# Patient Record
Sex: MALE | Race: BLACK OR AFRICAN AMERICAN | NOT HISPANIC OR LATINO | Employment: OTHER | URBAN - METROPOLITAN AREA
[De-identification: names, ages, dates, MRNs, and addresses within clinical notes are randomized per-mention and may not be internally consistent; named-entity substitution may affect disease eponyms.]

---

## 2018-01-27 ENCOUNTER — OFFICE VISIT (OUTPATIENT)
Dept: FAMILY MEDICINE | Facility: CLINIC | Age: 68
End: 2018-01-27
Payer: MEDICARE

## 2018-01-27 VITALS
SYSTOLIC BLOOD PRESSURE: 148 MMHG | BODY MASS INDEX: 20.83 KG/M2 | HEIGHT: 66 IN | TEMPERATURE: 98 F | HEART RATE: 81 BPM | WEIGHT: 129.63 LBS | OXYGEN SATURATION: 97 % | DIASTOLIC BLOOD PRESSURE: 94 MMHG

## 2018-01-27 DIAGNOSIS — J06.9 VIRAL URI: Primary | ICD-10-CM

## 2018-01-27 PROCEDURE — 99213 OFFICE O/P EST LOW 20 MIN: CPT | Mod: S$GLB,,, | Performed by: FAMILY MEDICINE

## 2018-01-27 PROCEDURE — 99999 PR PBB SHADOW E&M-NEW PATIENT-LVL III: CPT | Mod: PBBFAC,,, | Performed by: FAMILY MEDICINE

## 2018-01-27 NOTE — PROGRESS NOTES
"Routine Office Visit    Patient Name: Sharath Huizar    : 1950  MRN: [id number]    Subjective:  Sharath is a 67 y.o. male who presents today for:    1. Cold  Patient presenting today for cold.  States that he missed jury duty on Thursday due to a cold.  He reports subjective fever and chills.  He has been taking theraflu.  He is feeling much better today.  No cough or congestion.      Past Medical History  History reviewed. No pertinent past medical history.    Past Surgical History  History reviewed. No pertinent surgical history.    Family History  History reviewed. No pertinent family history.    Social History  Social History     Social History    Marital status:      Spouse name: N/A    Number of children: N/A    Years of education: N/A     Occupational History    Not on file.     Social History Main Topics    Smoking status: Current Every Day Smoker    Smokeless tobacco: Never Used    Alcohol use Yes    Drug use: No    Sexual activity: Yes     Partners: Female     Other Topics Concern    Not on file     Social History Narrative    No narrative on file       Current Medications  No current outpatient prescriptions on file prior to visit.     No current facility-administered medications on file prior to visit.        Allergies   Review of patient's allergies indicates:  No Known Allergies    Review of Systems (Pertinent positives)  Review of Systems   Constitutional: Negative.    HENT: Negative.    Eyes: Negative.    Respiratory: Negative.    Cardiovascular: Negative.    Gastrointestinal: Negative.    Skin: Negative.          BP (!) 148/94   Pulse 81   Temp 98.2 °F (36.8 °C) (Oral)   Ht 5' 6" (1.676 m)   Wt 58.8 kg (129 lb 10.1 oz)   SpO2 97%   BMI 20.92 kg/m²     GENERAL APPEARANCE: in no apparent distress and well developed and well nourished  HEENT: PERRL, EOMI, Sclera clear, anicteric, Oropharynx clear, no lesions, Neck supple with midline trachea  NECK: normal, supple, no " adenopathy, thyroid normal in size  RESPIRATORY: appears well, vitals normal, no respiratory distress, acyanotic, normal RR, chest clear, no wheezing, crepitations, rhonchi, normal symmetric air entry  HEART: regular rate and rhythm, S1, S2 normal, no murmur, click, rub or gallop.    ABDOMEN: abdomen is soft without tenderness, no masses, no hernias, no organomegaly, no rebound, no guarding. Suprapubic tenderness absent. No CVA tenderness.  SKIN: no rashes, no wounds, no other lesions  PSYCH: Alert, oriented x 3, thought content appropriate, speech normal, pleasant and cooperative, good eye contact, well groomed    Assessment/Plan:  Sharath Huizar is a 67 y.o. male who presents today for :    Sharath was seen today for cough.    Diagnoses and all orders for this visit:    Viral URI      1.  Patient has viral uri  2.  Follow up as needed  3.  He is to call with any concerns    Amilcar Jeong MD

## 2018-01-27 NOTE — LETTER
[facility]  [address]  [place] [address]  Phone: [phone]  Fax: [phone] January 27, 2018    Sharath Huizar  [address]  [place] [address]      To Whom It May Concern:    Sharath Huizar is unable to participate in jury duty due to feeling ill on Wednesday 1/24/18.    If you have any questions or concerns, please feel free to call my office.    Sincerely,        Amilcar Jeong MD

## 2018-03-06 ENCOUNTER — PES CALL (OUTPATIENT)
Dept: ADMINISTRATIVE | Facility: CLINIC | Age: 68
End: 2018-03-06

## 2018-03-23 ENCOUNTER — HOSPITAL ENCOUNTER (INPATIENT)
Facility: HOSPITAL | Age: 68
LOS: 3 days | Discharge: HOME | DRG: 246 | End: 2018-03-26
Attending: EMERGENCY MEDICINE | Admitting: INTERNAL MEDICINE
Payer: MEDICARE

## 2018-03-23 DIAGNOSIS — I21.4 NSTEMI (NON-ST ELEVATED MYOCARDIAL INFARCTION): Primary | ICD-10-CM

## 2018-03-23 DIAGNOSIS — I16.0 HYPERTENSIVE URGENCY: ICD-10-CM

## 2018-03-23 DIAGNOSIS — R07.9 CHEST PAIN: ICD-10-CM

## 2018-03-23 PROBLEM — Z72.0 TOBACCO ABUSE: Chronic | Status: ACTIVE | Noted: 2018-03-23

## 2018-03-23 LAB
ALBUMIN SERPL BCP-MCNC: 4 G/DL
ALP SERPL-CCNC: 87 U/L
ALT SERPL W/O P-5'-P-CCNC: 22 U/L
ANION GAP SERPL CALC-SCNC: 11 MMOL/L
AST SERPL-CCNC: 101 U/L
BASOPHILS # BLD AUTO: 0.01 K/UL
BASOPHILS NFR BLD: 0.1 %
BILIRUB SERPL-MCNC: 0.5 MG/DL
BNP SERPL-MCNC: 127 PG/ML
BUN SERPL-MCNC: 10 MG/DL
CALCIUM SERPL-MCNC: 9.9 MG/DL
CHLORIDE SERPL-SCNC: 103 MMOL/L
CO2 SERPL-SCNC: 22 MMOL/L
CREAT SERPL-MCNC: 0.9 MG/DL
DIFFERENTIAL METHOD: ABNORMAL
EOSINOPHIL # BLD AUTO: 0 K/UL
EOSINOPHIL NFR BLD: 0 %
ERYTHROCYTE [DISTWIDTH] IN BLOOD BY AUTOMATED COUNT: 14.3 %
EST. GFR  (AFRICAN AMERICAN): >60 ML/MIN/1.73 M^2
EST. GFR  (NON AFRICAN AMERICAN): >60 ML/MIN/1.73 M^2
GLUCOSE SERPL-MCNC: 129 MG/DL
HCT VFR BLD AUTO: 39.7 %
HGB BLD-MCNC: 13.4 G/DL
LYMPHOCYTES # BLD AUTO: 1.1 K/UL
LYMPHOCYTES NFR BLD: 7.8 %
MCH RBC QN AUTO: 29.8 PG
MCHC RBC AUTO-ENTMCNC: 33.8 G/DL
MCV RBC AUTO: 88 FL
MONOCYTES # BLD AUTO: 0.6 K/UL
MONOCYTES NFR BLD: 4.6 %
NEUTROPHILS # BLD AUTO: 12 K/UL
NEUTROPHILS NFR BLD: 87.4 %
PLATELET # BLD AUTO: 242 K/UL
PMV BLD AUTO: 10.5 FL
POTASSIUM SERPL-SCNC: 3.9 MMOL/L
PROT SERPL-MCNC: 7.4 G/DL
RBC # BLD AUTO: 4.5 M/UL
SODIUM SERPL-SCNC: 136 MMOL/L
TROPONIN I SERPL DL<=0.01 NG/ML-MCNC: 5.1 NG/ML
WBC # BLD AUTO: 13.78 K/UL

## 2018-03-23 PROCEDURE — 25000003 PHARM REV CODE 250: Performed by: EMERGENCY MEDICINE

## 2018-03-23 PROCEDURE — 96366 THER/PROPH/DIAG IV INF ADDON: CPT

## 2018-03-23 PROCEDURE — 83880 ASSAY OF NATRIURETIC PEPTIDE: CPT

## 2018-03-23 PROCEDURE — 63600175 PHARM REV CODE 636 W HCPCS: Mod: JG

## 2018-03-23 PROCEDURE — 85025 COMPLETE CBC W/AUTO DIFF WBC: CPT

## 2018-03-23 PROCEDURE — 25000003 PHARM REV CODE 250

## 2018-03-23 PROCEDURE — 99152 MOD SED SAME PHYS/QHP 5/>YRS: CPT

## 2018-03-23 PROCEDURE — 92929 CATH LAB PROCEDURE: CPT | Mod: RC,,, | Performed by: INTERNAL MEDICINE

## 2018-03-23 PROCEDURE — C1769 GUIDE WIRE: HCPCS

## 2018-03-23 PROCEDURE — B2111ZZ FLUOROSCOPY OF MULTIPLE CORONARY ARTERIES USING LOW OSMOLAR CONTRAST: ICD-10-PCS | Performed by: INTERNAL MEDICINE

## 2018-03-23 PROCEDURE — 93010 ELECTROCARDIOGRAM REPORT: CPT | Mod: ,,, | Performed by: INTERNAL MEDICINE

## 2018-03-23 PROCEDURE — 96365 THER/PROPH/DIAG IV INF INIT: CPT

## 2018-03-23 PROCEDURE — 99152 MOD SED SAME PHYS/QHP 5/>YRS: CPT | Mod: ,,, | Performed by: INTERNAL MEDICINE

## 2018-03-23 PROCEDURE — 83690 ASSAY OF LIPASE: CPT

## 2018-03-23 PROCEDURE — 20000000 HC ICU ROOM

## 2018-03-23 PROCEDURE — 83036 HEMOGLOBIN GLYCOSYLATED A1C: CPT

## 2018-03-23 PROCEDURE — 93458 L HRT ARTERY/VENTRICLE ANGIO: CPT | Mod: 26,59,, | Performed by: INTERNAL MEDICINE

## 2018-03-23 PROCEDURE — 63600175 PHARM REV CODE 636 W HCPCS: Mod: JG | Performed by: INTERNAL MEDICINE

## 2018-03-23 PROCEDURE — 36415 COLL VENOUS BLD VENIPUNCTURE: CPT

## 2018-03-23 PROCEDURE — 96375 TX/PRO/DX INJ NEW DRUG ADDON: CPT

## 2018-03-23 PROCEDURE — S0028 INJECTION, FAMOTIDINE, 20 MG: HCPCS | Performed by: EMERGENCY MEDICINE

## 2018-03-23 PROCEDURE — 4A023N7 MEASUREMENT OF CARDIAC SAMPLING AND PRESSURE, LEFT HEART, PERCUTANEOUS APPROACH: ICD-10-PCS | Performed by: INTERNAL MEDICINE

## 2018-03-23 PROCEDURE — 99285 EMERGENCY DEPT VISIT HI MDM: CPT | Mod: 25

## 2018-03-23 PROCEDURE — 86850 RBC ANTIBODY SCREEN: CPT

## 2018-03-23 PROCEDURE — 84484 ASSAY OF TROPONIN QUANT: CPT | Mod: 91

## 2018-03-23 PROCEDURE — 63600175 PHARM REV CODE 636 W HCPCS: Performed by: EMERGENCY MEDICINE

## 2018-03-23 PROCEDURE — 027137Z DILATION OF CORONARY ARTERY, TWO ARTERIES WITH FOUR OR MORE DRUG-ELUTING INTRALUMINAL DEVICES, PERCUTANEOUS APPROACH: ICD-10-PCS | Performed by: INTERNAL MEDICINE

## 2018-03-23 PROCEDURE — 80053 COMPREHEN METABOLIC PANEL: CPT

## 2018-03-23 PROCEDURE — 93005 ELECTROCARDIOGRAM TRACING: CPT

## 2018-03-23 PROCEDURE — 93010 ELECTROCARDIOGRAM REPORT: CPT | Mod: 76,,, | Performed by: INTERNAL MEDICINE

## 2018-03-23 PROCEDURE — 92941 PRQ TRLML REVSC TOT OCCL AMI: CPT | Mod: RC,,, | Performed by: INTERNAL MEDICINE

## 2018-03-23 PROCEDURE — 84484 ASSAY OF TROPONIN QUANT: CPT

## 2018-03-23 PROCEDURE — 25000003 PHARM REV CODE 250: Performed by: INTERNAL MEDICINE

## 2018-03-23 RX ORDER — NITROGLYCERIN 20 MG/100ML
5 INJECTION INTRAVENOUS CONTINUOUS
Status: DISCONTINUED | OUTPATIENT
Start: 2018-03-23 | End: 2018-03-23

## 2018-03-23 RX ORDER — MORPHINE SULFATE 4 MG/ML
2 INJECTION, SOLUTION INTRAMUSCULAR; INTRAVENOUS
Status: COMPLETED | OUTPATIENT
Start: 2018-03-23 | End: 2018-03-23

## 2018-03-23 RX ORDER — ONDANSETRON 2 MG/ML
4 INJECTION INTRAMUSCULAR; INTRAVENOUS
Status: COMPLETED | OUTPATIENT
Start: 2018-03-23 | End: 2018-03-23

## 2018-03-23 RX ORDER — CLOPIDOGREL 300 MG/1
300 TABLET, FILM COATED ORAL
Status: COMPLETED | OUTPATIENT
Start: 2018-03-23 | End: 2018-03-23

## 2018-03-23 RX ORDER — TIROFIBAN HYDROCHLORIDE 50 UG/ML
0.15 INJECTION INTRAVENOUS CONTINUOUS
Status: DISCONTINUED | OUTPATIENT
Start: 2018-03-23 | End: 2018-03-24

## 2018-03-23 RX ORDER — LABETALOL HYDROCHLORIDE 5 MG/ML
20 INJECTION, SOLUTION INTRAVENOUS
Status: COMPLETED | OUTPATIENT
Start: 2018-03-23 | End: 2018-03-23

## 2018-03-23 RX ORDER — ENOXAPARIN SODIUM 100 MG/ML
40 INJECTION SUBCUTANEOUS
Status: DISCONTINUED | OUTPATIENT
Start: 2018-03-24 | End: 2018-03-24

## 2018-03-23 RX ORDER — NITROGLYCERIN 0.4 MG/1
0.4 TABLET SUBLINGUAL EVERY 5 MIN PRN
Status: DISCONTINUED | OUTPATIENT
Start: 2018-03-24 | End: 2018-03-26 | Stop reason: HOSPADM

## 2018-03-23 RX ORDER — ASPIRIN 325 MG
325 TABLET ORAL DAILY
Status: DISCONTINUED | OUTPATIENT
Start: 2018-03-24 | End: 2018-03-24

## 2018-03-23 RX ORDER — METOPROLOL SUCCINATE 50 MG/1
50 TABLET, EXTENDED RELEASE ORAL DAILY
Status: DISCONTINUED | OUTPATIENT
Start: 2018-03-24 | End: 2018-03-26

## 2018-03-23 RX ORDER — AMOXICILLIN 250 MG
1 CAPSULE ORAL 2 TIMES DAILY PRN
Status: DISCONTINUED | OUTPATIENT
Start: 2018-03-24 | End: 2018-03-26 | Stop reason: HOSPADM

## 2018-03-23 RX ORDER — RAMELTEON 8 MG/1
8 TABLET ORAL NIGHTLY PRN
Status: DISCONTINUED | OUTPATIENT
Start: 2018-03-24 | End: 2018-03-26 | Stop reason: HOSPADM

## 2018-03-23 RX ORDER — METOPROLOL TARTRATE 25 MG/1
25 TABLET, FILM COATED ORAL
Status: COMPLETED | OUTPATIENT
Start: 2018-03-23 | End: 2018-03-23

## 2018-03-23 RX ORDER — ASPIRIN 325 MG
325 TABLET ORAL
Status: COMPLETED | OUTPATIENT
Start: 2018-03-23 | End: 2018-03-23

## 2018-03-23 RX ORDER — ENOXAPARIN SODIUM 100 MG/ML
1 INJECTION SUBCUTANEOUS
Status: DISCONTINUED | OUTPATIENT
Start: 2018-03-23 | End: 2018-03-23

## 2018-03-23 RX ORDER — ATORVASTATIN CALCIUM 40 MG/1
80 TABLET, FILM COATED ORAL DAILY
Status: DISCONTINUED | OUTPATIENT
Start: 2018-03-23 | End: 2018-03-26 | Stop reason: HOSPADM

## 2018-03-23 RX ORDER — ONDANSETRON 2 MG/ML
8 INJECTION INTRAMUSCULAR; INTRAVENOUS EVERY 8 HOURS PRN
Status: DISCONTINUED | OUTPATIENT
Start: 2018-03-23 | End: 2018-03-26 | Stop reason: HOSPADM

## 2018-03-23 RX ORDER — NITROGLYCERIN 0.4 MG/1
0.4 TABLET SUBLINGUAL EVERY 5 MIN PRN
Status: DISCONTINUED | OUTPATIENT
Start: 2018-03-23 | End: 2018-03-23

## 2018-03-23 RX ORDER — LISINOPRIL 5 MG/1
5 TABLET ORAL DAILY
Status: DISCONTINUED | OUTPATIENT
Start: 2018-03-23 | End: 2018-03-26

## 2018-03-23 RX ORDER — MORPHINE SULFATE 4 MG/ML
2 INJECTION, SOLUTION INTRAMUSCULAR; INTRAVENOUS EVERY 4 HOURS PRN
Status: DISCONTINUED | OUTPATIENT
Start: 2018-03-24 | End: 2018-03-24

## 2018-03-23 RX ORDER — ACETAMINOPHEN 500 MG
500 TABLET ORAL EVERY 6 HOURS PRN
Status: DISCONTINUED | OUTPATIENT
Start: 2018-03-24 | End: 2018-03-24

## 2018-03-23 RX ORDER — FAMOTIDINE 10 MG/ML
20 INJECTION INTRAVENOUS
Status: COMPLETED | OUTPATIENT
Start: 2018-03-23 | End: 2018-03-23

## 2018-03-23 RX ORDER — TIROFIBAN HYDROCHLORIDE 50 UG/ML
25 INJECTION INTRAVENOUS ONCE
Status: COMPLETED | OUTPATIENT
Start: 2018-03-23 | End: 2018-03-23

## 2018-03-23 RX ORDER — AMLODIPINE BESYLATE 5 MG/1
5 TABLET ORAL
Status: COMPLETED | OUTPATIENT
Start: 2018-03-23 | End: 2018-03-23

## 2018-03-23 RX ORDER — IBUPROFEN 200 MG
1 TABLET ORAL DAILY
Status: DISCONTINUED | OUTPATIENT
Start: 2018-03-24 | End: 2018-03-26 | Stop reason: HOSPADM

## 2018-03-23 RX ORDER — SODIUM CHLORIDE 0.9 % (FLUSH) 0.9 %
3 SYRINGE (ML) INJECTION
Status: CANCELLED | OUTPATIENT
Start: 2018-03-23

## 2018-03-23 RX ADMIN — ATORVASTATIN CALCIUM 80 MG: 40 TABLET, FILM COATED ORAL at 11:03

## 2018-03-23 RX ADMIN — MORPHINE SULFATE 2 MG: 4 INJECTION INTRAVENOUS at 07:03

## 2018-03-23 RX ADMIN — LISINOPRIL 5 MG: 5 TABLET ORAL at 11:03

## 2018-03-23 RX ADMIN — LABETALOL HYDROCHLORIDE 20 MG: 5 INJECTION, SOLUTION INTRAVENOUS at 07:03

## 2018-03-23 RX ADMIN — ONDANSETRON HYDROCHLORIDE 4 MG: 2 INJECTION, SOLUTION INTRAMUSCULAR; INTRAVENOUS at 06:03

## 2018-03-23 RX ADMIN — TIROFIBAN 0.15 MCG/KG/MIN: 5 INJECTION, SOLUTION INTRAVENOUS at 11:03

## 2018-03-23 RX ADMIN — SODIUM CHLORIDE 3 ML/KG/HR: 0.9 INJECTION, SOLUTION INTRAVENOUS at 11:03

## 2018-03-23 RX ADMIN — ASPIRIN 325 MG ORAL TABLET 325 MG: 325 PILL ORAL at 05:03

## 2018-03-23 RX ADMIN — METOPROLOL TARTRATE 25 MG: 25 TABLET, FILM COATED ORAL at 08:03

## 2018-03-23 RX ADMIN — NITROGLYCERIN 0.4 MG: 0.4 TABLET SUBLINGUAL at 05:03

## 2018-03-23 RX ADMIN — NITROGLYCERIN 5 MCG/MIN: 20 INJECTION INTRAVENOUS at 09:03

## 2018-03-23 RX ADMIN — TIROFIBAN 1530 MCG: 5 INJECTION, SOLUTION INTRAVENOUS at 11:03

## 2018-03-23 RX ADMIN — AMLODIPINE BESYLATE 5 MG: 5 TABLET ORAL at 06:03

## 2018-03-23 RX ADMIN — CLOPIDOGREL BISULFATE 300 MG: 300 TABLET, FILM COATED ORAL at 09:03

## 2018-03-23 RX ADMIN — ENOXAPARIN SODIUM 40 MG: 100 INJECTION SUBCUTANEOUS at 11:03

## 2018-03-23 RX ADMIN — DICYCLOMINE HYDROCHLORIDE: 10 SOLUTION ORAL at 05:03

## 2018-03-23 RX ADMIN — FAMOTIDINE 20 MG: 10 INJECTION INTRAVENOUS at 05:03

## 2018-03-23 NOTE — ED NOTES
Pt placed on cardiac monitoring.  Unable to obtain ECG. Portable ECG machine obtained. Cathryn, PCT obtaining ECG with portable machine.

## 2018-03-23 NOTE — ED PROVIDER NOTES
"Encounter Date: 3/23/2018    SCRIBE #1 NOTE: I, Leticia Gary, am scribing for, and in the presence of,  Noé Vergara MD. I have scribed the following portions of the note - Other sections scribed: HPI, ROS and PE.       History     Chief Complaint   Patient presents with    Chest Pain     left side chest pain to abdominal area started this morning     CC: Chest Pain    HPI: This 67 y.o. Male, smoker, with no known medical history presents to the ED c/o acute left sided chest pain radiating to the abdomen that began at 7:00 am this morning while at work. Pt describes the symptoms as "aching", constant and severe (10/10). He reports that he has also been experiencing emesis as well as "a little bit" of sweats. Pt notes that he last ate kiley sa[employer]ages last night. He additionally notes that he typically consumes 3x beers a day. Pt denies fever, shortness of breath and dysuria. He also denies any recent falls or injuries. No other associated symptoms. No alleviating factors.           The history is provided by the patient. No  was used.     Review of patient's allergies indicates:  No Known Allergies  No past medical history on file.  No past surgical history on file.  No family history on file.  Social History   Substance Use Topics    Smoking status: Current Every Day Smoker    Smokeless tobacco: Never Used    Alcohol use Yes     Review of Systems   Constitutional: Negative for chills and fever.        (+) sweats ("a little bit")   HENT: Negative for congestion, ear pain, rhinorrhea and sore throat.    Eyes: Negative for pain and visual disturbance.   Respiratory: Negative for cough and shortness of breath.    Cardiovascular: Positive for chest pain (left sided radiating to the abdomen).   Gastrointestinal: Positive for abdominal pain and vomiting. Negative for diarrhea and nausea.   Genitourinary: Negative for dysuria.   Musculoskeletal: Negative for back pain and neck pain.   Skin: " Negative for rash.   Neurological: Negative for headaches.       Physical Exam     Initial Vitals [03/23/18 1711]   BP Pulse Resp Temp SpO2   (!) 206/101 98 20 98.6 °F (37 °C) 99 %      MAP       136         Physical Exam    Nursing note and vitals reviewed.  Constitutional: Vital signs are normal. He appears well-developed and well-nourished. He is active.  Non-toxic appearance. No distress.   HENT:   Head: Normocephalic and atraumatic.   Mouth/Throat: Oropharynx is clear and moist.   Eyes: EOM are normal. Pupils are equal, round, and reactive to light. No scleral icterus.   Neck: Trachea normal. Neck supple. No JVD present.   Cardiovascular: Normal rate and regular rhythm.   Pulmonary/Chest: Breath sounds normal. No respiratory distress.   Abdominal: Soft. Normal appearance and bowel sounds are normal. He exhibits no distension. There is tenderness. There is no rebound and no guarding.   There is epigastric tenderness to palpation.    Musculoskeletal: Normal range of motion. He exhibits no edema or tenderness.   Neurological: He is alert and oriented to person, place, and time.   Skin: Skin is warm, dry and intact.   Psychiatric: He has a normal mood and affect.         ED Course   Procedures  Labs Reviewed   CBC W/ AUTO DIFFERENTIAL - Abnormal; Notable for the following:        Result Value    WBC 13.78 (*)     RBC 4.50 (*)     Hemoglobin 13.4 (*)     Hematocrit 39.7 (*)     Gran # (ANC) 12.0 (*)     Gran% 87.4 (*)     Lymph% 7.8 (*)     All other components within normal limits   COMPREHENSIVE METABOLIC PANEL - Abnormal; Notable for the following:     CO2 22 (*)     Glucose 129 (*)      (*)     All other components within normal limits   TROPONIN I - Abnormal; Notable for the following:     Troponin I 5.102 (*)     All other components within normal limits   B-TYPE NATRIURETIC PEPTIDE - Abnormal; Notable for the following:      (*)     All other components within normal limits   TROPONIN I     EKG  Readings: (Independently Interpreted)   Initial Reading: No STEMI. Rhythm: Normal Sinus Rhythm. Heart Rate: 94. Ectopy: No Ectopy. Conduction: Normal. ST Segment Depression: V5 and V6. T Waves Flipped: II, III and AVF.       X-Rays:   Independently Interpreted Readings:   Chest X-Ray: No infiltrates.  No acute abnormalities.     Medical Decision Making:   Differential Diagnosis:   ACS  GERD  Gastritis  Musculoskeletal pain  Independently Interpreted Test(s):   I have ordered and independently interpreted X-rays - see prior notes.  I have ordered and independently interpreted EKG Reading(s) - see prior notes  Clinical Tests:   Lab Tests: Ordered and Reviewed  Radiological Study: Ordered and Reviewed  Medical Tests: Reviewed and Ordered  ED Management:  Presented with complaint of chest pain.  EKG has T-wave inversions in the inferior leads.  Patient given by mouth nitroglycerin without improvement in symptoms.  Patient notes be hypertensive and given antihypertensive medications.  Labs notable for troponin that is markedly elevated to 5.1.    Consult: I have spoken with Dr. Lombardi from the Cardiology service regarding the patient's presentation and study results.  At this time, the recommendation is nitro drip, full dose Lovenox anticoagulation, beta blocker, admission to ICU, nothing by mouth for likely cardiac intervention In the morning.       Addendum: Upon reviewing the EKG, Dr. Lombardi of Cardiology, is concerned the patient may have an evolving inferior Mi.  At this time he recommends cath lab activation to further evaluate patient's coronary blood flow.  Lovenox will be held at this time but he recommends giving full dose of Plavix.  Other:   I have discussed this case with another health care provider.       <> Summary of the Discussion: I have discussed the patient's presentation and workup with the hospitalist who agrees with placing the patient in the hospital.  I have placed orders for the hospitalist.                Scribe Attestation:   Scribe #1: I performed the above scribed service and the documentation accurately describes the services I performed. I attest to the accuracy of the note.    Attending Attestation:           Physician Attestation for Scribe:  Physician Attestation Statement for Scribe #1: I, Noé Vergara MD, reviewed documentation, as scribed by Leticia Vega in my presence, and it is both accurate and complete.                    Clinical Impression:   The primary encounter diagnosis was NSTEMI (non-ST elevated myocardial infarction). A diagnosis of Chest pain was also pertinent to this visit.    Disposition:   Disposition: Admitted  Condition: Serious                        Noé Vergara MD  03/23/18 2047       Noé Vergara MD  03/23/18 2102       Noé Vergara MD  03/23/18 2116

## 2018-03-24 PROBLEM — R79.89 ELEVATED LFTS: Status: ACTIVE | Noted: 2018-03-24

## 2018-03-24 PROBLEM — D72.829 LEUKOCYTOSIS: Status: ACTIVE | Noted: 2018-03-24

## 2018-03-24 LAB
ABO + RH BLD: NORMAL
ALBUMIN SERPL BCP-MCNC: 3.4 G/DL
ALP SERPL-CCNC: 78 U/L
ALT SERPL W/O P-5'-P-CCNC: 90 U/L
ANION GAP SERPL CALC-SCNC: 9 MMOL/L
AST SERPL-CCNC: 637 U/L
BASOPHILS # BLD AUTO: 0.02 K/UL
BASOPHILS NFR BLD: 0.1 %
BILIRUB SERPL-MCNC: 0.8 MG/DL
BLD GP AB SCN CELLS X3 SERPL QL: NORMAL
BUN SERPL-MCNC: 9 MG/DL
CALCIUM SERPL-MCNC: 9.2 MG/DL
CHLORIDE SERPL-SCNC: 103 MMOL/L
CO2 SERPL-SCNC: 26 MMOL/L
CREAT SERPL-MCNC: 0.9 MG/DL
DIFFERENTIAL METHOD: ABNORMAL
EOSINOPHIL # BLD AUTO: 0 K/UL
EOSINOPHIL NFR BLD: 0.1 %
ERYTHROCYTE [DISTWIDTH] IN BLOOD BY AUTOMATED COUNT: 14.6 %
EST. GFR  (AFRICAN AMERICAN): >60 ML/MIN/1.73 M^2
EST. GFR  (NON AFRICAN AMERICAN): >60 ML/MIN/1.73 M^2
ESTIMATED AVG GLUCOSE: 94 MG/DL
GLUCOSE SERPL-MCNC: 167 MG/DL
HBA1C MFR BLD HPLC: 4.9 %
HCT VFR BLD AUTO: 41.3 %
HGB BLD-MCNC: 14.1 G/DL
LIPASE SERPL-CCNC: 12 U/L
LYMPHOCYTES # BLD AUTO: 1.9 K/UL
LYMPHOCYTES NFR BLD: 12.6 %
MAGNESIUM SERPL-MCNC: 2.2 MG/DL
MCH RBC QN AUTO: 30.1 PG
MCHC RBC AUTO-ENTMCNC: 34.1 G/DL
MCV RBC AUTO: 88 FL
MONOCYTES # BLD AUTO: 0.8 K/UL
MONOCYTES NFR BLD: 5.3 %
NEUTROPHILS # BLD AUTO: 12.2 K/UL
NEUTROPHILS NFR BLD: 81.7 %
PHOSPHATE SERPL-MCNC: 3.4 MG/DL
PLATELET # BLD AUTO: 258 K/UL
PMV BLD AUTO: 10.3 FL
POTASSIUM SERPL-SCNC: 4 MMOL/L
PROT SERPL-MCNC: 6.5 G/DL
RBC # BLD AUTO: 4.69 M/UL
SODIUM SERPL-SCNC: 138 MMOL/L
TROPONIN I SERPL DL<=0.01 NG/ML-MCNC: >50 NG/ML
WBC # BLD AUTO: 14.97 K/UL

## 2018-03-24 PROCEDURE — 25000003 PHARM REV CODE 250: Performed by: INTERNAL MEDICINE

## 2018-03-24 PROCEDURE — S4991 NICOTINE PATCH NONLEGEND: HCPCS | Performed by: INTERNAL MEDICINE

## 2018-03-24 PROCEDURE — 93306 TTE W/DOPPLER COMPLETE: CPT

## 2018-03-24 PROCEDURE — 93306 TTE W/DOPPLER COMPLETE: CPT | Mod: 26,,, | Performed by: INTERNAL MEDICINE

## 2018-03-24 PROCEDURE — 85025 COMPLETE CBC W/AUTO DIFF WBC: CPT

## 2018-03-24 PROCEDURE — 99233 SBSQ HOSP IP/OBS HIGH 50: CPT | Mod: ,,, | Performed by: INTERNAL MEDICINE

## 2018-03-24 PROCEDURE — 84484 ASSAY OF TROPONIN QUANT: CPT

## 2018-03-24 PROCEDURE — 21400001 HC TELEMETRY ROOM

## 2018-03-24 PROCEDURE — 63600175 PHARM REV CODE 636 W HCPCS: Performed by: INTERNAL MEDICINE

## 2018-03-24 PROCEDURE — 27000221 HC OXYGEN, UP TO 24 HOURS

## 2018-03-24 PROCEDURE — 63600175 PHARM REV CODE 636 W HCPCS: Performed by: HOSPITALIST

## 2018-03-24 PROCEDURE — 83735 ASSAY OF MAGNESIUM: CPT

## 2018-03-24 PROCEDURE — 84100 ASSAY OF PHOSPHORUS: CPT

## 2018-03-24 PROCEDURE — 80053 COMPREHEN METABOLIC PANEL: CPT

## 2018-03-24 PROCEDURE — 93010 ELECTROCARDIOGRAM REPORT: CPT | Mod: ,,, | Performed by: INTERNAL MEDICINE

## 2018-03-24 PROCEDURE — 93005 ELECTROCARDIOGRAM TRACING: CPT

## 2018-03-24 PROCEDURE — 36415 COLL VENOUS BLD VENIPUNCTURE: CPT

## 2018-03-24 PROCEDURE — 25000003 PHARM REV CODE 250: Performed by: EMERGENCY MEDICINE

## 2018-03-24 RX ORDER — ACETAMINOPHEN 325 MG/1
650 TABLET ORAL EVERY 4 HOURS PRN
Status: DISCONTINUED | OUTPATIENT
Start: 2018-03-24 | End: 2018-03-26 | Stop reason: HOSPADM

## 2018-03-24 RX ORDER — OXYCODONE HYDROCHLORIDE 5 MG/1
5 TABLET ORAL EVERY 4 HOURS PRN
Status: DISCONTINUED | OUTPATIENT
Start: 2018-03-24 | End: 2018-03-25

## 2018-03-24 RX ORDER — ASPIRIN 81 MG/1
81 TABLET ORAL DAILY
Status: DISCONTINUED | OUTPATIENT
Start: 2018-03-25 | End: 2018-03-26 | Stop reason: HOSPADM

## 2018-03-24 RX ORDER — ENOXAPARIN SODIUM 100 MG/ML
40 INJECTION SUBCUTANEOUS
Status: DISCONTINUED | OUTPATIENT
Start: 2018-03-24 | End: 2018-03-24

## 2018-03-24 RX ORDER — ENOXAPARIN SODIUM 100 MG/ML
40 INJECTION SUBCUTANEOUS
Status: DISCONTINUED | OUTPATIENT
Start: 2018-03-24 | End: 2018-03-26 | Stop reason: HOSPADM

## 2018-03-24 RX ADMIN — NICOTINE 1 PATCH: 21 PATCH, EXTENDED RELEASE TRANSDERMAL at 08:03

## 2018-03-24 RX ADMIN — ATORVASTATIN CALCIUM 80 MG: 40 TABLET, FILM COATED ORAL at 08:03

## 2018-03-24 RX ADMIN — ASPIRIN 325 MG ORAL TABLET 325 MG: 325 PILL ORAL at 08:03

## 2018-03-24 RX ADMIN — ENOXAPARIN SODIUM 40 MG: 100 INJECTION SUBCUTANEOUS at 05:03

## 2018-03-24 RX ADMIN — METOPROLOL SUCCINATE 50 MG: 50 TABLET, EXTENDED RELEASE ORAL at 08:03

## 2018-03-24 RX ADMIN — LISINOPRIL 5 MG: 5 TABLET ORAL at 08:03

## 2018-03-24 NOTE — HPI
Mr. Sharath Huizar is a 67 y.o. male with tobacco abuse who presents to [facility] ED with complaints of chest pain this morning.  He was at work when the pain started, and it was described as mid-epigastric with radiation into his abdomen, was sharp in quality, and was 10/10 in severity at its worst.  He reports some nausea but denies any vomiting.  He also had some diaphoresis but not any palpitations, shortness of breath, hemoptysis, nor any lower extremity pain or swelling.  There were no alleviating or exacerbating factors, and he has never had similar episodes in the past.  He denies any personal or family history of heart attacks.  He does admit to smoking 1 pack a day of cigarettes since his early 20's, and drinks about 2-3 beers a day.

## 2018-03-24 NOTE — SUBJECTIVE & OBJECTIVE
No past medical history on file.    No past surgical history on file.    Review of patient's allergies indicates:  No Known Allergies    No current facility-administered medications on file prior to encounter.      No current outpatient prescriptions on file prior to encounter.     Family History     None        Social History Main Topics    Smoking status: Current Every Day Smoker    Smokeless tobacco: Never Used    Alcohol use Yes    Drug use: No    Sexual activity: Yes     Partners: Female     Review of Systems   Constitutional: Positive for diaphoresis. Negative for activity change, appetite change, chills, fatigue, fever and unexpected weight change.   HENT: Negative.    Eyes: Negative.    Respiratory: Negative for cough, chest tightness, shortness of breath and wheezing.    Cardiovascular: Positive for chest pain. Negative for palpitations and leg swelling.   Gastrointestinal: Positive for nausea. Negative for abdominal distention, abdominal pain, blood in stool, constipation, diarrhea and vomiting.   Genitourinary: Negative for dysuria and hematuria.   Musculoskeletal: Negative.    Skin: Negative.    Neurological: Negative for dizziness, seizures, syncope, weakness and light-headedness.   Psychiatric/Behavioral: Negative.      Objective:     Vital Signs (Most Recent):  Temp: 98 °F (36.7 °C) (03/23/18 2323)  Pulse: 79 (03/23/18 2345)  Resp: (!) 34 (03/23/18 2345)  BP: (!) 148/86 (03/23/18 2345)  SpO2: 100 % (03/23/18 2345) Vital Signs (24h Range):  Temp:  [98 °F (36.7 °C)-98.6 °F (37 °C)] 98 °F (36.7 °C)  Pulse:  [70-98] 79  Resp:  [20-40] 34  SpO2:  [89 %-100 %] 100 %  BP: (148-206)/() 148/86     Weight: 56.8 kg (125 lb 3.5 oz)  Body mass index is 20.21 kg/m².    Physical Exam   Constitutional: He is oriented to person, place, and time. He appears well-developed and well-nourished. No distress.   HENT:   Head: Normocephalic and atraumatic.   Right Ear: External ear normal.   Left Ear: External ear  normal.   Nose: Nose normal.   Eyes: Right eye exhibits no discharge. Left eye exhibits no discharge.   Neck: Normal range of motion.   Cardiovascular: Normal rate, regular rhythm, normal heart sounds and intact distal pulses.  Exam reveals no gallop and no friction rub.    No murmur heard.  Pulmonary/Chest: Effort normal and breath sounds normal. No respiratory distress. He has no wheezes. He has no rales. He exhibits no tenderness.   Abdominal: Soft. Bowel sounds are normal. He exhibits no distension. There is no tenderness. There is no rebound.   Musculoskeletal: Normal range of motion. He exhibits no edema.   Neurological: He is alert and oriented to person, place, and time.   Skin: Skin is warm and dry. He is not diaphoretic. No erythema.   Psychiatric: He has a normal mood and affect. His behavior is normal. Judgment and thought content normal.   Nursing note and vitals reviewed.          Significant Labs: All pertinent labs within the past 24 hours have been reviewed.    Significant Imaging: I have reviewed and interpreted all pertinent imaging results/findings within the past 24 hours.

## 2018-03-24 NOTE — SUBJECTIVE & OBJECTIVE
Interval History: No acute events overnight.    Review of Systems   HENT: Negative for ear discharge and ear pain.    Eyes: Negative for pain and itching.   Endocrine: Negative for polyphagia and polyuria.   Neurological: Negative for tremors and seizures.     Objective:     Vital Signs (Most Recent):  Temp: 98 °F (36.7 °C) (03/24/18 1100)  Pulse: 73 (03/24/18 1100)  Resp: (!) 25 (03/24/18 1100)  BP: 133/72 (03/24/18 1100)  SpO2: 100 % (03/24/18 1100) Vital Signs (24h Range):  Temp:  [98 °F (36.7 °C)-98.6 °F (37 °C)] 98 °F (36.7 °C)  Pulse:  [68-98] 73  Resp:  [19-40] 25  SpO2:  [89 %-100 %] 100 %  BP: (108-206)/() 133/72     Weight: 56.8 kg (125 lb 3.5 oz)  Body mass index is 20.21 kg/m².    Intake/Output Summary (Last 24 hours) at 03/24/18 1206  Last data filed at 03/24/18 0900   Gross per 24 hour   Intake           136.93 ml   Output              200 ml   Net           -63.07 ml      Physical Exam   Constitutional: He is oriented to person, place, and time. He appears well-developed and well-nourished. No distress.   HENT:   Head: Normocephalic and atraumatic.   Right Ear: External ear normal.   Left Ear: External ear normal.   Nose: Nose normal.   Eyes: Right eye exhibits no discharge. Left eye exhibits no discharge.   Neck: Normal range of motion.   Cardiovascular: Normal rate, regular rhythm, normal heart sounds and intact distal pulses.  Exam reveals no gallop and no friction rub.    No murmur heard.  Pulmonary/Chest: Effort normal and breath sounds normal. No respiratory distress. He has no wheezes. He has no rales. He exhibits no tenderness.   Abdominal: Soft. Bowel sounds are normal. He exhibits no distension. There is no tenderness. There is no rebound.   Musculoskeletal: Normal range of motion. He exhibits no edema.   Neurological: He is alert and oriented to person, place, and time.   Skin: Skin is warm and dry. He is not diaphoretic. No erythema.   Psychiatric: He has a normal mood and affect.  His behavior is normal. Judgment and thought content normal.   Nursing note and vitals reviewed.      Significant Labs:   BMP:   Recent Labs  Lab 03/24/18  0817   *      K 4.0      CO2 26   BUN 9   CREATININE 0.9   CALCIUM 9.2   MG 2.2     CBC:   Recent Labs  Lab 03/23/18  1726 03/24/18  0817   WBC 13.78* 14.97*   HGB 13.4* 14.1   HCT 39.7* 41.3    258       Significant Imaging: I have reviewed all pertinent imaging results/findings within the past 24 hours.

## 2018-03-24 NOTE — PLAN OF CARE
Problem: Patient Care Overview  Goal: Plan of Care Review  Outcome: Ongoing (interventions implemented as appropriate)  Pt admitted to ICU post cardiac catheterization, four stents placed. Right radial approach, pressure band in place, small hematoma noted but no bleeding. Aggrastat gtt to infuse over 12 hrs. Pt describes pain as 3/10 now, Tridil gtt discontinued per MD. Echo planned for today. VSS.     Vasc band removed this AM per orders without complication.

## 2018-03-24 NOTE — SUBJECTIVE & OBJECTIVE
Interval History:     Review of Systems   Constitution: Negative for decreased appetite.   HENT: Negative for ear discharge.    Eyes: Negative for blurred vision.   Endocrine: Negative for polyphagia.   Skin: Negative for nail changes.   Neurological: Negative for aphonia.   Psychiatric/Behavioral: Negative for hallucinations.     Objective:     Vital Signs (Most Recent):  Temp: 98 °F (36.7 °C) (03/23/18 2323)  Pulse: 82 (03/24/18 0800)  Resp: (!) 38 (03/24/18 0800)  BP: 117/68 (03/24/18 0800)  SpO2: 100 % (03/24/18 0800) Vital Signs (24h Range):  Temp:  [98 °F (36.7 °C)-98.6 °F (37 °C)] 98 °F (36.7 °C)  Pulse:  [68-98] 82  Resp:  [19-40] 38  SpO2:  [89 %-100 %] 100 %  BP: (117-206)/() 117/68     Weight: 56.8 kg (125 lb 3.5 oz)  Body mass index is 20.21 kg/m².     SpO2: 100 %  O2 Device (Oxygen Therapy): nasal cannula      Intake/Output Summary (Last 24 hours) at 03/24/18 0918  Last data filed at 03/24/18 0800   Gross per 24 hour   Intake           125.93 ml   Output              200 ml   Net           -74.07 ml       Lines/Drains/Airways     Peripheral Intravenous Line                 Peripheral IV - Single Lumen Left Antecubital -- days         Peripheral IV - Single Lumen 03/23/18 2056 Left Forearm less than 1 day                Physical Exam   Constitutional: He is oriented to person, place, and time. He appears well-developed and well-nourished.   HENT:   Head: Normocephalic and atraumatic.   Eyes: Conjunctivae are normal. Pupils are equal, round, and reactive to light.   Neck: Normal range of motion. Neck supple.   Cardiovascular: Normal rate, normal heart sounds and intact distal pulses.    Pulmonary/Chest: Effort normal and breath sounds normal.   Abdominal: Soft. Bowel sounds are normal.   Musculoskeletal: Normal range of motion.   Neurological: He is alert and oriented to person, place, and time.   Skin: Skin is warm and dry.       Significant Labs: All pertinent lab results from the last 24 hours  have been reviewed.    Significant Imaging: Echocardiogram: 2D echo with color flow doppler: No results found for this or any previous visit.

## 2018-03-24 NOTE — HOSPITAL COURSE
68 y/o male admitted to ICU with NSTEMI.  Underwent LHC with JUANCARLOS x 4 to RCA. Residual LAD disease that will need to be addressed at a later date.  Remained on Aggrastat infusion, now off.  TTE showing new EF 35%. Patient euvolemic. Discharged with cardiology follow up.

## 2018-03-24 NOTE — ASSESSMENT & PLAN NOTE
Persistent pain  To cath lab for selective coronary angiography via RRA and PCI as indicated  Asa/plavix load    The risks, benefits, and alternatives of coronary vascular angiography and possible intervention were discussed with pt. All questions were answered and informed consent was obtained. I had a detailed discussion with the patient regarding risk of stroke, MI, bleeding access site complications, renal failure, emergent need for heart surgery, acute limb complications including ischemia and loss, contrast allergy and death. Pt understands the risks and benefits of the procedure and wishes to proceed. If stents are needed and there is preference for JUANCARLOS, pt understands that would necessitate aspirin for life with plavix for at least 1 year. Additionally, pt is aware that non compliance is likely to result in stent clotting with heart attack, heart failure, and/or death.

## 2018-03-24 NOTE — SUBJECTIVE & OBJECTIVE
No past medical history on file.    No past surgical history on file.    Review of patient's allergies indicates:  No Known Allergies      (Not in a hospital admission)  Family History     None        Social History Main Topics    Smoking status: Current Every Day Smoker    Smokeless tobacco: Never Used    Alcohol use Yes    Drug use: No    Sexual activity: Yes     Partners: Female     Review of Systems   Constitution: Negative for chills, decreased appetite, diaphoresis and fever.   HENT: Negative for congestion and ear pain.    Eyes: Negative for blurred vision.   Cardiovascular: Positive for chest pain and dyspnea on exertion. Negative for irregular heartbeat.   Respiratory: Negative for hemoptysis and shortness of breath.    Endocrine: Negative for cold intolerance and heat intolerance.   Hematologic/Lymphatic: Negative for adenopathy.   Skin: Negative for color change and nail changes.   Musculoskeletal: Negative for arthritis and back pain.   Gastrointestinal: Positive for abdominal pain. Negative for bloating.   Genitourinary: Negative for bladder incontinence.   Neurological: Negative for aphonia.   Psychiatric/Behavioral: Negative for altered mental status and depression.     Objective:     Vital Signs (Most Recent):  Temp: 98.6 °F (37 °C) (03/23/18 1711)  Pulse: 84 (03/23/18 2127)  Resp: (!) 22 (03/23/18 2127)  BP: (!) 184/106 (03/23/18 2116)  SpO2: 100 % (03/23/18 2127) Vital Signs (24h Range):  Temp:  [98.6 °F (37 °C)] 98.6 °F (37 °C)  Pulse:  [84-98] 84  Resp:  [20-40] 22  SpO2:  [89 %-100 %] 100 %  BP: (160-206)/() 184/106     Weight: 61.2 kg (135 lb)  Body mass index is 21.79 kg/m².    SpO2: 100 %  O2 Device (Oxygen Therapy): room air    No intake or output data in the 24 hours ending 03/23/18 2142    Lines/Drains/Airways     Peripheral Intravenous Line                 Peripheral IV - Single Lumen Left Antecubital -- days         Peripheral IV - Single Lumen 03/23/18 2056 Left Forearm less  than 1 day                Physical Exam   Constitutional: He is oriented to person, place, and time. He appears well-developed and well-nourished.   HENT:   Head: Normocephalic and atraumatic.   Eyes: EOM are normal.   Neck: Normal range of motion. Neck supple. No JVD present. No tracheal deviation present. No thyromegaly present.   Cardiovascular: Normal rate, regular rhythm and intact distal pulses.  Exam reveals no gallop.    No murmur heard.  Pulmonary/Chest: Effort normal and breath sounds normal. No respiratory distress. He has no wheezes. He has no rales.   Abdominal: Soft. Bowel sounds are normal. He exhibits no distension. There is no tenderness.   Musculoskeletal: Normal range of motion. He exhibits no edema or deformity.   Neurological: He is alert and oriented to person, place, and time.   Skin: Skin is warm and dry.   Psychiatric: He has a normal mood and affect. His behavior is normal.   Nursing note and vitals reviewed.      Significant Labs:   BMP:   Recent Labs  Lab 03/23/18  1726   *      K 3.9      CO2 22*   BUN 10   CREATININE 0.9   CALCIUM 9.9   , CBC   Recent Labs  Lab 03/23/18  1726   WBC 13.78*   HGB 13.4*   HCT 39.7*       and Troponin   Recent Labs  Lab 03/23/18  1726   TROPONINI 5.102*

## 2018-03-24 NOTE — PROGRESS NOTES
[facility] Hospital Medicine  Progress Note    Patient Name: Sharath Huizar  MRN: [id number]  Patient Class: IP- Inpatient   Admission Date: 3/23/2018  Length of Stay: 1 days  Attending Physician: Sajan Gould MD  Primary Care Provider: Primary Doctor No        Subjective:     Principal Problem:NSTEMI (non-ST elevated myocardial infarction)    HPI:  Mr. Sharath Huizar is a 67 y.o. male with tobacco abuse who presents to [facility] ED with complaints of chest pain this morning.  He was at work when the pain started, and it was described as mid-epigastric with radiation into his abdomen, was sharp in quality, and was 10/10 in severity at its worst.  He reports some nausea but denies any vomiting.  He also had some diaphoresis but not any palpitations, shortness of breath, hemoptysis, nor any lower extremity pain or swelling.  There were no alleviating or exacerbating factors, and he has never had similar episodes in the past.  He denies any personal or family history of heart attacks.  He does admit to smoking 1 pack a day of cigarettes since his early 20's, and drinks about 2-3 beers a day.    Hospital Course:  66 y/o male admitted to ICU with NSTEMI.  Underwent LHC with JUANCARLOS x 4 to RCA. Residual LAD disease that will need to be addressed at a later date.  Remained on Aggrastat infusion, now off.  Ok for Telemetry.  Cardiology following.    Interval History: No acute events overnight.    Review of Systems   HENT: Negative for ear discharge and ear pain.    Eyes: Negative for pain and itching.   Endocrine: Negative for polyphagia and polyuria.   Neurological: Negative for tremors and seizures.     Objective:     Vital Signs (Most Recent):  Temp: 98 °F (36.7 °C) (03/24/18 1100)  Pulse: 73 (03/24/18 1100)  Resp: (!) 25 (03/24/18 1100)  BP: 133/72 (03/24/18 1100)  SpO2: 100 % (03/24/18 1100) Vital Signs (24h Range):  Temp:  [98 °F (36.7 °C)-98.6 °F (37 °C)] 98 °F (36.7 °C)  Pulse:  [68-98] 73  Resp:   [19-40] 25  SpO2:  [89 %-100 %] 100 %  BP: (108-206)/() 133/72     Weight: 56.8 kg (125 lb 3.5 oz)  Body mass index is 20.21 kg/m².    Intake/Output Summary (Last 24 hours) at 03/24/18 1206  Last data filed at 03/24/18 0900   Gross per 24 hour   Intake           136.93 ml   Output              200 ml   Net           -63.07 ml      Physical Exam   Constitutional: He is oriented to person, place, and time. He appears well-developed and well-nourished. No distress.   HENT:   Head: Normocephalic and atraumatic.   Right Ear: External ear normal.   Left Ear: External ear normal.   Nose: Nose normal.   Eyes: Right eye exhibits no discharge. Left eye exhibits no discharge.   Neck: Normal range of motion.   Cardiovascular: Normal rate, regular rhythm, normal heart sounds and intact distal pulses.  Exam reveals no gallop and no friction rub.    No murmur heard.  Pulmonary/Chest: Effort normal and breath sounds normal. No respiratory distress. He has no wheezes. He has no rales. He exhibits no tenderness.   Abdominal: Soft. Bowel sounds are normal. He exhibits no distension. There is no tenderness. There is no rebound.   Musculoskeletal: Normal range of motion. He exhibits no edema.   Neurological: He is alert and oriented to person, place, and time.   Skin: Skin is warm and dry. He is not diaphoretic. No erythema.   Psychiatric: He has a normal mood and affect. His behavior is normal. Judgment and thought content normal.   Nursing note and vitals reviewed.      Significant Labs:   BMP:   Recent Labs  Lab 03/24/18  0817   *      K 4.0      CO2 26   BUN 9   CREATININE 0.9   CALCIUM 9.2   MG 2.2     CBC:   Recent Labs  Lab 03/23/18  1726 03/24/18  0817   WBC 13.78* 14.97*   HGB 13.4* 14.1   HCT 39.7* 41.3    258       Significant Imaging: I have reviewed all pertinent imaging results/findings within the past 24 hours.    Assessment/Plan:      * NSTEMI (non-ST elevated myocardial infarction)     Patient had ongoing chest pain for which he presented to the ED and was found to have a troponin level of 5.102.   Dr. Viral Lombardi, Cardiology, was consulted from the ED and recommended the patient undergo and angiogram.    Dr. Faustina Plata performed the angiogram and has placed four stents, three to the proximal, mid, and distal RCAand one to the PDA, all drug-eluting.    He recommends that the patient remain on the tirofiban infusion for 12 hours.  He also recommended beta-blockage, high-intensity statin, and aspirin.  Will obtain a lipid panel and HbA1c in the morning as well as a 2D-echo.  Now off Aggrastat infusion and ok for transfer to [facility].  Further plan and treatment per Cards.        Leukocytosis    Probably reactive.  No evidence of infection.          Tobacco abuse    Patient was counseled on smoking cessation and he will be provided a nicotine transdermal patch applied while inpatient.  Will provide additional smoking cessation counseling prior to discharge.        Hypertensive urgency    Currently on Norvasc, Lisinopril and Toprol.  BP much improved.          VTE Risk Mitigation         Ordered     enoxaparin injection 40 mg  Every 24 hours (non-standard times)     Route:  Subcutaneous        03/24/18 1207     IP VTE HIGH RISK PATIENT  Once      03/23/18 9009          Sajan Gould MD  Department of Hospital Medicine   [facility]

## 2018-03-24 NOTE — NURSING TRANSFER
Nursing Transfer Note      3/24/2018 1555    Transfer to  [address]    Transfer via w/c    Transfer with portable cardiac monitor    Transported by ICU RN    Medicines sent: none     Chart send with patient: yes    Notified:wife at bedside    Patient reassessed at: 03/24/2018  1500    Upon arrival to floor: bed in low position/siderails up x 2/call bell within reach/RN at bedside--Candida

## 2018-03-24 NOTE — H&P
[facility] Hospital Medicine  History & Physical    Patient Name: Sharath Huizar  MRN: [id number]  Admission Date: 3/23/2018  Attending Physician: Sajan Gould MD   Primary Care Provider: Primary Doctor No         Patient information was obtained from patient.     Subjective:     Principal Problem:NSTEMI (non-ST elevated myocardial infarction)    Chief Complaint: Chest pain today.    HPI: Mr. Sharath Huizar is a 67 y.o. male with tobacco abuse who presents to [facility] ED with complaints of chest pain this morning.  He was at work when the pain started, and it was described as mid-epigastric with radiation into his abdomen, was sharp in quality, and was 10/10 in severity at its worst.  He reports some nausea but denies any vomiting.  He also had some diaphoresis but not any palpitations, shortness of breath, hemoptysis, nor any lower extremity pain or swelling.  There were no alleviating or exacerbating factors, and he has never had similar episodes in the past.  He denies any personal or family history of heart attacks.  He does admit to smoking 1 pack a day of cigarettes since his early 20's, and drinks about 2-3 beers a day.    Chart Review:  Patient has not had any recent hospitalizations within the system.    Outpatient Follow-Up  Date of Visit Physician Service   Jan 2018 Amilcar Jeong MD Primary Care     No past medical history on file.    No past surgical history on file.    Review of patient's allergies indicates:  No Known Allergies    No current facility-administered medications on file prior to encounter.      No current outpatient prescriptions on file prior to encounter.     Family History     None        Social History Main Topics    Smoking status: Current Every Day Smoker    Smokeless tobacco: Never Used    Alcohol use Yes    Drug use: No    Sexual activity: Yes     Partners: Female     Review of Systems   Constitutional: Positive for diaphoresis. Negative for activity  change, appetite change, chills, fatigue, fever and unexpected weight change.   HENT: Negative.    Eyes: Negative.    Respiratory: Negative for cough, chest tightness, shortness of breath and wheezing.    Cardiovascular: Positive for chest pain. Negative for palpitations and leg swelling.   Gastrointestinal: Positive for nausea. Negative for abdominal distention, abdominal pain, blood in stool, constipation, diarrhea and vomiting.   Genitourinary: Negative for dysuria and hematuria.   Musculoskeletal: Negative.    Skin: Negative.    Neurological: Negative for dizziness, seizures, syncope, weakness and light-headedness.   Psychiatric/Behavioral: Negative.      Objective:     Vital Signs (Most Recent):  Temp: 98 °F (36.7 °C) (03/23/18 2323)  Pulse: 79 (03/23/18 2345)  Resp: (!) 34 (03/23/18 2345)  BP: (!) 148/86 (03/23/18 2345)  SpO2: 100 % (03/23/18 2345) Vital Signs (24h Range):  Temp:  [98 °F (36.7 °C)-98.6 °F (37 °C)] 98 °F (36.7 °C)  Pulse:  [70-98] 79  Resp:  [20-40] 34  SpO2:  [89 %-100 %] 100 %  BP: (148-206)/() 148/86     Weight: 56.8 kg (125 lb 3.5 oz)  Body mass index is 20.21 kg/m².    Physical Exam   Constitutional: He is oriented to person, place, and time. He appears well-developed and well-nourished. No distress.   HENT:   Head: Normocephalic and atraumatic.   Right Ear: External ear normal.   Left Ear: External ear normal.   Nose: Nose normal.   Eyes: Right eye exhibits no discharge. Left eye exhibits no discharge.   Neck: Normal range of motion.   Cardiovascular: Normal rate, regular rhythm, normal heart sounds and intact distal pulses.  Exam reveals no gallop and no friction rub.    No murmur heard.  Pulmonary/Chest: Effort normal and breath sounds normal. No respiratory distress. He has no wheezes. He has no rales. He exhibits no tenderness.   Abdominal: Soft. Bowel sounds are normal. He exhibits no distension. There is no tenderness. There is no rebound.   Musculoskeletal: Normal range of  motion. He exhibits no edema.   Neurological: He is alert and oriented to person, place, and time.   Skin: Skin is warm and dry. He is not diaphoretic. No erythema.   Psychiatric: He has a normal mood and affect. His behavior is normal. Judgment and thought content normal.   Nursing note and vitals reviewed.          Significant Labs: All pertinent labs within the past 24 hours have been reviewed.    Significant Imaging: I have reviewed and interpreted all pertinent imaging results/findings within the past 24 hours.    Assessment/Plan:     * NSTEMI (non-ST elevated myocardial infarction)    Patient had ongoing chest pain for which he presented to the ED and was found to have a troponin level of 5.102.  I have reviewed the EKG and it reveals normal sinus rhythm with inferior T-wave inversions; there are no previous EKGs available for review.  Dr. Viral Lombardi, Cardiology, was consulted from the ED and recommended the patient undergo and angiogram.  Dr. Faustina Plata performed the angiogram and has placed four stents, three to the proximal, mid, and distal RCAand one to the PDA, all drug-eluting.  He recommends that the patient remain on the tirofiban infusion for 12 hours.  He also recommended beta-blockage, high-intensity statin, and aspirin.  Will obtain a lipid panel and HbA1c in the morning as well as a 2D-echo.        Tobacco abuse    Patient was counseled on smoking cessation and he will be provided a nicotine transdermal patch applied while inpatient.  Will provide additional smoking cessation counseling prior to discharge.          VTE Risk Mitigation         Ordered     enoxaparin injection 40 mg  Every 12 hours (non-standard times)     Route:  Subcutaneous        03/23/18 2316     IP VTE HIGH RISK PATIENT  Once      03/23/18 2312          Critical care time spent on the evaluation and treatment of severe organ dysfunction, review of pertinent labs and imaging studies, discussions with consulting providers  and discussions with patient/family: 60 minutes.         Kalyan Negro M.D.  [facility]  Department of [facility]  [facility]  Pager: [phone]

## 2018-03-24 NOTE — HOSPITAL COURSE
Denies CP or SOB    Echo 3/24/18    1 - Moderately depressed left ventricular systolic function (EF 35-40%) Severe inferior hypokinesis.     2 - Concentric hypertrophy.     3 - Impaired LV relaxation, normal LAP (grade 1 diastolic dysfunction).     4 - Trivial mitral regurgitation.     5 - Trivial tricuspid regurgitation.     3/24/18  PCI to the prox, mid, distal RCA with JUANCARLOS x 3  PCI to the PDA with JUANCARLOS x 1  Long mid LAD 75%

## 2018-03-24 NOTE — ED NOTES
plavix given 300 mg po as ordered with a sip of water. Metoprolol. Given as ordered.    Pt. Shaved and prepped for cath lab.  ,   Second ekg done as ordered.

## 2018-03-24 NOTE — ED NOTES
Dr. Lombardi called to talk with Dr. Vergara about the troponin I on the pt.   Reviewed ekg changes.  Discussed admit. And plan of care.

## 2018-03-24 NOTE — ASSESSMENT & PLAN NOTE
Patient had ongoing chest pain for which he presented to the ED and was found to have a troponin level of 5.102.   Dr. Viral Lombardi, Cardiology, was consulted from the ED and recommended the patient undergo and angiogram.    Dr. Faustina Plata performed the angiogram and has placed four stents, three to the proximal, mid, and distal RCAand one to the PDA, all drug-eluting.    He recommends that the patient remain on the tirofiban infusion for 12 hours.  He also recommended beta-blockage, high-intensity statin, and aspirin.  Will obtain a lipid panel and HbA1c in the morning as well as a 2D-echo.  Now off Aggrastat infusion and ok for transfer to [facility].  Further plan and treatment per Cards.

## 2018-03-24 NOTE — ED NOTES
2056: First encounter with pt to placed  Second large bore IV for Nitro drip therapy. Primary RN Latrice notified.

## 2018-03-24 NOTE — PROCEDURES
"Sharath Huizar is a 67 y.o. male patient.    Temp: 98.6 °F (37 °C) (03/23/18 1711)  Pulse: 84 (03/23/18 2127)  Resp: (!) 22 (03/23/18 2127)  BP: (!) 184/106 (03/23/18 2116)  SpO2: 100 % (03/23/18 2127)  Weight: 61.2 kg (135 lb) (03/23/18 1711)  Height: 5' 6" (167.6 cm) (03/23/18 1711)  Mallampati Scale: Class II  ASA Classification: Class 2    Procedures  Post Cath Note  Referring Physician:  Procedure:  Indication:    HPI:   66 yo male with NSTEMI    Cath Results:   Access:  RRA    Occluded mRCA  Long mLAD lesion 75%    See full cath report for further details    Intervention:   PCI to the prox, mid, distal RCA with JUANCARLOS x 3  PCI to the PDA with JUANCARLOS x 1    Closure device: NA  Patient tolerated the procedure well, no complications    Post Cath Exam:   BP (!) 184/106   Pulse 84   Temp 98.6 °F (37 °C) (Oral)   Resp (!) 22   Ht 5' 6" (1.676 m)   Wt 61.2 kg (135 lb)   SpO2 100%   BMI 21.79 kg/m²   No unusual pain, hematoma, thrill or bruit at vascular access site.  Distal pulse present without signs of ischemia.    Recommendations:   DAPT for atleast 1 year  High intensity statin  aggrastat infusion for 12 hrs    Signed:  Faustina vargas MD  Interventional Cardiology Fellow  Pager: [phone]  3/23/2018 10:54 PM    Faustina Vargas  3/23/2018  "

## 2018-03-24 NOTE — PROGRESS NOTES
[facility]  Cardiology  Progress Note    Patient Name: Sharath Huizar  MRN: [id number]  Admission Date: 3/23/2018  Hospital Length of Stay: 1 days  Code Status: Full Code   Attending Physician: Sajan Gould MD   Primary Care Physician: Primary Doctor No  Expected Discharge Date:   Principal Problem:NSTEMI (non-ST elevated myocardial infarction)    Subjective:     Hospital Course:   Denies CP or SOB    Echo pending    3/24/18  PCI to the prox, mid, distal RCA with JUANCARLOS x 3  PCI to the PDA with JUANCARLOS x 1  Long mid LAD 75%    Interval History:     Review of Systems   Constitution: Negative for decreased appetite.   HENT: Negative for ear discharge.    Eyes: Negative for blurred vision.   Endocrine: Negative for polyphagia.   Skin: Negative for nail changes.   Neurological: Negative for aphonia.   Psychiatric/Behavioral: Negative for hallucinations.     Objective:     Vital Signs (Most Recent):  Temp: 98 °F (36.7 °C) (03/23/18 2323)  Pulse: 82 (03/24/18 0800)  Resp: (!) 38 (03/24/18 0800)  BP: 117/68 (03/24/18 0800)  SpO2: 100 % (03/24/18 0800) Vital Signs (24h Range):  Temp:  [98 °F (36.7 °C)-98.6 °F (37 °C)] 98 °F (36.7 °C)  Pulse:  [68-98] 82  Resp:  [19-40] 38  SpO2:  [89 %-100 %] 100 %  BP: (117-206)/() 117/68     Weight: 56.8 kg (125 lb 3.5 oz)  Body mass index is 20.21 kg/m².     SpO2: 100 %  O2 Device (Oxygen Therapy): nasal cannula      Intake/Output Summary (Last 24 hours) at 03/24/18 0918  Last data filed at 03/24/18 0800   Gross per 24 hour   Intake           125.93 ml   Output              200 ml   Net           -74.07 ml       Lines/Drains/Airways     Peripheral Intravenous Line                 Peripheral IV - Single Lumen Left Antecubital -- days         Peripheral IV - Single Lumen 03/23/18 2056 Left Forearm less than 1 day                Physical Exam   Constitutional: He is oriented to person, place, and time. He appears well-developed and well-nourished.   HENT:   Head:  Normocephalic and atraumatic.   Eyes: Conjunctivae are normal. Pupils are equal, round, and reactive to light.   Neck: Normal range of motion. Neck supple.   Cardiovascular: Normal rate, normal heart sounds and intact distal pulses.    Pulmonary/Chest: Effort normal and breath sounds normal.   Abdominal: Soft. Bowel sounds are normal.   Musculoskeletal: Normal range of motion.   Neurological: He is alert and oriented to person, place, and time.   Skin: Skin is warm and dry.       Significant Labs: All pertinent lab results from the last 24 hours have been reviewed.    Significant Imaging: Echocardiogram: 2D echo with color flow doppler: No results found for this or any previous visit.    Assessment and Plan:     Brief HPI:     * NSTEMI (non-ST elevated myocardial infarction)    S/P JUANCARLOS x 4 to RCA. Residual LAD disease that will need to be addressed at a later date. Echo today. Ok for telemetry        Tobacco abuse    counseled        Hypertensive urgency    Improved            VTE Risk Mitigation         Ordered     enoxaparin injection 40 mg  Every 12 hours (non-standard times)     Route:  Subcutaneous        03/23/18 2315     IP VTE HIGH RISK PATIENT  Once      03/23/18 2312          Vrial Lombardi MD  Cardiology  [facility]

## 2018-03-24 NOTE — ASSESSMENT & PLAN NOTE
Patient had ongoing chest pain for which he presented to the ED and was found to have a troponin level of 5.102.  I have reviewed the EKG and it reveals normal sinus rhythm with inferior T-wave inversions; there are no previous EKGs available for review.  Dr. Viral Lombardi, Cardiology, was consulted from the ED and recommended the patient undergo and angiogram.  Dr. Faustina Plata performed the angiogram and has placed four stents, three to the proximal, mid, and distal RCAand one to the PDA, all drug-eluting.  He recommends that the patient remain on the tirofiban infusion for 12 hours.  He also recommended beta-blockage, high-intensity statin, and aspirin.  Will obtain a lipid panel and HbA1c in the morning as well as a 2D-echo.

## 2018-03-24 NOTE — CONSULTS
[facility]  Interventional Cardiology  Consult Note    Patient Name: Sharath Huizar  MRN: [id number]  Admission Date: 3/23/2018  Hospital Length of Stay: 0 days  Code Status: No Order   Attending Provider: Noé Vergara MD  Consulting Provider: Faustina Plata MD  Primary Care Physician: Primary Doctor No  Principal Problem:<principal problem not specified>    Patient information was obtained from patient and ER records.     Consults  Subjective:     Chief Complaint:  CP     HPI:  68 yo male smoker presents with CP for 9 hrs    No past medical history on file.    No past surgical history on file.    Review of patient's allergies indicates:  No Known Allergies      (Not in a hospital admission)  Family History     None        Social History Main Topics    Smoking status: Current Every Day Smoker    Smokeless tobacco: Never Used    Alcohol use Yes    Drug use: No    Sexual activity: Yes     Partners: Female     Review of Systems   Constitution: Negative for chills, decreased appetite, diaphoresis and fever.   HENT: Negative for congestion and ear pain.    Eyes: Negative for blurred vision.   Cardiovascular: Positive for chest pain and dyspnea on exertion. Negative for irregular heartbeat.   Respiratory: Negative for hemoptysis and shortness of breath.    Endocrine: Negative for cold intolerance and heat intolerance.   Hematologic/Lymphatic: Negative for adenopathy.   Skin: Negative for color change and nail changes.   Musculoskeletal: Negative for arthritis and back pain.   Gastrointestinal: Positive for abdominal pain. Negative for bloating.   Genitourinary: Negative for bladder incontinence.   Neurological: Negative for aphonia.   Psychiatric/Behavioral: Negative for altered mental status and depression.     Objective:     Vital Signs (Most Recent):  Temp: 98.6 °F (37 °C) (03/23/18 1711)  Pulse: 84 (03/23/18 2127)  Resp: (!) 22 (03/23/18 2127)  BP: (!) 184/106 (03/23/18 2116)  SpO2: 100 %  (03/23/18 2127) Vital Signs (24h Range):  Temp:  [98.6 °F (37 °C)] 98.6 °F (37 °C)  Pulse:  [84-98] 84  Resp:  [20-40] 22  SpO2:  [89 %-100 %] 100 %  BP: (160-206)/() 184/106     Weight: 61.2 kg (135 lb)  Body mass index is 21.79 kg/m².    SpO2: 100 %  O2 Device (Oxygen Therapy): room air    No intake or output data in the 24 hours ending 03/23/18 2142    Lines/Drains/Airways     Peripheral Intravenous Line                 Peripheral IV - Single Lumen Left Antecubital -- days         Peripheral IV - Single Lumen 03/23/18 2056 Left Forearm less than 1 day                Physical Exam   Constitutional: He is oriented to person, place, and time. He appears well-developed and well-nourished.   HENT:   Head: Normocephalic and atraumatic.   Eyes: EOM are normal.   Neck: Normal range of motion. Neck supple. No JVD present. No tracheal deviation present. No thyromegaly present.   Cardiovascular: Normal rate, regular rhythm and intact distal pulses.  Exam reveals no gallop.    No murmur heard.  Pulmonary/Chest: Effort normal and breath sounds normal. No respiratory distress. He has no wheezes. He has no rales.   Abdominal: Soft. Bowel sounds are normal. He exhibits no distension. There is no tenderness.   Musculoskeletal: Normal range of motion. He exhibits no edema or deformity.   Neurological: He is alert and oriented to person, place, and time.   Skin: Skin is warm and dry.   Psychiatric: He has a normal mood and affect. His behavior is normal.   Nursing note and vitals reviewed.      Significant Labs:   BMP:   Recent Labs  Lab 03/23/18  1726   *      K 3.9      CO2 22*   BUN 10   CREATININE 0.9   CALCIUM 9.9   , CBC   Recent Labs  Lab 03/23/18  1726   WBC 13.78*   HGB 13.4*   HCT 39.7*       and Troponin   Recent Labs  Lab 03/23/18  1726   TROPONINI 5.102*           Assessment and Plan:     Hypertensive urgency    Will need aggressive BP control  BB/        NSTEMI (non-ST elevated  myocardial infarction)    Persistent pain  To cath lab for selective coronary angiography via RRA and PCI as indicated  Asa/plavix load    The risks, benefits, and alternatives of coronary vascular angiography and possible intervention were discussed with pt. All questions were answered and informed consent was obtained. I had a detailed discussion with the patient regarding risk of stroke, MI, bleeding access site complications, renal failure, emergent need for heart surgery, acute limb complications including ischemia and loss, contrast allergy and death. Pt understands the risks and benefits of the procedure and wishes to proceed. If stents are needed and there is preference for JUANCARLOS, pt understands that would necessitate aspirin for life with plavix for at least 1 year. Additionally, pt is aware that non compliance is likely to result in stent clotting with heart attack, heart failure, and/or death.              VTE Risk Mitigation     None              Faustina Plata MD  Interventional Cardiology   [facility]

## 2018-03-24 NOTE — ASSESSMENT & PLAN NOTE
S/P JUANCARLOS x 4 to RCA. Residual LAD disease that will need to be addressed at a later date. Echo today. Ok for telemetry

## 2018-03-24 NOTE — ED NOTES
Patient wheeled to cath lab with 3 nurses and a tech with cardiologist. At bed side.   Report given to cath lab nurse James HERNANDEZ.

## 2018-03-25 PROBLEM — I50.21 ACUTE SYSTOLIC HEART FAILURE: Status: ACTIVE | Noted: 2018-03-25

## 2018-03-25 LAB
DIASTOLIC DYSFUNCTION: YES
ESTIMATED PA SYSTOLIC PRESSURE: 19.48
MITRAL VALVE REGURGITATION: ABNORMAL
RETIRED EF AND QEF - SEE NOTES: 35 (ref 55–65)
TRICUSPID VALVE REGURGITATION: ABNORMAL

## 2018-03-25 PROCEDURE — 25000003 PHARM REV CODE 250: Performed by: INTERNAL MEDICINE

## 2018-03-25 PROCEDURE — S4991 NICOTINE PATCH NONLEGEND: HCPCS | Performed by: INTERNAL MEDICINE

## 2018-03-25 PROCEDURE — 21400001 HC TELEMETRY ROOM

## 2018-03-25 PROCEDURE — 25000003 PHARM REV CODE 250: Performed by: HOSPITALIST

## 2018-03-25 PROCEDURE — 25000003 PHARM REV CODE 250: Performed by: EMERGENCY MEDICINE

## 2018-03-25 PROCEDURE — 63600175 PHARM REV CODE 636 W HCPCS: Performed by: HOSPITALIST

## 2018-03-25 PROCEDURE — 99232 SBSQ HOSP IP/OBS MODERATE 35: CPT | Mod: ,,, | Performed by: INTERNAL MEDICINE

## 2018-03-25 RX ORDER — CLOPIDOGREL BISULFATE 75 MG/1
75 TABLET ORAL DAILY
Status: DISCONTINUED | OUTPATIENT
Start: 2018-03-25 | End: 2018-03-26 | Stop reason: HOSPADM

## 2018-03-25 RX ADMIN — ENOXAPARIN SODIUM 40 MG: 100 INJECTION SUBCUTANEOUS at 05:03

## 2018-03-25 RX ADMIN — LISINOPRIL 5 MG: 5 TABLET ORAL at 08:03

## 2018-03-25 RX ADMIN — NICOTINE 1 PATCH: 21 PATCH, EXTENDED RELEASE TRANSDERMAL at 08:03

## 2018-03-25 RX ADMIN — ATORVASTATIN CALCIUM 80 MG: 40 TABLET, FILM COATED ORAL at 08:03

## 2018-03-25 RX ADMIN — METOPROLOL SUCCINATE 50 MG: 50 TABLET, EXTENDED RELEASE ORAL at 08:03

## 2018-03-25 RX ADMIN — ASPIRIN 81 MG: 81 TABLET, COATED ORAL at 08:03

## 2018-03-25 RX ADMIN — CLOPIDOGREL BISULFATE 75 MG: 75 TABLET ORAL at 08:03

## 2018-03-25 NOTE — ASSESSMENT & PLAN NOTE
- Patient had ongoing chest pain for which he presented to the ED and was found to have a troponin level of 5.102.   - Dr. Viral Lombardi, Cardiology, was consulted from the ED and recommended the patient undergo and angiogram.    - Dr. Faustina Plata performed the angiogram and has placed four stents, three to the proximal, mid, and distal RCAand one to the PDA, all drug-eluting. - He recommends that the patient remain on the tirofiban infusion for 12 hours.  He also recommended beta-blockage, high-intensity statin, and aspirin.  Finished tirofiban infusion  - LDL pending  - A1c 4.9%  - TTE showing new EF 35%  - Cardiology following  - asa, plavix, Bb, ACEi, statin

## 2018-03-25 NOTE — ASSESSMENT & PLAN NOTE
- TTE CONCLUSIONS     1 - Moderately depressed left ventricular systolic function (EF 35-40%) Severe inferior hypokinesis.     2 - Concentric hypertrophy.     3 - Impaired LV relaxation, normal LAP (grade 1 diastolic dysfunction).     4 - Trivial mitral regurgitation.     5 - Trivial tricuspid regurgitation.  - new heart failure, likely due to NSTEMI  - NSTEMI tx as above  - appears euvolemic

## 2018-03-25 NOTE — ASSESSMENT & PLAN NOTE
S/P JUANCARLOS x 4 to RCA. Residual LAD disease that will need to be addressed at a later date. Echo with moderately depressed EF. Ok for d/c in AM if stable

## 2018-03-25 NOTE — PLAN OF CARE
03/25/18 1547   Discharge Assessment   Assessment Type Discharge Planning Assessment   Confirmed/corrected address and phone number on facesheet? Yes   Assessment information obtained from? Patient   Communicated expected length of stay with patient/caregiver no   Prior to hospitilization cognitive status: Alert/Oriented   Prior to hospitalization functional status: Independent   Current cognitive status: Alert/Oriented   Current Functional Status: Independent   Facility Arrived From: Home   Lives With spouse   Able to Return to Prior Arrangements yes   Is patient able to care for self after discharge? Yes   Who are your caregiver(s) and their phone number(s)? Spouse-Slim Gray: [phone]   Readmission Within The Last 30 Days no previous admission in last 30 days   Patient currently being followed by outpatient case management? No   Patient currently receives any other outside agency services? No   Equipment Currently Used at Home none   Do you have any problems affording any of your prescribed medications? No   Is the patient taking medications as prescribed? yes   Does the patient have transportation home? Yes   Transportation Available car;family or friend will provide   Does the patient receive services at the [facility]? No   Discharge Plan A Home with family   Discharge Plan B Other  (TBD)   Patient/Family In Agreement With Plan yes     Gianluca    Independent at home with spouse who can assist if needed; no assist needed; no DME used; no previous services; no discharge needs anticipated; home with spouse; PCP resources provided for patient; considering [facility] or provider in [facility].    Warning signs/symptoms information reviewed with and provided to patient in blue folder; discussed and reinforced patient responsibility for managing health care at home: acquiring medications; following-through on follow-up appointments; and monitoring health at home.

## 2018-03-25 NOTE — SUBJECTIVE & OBJECTIVE
Interval History: No complaints    Review of Systems   Constitutional: Negative for appetite change, chills and fever.   Respiratory: Negative for cough, chest tightness and shortness of breath.    Cardiovascular: Negative for chest pain, palpitations and leg swelling.   Gastrointestinal: Negative for abdominal pain, constipation, diarrhea, nausea and vomiting.     Objective:     Vital Signs (Most Recent):  Temp: 97.9 °F (36.6 °C) (03/25/18 1149)  Pulse: 79 (03/25/18 1149)  Resp: 17 (03/25/18 1149)  BP: 106/66 (03/25/18 1149)  SpO2: 98 % (03/25/18 1149) Vital Signs (24h Range):  Temp:  [96.4 °F (35.8 °C)-98.6 °F (37 °C)] 97.9 °F (36.6 °C)  Pulse:  [72-94] 79  Resp:  [17-30] 17  SpO2:  [97 %-99 %] 98 %  BP: (100-125)/(57-73) 106/66     Weight: 56.8 kg (125 lb 3.5 oz)  Body mass index is 20.21 kg/m².  No intake or output data in the 24 hours ending 03/25/18 1258   Physical Exam   Constitutional: He is oriented to person, place, and time. He appears well-developed. No distress.   HENT:   Head: Normocephalic and atraumatic.   Mouth/Throat: Oropharynx is clear and moist. No oropharyngeal exudate.   Eyes: Conjunctivae are normal. No scleral icterus.   Neck: Neck supple. No JVD present.   Cardiovascular: Normal rate, regular rhythm, normal heart sounds and intact distal pulses.  Exam reveals no friction rub.    No murmur heard.  Pulmonary/Chest: Effort normal and breath sounds normal. No respiratory distress. He has no wheezes. He has no rales.   Abdominal: Soft. Bowel sounds are normal. He exhibits no distension. There is no tenderness. There is no guarding.   Musculoskeletal: He exhibits no edema or deformity.   Neurological: He is alert and oriented to person, place, and time.   Skin: He is not diaphoretic.       Significant Labs: All pertinent labs within the past 24 hours have been reviewed.    Significant Imaging: I have reviewed and interpreted all pertinent imaging results/findings within the past 24 hours.

## 2018-03-25 NOTE — PROGRESS NOTES
[facility]  Cardiology  Progress Note    Patient Name: Sharath Huizar  MRN: [id number]  Admission Date: 3/23/2018  Hospital Length of Stay: 2 days  Code Status: Full Code   Attending Physician: Shreya Friedman MD   Primary Care Physician: Primary Doctor No  Expected Discharge Date:   Principal Problem:NSTEMI (non-ST elevated myocardial infarction)    Subjective:     Hospital Course:   Denies CP or SOB    Echo 3/24/18    1 - Moderately depressed left ventricular systolic function (EF 35-40%) Severe inferior hypokinesis.     2 - Concentric hypertrophy.     3 - Impaired LV relaxation, normal LAP (grade 1 diastolic dysfunction).     4 - Trivial mitral regurgitation.     5 - Trivial tricuspid regurgitation.     3/24/18  PCI to the prox, mid, distal RCA with JUANCARLOS x 3  PCI to the PDA with JUANCARLOS x 1  Long mid LAD 75%    Interval History:     Review of Systems   Constitution: Negative for decreased appetite.   HENT: Negative for ear discharge.    Eyes: Negative for blurred vision.   Endocrine: Negative for polyphagia.   Skin: Negative for nail changes.   Neurological: Negative for aphonia.   Psychiatric/Behavioral: Negative for hallucinations.     Objective:     Vital Signs (Most Recent):  Temp: 97.9 °F (36.6 °C) (03/25/18 1149)  Pulse: 79 (03/25/18 1149)  Resp: 17 (03/25/18 1149)  BP: 106/66 (03/25/18 1149)  SpO2: 98 % (03/25/18 1149) Vital Signs (24h Range):  Temp:  [96.4 °F (35.8 °C)-98.6 °F (37 °C)] 97.9 °F (36.6 °C)  Pulse:  [72-94] 79  Resp:  [17-30] 17  SpO2:  [97 %-99 %] 98 %  BP: (100-125)/(57-73) 106/66     Weight: 56.8 kg (125 lb 3.5 oz)  Body mass index is 20.21 kg/m².     SpO2: 98 %  O2 Device (Oxygen Therapy): room air    No intake or output data in the 24 hours ending 03/25/18 1327    Lines/Drains/Airways     Peripheral Intravenous Line                 Peripheral IV - Single Lumen Left Antecubital -- days         Peripheral IV - Single Lumen 03/23/18 2056 Left Forearm 1 day                Physical  Exam   Constitutional: He is oriented to person, place, and time. He appears well-developed and well-nourished.   HENT:   Head: Normocephalic and atraumatic.   Eyes: Conjunctivae are normal. Pupils are equal, round, and reactive to light.   Neck: Normal range of motion. Neck supple.   Cardiovascular: Normal rate, normal heart sounds and intact distal pulses.    Pulmonary/Chest: Effort normal and breath sounds normal.   Abdominal: Soft. Bowel sounds are normal.   Musculoskeletal: Normal range of motion.   Neurological: He is alert and oriented to person, place, and time.   Skin: Skin is warm and dry.       Significant Labs: All pertinent lab results from the last 24 hours have been reviewed.    Significant Imaging: Echocardiogram:   2D echo with color flow doppler:   Results for orders placed or performed during the hospital encounter of 03/23/18   2D echo with color flow doppler   Result Value Ref Range    EF 35 (A) 55 - 65    Mitral Valve Regurgitation TRIVIAL     Diastolic Dysfunction Yes (A)     Est. PA Systolic Pressure 19.48     Tricuspid Valve Regurgitation TRIVIAL      Assessment and Plan:     Brief HPI:     * NSTEMI (non-ST elevated myocardial infarction)    S/P JUANCARLOS x 4 to RCA. Residual LAD disease that will need to be addressed at a later date. Echo with moderately depressed EF. Ok for d/c in AM if stable        Tobacco abuse    counseled        Hypertensive urgency    Improved            VTE Risk Mitigation         Ordered     enoxaparin injection 40 mg  Every 24 hours (non-standard times)     Route:  Subcutaneous        03/24/18 1207     IP VTE HIGH RISK PATIENT  Once      03/23/18 2259          Viral Lombardi MD  Cardiology  [facility]

## 2018-03-25 NOTE — PROGRESS NOTES
[facility] Hospital Medicine  Progress Note    Patient Name: Sharath Huizar  MRN: [id number]  Patient Class: IP- Inpatient   Admission Date: 3/23/2018  Length of Stay: 2 days  Attending Physician: Shreya Friedman MD  Primary Care Provider: Primary Doctor No        Subjective:     Principal Problem:NSTEMI (non-ST elevated myocardial infarction)    HPI:  Mr. Sharath Huizar is a 67 y.o. male with tobacco abuse who presents to [facility] ED with complaints of chest pain this morning.  He was at work when the pain started, and it was described as mid-epigastric with radiation into his abdomen, was sharp in quality, and was 10/10 in severity at its worst.  He reports some nausea but denies any vomiting.  He also had some diaphoresis but not any palpitations, shortness of breath, hemoptysis, nor any lower extremity pain or swelling.  There were no alleviating or exacerbating factors, and he has never had similar episodes in the past.  He denies any personal or family history of heart attacks.  He does admit to smoking 1 pack a day of cigarettes since his early 20's, and drinks about 2-3 beers a day.    Hospital Course:  68 y/o male admitted to ICU with NSTEMI.  Underwent LHC with JUANCARLOS x 4 to RCA. Residual LAD disease that will need to be addressed at a later date.  Remained on Aggrastat infusion, now off.  Ok for Telemetry.  Cardiology following. TTE showing new EF 35%.     Interval History: No complaints    Review of Systems   Constitutional: Negative for appetite change, chills and fever.   Respiratory: Negative for cough, chest tightness and shortness of breath.    Cardiovascular: Negative for chest pain, palpitations and leg swelling.   Gastrointestinal: Negative for abdominal pain, constipation, diarrhea, nausea and vomiting.     Objective:     Vital Signs (Most Recent):  Temp: 97.9 °F (36.6 °C) (03/25/18 1149)  Pulse: 79 (03/25/18 1149)  Resp: 17 (03/25/18 1149)  BP: 106/66 (03/25/18 1149)  SpO2: 98 %  (03/25/18 1149) Vital Signs (24h Range):  Temp:  [96.4 °F (35.8 °C)-98.6 °F (37 °C)] 97.9 °F (36.6 °C)  Pulse:  [72-94] 79  Resp:  [17-30] 17  SpO2:  [97 %-99 %] 98 %  BP: (100-125)/(57-73) 106/66     Weight: 56.8 kg (125 lb 3.5 oz)  Body mass index is 20.21 kg/m².  No intake or output data in the 24 hours ending 03/25/18 1258   Physical Exam   Constitutional: He is oriented to person, place, and time. He appears well-developed. No distress.   HENT:   Head: Normocephalic and atraumatic.   Mouth/Throat: Oropharynx is clear and moist. No oropharyngeal exudate.   Eyes: Conjunctivae are normal. No scleral icterus.   Neck: Neck supple. No JVD present.   Cardiovascular: Normal rate, regular rhythm, normal heart sounds and intact distal pulses.  Exam reveals no friction rub.    No murmur heard.  Pulmonary/Chest: Effort normal and breath sounds normal. No respiratory distress. He has no wheezes. He has no rales.   Abdominal: Soft. Bowel sounds are normal. He exhibits no distension. There is no tenderness. There is no guarding.   Musculoskeletal: He exhibits no edema or deformity.   Neurological: He is alert and oriented to person, place, and time.   Skin: He is not diaphoretic.       Significant Labs: All pertinent labs within the past 24 hours have been reviewed.    Significant Imaging: I have reviewed and interpreted all pertinent imaging results/findings within the past 24 hours.    Assessment/Plan:      * NSTEMI (non-ST elevated myocardial infarction)    - Patient had ongoing chest pain for which he presented to the ED and was found to have a troponin level of 5.102.   - Dr. Viral Lombardi, Cardiology, was consulted from the ED and recommended the patient undergo and angiogram.    - Dr. Faustina Plata performed the angiogram and has placed four stents, three to the proximal, mid, and distal RCAand one to the PDA, all drug-eluting. - He recommends that the patient remain on the tirofiban infusion for 12 hours.  He also  recommended beta-blockage, high-intensity statin, and aspirin.  Finished tirofiban infusion  - LDL pending  - A1c 4.9%  - TTE showing new EF 35%  - Cardiology following  - asa, plavix, Bb, ACEi, statin        Acute systolic heart failure    - TTE CONCLUSIONS     1 - Moderately depressed left ventricular systolic function (EF 35-40%) Severe inferior hypokinesis.     2 - Concentric hypertrophy.     3 - Impaired LV relaxation, normal LAP (grade 1 diastolic dysfunction).     4 - Trivial mitral regurgitation.     5 - Trivial tricuspid regurgitation.  - new heart failure, likely due to NSTEMI  - NSTEMI tx as above  - appears euvolemic          Elevated LFTs    AST//90  Recheck tomorrow          Leukocytosis    Probably reactive.  No evidence of infection.          Tobacco abuse    Patient was counseled on smoking cessation and he will be provided a nicotine transdermal patch applied while inpatient.  Will provide additional smoking cessation counseling prior to discharge.        Hypertensive urgency    Currently on Lisinopril 5 and Toprol XL 50.  BP controlled          VTE Risk Mitigation         Ordered     enoxaparin injection 40 mg  Every 24 hours (non-standard times)     Route:  Subcutaneous        03/24/18 1207     IP VTE HIGH RISK PATIENT  Once      03/23/18 7063              Shreya Friedman MD  Department of Hospital Medicine   [facility] [place]

## 2018-03-25 NOTE — SUBJECTIVE & OBJECTIVE
Interval History:     Review of Systems   Constitution: Negative for decreased appetite.   HENT: Negative for ear discharge.    Eyes: Negative for blurred vision.   Endocrine: Negative for polyphagia.   Skin: Negative for nail changes.   Neurological: Negative for aphonia.   Psychiatric/Behavioral: Negative for hallucinations.     Objective:     Vital Signs (Most Recent):  Temp: 97.9 °F (36.6 °C) (03/25/18 1149)  Pulse: 79 (03/25/18 1149)  Resp: 17 (03/25/18 1149)  BP: 106/66 (03/25/18 1149)  SpO2: 98 % (03/25/18 1149) Vital Signs (24h Range):  Temp:  [96.4 °F (35.8 °C)-98.6 °F (37 °C)] 97.9 °F (36.6 °C)  Pulse:  [72-94] 79  Resp:  [17-30] 17  SpO2:  [97 %-99 %] 98 %  BP: (100-125)/(57-73) 106/66     Weight: 56.8 kg (125 lb 3.5 oz)  Body mass index is 20.21 kg/m².     SpO2: 98 %  O2 Device (Oxygen Therapy): room air    No intake or output data in the 24 hours ending 03/25/18 1327    Lines/Drains/Airways     Peripheral Intravenous Line                 Peripheral IV - Single Lumen Left Antecubital -- days         Peripheral IV - Single Lumen 03/23/18 2056 Left Forearm 1 day                Physical Exam   Constitutional: He is oriented to person, place, and time. He appears well-developed and well-nourished.   HENT:   Head: Normocephalic and atraumatic.   Eyes: Conjunctivae are normal. Pupils are equal, round, and reactive to light.   Neck: Normal range of motion. Neck supple.   Cardiovascular: Normal rate, normal heart sounds and intact distal pulses.    Pulmonary/Chest: Effort normal and breath sounds normal.   Abdominal: Soft. Bowel sounds are normal.   Musculoskeletal: Normal range of motion.   Neurological: He is alert and oriented to person, place, and time.   Skin: Skin is warm and dry.       Significant Labs: All pertinent lab results from the last 24 hours have been reviewed.    Significant Imaging: Echocardiogram:   2D echo with color flow doppler:   Results for orders placed or performed during the hospital  encounter of 03/23/18   2D echo with color flow doppler   Result Value Ref Range    EF 35 (A) 55 - 65    Mitral Valve Regurgitation TRIVIAL     Diastolic Dysfunction Yes (A)     Est. PA Systolic Pressure 19.48     Tricuspid Valve Regurgitation TRIVIAL

## 2018-03-26 ENCOUNTER — TELEPHONE (OUTPATIENT)
Dept: FAMILY MEDICINE | Facility: CLINIC | Age: 68
End: 2018-03-26

## 2018-03-26 VITALS
HEART RATE: 74 BPM | DIASTOLIC BLOOD PRESSURE: 67 MMHG | WEIGHT: 123.69 LBS | HEIGHT: 66 IN | TEMPERATURE: 98 F | OXYGEN SATURATION: 95 % | RESPIRATION RATE: 18 BRPM | SYSTOLIC BLOOD PRESSURE: 110 MMHG | BODY MASS INDEX: 19.88 KG/M2

## 2018-03-26 PROBLEM — I25.10 CORONARY ARTERY DISEASE INVOLVING NATIVE CORONARY ARTERY OF NATIVE HEART: Status: ACTIVE | Noted: 2018-03-26

## 2018-03-26 PROBLEM — S98.912A TRAUMATIC BILATERAL AMPUTATION OF FOOT WITHOUT COMPLICATION: Status: ACTIVE | Noted: 2018-03-26

## 2018-03-26 PROBLEM — S98.911A TRAUMATIC BILATERAL AMPUTATION OF FOOT WITHOUT COMPLICATION: Status: ACTIVE | Noted: 2018-03-26

## 2018-03-26 PROCEDURE — 25000003 PHARM REV CODE 250: Performed by: INTERNAL MEDICINE

## 2018-03-26 PROCEDURE — 94760 N-INVAS EAR/PLS OXIMETRY 1: CPT

## 2018-03-26 PROCEDURE — 93010 ELECTROCARDIOGRAM REPORT: CPT | Mod: 76,,, | Performed by: INTERNAL MEDICINE

## 2018-03-26 PROCEDURE — S4991 NICOTINE PATCH NONLEGEND: HCPCS | Performed by: INTERNAL MEDICINE

## 2018-03-26 PROCEDURE — 93005 ELECTROCARDIOGRAM TRACING: CPT

## 2018-03-26 PROCEDURE — 99232 SBSQ HOSP IP/OBS MODERATE 35: CPT | Mod: ,,, | Performed by: INTERNAL MEDICINE

## 2018-03-26 PROCEDURE — 93010 ELECTROCARDIOGRAM REPORT: CPT | Mod: ,,, | Performed by: INTERNAL MEDICINE

## 2018-03-26 PROCEDURE — 25000003 PHARM REV CODE 250: Performed by: HOSPITALIST

## 2018-03-26 RX ORDER — CLOPIDOGREL BISULFATE 75 MG/1
75 TABLET ORAL DAILY
Qty: 30 TABLET | Refills: 11 | Status: SHIPPED | OUTPATIENT
Start: 2018-03-26 | End: 2018-09-11 | Stop reason: SDUPTHER

## 2018-03-26 RX ORDER — LISINOPRIL 2.5 MG/1
2.5 TABLET ORAL DAILY
Status: DISCONTINUED | OUTPATIENT
Start: 2018-03-27 | End: 2018-03-26 | Stop reason: HOSPADM

## 2018-03-26 RX ORDER — ATORVASTATIN CALCIUM 80 MG/1
80 TABLET, FILM COATED ORAL DAILY
Qty: 90 TABLET | Refills: 3 | Status: SHIPPED | OUTPATIENT
Start: 2018-03-26 | End: 2018-09-11 | Stop reason: SDUPTHER

## 2018-03-26 RX ORDER — ASPIRIN 81 MG/1
81 TABLET ORAL DAILY
Refills: 0 | COMMUNITY
Start: 2018-03-26 | End: 2022-07-14

## 2018-03-26 RX ORDER — LISINOPRIL 5 MG/1
5 TABLET ORAL DAILY
Qty: 90 TABLET | Refills: 3 | Status: SHIPPED | OUTPATIENT
Start: 2018-03-26 | End: 2018-03-26

## 2018-03-26 RX ORDER — METOPROLOL SUCCINATE 50 MG/1
50 TABLET, EXTENDED RELEASE ORAL DAILY
Qty: 30 TABLET | Refills: 11 | Status: SHIPPED | OUTPATIENT
Start: 2018-03-26 | End: 2018-03-26

## 2018-03-26 RX ORDER — LISINOPRIL 2.5 MG/1
2.5 TABLET ORAL DAILY
Qty: 90 TABLET | Refills: 3 | Status: SHIPPED | OUTPATIENT
Start: 2018-03-26 | End: 2018-09-11 | Stop reason: SDUPTHER

## 2018-03-26 RX ORDER — METOPROLOL SUCCINATE 25 MG/1
25 TABLET, EXTENDED RELEASE ORAL DAILY
Qty: 30 TABLET | Refills: 11 | Status: SHIPPED | OUTPATIENT
Start: 2018-03-26 | End: 2018-05-01 | Stop reason: SDUPTHER

## 2018-03-26 RX ORDER — METOPROLOL SUCCINATE 25 MG/1
25 TABLET, EXTENDED RELEASE ORAL DAILY
Status: DISCONTINUED | OUTPATIENT
Start: 2018-03-27 | End: 2018-03-26 | Stop reason: HOSPADM

## 2018-03-26 RX ADMIN — CLOPIDOGREL BISULFATE 75 MG: 75 TABLET ORAL at 08:03

## 2018-03-26 RX ADMIN — ATORVASTATIN CALCIUM 80 MG: 40 TABLET, FILM COATED ORAL at 08:03

## 2018-03-26 RX ADMIN — NICOTINE 1 PATCH: 21 PATCH, EXTENDED RELEASE TRANSDERMAL at 08:03

## 2018-03-26 RX ADMIN — ASPIRIN 81 MG: 81 TABLET, COATED ORAL at 08:03

## 2018-03-26 NOTE — PROGRESS NOTES
[facility]  Cardiology  Progress Note    Patient Name: Sharath Huizar  MRN: [id number]  Admission Date: 3/23/2018  Hospital Length of Stay: 3 days  Code Status: Full Code   Attending Physician: Shreya Friedman MD   Primary Care Physician: Primary Doctor No  Expected Discharge Date: 3/26/2018  Principal Problem:NSTEMI (non-ST elevated myocardial infarction)    Subjective:     Hospital Course:   Denies CP or SOB    Echo 3/24/18    1 - Moderately depressed left ventricular systolic function (EF 35-40%) Severe inferior hypokinesis.     2 - Concentric hypertrophy.     3 - Impaired LV relaxation, normal LAP (grade 1 diastolic dysfunction).     4 - Trivial mitral regurgitation.     5 - Trivial tricuspid regurgitation.     3/24/18  PCI to the prox, mid, distal RCA with JUANCARLOS x 3  PCI to the PDA with JUANCARLOS x 1  Long mid LAD 75%    Interval History:     Review of Systems   Constitution: Negative for decreased appetite.   HENT: Negative for ear discharge.    Eyes: Negative for blurred vision.   Endocrine: Negative for polyphagia.   Skin: Negative for nail changes.   Neurological: Negative for aphonia.   Psychiatric/Behavioral: Negative for hallucinations.     Objective:     Vital Signs (Most Recent):  Temp: 98.9 °F (37.2 °C) (03/26/18 0813)  Pulse: 71 (03/26/18 0813)  Resp: 18 (03/26/18 0813)  BP: 100/61 (03/26/18 0813)  SpO2: 99 % (03/26/18 0813) Vital Signs (24h Range):  Temp:  [97.3 °F (36.3 °C)-98.9 °F (37.2 °C)] 98.9 °F (37.2 °C)  Pulse:  [71-79] 71  Resp:  [17-20] 18  SpO2:  [97 %-99 %] 99 %  BP: ()/(59-67) 100/61     Weight: 56.1 kg (123 lb 10.9 oz)  Body mass index is 19.96 kg/m².     SpO2: 99 %  O2 Device (Oxygen Therapy): room air    No intake or output data in the 24 hours ending 03/26/18 1044    Lines/Drains/Airways     Peripheral Intravenous Line                 Peripheral IV - Single Lumen Left Antecubital -- days         Peripheral IV - Single Lumen 03/23/18 2056 Left Forearm 2 days                 Physical Exam   Constitutional: He is oriented to person, place, and time. He appears well-developed and well-nourished.   HENT:   Head: Normocephalic and atraumatic.   Eyes: Conjunctivae are normal. Pupils are equal, round, and reactive to light.   Neck: Normal range of motion. Neck supple.   Cardiovascular: Normal rate, normal heart sounds and intact distal pulses.    Pulmonary/Chest: Effort normal and breath sounds normal.   Abdominal: Soft. Bowel sounds are normal.   Musculoskeletal: Normal range of motion.   Neurological: He is alert and oriented to person, place, and time.   Skin: Skin is warm and dry.       Significant Labs: All pertinent lab results from the last 24 hours have been reviewed.    Significant Imaging: Echocardiogram:   2D echo with color flow doppler:   Results for orders placed or performed during the hospital encounter of 03/23/18   2D echo with color flow doppler   Result Value Ref Range    EF 35 (A) 55 - 65    Mitral Valve Regurgitation TRIVIAL     Diastolic Dysfunction Yes (A)     Est. PA Systolic Pressure 19.48     Tricuspid Valve Regurgitation TRIVIAL      Assessment and Plan:     Brief HPI:     * NSTEMI (non-ST elevated myocardial infarction)    S/P JUANCARLOS x 4 to RCA. Residual LAD disease that will need to be addressed at a later date. Echo with moderately depressed EF. Ok for d/c        Tobacco abuse    counseled        Hypertensive urgency    Improved            VTE Risk Mitigation         Ordered     enoxaparin injection 40 mg  Every 24 hours (non-standard times)     Route:  Subcutaneous        03/24/18 1207     IP VTE HIGH RISK PATIENT  Once      03/23/18 2313        OV with me 1 week    Viral Lombardi MD  Cardiology  [facility]

## 2018-03-26 NOTE — PROGRESS NOTES
TN informed telemetry nurse that pt is ready for d/c from [facility].Mary Martin RN, BSN, STN [facility]  3/26/2018  '

## 2018-03-26 NOTE — PROGRESS NOTES
WRITTEN DISCHARGE INFORMATION:     Follow-up Information     Viral Lombardi MD On 4/6/2018.    Specialty:  Cardiology  Why:  out patient services: 10:30 AM  Contact information:  [address]  [address]  [place] [address]  [phone]             Wilmer Bangura Jr, MD On 4/2/2018.    Specialty:  Family Medicine  Why:  out patient services: 1:20 pm follow up from the hospital and to establish care  Contact information:  [address]  [place] [address]  [phone]               Things that YOU are responsible for to Manage Your Care At Home:  1. Getting your prescriptions filled.  2. Taking you medications as directed. DO NOT MISS ANY DOSES!  3. Going to your follow-up doctor appointments. This is important because it allows the doctor to monitor your progress and to determine if any changes need to be made to your treatment plan.                                                         Help at Home  After discharge for assistance [facility] 24/7 assistance  [phone]   Thank you for choosing [facility] for your care.  Please answer any calls you may receive from [facility] we want to continue to support you as you manage your healthcare needs.  Sincerely, Your [facility] Healthcare Manager is,  Mary Martin RN [facility] [phone]

## 2018-03-26 NOTE — TELEPHONE ENCOUNTER
----- Message from Brielle Frost sent at 3/26/2018 12:22 PM CDT -----  Contact: self  Pt wife calling to see if Dr Conway would take him in as a patient. Please call [phone].

## 2018-03-26 NOTE — DISCHARGE SUMMARY
[facility] Hospital Medicine  Discharge Summary      Patient Name: Sharath Huizar  MRN: [id number]  Admission Date: 3/23/2018  Hospital Length of Stay: 3 days  Discharge Date and Time:  03/26/2018 12:13 PM  Attending Physician: Shreya Friedman MD   Discharging Provider: Shreya Friedman MD  Primary Care Provider: Primary Doctor No      HPI:   Mr. Sharath Huizar is a 67 y.o. male with tobacco abuse who presents to [facility] ED with complaints of chest pain this morning.  He was at work when the pain started, and it was described as mid-epigastric with radiation into his abdomen, was sharp in quality, and was 10/10 in severity at its worst.  He reports some nausea but denies any vomiting.  He also had some diaphoresis but not any palpitations, shortness of breath, hemoptysis, nor any lower extremity pain or swelling.  There were no alleviating or exacerbating factors, and he has never had similar episodes in the past.  He denies any personal or family history of heart attacks.  He does admit to smoking 1 pack a day of cigarettes since his early 20's, and drinks about 2-3 beers a day.    * No surgery found *      Hospital Course:   68 y/o male admitted to ICU with NSTEMI.  Underwent LHC with JUANCARLOS x 4 to RCA. Residual LAD disease that will need to be addressed at a later date.  Remained on Aggrastat infusion, now off.  TTE showing new EF 35%. Patient euvolemic. Discharged with cardiology follow up.      Consults:   Consults         Status Ordering Provider     Inpatient consult to Cardiology  Once     Provider:  Viral Lombardi MD    Acknowledged BIBIANA MCKEON          * NSTEMI (non-ST elevated myocardial infarction)    - Patient had ongoing chest pain for which he presented to the ED and was found to have a troponin level of 5.102.   - Dr. Viral Lombardi, Cardiology, was consulted from the ED and recommended the patient undergo and angiogram.    - Dr. Faustina Plata performed the angiogram and has placed  four stents, three to the proximal, mid, and distal RCAand one to the PDA, all drug-eluting. - He recommends that the patient remain on the tirofiban infusion for 12 hours.  He also recommended beta-blockage, high-intensity statin, and aspirin.  Finished tirofiban infusion  - LDL pending  - A1c 4.9%  - TTE showing new EF 35%  - Cardiology following  - asa, plavix, Bb, ACEi, statin        Coronary artery disease involving native coronary artery of native heart without angina pectoris    See NSTEMI  Cardiology follow up          Acute systolic heart failure    - TTE CONCLUSIONS     1 - Moderately depressed left ventricular systolic function (EF 35-40%) Severe inferior hypokinesis.     2 - Concentric hypertrophy.     3 - Impaired LV relaxation, normal LAP (grade 1 diastolic dysfunction).     4 - Trivial mitral regurgitation.     5 - Trivial tricuspid regurgitation.  - new heart failure, likely due to NSTEMI  - NSTEMI tx as above  - appears euvolemic          Elevated LFTs    AST//90  Likely secondary to NSTEMI          Leukocytosis    Probably reactive.  No evidence of infection.          Tobacco abuse    Patient was counseled on smoking cessation and he will be provided a nicotine transdermal patch applied while inpatient.  Will provide additional smoking cessation counseling prior to discharge.        Hypertensive urgency    Currently on Lisinopril 5 and Toprol XL 50.  BP controlled  Per cardiology, discharge on lisinopril 2.5 and toprol xl 25 daily          Final Active Diagnoses:    Diagnosis Date Noted POA    PRINCIPAL PROBLEM:  NSTEMI (non-ST elevated myocardial infarction) [I21.4] 03/23/2018 Yes    Coronary artery disease involving native coronary artery of native heart without angina pectoris [I25.10] 03/26/2018 Yes    Acute systolic heart failure [I50.21] 03/25/2018 Yes    Leukocytosis [D72.829] 03/24/2018 Yes    Elevated LFTs [R79.89] 03/24/2018 Yes    Tobacco abuse [Z72.0] 03/23/2018 Yes      Chronic    Hypertensive urgency [I16.0] 03/23/2018 Yes      Problems Resolved During this Admission:    Diagnosis Date Noted Date Resolved POA       Discharged Condition: good    Disposition: Home or Self Care    Follow Up:  Follow-up Information     Viral Lombardi MD On 4/6/2018.    Specialty:  Cardiology  Why:  out patient services: 10:30 AM  Contact information:  [address]  [address]  [place] [address]  [phone]             Wilmer Bangura Jr, MD On 4/2/2018.    Specialty:  Family Medicine  Why:  out patient services: 1:20 pm follow up from the hospital and to establish care  Contact information:  [address]  [place] [address]  [phone]                 Patient Instructions:     Diet Cardiac     Activity as tolerated     Notify your health care provider if you experience any of the following:  temperature >100.4     Notify your health care provider if you experience any of the following:  persistent nausea and vomiting or diarrhea     Notify your health care provider if you experience any of the following:  severe uncontrolled pain     Notify your health care provider if you experience any of the following:  redness, tenderness, or signs of infection (pain, swelling, redness, odor or green/yellow discharge around incision site)     Notify your health care provider if you experience any of the following:  difficulty breathing or increased cough     Notify your health care provider if you experience any of the following:  severe persistent headache     Notify your health care provider if you experience any of the following:  worsening rash     Notify your health care provider if you experience any of the following:  persistent dizziness, light-headedness, or visual disturbances     Notify your health care provider if you experience any of the following:  increased confusion or weakness         Significant Diagnostic Studies: Labs: All labs within the past 24 hours have been reviewed    Pending  Diagnostic Studies:     Procedure Component Value Units Date/Time    EKG 12-LEAD [[id number]]     Order Status:  Sent Lab Status:  No result     EKG 12-LEAD [[id number]]     Order Status:  Sent Lab Status:  No result     EKG 12-LEAD [[id number]]     Order Status:  Sent Lab Status:  No result     EKG 12-LEAD [[id number]]     Order Status:  Sent Lab Status:  No result     EKG 12-LEAD [[id number]]     Order Status:  Sent Lab Status:  No result     EKG 12-LEAD [[id number]]     Order Status:  Sent Lab Status:  No result     EKG 12-LEAD [[id number]]     Order Status:  Sent Lab Status:  No result     EKG 12-LEAD [[id number]]     Order Status:  Sent Lab Status:  No result     EKG 12-LEAD [[id number]]     Order Status:  Sent Lab Status:  No result     EKG 12-LEAD [[id number]]     Order Status:  Sent Lab Status:  No result     EKG 12-LEAD [[id number]]     Order Status:  Sent Lab Status:  No result     EKG 12-LEAD [[id number]]     Order Status:  Sent Lab Status:  No result          Medications:  Reconciled Home Medications:   Current Discharge Medication List      START taking these medications    Details   aspirin (ECOTRIN) 81 MG EC tablet Take 1 tablet (81 mg total) by mouth once daily.  Refills: 0      atorvastatin (LIPITOR) 80 MG tablet Take 1 tablet (80 mg total) by mouth once daily.  Qty: 90 tablet, Refills: 3      clopidogrel (PLAVIX) 75 mg tablet Take 1 tablet (75 mg total) by mouth once daily.  Qty: 30 tablet, Refills: 11      lisinopril (PRINIVIL,ZESTRIL) 2.5 MG tablet Take 1 tablet (2.5 mg total) by mouth once daily.  Qty: 90 tablet, Refills: 3      metoprolol succinate (TOPROL-XL) 25 MG 24 hr tablet Take 1 tablet (25 mg total) by mouth once daily.  Qty: 30 tablet, Refills: 11             Indwelling Lines/Drains at time of discharge:   Lines/Drains/Airways          No matching active lines, drains, or airways          Time spent on the discharge of patient: 30 minutes  Patient was seen and examined on the date of discharge and  determined to be suitable for discharge.         Shreya Friedman MD  Department of Hospital Medicine  [facility]

## 2018-03-26 NOTE — PLAN OF CARE
03/26/18 1138   Final Note   Assessment Type Final Discharge Note   Discharge Disposition Home   What phone number can be called within the next 1-3 days to see how you are doing after discharge? (see chart)   Hospital Follow Up  Appt(s) scheduled? Yes   Discharge plans and expectations educations in teach back method with documentation complete? Yes   Right Care Referral Info   Post Acute Recommendation No Care

## 2018-03-26 NOTE — PLAN OF CARE
Problem: Fall Risk (Adult)  Goal: Absence of Falls  Patient will demonstrate the desired outcomes by discharge/transition of care.    03/26/18 0332   Fall Risk (Adult)   Absence of Falls making progress toward outcome     Patient had no falls during this shift - bed alarm on - SR up x2 upper - wife at bedside - bed locked in lowest position, call light in reach, patient instructed to call for assistance.

## 2018-03-26 NOTE — ASSESSMENT & PLAN NOTE
Currently on Lisinopril 5 and Toprol XL 50.  BP controlled  Per cardiology, discharge on lisinopril 2.5 and toprol xl 25 daily

## 2018-03-26 NOTE — PROGRESS NOTES
Informed Dr. Lombardi patients blood pressure 100/61 stated ok to hold AM medications of metoprolol and lisinopril.  Also informed of EKG results from this AM verbalized acknowledgement.  Will continue to monitor.

## 2018-03-26 NOTE — PROGRESS NOTES
TN reviewed Chest Pain S&S with patient and teachback was utilized..Mary Martin RN, BSN, STN [facility]  3/26/2018

## 2018-03-26 NOTE — SUBJECTIVE & OBJECTIVE
Interval History:     Review of Systems   Constitution: Negative for decreased appetite.   HENT: Negative for ear discharge.    Eyes: Negative for blurred vision.   Endocrine: Negative for polyphagia.   Skin: Negative for nail changes.   Neurological: Negative for aphonia.   Psychiatric/Behavioral: Negative for hallucinations.     Objective:     Vital Signs (Most Recent):  Temp: 98.9 °F (37.2 °C) (03/26/18 0813)  Pulse: 71 (03/26/18 0813)  Resp: 18 (03/26/18 0813)  BP: 100/61 (03/26/18 0813)  SpO2: 99 % (03/26/18 0813) Vital Signs (24h Range):  Temp:  [97.3 °F (36.3 °C)-98.9 °F (37.2 °C)] 98.9 °F (37.2 °C)  Pulse:  [71-79] 71  Resp:  [17-20] 18  SpO2:  [97 %-99 %] 99 %  BP: ()/(59-67) 100/61     Weight: 56.1 kg (123 lb 10.9 oz)  Body mass index is 19.96 kg/m².     SpO2: 99 %  O2 Device (Oxygen Therapy): room air    No intake or output data in the 24 hours ending 03/26/18 1044    Lines/Drains/Airways     Peripheral Intravenous Line                 Peripheral IV - Single Lumen Left Antecubital -- days         Peripheral IV - Single Lumen 03/23/18 2056 Left Forearm 2 days                Physical Exam   Constitutional: He is oriented to person, place, and time. He appears well-developed and well-nourished.   HENT:   Head: Normocephalic and atraumatic.   Eyes: Conjunctivae are normal. Pupils are equal, round, and reactive to light.   Neck: Normal range of motion. Neck supple.   Cardiovascular: Normal rate, normal heart sounds and intact distal pulses.    Pulmonary/Chest: Effort normal and breath sounds normal.   Abdominal: Soft. Bowel sounds are normal.   Musculoskeletal: Normal range of motion.   Neurological: He is alert and oriented to person, place, and time.   Skin: Skin is warm and dry.       Significant Labs: All pertinent lab results from the last 24 hours have been reviewed.    Significant Imaging: Echocardiogram:   2D echo with color flow doppler:   Results for orders placed or performed during the  hospital encounter of 03/23/18   2D echo with color flow doppler   Result Value Ref Range    EF 35 (A) 55 - 65    Mitral Valve Regurgitation TRIVIAL     Diastolic Dysfunction Yes (A)     Est. PA Systolic Pressure 19.48     Tricuspid Valve Regurgitation TRIVIAL

## 2018-03-26 NOTE — ASSESSMENT & PLAN NOTE
S/P JUANCARLOS x 4 to RCA. Residual LAD disease that will need to be addressed at a later date. Echo with moderately depressed EF. Ok for d/c

## 2018-03-28 LAB
CORONARY STENOSIS: ABNORMAL
CORONARY STENT: YES

## 2018-04-02 ENCOUNTER — LAB VISIT (OUTPATIENT)
Dept: LAB | Facility: HOSPITAL | Age: 68
End: 2018-04-02
Attending: FAMILY MEDICINE
Payer: MEDICARE

## 2018-04-02 ENCOUNTER — OFFICE VISIT (OUTPATIENT)
Dept: FAMILY MEDICINE | Facility: CLINIC | Age: 68
End: 2018-04-02
Payer: MEDICARE

## 2018-04-02 VITALS
BODY MASS INDEX: 21.01 KG/M2 | TEMPERATURE: 98 F | OXYGEN SATURATION: 98 % | HEART RATE: 70 BPM | RESPIRATION RATE: 17 BRPM | SYSTOLIC BLOOD PRESSURE: 120 MMHG | HEIGHT: 66 IN | DIASTOLIC BLOOD PRESSURE: 74 MMHG | WEIGHT: 130.75 LBS

## 2018-04-02 DIAGNOSIS — I25.10 CORONARY ARTERY DISEASE INVOLVING NATIVE CORONARY ARTERY OF NATIVE HEART WITHOUT ANGINA PECTORIS: ICD-10-CM

## 2018-04-02 DIAGNOSIS — I50.21 ACUTE SYSTOLIC HEART FAILURE: ICD-10-CM

## 2018-04-02 DIAGNOSIS — I10 ESSENTIAL HYPERTENSION: ICD-10-CM

## 2018-04-02 DIAGNOSIS — Z72.0 TOBACCO ABUSE: Chronic | ICD-10-CM

## 2018-04-02 DIAGNOSIS — Z12.9 SCREENING FOR CANCER: ICD-10-CM

## 2018-04-02 DIAGNOSIS — Z13.6 SCREENING FOR AAA (ABDOMINAL AORTIC ANEURYSM): ICD-10-CM

## 2018-04-02 DIAGNOSIS — I21.4 NSTEMI (NON-ST ELEVATED MYOCARDIAL INFARCTION): Primary | ICD-10-CM

## 2018-04-02 DIAGNOSIS — Z12.11 SCREEN FOR COLON CANCER: ICD-10-CM

## 2018-04-02 DIAGNOSIS — R79.89 ELEVATED LFTS: ICD-10-CM

## 2018-04-02 DIAGNOSIS — Z95.5 S/P CORONARY ARTERY STENT PLACEMENT: ICD-10-CM

## 2018-04-02 DIAGNOSIS — D72.829 LEUKOCYTOSIS, UNSPECIFIED TYPE: ICD-10-CM

## 2018-04-02 LAB
ALBUMIN SERPL BCP-MCNC: 3.4 G/DL
ALP SERPL-CCNC: 83 U/L
ALT SERPL W/O P-5'-P-CCNC: 21 U/L
ANION GAP SERPL CALC-SCNC: 9 MMOL/L
AST SERPL-CCNC: 19 U/L
BASOPHILS # BLD AUTO: 0.04 K/UL
BASOPHILS NFR BLD: 0.4 %
BILIRUB SERPL-MCNC: 0.2 MG/DL
BUN SERPL-MCNC: 14 MG/DL
CALCIUM SERPL-MCNC: 9.4 MG/DL
CHLORIDE SERPL-SCNC: 108 MMOL/L
CHOLEST SERPL-MCNC: 90 MG/DL
CHOLEST/HDLC SERPL: 2.6 {RATIO}
CO2 SERPL-SCNC: 25 MMOL/L
CREAT SERPL-MCNC: 0.9 MG/DL
DIFFERENTIAL METHOD: ABNORMAL
EOSINOPHIL # BLD AUTO: 0.2 K/UL
EOSINOPHIL NFR BLD: 2.6 %
ERYTHROCYTE [DISTWIDTH] IN BLOOD BY AUTOMATED COUNT: 14.7 %
EST. GFR  (AFRICAN AMERICAN): >60 ML/MIN/1.73 M^2
EST. GFR  (NON AFRICAN AMERICAN): >60 ML/MIN/1.73 M^2
GLUCOSE SERPL-MCNC: 87 MG/DL
HCT VFR BLD AUTO: 38.8 %
HDLC SERPL-MCNC: 35 MG/DL
HDLC SERPL: 38.9 %
HGB BLD-MCNC: 12.6 G/DL
LDLC SERPL CALC-MCNC: 40.4 MG/DL
LYMPHOCYTES # BLD AUTO: 2.6 K/UL
LYMPHOCYTES NFR BLD: 27.4 %
MCH RBC QN AUTO: 29.9 PG
MCHC RBC AUTO-ENTMCNC: 32.5 G/DL
MCV RBC AUTO: 92 FL
MONOCYTES # BLD AUTO: 0.6 K/UL
MONOCYTES NFR BLD: 6.5 %
NEUTROPHILS # BLD AUTO: 5.9 K/UL
NEUTROPHILS NFR BLD: 62.9 %
NONHDLC SERPL-MCNC: 55 MG/DL
PLATELET # BLD AUTO: 320 K/UL
PMV BLD AUTO: 10.3 FL
POTASSIUM SERPL-SCNC: 4.2 MMOL/L
PROT SERPL-MCNC: 6.7 G/DL
RBC # BLD AUTO: 4.21 M/UL
SODIUM SERPL-SCNC: 142 MMOL/L
TRIGL SERPL-MCNC: 73 MG/DL
TSH SERPL DL<=0.005 MIU/L-ACNC: 0.99 UIU/ML
WBC # BLD AUTO: 9.34 K/UL

## 2018-04-02 PROCEDURE — 99496 TRANSJ CARE MGMT HIGH F2F 7D: CPT | Mod: S$GLB,,, | Performed by: FAMILY MEDICINE

## 2018-04-02 PROCEDURE — 80053 COMPREHEN METABOLIC PANEL: CPT

## 2018-04-02 PROCEDURE — 36415 COLL VENOUS BLD VENIPUNCTURE: CPT | Mod: PN

## 2018-04-02 PROCEDURE — 84443 ASSAY THYROID STIM HORMONE: CPT

## 2018-04-02 PROCEDURE — 80061 LIPID PANEL: CPT

## 2018-04-02 PROCEDURE — 82043 UR ALBUMIN QUANTITATIVE: CPT

## 2018-04-02 PROCEDURE — 85025 COMPLETE CBC W/AUTO DIFF WBC: CPT

## 2018-04-02 PROCEDURE — 99999 PR PBB SHADOW E&M-EST. PATIENT-LVL V: CPT | Mod: PBBFAC,,, | Performed by: FAMILY MEDICINE

## 2018-04-02 PROCEDURE — 99499 UNLISTED E&M SERVICE: CPT | Mod: S$GLB,,, | Performed by: FAMILY MEDICINE

## 2018-04-02 RX ORDER — BENZONATATE 200 MG/1
CAPSULE ORAL
Refills: 0 | Status: ON HOLD | COMMUNITY
Start: 2018-02-05 | End: 2018-05-29 | Stop reason: HOSPADM

## 2018-04-02 RX ORDER — AZITHROMYCIN 250 MG/1
TABLET, FILM COATED ORAL
Refills: 0 | COMMUNITY
Start: 2018-02-05 | End: 2018-05-28 | Stop reason: CLARIF

## 2018-04-03 ENCOUNTER — OUTPATIENT CASE MANAGEMENT (OUTPATIENT)
Dept: ADMINISTRATIVE | Facility: OTHER | Age: 68
End: 2018-04-03

## 2018-04-03 LAB
CREAT UR-MCNC: 98 MG/DL
MICROALBUMIN UR DL<=1MG/L-MCNC: 4 UG/ML
MICROALBUMIN/CREATININE RATIO: 4.1 UG/MG

## 2018-04-03 NOTE — PROGRESS NOTES
Thank you for the referral.  Patient has been assigned to Magdalene Pena LMSW for low risk screening for Outpatient Case Management.     Reason for referral:  NSTEMI (non-ST elevated myocardial infarction)  Coronary artery disease involving native coronary artery of native heart without angina pectoris  Essential hypertension  Acute systolic heart failure  Please contact [facility] at [phone] with any questions.    Thank you,    ALFRED AppleW, CCM

## 2018-04-03 NOTE — PROGRESS NOTES
Transitional Care Note  Subjective:       Patient ID: Sharath Huizar is a 67 y.o. male.  Chief Complaint: Heart Problem (hospital f/u heart attack )    Family and/or Caretaker present at visit?  Yes- wife  Diagnostic tests reviewed/disposition: I have reviewed all completed as well as pending diagnostic tests at the time of discharge.  Disease/illness education: Yes  Home health/community services discussion/referrals: Patient does not have home health established from hospital visit.  They do not need home health.  If needed, we will set up home health for the patient.   Establishment or re-establishment of referral orders for community resources: Order for Case Management placed.   Discussion with other health care providers: No discussion with other health care providers necessary at present    HPI   67 year old male comes in for hospital follow up. He had presented to the emergency room on 3/23/18 with severe chest pain and found to have elevated troponin levels, and diagnosed with non-ST-elevation myocardial infarction. He underwent cardiac catheterization and had 4 drug eluting stents placed 3 to the right coronary artery and one to the posterior descending artery. Of note in the hospital record the is pending intervention to the left anterior descending. The patient was started and continues on antiplatelet medication and understands there are pending interventions. He reports he is tolerating all his medications without concern. He also reports that he has not smoked or had any alcohol since he was admitted. He has not returned to work yet. He has an appointment with cardiology on April 06, this week.   TTE done in hospital shows ejection fraction of 35%.  Patient reports that prior to this hospitalization he had not seen a doctor often. He smoked at least a pack per day since teenage years, and would drink beer regularly.   He denies any chest pain, shortness of breath, or peripheral edema. He denies  "needing to sleep with a pillow. He does report some fatigue.     Review of Systems   Constitutional: Positive for fatigue. Negative for chills, fever and unexpected weight change.   HENT: Negative for ear pain and sore throat.    Eyes: Negative for visual disturbance.   Respiratory: Negative for shortness of breath.    Cardiovascular: Negative for chest pain, palpitations and leg swelling.   Gastrointestinal: Negative for abdominal pain, blood in stool, constipation and diarrhea.   Endocrine: Negative for cold intolerance and heat intolerance.   Genitourinary: Negative for dysuria, frequency and hematuria.   Skin: Negative for rash.   Neurological: Negative for numbness and headaches.   Hematological: Negative for adenopathy.   Psychiatric/Behavioral: Negative for suicidal ideas.       Objective:     /74 (BP Location: Left arm, Patient Position: Sitting, BP Method: Medium (Manual))   Pulse 70   Temp 98.4 °F (36.9 °C) (Oral)   Resp 17   Ht 5' 6" (1.676 m)   Wt 59.3 kg (130 lb 11.7 oz)   SpO2 98%   BMI 21.10 kg/m²      Lab Visit on 04/02/2018   Component Date Value Ref Range Status    Sodium 04/02/2018 142  136 - 145 mmol/L Final    Potassium 04/02/2018 4.2  3.5 - 5.1 mmol/L Final    Chloride 04/02/2018 108  95 - 110 mmol/L Final    CO2 04/02/2018 25  23 - 29 mmol/L Final    Glucose 04/02/2018 87  70 - 110 mg/dL Final    BUN, Bld 04/02/2018 14  8 - 23 mg/dL Final    Creatinine 04/02/2018 0.9  0.5 - 1.4 mg/dL Final    Calcium 04/02/2018 9.4  8.7 - 10.5 mg/dL Final    Total Protein 04/02/2018 6.7  6.0 - 8.4 g/dL Final    Albumin 04/02/2018 3.4* 3.5 - 5.2 g/dL Final    Total Bilirubin 04/02/2018 0.2  0.1 - 1.0 mg/dL Final    Comment: For infants and newborns, interpretation of results should be based  on gestational age, weight and in agreement with clinical  observations.  Premature Infant recommended reference ranges:  Up to 24 hours.............<8.0 mg/dL  Up to 48 hours............<12.0 " mg/dL  3-5 days..................<15.0 mg/dL  6-29 days.................<15.0 mg/dL      Alkaline Phosphatase 04/02/2018 83  55 - 135 U/L Final    AST 04/02/2018 19  10 - 40 U/L Final    ALT 04/02/2018 21  10 - 44 U/L Final    Anion Gap 04/02/2018 9  8 - 16 mmol/L Final    eGFR if African American 04/02/2018 >60  >60 mL/min/1.73 m^2 Final    eGFR if non African American 04/02/2018 >60  >60 mL/min/1.73 m^2 Final    Comment: Calculation used to obtain the estimated glomerular filtration  rate (eGFR) is the CKD-EPI equation.       WBC 04/02/2018 9.34  3.90 - 12.70 K/uL Final    RBC 04/02/2018 4.21* 4.60 - 6.20 M/uL Final    Hemoglobin 04/02/2018 12.6* 14.0 - 18.0 g/dL Final    Hematocrit 04/02/2018 38.8* 40.0 - 54.0 % Final    MCV 04/02/2018 92  82 - 98 fL Final    MCH 04/02/2018 29.9  27.0 - 31.0 pg Final    MCHC 04/02/2018 32.5  32.0 - 36.0 g/dL Final    RDW 04/02/2018 14.7* 11.5 - 14.5 % Final    Platelets 04/02/2018 320  150 - 350 K/uL Final    MPV 04/02/2018 10.3  9.2 - 12.9 fL Final    Gran # (ANC) 04/02/2018 5.9  1.8 - 7.7 K/uL Final    Lymph # 04/02/2018 2.6  1.0 - 4.8 K/uL Final    Mono # 04/02/2018 0.6  0.3 - 1.0 K/uL Final    Eos # 04/02/2018 0.2  0.0 - 0.5 K/uL Final    Baso # 04/02/2018 0.04  0.00 - 0.20 K/uL Final    Gran% 04/02/2018 62.9  38.0 - 73.0 % Final    Lymph% 04/02/2018 27.4  18.0 - 48.0 % Final    Mono% 04/02/2018 6.5  4.0 - 15.0 % Final    Eosinophil% 04/02/2018 2.6  0.0 - 8.0 % Final    Basophil% 04/02/2018 0.4  0.0 - 1.9 % Final    Differential Method 04/02/2018 Automated   Final    TSH 04/02/2018 0.988  0.400 - 4.000 uIU/mL Final    Cholesterol 04/02/2018 90* 120 - 199 mg/dL Final    Comment: The National Cholesterol Education Program (NCEP) has set the  following guidelines (reference ranges) for Cholesterol:  Optimal.....................<200 mg/dL  Borderline High.............200-239 mg/dL  High........................> or = 240 mg/dL      Triglycerides  04/02/2018 73  30 - 150 mg/dL Final    Comment: The National Cholesterol Education Program (NCEP) has set the  following guidelines (reference values) for triglycerides:  Normal......................<150 mg/dL  Borderline High.............150-199 mg/dL  High........................200-499 mg/dL      HDL 04/02/2018 35* 40 - 75 mg/dL Final    Comment: The National Cholesterol Education Program (NCEP) has set the  following guidelines (reference values) for HDL Cholesterol:  Low...............<40 mg/dL  Optimal...........>60 mg/dL      LDL Cholesterol 04/02/2018 40.4* 63.0 - 159.0 mg/dL Final    Comment: The National Cholesterol Education Program (NCEP) has set the  following guidelines (reference values) for LDL Cholesterol:  Optimal.......................<130 mg/dL  Borderline High...............130-159 mg/dL  High..........................160-189 mg/dL  Very High.....................>190 mg/dL      HDL/Chol Ratio 04/02/2018 38.9  20.0 - 50.0 % Final    Total Cholesterol/HDL Ratio 04/02/2018 2.6  2.0 - 5.0 Final    Non-HDL Cholesterol 04/02/2018 55  mg/dL Final    Comment: Risk category and Non-HDL cholesterol goals:  Coronary heart disease (CHD)or equivalent (10-year risk of CHD >20%):  Non-HDL cholesterol goal     <130 mg/dL  Two or more CHD risk factors and 10-year risk of CHD <= 20%:  Non-HDL cholesterol goal     <160 mg/dL  0 to 1 CHD risk factor:  Non-HDL cholesterol goal     <190 mg/dL     Admission on 03/23/2018, Discharged on 03/26/2018   Component Date Value Ref Range Status    WBC 03/23/2018 13.78* 3.90 - 12.70 K/uL Final    RBC 03/23/2018 4.50* 4.60 - 6.20 M/uL Final    Hemoglobin 03/23/2018 13.4* 14.0 - 18.0 g/dL Final    Hematocrit 03/23/2018 39.7* 40.0 - 54.0 % Final    MCV 03/23/2018 88  82 - 98 fL Final    MCH 03/23/2018 29.8  27.0 - 31.0 pg Final    MCHC 03/23/2018 33.8  32.0 - 36.0 g/dL Final    RDW 03/23/2018 14.3  11.5 - 14.5 % Final    Platelets 03/23/2018 242  150 - 350 K/uL Final     MPV 03/23/2018 10.5  9.2 - 12.9 fL Final    Gran # (ANC) 03/23/2018 12.0* 1.8 - 7.7 K/uL Final    Lymph # 03/23/2018 1.1  1.0 - 4.8 K/uL Final    Mono # 03/23/2018 0.6  0.3 - 1.0 K/uL Final    Eos # 03/23/2018 0.0  0.0 - 0.5 K/uL Final    Baso # 03/23/2018 0.01  0.00 - 0.20 K/uL Final    Gran% 03/23/2018 87.4* 38.0 - 73.0 % Final    Lymph% 03/23/2018 7.8* 18.0 - 48.0 % Final    Mono% 03/23/2018 4.6  4.0 - 15.0 % Final    Eosinophil% 03/23/2018 0.0  0.0 - 8.0 % Final    Basophil% 03/23/2018 0.1  0.0 - 1.9 % Final    Differential Method 03/23/2018 Automated   Final    Sodium 03/23/2018 136  136 - 145 mmol/L Final    Potassium 03/23/2018 3.9  3.5 - 5.1 mmol/L Final    Chloride 03/23/2018 103  95 - 110 mmol/L Final    CO2 03/23/2018 22* 23 - 29 mmol/L Final    Glucose 03/23/2018 129* 70 - 110 mg/dL Final    BUN, Bld 03/23/2018 10  8 - 23 mg/dL Final    Creatinine 03/23/2018 0.9  0.5 - 1.4 mg/dL Final    Calcium 03/23/2018 9.9  8.7 - 10.5 mg/dL Final    Total Protein 03/23/2018 7.4  6.0 - 8.4 g/dL Final    Albumin 03/23/2018 4.0  3.5 - 5.2 g/dL Final    Total Bilirubin 03/23/2018 0.5  0.1 - 1.0 mg/dL Final    Comment: For infants and newborns, interpretation of results should be based  on gestational age, weight and in agreement with clinical  observations.  Premature Infant recommended reference ranges:  Up to 24 hours.............<8.0 mg/dL  Up to 48 hours............<12.0 mg/dL  3-5 days..................<15.0 mg/dL  6-29 days.................<15.0 mg/dL      Alkaline Phosphatase 03/23/2018 87  55 - 135 U/L Final    AST 03/23/2018 101* 10 - 40 U/L Final    ALT 03/23/2018 22  10 - 44 U/L Final    Anion Gap 03/23/2018 11  8 - 16 mmol/L Final    eGFR if  03/23/2018 >60  >60 mL/min/1.73 m^2 Final    eGFR if non African American 03/23/2018 >60  >60 mL/min/1.73 m^2 Final    Comment: Calculation used to obtain the estimated glomerular filtration  rate (eGFR) is the CKD-EPI  equation.       Troponin I 03/23/2018 5.102* 0.000 - 0.026 ng/mL Final    Comment: The reference interval for Troponin I represents the 99th percentile   cutoff   for our facility and is consistent with 3rd generation assay   performance.      BNP 03/23/2018 127* 0 - 99 pg/mL Final    Coronary Stenosis 03/23/2018 >= 50%*  Final    Coronary Stent 03/23/2018 Yes*  Final    EF 03/24/2018 35* 55 - 65 Final    Mitral Valve Regurgitation 03/24/2018 TRIVIAL   Final    Diastolic Dysfunction 03/24/2018 Yes*  Final    Est. PA Systolic Pressure 03/24/2018 19.48   Final    Tricuspid Valve Regurgitation 03/24/2018 TRIVIAL   Final    Troponin I 03/23/2018 >50.000* 0.000 - 0.026 ng/mL Final    Comment: The reference interval for Troponin I represents the 99th percentile   cutoff   for our facility and is consistent with 3rd generation assay   performance.      Group & Rh 03/23/2018 O POS   Final    Indirect Dhiraj 03/23/2018 NEG   Final    Hemoglobin A1C 03/23/2018 4.9  4.0 - 5.6 % Final    Comment: According to ADA guidelines, hemoglobin A1c <7.0% represents  optimal control in non-pregnant diabetic patients. Different  metrics may apply to specific patient populations.   Standards of Medical Care in Diabetes-2016.  For the purpose of screening for the presence of diabetes:  <5.7%     Consistent with the absence of diabetes  5.7-6.4%  Consistent with increasing risk for diabetes   (prediabetes)  >or=6.5%  Consistent with diabetes  Currently, no consensus exists for use of hemoglobin A1c  for diagnosis of diabetes for children.  This Hemoglobin A1c assay has significant interference with fetal   hemoglobin   (HbF). The results are invalid for patients with abnormal amounts of   HbF,   including those with known Hereditary Persistence   of Fetal Hemoglobin. Heterozygous hemoglobin variants (HbAS, HbAC,   HbAD, HbAE, HbA2) do not significantly interfere with this assay;   however, presence of multiple variants in a  sample may impact the %   interference.      Estimated Avg Glucose 03/23/2018 94  68 - 131 mg/dL Final    Lipase 03/23/2018 12  4 - 60 U/L Final    WBC 03/24/2018 14.97* 3.90 - 12.70 K/uL Final    RBC 03/24/2018 4.69  4.60 - 6.20 M/uL Final    Hemoglobin 03/24/2018 14.1  14.0 - 18.0 g/dL Final    Hematocrit 03/24/2018 41.3  40.0 - 54.0 % Final    MCV 03/24/2018 88  82 - 98 fL Final    MCH 03/24/2018 30.1  27.0 - 31.0 pg Final    MCHC 03/24/2018 34.1  32.0 - 36.0 g/dL Final    RDW 03/24/2018 14.6* 11.5 - 14.5 % Final    Platelets 03/24/2018 258  150 - 350 K/uL Final    MPV 03/24/2018 10.3  9.2 - 12.9 fL Final    Gran # (ANC) 03/24/2018 12.2* 1.8 - 7.7 K/uL Final    Lymph # 03/24/2018 1.9  1.0 - 4.8 K/uL Final    Mono # 03/24/2018 0.8  0.3 - 1.0 K/uL Final    Eos # 03/24/2018 0.0  0.0 - 0.5 K/uL Final    Baso # 03/24/2018 0.02  0.00 - 0.20 K/uL Final    Gran% 03/24/2018 81.7* 38.0 - 73.0 % Final    Lymph% 03/24/2018 12.6* 18.0 - 48.0 % Final    Mono% 03/24/2018 5.3  4.0 - 15.0 % Final    Eosinophil% 03/24/2018 0.1  0.0 - 8.0 % Final    Basophil% 03/24/2018 0.1  0.0 - 1.9 % Final    Differential Method 03/24/2018 Automated   Final    Sodium 03/24/2018 138  136 - 145 mmol/L Final    Potassium 03/24/2018 4.0  3.5 - 5.1 mmol/L Final    Chloride 03/24/2018 103  95 - 110 mmol/L Final    CO2 03/24/2018 26  23 - 29 mmol/L Final    Glucose 03/24/2018 167* 70 - 110 mg/dL Final    BUN, Bld 03/24/2018 9  8 - 23 mg/dL Final    Creatinine 03/24/2018 0.9  0.5 - 1.4 mg/dL Final    Calcium 03/24/2018 9.2  8.7 - 10.5 mg/dL Final    Total Protein 03/24/2018 6.5  6.0 - 8.4 g/dL Final    Albumin 03/24/2018 3.4* 3.5 - 5.2 g/dL Final    Total Bilirubin 03/24/2018 0.8  0.1 - 1.0 mg/dL Final    Comment: For infants and newborns, interpretation of results should be based  on gestational age, weight and in agreement with clinical  observations.  Premature Infant recommended reference ranges:  Up to 24  hours.............<8.0 mg/dL  Up to 48 hours............<12.0 mg/dL  3-5 days..................<15.0 mg/dL  6-29 days.................<15.0 mg/dL      Alkaline Phosphatase 03/24/2018 78  55 - 135 U/L Final    AST 03/24/2018 637* 10 - 40 U/L Final    ALT 03/24/2018 90* 10 - 44 U/L Final    Anion Gap 03/24/2018 9  8 - 16 mmol/L Final    eGFR if  03/24/2018 >60  >60 mL/min/1.73 m^2 Final    eGFR if non African American 03/24/2018 >60  >60 mL/min/1.73 m^2 Final    Comment: Calculation used to obtain the estimated glomerular filtration  rate (eGFR) is the CKD-EPI equation.       Magnesium 03/24/2018 2.2  1.6 - 2.6 mg/dL Final    Phosphorus 03/24/2018 3.4  2.7 - 4.5 mg/dL Final    Troponin I 03/24/2018 >50.000* 0.000 - 0.026 ng/mL Final    Comment: The reference interval for Troponin I represents the 99th percentile   cutoff   for our facility and is consistent with 3rd generation assay   performance.      Troponin I 03/24/2018 >50.000* 0.000 - 0.026 ng/mL Final    Comment: The reference interval for Troponin I represents the 99th percentile   cutoff   for our facility and is consistent with 3rd generation assay   performance.           Physical Exam   Constitutional: He is oriented to person, place, and time. He appears well-developed and well-nourished. No distress.   HENT:   Head: Normocephalic and atraumatic.   Right Ear: Tympanic membrane, external ear and ear canal normal.   Left Ear: Tympanic membrane, external ear and ear canal normal.   Nose: Nose normal.   Mouth/Throat: Oropharynx is clear and moist.   Eyes: Conjunctivae and EOM are normal. Pupils are equal, round, and reactive to light. Right eye exhibits no discharge. Left eye exhibits no discharge.   Neck: Normal range of motion. Neck supple. No tracheal deviation present. No thyromegaly present.   Cardiovascular: Normal rate, regular rhythm, normal heart sounds and intact distal pulses.    No murmur heard.  Pulmonary/Chest: Effort  normal and breath sounds normal. No respiratory distress. He has no wheezes. He has no rales.   Abdominal: Soft. Bowel sounds are normal. He exhibits no distension and no mass. There is no tenderness.   Lymphadenopathy:     He has no cervical adenopathy.   Neurological: He is alert and oriented to person, place, and time. No cranial nerve deficit.   Reflex Scores:       Patellar reflexes are 2+ on the right side and 2+ on the left side.  Skin: Skin is warm and dry. Capillary refill takes less than 2 seconds. No rash noted. He is not diaphoretic.   Psychiatric: He has a normal mood and affect. His behavior is normal.   Vitals reviewed.      Assessment:       1. NSTEMI (non-ST elevated myocardial infarction)    2. Coronary artery disease involving native coronary artery of native heart without angina pectoris    3. Essential hypertension    4. Acute systolic heart failure    5. Leukocytosis, unspecified type    6. Elevated LFTs    7. Tobacco abuse    8. Screening for cancer    9. Screen for colon cancer    10. Screening for AAA (abdominal aortic aneurysm)    11. S/P coronary artery stent placement        Plan:     Sharath was seen today for heart problem.    Diagnoses and all orders for this visit:    NSTEMI (non-ST elevated myocardial infarction)  -     Ambulatory referral to Outpatient Case Management  Patient to follow up with cardiology on 4/6/18 as scheduled.  Discussed risks factors.  Importance of medication compliance discussed.  Side effects of medications discussed.    Coronary artery disease involving native coronary artery of native heart without angina pectoris  -     Comprehensive metabolic panel; Future  -     CBC auto differential; Future  -     Lipid panel; Future  -     Ambulatory referral to Outpatient Case Management    Essential hypertension  -     Comprehensive metabolic panel; Future  -     CBC auto differential; Future  -     TSH; Future  -     Microalbumin/creatinine urine ratio; Future  -      Ambulatory referral to Optometry  -     Ambulatory referral to Outpatient Case Management    Acute systolic heart failure  -     Ambulatory referral to Outpatient Case Management    Leukocytosis, unspecified type  Believed to be reactive from Mi, will recheck CBC    Elevated LFTs  Recheck LFTs. Possibly from liver injury because of Mi.    Tobacco abuse  -     Ambulatory referral to Smoking Cessation Program  -     CT Chest Lung Screening Low Dose; Future  Patient encouraged to continue refraining from smoking and agrees to speak with smoking cessation counselor.    Screening for cancer  -     CT Chest Lung Screening Low Dose; Future  Given smoking history with check low dose CT of lungs    Screen for colon cancer  -     Fecal Immunochemical Test (iFOBT); Future  Check FIT kit as patient currently on antiplatelet therapy which should not be held, which is commonly done for colonoscopy    Screening for AAA (abdominal aortic aneurysm)  -     US Abdominal Aorta; Future  Screen for AAA given smoking histroy    S/P coronary artery stent placement  Continue antiplatelet as per cardio recommendations

## 2018-04-04 ENCOUNTER — OUTPATIENT CASE MANAGEMENT (OUTPATIENT)
Dept: ADMINISTRATIVE | Facility: OTHER | Age: 68
End: 2018-04-04

## 2018-04-04 ENCOUNTER — TELEPHONE (OUTPATIENT)
Dept: FAMILY MEDICINE | Facility: CLINIC | Age: 68
End: 2018-04-04

## 2018-04-04 ENCOUNTER — TELEPHONE (OUTPATIENT)
Dept: PHARMACY | Facility: CLINIC | Age: 68
End: 2018-04-04

## 2018-04-04 DIAGNOSIS — Z74.09 IMPAIRED MOBILITY: Primary | ICD-10-CM

## 2018-04-04 NOTE — PROGRESS NOTES
This SW received a referral on the above patient.   Reason for referral:NSTEMI (non-ST elevated myocardial infarction)   Coronary artery disease involving native coronary artery of native heart without angina pectoris   Essential hypertension   Acute systolic heart failure   Name of the community resource that was provided: Pharmacy Assistance, Advance Directives  Resource given to: Patient  via US Mail and Telephone      This LMSW completed assessment with patient. Patient resides with spouse. Patient reports he is independent with his ADL's. Patient reports he requested a cane at last appointment, however never received it. LMSW sent an [employer] message to PCP to please place orders if an agreement with request. Patient denied needing assistance with food, shelter and medical, however needs assistance with affording medication. LMSW sent a referral to Pharmacy assistance to please assist patient with medication affordability. Patient reports spouse provides transportation to and from appointments. LMSW mailed patient an Advance Directive to assist with POA. LMSW mailed all resources and provided her contact information fort any additional needs.

## 2018-04-04 NOTE — TELEPHONE ENCOUNTER
There are no programs to assist with Pharmacy Patient Assistance for medications on patients profile.  Patient must sign up for Medicare Part D which would cover his medications.

## 2018-04-04 NOTE — TELEPHONE ENCOUNTER
----- Message from Blanche Ordaz LPN sent at 4/4/2018 12:39 PM CDT -----      ----- Message -----  From: Magdalene Pena LMSW  Sent: 4/4/2018  11:46 AM  To: Willem Guillen Staff    This SW received a referral on the above patient. This LMSW provided patient with the following resource: Pharmacy Assistance, Advance Directives. The resource can be located within [facility] under the [employer] legal category.    Dr. Bangura patient is also requesting a cane. Please place orders if you are in agreement with request.    Thank you for the referral,    Magdalene Pena LMSW

## 2018-04-05 ENCOUNTER — LAB VISIT (OUTPATIENT)
Dept: LAB | Facility: HOSPITAL | Age: 68
End: 2018-04-05
Attending: FAMILY MEDICINE
Payer: MEDICARE

## 2018-04-05 ENCOUNTER — CLINICAL SUPPORT (OUTPATIENT)
Dept: SMOKING CESSATION | Facility: CLINIC | Age: 68
End: 2018-04-05
Payer: COMMERCIAL

## 2018-04-05 ENCOUNTER — OFFICE VISIT (OUTPATIENT)
Dept: OPTOMETRY | Facility: CLINIC | Age: 68
End: 2018-04-05
Payer: MEDICARE

## 2018-04-05 DIAGNOSIS — Z12.11 SCREEN FOR COLON CANCER: ICD-10-CM

## 2018-04-05 DIAGNOSIS — H52.7 REFRACTIVE ERROR: ICD-10-CM

## 2018-04-05 DIAGNOSIS — H25.13 NUCLEAR SCLEROSIS OF BOTH EYES: Primary | ICD-10-CM

## 2018-04-05 DIAGNOSIS — F17.200 NICOTINE DEPENDENCE: Primary | ICD-10-CM

## 2018-04-05 DIAGNOSIS — H43.21 ASTEROID HYALOSIS OF RIGHT EYE: ICD-10-CM

## 2018-04-05 PROCEDURE — 92004 COMPRE OPH EXAM NEW PT 1/>: CPT | Mod: S$GLB,,, | Performed by: OPTOMETRIST

## 2018-04-05 PROCEDURE — 99404 PREV MED CNSL INDIV APPRX 60: CPT | Mod: S$GLB,,,

## 2018-04-05 PROCEDURE — 92015 DETERMINE REFRACTIVE STATE: CPT | Mod: S$GLB,,, | Performed by: OPTOMETRIST

## 2018-04-05 PROCEDURE — 99999 PR PBB SHADOW E&M-EST. PATIENT-LVL II: CPT | Mod: PBBFAC,,, | Performed by: OPTOMETRIST

## 2018-04-05 PROCEDURE — 99499 UNLISTED E&M SERVICE: CPT | Mod: S$GLB,,, | Performed by: OPTOMETRIST

## 2018-04-05 PROCEDURE — 82274 ASSAY TEST FOR BLOOD FECAL: CPT

## 2018-04-05 PROCEDURE — 99999 PR PBB SHADOW E&M-EST. PATIENT-LVL I: CPT | Mod: PBBFAC,,,

## 2018-04-05 RX ORDER — IBUPROFEN 200 MG
1 TABLET ORAL DAILY
Qty: 28 PATCH | Refills: 0 | Status: SHIPPED | OUTPATIENT
Start: 2018-04-05 | End: 2018-04-30 | Stop reason: SDUPTHER

## 2018-04-05 RX ORDER — MICONAZOLE NITRATE 2 %
2 CREAM (GRAM) TOPICAL
Qty: 160 EACH | Refills: 0 | Status: SHIPPED | OUTPATIENT
Start: 2018-04-05 | End: 2018-05-28 | Stop reason: SDUPTHER

## 2018-04-05 NOTE — PROGRESS NOTES
Subjective:       Patient ID: Sharath Huizar is a 67 y.o. male      Chief Complaint   Patient presents with    Concerns About Ocular Health     History of Present Illness  Dls: 1st exam     Pt c/o blurry vision at near ou. Pt wears otc readers.   Pt states no tearing no itching no burning no pain  ha's  Floaters ou   off/on.  Pt states had a heart attack 2 weeks ago    Eye meds:     Assessment/Plan:     1. Nuclear sclerosis of both eyes  Educated pt on presence of cataracts and effects on vision. No surgery at this time. Recheck in one year.    2. Asteroid hyalosis of right eye  Monitor     3. Refractive error  Educated patient on refractive error and discussed lens options. Dispensed updated spectacle Rx. Educated about adaptation period to new specs.    Eyeglass Final Rx     Eyeglass Final Rx       Sphere Cylinder Axis Add    Right +0.25 +0.75 175 +2.50    Left +0.25 +1.00 010 +2.50    Expiration Date:  4/6/2019                Follow-up in about 1 year (around 4/5/2019).

## 2018-04-06 ENCOUNTER — OFFICE VISIT (OUTPATIENT)
Dept: CARDIOLOGY | Facility: CLINIC | Age: 68
End: 2018-04-06
Payer: MEDICARE

## 2018-04-06 VITALS
HEIGHT: 66 IN | BODY MASS INDEX: 21.18 KG/M2 | RESPIRATION RATE: 15 BRPM | OXYGEN SATURATION: 100 % | HEART RATE: 74 BPM | DIASTOLIC BLOOD PRESSURE: 76 MMHG | SYSTOLIC BLOOD PRESSURE: 132 MMHG | WEIGHT: 131.81 LBS

## 2018-04-06 DIAGNOSIS — I50.21 ACUTE SYSTOLIC HEART FAILURE: ICD-10-CM

## 2018-04-06 DIAGNOSIS — I16.0 HYPERTENSIVE URGENCY: ICD-10-CM

## 2018-04-06 DIAGNOSIS — Z91.89 AT RISK FOR CORONARY ARTERY DISEASE: ICD-10-CM

## 2018-04-06 DIAGNOSIS — I25.10 CORONARY ARTERY DISEASE INVOLVING NATIVE CORONARY ARTERY OF NATIVE HEART WITHOUT ANGINA PECTORIS: ICD-10-CM

## 2018-04-06 DIAGNOSIS — I21.4 NSTEMI (NON-ST ELEVATED MYOCARDIAL INFARCTION): Primary | ICD-10-CM

## 2018-04-06 LAB — HEMOCCULT STL QL IA: NEGATIVE

## 2018-04-06 PROCEDURE — 99214 OFFICE O/P EST MOD 30 MIN: CPT | Mod: S$GLB,,, | Performed by: INTERNAL MEDICINE

## 2018-04-06 PROCEDURE — 3078F DIAST BP <80 MM HG: CPT | Mod: CPTII,S$GLB,, | Performed by: INTERNAL MEDICINE

## 2018-04-06 PROCEDURE — 99999 PR PBB SHADOW E&M-EST. PATIENT-LVL IV: CPT | Mod: PBBFAC,,, | Performed by: INTERNAL MEDICINE

## 2018-04-06 PROCEDURE — 99499 UNLISTED E&M SERVICE: CPT | Mod: S$GLB,,, | Performed by: INTERNAL MEDICINE

## 2018-04-06 PROCEDURE — 3075F SYST BP GE 130 - 139MM HG: CPT | Mod: CPTII,S$GLB,, | Performed by: INTERNAL MEDICINE

## 2018-04-06 RX ORDER — NITROGLYCERIN 0.4 MG/1
0.4 TABLET SUBLINGUAL EVERY 5 MIN PRN
Qty: 25 TABLET | Refills: 11 | Status: SHIPPED | OUTPATIENT
Start: 2018-04-06 | End: 2018-09-28 | Stop reason: SDUPTHER

## 2018-04-06 NOTE — PROGRESS NOTES
Subjective:    Patient ID:  Sharath Huizar is a 67 y.o. male who presents for follow-up of Hospital Follow Up      HPI   NSTEMI - 4 stents to RCA 3/23/18, HTN    Admitted 3/23/18  Mr. Sharath Huizar is a 67 y.o. male with tobacco abuse who presents to [facility] ED with complaints of chest pain this morning.  He was at work when the pain started, and it was described as mid-epigastric with radiation into his abdomen, was sharp in quality, and was 10/10 in severity at its worst.  He reports some nausea but denies any vomiting.  He also had some diaphoresis but not any palpitations, shortness of breath, hemoptysis, nor any lower extremity pain or swelling.  There were no alleviating or exacerbating factors, and he has never had similar episodes in the past.  He denies any personal or family history of heart attacks.  He does admit to smoking 1 pack a day of cigarettes since his early 20's, and drinks about 2-3 beers a day.    66 y/o male admitted to ICU with NSTEMI.  Underwent LHC with JUANCARLOS x 4 to RCA. Residual LAD disease that will need to be addressed at a later date.  Remained on Aggrastat infusion, now off.  TTE showing new EF 35%. Patient euvolemic. Discharged with cardiology follow up.      Echo 3/24/18    1 - Moderately depressed left ventricular systolic function (EF 35-40%) Severe inferior hypokinesis.     2 - Concentric hypertrophy.     3 - Impaired LV relaxation, normal LAP (grade 1 diastolic dysfunction).     4 - Trivial mitral regurgitation.     5 - Trivial tricuspid regurgitation.      3/24/18  PCI to the prox, mid, distal RCA with JUANCARLOS x 3  PCI to the PDA with JUANCARLOS x 1  Long mid LAD 75%    Has had 2 episodes of CP since discharge      Review of Systems   Constitution: Negative for decreased appetite.   HENT: Negative for ear discharge.    Eyes: Negative for blurred vision.   Endocrine: Negative for polyphagia.   Skin: Negative for nail changes.   Neurological: Negative for aphonia.   Psychiatric/Behavioral:  Negative for hallucinations.        Objective:    Physical Exam   Constitutional: He is oriented to person, place, and time. He appears well-developed and well-nourished.   HENT:   Head: Normocephalic and atraumatic.   Eyes: Conjunctivae are normal. Pupils are equal, round, and reactive to light.   Neck: Normal range of motion. Neck supple.   Cardiovascular: Normal rate, normal heart sounds and intact distal pulses.    Pulmonary/Chest: Effort normal and breath sounds normal.   Abdominal: Soft. Bowel sounds are normal.   Musculoskeletal: Normal range of motion.   Neurological: He is alert and oriented to person, place, and time.   Skin: Skin is warm and dry.         Assessment:       1. NSTEMI (non-ST elevated myocardial infarction)    2. Acute systolic heart failure    3. Coronary artery disease involving native coronary artery of native heart without angina pectoris    4. Hypertensive urgency         Plan:       PRN NTG  Lexiscan myoview - if anterior ischemia present will discuss PCI of LAD

## 2018-04-11 ENCOUNTER — HOSPITAL ENCOUNTER (OUTPATIENT)
Dept: RADIOLOGY | Facility: HOSPITAL | Age: 68
Discharge: HOME | End: 2018-04-11
Attending: INTERNAL MEDICINE
Payer: MEDICARE

## 2018-04-11 ENCOUNTER — HOSPITAL ENCOUNTER (OUTPATIENT)
Dept: CARDIOLOGY | Facility: HOSPITAL | Age: 68
Discharge: HOME | End: 2018-04-11
Attending: INTERNAL MEDICINE
Payer: MEDICARE

## 2018-04-11 DIAGNOSIS — I16.0 HYPERTENSIVE URGENCY: ICD-10-CM

## 2018-04-11 DIAGNOSIS — I50.21 ACUTE SYSTOLIC HEART FAILURE: ICD-10-CM

## 2018-04-11 DIAGNOSIS — Z91.89 AT RISK FOR CORONARY ARTERY DISEASE: ICD-10-CM

## 2018-04-11 DIAGNOSIS — I21.4 NSTEMI (NON-ST ELEVATED MYOCARDIAL INFARCTION): ICD-10-CM

## 2018-04-11 DIAGNOSIS — I25.10 CORONARY ARTERY DISEASE INVOLVING NATIVE CORONARY ARTERY OF NATIVE HEART WITHOUT ANGINA PECTORIS: ICD-10-CM

## 2018-04-11 LAB — DIASTOLIC DYSFUNCTION: NO

## 2018-04-11 PROCEDURE — 93017 CV STRESS TEST TRACING ONLY: CPT

## 2018-04-11 PROCEDURE — 93016 CV STRESS TEST SUPVJ ONLY: CPT | Mod: ,,, | Performed by: INTERNAL MEDICINE

## 2018-04-11 PROCEDURE — 78452 HT MUSCLE IMAGE SPECT MULT: CPT | Mod: 26,,, | Performed by: INTERNAL MEDICINE

## 2018-04-11 PROCEDURE — 93018 CV STRESS TEST I&R ONLY: CPT | Mod: ,,, | Performed by: INTERNAL MEDICINE

## 2018-04-11 PROCEDURE — 63600175 PHARM REV CODE 636 W HCPCS

## 2018-04-11 PROCEDURE — A9502 TC99M TETROFOSMIN: HCPCS

## 2018-04-11 RX ORDER — REGADENOSON 0.08 MG/ML
INJECTION, SOLUTION INTRAVENOUS
Status: DISPENSED
Start: 2018-04-11 | End: 2018-04-11

## 2018-04-13 ENCOUNTER — OFFICE VISIT (OUTPATIENT)
Dept: CARDIOLOGY | Facility: CLINIC | Age: 68
End: 2018-04-13
Payer: MEDICARE

## 2018-04-13 VITALS
SYSTOLIC BLOOD PRESSURE: 142 MMHG | WEIGHT: 132.25 LBS | BODY MASS INDEX: 21.25 KG/M2 | OXYGEN SATURATION: 99 % | HEIGHT: 66 IN | DIASTOLIC BLOOD PRESSURE: 76 MMHG | HEART RATE: 78 BPM

## 2018-04-13 DIAGNOSIS — I25.10 CORONARY ARTERY DISEASE INVOLVING NATIVE CORONARY ARTERY OF NATIVE HEART WITHOUT ANGINA PECTORIS: ICD-10-CM

## 2018-04-13 DIAGNOSIS — I21.4 NSTEMI (NON-ST ELEVATED MYOCARDIAL INFARCTION): Primary | ICD-10-CM

## 2018-04-13 DIAGNOSIS — I50.21 ACUTE SYSTOLIC HEART FAILURE: ICD-10-CM

## 2018-04-13 DIAGNOSIS — I16.0 HYPERTENSIVE URGENCY: ICD-10-CM

## 2018-04-13 PROCEDURE — 99499 UNLISTED E&M SERVICE: CPT | Mod: S$GLB,,, | Performed by: INTERNAL MEDICINE

## 2018-04-13 PROCEDURE — 99999 PR PBB SHADOW E&M-EST. PATIENT-LVL IV: CPT | Mod: PBBFAC,,, | Performed by: INTERNAL MEDICINE

## 2018-04-13 PROCEDURE — 99213 OFFICE O/P EST LOW 20 MIN: CPT | Mod: S$GLB,,, | Performed by: INTERNAL MEDICINE

## 2018-04-13 PROCEDURE — 3077F SYST BP >= 140 MM HG: CPT | Mod: CPTII,S$GLB,, | Performed by: INTERNAL MEDICINE

## 2018-04-13 PROCEDURE — 3078F DIAST BP <80 MM HG: CPT | Mod: CPTII,S$GLB,, | Performed by: INTERNAL MEDICINE

## 2018-04-13 NOTE — PROGRESS NOTES
Subjective:    Patient ID:  Sharath Huizar is a 67 y.o. male who presents for follow-up of Hospital Follow Up      HPI       NSTEMI - 4 stents to RCA 3/23/18, HTN     Admitted 3/23/18  Mr. Sharath Huizar is a 67 y.o. male with tobacco abuse who presents to [facility] ED with complaints of chest pain this morning.  He was at work when the pain started, and it was described as mid-epigastric with radiation into his abdomen, was sharp in quality, and was 10/10 in severity at its worst.  He reports some nausea but denies any vomiting.  He also had some diaphoresis but not any palpitations, shortness of breath, hemoptysis, nor any lower extremity pain or swelling.  There were no alleviating or exacerbating factors, and he has never had similar episodes in the past.  He denies any personal or family history of heart attacks.  He does admit to smoking 1 pack a day of cigarettes since his early 20's, and drinks about 2-3 beers a day.     66 y/o male admitted to ICU with NSTEMI.  Underwent LHC with JUANCARLOS x 4 to RCA. Residual LAD disease that will need to be addressed at a later date.  Remained on Aggrastat infusion, now off.  TTE showing new EF 35%. Patient euvolemic. Discharged with cardiology follow up.       Echo 3/24/18    1 - Moderately depressed left ventricular systolic function (EF 35-40%) Severe inferior hypokinesis.     2 - Concentric hypertrophy.     3 - Impaired LV relaxation, normal LAP (grade 1 diastolic dysfunction).     4 - Trivial mitral regurgitation.     5 - Trivial tricuspid regurgitation.      3/24/18  PCI to the prox, mid, distal RCA with JUANCARLOS x 3  PCI to the PDA with JUANCARLOS x 1  Long mid LAD 75%    Stress test 4/11/18  LVEF: 28 %  Impression: ABNORMAL MYOCARDIAL PERFUSION  1. There is a large size fixed defect of severe intensity that extends from the base to the apical inferior wall of the left ventricle, consistent with myocardial injury. There is trivial nahid-injury ischemia.   2. Resting wall motion is  physiologic.   3. There is resting LV dysfunction with a reduced ejection fraction of 28 %.     Denies CP  Mild MCKEON     Review of Systems   Constitution: Negative for decreased appetite.   HENT: Negative for ear discharge.    Eyes: Negative for blurred vision.   Endocrine: Negative for polyphagia.   Skin: Negative for nail changes.   Neurological: Negative for aphonia.   Psychiatric/Behavioral: Negative for hallucinations.        Objective:    Physical Exam   Constitutional: He is oriented to person, place, and time. He appears well-developed and well-nourished.   HENT:   Head: Normocephalic and atraumatic.   Eyes: Conjunctivae are normal. Pupils are equal, round, and reactive to light.   Neck: Normal range of motion. Neck supple.   Cardiovascular: Normal rate, normal heart sounds and intact distal pulses.    Pulmonary/Chest: Effort normal and breath sounds normal.   Abdominal: Soft. Bowel sounds are normal.   Musculoskeletal: Normal range of motion.   Neurological: He is alert and oriented to person, place, and time.   Skin: Skin is warm and dry.         Assessment:       1. NSTEMI (non-ST elevated myocardial infarction)    2. Hypertensive urgency    3. Acute systolic heart failure    4. Coronary artery disease involving native coronary artery of native heart without angina pectoris         Plan:       No anterior ischemia on stress test - will treat LAD stenosis medically  Cardiac rehab  OV 3 months with repeat echo - consider AICD if EF < 35%

## 2018-04-18 ENCOUNTER — HOSPITAL ENCOUNTER (OUTPATIENT)
Dept: RADIOLOGY | Facility: HOSPITAL | Age: 68
Discharge: HOME | End: 2018-04-18
Attending: FAMILY MEDICINE
Payer: MEDICARE

## 2018-04-18 ENCOUNTER — CLINICAL SUPPORT (OUTPATIENT)
Dept: SMOKING CESSATION | Facility: CLINIC | Age: 68
End: 2018-04-18
Payer: COMMERCIAL

## 2018-04-18 DIAGNOSIS — F17.200 NICOTINE DEPENDENCE: Primary | ICD-10-CM

## 2018-04-18 DIAGNOSIS — Z13.6 SCREENING FOR AAA (ABDOMINAL AORTIC ANEURYSM): ICD-10-CM

## 2018-04-18 PROCEDURE — 99999 PR PBB SHADOW E&M-EST. PATIENT-LVL I: CPT | Mod: PBBFAC,,,

## 2018-04-18 PROCEDURE — 76775 US EXAM ABDO BACK WALL LIM: CPT | Mod: TC

## 2018-04-18 PROCEDURE — 99402 PREV MED CNSL INDIV APPRX 30: CPT | Mod: S$GLB,,,

## 2018-04-18 PROCEDURE — 76775 US EXAM ABDO BACK WALL LIM: CPT | Mod: 26,,, | Performed by: RADIOLOGY

## 2018-04-18 NOTE — PROGRESS NOTES
Individual Follow-Up Form    4/18/2018    Quit Date: 3/23/18    Clinical Status of Patient: Outpatient    Length of Service: 30 minutes    Continuing Medication: yes  Patches    Other Medications: none      Target Symptoms: Withdrawal and medication side effects. The following were  rated moderate (3) to severe (4) on TCRS:  · Moderate (3): none  · Severe (4): none    Comments: Pt seen in office today. Patient is tobacco free since 3/23/18. Pt remains on tobacco cessation medication of  21 mg nicotine patch QD. No adverse effects/mental changes noted at this time. Reviewed coping strategies/habitual behavior/relapse prevention with patient. Exhaled carbon monoxide level was 0 ppm per Smokerlyzer (non- smoker = 0-5 ppm.) Will see pt back in office in 2 weeks.        Diagnosis: F17.200    Next Visit: 2 weeks

## 2018-04-18 NOTE — Clinical Note
Pt seen in office today. Patient is tobacco free since 3/23/18. Pt remains on tobacco cessation medication of  21 mg nicotine patch QD. No adverse effects/mental changes noted at this time. Reviewed coping strategies/habitual behavior/relapse prevention with patient. Exhaled carbon monoxide level was 0 ppm per Smokerlyzer (non- smoker = 0-5 ppm.) Will see pt back in office in 2 weeks.

## 2018-04-25 ENCOUNTER — CLINICAL SUPPORT (OUTPATIENT)
Dept: SMOKING CESSATION | Facility: CLINIC | Age: 68
End: 2018-04-25
Payer: COMMERCIAL

## 2018-04-25 DIAGNOSIS — F17.200 NICOTINE DEPENDENCE: Primary | ICD-10-CM

## 2018-04-25 PROCEDURE — 99407 BEHAV CHNG SMOKING > 10 MIN: CPT | Mod: S$GLB,,,

## 2018-04-30 ENCOUNTER — OFFICE VISIT (OUTPATIENT)
Dept: FAMILY MEDICINE | Facility: CLINIC | Age: 68
End: 2018-04-30
Payer: COMMERCIAL

## 2018-04-30 ENCOUNTER — CLINICAL SUPPORT (OUTPATIENT)
Dept: SMOKING CESSATION | Facility: CLINIC | Age: 68
End: 2018-04-30
Payer: COMMERCIAL

## 2018-04-30 VITALS
OXYGEN SATURATION: 98 % | RESPIRATION RATE: 17 BRPM | SYSTOLIC BLOOD PRESSURE: 138 MMHG | TEMPERATURE: 98 F | WEIGHT: 132.69 LBS | HEIGHT: 66 IN | HEART RATE: 78 BPM | BODY MASS INDEX: 21.33 KG/M2 | DIASTOLIC BLOOD PRESSURE: 82 MMHG

## 2018-04-30 DIAGNOSIS — F17.200 NICOTINE DEPENDENCE: ICD-10-CM

## 2018-04-30 DIAGNOSIS — I25.10 CORONARY ARTERY DISEASE INVOLVING NATIVE CORONARY ARTERY OF NATIVE HEART WITHOUT ANGINA PECTORIS: Primary | ICD-10-CM

## 2018-04-30 DIAGNOSIS — I25.2 HISTORY OF MI (MYOCARDIAL INFARCTION): ICD-10-CM

## 2018-04-30 DIAGNOSIS — Z23 NEED FOR INFLUENZA VACCINATION: ICD-10-CM

## 2018-04-30 DIAGNOSIS — I10 ESSENTIAL HYPERTENSION: ICD-10-CM

## 2018-04-30 DIAGNOSIS — Z72.0 TOBACCO ABUSE: ICD-10-CM

## 2018-04-30 PROCEDURE — 99999 PR PBB SHADOW E&M-EST. PATIENT-LVL I: CPT | Mod: PBBFAC,,,

## 2018-04-30 PROCEDURE — 99999 PR PBB SHADOW E&M-EST. PATIENT-LVL III: CPT | Mod: PBBFAC,,, | Performed by: FAMILY MEDICINE

## 2018-04-30 PROCEDURE — 3079F DIAST BP 80-89 MM HG: CPT | Mod: CPTII,S$GLB,, | Performed by: FAMILY MEDICINE

## 2018-04-30 PROCEDURE — 99214 OFFICE O/P EST MOD 30 MIN: CPT | Mod: S$GLB,,, | Performed by: FAMILY MEDICINE

## 2018-04-30 PROCEDURE — 99499 UNLISTED E&M SERVICE: CPT | Mod: S$GLB,,, | Performed by: FAMILY MEDICINE

## 2018-04-30 PROCEDURE — 99403 PREV MED CNSL INDIV APPRX 45: CPT | Mod: S$GLB,,,

## 2018-04-30 PROCEDURE — 3075F SYST BP GE 130 - 139MM HG: CPT | Mod: CPTII,S$GLB,, | Performed by: FAMILY MEDICINE

## 2018-04-30 RX ORDER — IBUPROFEN 200 MG
1 TABLET ORAL DAILY
Qty: 14 PATCH | Refills: 0 | Status: SHIPPED | OUTPATIENT
Start: 2018-04-30 | End: 2018-05-15 | Stop reason: SDUPTHER

## 2018-04-30 NOTE — Clinical Note
Patient is tobacco free . Pt remains on tobacco cessation medication of  21 mg nicotine patch QD and 2 mg nicotine gum PRN (1-2 per hour in place of cigarettes). No adverse effects/mental changes noted at this time. Reviewed coping strategies/habitual behavior/relapse prevention with patient. Exhaled carbon monoxide level was (1 ppm per Smokerlyzer (non- smoker = 0-5 ppm.) Will see pt back in office in 2 weeks. Will wean to 14 mg patches next visit. Congratulated patient on his continued success.

## 2018-04-30 NOTE — PROGRESS NOTES
Individual Follow-Up Form    4/30/2018    Quit Date: 3/23/18    Clinical Status of Patient: Outpatient    Length of Service: 45 minutes    Continuing Medication: yes  Patches    Other Medications: nrt gum     Target Symptoms: Withdrawal and medication side effects. The following were  rated moderate (3) to severe (4) on TCRS:  · Moderate (3): none  · Severe (4): none    Comments:  Patient is tobacco free . Pt remains on tobacco cessation medication of  21 mg nicotine patch QD and 2 mg nicotine gum PRN (1-2 per hour in place of cigarettes). No adverse effects/mental changes noted at this time. Reviewed coping strategies/habitual behavior/relapse prevention with patient. Exhaled carbon monoxide level was (1 ppm per Smokerlyzer (non- smoker = 0-5 ppm.) Will see pt back in office in 2 weeks. Will wean to 14 mg patches next visit. Congratulated patient on his continued success.         Diagnosis: F17.200    Next Visit: 2 weeks

## 2018-05-01 ENCOUNTER — OFFICE VISIT (OUTPATIENT)
Dept: CARDIOLOGY | Facility: CLINIC | Age: 68
End: 2018-05-01
Payer: MEDICARE

## 2018-05-01 VITALS
WEIGHT: 133.19 LBS | SYSTOLIC BLOOD PRESSURE: 160 MMHG | HEIGHT: 66 IN | DIASTOLIC BLOOD PRESSURE: 95 MMHG | OXYGEN SATURATION: 97 % | HEART RATE: 85 BPM | BODY MASS INDEX: 21.4 KG/M2

## 2018-05-01 DIAGNOSIS — I16.0 HYPERTENSIVE URGENCY: ICD-10-CM

## 2018-05-01 DIAGNOSIS — I50.21 ACUTE SYSTOLIC HEART FAILURE: ICD-10-CM

## 2018-05-01 DIAGNOSIS — I21.4 NSTEMI (NON-ST ELEVATED MYOCARDIAL INFARCTION): Primary | ICD-10-CM

## 2018-05-01 DIAGNOSIS — I25.10 CORONARY ARTERY DISEASE INVOLVING NATIVE CORONARY ARTERY OF NATIVE HEART WITHOUT ANGINA PECTORIS: ICD-10-CM

## 2018-05-01 PROCEDURE — 99499 UNLISTED E&M SERVICE: CPT | Mod: S$GLB,,, | Performed by: INTERNAL MEDICINE

## 2018-05-01 PROCEDURE — 3077F SYST BP >= 140 MM HG: CPT | Mod: CPTII,S$GLB,, | Performed by: INTERNAL MEDICINE

## 2018-05-01 PROCEDURE — 3080F DIAST BP >= 90 MM HG: CPT | Mod: CPTII,S$GLB,, | Performed by: INTERNAL MEDICINE

## 2018-05-01 PROCEDURE — 99999 PR PBB SHADOW E&M-EST. PATIENT-LVL III: CPT | Mod: PBBFAC,,, | Performed by: INTERNAL MEDICINE

## 2018-05-01 PROCEDURE — 99213 OFFICE O/P EST LOW 20 MIN: CPT | Mod: S$GLB,,, | Performed by: INTERNAL MEDICINE

## 2018-05-01 RX ORDER — METOPROLOL SUCCINATE 50 MG/1
50 TABLET, EXTENDED RELEASE ORAL DAILY
Qty: 30 TABLET | Refills: 11 | Status: SHIPPED | OUTPATIENT
Start: 2018-05-01 | End: 2018-09-11 | Stop reason: SDUPTHER

## 2018-05-01 RX ORDER — ISOSORBIDE MONONITRATE 30 MG/1
30 TABLET, EXTENDED RELEASE ORAL DAILY
Qty: 30 TABLET | Refills: 11 | Status: SHIPPED | OUTPATIENT
Start: 2018-05-01 | End: 2018-09-28 | Stop reason: SDUPTHER

## 2018-05-01 NOTE — PROGRESS NOTES
Subjective:    Patient ID:  Sharath Huizar is a 67 y.o. male who presents for follow-up of Follow-up      HPI     NSTEMI - 4 stents to RCA 3/23/18, HTN     Admitted 3/23/18  Mr. Sharath Huizar is a 67 y.o. male with tobacco abuse who presents to [facility] ED with complaints of chest pain this morning.  He was at work when the pain started, and it was described as mid-epigastric with radiation into his abdomen, was sharp in quality, and was 10/10 in severity at its worst.  He reports some nausea but denies any vomiting.  He also had some diaphoresis but not any palpitations, shortness of breath, hemoptysis, nor any lower extremity pain or swelling.  There were no alleviating or exacerbating factors, and he has never had similar episodes in the past.  He denies any personal or family history of heart attacks.  He does admit to smoking 1 pack a day of cigarettes since his early 20's, and drinks about 2-3 beers a day.     66 y/o male admitted to ICU with NSTEMI.  Underwent LHC with JUANCARLOS x 4 to RCA. Residual LAD disease that will need to be addressed at a later date.  Remained on Aggrastat infusion, now off.  TTE showing new EF 35%. Patient euvolemic. Discharged with cardiology follow up.       Echo 3/24/18    1 - Moderately depressed left ventricular systolic function (EF 35-40%) Severe inferior hypokinesis.     2 - Concentric hypertrophy.     3 - Impaired LV relaxation, normal LAP (grade 1 diastolic dysfunction).     4 - Trivial mitral regurgitation.     5 - Trivial tricuspid regurgitation.      3/24/18  PCI to the prox, mid, distal RCA with JUANCARLOS x 3  PCI to the PDA with JUANCARLOS x 1  Long mid LAD 75%     Stress test 4/11/18  LVEF: 28 %  Impression: ABNORMAL MYOCARDIAL PERFUSION  1. There is a large size fixed defect of severe intensity that extends from the base to the apical inferior wall of the left ventricle, consistent with myocardial injury. There is trivial nahid-injury ischemia.   2. Resting wall motion is physiologic.    3. There is resting LV dysfunction with a reduced ejection fraction of 28 %.     Back today - has had a few episodes of CP relieved with NTG since his last visit  Also c/o HA    Review of Systems   Constitution: Negative for decreased appetite.   HENT: Negative for ear discharge.    Eyes: Negative for blurred vision.   Endocrine: Negative for polyphagia.   Skin: Negative for nail changes.   Neurological: Negative for aphonia.   Psychiatric/Behavioral: Negative for hallucinations.        Objective:    Physical Exam   Constitutional: He is oriented to person, place, and time. He appears well-developed and well-nourished.   HENT:   Head: Normocephalic and atraumatic.   Eyes: Conjunctivae are normal. Pupils are equal, round, and reactive to light.   Neck: Normal range of motion. Neck supple.   Cardiovascular: Normal rate, normal heart sounds and intact distal pulses.    Pulmonary/Chest: Effort normal and breath sounds normal.   Abdominal: Soft. Bowel sounds are normal.   Musculoskeletal: Normal range of motion.   Neurological: He is alert and oriented to person, place, and time.   Skin: Skin is warm and dry.         Assessment:       1. NSTEMI (non-ST elevated myocardial infarction)    2. Hypertensive urgency    3. Acute systolic heart failure    4. Coronary artery disease involving native coronary artery of native heart without angina pectoris         Plan:       Increase toprol XL 50 qd  Add imdur 30 qd  OV 1 month to reassess symptoms - will discuss repeat C if still having CP  Will hold off clearing him for full duty due to recurrent CP symptoms

## 2018-05-01 NOTE — PROGRESS NOTES
Routine Office Visit    Patient Name: Sharath Huizar    : 1950  MRN: [id number]    Subjective:  Sharath is a 67 y.o. male who presents today for:   Chief Complaint   Patient presents with    Heart Problem        67-year-old male with a recent heart attack comes in for follow-up on his heart attack.  He has seen cardio since his last visit with me.  He reports the cardiology has made no changes.  He states he has had some testing done.  He states that he was told by cardiologist at his next appointment is in July.  The patient states that he has not been able to return to work as cardiology has not told him when he can return.  He needs [place] paperwork filled out so he does not lose his job.  He states that he still feels pretty tired and occasionally gets some chest pains and headaches.  He is taking all his medications as prescribed.  He is not having any side effects from any of his medications.    Past Medical History  Past Medical History:   Diagnosis Date    Hypertension        Past Surgical History  History reviewed. No pertinent surgical history.     Family History  Family History   Problem Relation Age of Onset    No Known Problems Mother     No Known Problems Father     No Known Problems Sister     No Known Problems Brother     No Known Problems Maternal Aunt     No Known Problems Maternal Uncle     No Known Problems Paternal Aunt     No Known Problems Paternal Uncle     No Known Problems Maternal Grandmother     No Known Problems Maternal Grandfather     No Known Problems Paternal Grandmother     No Known Problems Paternal Grandfather     Amblyopia Neg Hx     Blindness Neg Hx     Cancer Neg Hx     Cataracts Neg Hx     Diabetes Neg Hx     Glaucoma Neg Hx     Hypertension Neg Hx     Macular degeneration Neg Hx     Retinal detachment Neg Hx     Strabismus Neg Hx     Stroke Neg Hx     Thyroid disease Neg Hx        Social History  Social History     Social History    Marital  status:      Spouse name: N/A    Number of children: N/A    Years of education: N/A     Occupational History    Not on file.     Social History Main Topics    Smoking status: Former Smoker     Types: Cigarettes     Quit date: 3/23/2018    Smokeless tobacco: Never Used    Alcohol use Yes    Drug use: No    Sexual activity: Yes     Partners: Female     Other Topics Concern    Not on file     Social History Narrative    No narrative on file       Current Medications  Current Outpatient Prescriptions on File Prior to Visit   Medication Sig Dispense Refill    aspirin (ECOTRIN) 81 MG EC tablet Take 1 tablet (81 mg total) by mouth once daily.  0    atorvastatin (LIPITOR) 80 MG tablet Take 1 tablet (80 mg total) by mouth once daily. 90 tablet 3    clopidogrel (PLAVIX) 75 mg tablet Take 1 tablet (75 mg total) by mouth once daily. 30 tablet 11    lisinopril (PRINIVIL,ZESTRIL) 2.5 MG tablet Take 1 tablet (2.5 mg total) by mouth once daily. 90 tablet 3    metoprolol succinate (TOPROL-XL) 25 MG 24 hr tablet Take 1 tablet (25 mg total) by mouth once daily. 30 tablet 11    nicotine, polacrilex, (NICORETTE) 2 mg Gum Take 1 each (2 mg total) by mouth as needed. 160 each 0    nitroGLYCERIN (NITROSTAT) 0.4 MG SL tablet Place 1 tablet (0.4 mg total) under the tongue every 5 (five) minutes as needed. 25 tablet 11    [DISCONTINUED] nicotine (NICODERM CQ) 21 mg/24 hr Place 1 patch onto the skin once daily. 28 patch 0    azithromycin (Z-SENA) 250 MG tablet   0    benzonatate (TESSALON) 200 MG capsule TK 1 C PO TID PRF COUGH  0     No current facility-administered medications on file prior to visit.        Allergies   Review of patient's allergies indicates:  No Known Allergies    Review of Systems   Constitutional: Positive for fatigue. Negative for chills, fever and unexpected weight change.   HENT: Negative for ear pain and sore throat.    Eyes: Negative for visual disturbance.   Respiratory: Negative for  "shortness of breath.    Cardiovascular: Negative for chest pain (at present), palpitations (at present) and leg swelling.        See HPI   Gastrointestinal: Negative for abdominal pain, blood in stool, constipation and diarrhea.   Endocrine: Negative for cold intolerance and heat intolerance.   Genitourinary: Negative for dysuria, frequency and hematuria.   Skin: Negative for rash.   Neurological: Negative for numbness and headaches.   Hematological: Negative for adenopathy.   Psychiatric/Behavioral: Negative for suicidal ideas.       /82 (BP Location: Left arm, Patient Position: Sitting, BP Method: Medium (Manual))   Pulse 78   Temp 98 °F (36.7 °C) (Oral)   Resp 17   Ht 5' 6" (1.676 m)   Wt 60.2 kg (132 lb 11.5 oz)   SpO2 98%   BMI 21.42 kg/m²     PHYS    Assessment/Plan:  Sharath was seen today for heart problem.    Diagnoses and all orders for this visit:    Coronary artery disease involving native coronary artery of native heart without angina pectoris  Doing well at present.  Patient to follow-up as per cardiology recommendations.  The patient's [facility] paperwork is filled out and patient was advised that he will also need to follow up with cardiology to see when he can return to work, from a cardiac perspective. Paperwork filled through 7/26/18    History of MI (myocardial infarction)  Management as per cardiology.    Essential hypertension  Blood pressure stable.  Continue current medicines.    Need for influenza vaccination  -     diphth,pertus,acell,,tetanus (BOOSTRIX) 2.5-8-5 Lf-mcg-Lf/0.5mL Susp; Inject 0.5 mLs into the muscle once.  -     pneumoc 13-rosaura conj-dip cr,PF, (PREVNAR 13, PF,) 0.5 mL Syrg; Inject 0.5 mLs into the muscle once.    Tobacco abuse  Patient has been tobacco free for 2 weeks.  He was congratulated and encouraged to keep up being tobacco free.      "

## 2018-05-14 ENCOUNTER — CLINICAL SUPPORT (OUTPATIENT)
Dept: SMOKING CESSATION | Facility: CLINIC | Age: 68
End: 2018-05-14
Payer: COMMERCIAL

## 2018-05-14 DIAGNOSIS — F17.200 NICOTINE DEPENDENCE: Primary | ICD-10-CM

## 2018-05-14 PROCEDURE — 99407 BEHAV CHNG SMOKING > 10 MIN: CPT | Mod: S$GLB,,,

## 2018-05-15 ENCOUNTER — OFFICE VISIT (OUTPATIENT)
Dept: CARDIOLOGY | Facility: CLINIC | Age: 68
End: 2018-05-15
Payer: MEDICARE

## 2018-05-15 VITALS
OXYGEN SATURATION: 98 % | HEIGHT: 68 IN | DIASTOLIC BLOOD PRESSURE: 68 MMHG | HEART RATE: 87 BPM | SYSTOLIC BLOOD PRESSURE: 123 MMHG | WEIGHT: 135.56 LBS | BODY MASS INDEX: 20.55 KG/M2

## 2018-05-15 DIAGNOSIS — I25.10 CORONARY ARTERY DISEASE INVOLVING NATIVE CORONARY ARTERY OF NATIVE HEART WITHOUT ANGINA PECTORIS: ICD-10-CM

## 2018-05-15 DIAGNOSIS — R06.09 DOE (DYSPNEA ON EXERTION): ICD-10-CM

## 2018-05-15 DIAGNOSIS — I21.4 NSTEMI (NON-ST ELEVATED MYOCARDIAL INFARCTION): Primary | ICD-10-CM

## 2018-05-15 DIAGNOSIS — I50.21 ACUTE SYSTOLIC HEART FAILURE: ICD-10-CM

## 2018-05-15 DIAGNOSIS — I16.0 HYPERTENSIVE URGENCY: ICD-10-CM

## 2018-05-15 PROCEDURE — 99999 PR PBB SHADOW E&M-EST. PATIENT-LVL III: CPT | Mod: PBBFAC,,, | Performed by: INTERNAL MEDICINE

## 2018-05-15 PROCEDURE — 3078F DIAST BP <80 MM HG: CPT | Mod: CPTII,S$GLB,, | Performed by: INTERNAL MEDICINE

## 2018-05-15 PROCEDURE — 3074F SYST BP LT 130 MM HG: CPT | Mod: CPTII,S$GLB,, | Performed by: INTERNAL MEDICINE

## 2018-05-15 PROCEDURE — 99499 UNLISTED E&M SERVICE: CPT | Mod: S$GLB,,, | Performed by: INTERNAL MEDICINE

## 2018-05-15 PROCEDURE — 99214 OFFICE O/P EST MOD 30 MIN: CPT | Mod: S$GLB,,, | Performed by: INTERNAL MEDICINE

## 2018-05-15 RX ORDER — IBUPROFEN 200 MG
1 TABLET ORAL DAILY
Qty: 14 PATCH | Refills: 0 | Status: SHIPPED | OUTPATIENT
Start: 2018-05-15 | End: 2018-05-28 | Stop reason: SDUPTHER

## 2018-05-15 RX ORDER — SODIUM CHLORIDE 9 MG/ML
INJECTION, SOLUTION INTRAVENOUS CONTINUOUS
Status: CANCELLED | OUTPATIENT
Start: 2018-05-15

## 2018-05-15 NOTE — PROGRESS NOTES
Subjective:    Patient ID:  Sharath Huizar is a 67 y.o. male who presents for follow-up of Shortness of Breath      HPI     NSTEMI - 4 stents to RCA 3/23/18, HTN     Admitted 3/23/18  Mr. Sharath Huizar is a 67 y.o. male with tobacco abuse who presents to [facility] ED with complaints of chest pain this morning.  He was at work when the pain started, and it was described as mid-epigastric with radiation into his abdomen, was sharp in quality, and was 10/10 in severity at its worst.  He reports some nausea but denies any vomiting.  He also had some diaphoresis but not any palpitations, shortness of breath, hemoptysis, nor any lower extremity pain or swelling.  There were no alleviating or exacerbating factors, and he has never had similar episodes in the past.  He denies any personal or family history of heart attacks.  He does admit to smoking 1 pack a day of cigarettes since his early 20's, and drinks about 2-3 beers a day.     68 y/o male admitted to ICU with NSTEMI.  Underwent LHC with JUANCARLOS x 4 to RCA. Residual LAD disease that will need to be addressed at a later date.  Remained on Aggrastat infusion, now off.  TTE showing new EF 35%. Patient euvolemic. Discharged with cardiology follow up.       Echo 3/24/18    1 - Moderately depressed left ventricular systolic function (EF 35-40%) Severe inferior hypokinesis.     2 - Concentric hypertrophy.     3 - Impaired LV relaxation, normal LAP (grade 1 diastolic dysfunction).     4 - Trivial mitral regurgitation.     5 - Trivial tricuspid regurgitation.      3/24/18  PCI to the prox, mid, distal RCA with JUANCARLOS x 3  PCI to the PDA with JUANCARLOS x 1  Long mid LAD 75%     Stress test 4/11/18  LVEF: 28 %  Impression: ABNORMAL MYOCARDIAL PERFUSION  1. There is a large size fixed defect of severe intensity that extends from the base to the apical inferior wall of the left ventricle, consistent with myocardial injury. There is trivial nahid-injury ischemia.   2. Resting wall motion is  physiologic.   3. There is resting LV dysfunction with a reduced ejection fraction of 28 %.     5/1/18 Imdur added and toprol increased for recurrent CP  5/15/18 Still with exertional CP - imdur gives him a HA       Review of Systems   Constitution: Negative for decreased appetite.   HENT: Negative for ear discharge.    Eyes: Negative for blurred vision.   Endocrine: Negative for polyphagia.   Skin: Negative for nail changes.   Neurological: Negative for aphonia.   Psychiatric/Behavioral: Negative for hallucinations.        Objective:    Physical Exam   Constitutional: He is oriented to person, place, and time. He appears well-developed and well-nourished.   HENT:   Head: Normocephalic and atraumatic.   Eyes: Conjunctivae are normal. Pupils are equal, round, and reactive to light.   Neck: Normal range of motion. Neck supple.   Cardiovascular: Normal rate, normal heart sounds and intact distal pulses.    Pulmonary/Chest: Effort normal and breath sounds normal.   Abdominal: Soft. Bowel sounds are normal.   Musculoskeletal: Normal range of motion.   Neurological: He is alert and oriented to person, place, and time.   Skin: Skin is warm and dry.         Assessment:       1. NSTEMI (non-ST elevated myocardial infarction)    2. Acute systolic heart failure    3. Hypertensive urgency    4. Coronary artery disease involving native coronary artery of native heart without angina pectoris    5. MCKEON (dyspnea on exertion)         Plan:       [facility] for recurrent CP - possible PCI of LAD - risks/benefits explained and he agrees to proceed

## 2018-05-15 NOTE — H&P
[facility]  History & Physical    Subjective:      Chief Complaint/Reason for Admission: [facility]    Sharath Huizar is a 67 y.o. male.    NSTEMI - 4 stents to RCA 3/23/18, HTN     Admitted 3/23/18  Mr. Sharath Huizar is a 67 y.o. male with tobacco abuse who presents to [facility] ED with complaints of chest pain this morning.  He was at work when the pain started, and it was described as mid-epigastric with radiation into his abdomen, was sharp in quality, and was 10/10 in severity at its worst.  He reports some nausea but denies any vomiting.  He also had some diaphoresis but not any palpitations, shortness of breath, hemoptysis, nor any lower extremity pain or swelling.  There were no alleviating or exacerbating factors, and he has never had similar episodes in the past.  He denies any personal or family history of heart attacks.  He does admit to smoking 1 pack a day of cigarettes since his early 20's, and drinks about 2-3 beers a day.     68 y/o male admitted to ICU with NSTEMI.  Underwent [facility] with JUANCARLOS x 4 to RCA. Residual LAD disease that will need to be addressed at a later date.  Remained on Aggrastat infusion, now off.  TTE showing new EF 35%. Patient euvolemic. Discharged with cardiology follow up.       Echo 3/24/18    1 - Moderately depressed left ventricular systolic function (EF 35-40%) Severe inferior hypokinesis.     2 - Concentric hypertrophy.     3 - Impaired LV relaxation, normal LAP (grade 1 diastolic dysfunction).     4 - Trivial mitral regurgitation.     5 - Trivial tricuspid regurgitation.      3/24/18  PCI to the prox, mid, distal RCA with JUANCARLOS x 3  PCI to the PDA with JUANCARLOS x 1  Long mid LAD 75%     Stress test 4/11/18  LVEF: 28 %  Impression: ABNORMAL MYOCARDIAL PERFUSION  1. There is a large size fixed defect of severe intensity that extends from the base to the apical inferior wall of the left ventricle, consistent with myocardial injury. There is trivial nahid-injury ischemia.   2. Resting  wall motion is physiologic.   3. There is resting LV dysfunction with a reduced ejection fraction of 28 %.     5/1/18 Imdur added and toprol increased for recurrent CP  5/15/18 Still with exertional CP - imdur gives him a HA         Past Medical History:   Diagnosis Date    Hypertension      No past surgical history on file.  Family History   Problem Relation Age of Onset    No Known Problems Mother     No Known Problems Father     No Known Problems Sister     No Known Problems Brother     No Known Problems Maternal Aunt     No Known Problems Maternal Uncle     No Known Problems Paternal Aunt     No Known Problems Paternal Uncle     No Known Problems Maternal Grandmother     No Known Problems Maternal Grandfather     No Known Problems Paternal Grandmother     No Known Problems Paternal Grandfather     Amblyopia Neg Hx     Blindness Neg Hx     Cancer Neg Hx     Cataracts Neg Hx     Diabetes Neg Hx     Glaucoma Neg Hx     Hypertension Neg Hx     Macular degeneration Neg Hx     Retinal detachment Neg Hx     Strabismus Neg Hx     Stroke Neg Hx     Thyroid disease Neg Hx      Social History   Substance Use Topics    Smoking status: Former Smoker     Types: Cigarettes     Quit date: 3/23/2018    Smokeless tobacco: Never Used    Alcohol use Yes         (Not in a hospital admission)  Review of patient's allergies indicates:  No Known Allergies     Review of Systems   All other systems reviewed and are negative.      Objective:      Vital Signs (Most Recent)  Pulse: 87 (05/15/18 1132)  BP: 123/68 (05/15/18 1132)  SpO2: 98 % (05/15/18 1132)    Vital Signs Range (Last 24H):  [unfilled]    Physical Exam   Constitutional: He is oriented to person, place, and time. He appears well-developed and well-nourished.   HENT:   Head: Normocephalic and atraumatic.   Eyes: EOM are normal. Pupils are equal, round, and reactive to light.   Neck: Normal range of motion. Neck supple.   Cardiovascular: Normal  rate and regular rhythm.    Pulmonary/Chest: Effort normal and breath sounds normal.   Abdominal: Soft. Bowel sounds are normal.   Musculoskeletal: Normal range of motion.   Neurological: He is alert and oriented to person, place, and time.   Skin: Skin is warm and dry.       Data Review:    CBC:   Lab Results   Component Value Date    WBC 9.34 04/02/2018    RBC 4.21 (L) 04/02/2018    HGB 12.6 (L) 04/02/2018    HCT 38.8 (L) 04/02/2018     04/02/2018     BMP:   Lab Results   Component Value Date    GLU 87 04/02/2018     04/02/2018    K 4.2 04/02/2018     04/02/2018    CO2 25 04/02/2018    BUN 14 04/02/2018    CREATININE 0.9 04/02/2018    CALCIUM 9.4 04/02/2018      ECG: NSR LVH with repol.     Assessment:      There are no hospital problems to display for this patient.    Recurrent CP - on ASA/plavix and 2 anti-anginal medications. Recent stents to RCA after NSTEMI with residual LAD disease    Plan:      [facility]

## 2018-05-28 ENCOUNTER — HOSPITAL ENCOUNTER (OUTPATIENT)
Dept: PREADMISSION TESTING | Facility: HOSPITAL | Age: 68
Discharge: HOME | End: 2018-05-28
Attending: INTERNAL MEDICINE
Payer: MEDICARE

## 2018-05-28 ENCOUNTER — CLINICAL SUPPORT (OUTPATIENT)
Dept: SMOKING CESSATION | Facility: CLINIC | Age: 68
End: 2018-05-28
Payer: COMMERCIAL

## 2018-05-28 VITALS
BODY MASS INDEX: 22.36 KG/M2 | OXYGEN SATURATION: 99 % | TEMPERATURE: 98 F | HEART RATE: 68 BPM | DIASTOLIC BLOOD PRESSURE: 83 MMHG | SYSTOLIC BLOOD PRESSURE: 156 MMHG | RESPIRATION RATE: 18 BRPM | WEIGHT: 139.13 LBS | HEIGHT: 66 IN

## 2018-05-28 DIAGNOSIS — I16.0 HYPERTENSIVE URGENCY: ICD-10-CM

## 2018-05-28 DIAGNOSIS — I50.21 ACUTE SYSTOLIC HEART FAILURE: ICD-10-CM

## 2018-05-28 DIAGNOSIS — R06.09 DOE (DYSPNEA ON EXERTION): ICD-10-CM

## 2018-05-28 DIAGNOSIS — I25.10 CORONARY ARTERY DISEASE INVOLVING NATIVE CORONARY ARTERY OF NATIVE HEART WITHOUT ANGINA PECTORIS: ICD-10-CM

## 2018-05-28 DIAGNOSIS — F17.200 NICOTINE DEPENDENCE: ICD-10-CM

## 2018-05-28 DIAGNOSIS — I21.4 NSTEMI (NON-ST ELEVATED MYOCARDIAL INFARCTION): ICD-10-CM

## 2018-05-28 LAB
ANION GAP SERPL CALC-SCNC: 7 MMOL/L
APTT BLDCRRT: 26.3 SEC
BUN SERPL-MCNC: 7 MG/DL
CALCIUM SERPL-MCNC: 8.9 MG/DL
CHLORIDE SERPL-SCNC: 111 MMOL/L
CO2 SERPL-SCNC: 23 MMOL/L
CREAT SERPL-MCNC: 0.9 MG/DL
ERYTHROCYTE [DISTWIDTH] IN BLOOD BY AUTOMATED COUNT: 14.5 %
EST. GFR  (AFRICAN AMERICAN): >60 ML/MIN/1.73 M^2
EST. GFR  (NON AFRICAN AMERICAN): >60 ML/MIN/1.73 M^2
GLUCOSE SERPL-MCNC: 128 MG/DL
HCT VFR BLD AUTO: 40.2 %
HGB BLD-MCNC: 13.4 G/DL
INR PPP: 1.1
MCH RBC QN AUTO: 28.9 PG
MCHC RBC AUTO-ENTMCNC: 33.3 G/DL
MCV RBC AUTO: 87 FL
PLATELET # BLD AUTO: 223 K/UL
PMV BLD AUTO: 11.1 FL
POTASSIUM SERPL-SCNC: 3.4 MMOL/L
PROTHROMBIN TIME: 11.4 SEC
RBC # BLD AUTO: 4.63 M/UL
SODIUM SERPL-SCNC: 141 MMOL/L
WBC # BLD AUTO: 8.78 K/UL

## 2018-05-28 PROCEDURE — 93010 ELECTROCARDIOGRAM REPORT: CPT | Mod: ,,, | Performed by: INTERNAL MEDICINE

## 2018-05-28 PROCEDURE — 80048 BASIC METABOLIC PNL TOTAL CA: CPT

## 2018-05-28 PROCEDURE — 93005 ELECTROCARDIOGRAM TRACING: CPT

## 2018-05-28 PROCEDURE — 85610 PROTHROMBIN TIME: CPT

## 2018-05-28 PROCEDURE — 99407 BEHAV CHNG SMOKING > 10 MIN: CPT | Mod: S$GLB,,,

## 2018-05-28 PROCEDURE — 85027 COMPLETE CBC AUTOMATED: CPT

## 2018-05-28 PROCEDURE — 85730 THROMBOPLASTIN TIME PARTIAL: CPT

## 2018-05-28 PROCEDURE — 36415 COLL VENOUS BLD VENIPUNCTURE: CPT

## 2018-05-28 RX ORDER — IBUPROFEN 200 MG
1 TABLET ORAL DAILY
Qty: 14 PATCH | Refills: 0 | Status: SHIPPED | OUTPATIENT
Start: 2018-05-28 | End: 2018-06-11

## 2018-05-28 RX ORDER — MICONAZOLE NITRATE 2 %
CREAM (GRAM) TOPICAL
Qty: 220 EACH | Refills: 0 | Status: SHIPPED | OUTPATIENT
Start: 2018-05-28 | End: 2019-10-19

## 2018-05-28 NOTE — PLAN OF CARE
Pre-operative instructions, medication directives and pain scales reviewed with Bhupendra. All questions the patient had  were answered. Re-assurance about surgical procedure and day of surgery routine given as needed. Mr Huizar verbalized understanding of the pre-op instructions.EKG and labs done.

## 2018-05-28 NOTE — DISCHARGE INSTRUCTIONS
Your angiogram is scheduled for__TUESDAY 5/29_____________    Call [phone] between 2 pm and 5 pm on _TODAY___________to find out your arrival time for the day of your procedure.    You will go directly to Same Day Surgery on the 2nd floor of the hospital on the day of your procedure at __________    If you need wheelchair assistance, call [phone] from the lobby using your cell phone.  Or you may use the courtesy phone in the lobby.  The  will direct your call to the Same Day Surgery unit.    IMPORTANT INSTRUCTIONS:  Do not eat or drink anything after midnight.  This includes water and coffee.  It is okay to brush your teeth.  Do not chew gum or have hard candy or mints.    Take your morning medications with small sips of water.        Wash both groins with Hibiclens on the night before your angiogram, and on the morning of your angiogram.  .  Be sure to rinse it off.  Do not put Hibiclens directly on your genitals or face.    .     You may wear deodorant, but do not wear body lotion, powder or cologne       Do not wear jewelry.     You do not need money or valuables.  You may bring your cell phone.     If you are going home the same day, you must arrange for someone to drive you home.     Wear comfortable, loose fitting clothes.          Call your doctor if you have sickness or fever before your angiogram.     Our number in [facility] is [phone] if you need to contact us.

## 2018-05-29 ENCOUNTER — SURGERY (OUTPATIENT)
Age: 68
End: 2018-05-29

## 2018-05-29 ENCOUNTER — HOSPITAL ENCOUNTER (OUTPATIENT)
Facility: HOSPITAL | Age: 68
Discharge: HOME | End: 2018-05-29
Attending: INTERNAL MEDICINE | Admitting: INTERNAL MEDICINE
Payer: COMMERCIAL

## 2018-05-29 VITALS
TEMPERATURE: 97 F | BODY MASS INDEX: 22.36 KG/M2 | HEART RATE: 68 BPM | HEIGHT: 66 IN | RESPIRATION RATE: 16 BRPM | WEIGHT: 139.13 LBS | SYSTOLIC BLOOD PRESSURE: 156 MMHG | OXYGEN SATURATION: 98 % | DIASTOLIC BLOOD PRESSURE: 88 MMHG

## 2018-05-29 DIAGNOSIS — R06.09 DOE (DYSPNEA ON EXERTION): ICD-10-CM

## 2018-05-29 DIAGNOSIS — I25.10 CORONARY ARTERY DISEASE INVOLVING NATIVE CORONARY ARTERY OF NATIVE HEART WITHOUT ANGINA PECTORIS: ICD-10-CM

## 2018-05-29 DIAGNOSIS — I50.21 ACUTE SYSTOLIC HEART FAILURE: ICD-10-CM

## 2018-05-29 DIAGNOSIS — I16.0 HYPERTENSIVE URGENCY: ICD-10-CM

## 2018-05-29 DIAGNOSIS — I21.4 NSTEMI (NON-ST ELEVATED MYOCARDIAL INFARCTION): ICD-10-CM

## 2018-05-29 PROCEDURE — 93458 L HRT ARTERY/VENTRICLE ANGIO: CPT

## 2018-05-29 PROCEDURE — 99152 MOD SED SAME PHYS/QHP 5/>YRS: CPT

## 2018-05-29 PROCEDURE — 93458 L HRT ARTERY/VENTRICLE ANGIO: CPT | Mod: 26,,, | Performed by: INTERNAL MEDICINE

## 2018-05-29 PROCEDURE — 99152 MOD SED SAME PHYS/QHP 5/>YRS: CPT | Mod: ,,, | Performed by: INTERNAL MEDICINE

## 2018-05-29 PROCEDURE — 63600175 PHARM REV CODE 636 W HCPCS

## 2018-05-29 PROCEDURE — 25000003 PHARM REV CODE 250

## 2018-05-29 RX ORDER — ACETAMINOPHEN 325 MG/1
650 TABLET ORAL EVERY 4 HOURS PRN
Status: DISCONTINUED | OUTPATIENT
Start: 2018-05-29 | End: 2018-05-29 | Stop reason: HOSPADM

## 2018-05-29 RX ORDER — NITROGLYCERIN 0.4 MG/1
0.4 TABLET SUBLINGUAL EVERY 5 MIN PRN
Status: DISCONTINUED | OUTPATIENT
Start: 2018-05-29 | End: 2018-05-29 | Stop reason: HOSPADM

## 2018-05-29 RX ORDER — SODIUM CHLORIDE 9 MG/ML
INJECTION, SOLUTION INTRAVENOUS CONTINUOUS
Status: DISCONTINUED | OUTPATIENT
Start: 2018-05-29 | End: 2018-05-29 | Stop reason: HOSPADM

## 2018-05-29 RX ORDER — HYDROCODONE BITARTRATE AND ACETAMINOPHEN 5; 325 MG/1; MG/1
1 TABLET ORAL EVERY 4 HOURS PRN
Status: DISCONTINUED | OUTPATIENT
Start: 2018-05-29 | End: 2018-05-29 | Stop reason: HOSPADM

## 2018-05-29 RX ORDER — SODIUM CHLORIDE 9 MG/ML
INJECTION, SOLUTION INTRAVENOUS CONTINUOUS
Status: ACTIVE | OUTPATIENT
Start: 2018-05-29 | End: 2018-05-29

## 2018-05-29 RX ADMIN — SODIUM CHLORIDE: 9 INJECTION, SOLUTION INTRAVENOUS at 07:05

## 2018-05-29 NOTE — H&P
[facility]  History & Physical    Subjective:      Chief Complaint/Reason for Admission: [facility]    Sharath Huizar is a 67 y.o. male.    NSTEMI - 4 stents to RCA 3/23/18, HTN     Admitted 3/23/18  Mr. Sharath Huizar is a 67 y.o. male with tobacco abuse who presents to [facility] ED with complaints of chest pain this morning.  He was at work when the pain started, and it was described as mid-epigastric with radiation into his abdomen, was sharp in quality, and was 10/10 in severity at its worst.  He reports some nausea but denies any vomiting.  He also had some diaphoresis but not any palpitations, shortness of breath, hemoptysis, nor any lower extremity pain or swelling.  There were no alleviating or exacerbating factors, and he has never had similar episodes in the past.  He denies any personal or family history of heart attacks.  He does admit to smoking 1 pack a day of cigarettes since his early 20's, and drinks about 2-3 beers a day.     68 y/o male admitted to ICU with NSTEMI.  Underwent [facility] with JUANCARLOS x 4 to RCA. Residual LAD disease that will need to be addressed at a later date.  Remained on Aggrastat infusion, now off.  TTE showing new EF 35%. Patient euvolemic. Discharged with cardiology follow up.       Echo 3/24/18    1 - Moderately depressed left ventricular systolic function (EF 35-40%) Severe inferior hypokinesis.     2 - Concentric hypertrophy.     3 - Impaired LV relaxation, normal LAP (grade 1 diastolic dysfunction).     4 - Trivial mitral regurgitation.     5 - Trivial tricuspid regurgitation.      3/24/18  PCI to the prox, mid, distal RCA with JUANCARLOS x 3  PCI to the PDA with JUANCARLOS x 1  Long mid LAD 75%     Stress test 4/11/18  LVEF: 28 %  Impression: ABNORMAL MYOCARDIAL PERFUSION  1. There is a large size fixed defect of severe intensity that extends from the base to the apical inferior wall of the left ventricle, consistent with myocardial injury. There is trivial nahid-injury ischemia.   2. Resting  wall motion is physiologic.   3. There is resting LV dysfunction with a reduced ejection fraction of 28 %.     5/1/18 Imdur added and toprol increased for recurrent CP  5/15/18 Still with exertional CP - imdur gives him a HA         Past Medical History:   Diagnosis Date    Coronary artery disease     Hypertension     Myocardial infarction      Past Surgical History:   Procedure Laterality Date    CARDIAC CATHETERIZATION      with 4 stents     Family History   Problem Relation Age of Onset    No Known Problems Mother     No Known Problems Father     No Known Problems Sister     No Known Problems Brother     No Known Problems Maternal Aunt     No Known Problems Maternal Uncle     No Known Problems Paternal Aunt     No Known Problems Paternal Uncle     No Known Problems Maternal Grandmother     No Known Problems Maternal Grandfather     No Known Problems Paternal Grandmother     No Known Problems Paternal Grandfather     Amblyopia Neg Hx     Blindness Neg Hx     Cancer Neg Hx     Cataracts Neg Hx     Diabetes Neg Hx     Glaucoma Neg Hx     Hypertension Neg Hx     Macular degeneration Neg Hx     Retinal detachment Neg Hx     Strabismus Neg Hx     Stroke Neg Hx     Thyroid disease Neg Hx      Social History   Substance Use Topics    Smoking status: Former Smoker     Types: Cigarettes     Quit date: 3/23/2018    Smokeless tobacco: Never Used    Alcohol use No       PTA Medications   Medication Sig    aspirin (ECOTRIN) 81 MG EC tablet Take 1 tablet (81 mg total) by mouth once daily.    atorvastatin (LIPITOR) 80 MG tablet Take 1 tablet (80 mg total) by mouth once daily.    clopidogrel (PLAVIX) 75 mg tablet Take 1 tablet (75 mg total) by mouth once daily.    isosorbide mononitrate (IMDUR) 30 MG 24 hr tablet Take 1 tablet (30 mg total) by mouth once daily.    lisinopril (PRINIVIL,ZESTRIL) 2.5 MG tablet Take 1 tablet (2.5 mg total) by mouth once daily.    metoprolol succinate (TOPROL-XL)  50 MG 24 hr tablet Take 1 tablet (50 mg total) by mouth once daily.    nicotine (NICODERM CQ) 21 mg/24 hr Place 1 patch onto the skin once daily.    nicotine, polacrilex, (NICORETTE) 2 mg Gum 1-2 per hour in place of cigarettes maximum of 15 per day.    benzonatate (TESSALON) 200 MG capsule TK 1 C PO TID PRF COUGH    nitroGLYCERIN (NITROSTAT) 0.4 MG SL tablet Place 1 tablet (0.4 mg total) under the tongue every 5 (five) minutes as needed.     Review of patient's allergies indicates:  No Known Allergies     Review of Systems   All other systems reviewed and are negative.      Objective:      Vital Signs (Most Recent)  Temp: 98.2 °F (36.8 °C) (05/29/18 0700)  Pulse: 71 (05/29/18 0700)  Resp: 16 (05/29/18 0700)  BP: (!) 165/94 (05/29/18 0700)  SpO2: 95 % (05/29/18 0700)    Vital Signs Range (Last 24H):  [unfilled]    Physical Exam   Constitutional: He is oriented to person, place, and time. He appears well-developed and well-nourished.   HENT:   Head: Normocephalic and atraumatic.   Eyes: EOM are normal. Pupils are equal, round, and reactive to light.   Neck: Normal range of motion. Neck supple.   Cardiovascular: Normal rate and regular rhythm.    Pulmonary/Chest: Effort normal and breath sounds normal.   Abdominal: Soft. Bowel sounds are normal.   Musculoskeletal: Normal range of motion.   Neurological: He is alert and oriented to person, place, and time.   Skin: Skin is warm and dry.       Data Review:    CBC:   Lab Results   Component Value Date    WBC 8.78 05/28/2018    RBC 4.63 05/28/2018    HGB 13.4 (L) 05/28/2018    HCT 40.2 05/28/2018     05/28/2018     BMP:   Lab Results   Component Value Date     (H) 05/28/2018     05/28/2018    K 3.4 (L) 05/28/2018     (H) 05/28/2018    CO2 23 05/28/2018    BUN 7 (L) 05/28/2018    CREATININE 0.9 05/28/2018    CALCIUM 8.9 05/28/2018      ECG: NSR LVH with repol.     Assessment:      Active Hospital Problems    Diagnosis  POA    NSTEMI  (non-ST elevated myocardial infarction) [I21.4]  Yes      Resolved Hospital Problems    Diagnosis Date Resolved POA   No resolved problems to display.     Recurrent CP - on ASA/plavix and 2 anti-anginal medications. Recent stents to RCA after NSTEMI with residual LAD disease    Plan:      [facility]

## 2018-05-29 NOTE — PROCEDURES
Procedures     [facility]   Dr Lombardi  Pre-op Dx CAD  Post-op Dx same  Specimen none  EBL < 50 cc    5/29/18 [facility] - EDP 23, LAD small vessel - occluded mid, Cx - luminal irregularities, RCA stents widely patent    Medical Rx  [employer]  Home today

## 2018-05-29 NOTE — HOSPITAL COURSE
5/29/18 [facility] - EDP 23, LAD small vessel - occluded mid, Cx - luminal irregularities, RCA stents widely patent     Medical Rx  [employer]  Home today

## 2018-05-29 NOTE — DISCHARGE INSTRUCTIONS
ANGIOGRAM INSTRUCTIONS                                           Drink plenty of fluids for the next 48 hours and follow your doctor's diet orders.    Rest for the next 72 hours.   Try not to keep the injected leg bent for a long period of time.  Remove the dressing in 24 hours, and you may shower. Clean the area with soap and water, and apply a band aid for the next 5 days.                                                                 No  Lifting over 5-10 lbs., that is, not more than 1 gallon of water, or straining for 72 hours.    No driving, no drinking alcohol, and no signing legal documents for the next 24 hours.    Call your doctor for elevated temperature, shortness of breath, chest pain, or cold discolored foot or leg.    If oozing occurs at the injections site, lie down.  Apply pressure with a clean wash cloth for 20 to 30 minutes and call your doctor.    If severe bleeding occurs, lie down, apply pressure.  Call 911 and request an ambulance to take you to the nearest hospital emergency room.    Continue to take your regular medications as instructed.      Follow the instructions in the handout given to you.     Fall Prevention  Millions of people fall every year and injure themselves. You may have had anesthesia or sedation which may increase your risk of falling. You may have health issues that put you at an increased risk of falling.     Here are ways to reduce your risk of falling.  ·   · Make your home safe by keeping walkways clear of objects you may trip over.  · Use non-slip pads under rugs. Do not use area rugs or small throw rugs.  · Use non-slip mats in bathtubs and showers.  · Install handrails and lights on staircases.  · Do not walk in poorly lit areas.  · Do not stand on chairs or wobbly ladders.  · Use caution when reaching overhead or looking upward. This position can cause a loss of balance.  · Be sure your shoes fit properly, have non-slip bottoms and are in good  condition.   · Wear shoes both inside and out. Avoid going barefoot or wearing slippers.  · Be cautious when going up and down stairs, curbs, and when walking on uneven sidewalks.  · If your balance is poor, consider using a cane or walker.  · If your fall was related to alcohol use, stop or limit alcohol intake.   · If your fall was related to use of sleeping medicines, talk to your doctor about this. You may need to reduce your dosage at bedtime if you awaken during the night to go to the bathroom.    · To reduce the need for nighttime bathroom trips:  ¨ Avoid drinking fluids for several hours before going to bed  ¨ Empty your bladder before going to bed  ¨ Men can keep a urinal at the bedside  · Stay as active as you can. Balance, flexibility, strength, and endurance all come from exercise. They all play a role in preventing falls. Ask your healthcare provider which types of activity are right for you.  · Get your vision checked on a regular basis.  · If you have pets, know where they are before you stand up or walk so you don't trip over them.  · Use night lights.

## 2018-05-29 NOTE — HPI
NSTEMI - 4 stents to RCA 3/23/18, HTN     Admitted 3/23/18  Mr. Sharath Huizar is a 67 y.o. male with tobacco abuse who presents to [facility] ED with complaints of chest pain this morning.  He was at work when the pain started, and it was described as mid-epigastric with radiation into his abdomen, was sharp in quality, and was 10/10 in severity at its worst.  He reports some nausea but denies any vomiting.  He also had some diaphoresis but not any palpitations, shortness of breath, hemoptysis, nor any lower extremity pain or swelling.  There were no alleviating or exacerbating factors, and he has never had similar episodes in the past.  He denies any personal or family history of heart attacks.  He does admit to smoking 1 pack a day of cigarettes since his early 20's, and drinks about 2-3 beers a day.     68 y/o male admitted to ICU with NSTEMI.  Underwent [facility] with JUANCARLOS x 4 to RCA. Residual LAD disease that will need to be addressed at a later date.  Remained on Aggrastat infusion, now off.  TTE showing new EF 35%. Patient euvolemic. Discharged with cardiology follow up.       Echo 3/24/18    1 - Moderately depressed left ventricular systolic function (EF 35-40%) Severe inferior hypokinesis.     2 - Concentric hypertrophy.     3 - Impaired LV relaxation, normal LAP (grade 1 diastolic dysfunction).     4 - Trivial mitral regurgitation.     5 - Trivial tricuspid regurgitation.      3/24/18  PCI to the prox, mid, distal RCA with JUANCARLOS x 3  PCI to the PDA with JUANCARLOS x 1  Long mid LAD 75%     Stress test 4/11/18  LVEF: 28 %  Impression: ABNORMAL MYOCARDIAL PERFUSION  1. There is a large size fixed defect of severe intensity that extends from the base to the apical inferior wall of the left ventricle, consistent with myocardial injury. There is trivial nahid-injury ischemia.   2. Resting wall motion is physiologic.   3. There is resting LV dysfunction with a reduced ejection fraction of 28 %.      5/1/18 Imdur added and toprol  increased for recurrent CP  5/15/18 Still with exertional CP - imdur gives him a HA

## 2018-05-29 NOTE — ASSESSMENT & PLAN NOTE
5/29/18 [facility] - EDP 23, LAD small vessel - occluded mid, Cx - luminal irregularities, RCA stents widely patent     Medical Rx  [employer]  Home today

## 2018-05-29 NOTE — DISCHARGE SUMMARY
[facility]  Cardiology  Discharge Summary      Patient Name: Sharath Huizar  MRN: [id number]  Admission Date: 5/29/2018  Hospital Length of Stay: 0 days  Discharge Date and Time:  05/29/2018 8:19 AM  Attending Physician: Viral Lombardi MD    Discharging Provider: Viral Lombardi MD  Primary Care Physician: Wilmer Bangura Jr, MD    HPI:   NSTEMI - 4 stents to RCA 3/23/18, HTN     Admitted 3/23/18  Mr. Sharath Huizar is a 67 y.o. male with tobacco abuse who presents to [facility] ED with complaints of chest pain this morning.  He was at work when the pain started, and it was described as mid-epigastric with radiation into his abdomen, was sharp in quality, and was 10/10 in severity at its worst.  He reports some nausea but denies any vomiting.  He also had some diaphoresis but not any palpitations, shortness of breath, hemoptysis, nor any lower extremity pain or swelling.  There were no alleviating or exacerbating factors, and he has never had similar episodes in the past.  He denies any personal or family history of heart attacks.  He does admit to smoking 1 pack a day of cigarettes since his early 20's, and drinks about 2-3 beers a day.     66 y/o male admitted to ICU with NSTEMI.  Underwent LHC with JUANCARLOS x 4 to RCA. Residual LAD disease that will need to be addressed at a later date.  Remained on Aggrastat infusion, now off.  TTE showing new EF 35%. Patient euvolemic. Discharged with cardiology follow up.       Echo 3/24/18    1 - Moderately depressed left ventricular systolic function (EF 35-40%) Severe inferior hypokinesis.     2 - Concentric hypertrophy.     3 - Impaired LV relaxation, normal LAP (grade 1 diastolic dysfunction).     4 - Trivial mitral regurgitation.     5 - Trivial tricuspid regurgitation.      3/24/18  PCI to the prox, mid, distal RCA with JUANCARLOS x 3  PCI to the PDA with JUANCARLOS x 1  Long mid LAD 75%     Stress test 4/11/18  LVEF: 28 %  Impression: ABNORMAL MYOCARDIAL PERFUSION  1.  There is a large size fixed defect of severe intensity that extends from the base to the apical inferior wall of the left ventricle, consistent with myocardial injury. There is trivial nahid-injury ischemia.   2. Resting wall motion is physiologic.   3. There is resting LV dysfunction with a reduced ejection fraction of 28 %.      5/1/18 Imdur added and toprol increased for recurrent CP  5/15/18 Still with exertional CP - imdur gives him a HA       Procedure(s) (LRB):  Left heart cath (Left)     Indwelling Lines/Drains at time of discharge:  Lines/Drains/Airways          No matching active lines, drains, or airways          Hospital Course:  5/29/18 [facility] - EDP 23, LAD small vessel - occluded mid, Cx - luminal irregularities, RCA stents widely patent     Medical Rx  Angioseal  Home today    Consults:     Significant Diagnostic Studies: Labs:   BMP:   Recent Labs  Lab 05/28/18  1010   *      K 3.4*   *   CO2 23   BUN 7*   CREATININE 0.9   CALCIUM 8.9   , CMP   Recent Labs  Lab 05/28/18  1010      K 3.4*   *   CO2 23   *   BUN 7*   CREATININE 0.9   CALCIUM 8.9   ANIONGAP 7*   ESTGFRAFRICA >60   EGFRNONAA >60    and CBC   Recent Labs  Lab 05/28/18  1010   WBC 8.78   HGB 13.4*   HCT 40.2          Pending Diagnostic Studies:     None          Final Active Diagnoses:    Diagnosis Date Noted POA    Coronary artery disease involving native coronary artery of native heart without angina pectoris [I25.10] 03/26/2018 Yes    NSTEMI (non-ST elevated myocardial infarction) [I21.4] 03/23/2018 Yes      Problems Resolved During this Admission:    Diagnosis Date Noted Date Resolved POA     Coronary artery disease involving native coronary artery of native heart without angina pectoris    5/29/18 [facility] - EDP 23, LAD small vessel - occluded mid, Cx - luminal irregularities, RCA stents widely patent     Medical Rx  Angioseal  Home today            Discharged Condition: good    Disposition: Home  or Self Care    Follow Up:    Patient Instructions:   No discharge procedures on file.  Medications:  Reconciled Home Medications:      Medication List      CONTINUE taking these medications    aspirin 81 MG EC tablet  Commonly known as:  ECOTRIN  Take 1 tablet (81 mg total) by mouth once daily.     atorvastatin 80 MG tablet  Commonly known as:  LIPITOR  Take 1 tablet (80 mg total) by mouth once daily.     clopidogrel 75 mg tablet  Commonly known as:  PLAVIX  Take 1 tablet (75 mg total) by mouth once daily.     isosorbide mononitrate 30 MG 24 hr tablet  Commonly known as:  IMDUR  Take 1 tablet (30 mg total) by mouth once daily.     lisinopril 2.5 MG tablet  Commonly known as:  PRINIVIL,ZESTRIL  Take 1 tablet (2.5 mg total) by mouth once daily.     metoprolol succinate 50 MG 24 hr tablet  Commonly known as:  TOPROL-XL  Take 1 tablet (50 mg total) by mouth once daily.     nicotine (polacrilex) 2 mg Gum  Commonly known as:  NICORETTE  1-2 per hour in place of cigarettes maximum of 15 per day.     nicotine 21 mg/24 hr  Commonly known as:  NICODERM CQ  Place 1 patch onto the skin once daily.     nitroGLYCERIN 0.4 MG SL tablet  Commonly known as:  NITROSTAT  Place 1 tablet (0.4 mg total) under the tongue every 5 (five) minutes as needed.        STOP taking these medications    benzonatate 200 MG capsule  Commonly known as:  TESSALON            Time spent on the discharge of patient: 20 minutes    Viral Lombardi MD  Cardiology  [facility]

## 2018-06-12 ENCOUNTER — OFFICE VISIT (OUTPATIENT)
Dept: CARDIOLOGY | Facility: CLINIC | Age: 68
End: 2018-06-12
Payer: MEDICARE

## 2018-06-12 ENCOUNTER — CLINICAL SUPPORT (OUTPATIENT)
Dept: SMOKING CESSATION | Facility: CLINIC | Age: 68
End: 2018-06-12
Payer: COMMERCIAL

## 2018-06-12 VITALS
WEIGHT: 140.19 LBS | BODY MASS INDEX: 22.53 KG/M2 | SYSTOLIC BLOOD PRESSURE: 164 MMHG | OXYGEN SATURATION: 98 % | DIASTOLIC BLOOD PRESSURE: 81 MMHG | HEART RATE: 79 BPM | HEIGHT: 66 IN

## 2018-06-12 DIAGNOSIS — I25.10 CORONARY ARTERY DISEASE INVOLVING NATIVE CORONARY ARTERY OF NATIVE HEART WITHOUT ANGINA PECTORIS: ICD-10-CM

## 2018-06-12 DIAGNOSIS — R06.09 DOE (DYSPNEA ON EXERTION): ICD-10-CM

## 2018-06-12 DIAGNOSIS — I50.21 ACUTE SYSTOLIC HEART FAILURE: ICD-10-CM

## 2018-06-12 DIAGNOSIS — F17.200 NICOTINE DEPENDENCE: Primary | ICD-10-CM

## 2018-06-12 DIAGNOSIS — I21.4 NSTEMI (NON-ST ELEVATED MYOCARDIAL INFARCTION): Primary | ICD-10-CM

## 2018-06-12 PROCEDURE — 3077F SYST BP >= 140 MM HG: CPT | Mod: CPTII,S$GLB,, | Performed by: INTERNAL MEDICINE

## 2018-06-12 PROCEDURE — 99214 OFFICE O/P EST MOD 30 MIN: CPT | Mod: S$GLB,,, | Performed by: INTERNAL MEDICINE

## 2018-06-12 PROCEDURE — 99407 BEHAV CHNG SMOKING > 10 MIN: CPT | Mod: S$GLB,,,

## 2018-06-12 PROCEDURE — 99999 PR PBB SHADOW E&M-EST. PATIENT-LVL III: CPT | Mod: PBBFAC,,, | Performed by: INTERNAL MEDICINE

## 2018-06-12 PROCEDURE — 3079F DIAST BP 80-89 MM HG: CPT | Mod: CPTII,S$GLB,, | Performed by: INTERNAL MEDICINE

## 2018-06-12 PROCEDURE — 99499 UNLISTED E&M SERVICE: CPT | Mod: S$GLB,,, | Performed by: INTERNAL MEDICINE

## 2018-06-12 NOTE — PROGRESS NOTES
Successful contact with patient regarding tobacco cessation quit #1. Pt states, he remains tobacco free since April 2018. Pt commended for the accomplishment, thus far. Pt scheduled to follow up with CTTS on 7/2/2018. Pt provided with his current and next available  benefit status. Will follow up with him in 3-4 months.

## 2018-06-12 NOTE — PROGRESS NOTES
Subjective:    Patient ID:  Shaarth Huizar is a 67 y.o. male who presents for follow-up of Post-op Evaluation      HPI     NSTEMI - 4 stents to RCA 3/23/18, HTN    5/29/18 [facility] - EDP 23, LAD small vessel - occluded mid, Cx - luminal irregularities, RCA stents widely patent     Admitted 3/23/18  Mr. Sharath Huizar is a 67 y.o. male with tobacco abuse who presents to [facility] ED with complaints of chest pain this morning.  He was at work when the pain started, and it was described as mid-epigastric with radiation into his abdomen, was sharp in quality, and was 10/10 in severity at its worst.  He reports some nausea but denies any vomiting.  He also had some diaphoresis but not any palpitations, shortness of breath, hemoptysis, nor any lower extremity pain or swelling.  There were no alleviating or exacerbating factors, and he has never had similar episodes in the past.  He denies any personal or family history of heart attacks.  He does admit to smoking 1 pack a day of cigarettes since his early 20's, and drinks about 2-3 beers a day.     68 y/o male admitted to ICU with NSTEMI.  Underwent LHC with JUANCARLOS x 4 to RCA. Residual LAD disease that will need to be addressed at a later date.  Remained on Aggrastat infusion, now off.  TTE showing new EF 35%. Patient euvolemic. Discharged with cardiology follow up.       Echo 3/24/18    1 - Moderately depressed left ventricular systolic function (EF 35-40%) Severe inferior hypokinesis.     2 - Concentric hypertrophy.     3 - Impaired LV relaxation, normal LAP (grade 1 diastolic dysfunction).     4 - Trivial mitral regurgitation.     5 - Trivial tricuspid regurgitation.      3/24/18  PCI to the prox, mid, distal RCA with JUANCARLOS x 3  PCI to the PDA with JUANCARLOS x 1  Long mid LAD 75%     Stress test 4/11/18  LVEF: 28 %  Impression: ABNORMAL MYOCARDIAL PERFUSION  1. There is a large size fixed defect of severe intensity that extends from the base to the apical inferior wall of the left  ventricle, consistent with myocardial injury. There is trivial nahid-injury ischemia.   2. Resting wall motion is physiologic.   3. There is resting LV dysfunction with a reduced ejection fraction of 28 %.      5/1/18 Imdur added and toprol increased for recurrent CP  5/15/18 Still with exertional CP - imdur gives him a HA  6/12/18 Stable angina. Gets MCKEON with fast walking    Review of Systems   Constitution: Negative for decreased appetite.   HENT: Negative for ear discharge.    Eyes: Negative for blurred vision.   Endocrine: Negative for polyphagia.   Skin: Negative for nail changes.   Neurological: Negative for aphonia.   Psychiatric/Behavioral: Negative for hallucinations.        Objective:    Physical Exam   Constitutional: He is oriented to person, place, and time. He appears well-developed and well-nourished.   HENT:   Head: Normocephalic and atraumatic.   Eyes: Conjunctivae are normal. Pupils are equal, round, and reactive to light.   Neck: Normal range of motion. Neck supple.   Cardiovascular: Normal rate, normal heart sounds and intact distal pulses.    Pulmonary/Chest: Effort normal and breath sounds normal.   Abdominal: Soft. Bowel sounds are normal.   Musculoskeletal: Normal range of motion.   Neurological: He is alert and oriented to person, place, and time.   Skin: Skin is warm and dry.         Assessment:       1. NSTEMI (non-ST elevated myocardial infarction)    2. Acute systolic heart failure    3. Coronary artery disease involving native coronary artery of native heart without angina pectoris    4. MCKEON (dyspnea on exertion)         Plan:       Cardiac stable  Not strong enough to perform his full job duties. Will clear him for work with no lifting > 15 pounds  OV 3 months with repeat echo

## 2018-06-14 DIAGNOSIS — Z11.59 NEED FOR HEPATITIS C SCREENING TEST: ICD-10-CM

## 2018-06-28 ENCOUNTER — PES CALL (OUTPATIENT)
Dept: ADMINISTRATIVE | Facility: CLINIC | Age: 68
End: 2018-06-28

## 2018-07-03 ENCOUNTER — LAB VISIT (OUTPATIENT)
Dept: LAB | Facility: HOSPITAL | Age: 68
End: 2018-07-03
Attending: FAMILY MEDICINE
Payer: MEDICARE

## 2018-07-03 ENCOUNTER — OFFICE VISIT (OUTPATIENT)
Dept: FAMILY MEDICINE | Facility: CLINIC | Age: 68
End: 2018-07-03
Payer: MEDICARE

## 2018-07-03 VITALS
SYSTOLIC BLOOD PRESSURE: 144 MMHG | WEIGHT: 139 LBS | DIASTOLIC BLOOD PRESSURE: 80 MMHG | HEIGHT: 66 IN | BODY MASS INDEX: 22.34 KG/M2 | HEART RATE: 78 BPM | TEMPERATURE: 98 F | OXYGEN SATURATION: 98 %

## 2018-07-03 DIAGNOSIS — I70.0 ATHEROSCLEROSIS OF AORTA: ICD-10-CM

## 2018-07-03 DIAGNOSIS — Z11.59 NEED FOR HEPATITIS C SCREENING TEST: ICD-10-CM

## 2018-07-03 DIAGNOSIS — I25.118 CORONARY ARTERY DISEASE WITH EXERTIONAL ANGINA: ICD-10-CM

## 2018-07-03 DIAGNOSIS — Z72.0 TOBACCO ABUSE: Chronic | ICD-10-CM

## 2018-07-03 DIAGNOSIS — I25.10 CORONARY ARTERY DISEASE INVOLVING NATIVE CORONARY ARTERY OF NATIVE HEART WITHOUT ANGINA PECTORIS: ICD-10-CM

## 2018-07-03 DIAGNOSIS — I50.21 ACUTE SYSTOLIC HEART FAILURE: ICD-10-CM

## 2018-07-03 DIAGNOSIS — I21.4 NSTEMI (NON-ST ELEVATED MYOCARDIAL INFARCTION): ICD-10-CM

## 2018-07-03 DIAGNOSIS — Z09 NEED FOR IMMUNIZATION FOLLOW-UP: ICD-10-CM

## 2018-07-03 DIAGNOSIS — Z00.00 ENCOUNTER FOR PREVENTIVE HEALTH EXAMINATION: Primary | ICD-10-CM

## 2018-07-03 DIAGNOSIS — R79.89 ELEVATED LFTS: ICD-10-CM

## 2018-07-03 PROCEDURE — 3079F DIAST BP 80-89 MM HG: CPT | Mod: CPTII,S$GLB,, | Performed by: NURSE PRACTITIONER

## 2018-07-03 PROCEDURE — G0439 PPPS, SUBSEQ VISIT: HCPCS | Mod: S$GLB,,, | Performed by: NURSE PRACTITIONER

## 2018-07-03 PROCEDURE — 90714 TD VACC NO PRESV 7 YRS+ IM: CPT | Mod: S$GLB,,, | Performed by: FAMILY MEDICINE

## 2018-07-03 PROCEDURE — 36415 COLL VENOUS BLD VENIPUNCTURE: CPT | Mod: PO

## 2018-07-03 PROCEDURE — 3077F SYST BP >= 140 MM HG: CPT | Mod: CPTII,S$GLB,, | Performed by: NURSE PRACTITIONER

## 2018-07-03 PROCEDURE — 86803 HEPATITIS C AB TEST: CPT

## 2018-07-03 PROCEDURE — 99999 PR PBB SHADOW E&M-EST. PATIENT-LVL IV: CPT | Mod: PBBFAC,,, | Performed by: NURSE PRACTITIONER

## 2018-07-03 PROCEDURE — 90471 IMMUNIZATION ADMIN: CPT | Mod: S$GLB,,, | Performed by: FAMILY MEDICINE

## 2018-07-03 PROCEDURE — 99499 UNLISTED E&M SERVICE: CPT | Mod: S$GLB,,, | Performed by: NURSE PRACTITIONER

## 2018-07-03 NOTE — PROGRESS NOTES
Td vaccine administered. Tolerated well, instructed to wait 15 min for observation. No reaction noted at discharged.

## 2018-07-03 NOTE — PATIENT INSTRUCTIONS
Counseling and Referral of Other Preventative  (Italic type indicates deductible and co-insurance are waived)    Patient Name: Sharath Huizar  Today's Date: 7/3/2018    Health Maintenance       Date Due Completion Date    Hepatitis C Screening 1950 ---    TETANUS VACCINE 12/24/1968 ---    Zoster Vaccine 12/24/2010 ---    Pneumococcal (65+) (1 of 2 - PCV13) 12/24/2015 ---    Influenza Vaccine 08/01/2018 ---    Fecal Occult Blood Test (FOBT)/FitKit 04/05/2019 4/5/2018    High Dose Statin 06/12/2019 6/12/2018    Lipid Panel 04/02/2023 4/2/2018        No orders of the defined types were placed in this encounter.    The following information is provided to all patients.  This information is to help you find resources for any of the problems found today that may be affecting your health:                Living healthy guide: www.[facility].[place]      Understanding Diabetes: www.diabetes.org      Eating healthy: www.cdc.gov/healthyweight      [facility] home safety checklist: www.cdc.gov/steadi/patient.html      Agency on Aging: www.goea.[place]      Alcoholics anonymous (AA): www.aa.org      Physical Activity: www.lenny.nih.gov/zw7qxau      Tobacco use: www.quitwithusla.org

## 2018-07-03 NOTE — PROGRESS NOTES
"Sharath Huizar presented for a  Medicare AWV and comprehensive Health Risk Assessment today. The following components were reviewed and updated:    · Medical history  · Family History  · Social history  · Allergies and Current Medications  · Health Risk Assessment  · Health Maintenance  · Care Team     ** See Completed Assessments for Annual Wellness Visit within the encounter summary.**       The following assessments were completed:  · Living Situation  · CAGE  · Depression Screening  · Timed Get Up and Go  · Whisper Test  · Cognitive Function Screening  · Nutrition Screening  · ADL Screening  · PAQ Screening    Vitals:    07/03/18 1156   BP: (!) 144/80   BP Location: Right arm   Patient Position: Sitting   BP Method: Medium (Manual)   Pulse: 78   Temp: 97.7 °F (36.5 °C)   TempSrc: Oral   SpO2: 98%   Weight: 63 kg (139 lb)   Height: 5' 6" (1.676 m)     Body mass index is 22.44 kg/m².  Physical Exam   Constitutional: He is oriented to person, place, and time. He appears well-developed and well-nourished.   Cardiovascular: Normal rate and regular rhythm.    Pulmonary/Chest: Effort normal and breath sounds normal.   Neurological: He is alert and oriented to person, place, and time.   Skin: Skin is warm and dry.   Psychiatric: He has a normal mood and affect.               Diagnoses and health risks identified today and associated recommendations/orders:  Encounter for preventive health examination  Patient was seen today for AWV.  Healthcare maintenance and screening recommendations were discussed and updated.  Will have Hep C screening done today per PCP order.  He will had TD vaccine today, he had prevnar 13 at his local pharmacy.  Return in one year for AWV.    Atherosclerosis of aorta  Stable.  Plavix, statin, ASA therapy.    Acute systolic heart failure  The current medical regimen is effective;  continue present plan and medications.  Followed by cardiology.    NSTEMI (non-ST elevated myocardial " infarction)  The current medical regimen is effective;  continue present plan and medications.  Followed by cardiology.    Coronary artery disease with exertional angina  The current medical regimen is effective;  continue present plan and medications.  Followed by cardiology.  Relieved with SL nitroglycerine.    Coronary artery disease involving native coronary artery of native heart without angina pectoris  The current medical regimen is effective;  continue present plan and medications.  Followed by cardiology.    Elevated LFTs  Stable.    Tobacco abuse  Smoking cessation, no cigarette in one month.    Need for immunization follow-up  Administered and updated in  today.  -     Cancel: (In Office Administered) Pneumococcal Conjugate Vaccine (13 Valent) (IM)  -     (In Office Administered) Td Vaccine - Preservative Free    Provided Sharath with a 5-10 year written screening schedule and personal prevention plan. Recommendations were developed using the USPSTF age appropriate recommendations. Education, counseling, and referrals were provided as needed. After Visit Summary printed and given to patient which includes a list of additional screenings\tests needed.    Follow-up in about 1 year (around 7/3/2019) for annual wellness visit.    Yamilex Xavier NP  I offered to discuss end of life issues, including information on how to make advance directives that the patient could use to name someone who would make medical decisions on their behalf if they became too ill to make themselves.    ___Patient declined  _X_Patient is interested, I provided paper work and offered to discuss.

## 2018-07-05 LAB — HCV AB SERPL QL IA: NEGATIVE

## 2018-07-09 ENCOUNTER — APPOINTMENT (OUTPATIENT)
Dept: RADIOLOGY | Facility: HOSPITAL | Age: 68
End: 2018-07-09
Attending: FAMILY MEDICINE
Payer: MEDICARE

## 2018-07-09 ENCOUNTER — CLINICAL SUPPORT (OUTPATIENT)
Dept: SMOKING CESSATION | Facility: CLINIC | Age: 68
End: 2018-07-09
Payer: COMMERCIAL

## 2018-07-09 ENCOUNTER — OFFICE VISIT (OUTPATIENT)
Dept: FAMILY MEDICINE | Facility: CLINIC | Age: 68
End: 2018-07-09
Payer: MEDICARE

## 2018-07-09 VITALS
HEART RATE: 75 BPM | SYSTOLIC BLOOD PRESSURE: 133 MMHG | TEMPERATURE: 98 F | BODY MASS INDEX: 22 KG/M2 | WEIGHT: 136.88 LBS | HEIGHT: 66 IN | RESPIRATION RATE: 17 BRPM | OXYGEN SATURATION: 97 % | DIASTOLIC BLOOD PRESSURE: 78 MMHG

## 2018-07-09 DIAGNOSIS — K21.9 GASTROESOPHAGEAL REFLUX DISEASE, ESOPHAGITIS PRESENCE NOT SPECIFIED: ICD-10-CM

## 2018-07-09 DIAGNOSIS — F17.200 NICOTINE DEPENDENCE: Primary | ICD-10-CM

## 2018-07-09 DIAGNOSIS — M25.511 CHRONIC PAIN OF BOTH SHOULDERS: ICD-10-CM

## 2018-07-09 DIAGNOSIS — G89.29 CHRONIC PAIN OF BOTH SHOULDERS: Primary | ICD-10-CM

## 2018-07-09 DIAGNOSIS — I25.10 CORONARY ARTERY DISEASE INVOLVING NATIVE CORONARY ARTERY OF NATIVE HEART WITHOUT ANGINA PECTORIS: ICD-10-CM

## 2018-07-09 DIAGNOSIS — M25.512 CHRONIC PAIN OF BOTH SHOULDERS: Primary | ICD-10-CM

## 2018-07-09 DIAGNOSIS — G89.29 CHRONIC PAIN OF BOTH SHOULDERS: ICD-10-CM

## 2018-07-09 DIAGNOSIS — I10 HYPERTENSION, ESSENTIAL: ICD-10-CM

## 2018-07-09 DIAGNOSIS — M25.511 CHRONIC PAIN OF BOTH SHOULDERS: Primary | ICD-10-CM

## 2018-07-09 DIAGNOSIS — M25.512 CHRONIC PAIN OF BOTH SHOULDERS: ICD-10-CM

## 2018-07-09 DIAGNOSIS — I50.22 CHRONIC SYSTOLIC HEART FAILURE: ICD-10-CM

## 2018-07-09 PROCEDURE — 99999 PR PBB SHADOW E&M-EST. PATIENT-LVL III: CPT | Mod: PBBFAC,,, | Performed by: FAMILY MEDICINE

## 2018-07-09 PROCEDURE — 99999 PR PBB SHADOW E&M-EST. PATIENT-LVL I: CPT | Mod: PBBFAC,,,

## 2018-07-09 PROCEDURE — 3075F SYST BP GE 130 - 139MM HG: CPT | Mod: CPTII,S$GLB,, | Performed by: FAMILY MEDICINE

## 2018-07-09 PROCEDURE — 99214 OFFICE O/P EST MOD 30 MIN: CPT | Mod: S$GLB,,, | Performed by: FAMILY MEDICINE

## 2018-07-09 PROCEDURE — 99403 PREV MED CNSL INDIV APPRX 45: CPT | Mod: S$GLB,,,

## 2018-07-09 PROCEDURE — 73030 X-RAY EXAM OF SHOULDER: CPT | Mod: 26,50,, | Performed by: RADIOLOGY

## 2018-07-09 PROCEDURE — 3078F DIAST BP <80 MM HG: CPT | Mod: CPTII,S$GLB,, | Performed by: FAMILY MEDICINE

## 2018-07-09 PROCEDURE — 73030 X-RAY EXAM OF SHOULDER: CPT | Mod: TC,50,FY,PN

## 2018-07-09 RX ORDER — FAMOTIDINE 20 MG/1
20 TABLET, FILM COATED ORAL DAILY
Qty: 30 TABLET | Refills: 1 | Status: SHIPPED | OUTPATIENT
Start: 2018-07-09 | End: 2019-10-19

## 2018-07-09 RX ORDER — IBUPROFEN 200 MG
1 TABLET ORAL DAILY
Qty: 14 PATCH | Refills: 0 | Status: SHIPPED | OUTPATIENT
Start: 2018-07-09 | End: 2018-07-23

## 2018-07-09 RX ORDER — FAMOTIDINE 20 MG/1
TABLET, FILM COATED ORAL
Qty: 90 TABLET | Refills: 1 | OUTPATIENT
Start: 2018-07-09

## 2018-07-09 NOTE — PROGRESS NOTES
Routine Office Visit    Patient Name: Sharath Huizar    : 1950  MRN: [id number]    Subjective:  Sharath is a 67 y.o. male who presents today for:   Chief Complaint   Patient presents with    Chest Pain     possible heartburn(acid reflux)    Shoulder Pain       67-year-old male with coronary artery disease, status post non ST elevated myocardial infarction or early this year, and former smoker comes in for evaluation of recurring chest pain. He describes the pain as a burning sensation.  It is located in the middle of his chest and does not radiate anywhere.  He states that he thinks it is related to heartburn.  The patient reports that he often feels bloated, and has burping.  He does not eat spicy foods, and has no sore throat.  He reports some coughing occasionally.  He does report drinking coffee daily.  He also reports drinking soda.  He denies chocolate, and products with mint.  He reports that he has not tried anything for the pain. He states that the symptoms last a few minutes and then go away.  He is not sure if there worsened right after eating.  He denies any coughing up blood, and denies any blood in the stools.  The patient denies any shortness of breath when exerting himself, any leg swelling, or needing to use pillows at night for sleep because of shortness of breath.  One reviewed the patient's medication, he reports that he is not taking Imdur.  He states that he was also unaware of this medication.  I called his pharmacy and confirmed that the medication was never picked up.  The patient is also complaining of bilateral shoulder pains.  He states that it has been going on for over 6-7 months.  He states that it is worsened with lifting.  He states that sometimes it is worse with any type of movement.  He denies any injuries.  He denies any skin color changes.  He denies any tingling or numbness into his hands.  He is not taking anything for the pain. He is unsure of what makes the pain  better.    Past Medical History  Past Medical History:   Diagnosis Date    Coronary artery disease     Hypertension     Myocardial infarction        Past Surgical History  Past Surgical History:   Procedure Laterality Date    CARDIAC CATHETERIZATION      with 4 stents    LEFT HEART CATHETERIZATION Left 5/29/2018    Procedure: Left heart cath;  Surgeon: Viral Lombardi MD;  Location: [facility] CATH LAB;  Service: Cardiology;  Laterality: Left;  RN PREOP 5/28/18        Family History  Family History   Problem Relation Age of Onset    No Known Problems Mother     No Known Problems Father     No Known Problems Sister     No Known Problems Brother     No Known Problems Maternal Aunt     No Known Problems Maternal Uncle     No Known Problems Paternal Aunt     No Known Problems Paternal Uncle     No Known Problems Maternal Grandmother     No Known Problems Maternal Grandfather     No Known Problems Paternal Grandmother     No Known Problems Paternal Grandfather     Amblyopia Neg Hx     Blindness Neg Hx     Cancer Neg Hx     Cataracts Neg Hx     Diabetes Neg Hx     Glaucoma Neg Hx     Hypertension Neg Hx     Macular degeneration Neg Hx     Retinal detachment Neg Hx     Strabismus Neg Hx     Stroke Neg Hx     Thyroid disease Neg Hx        Social History  Social History     Social History    Marital status:      Spouse name: N/A    Number of children: N/A    Years of education: N/A     Occupational History    Not on file.     Social History Main Topics    Smoking status: Former Smoker     Types: Cigarettes     Quit date: 3/23/2018    Smokeless tobacco: Never Used    Alcohol use No    Drug use: No    Sexual activity: Yes     Partners: Female     Other Topics Concern    Not on file     Social History Narrative    No narrative on file       Current Medications  Current Outpatient Prescriptions on File Prior to Visit   Medication Sig Dispense Refill    atorvastatin (LIPITOR) 80 MG  "tablet Take 1 tablet (80 mg total) by mouth once daily. 90 tablet 3    clopidogrel (PLAVIX) 75 mg tablet Take 1 tablet (75 mg total) by mouth once daily. 30 tablet 11    lisinopril (PRINIVIL,ZESTRIL) 2.5 MG tablet Take 1 tablet (2.5 mg total) by mouth once daily. 90 tablet 3    metoprolol succinate (TOPROL-XL) 50 MG 24 hr tablet Take 1 tablet (50 mg total) by mouth once daily. 30 tablet 11    nicotine, polacrilex, (NICORETTE) 2 mg Gum 1-2 per hour in place of cigarettes maximum of 15 per day. 220 each 0    nitroGLYCERIN (NITROSTAT) 0.4 MG SL tablet Place 1 tablet (0.4 mg total) under the tongue every 5 (five) minutes as needed. 25 tablet 11    aspirin (ECOTRIN) 81 MG EC tablet Take 1 tablet (81 mg total) by mouth once daily.  0    isosorbide mononitrate (IMDUR) 30 MG 24 hr tablet Take 1 tablet (30 mg total) by mouth once daily. 30 tablet 11     No current facility-administered medications on file prior to visit.        Allergies   Review of patient's allergies indicates:  No Known Allergies    Review of Systems   Constitutional: Negative for chills and fever.   Respiratory: Negative for shortness of breath.    Cardiovascular: Positive for chest pain (as per HPI). Negative for palpitations and leg swelling.   Genitourinary: Negative for dysuria.   Musculoskeletal: Positive for arthralgias.   Neurological: Negative for dizziness, light-headedness and headaches.       /78 (BP Location: Left arm, Patient Position: Sitting, BP Method: Medium (Manual))   Pulse 75   Temp 97.6 °F (36.4 °C) (Oral)   Resp 17   Ht 5' 6" (1.676 m)   Wt 62.1 kg (136 lb 14.5 oz)   SpO2 97%   BMI 22.10 kg/m²     Physical Exam   Constitutional: He appears well-developed and well-nourished.   HENT:   Head: Normocephalic.   Right Ear: External ear normal.   Left Ear: External ear normal.   Nose: Nose normal.   Mouth/Throat: No oropharyngeal exudate.   Neck: Normal range of motion. Neck supple. No tracheal deviation present. "   Cardiovascular: Normal rate, regular rhythm, normal heart sounds and intact distal pulses.    No murmur heard.  Pulmonary/Chest: Effort normal and breath sounds normal. He has no wheezes. He has no rales.   Abdominal: Soft. Bowel sounds are normal. He exhibits no mass. There is no tenderness.   Musculoskeletal: He exhibits no edema.        Right shoulder: He exhibits tenderness. He exhibits normal range of motion, no swelling and no deformity.        Left shoulder: He exhibits tenderness. He exhibits normal range of motion, no swelling and no deformity.   Lymphadenopathy:     He has no cervical adenopathy.   Skin: He is not diaphoretic.   Vitals reviewed.      Assessment/Plan:  Sharath was seen today for chest pain and shoulder pain.    Diagnoses and all orders for this visit:    Chronic pain of both shoulders  -     X-Ray Shoulder Complete Bilateral; Future  Will check x-ray of shoulders.  Will review results next week.    Gastroesophageal reflux disease, esophagitis presence not specified  -     famotidine (PEPCID) 20 MG tablet; Take 1 tablet (20 mg total) by mouth once daily.  Patient's symptoms seem consistent with acid reflux.  No reported concerns for worsening heart disease, such as shortness of breath, dyspnea on exertion, orthopnea.  Will start patient on an H2 blocker and re-evaluate next week.  Not using a proton pump inhibitor as patient is on Plavix.    Coronary artery disease involving native coronary artery of native heart without angina pectoris  Discussed with patient that Imdur is ready for pickup at the pharmacy.  He will start that today.  He is to continue his Plavix.    Hypertension, essential  Blood pressure controlled.    Chronic systolic heart failure  Stable.

## 2018-07-09 NOTE — PROGRESS NOTES
Individual Follow-Up Form    7/9/2018    Quit Date: 3/23/18    Clinical Status of Patient: Outpatient    Length of Service: 45 minutes    Continuing Medication: yes  Patches    Other Medications: none     Target Symptoms: Withdrawal and medication side effects. The following were  rated moderate (3) to severe (4) on TCRS:  · Moderate (3): muscular pain in shoulder , crave  · Severe (4): none    Comments:  Patient reports pain in both shoulders and was concerned it was from patch , his PCP is aware and has obtain x-rays and but states he feels it is arthritic pain.  Discussed with patient it is great time to wean down to 14 mg patches and to use them on his torso changing location daily and not to repeat the same placement for a week. Patient feels this is acceptable and will call if any further problems. Reviewed coping strategies/habitual behavior/relapse prevention with patient. Congratulated patient on his success and encouraged him to keep up the great work.l     Diagnosis: F17.200    Next Visit: 2 weeks

## 2018-07-17 ENCOUNTER — OFFICE VISIT (OUTPATIENT)
Dept: FAMILY MEDICINE | Facility: CLINIC | Age: 68
End: 2018-07-17
Payer: MEDICARE

## 2018-07-17 VITALS
BODY MASS INDEX: 22.32 KG/M2 | TEMPERATURE: 98 F | OXYGEN SATURATION: 100 % | RESPIRATION RATE: 12 BRPM | SYSTOLIC BLOOD PRESSURE: 124 MMHG | HEIGHT: 66 IN | WEIGHT: 138.88 LBS | HEART RATE: 66 BPM | DIASTOLIC BLOOD PRESSURE: 75 MMHG

## 2018-07-17 DIAGNOSIS — K21.9 GASTROESOPHAGEAL REFLUX DISEASE, ESOPHAGITIS PRESENCE NOT SPECIFIED: ICD-10-CM

## 2018-07-17 DIAGNOSIS — M19.012 PRIMARY OSTEOARTHRITIS OF BOTH SHOULDERS: Primary | ICD-10-CM

## 2018-07-17 DIAGNOSIS — M19.011 PRIMARY OSTEOARTHRITIS OF BOTH SHOULDERS: Primary | ICD-10-CM

## 2018-07-17 DIAGNOSIS — I10 HYPERTENSION, ESSENTIAL: ICD-10-CM

## 2018-07-17 DIAGNOSIS — I25.10 CORONARY ARTERY DISEASE INVOLVING NATIVE CORONARY ARTERY OF NATIVE HEART WITHOUT ANGINA PECTORIS: ICD-10-CM

## 2018-07-17 PROCEDURE — 3074F SYST BP LT 130 MM HG: CPT | Mod: CPTII,S$GLB,, | Performed by: FAMILY MEDICINE

## 2018-07-17 PROCEDURE — 3078F DIAST BP <80 MM HG: CPT | Mod: CPTII,S$GLB,, | Performed by: FAMILY MEDICINE

## 2018-07-17 PROCEDURE — 99999 PR PBB SHADOW E&M-EST. PATIENT-LVL III: CPT | Mod: PBBFAC,,, | Performed by: FAMILY MEDICINE

## 2018-07-17 PROCEDURE — 99214 OFFICE O/P EST MOD 30 MIN: CPT | Mod: S$GLB,,, | Performed by: FAMILY MEDICINE

## 2018-07-17 NOTE — PROGRESS NOTES
Routine Office Visit    Patient Name: Sharath Huizar    : 1950  MRN: [id number]    Subjective:  Sharath is a 67 y.o. male who presents today for:   Chief Complaint   Patient presents with    Results       67-year-old male with hypertension, coronary artery disease, gastroesophageal reflux disease, and history of myocardial infarction comes in a for re-evaluation of a bilateral shoulder pain.  He continues to have shoulder pain.  He reports no improvement in the pain. He had bilateral shoulder x-rays last week.   Last week, he was also started on Pepcid for gastroesophageal reflux disease.  He reports that his symptoms are much improved.  He was started on Pepcid instead of a PPI because he is on Plavix.    Past Medical History  Past Medical History:   Diagnosis Date    Coronary artery disease     Hypertension     Myocardial infarction        Past Surgical History  Past Surgical History:   Procedure Laterality Date    CARDIAC CATHETERIZATION      with 4 stents    LEFT HEART CATHETERIZATION Left 2018    Procedure: Left heart cath;  Surgeon: Viral Lombardi MD;  Location: [facility] CATH LAB;  Service: Cardiology;  Laterality: Left;  RN PREOP 18        Family History  Family History   Problem Relation Age of Onset    No Known Problems Mother     No Known Problems Father     No Known Problems Sister     No Known Problems Brother     No Known Problems Maternal Aunt     No Known Problems Maternal Uncle     No Known Problems Paternal Aunt     No Known Problems Paternal Uncle     No Known Problems Maternal Grandmother     No Known Problems Maternal Grandfather     No Known Problems Paternal Grandmother     No Known Problems Paternal Grandfather     Amblyopia Neg Hx     Blindness Neg Hx     Cancer Neg Hx     Cataracts Neg Hx     Diabetes Neg Hx     Glaucoma Neg Hx     Hypertension Neg Hx     Macular degeneration Neg Hx     Retinal detachment Neg Hx     Strabismus Neg Hx     Stroke Neg  Hx     Thyroid disease Neg Hx        Social History  Social History     Social History    Marital status:      Spouse name: N/A    Number of children: N/A    Years of education: N/A     Occupational History    Not on file.     Social History Main Topics    Smoking status: Former Smoker     Types: Cigarettes     Quit date: 3/23/2018    Smokeless tobacco: Never Used    Alcohol use No    Drug use: No    Sexual activity: Yes     Partners: Female     Other Topics Concern    Not on file     Social History Narrative    No narrative on file       Current Medications  Current Outpatient Prescriptions on File Prior to Visit   Medication Sig Dispense Refill    aspirin (ECOTRIN) 81 MG EC tablet Take 1 tablet (81 mg total) by mouth once daily.  0    atorvastatin (LIPITOR) 80 MG tablet Take 1 tablet (80 mg total) by mouth once daily. 90 tablet 3    clopidogrel (PLAVIX) 75 mg tablet Take 1 tablet (75 mg total) by mouth once daily. 30 tablet 11    famotidine (PEPCID) 20 MG tablet Take 1 tablet (20 mg total) by mouth once daily. 30 tablet 1    isosorbide mononitrate (IMDUR) 30 MG 24 hr tablet Take 1 tablet (30 mg total) by mouth once daily. 30 tablet 11    lisinopril (PRINIVIL,ZESTRIL) 2.5 MG tablet Take 1 tablet (2.5 mg total) by mouth once daily. 90 tablet 3    metoprolol succinate (TOPROL-XL) 50 MG 24 hr tablet Take 1 tablet (50 mg total) by mouth once daily. 30 tablet 11    nicotine (NICODERM CQ) 14 mg/24 hr Place 1 patch onto the skin once daily. for 14 days 14 patch 0    nicotine, polacrilex, (NICORETTE) 2 mg Gum 1-2 per hour in place of cigarettes maximum of 15 per day. 220 each 0    nitroGLYCERIN (NITROSTAT) 0.4 MG SL tablet Place 1 tablet (0.4 mg total) under the tongue every 5 (five) minutes as needed. 25 tablet 11     No current facility-administered medications on file prior to visit.        Allergies   Review of patient's allergies indicates:  No Known Allergies    Review of Systems  "  Constitutional: Negative for chills and fever.   Respiratory: Negative for shortness of breath.    Cardiovascular: Negative for chest pain, palpitations and leg swelling.   Gastrointestinal: Negative for abdominal pain, blood in stool, constipation, diarrhea and nausea.   Genitourinary: Negative for dysuria.   Musculoskeletal: Positive for arthralgias.   Neurological: Negative for dizziness, light-headedness and headaches.       /75 (BP Location: Left arm, Patient Position: Sitting, BP Method: Medium (Manual))   Pulse 66   Temp 97.6 °F (36.4 °C) (Oral)   Resp 12   Ht 5' 6" (1.676 m)   Wt 63 kg (138 lb 14.2 oz)   SpO2 100%   BMI 22.42 kg/m²     Physical Exam   Constitutional: He appears well-developed and well-nourished.   HENT:   Head: Normocephalic.   Right Ear: External ear normal.   Left Ear: External ear normal.   Nose: Nose normal.   Mouth/Throat: No oropharyngeal exudate.   Neck: Normal range of motion. Neck supple. No tracheal deviation present.   Cardiovascular: Normal rate, regular rhythm, normal heart sounds and intact distal pulses.    No murmur heard.  Pulmonary/Chest: Effort normal and breath sounds normal. He has no wheezes. He has no rales.   Abdominal: Soft. Bowel sounds are normal. He exhibits no mass. There is no tenderness.   Musculoskeletal: He exhibits no edema.        Right shoulder: He exhibits tenderness. He exhibits normal range of motion, no swelling and no deformity.        Left shoulder: He exhibits tenderness. He exhibits normal range of motion, no swelling and no deformity.   Lymphadenopathy:     He has no cervical adenopathy.   Skin: He is not diaphoretic.   Vitals reviewed.      Assessment/Plan:  Sharath was seen today for results.    Diagnoses and all orders for this visit:    Primary osteoarthritis of both shoulders  -     Ambulatory Referral to Physical/Occupational Therapy  -     Ambulatory referral to Orthopedics  Will refer patient to physical therapy.  If not " improved patient to follow up with Orthopedics.    Gastroesophageal reflux disease, esophagitis presence not specified  Continue Pepcid.    Hypertension, essential  Will control continue current regimen.    Coronary artery disease involving native coronary artery of native heart without angina pectoris  Continue Cardio recommendations.          This office note has been dictated.  This dictation has been generated using M-Modal Fluency Direct dictation; some phonetic errors may occur.

## 2018-07-17 NOTE — LETTER
July 17, 2018    Sharath Huizar  [address] [place] [address]             [address]  [place] [address]  Phone: [phone] To whom it  May concern;    Mr. Sharath Huizar is a patient of mine. He was seen in this office on July 09, 2018 and July 17, 2018. He has a history of a myocardial infarction, has hypertension, gastroesophageal reflux, and osteoarthritis of his shoulders.    Sincerely,        Wilmer Bangura Jr., MD

## 2018-07-24 ENCOUNTER — CLINICAL SUPPORT (OUTPATIENT)
Dept: REHABILITATION | Facility: HOSPITAL | Age: 68
End: 2018-07-24
Attending: FAMILY MEDICINE
Payer: MEDICARE

## 2018-07-24 DIAGNOSIS — M25.619 DECREASED RANGE OF MOTION OF SHOULDER, UNSPECIFIED LATERALITY: ICD-10-CM

## 2018-07-24 DIAGNOSIS — M25.511 CHRONIC PAIN OF BOTH SHOULDERS: ICD-10-CM

## 2018-07-24 DIAGNOSIS — R29.898 WEAKNESS OF SHOULDER: ICD-10-CM

## 2018-07-24 DIAGNOSIS — G89.29 CHRONIC PAIN OF BOTH SHOULDERS: ICD-10-CM

## 2018-07-24 DIAGNOSIS — M25.512 CHRONIC PAIN OF BOTH SHOULDERS: ICD-10-CM

## 2018-07-24 PROCEDURE — G8978 MOBILITY CURRENT STATUS: HCPCS | Mod: CL,PN

## 2018-07-24 PROCEDURE — G8979 MOBILITY GOAL STATUS: HCPCS | Mod: CK,PN

## 2018-07-24 PROCEDURE — 97110 THERAPEUTIC EXERCISES: CPT | Mod: PN

## 2018-07-24 PROCEDURE — 97161 PT EVAL LOW COMPLEX 20 MIN: CPT | Mod: PN

## 2018-07-24 NOTE — PLAN OF CARE
Physical Therapy Evaluation    Name: Sharath Huizar  [facility] Number: [id number]        Diagnosis:   Encounter Diagnoses   Name Primary?    Chronic pain of both shoulders     Decreased range of motion of shoulder, unspecified laterality     Weakness of shoulder      Physician: Wilmer Bangura Jr., MD  Treatment Orders: PT Eval and Treat    Past Medical History:   Diagnosis Date    Coronary artery disease     Hypertension     Myocardial infarction      Current Outpatient Prescriptions   Medication Sig    aspirin (ECOTRIN) 81 MG EC tablet Take 1 tablet (81 mg total) by mouth once daily.    atorvastatin (LIPITOR) 80 MG tablet Take 1 tablet (80 mg total) by mouth once daily.    clopidogrel (PLAVIX) 75 mg tablet Take 1 tablet (75 mg total) by mouth once daily.    famotidine (PEPCID) 20 MG tablet Take 1 tablet (20 mg total) by mouth once daily.    isosorbide mononitrate (IMDUR) 30 MG 24 hr tablet Take 1 tablet (30 mg total) by mouth once daily.    lisinopril (PRINIVIL,ZESTRIL) 2.5 MG tablet Take 1 tablet (2.5 mg total) by mouth once daily.    metoprolol succinate (TOPROL-XL) 50 MG 24 hr tablet Take 1 tablet (50 mg total) by mouth once daily.    nicotine, polacrilex, (NICORETTE) 2 mg Gum 1-2 per hour in place of cigarettes maximum of 15 per day.    nitroGLYCERIN (NITROSTAT) 0.4 MG SL tablet Place 1 tablet (0.4 mg total) under the tongue every 5 (five) minutes as needed.     No current facility-administered medications for this visit.      Review of patient's allergies indicates:  No Known Allergies    Evaluation Date: 7/24/2018  Visit # authorized:   Authorization period:   To Vendor Referred By By Location/POS By Department   none Wilmer Bangura Jr., MD [facility]/ INTERNAL MED   Priority Start Date Expiration Date Referral Entered By   Routine 07/17/2018 07/17/2019 Wilmer Bangura Jr., MD   Visits Requested Visits Authorized Visits Completed Visits Scheduled   1 1 0 1         Time  start: 8:06 am  Time end: 9:00 am  Total min: 54 am    Subjective     Sharath is a 67 y.o. male that presents to [facility] outpatient clinic secondary to Primary osteoarthritis of both shoulders. Onset of pain march 2018.  JOSE MIGUEL: Pt denies any trauma.  Pt states that his shoulder pain began after having heart attack. Pt states both shoulder pain radiates towards elbows. Pt describe shoulder pain as sharp pain. Pt describe shoulder pain lateral side. Aggravates: lying down on sides and  objects . Easing pain; rubbing with alcohol.  Pt states he had heart attack this Year but he is healthy otherwise.     Right  hand dominant    X-rays from epic 7/9/2018  FINDINGS:  The osseous structures appear intact without evidence of a displaced fracture or osseous destructive lesion.  No evidence of dislocation.    Mild degenerative change of the AC joints, right more than left.  Advanced degenerative change at the glenohumeral joints.  No calcifications to suggest tendonitis.    Atherosclerotic calcification at the level of the aortic arch.    Precautions:   Patient c/o: continuous/intermittent symptoms  Radicular symptoms: both lateral arm   Onset:: insidious/sudden/gradual   Pain Scale: Rates pain on a scale of 0-10 to be 9 at worst;  6 currently;  4 at best .  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  Patient's goals: Pt's goals are decrease shoulder pain.     Objective     Observation:     Posture Alignment: slouched posture;   Shoulder rotation at rest: protracted     Sensation: Light touch: intact to light touch    DTR: intact to light touch    GAIT DEVIATIONS: Sharath displays No major deviation      Cervical Range of Motion:    Degrees Pain   Flexion WFL No pain     Extension WFL No pain     Right Rotation WFL No pain     Left Rotation WFL No pain     Right Side Bending WFL No pain   Left Side Bending WFL No pain     Cervical Special Tests:  Compression: negative  Spurling's:  negative    Passive Range of Motion (degrees):   Shoulder Right Left   Flexion 145 153   Abduction 80 120   ER  45 53   IR  WNL WNL      Active Range of Motion in  (degrees):   Shoulder Right Left   Flexion 108 145   Abduction 70 105   ER 40 50   IR  WFL WFL     Upper Extremity Strength  (R) UE  (L) UE    Elbow flexion: 4+/5 Elbow flexion: 4+/5   Elbow extension: 4+/5 Elbow extension: 4+/5   Shoulder elevation: 5/5 Shoulder elevation: 5/5   Shoulder flexion: 4/5 Shoulder flexion: 4/5   Shoulder Abduction: 3+/5 Shoulder abduction: 4-/5   Shoulder ER 4-/5 Shoulder ER 4-/5   Shoulder IR 4-/5 Shoulder IR 4-/5       Special Tests:    Impingement   Right Left   Neer's  positive  positive   Welsh-Tru  positive  positive     Rotator Cuff:     Right Left   Drop Arm Test  negative  negative   Subscapularis Lift Off Test  negative  negative   ER Lag Sign  negative  negative   IR Lag Sign  negative  negative   Empty Can Test  positive positive   Full Can Test positive positive   Scapular Retraction Test positive positive     Labrum:     Right Left   Limestone's Test negative negative       AC Joint/Biceps:     Right Left   AC Joint Compression Test negative negative   Speed's test negative negative         Palpation: Lateral Subacromial space , Anterior subacromial Space and AC joint increase in pain    Scapula: protracted    Flexibility:     CMS Impairment/Limitation/Restriction for FOTO Shoulder Survey  Status Limitation G-Code CMS Severity Modifier  Intake 39% 61% Current Status CL - At least 60 percent but less than 80 percent  Predicted 65% 35% Goal Status+ CJ - At least 20 percent but less than 40 percent  D/C Status CL **only report if  this is a one time visit  +Based on FOTO predicted change score    PT Evaluation Completed? Yes  Discussed Plan of Care with patient: Yes    TREATMENT:  Sharath received therapeutic exercises to develop strength and endurance, flexibility for 10 minutes including:see exercise sheet   Scapular retraction   Standing shoulder extension YTB   Standing shoulder retraction YTB    Sharath  received the following manual therapy techniques x 0 min. To include Joint mobilizations and Soft tissue Mobilization were applied to the: N/A To include: N/A     Pt. Received cold pack x 0 min. To N/A. Following treatment.    HEP provided:   Instructed pt. Regarding: Proper technique with all exercises. Pt demo good understanding of the education provided. Sharath demonstrated good return demonstration of activities.      Assessment     This is a 67 y.o. male referred to outpatient physical therapy and presents with a medical diagnosis of B shoulder  and demonstrates limitations as described in the problem list. Pt will benefit from physcial therapy services in order to maximize pain free and/or functional use of bilateral shoulder. Pt presented with bilaterally shoulder pain with left shoulder pain greater than right. Pt demonstrated shoulder pain to be at AC joint during shoulder flexion, abd, and external rotation. Pt has increase in pain during shoulder flexion, abduction and elevation. Pt has poor scapulothoracic rhythm. Pt has poor posture with slouched shoulder and protracted scapular. Pt will benefit from skilled PT services to decrease functional limitation and pain. The following goals were discussed with the patient and patient is in agreement with them as to be addressed in the treatment plan. Pt was given a HEP consisting of strengthening. Pt verbally understood the instructions as they were given and demonstrated proper form and technique during therapy. Pt was advised to perform these exercises free of pain, and to stop  performing them if pain occurs.     History  Co-morbidities and personal factors that may impact the plan of care Examination  Body Structures and Functions, activity limitations and participation restrictions that may impact the plan of care    Clinical Presentation   Co-morbidities:   Hx of heart attack        Personal Factors:   no deficits Body Regions:   Shoulder     Body Systems:    ROM  strength  motor control            Participation Restrictions:   Community activities     Activity limitations:   Learning and applying knowledge  no deficits    General Tasks and Commands  no deficits    Communication  no deficits    Mobility  lifting and carrying objects  walking    Self care  no deficits    Domestic Life  no deficits    Interactions/Relationships  no deficits    Life Areas  no deficits    Community and Social Life  community life  recreation and leisure         stable and uncomplicated                      Complexity: low  FOTO see above     Prognosis: Good    Medical necessity is demonstrated by the following IMPAIRMENTS/PROBLEM LIST:   1)Increase in pain level limiting function   2)Decrease muscle strength    3)Decrease ROM   4) Impaired posture   5)Lack of HEP                  Anticipated Barriers for physical therapy: N/a     GOALS: Short Term Goals: 2-3 weeks  1.Report decreased in pain at worse less than  <   / =  5  /10  to increase tolerance for functional mobility.  2. Pt to improve B shoulder range of motion by 15% to allow for improved functional mobility to allow for improvement in IADLs.   3. Increased B shoulder  MMT 1/2 grade to increase tolerance for ADL and work activities.  4. Pt to report be conscious of impaired sitting and standing posture daily to decrease pain   5. Pt to tolerate HEP to improve ROM and independence with ADL's    Long Term Goals: 6-8 weeks  1.Report decreased in pain at worse less than  <   / =  0 or 1  /10  to increase tolerance for functional mobility  2.  Patient  will demonstrate improved overall function per FOTO Survey to CJ = at least 20% but < 40% impaired, limited or restricted score or less.  3.Increased B shoulder  MMT 1 grade to increase tolerance for ADL and work activities.  4. Pt to report and demonstrate proper posture in standing and sitting to decrease pain  5. Pt to be Independent with HEP to improve ROM and independence with ADL's.  6. Pt to improve range of motion by 50% to allow for improved functional mobility to allow for improvement in IADLs.       Plan     Pt will be treated by physical therapy 1-2 times a week for 6-8 weeks for Pt Education, HEP, therapeutic exercises, neuromuscular re-education, joint mobilizations, Iontophoresis with dexamethasone sodium phosphate, functional dry needling, manual therapy, taping techniques and modalities prn to achieve established goals. Sharath may at times be seen by a PTA as part of the Rehab Team.     Cont PT for  6-8 weeks.     Certification date:7/24/2018 to 10/24/2018     I certify the need for these services furnished under this plan of treatment and while under my care.______________________________ Physician/Referring Practitioner  Date of Signature    Pio Saez PT, DPT  Date:7/24/2018

## 2018-07-25 ENCOUNTER — CLINICAL SUPPORT (OUTPATIENT)
Dept: SMOKING CESSATION | Facility: CLINIC | Age: 68
End: 2018-07-25
Payer: COMMERCIAL

## 2018-07-25 DIAGNOSIS — F17.200 NICOTINE DEPENDENCE: Primary | ICD-10-CM

## 2018-07-25 PROCEDURE — 99999 PR PBB SHADOW E&M-EST. PATIENT-LVL I: CPT | Mod: PBBFAC,,,

## 2018-07-25 PROCEDURE — 99404 PREV MED CNSL INDIV APPRX 60: CPT | Mod: S$GLB,,,

## 2018-07-25 RX ORDER — IBUPROFEN 200 MG
1 TABLET ORAL DAILY
Qty: 14 PATCH | Refills: 0 | Status: SHIPPED | OUTPATIENT
Start: 2018-07-25 | End: 2018-08-01 | Stop reason: SDUPTHER

## 2018-07-25 NOTE — PROGRESS NOTES
Individual Follow-Up Form    7/25/2018    Quit Date: 3/23/18    Clinical Status of Patient: Outpatient    Length of Service: 60 minutes    Continuing Medication: yes  Patches or Nicotine gum    Other Medications:      Target Symptoms: Withdrawal and medication side effects. The following were  rated moderate (3) to severe (4) on TCRS:  · Moderate (3): crave  · Severe (4): none    Comments: Pt seen in office today. Patient is tobacco free . Pt remains on tobacco cessation medication of  14 mg nicotine patch QD and 2 mg nicotine lozenge PRN (1-2 per hour in place of cigarettes). No adverse effects/mental changes noted at this time. Reviewed coping strategies/habitual behavior/relapse prevention with patient. Exhaled carbon monoxide level was 2 ppm per Smokerlyzer (non- smoker = 0-5 ppm.) Will see pt back in office in 2 weeks.        Diagnosis: F17.200    Next Visit: 2 weeks

## 2018-07-31 ENCOUNTER — HOSPITAL ENCOUNTER (OUTPATIENT)
Dept: CARDIOLOGY | Facility: HOSPITAL | Age: 68
Discharge: HOME | End: 2018-07-31
Attending: INTERNAL MEDICINE
Payer: MEDICARE

## 2018-07-31 DIAGNOSIS — I21.4 NSTEMI (NON-ST ELEVATED MYOCARDIAL INFARCTION): ICD-10-CM

## 2018-07-31 DIAGNOSIS — I50.21 ACUTE SYSTOLIC HEART FAILURE: ICD-10-CM

## 2018-07-31 DIAGNOSIS — I25.10 CORONARY ARTERY DISEASE INVOLVING NATIVE CORONARY ARTERY OF NATIVE HEART WITHOUT ANGINA PECTORIS: ICD-10-CM

## 2018-07-31 DIAGNOSIS — I16.0 HYPERTENSIVE URGENCY: ICD-10-CM

## 2018-07-31 LAB
DIASTOLIC DYSFUNCTION: YES
ESTIMATED PA SYSTOLIC PRESSURE: 13.24
MITRAL VALVE REGURGITATION: ABNORMAL
RETIRED EF AND QEF - SEE NOTES: 30 (ref 55–65)
TRICUSPID VALVE REGURGITATION: ABNORMAL

## 2018-07-31 PROCEDURE — 93306 TTE W/DOPPLER COMPLETE: CPT | Mod: 26,,, | Performed by: INTERNAL MEDICINE

## 2018-07-31 PROCEDURE — 93306 TTE W/DOPPLER COMPLETE: CPT

## 2018-08-01 ENCOUNTER — CLINICAL SUPPORT (OUTPATIENT)
Dept: SMOKING CESSATION | Facility: CLINIC | Age: 68
End: 2018-08-01
Payer: COMMERCIAL

## 2018-08-01 ENCOUNTER — CLINICAL SUPPORT (OUTPATIENT)
Dept: REHABILITATION | Facility: HOSPITAL | Age: 68
End: 2018-08-01
Attending: FAMILY MEDICINE
Payer: MEDICARE

## 2018-08-01 ENCOUNTER — OFFICE VISIT (OUTPATIENT)
Dept: CARDIOLOGY | Facility: CLINIC | Age: 68
End: 2018-08-01
Payer: MEDICARE

## 2018-08-01 VITALS
HEIGHT: 66 IN | HEART RATE: 90 BPM | DIASTOLIC BLOOD PRESSURE: 87 MMHG | BODY MASS INDEX: 22.32 KG/M2 | OXYGEN SATURATION: 97 % | WEIGHT: 138.88 LBS | SYSTOLIC BLOOD PRESSURE: 168 MMHG

## 2018-08-01 DIAGNOSIS — I10 HYPERTENSION, ESSENTIAL: ICD-10-CM

## 2018-08-01 DIAGNOSIS — R29.898 WEAKNESS OF SHOULDER: ICD-10-CM

## 2018-08-01 DIAGNOSIS — R06.09 DOE (DYSPNEA ON EXERTION): ICD-10-CM

## 2018-08-01 DIAGNOSIS — I21.4 NSTEMI (NON-ST ELEVATED MYOCARDIAL INFARCTION): Primary | ICD-10-CM

## 2018-08-01 DIAGNOSIS — I50.22 CHRONIC SYSTOLIC HEART FAILURE: ICD-10-CM

## 2018-08-01 DIAGNOSIS — G89.29 CHRONIC PAIN OF BOTH SHOULDERS: ICD-10-CM

## 2018-08-01 DIAGNOSIS — F17.200 NICOTINE DEPENDENCE: ICD-10-CM

## 2018-08-01 DIAGNOSIS — M25.619 DECREASED RANGE OF MOTION OF SHOULDER, UNSPECIFIED LATERALITY: ICD-10-CM

## 2018-08-01 DIAGNOSIS — M25.511 CHRONIC PAIN OF BOTH SHOULDERS: ICD-10-CM

## 2018-08-01 DIAGNOSIS — M25.512 CHRONIC PAIN OF BOTH SHOULDERS: ICD-10-CM

## 2018-08-01 DIAGNOSIS — I25.10 CORONARY ARTERY DISEASE INVOLVING NATIVE CORONARY ARTERY OF NATIVE HEART WITHOUT ANGINA PECTORIS: ICD-10-CM

## 2018-08-01 PROCEDURE — 99403 PREV MED CNSL INDIV APPRX 45: CPT | Mod: S$GLB,,,

## 2018-08-01 PROCEDURE — 99214 OFFICE O/P EST MOD 30 MIN: CPT | Mod: S$GLB,,, | Performed by: INTERNAL MEDICINE

## 2018-08-01 PROCEDURE — 99999 PR PBB SHADOW E&M-EST. PATIENT-LVL I: CPT | Mod: PBBFAC,,,

## 2018-08-01 PROCEDURE — 3079F DIAST BP 80-89 MM HG: CPT | Mod: CPTII,S$GLB,, | Performed by: INTERNAL MEDICINE

## 2018-08-01 PROCEDURE — 99999 PR PBB SHADOW E&M-EST. PATIENT-LVL III: CPT | Mod: PBBFAC,,, | Performed by: INTERNAL MEDICINE

## 2018-08-01 PROCEDURE — 3077F SYST BP >= 140 MM HG: CPT | Mod: CPTII,S$GLB,, | Performed by: INTERNAL MEDICINE

## 2018-08-01 PROCEDURE — 97110 THERAPEUTIC EXERCISES: CPT | Mod: PN

## 2018-08-01 RX ORDER — IBUPROFEN 200 MG
1 TABLET ORAL DAILY
Qty: 14 PATCH | Refills: 0 | Status: SHIPPED | OUTPATIENT
Start: 2018-08-01 | End: 2018-08-15

## 2018-08-01 NOTE — PROGRESS NOTES
Physical Therapy Daily Note     Name: Sharath andra  [facility] Number: [id number]  Diagnosis:   Encounter Diagnoses   Name Primary?    Chronic pain of both shoulders     Weakness of shoulder     Decreased range of motion of shoulder, unspecified laterality      Physician: Wilmer Bangura Jr., MD  Precautions: Standard   Visit #: 2 of 20  [facility] 12/31/2018  PTA Visit #: 0    Time In: 10:07 am  Time Out: 11:00 am  Total time:    53 (1:1 with PT for 30 min)    Subjective     Pt reports: that he had more pain in the right anterior shoulder by AC joint.  Pain Scale: Sharath rates pain on a scale of 0-10 to be 5 currently.    Objective     Sharath received individual therapeutic exercises to develop strength, endurance, ROM, flexibility and posture for 50 minutes including:    UBE 6 min  Supine pec stretch on half foam 2'   Supine shoulder flexion (white dowel)on half foam  3x10   Supine serratus punch on half foam 3x10   SL gator 1# 3x10   SL abd 1# 3x10   SL shoulder ER 2# 3x10  Scapular retraction YTB 3x10   Standing Shoulder extension YTB 3x10  Corner stretch 3x30''       Sharath received the following manual therapy techniques: Soft tissue Mobilization were applied to the: N/a for 3 minutes including:    Kinesio tape with Biofreeze applied to right Ac joint   for support and decrease pain using T-strip. Patient received education regarding appropriate care and removal of Kinesiotape. Patient instructed in proper removal techniques if skin irritation occurs.     Written Home Exercises Provided:   Pt demo good understanding of the education provided. Sharath demonstrated good return demonstration of activities.     Education provided re:  Sharath verbalized good understanding of education provided.   No spiritual or educational barriers to learning provided    Assessment     Patient tolerated initial therapy well with minor provocation in right shoulder pain during SL shoulder  ER. Pt presented with poor scapulothoracic rhythm. Pt demonstrated minor right scapular winging during exercises. Pt presented with  Periscapular muscle facisculation and fatigue after therapy. V/c's were required to perform exercises with proper form. Taping technique was apllied right AC joint with decrease in right shoulder pain during shoulder abd. Cont skilled Pt services to decrease functional limitations.   This is a 67 y.o. male referred to outpatient physical therapy and presents with a medical diagnosis of  B shoulder   and demonstrates limitations as described in the problem list. Pt prognosis is Good. Pt will continue to benefit from skilled outpatient physical therapy to address the deficits listed in the problem list, provide pt/family education and to maximize pt's level of independence in the home and community environment.     Goals as follows:  GOALS: Short Term Goals: 2-3 weeks  1.Report decreased in pain at worse less than  <   / =  5  /10  to increase tolerance for functional mobility.  2. Pt to improve B shoulder range of motion by 15% to allow for improved functional mobility to allow for improvement in IADLs.   3. Increased B shoulder  MMT 1/2 grade to increase tolerance for ADL and work activities.  4. Pt to report be conscious of impaired sitting and standing posture daily to decrease pain   5. Pt to tolerate HEP to improve ROM and independence with ADL's     Long Term Goals: 6-8 weeks  1.Report decreased in pain at worse less than  <   / =  0 or 1  /10  to increase tolerance for functional mobility  2.  Patient will demonstrate improved overall function per FOTO Survey to CJ = at least 20% but < 40% impaired, limited or restricted score or less.  3.Increased B shoulder  MMT 1 grade to increase tolerance for ADL and work activities.  4. Pt to report and demonstrate proper posture in standing and sitting to decrease pain  5. Pt to be Independent with HEP to improve ROM and independence with  ADL's.  6. Pt to improve range of motion by 50% to allow for improved functional mobility to allow for improvement in IADLs.      Plan     Continue with established Plan of Care towards PT goals.  Certification date:7/24/2018 to 10/24/2018   Therapist: Pio Mg, PT  8/1/2018

## 2018-08-01 NOTE — PROGRESS NOTES
Individual Follow-Up Form    8/1/2018    Quit Date: 3/23/18    Clinical Status of Patient: Outpatient    Length of Service: 45 minutes    Continuing Medication: yes  Patches    Other Medications:      Target Symptoms: Withdrawal and medication side effects. The following were  rated moderate (3) to severe (4) on TCRS:  · Moderate (3): none  · Severe (4): none    Comments: Pt seen in office today. Patient is tobacco free . Pt remains on tobacco cessation medication of  14 mg nicotine patch QD . No adverse effects/mental changes noted at this time. Reviewed coping strategies/habitual behavior/relapse prevention with patient. Exhaled carbon monoxide level was 4 ppm per Smokerlyzer (non- smoker = 0-5 ppm.) Will see pt back in office in 2 weeks.        Diagnosis: F17.200    Next Visit: 2 weeks

## 2018-08-01 NOTE — PROGRESS NOTES
Subjective:    Patient ID:  Sharath Huizar is a 67 y.o. male who presents for follow-up of Coronary Artery Disease      HPI        NSTEMI - 4 stents to RCA 3/23/18, HTN     5/29/18 [facility] - EDP 23, LAD small vessel - occluded mid, Cx - luminal irregularities, RCA stents widely patent     Admitted 3/23/18  Mr. Sharath Huizar is a 67 y.o. male with tobacco abuse who presents to [facility] ED with complaints of chest pain this morning.  He was at work when the pain started, and it was described as mid-epigastric with radiation into his abdomen, was sharp in quality, and was 10/10 in severity at its worst.  He reports some nausea but denies any vomiting.  He also had some diaphoresis but not any palpitations, shortness of breath, hemoptysis, nor any lower extremity pain or swelling.  There were no alleviating or exacerbating factors, and he has never had similar episodes in the past.  He denies any personal or family history of heart attacks.  He does admit to smoking 1 pack a day of cigarettes since his early 20's, and drinks about 2-3 beers a day.     68 y/o male admitted to ICU with NSTEMI.  Underwent LHC with JUANCARLOS x 4 to RCA. Residual LAD disease that will need to be addressed at a later date.  Remained on Aggrastat infusion, now off.  TTE showing new EF 35%. Patient euvolemic. Discharged with cardiology follow up.      Echo 7/31/18    1 - Moderately depressed left ventricular systolic function (EF 30-35%).     2 - Eccentric hypertrophy.     3 - Mild left ventricular enlargement.     4 - Moderate left atrial enlargement.     5 - Impaired LV relaxation, elevated LAP (grade 2 diastolic dysfunction).     6 - Trivial mitral regurgitation.     7 - Trivial tricuspid regurgitation.     8 - Trivial pulmonic regurgitation.      Echo 3/24/18    1 - Moderately depressed left ventricular systolic function (EF 35-40%) Severe inferior hypokinesis.     2 - Concentric hypertrophy.     3 - Impaired LV relaxation, normal LAP (grade 1  diastolic dysfunction).     4 - Trivial mitral regurgitation.     5 - Trivial tricuspid regurgitation.      3/24/18  PCI to the prox, mid, distal RCA with JUANCARLOS x 3  PCI to the PDA with JUANCARLOS x 1  Long mid LAD 75%     Stress test 4/11/18  LVEF: 28 %  Impression: ABNORMAL MYOCARDIAL PERFUSION  1. There is a large size fixed defect of severe intensity that extends from the base to the apical inferior wall of the left ventricle, consistent with myocardial injury. There is trivial nahid-injury ischemia.   2. Resting wall motion is physiologic.   3. There is resting LV dysfunction with a reduced ejection fraction of 28 %.      5/1/18 Imdur added and toprol increased for recurrent CP  5/15/18 Still with exertional CP - imdur gives him a HA  6/12/18 Stable angina. Gets MCKEON with fast walking    Still gets MCKEON after walking 2 blocks  Denies CP    Review of Systems   Constitution: Negative for decreased appetite.   HENT: Negative for ear discharge.    Eyes: Negative for blurred vision.   Endocrine: Negative for polyphagia.   Skin: Negative for nail changes.   Neurological: Negative for aphonia.   Psychiatric/Behavioral: Negative for hallucinations.        Objective:    Physical Exam   Constitutional: He is oriented to person, place, and time. He appears well-developed and well-nourished.   HENT:   Head: Normocephalic and atraumatic.   Eyes: Conjunctivae are normal. Pupils are equal, round, and reactive to light.   Neck: Normal range of motion. Neck supple.   Cardiovascular: Normal rate, normal heart sounds and intact distal pulses.    Pulmonary/Chest: Effort normal and breath sounds normal.   Abdominal: Soft. Bowel sounds are normal.   Musculoskeletal: Normal range of motion.   Neurological: He is alert and oriented to person, place, and time.   Skin: Skin is warm and dry.         Assessment:       1. NSTEMI (non-ST elevated myocardial infarction)    2. Hypertension, essential    3. Chronic systolic heart failure    4. Coronary  artery disease involving native coronary artery of native heart without angina pectoris    5. MCKEON (dyspnea on exertion)         Plan:       Echo with EF < 35%. I have recommended an AICD - he refuses at this time  Continue with medical Rx  OV 3 months

## 2018-08-07 ENCOUNTER — DOCUMENTATION ONLY (OUTPATIENT)
Dept: REHABILITATION | Facility: HOSPITAL | Age: 68
End: 2018-08-07

## 2018-08-09 ENCOUNTER — TELEPHONE (OUTPATIENT)
Dept: REHABILITATION | Facility: HOSPITAL | Age: 68
End: 2018-08-09

## 2018-08-13 NOTE — PROGRESS NOTES
Physical Therapy Daily Note     Name: Sharath Huizar  [facility] Number: [id number]  Diagnosis:   Encounter Diagnoses   Name Primary?    Chronic pain of both shoulders     Weakness of shoulder     Decreased range of motion of shoulder, unspecified laterality      Physician: Wilmer Bangura Jr., MD  Precautions: Standard   Visit #: 3 of 20  MCKEON 12/31/2018  PTA Visit #: 0    Time In: 10:00 am  Time Out: 11:00 am  Total time:    58 (1:1 with PT for 58 min)    Subjective     Pt reports: that both shoulder pain is about the same but pt states taping technique on right shoulder helped decrease pain. Pt state he still feel pain at right anterior shoulder by AC joint.   Pain Scale: Sharath rates pain on a scale of 0-10 to be 5 currently.    Objective     Perform both shoulders     Sharath received individual therapeutic exercises to develop strength, endurance, ROM, flexibility and posture for 55 minutes including:    UBE 6 min  Pulleys flexion and abduction 1x2'  Supine pec stretch on half foam 2'   Supine shoulder flexion (red dowel)on half foam  3x10   Supine serratus punch (red dowel) on half foam 3x10   SL gator 3# 1x10   SL abd 3# 1x10   SL shoulder ER 3# 1x10  Scapular retraction RTB 3x10   Standing Shoulder extension RTB 3x10  Corner stretch 3x30''   +Matric seated row 20# 3x10   +Standing shoulder IR RTB 2x10   +Standing shoulder ER RTB 2x10   +Standing shoulder abd YTB in front of mirror 3x10  +Standing shoulder flexion YTB in front of mirror 3x10  + wall cock 2x10      Sharath received the following manual therapy techniques: Soft tissue Mobilization were applied to the: both shoulders  for 3 minutes including:    Kinesio tape with Biofreeze applied to right Ac joint   for support and decrease pain using T-strip. Patient received education regarding appropriate care and removal of Kinesiotape. Patient instructed in proper removal techniques if skin irritation occurs.      Written Home Exercises Provided:   Pt demo good understanding of the education provided. Sharath demonstrated good return demonstration of activities.     Education provided re:  Sharath verbalized good understanding of education provided.   No spiritual or educational barriers to learning provided    Assessment     Patient tolerated initial therapy well with no provocation in both shoulders pain. Pt demonstrated periscapular muscle weakness. Periscapular fasciculation was noted while performing therapeutic exercises due to muscle weakness. V/c's and tactile cues required to correct proper form for exercises to muscle slouched shoulders. Pt responded well to AC joint taping technique to decrease pain. Cont skilled Pt services to improve periscapular muscle strength and decrease functional limitations.   This is a 67 y.o. male referred to outpatient physical therapy and presents with a medical diagnosis of  B shoulder   and demonstrates limitations as described in the problem list. Pt prognosis is Good. Pt will continue to benefit from skilled outpatient physical therapy to address the deficits listed in the problem list, provide pt/family education and to maximize pt's level of independence in the home and community environment.     Goals as follows:  GOALS: Short Term Goals: 2-3 weeks  1.Report decreased in pain at worse less than  <   / =  5  /10  to increase tolerance for functional mobility.  2. Pt to improve B shoulder range of motion by 15% to allow for improved functional mobility to allow for improvement in IADLs.   3. Increased B shoulder  MMT 1/2 grade to increase tolerance for ADL and work activities.  4. Pt to report be conscious of impaired sitting and standing posture daily to decrease pain   5. Pt to tolerate HEP to improve ROM and independence with ADL's     Long Term Goals: 6-8 weeks  1.Report decreased in pain at worse less than  <   / =  0 or 1  /10  to increase tolerance for functional  mobility  2.  Patient will demonstrate improved overall function per FOTO Survey to CJ = at least 20% but < 40% impaired, limited or restricted score or less.  3.Increased B shoulder  MMT 1 grade to increase tolerance for ADL and work activities.  4. Pt to report and demonstrate proper posture in standing and sitting to decrease pain  5. Pt to be Independent with HEP to improve ROM and independence with ADL's.  6. Pt to improve range of motion by 50% to allow for improved functional mobility to allow for improvement in IADLs.      Plan     Continue with established Plan of Care towards PT goals.  Certification date:7/24/2018 to 10/24/2018  PN:8/23/2018   Therapist: Pio Mg, PT  8/14/2018

## 2018-08-14 ENCOUNTER — CLINICAL SUPPORT (OUTPATIENT)
Dept: REHABILITATION | Facility: HOSPITAL | Age: 68
End: 2018-08-14
Attending: FAMILY MEDICINE
Payer: MEDICARE

## 2018-08-14 DIAGNOSIS — M25.619 DECREASED RANGE OF MOTION OF SHOULDER, UNSPECIFIED LATERALITY: ICD-10-CM

## 2018-08-14 DIAGNOSIS — M25.511 CHRONIC PAIN OF BOTH SHOULDERS: ICD-10-CM

## 2018-08-14 DIAGNOSIS — M25.512 CHRONIC PAIN OF BOTH SHOULDERS: ICD-10-CM

## 2018-08-14 DIAGNOSIS — R29.898 WEAKNESS OF SHOULDER: ICD-10-CM

## 2018-08-14 DIAGNOSIS — G89.29 CHRONIC PAIN OF BOTH SHOULDERS: ICD-10-CM

## 2018-08-14 PROCEDURE — 97110 THERAPEUTIC EXERCISES: CPT | Mod: PN

## 2018-08-15 ENCOUNTER — CLINICAL SUPPORT (OUTPATIENT)
Dept: SMOKING CESSATION | Facility: CLINIC | Age: 68
End: 2018-08-15
Payer: COMMERCIAL

## 2018-08-15 DIAGNOSIS — F17.200 NICOTINE DEPENDENCE: Primary | ICD-10-CM

## 2018-08-15 PROCEDURE — 99402 PREV MED CNSL INDIV APPRX 30: CPT | Mod: S$GLB,,,

## 2018-08-15 NOTE — PROGRESS NOTES
Individual Follow-Up Form    8/15/2018    Quit Date: 3/23/18    Clinical Status of Patient: Outpatient    Length of Service: 30 minutes    Continuing Medication: yes  Patches    Other Medications: nrt gum     Target Symptoms: Withdrawal and medication side effects. The following were  rated moderate (3) to severe (4) on TCRS:  · Moderate (3): none  · Severe (4): none    Comments: Pt seen in office today. Patient is tobacco free . Pt remains on tobacco cessation medication of  14 mg nicotine patch QD , he is now wearing the patch every second day to wean. . No adverse effects/mental changes noted at this time. Reviewed coping strategies/habitual behavior/relapse prevention with patient. Exhaled carbon monoxide level was 0 ppm per Smokerlyzer (non- smoker = 0-5 ppm.) Will see pt back in office in 2 weeks.        Diagnosis: F17.200    Next Visit: 2 weeks

## 2018-08-16 ENCOUNTER — DOCUMENTATION ONLY (OUTPATIENT)
Dept: REHABILITATION | Facility: HOSPITAL | Age: 68
End: 2018-08-16

## 2018-08-16 NOTE — PROGRESS NOTES
Physical Therapy       Sharath Huizar   MRN: [id number]          called pt reagarding no show to today's scheduled appt.  Pt states the He forgot.  This is the patients 3rd overall no show to scheduled appts.           Liz Orellana, PTA

## 2018-08-23 ENCOUNTER — DOCUMENTATION ONLY (OUTPATIENT)
Dept: REHABILITATION | Facility: HOSPITAL | Age: 68
End: 2018-08-23

## 2018-08-23 NOTE — PROGRESS NOTES
Pt spoke to pt over the phone. Pt states he cannot come him twice per week due to co-pay.  Pt stated he will be on therapy next week since he will have money to pay-copay. PT suggested therapy one time per week from now on. If pt does not show up next PT session, pt will be discharged due to poor compliance.

## 2018-09-06 ENCOUNTER — CLINICAL SUPPORT (OUTPATIENT)
Dept: SMOKING CESSATION | Facility: CLINIC | Age: 68
End: 2018-09-06
Payer: COMMERCIAL

## 2018-09-06 DIAGNOSIS — F17.200 NICOTINE DEPENDENCE: Primary | ICD-10-CM

## 2018-09-06 PROCEDURE — 99407 BEHAV CHNG SMOKING > 10 MIN: CPT | Mod: S$GLB,,,

## 2018-09-06 RX ORDER — NICOTINE 7MG/24HR
1 PATCH, TRANSDERMAL 24 HOURS TRANSDERMAL DAILY
Qty: 14 PATCH | Refills: 0 | Status: SHIPPED | OUTPATIENT
Start: 2018-09-06 | End: 2018-09-20

## 2018-09-11 ENCOUNTER — OFFICE VISIT (OUTPATIENT)
Dept: FAMILY MEDICINE | Facility: CLINIC | Age: 68
End: 2018-09-11
Payer: MEDICARE

## 2018-09-11 VITALS
HEIGHT: 66 IN | HEART RATE: 72 BPM | BODY MASS INDEX: 22.53 KG/M2 | SYSTOLIC BLOOD PRESSURE: 132 MMHG | RESPIRATION RATE: 16 BRPM | TEMPERATURE: 98 F | DIASTOLIC BLOOD PRESSURE: 78 MMHG | WEIGHT: 140.19 LBS | OXYGEN SATURATION: 96 %

## 2018-09-11 DIAGNOSIS — I25.10 CORONARY ARTERY DISEASE INVOLVING NATIVE CORONARY ARTERY OF NATIVE HEART WITHOUT ANGINA PECTORIS: ICD-10-CM

## 2018-09-11 DIAGNOSIS — I50.22 CHRONIC SYSTOLIC HEART FAILURE: ICD-10-CM

## 2018-09-11 DIAGNOSIS — M19.011 PRIMARY OSTEOARTHRITIS OF BOTH SHOULDERS: Primary | ICD-10-CM

## 2018-09-11 DIAGNOSIS — M19.012 PRIMARY OSTEOARTHRITIS OF BOTH SHOULDERS: Primary | ICD-10-CM

## 2018-09-11 DIAGNOSIS — I10 HYPERTENSION, ESSENTIAL: ICD-10-CM

## 2018-09-11 PROCEDURE — 99214 OFFICE O/P EST MOD 30 MIN: CPT | Mod: PBBFAC,PN | Performed by: FAMILY MEDICINE

## 2018-09-11 PROCEDURE — 3078F DIAST BP <80 MM HG: CPT | Mod: CPTII,,, | Performed by: FAMILY MEDICINE

## 2018-09-11 PROCEDURE — 1101F PT FALLS ASSESS-DOCD LE1/YR: CPT | Mod: CPTII,,, | Performed by: FAMILY MEDICINE

## 2018-09-11 PROCEDURE — 99999 PR PBB SHADOW E&M-EST. PATIENT-LVL IV: CPT | Mod: PBBFAC,,, | Performed by: FAMILY MEDICINE

## 2018-09-11 PROCEDURE — 3075F SYST BP GE 130 - 139MM HG: CPT | Mod: CPTII,,, | Performed by: FAMILY MEDICINE

## 2018-09-11 PROCEDURE — 99214 OFFICE O/P EST MOD 30 MIN: CPT | Mod: S$PBB,,, | Performed by: FAMILY MEDICINE

## 2018-09-11 RX ORDER — CLOPIDOGREL BISULFATE 75 MG/1
75 TABLET ORAL DAILY
Qty: 30 TABLET | Refills: 5 | Status: SHIPPED | OUTPATIENT
Start: 2018-09-11 | End: 2018-09-28 | Stop reason: SDUPTHER

## 2018-09-11 RX ORDER — ATORVASTATIN CALCIUM 80 MG/1
80 TABLET, FILM COATED ORAL DAILY
Qty: 30 TABLET | Refills: 5 | Status: SHIPPED | OUTPATIENT
Start: 2018-09-11 | End: 2018-09-28 | Stop reason: SDUPTHER

## 2018-09-11 RX ORDER — LISINOPRIL 2.5 MG/1
2.5 TABLET ORAL DAILY
Qty: 30 TABLET | Refills: 5 | Status: SHIPPED | OUTPATIENT
Start: 2018-09-11 | End: 2018-09-28 | Stop reason: SDUPTHER

## 2018-09-11 RX ORDER — METOPROLOL SUCCINATE 50 MG/1
50 TABLET, EXTENDED RELEASE ORAL DAILY
Qty: 30 TABLET | Refills: 5 | Status: SHIPPED | OUTPATIENT
Start: 2018-09-11 | End: 2018-09-28 | Stop reason: SDUPTHER

## 2018-09-17 ENCOUNTER — HOSPITAL ENCOUNTER (OUTPATIENT)
Dept: CARDIOLOGY | Facility: HOSPITAL | Age: 68
Discharge: HOME | End: 2018-09-17
Attending: INTERNAL MEDICINE
Payer: MEDICARE

## 2018-09-17 DIAGNOSIS — I50.21 ACUTE SYSTOLIC HEART FAILURE: ICD-10-CM

## 2018-09-17 DIAGNOSIS — R06.09 DOE (DYSPNEA ON EXERTION): ICD-10-CM

## 2018-09-17 DIAGNOSIS — I25.10 CORONARY ARTERY DISEASE INVOLVING NATIVE CORONARY ARTERY OF NATIVE HEART WITHOUT ANGINA PECTORIS: ICD-10-CM

## 2018-09-17 DIAGNOSIS — I21.4 NSTEMI (NON-ST ELEVATED MYOCARDIAL INFARCTION): ICD-10-CM

## 2018-09-17 LAB
DIASTOLIC DYSFUNCTION: YES
ESTIMATED PA SYSTOLIC PRESSURE: 22.89
MITRAL VALVE REGURGITATION: ABNORMAL
RETIRED EF AND QEF - SEE NOTES: 35 (ref 55–65)
TRICUSPID VALVE REGURGITATION: ABNORMAL

## 2018-09-17 PROCEDURE — 93306 TTE W/DOPPLER COMPLETE: CPT

## 2018-09-17 PROCEDURE — 93306 TTE W/DOPPLER COMPLETE: CPT | Mod: 26,,, | Performed by: INTERNAL MEDICINE

## 2018-09-28 DIAGNOSIS — I10 HYPERTENSION, ESSENTIAL: ICD-10-CM

## 2018-09-28 DIAGNOSIS — I25.10 CORONARY ARTERY DISEASE INVOLVING NATIVE CORONARY ARTERY OF NATIVE HEART WITHOUT ANGINA PECTORIS: ICD-10-CM

## 2018-09-28 RX ORDER — CLOPIDOGREL BISULFATE 75 MG/1
75 TABLET ORAL DAILY
Qty: 30 TABLET | Refills: 5 | Status: SHIPPED | OUTPATIENT
Start: 2018-09-28 | End: 2018-10-01 | Stop reason: SDUPTHER

## 2018-09-28 RX ORDER — METOPROLOL SUCCINATE 50 MG/1
50 TABLET, EXTENDED RELEASE ORAL DAILY
Qty: 30 TABLET | Refills: 5 | Status: SHIPPED | OUTPATIENT
Start: 2018-09-28 | End: 2018-10-01 | Stop reason: SDUPTHER

## 2018-09-28 RX ORDER — LISINOPRIL 2.5 MG/1
2.5 TABLET ORAL DAILY
Qty: 30 TABLET | Refills: 5 | Status: SHIPPED | OUTPATIENT
Start: 2018-09-28 | End: 2018-10-01 | Stop reason: SDUPTHER

## 2018-09-28 RX ORDER — ATORVASTATIN CALCIUM 80 MG/1
80 TABLET, FILM COATED ORAL DAILY
Qty: 30 TABLET | Refills: 5 | Status: SHIPPED | OUTPATIENT
Start: 2018-09-28 | End: 2018-10-01 | Stop reason: SDUPTHER

## 2018-09-28 RX ORDER — ISOSORBIDE MONONITRATE 30 MG/1
30 TABLET, EXTENDED RELEASE ORAL DAILY
Qty: 30 TABLET | Refills: 11 | Status: SHIPPED | OUTPATIENT
Start: 2018-09-28 | End: 2018-10-01 | Stop reason: SDUPTHER

## 2018-09-28 RX ORDER — NITROGLYCERIN 0.4 MG/1
0.4 TABLET SUBLINGUAL EVERY 5 MIN PRN
Qty: 25 TABLET | Refills: 11 | Status: SHIPPED | OUTPATIENT
Start: 2018-09-28 | End: 2018-10-01 | Stop reason: SDUPTHER

## 2018-10-01 DIAGNOSIS — I25.10 CORONARY ARTERY DISEASE INVOLVING NATIVE CORONARY ARTERY OF NATIVE HEART WITHOUT ANGINA PECTORIS: ICD-10-CM

## 2018-10-01 DIAGNOSIS — I10 HYPERTENSION, ESSENTIAL: ICD-10-CM

## 2018-10-01 RX ORDER — LISINOPRIL 2.5 MG/1
2.5 TABLET ORAL DAILY
Qty: 30 TABLET | Refills: 11 | Status: SHIPPED | OUTPATIENT
Start: 2018-10-01 | End: 2019-10-04 | Stop reason: SDUPTHER

## 2018-10-01 RX ORDER — NITROGLYCERIN 0.4 MG/1
0.4 TABLET SUBLINGUAL EVERY 5 MIN PRN
Qty: 25 TABLET | Refills: 11 | Status: SHIPPED | OUTPATIENT
Start: 2018-10-01 | End: 2019-10-04 | Stop reason: SDUPTHER

## 2018-10-01 RX ORDER — ATORVASTATIN CALCIUM 80 MG/1
80 TABLET, FILM COATED ORAL DAILY
Qty: 30 TABLET | Refills: 11 | Status: SHIPPED | OUTPATIENT
Start: 2018-10-01 | End: 2019-10-04 | Stop reason: SDUPTHER

## 2018-10-01 RX ORDER — METOPROLOL SUCCINATE 50 MG/1
50 TABLET, EXTENDED RELEASE ORAL DAILY
Qty: 30 TABLET | Refills: 11 | Status: SHIPPED | OUTPATIENT
Start: 2018-10-01 | End: 2019-10-04 | Stop reason: SDUPTHER

## 2018-10-01 RX ORDER — CLOPIDOGREL BISULFATE 75 MG/1
75 TABLET ORAL DAILY
Qty: 30 TABLET | Refills: 11 | Status: SHIPPED | OUTPATIENT
Start: 2018-10-01 | End: 2019-10-04 | Stop reason: SDUPTHER

## 2018-10-01 RX ORDER — ISOSORBIDE MONONITRATE 30 MG/1
30 TABLET, EXTENDED RELEASE ORAL DAILY
Qty: 30 TABLET | Refills: 11 | Status: SHIPPED | OUTPATIENT
Start: 2018-10-01 | End: 2019-10-04 | Stop reason: SDUPTHER

## 2018-10-22 ENCOUNTER — TELEPHONE (OUTPATIENT)
Dept: SMOKING CESSATION | Facility: CLINIC | Age: 68
End: 2018-10-22

## 2018-10-30 ENCOUNTER — CLINICAL SUPPORT (OUTPATIENT)
Dept: SMOKING CESSATION | Facility: CLINIC | Age: 68
End: 2018-10-30
Payer: COMMERCIAL

## 2018-10-30 DIAGNOSIS — F17.200 NICOTINE DEPENDENCE: Primary | ICD-10-CM

## 2018-10-30 PROCEDURE — 99407 BEHAV CHNG SMOKING > 10 MIN: CPT | Mod: S$GLB,,,

## 2018-10-30 NOTE — PROGRESS NOTES
Successful contact with patient regarding tobacco cessation quit #1. Pt states, he continue to remain tobacco free, he no longer experience urges or cravings, and no further assistance is needed at this time. Pt commended for the accomplishment thus far. Pt informed of his benefit status, future telephone follow ups, and contact information to schedule an appointment if he support. Will update the tobacco cessation smart form for 6 months on quit #1.

## 2018-11-01 ENCOUNTER — OFFICE VISIT (OUTPATIENT)
Dept: CARDIOLOGY | Facility: CLINIC | Age: 68
End: 2018-11-01
Payer: MEDICARE

## 2018-11-01 VITALS
WEIGHT: 134.5 LBS | SYSTOLIC BLOOD PRESSURE: 125 MMHG | HEART RATE: 82 BPM | BODY MASS INDEX: 21.62 KG/M2 | HEIGHT: 66 IN | DIASTOLIC BLOOD PRESSURE: 71 MMHG | OXYGEN SATURATION: 98 %

## 2018-11-01 DIAGNOSIS — I50.22 CHRONIC SYSTOLIC HEART FAILURE: ICD-10-CM

## 2018-11-01 DIAGNOSIS — I10 HYPERTENSION, ESSENTIAL: ICD-10-CM

## 2018-11-01 DIAGNOSIS — R06.09 DOE (DYSPNEA ON EXERTION): ICD-10-CM

## 2018-11-01 DIAGNOSIS — I10 HYPERTENSION: ICD-10-CM

## 2018-11-01 DIAGNOSIS — I21.4 NSTEMI (NON-ST ELEVATED MYOCARDIAL INFARCTION): Primary | ICD-10-CM

## 2018-11-01 DIAGNOSIS — I25.10 CORONARY ARTERY DISEASE INVOLVING NATIVE CORONARY ARTERY OF NATIVE HEART WITHOUT ANGINA PECTORIS: ICD-10-CM

## 2018-11-01 PROCEDURE — 93005 ELECTROCARDIOGRAM TRACING: CPT | Mod: PBBFAC | Performed by: INTERNAL MEDICINE

## 2018-11-01 PROCEDURE — 3074F SYST BP LT 130 MM HG: CPT | Mod: CPTII,S$GLB,, | Performed by: INTERNAL MEDICINE

## 2018-11-01 PROCEDURE — 99213 OFFICE O/P EST LOW 20 MIN: CPT | Mod: PBBFAC,25 | Performed by: INTERNAL MEDICINE

## 2018-11-01 PROCEDURE — 1101F PT FALLS ASSESS-DOCD LE1/YR: CPT | Mod: CPTII,S$GLB,, | Performed by: INTERNAL MEDICINE

## 2018-11-01 PROCEDURE — 3078F DIAST BP <80 MM HG: CPT | Mod: CPTII,S$GLB,, | Performed by: INTERNAL MEDICINE

## 2018-11-01 PROCEDURE — 93010 ELECTROCARDIOGRAM REPORT: CPT | Mod: S$PBB,,, | Performed by: INTERNAL MEDICINE

## 2018-11-01 PROCEDURE — 99214 OFFICE O/P EST MOD 30 MIN: CPT | Mod: S$GLB,,, | Performed by: INTERNAL MEDICINE

## 2018-11-01 PROCEDURE — 99999 PR PBB SHADOW E&M-EST. PATIENT-LVL III: CPT | Mod: PBBFAC,,, | Performed by: INTERNAL MEDICINE

## 2018-11-01 NOTE — PROGRESS NOTES
Subjective:    Patient ID:  Sharath Huizar is a 67 y.o. male who presents for follow-up of Coronary Artery Disease      HPI     NSTEMI - 4 stents to RCA 3/23/18, HTN     5/29/18 [facility] - EDP 23, LAD small vessel - occluded mid, Cx - luminal irregularities, RCA stents widely patent     Admitted 3/23/18  Mr. Sharath Huizar is a 67 y.o. male with tobacco abuse who presents to [facility] ED with complaints of chest pain this morning.  He was at work when the pain started, and it was described as mid-epigastric with radiation into his abdomen, was sharp in quality, and was 10/10 in severity at its worst.  He reports some nausea but denies any vomiting.  He also had some diaphoresis but not any palpitations, shortness of breath, hemoptysis, nor any lower extremity pain or swelling.  There were no alleviating or exacerbating factors, and he has never had similar episodes in the past.  He denies any personal or family history of heart attacks.  He does admit to smoking 1 pack a day of cigarettes since his early 20's, and drinks about 2-3 beers a day.     66 y/o male admitted to ICU with NSTEMI.  Underwent LHC with JUANCARLOS x 4 to RCA. Residual LAD disease that will need to be addressed at a later date.  Remained on Aggrastat infusion, now off.  TTE showing new EF 35%. Patient euvolemic. Discharged with cardiology follow up.       Echo 7/31/18    1 - Moderately depressed left ventricular systolic function (EF 30-35%).     2 - Eccentric hypertrophy.     3 - Mild left ventricular enlargement.     4 - Moderate left atrial enlargement.     5 - Impaired LV relaxation, elevated LAP (grade 2 diastolic dysfunction).     6 - Trivial mitral regurgitation.     7 - Trivial tricuspid regurgitation.     8 - Trivial pulmonic regurgitation.      Echo 3/24/18    1 - Moderately depressed left ventricular systolic function (EF 35-40%) Severe inferior hypokinesis.     2 - Concentric hypertrophy.     3 - Impaired LV relaxation, normal LAP (grade 1 diastolic  dysfunction).     4 - Trivial mitral regurgitation.     5 - Trivial tricuspid regurgitation.      3/24/18  PCI to the prox, mid, distal RCA with JAUNCARLOS x 3  PCI to the PDA with JUANCARLOS x 1  Long mid LAD 75%     Stress test 4/11/18  LVEF: 28 %  Impression: ABNORMAL MYOCARDIAL PERFUSION  1. There is a large size fixed defect of severe intensity that extends from the base to the apical inferior wall of the left ventricle, consistent with myocardial injury. There is trivial nahid-injury ischemia.   2. Resting wall motion is physiologic.   3. There is resting LV dysfunction with a reduced ejection fraction of 28 %.      5/1/18 Imdur added and toprol increased for recurrent CP  5/15/18 Still with exertional CP - imdur gives him a HA  6/12/18 Stable angina. Gets MCKEON with fast walking     8/1/18 Still gets MCKEON after walking 2 blocks  Denies CP  Echo with EF < 35%. I have recommended an AICD - he refuses at this time  Continue with medical Rx    MCKEON improved some  Denies CP  EKG NSR old IMI            Review of Systems   Constitution: Negative for decreased appetite.   HENT: Negative for ear discharge.    Eyes: Negative for blurred vision.   Endocrine: Negative for polyphagia.   Skin: Negative for nail changes.   Neurological: Negative for aphonia.   Psychiatric/Behavioral: Negative for hallucinations.        Objective:    Physical Exam   Constitutional: He is oriented to person, place, and time. He appears well-developed and well-nourished.   HENT:   Head: Normocephalic and atraumatic.   Eyes: Conjunctivae are normal. Pupils are equal, round, and reactive to light.   Neck: Normal range of motion. Neck supple.   Cardiovascular: Normal rate, normal heart sounds and intact distal pulses.   Pulmonary/Chest: Effort normal and breath sounds normal.   Abdominal: Soft. Bowel sounds are normal.   Musculoskeletal: Normal range of motion.   Neurological: He is alert and oriented to person, place, and time.   Skin: Skin is warm and dry.          Assessment:       1. NSTEMI (non-ST elevated myocardial infarction)    2. Hypertension, essential    3. Chronic systolic heart failure    4. Coronary artery disease involving native coronary artery of native heart without angina pectoris    5. MCKEON (dyspnea on exertion)         Plan:       I discussed an AICD again - he wants to think about it  OV 1 month with repeat echo

## 2018-12-11 ENCOUNTER — LAB VISIT (OUTPATIENT)
Dept: LAB | Facility: HOSPITAL | Age: 68
End: 2018-12-11
Attending: FAMILY MEDICINE
Payer: MEDICARE

## 2018-12-11 ENCOUNTER — OFFICE VISIT (OUTPATIENT)
Dept: FAMILY MEDICINE | Facility: CLINIC | Age: 68
End: 2018-12-11
Payer: MEDICARE

## 2018-12-11 VITALS
TEMPERATURE: 98 F | RESPIRATION RATE: 18 BRPM | HEART RATE: 70 BPM | SYSTOLIC BLOOD PRESSURE: 128 MMHG | OXYGEN SATURATION: 98 % | WEIGHT: 137.38 LBS | HEIGHT: 66 IN | DIASTOLIC BLOOD PRESSURE: 78 MMHG | BODY MASS INDEX: 22.08 KG/M2

## 2018-12-11 DIAGNOSIS — D64.9 ANEMIA, UNSPECIFIED TYPE: ICD-10-CM

## 2018-12-11 DIAGNOSIS — I10 HYPERTENSION, ESSENTIAL: Primary | ICD-10-CM

## 2018-12-11 DIAGNOSIS — I25.10 CORONARY ARTERY DISEASE INVOLVING NATIVE CORONARY ARTERY OF NATIVE HEART WITHOUT ANGINA PECTORIS: ICD-10-CM

## 2018-12-11 DIAGNOSIS — M19.011 PRIMARY OSTEOARTHRITIS OF BOTH SHOULDERS: ICD-10-CM

## 2018-12-11 DIAGNOSIS — I25.2 HISTORY OF MI (MYOCARDIAL INFARCTION): ICD-10-CM

## 2018-12-11 DIAGNOSIS — I50.22 CHRONIC SYSTOLIC HEART FAILURE: ICD-10-CM

## 2018-12-11 DIAGNOSIS — I10 HYPERTENSION, ESSENTIAL: ICD-10-CM

## 2018-12-11 DIAGNOSIS — M19.012 PRIMARY OSTEOARTHRITIS OF BOTH SHOULDERS: ICD-10-CM

## 2018-12-11 LAB
ALBUMIN SERPL BCP-MCNC: 3.5 G/DL
ALBUMIN SERPL BCP-MCNC: 3.5 G/DL
ALP SERPL-CCNC: 99 U/L
ALP SERPL-CCNC: 99 U/L
ALT SERPL W/O P-5'-P-CCNC: 11 U/L
ALT SERPL W/O P-5'-P-CCNC: 11 U/L
ANION GAP SERPL CALC-SCNC: 7 MMOL/L
ANION GAP SERPL CALC-SCNC: 7 MMOL/L
AST SERPL-CCNC: 13 U/L
AST SERPL-CCNC: 13 U/L
BASOPHILS # BLD AUTO: 0.02 K/UL
BASOPHILS # BLD AUTO: 0.02 K/UL
BASOPHILS NFR BLD: 0.2 %
BASOPHILS NFR BLD: 0.2 %
BILIRUB SERPL-MCNC: 0.5 MG/DL
BILIRUB SERPL-MCNC: 0.5 MG/DL
BUN SERPL-MCNC: 9 MG/DL
BUN SERPL-MCNC: 9 MG/DL
CALCIUM SERPL-MCNC: 8.5 MG/DL
CALCIUM SERPL-MCNC: 8.5 MG/DL
CHLORIDE SERPL-SCNC: 110 MMOL/L
CHLORIDE SERPL-SCNC: 110 MMOL/L
CO2 SERPL-SCNC: 25 MMOL/L
CO2 SERPL-SCNC: 25 MMOL/L
CREAT SERPL-MCNC: 1 MG/DL
CREAT SERPL-MCNC: 1 MG/DL
DIFFERENTIAL METHOD: ABNORMAL
DIFFERENTIAL METHOD: ABNORMAL
EOSINOPHIL # BLD AUTO: 0.2 K/UL
EOSINOPHIL # BLD AUTO: 0.2 K/UL
EOSINOPHIL NFR BLD: 1.8 %
EOSINOPHIL NFR BLD: 1.8 %
ERYTHROCYTE [DISTWIDTH] IN BLOOD BY AUTOMATED COUNT: 16 %
ERYTHROCYTE [DISTWIDTH] IN BLOOD BY AUTOMATED COUNT: 16 %
EST. GFR  (AFRICAN AMERICAN): >60 ML/MIN/1.73 M^2
EST. GFR  (AFRICAN AMERICAN): >60 ML/MIN/1.73 M^2
EST. GFR  (NON AFRICAN AMERICAN): >60 ML/MIN/1.73 M^2
EST. GFR  (NON AFRICAN AMERICAN): >60 ML/MIN/1.73 M^2
FERRITIN SERPL-MCNC: 151 NG/ML
GLUCOSE SERPL-MCNC: 93 MG/DL
GLUCOSE SERPL-MCNC: 93 MG/DL
HCT VFR BLD AUTO: 42.1 %
HCT VFR BLD AUTO: 42.1 %
HGB BLD-MCNC: 14 G/DL
HGB BLD-MCNC: 14 G/DL
IRON SERPL-MCNC: 99 UG/DL
LYMPHOCYTES # BLD AUTO: 3.1 K/UL
LYMPHOCYTES # BLD AUTO: 3.1 K/UL
LYMPHOCYTES NFR BLD: 32.6 %
LYMPHOCYTES NFR BLD: 32.6 %
MCH RBC QN AUTO: 28.8 PG
MCH RBC QN AUTO: 28.8 PG
MCHC RBC AUTO-ENTMCNC: 33.3 G/DL
MCHC RBC AUTO-ENTMCNC: 33.3 G/DL
MCV RBC AUTO: 87 FL
MCV RBC AUTO: 87 FL
MONOCYTES # BLD AUTO: 0.5 K/UL
MONOCYTES # BLD AUTO: 0.5 K/UL
MONOCYTES NFR BLD: 5.6 %
MONOCYTES NFR BLD: 5.6 %
NEUTROPHILS # BLD AUTO: 5.6 K/UL
NEUTROPHILS # BLD AUTO: 5.6 K/UL
NEUTROPHILS NFR BLD: 59.8 %
NEUTROPHILS NFR BLD: 59.8 %
PLATELET # BLD AUTO: 217 K/UL
PLATELET # BLD AUTO: 217 K/UL
PMV BLD AUTO: 11.1 FL
PMV BLD AUTO: 11.1 FL
POTASSIUM SERPL-SCNC: 3.7 MMOL/L
POTASSIUM SERPL-SCNC: 3.7 MMOL/L
PROT SERPL-MCNC: 6.3 G/DL
PROT SERPL-MCNC: 6.3 G/DL
RBC # BLD AUTO: 4.86 M/UL
RBC # BLD AUTO: 4.86 M/UL
SATURATED IRON: 41 %
SODIUM SERPL-SCNC: 142 MMOL/L
SODIUM SERPL-SCNC: 142 MMOL/L
TOTAL IRON BINDING CAPACITY: 241 UG/DL
TRANSFERRIN SERPL-MCNC: 163 MG/DL
WBC # BLD AUTO: 9.39 K/UL
WBC # BLD AUTO: 9.39 K/UL

## 2018-12-11 PROCEDURE — 82728 ASSAY OF FERRITIN: CPT

## 2018-12-11 PROCEDURE — 1101F PT FALLS ASSESS-DOCD LE1/YR: CPT | Mod: CPTII,S$GLB,, | Performed by: FAMILY MEDICINE

## 2018-12-11 PROCEDURE — 99999 PR PBB SHADOW E&M-EST. PATIENT-LVL III: CPT | Mod: PBBFAC,,, | Performed by: FAMILY MEDICINE

## 2018-12-11 PROCEDURE — 99214 OFFICE O/P EST MOD 30 MIN: CPT | Mod: S$GLB,,, | Performed by: FAMILY MEDICINE

## 2018-12-11 PROCEDURE — 85025 COMPLETE CBC W/AUTO DIFF WBC: CPT

## 2018-12-11 PROCEDURE — 3078F DIAST BP <80 MM HG: CPT | Mod: CPTII,S$GLB,, | Performed by: FAMILY MEDICINE

## 2018-12-11 PROCEDURE — 83540 ASSAY OF IRON: CPT

## 2018-12-11 PROCEDURE — 36415 COLL VENOUS BLD VENIPUNCTURE: CPT | Mod: PN

## 2018-12-11 PROCEDURE — 80053 COMPREHEN METABOLIC PANEL: CPT

## 2018-12-11 PROCEDURE — 3074F SYST BP LT 130 MM HG: CPT | Mod: CPTII,S$GLB,, | Performed by: FAMILY MEDICINE

## 2018-12-11 NOTE — PROGRESS NOTES
Routine Office Visit    Patient Name: Sharath Huizar    : 1950  MRN: [id number]    Subjective:  Sharath is a 67 y.o. male who presents today for:   Chief Complaint   Patient presents with    Follow-up    Osteoarthritis     67-year-old male with hypertension, a history of myocardial infarction earlier this year in March, comes in for routine follow-up.  He reports taking his medications as prescribed.  Reports no side effects from his medicines.  The patient had been previously advised by Cardiology to have an defibrillator placed because of a combined congestive heart failure with low ejection fraction, however the patient has declined this twice.  On questioning today, he reports that he has still not decided to proceed with the defibrillator.  The patient continues to have bilateral shoulder pain. Imaging of his shoulder see few months ago had shown bilateral osteoarthritis right worse than left.  The patient has not gone to Orthopedics because of financial reasons, and states that he cannot go to physical therapy for the same reasons.  He does not take any medicines for when the pain except.  He states that the pain is worse in the morning when he wakes.    Past Medical History  Past Medical History:   Diagnosis Date    Coronary artery disease     Hypertension     Myocardial infarction        Past Surgical History  Past Surgical History:   Procedure Laterality Date    CARDIAC CATHETERIZATION      with 4 stents    Left heart cath Left 2018    Performed by Viral Lombardi MD at [facility] CATH LAB    LEFT HEART CATHETERIZATION Left 2018    Procedure: Left heart cath;  Surgeon: Viral Lombardi MD;  Location: [facility] CATH LAB;  Service: Cardiology;  Laterality: Left;  RN PREOP 18        Family History  Family History   Problem Relation Age of Onset    No Known Problems Mother     No Known Problems Father     No Known Problems Sister     No Known Problems Brother     No Known Problems  Maternal Aunt     No Known Problems Maternal Uncle     No Known Problems Paternal Aunt     No Known Problems Paternal Uncle     No Known Problems Maternal Grandmother     No Known Problems Maternal Grandfather     No Known Problems Paternal Grandmother     No Known Problems Paternal Grandfather     Amblyopia Neg Hx     Blindness Neg Hx     Cancer Neg Hx     Cataracts Neg Hx     Diabetes Neg Hx     Glaucoma Neg Hx     Hypertension Neg Hx     Macular degeneration Neg Hx     Retinal detachment Neg Hx     Strabismus Neg Hx     Stroke Neg Hx     Thyroid disease Neg Hx        Social History  Social History     Socioeconomic History    Marital status:      Spouse name: Not on file    Number of children: Not on file    Years of education: Not on file    Highest education level: Not on file   Social Needs    Financial resource strain: Not on file    Food insecurity - worry: Not on file    Food insecurity - inability: Not on file    Transportation needs - medical: Not on file    Transportation needs - non-medical: Not on file   Occupational History    Not on file   Tobacco Use    Smoking status: Former Smoker     Types: Cigarettes     Last attempt to quit: 3/23/2018     Years since quittin.7    Smokeless tobacco: Never Used   Substance and Sexual Activity    Alcohol use: No    Drug use: No    Sexual activity: Yes     Partners: Female   Other Topics Concern    Not on file   Social History Narrative    Not on file       Current Medications  Current Outpatient Medications on File Prior to Visit   Medication Sig Dispense Refill    aspirin (ECOTRIN) 81 MG EC tablet Take 1 tablet (81 mg total) by mouth once daily.  0    atorvastatin (LIPITOR) 80 MG tablet Take 1 tablet (80 mg total) by mouth once daily. 30 tablet 11    clopidogrel (PLAVIX) 75 mg tablet Take 1 tablet (75 mg total) by mouth once daily. 30 tablet 11    famotidine (PEPCID) 20 MG tablet Take 1 tablet (20 mg total) by  "mouth once daily. 30 tablet 1    isosorbide mononitrate (IMDUR) 30 MG 24 hr tablet Take 1 tablet (30 mg total) by mouth once daily. 30 tablet 11    lisinopril (PRINIVIL,ZESTRIL) 2.5 MG tablet Take 1 tablet (2.5 mg total) by mouth once daily. 30 tablet 11    metoprolol succinate (TOPROL-XL) 50 MG 24 hr tablet Take 1 tablet (50 mg total) by mouth once daily. 30 tablet 11    nicotine, polacrilex, (NICORETTE) 2 mg Gum 1-2 per hour in place of cigarettes maximum of 15 per day. 220 each 0    nitroGLYCERIN (NITROSTAT) 0.4 MG SL tablet Place 1 tablet (0.4 mg total) under the tongue every 5 (five) minutes as needed. 25 tablet 11     No current facility-administered medications on file prior to visit.        Allergies   Review of patient's allergies indicates:  No Known Allergies    Review of Systems   Constitutional: Negative for chills and fever.   Respiratory: Negative for shortness of breath.    Cardiovascular: Negative for chest pain, palpitations and leg swelling.   Gastrointestinal: Negative for abdominal pain, blood in stool, constipation, diarrhea and nausea.   Genitourinary: Negative for dysuria.   Musculoskeletal: Positive for arthralgias.   Neurological: Negative for dizziness, light-headedness and headaches.       /78 (BP Location: Left arm, Patient Position: Sitting, BP Method: Medium (Manual))   Pulse 70   Temp 97.7 °F (36.5 °C) (Oral)   Resp 18   Ht 5' 6" (1.676 m)   Wt 62.3 kg (137 lb 5.6 oz)   SpO2 98%   BMI 22.17 kg/m²     Physical Exam   Constitutional: He appears well-developed and well-nourished.   HENT:   Head: Normocephalic.   Right Ear: External ear normal.   Left Ear: External ear normal.   Nose: Nose normal.   Mouth/Throat: No oropharyngeal exudate.   Neck: Normal range of motion. Neck supple. No tracheal deviation present.   Cardiovascular: Normal rate, regular rhythm, normal heart sounds and intact distal pulses.   No murmur heard.  Pulmonary/Chest: Effort normal and breath " sounds normal. He has no wheezes. He has no rales.   Abdominal: Soft. Bowel sounds are normal. He exhibits no mass. There is no tenderness.   Musculoskeletal: He exhibits no edema.        Right shoulder: He exhibits tenderness. He exhibits normal range of motion, no swelling and no deformity.        Left shoulder: He exhibits tenderness. He exhibits normal range of motion, no swelling and no deformity.   Lymphadenopathy:     He has no cervical adenopathy.   Skin: He is not diaphoretic.   Vitals reviewed.      Assessment/Plan:  Sharath was seen today for follow-up and osteoarthritis.    Diagnoses and all orders for this visit:    Hypertension, essential  -     Comprehensive metabolic panel; Future  -     CBC auto differential; Future  Continue current regimen.  Check labs.  Follow-up in 6 months.    Primary osteoarthritis of both shoulders  Patient shown different exercises that he can do at home.  Discussed with patient seeing Orthopedics for consult to discuss possible joint injection.  Patient would like to postpone orthopedic consult.    History of MI (myocardial infarction)  Patient is on a beta-blocker, statin, and aspirin.    Anemia, unspecified type  -     Iron and TIBC; Future  -     Ferritin; Future  Check anemia studies.    Coronary artery disease involving native coronary artery of native heart without angina pectoris  Patient has prescription for nitroglycerin to use as needed.    Chronic systolic heart failure  Echocardiogram scheduled for February with follow-up with Cardiology a few days later.        This office note has been dictated.  This dictation has been generated using M-Modal Fluency Direct dictation; some phonetic errors may occur.

## 2019-02-01 ENCOUNTER — HOSPITAL ENCOUNTER (OUTPATIENT)
Dept: CARDIOLOGY | Facility: HOSPITAL | Age: 69
Discharge: HOME | End: 2019-02-01
Attending: INTERNAL MEDICINE
Payer: MEDICARE

## 2019-02-01 VITALS — WEIGHT: 137 LBS | HEIGHT: 66 IN | BODY MASS INDEX: 22.02 KG/M2

## 2019-02-01 DIAGNOSIS — I50.22 CHRONIC SYSTOLIC HEART FAILURE: ICD-10-CM

## 2019-02-01 DIAGNOSIS — I25.10 CORONARY ARTERY DISEASE INVOLVING NATIVE CORONARY ARTERY OF NATIVE HEART WITHOUT ANGINA PECTORIS: ICD-10-CM

## 2019-02-01 DIAGNOSIS — I10 HYPERTENSION, ESSENTIAL: ICD-10-CM

## 2019-02-01 DIAGNOSIS — R06.09 DOE (DYSPNEA ON EXERTION): ICD-10-CM

## 2019-02-01 DIAGNOSIS — I21.4 NSTEMI (NON-ST ELEVATED MYOCARDIAL INFARCTION): ICD-10-CM

## 2019-02-01 LAB
AORTIC ROOT ANNULUS: 3.58 CM
AORTIC VALVE CUSP SEPERATION: 2.2 CM
ASCENDING AORTA: 2.7 CM
AV INDEX (PROSTH): 0.54
AV MEAN GRADIENT: 3.39 MMHG
AV PEAK GRADIENT: 5.76 MMHG
AV VALVE AREA: 2.11 CM2
AV VELOCITY RATIO: 0.57
BSA FOR ECHO PROCEDURE: 1.7 M2
CV ECHO LV RWT: 0.43 CM
DOP CALC AO PEAK VEL: 1.2 M/S
DOP CALC AO VTI: 27.9 CM
DOP CALC LVOT AREA: 3.87 CM2
DOP CALC LVOT DIAMETER: 2.22 CM
DOP CALC LVOT PEAK VEL: 0.69 M/S
DOP CALC LVOT STROKE VOLUME: 58.77 CM3
DOP CALCLVOT PEAK VEL VTI: 15.19 CM
E WAVE DECELERATION TIME: 242.27 MSEC
E/A RATIO: 0.63
ECHO LV POSTERIOR WALL: 1.1 CM (ref 0.6–1.1)
FRACTIONAL SHORTENING: 9 % (ref 28–44)
INTERVENTRICULAR SEPTUM: 1.32 CM (ref 0.6–1.1)
IVRT: 0.17 MSEC
LA MAJOR: 5.76 CM
LA MINOR: 5.93 CM
LA WIDTH: 4.68 CM
LEFT ATRIUM SIZE: 3.78 CM
LEFT ATRIUM VOLUME INDEX: 51.6 ML/M2
LEFT ATRIUM VOLUME: 87.87 CM3
LEFT INTERNAL DIMENSION IN SYSTOLE: 4.63 CM (ref 2.1–4)
LEFT VENTRICLE DIASTOLIC VOLUME INDEX: 71.59 ML/M2
LEFT VENTRICLE DIASTOLIC VOLUME: 121.93 ML
LEFT VENTRICLE MASS INDEX: 142 G/M2
LEFT VENTRICLE SYSTOLIC VOLUME INDEX: 58 ML/M2
LEFT VENTRICLE SYSTOLIC VOLUME: 98.85 ML
LEFT VENTRICULAR INTERNAL DIMENSION IN DIASTOLE: 5.07 CM (ref 3.5–6)
LEFT VENTRICULAR MASS: 241.77 G
MV PEAK A VEL: 1.01 M/S
MV PEAK E VEL: 0.64 M/S
PISA TR MAX VEL: 2.49 M/S
PULM VEIN S/D RATIO: 1.59
PV PEAK D VEL: 0.34 M/S
PV PEAK S VEL: 0.54 M/S
PV PEAK VELOCITY: 0.87 CM/S
RA MAJOR: 4.82 CM
RA PRESSURE: 3 MMHG
RA WIDTH: 3.79 CM
RIGHT VENTRICULAR END-DIASTOLIC DIMENSION: 3.58 CM
RV TISSUE DOPPLER FREE WALL SYSTOLIC VELOCITY 1 (APICAL 4 CHAMBER VIEW): 12.97 M/S
SINUS: 3.36 CM
STJ: 2.43 CM
TR MAX PG: 24.8 MMHG
TRICUSPID ANNULAR PLANE SYSTOLIC EXCURSION: 1.11 CM
TV REST PULMONARY ARTERY PRESSURE: 28 MMHG

## 2019-02-01 PROCEDURE — 93306 TRANSTHORACIC ECHO (TTE) COMPLETE: ICD-10-PCS | Mod: 26,HCNC,, | Performed by: INTERNAL MEDICINE

## 2019-02-01 PROCEDURE — 93306 TTE W/DOPPLER COMPLETE: CPT | Mod: HCNC

## 2019-02-01 PROCEDURE — 93306 TTE W/DOPPLER COMPLETE: CPT | Mod: 26,HCNC,, | Performed by: INTERNAL MEDICINE

## 2019-02-04 ENCOUNTER — OFFICE VISIT (OUTPATIENT)
Dept: CARDIOLOGY | Facility: CLINIC | Age: 69
End: 2019-02-04
Payer: MEDICARE

## 2019-02-04 VITALS
SYSTOLIC BLOOD PRESSURE: 141 MMHG | OXYGEN SATURATION: 100 % | HEIGHT: 66 IN | DIASTOLIC BLOOD PRESSURE: 71 MMHG | BODY MASS INDEX: 21.25 KG/M2 | WEIGHT: 132.25 LBS | HEART RATE: 74 BPM

## 2019-02-04 DIAGNOSIS — I21.4 NSTEMI (NON-ST ELEVATED MYOCARDIAL INFARCTION): Primary | ICD-10-CM

## 2019-02-04 DIAGNOSIS — I10 HYPERTENSION: ICD-10-CM

## 2019-02-04 DIAGNOSIS — I25.10 CORONARY ARTERY DISEASE INVOLVING NATIVE CORONARY ARTERY OF NATIVE HEART WITHOUT ANGINA PECTORIS: ICD-10-CM

## 2019-02-04 DIAGNOSIS — I10 HYPERTENSION, ESSENTIAL: ICD-10-CM

## 2019-02-04 DIAGNOSIS — I50.22 CHRONIC SYSTOLIC HEART FAILURE: ICD-10-CM

## 2019-02-04 DIAGNOSIS — R06.09 DOE (DYSPNEA ON EXERTION): ICD-10-CM

## 2019-02-04 PROCEDURE — 99499 RISK ADDL DX/OHS AUDIT: ICD-10-PCS | Mod: HCNC,S$GLB,, | Performed by: INTERNAL MEDICINE

## 2019-02-04 PROCEDURE — 3077F SYST BP >= 140 MM HG: CPT | Mod: HCNC,CPTII,S$GLB, | Performed by: INTERNAL MEDICINE

## 2019-02-04 PROCEDURE — 93000 ELECTROCARDIOGRAM COMPLETE: CPT | Mod: HCNC,S$GLB,, | Performed by: INTERNAL MEDICINE

## 2019-02-04 PROCEDURE — 93000 EKG 12-LEAD: ICD-10-PCS | Mod: HCNC,S$GLB,, | Performed by: INTERNAL MEDICINE

## 2019-02-04 PROCEDURE — 99499 UNLISTED E&M SERVICE: CPT | Mod: HCNC,S$GLB,, | Performed by: INTERNAL MEDICINE

## 2019-02-04 PROCEDURE — 1101F PT FALLS ASSESS-DOCD LE1/YR: CPT | Mod: HCNC,CPTII,S$GLB, | Performed by: INTERNAL MEDICINE

## 2019-02-04 PROCEDURE — 99999 PR PBB SHADOW E&M-EST. PATIENT-LVL III: CPT | Mod: PBBFAC,HCNC,, | Performed by: INTERNAL MEDICINE

## 2019-02-04 PROCEDURE — 3077F PR MOST RECENT SYSTOLIC BLOOD PRESSURE >= 140 MM HG: ICD-10-PCS | Mod: HCNC,CPTII,S$GLB, | Performed by: INTERNAL MEDICINE

## 2019-02-04 PROCEDURE — 1101F PR PT FALLS ASSESS DOC 0-1 FALLS W/OUT INJ PAST YR: ICD-10-PCS | Mod: HCNC,CPTII,S$GLB, | Performed by: INTERNAL MEDICINE

## 2019-02-04 PROCEDURE — 3078F PR MOST RECENT DIASTOLIC BLOOD PRESSURE < 80 MM HG: ICD-10-PCS | Mod: HCNC,CPTII,S$GLB, | Performed by: INTERNAL MEDICINE

## 2019-02-04 PROCEDURE — 99999 PR PBB SHADOW E&M-EST. PATIENT-LVL III: ICD-10-PCS | Mod: PBBFAC,HCNC,, | Performed by: INTERNAL MEDICINE

## 2019-02-04 PROCEDURE — 99213 PR OFFICE/OUTPT VISIT, EST, LEVL III, 20-29 MIN: ICD-10-PCS | Mod: HCNC,S$GLB,, | Performed by: INTERNAL MEDICINE

## 2019-02-04 PROCEDURE — 99213 OFFICE O/P EST LOW 20 MIN: CPT | Mod: HCNC,S$GLB,, | Performed by: INTERNAL MEDICINE

## 2019-02-04 PROCEDURE — 3078F DIAST BP <80 MM HG: CPT | Mod: HCNC,CPTII,S$GLB, | Performed by: INTERNAL MEDICINE

## 2019-02-04 NOTE — PROGRESS NOTES
Subjective:    Patient ID:  Sharath Huizar is a 68 y.o. male who presents for follow-up of Coronary Artery Disease      HPI     NSTEMI - 4 stents to RCA 3/23/18, HTN     5/29/18 [facility] - EDP 23, LAD small vessel - occluded mid, Cx - luminal irregularities, RCA stents widely patent     Admitted 3/23/18  Mr. Sharath Huizar is a 67 y.o. male with tobacco abuse who presents to [facility] ED with complaints of chest pain this morning.  He was at work when the pain started, and it was described as mid-epigastric with radiation into his abdomen, was sharp in quality, and was 10/10 in severity at its worst.  He reports some nausea but denies any vomiting.  He also had some diaphoresis but not any palpitations, shortness of breath, hemoptysis, nor any lower extremity pain or swelling.  There were no alleviating or exacerbating factors, and he has never had similar episodes in the past.  He denies any personal or family history of heart attacks.  He does admit to smoking 1 pack a day of cigarettes since his early 20's, and drinks about 2-3 beers a day.     66 y/o male admitted to ICU with NSTEMI.  Underwent LHC with JUANCARLOS x 4 to RCA. Residual LAD disease that will need to be addressed at a later date.  Remained on Aggrastat infusion, now off.  TTE showing new EF 35%. Patient euvolemic. Discharged with cardiology follow up.      Echo 2/1/19  · Moderately decreased left ventricular systolic function. The estimated ejection fraction is 30%  · Eccentric left ventricular hypertrophy.  · Mild left ventricular enlargement.  · Grade I (mild) left ventricular diastolic dysfunction consistent with impaired relaxation.  · Normal right ventricular systolic function.  · Moderate left atrial enlargement.     Echo 7/31/18    1 - Moderately depressed left ventricular systolic function (EF 30-35%).     2 - Eccentric hypertrophy.     3 - Mild left ventricular enlargement.     4 - Moderate left atrial enlargement.     5 - Impaired LV relaxation,  elevated LAP (grade 2 diastolic dysfunction).     6 - Trivial mitral regurgitation.     7 - Trivial tricuspid regurgitation.     8 - Trivial pulmonic regurgitation.      Echo 3/24/18    1 - Moderately depressed left ventricular systolic function (EF 35-40%) Severe inferior hypokinesis.     2 - Concentric hypertrophy.     3 - Impaired LV relaxation, normal LAP (grade 1 diastolic dysfunction).     4 - Trivial mitral regurgitation.     5 - Trivial tricuspid regurgitation.      3/24/18  PCI to the prox, mid, distal RCA with JUANCARLOS x 3  PCI to the PDA with JUANCARLOS x 1  Long mid LAD 75%     Stress test 4/11/18  LVEF: 28 %  Impression: ABNORMAL MYOCARDIAL PERFUSION  1. There is a large size fixed defect of severe intensity that extends from the base to the apical inferior wall of the left ventricle, consistent with myocardial injury. There is trivial nahid-injury ischemia.   2. Resting wall motion is physiologic.   3. There is resting LV dysfunction with a reduced ejection fraction of 28 %.      5/1/18 Imdur added and toprol increased for recurrent CP  5/15/18 Still with exertional CP - imdur gives him a HA  6/12/18 Stable angina. Gets MCKEON with fast walking     8/1/18 Still gets MCKEON after walking 2 blocks  Denies CP  Echo with EF < 35%. I have recommended an AICD - he refuses at this time  Continue with medical Rx     11/1/18 MCKEON improved some  Denies CP  EKG NSR old IMI     Denies CP or SOB  EKG NSR - old IMI  Still declines AICD      Review of Systems   Constitution: Negative for decreased appetite.   HENT: Negative for ear discharge.    Eyes: Negative for blurred vision.   Endocrine: Negative for polyphagia.   Skin: Negative for nail changes.   Neurological: Negative for aphonia.   Psychiatric/Behavioral: Negative for hallucinations.        Objective:    Physical Exam   Constitutional: He is oriented to person, place, and time. He appears well-developed and well-nourished.   HENT:   Head: Normocephalic and atraumatic.   Eyes:  Conjunctivae are normal. Pupils are equal, round, and reactive to light.   Neck: Normal range of motion. Neck supple.   Cardiovascular: Normal rate, normal heart sounds and intact distal pulses.   Pulmonary/Chest: Effort normal and breath sounds normal.   Abdominal: Soft. Bowel sounds are normal.   Musculoskeletal: Normal range of motion.   Neurological: He is alert and oriented to person, place, and time.   Skin: Skin is warm and dry.         Assessment:       1. NSTEMI (non-ST elevated myocardial infarction)    2. Hypertension, essential    3. Chronic systolic heart failure    4. Coronary artery disease involving native coronary artery of native heart without angina pectoris    5. MCKEON (dyspnea on exertion)         Plan:       Still not interested in AICD  Continue Rx  OV 6 months

## 2019-04-12 DIAGNOSIS — Z12.11 COLON CANCER SCREENING: ICD-10-CM

## 2019-06-12 ENCOUNTER — TELEPHONE (OUTPATIENT)
Dept: SMOKING CESSATION | Facility: CLINIC | Age: 69
End: 2019-06-12

## 2019-06-13 ENCOUNTER — OFFICE VISIT (OUTPATIENT)
Dept: OPTOMETRY | Facility: CLINIC | Age: 69
End: 2019-06-13
Payer: MEDICARE

## 2019-06-13 DIAGNOSIS — H52.7 REFRACTIVE ERROR: ICD-10-CM

## 2019-06-13 DIAGNOSIS — H43.21 ASTEROID HYALOSIS OF RIGHT EYE: ICD-10-CM

## 2019-06-13 DIAGNOSIS — H25.13 NUCLEAR SCLEROSIS OF BOTH EYES: Primary | ICD-10-CM

## 2019-06-13 PROCEDURE — 99999 PR PBB SHADOW E&M-EST. PATIENT-LVL II: CPT | Mod: PBBFAC,HCNC,, | Performed by: OPTOMETRIST

## 2019-06-13 PROCEDURE — 99499 UNLISTED E&M SERVICE: CPT | Mod: HCNC,S$GLB,, | Performed by: OPTOMETRIST

## 2019-06-13 PROCEDURE — 99499 RISK ADDL DX/OHS AUDIT: ICD-10-PCS | Mod: HCNC,S$GLB,, | Performed by: OPTOMETRIST

## 2019-06-13 PROCEDURE — 92015 PR REFRACTION: ICD-10-PCS | Mod: HCNC,S$GLB,, | Performed by: OPTOMETRIST

## 2019-06-13 PROCEDURE — 99999 PR PBB SHADOW E&M-EST. PATIENT-LVL II: ICD-10-PCS | Mod: PBBFAC,HCNC,, | Performed by: OPTOMETRIST

## 2019-06-13 PROCEDURE — 92014 PR EYE EXAM, EST PATIENT,COMPREHESV: ICD-10-PCS | Mod: HCNC,S$GLB,, | Performed by: OPTOMETRIST

## 2019-06-13 PROCEDURE — 92014 COMPRE OPH EXAM EST PT 1/>: CPT | Mod: HCNC,S$GLB,, | Performed by: OPTOMETRIST

## 2019-06-13 PROCEDURE — 92015 DETERMINE REFRACTIVE STATE: CPT | Mod: HCNC,S$GLB,, | Performed by: OPTOMETRIST

## 2019-06-13 NOTE — PROGRESS NOTES
Subjective:       Patient ID: Sharath Huizar is a 68 y.o. male      Chief Complaint   Patient presents with    Concerns About Ocular Health     History of Present Illness  Dls: 4/5/18 Dr. Samano     69 y/o male presents today for ocular health check.  Pt c/o blurry vision at distance and near ou.   Pt wears bifocal glasses.     No tearing  No itching  No burning  No pain  + ha's  + floaters  No flashes    Eye meds  None       Assessment/Plan:     1. Nuclear sclerosis of both eyes  Educated pt on presence of cataracts and effects on vision. No surgery at this time. Recheck in one year.    2. Asteroid hyalosis of right eye  Stable. Monitor.    3. Refractive error  Educated patient on refractive error and discussed lens options. Dispensed updated spectacle Rx. Educated about adaptation period to new specs.    Eyeglass Final Rx     Eyeglass Final Rx       Sphere Cylinder Axis Add    Right +0.50 +0.75 170 +2.75    Left +0.25 +1.00 015 +2.75    Expiration Date:  6/13/2020                  Follow up in about 1 year (around 6/13/2020).

## 2019-06-21 ENCOUNTER — OFFICE VISIT (OUTPATIENT)
Dept: CARDIOLOGY | Facility: CLINIC | Age: 69
End: 2019-06-21
Payer: MEDICARE

## 2019-06-21 VITALS
HEIGHT: 66 IN | SYSTOLIC BLOOD PRESSURE: 102 MMHG | OXYGEN SATURATION: 96 % | WEIGHT: 128.31 LBS | HEART RATE: 66 BPM | BODY MASS INDEX: 20.62 KG/M2 | DIASTOLIC BLOOD PRESSURE: 60 MMHG

## 2019-06-21 DIAGNOSIS — I10 HYPERTENSION, ESSENTIAL: ICD-10-CM

## 2019-06-21 DIAGNOSIS — I25.10 CORONARY ARTERY DISEASE INVOLVING NATIVE CORONARY ARTERY OF NATIVE HEART WITHOUT ANGINA PECTORIS: ICD-10-CM

## 2019-06-21 DIAGNOSIS — I21.4 NSTEMI (NON-ST ELEVATED MYOCARDIAL INFARCTION): Primary | ICD-10-CM

## 2019-06-21 DIAGNOSIS — I50.22 CHRONIC SYSTOLIC HEART FAILURE: ICD-10-CM

## 2019-06-21 DIAGNOSIS — R06.02 SOB (SHORTNESS OF BREATH): ICD-10-CM

## 2019-06-21 PROCEDURE — 3074F PR MOST RECENT SYSTOLIC BLOOD PRESSURE < 130 MM HG: ICD-10-PCS | Mod: HCNC,CPTII,S$GLB, | Performed by: INTERNAL MEDICINE

## 2019-06-21 PROCEDURE — 93000 EKG 12-LEAD: ICD-10-PCS | Mod: HCNC,S$GLB,, | Performed by: INTERNAL MEDICINE

## 2019-06-21 PROCEDURE — 99999 PR PBB SHADOW E&M-EST. PATIENT-LVL III: ICD-10-PCS | Mod: PBBFAC,HCNC,, | Performed by: INTERNAL MEDICINE

## 2019-06-21 PROCEDURE — 1101F PT FALLS ASSESS-DOCD LE1/YR: CPT | Mod: HCNC,CPTII,S$GLB, | Performed by: INTERNAL MEDICINE

## 2019-06-21 PROCEDURE — 99499 UNLISTED E&M SERVICE: CPT | Mod: HCNC,S$GLB,, | Performed by: INTERNAL MEDICINE

## 2019-06-21 PROCEDURE — 93000 ELECTROCARDIOGRAM COMPLETE: CPT | Mod: HCNC,S$GLB,, | Performed by: INTERNAL MEDICINE

## 2019-06-21 PROCEDURE — 99499 RISK ADDL DX/OHS AUDIT: ICD-10-PCS | Mod: HCNC,S$GLB,, | Performed by: INTERNAL MEDICINE

## 2019-06-21 PROCEDURE — 99999 PR PBB SHADOW E&M-EST. PATIENT-LVL III: CPT | Mod: PBBFAC,HCNC,, | Performed by: INTERNAL MEDICINE

## 2019-06-21 PROCEDURE — 1101F PR PT FALLS ASSESS DOC 0-1 FALLS W/OUT INJ PAST YR: ICD-10-PCS | Mod: HCNC,CPTII,S$GLB, | Performed by: INTERNAL MEDICINE

## 2019-06-21 PROCEDURE — 3078F DIAST BP <80 MM HG: CPT | Mod: HCNC,CPTII,S$GLB, | Performed by: INTERNAL MEDICINE

## 2019-06-21 PROCEDURE — 99214 PR OFFICE/OUTPT VISIT, EST, LEVL IV, 30-39 MIN: ICD-10-PCS | Mod: HCNC,S$GLB,, | Performed by: INTERNAL MEDICINE

## 2019-06-21 PROCEDURE — 3074F SYST BP LT 130 MM HG: CPT | Mod: HCNC,CPTII,S$GLB, | Performed by: INTERNAL MEDICINE

## 2019-06-21 PROCEDURE — 99214 OFFICE O/P EST MOD 30 MIN: CPT | Mod: HCNC,S$GLB,, | Performed by: INTERNAL MEDICINE

## 2019-06-21 PROCEDURE — 3078F PR MOST RECENT DIASTOLIC BLOOD PRESSURE < 80 MM HG: ICD-10-PCS | Mod: HCNC,CPTII,S$GLB, | Performed by: INTERNAL MEDICINE

## 2019-06-21 NOTE — PROGRESS NOTES
Subjective:    Patient ID:  Sharath Huizar is a 68 y.o. male who presents for follow-up of Coronary Artery Disease      HPI   NSTEMI - 4 stents to RCA 3/23/18, HTN     5/29/18 [facility] - EDP 23, LAD small vessel - occluded mid, Cx - luminal irregularities, RCA stents widely patent     Admitted 3/23/18  Mr. Sharath Huizar is a 67 y.o. male with tobacco abuse who presents to [facility] ED with complaints of chest pain this morning.  He was at work when the pain started, and it was described as mid-epigastric with radiation into his abdomen, was sharp in quality, and was 10/10 in severity at its worst.  He reports some nausea but denies any vomiting.  He also had some diaphoresis but not any palpitations, shortness of breath, hemoptysis, nor any lower extremity pain or swelling.  There were no alleviating or exacerbating factors, and he has never had similar episodes in the past.  He denies any personal or family history of heart attacks.  He does admit to smoking 1 pack a day of cigarettes since his early 20's, and drinks about 2-3 beers a day.     66 y/o male admitted to ICU with NSTEMI.  Underwent LHC with JUANCARLOS x 4 to RCA. Residual LAD disease that will need to be addressed at a later date.  Remained on Aggrastat infusion, now off.  TTE showing new EF 35%. Patient euvolemic. Discharged with cardiology follow up.       Echo 2/1/19  · Moderately decreased left ventricular systolic function. The estimated ejection fraction is 30%  · Eccentric left ventricular hypertrophy.  · Mild left ventricular enlargement.  · Grade I (mild) left ventricular diastolic dysfunction consistent with impaired relaxation.  · Normal right ventricular systolic function.  · Moderate left atrial enlargement.     Echo 7/31/18    1 - Moderately depressed left ventricular systolic function (EF 30-35%).     2 - Eccentric hypertrophy.     3 - Mild left ventricular enlargement.     4 - Moderate left atrial enlargement.     5 - Impaired LV relaxation, elevated  LAP (grade 2 diastolic dysfunction).     6 - Trivial mitral regurgitation.     7 - Trivial tricuspid regurgitation.     8 - Trivial pulmonic regurgitation.      Echo 3/24/18    1 - Moderately depressed left ventricular systolic function (EF 35-40%) Severe inferior hypokinesis.     2 - Concentric hypertrophy.     3 - Impaired LV relaxation, normal LAP (grade 1 diastolic dysfunction).     4 - Trivial mitral regurgitation.     5 - Trivial tricuspid regurgitation.      3/24/18  PCI to the prox, mid, distal RCA with JUANCARLOS x 3  PCI to the PDA with JUANCARLOS x 1  Long mid LAD 75%     Stress test 4/11/18  LVEF: 28 %  Impression: ABNORMAL MYOCARDIAL PERFUSION  1. There is a large size fixed defect of severe intensity that extends from the base to the apical inferior wall of the left ventricle, consistent with myocardial injury. There is trivial nahid-injury ischemia.   2. Resting wall motion is physiologic.   3. There is resting LV dysfunction with a reduced ejection fraction of 28 %.      5/1/18 Imdur added and toprol increased for recurrent CP  5/15/18 Still with exertional CP - imdur gives him a HA  6/12/18 Stable angina. Gets MCKEON with fast walking     8/1/18 Still gets MCKEON after walking 2 blocks  Denies CP  Echo with EF < 35%. I have recommended an AICD - he refuses at this time  Continue with medical Rx     11/1/18 MCKEON improved some  Denies CP  EKG NSR old IMI     2/4/19 Denies CP or SOB  EKG NSR - old IMI  Still declines AICD    Denies CP or SOB  Has been suffering with HA - sound like a sinus HA  EKG NSR - old IMI   BP controlled      Review of Systems   Constitution: Negative for decreased appetite.   HENT: Negative for ear discharge.    Eyes: Negative for blurred vision.   Endocrine: Negative for polyphagia.   Skin: Negative for nail changes.   Genitourinary: Negative for bladder incontinence.   Neurological: Negative for aphonia.   Psychiatric/Behavioral: Negative for hallucinations.   Allergic/Immunologic: Negative for  hives.        Objective:    Physical Exam   Constitutional: He is oriented to person, place, and time. He appears well-developed and well-nourished.   HENT:   Head: Normocephalic and atraumatic.   Eyes: Pupils are equal, round, and reactive to light. Conjunctivae are normal.   Neck: Normal range of motion. Neck supple.   Cardiovascular: Normal rate, normal heart sounds and intact distal pulses.   Pulmonary/Chest: Effort normal and breath sounds normal.   Abdominal: Soft. Bowel sounds are normal.   Musculoskeletal: Normal range of motion.   Neurological: He is alert and oriented to person, place, and time.   Skin: Skin is warm and dry.         Assessment:       1. NSTEMI (non-ST elevated myocardial infarction)    2. Hypertension, essential    3. Chronic systolic heart failure    4. Coronary artery disease involving native coronary artery of native heart without angina pectoris         Plan:       Cardiac stable  Still declines AICD  OV 6 months

## 2019-06-25 ENCOUNTER — TELEPHONE (OUTPATIENT)
Dept: SMOKING CESSATION | Facility: CLINIC | Age: 69
End: 2019-06-25

## 2019-07-09 ENCOUNTER — CLINICAL SUPPORT (OUTPATIENT)
Dept: SMOKING CESSATION | Facility: CLINIC | Age: 69
End: 2019-07-09
Payer: COMMERCIAL

## 2019-07-09 ENCOUNTER — TELEPHONE (OUTPATIENT)
Dept: SMOKING CESSATION | Facility: CLINIC | Age: 69
End: 2019-07-09

## 2019-07-09 DIAGNOSIS — F17.200 NICOTINE DEPENDENCE: Primary | ICD-10-CM

## 2019-07-09 PROCEDURE — 99407 BEHAV CHNG SMOKING > 10 MIN: CPT | Mod: S$GLB,,, | Performed by: INTERNAL MEDICINE

## 2019-07-09 PROCEDURE — 99407 PR TOBACCO USE CESSATION INTENSIVE >10 MINUTES: ICD-10-PCS | Mod: S$GLB,,, | Performed by: INTERNAL MEDICINE

## 2019-07-09 NOTE — PROGRESS NOTES
Spoke with patient today in regard to smoking cessation progress for 12 month telephone follow up, he states not tobacco free. Patient has scheduled an appointment to return to the program for Quit attempt #2 on 7/24/2019 @ 10:00 am. Patient states using the prescribed tobacco cessation medication of nicotine patch and gum for his previous quit attempt. Informed patient of benefit period, future follow ups, and contact information if any further help or support is needed. Will resolve episode and complete smart form for Quit attempt #1.

## 2019-08-22 DIAGNOSIS — Z12.11 COLON CANCER SCREENING: Primary | ICD-10-CM

## 2019-09-17 ENCOUNTER — TELEPHONE (OUTPATIENT)
Dept: CARDIOLOGY | Facility: CLINIC | Age: 69
End: 2019-09-17

## 2019-10-04 DIAGNOSIS — I25.10 CORONARY ARTERY DISEASE INVOLVING NATIVE CORONARY ARTERY OF NATIVE HEART WITHOUT ANGINA PECTORIS: ICD-10-CM

## 2019-10-04 DIAGNOSIS — I10 HYPERTENSION, ESSENTIAL: ICD-10-CM

## 2019-10-04 RX ORDER — CLOPIDOGREL BISULFATE 75 MG/1
75 TABLET ORAL DAILY
Qty: 90 TABLET | Refills: 3 | Status: SHIPPED | OUTPATIENT
Start: 2019-10-04 | End: 2019-10-15 | Stop reason: SDUPTHER

## 2019-10-04 RX ORDER — METOPROLOL SUCCINATE 50 MG/1
50 TABLET, EXTENDED RELEASE ORAL DAILY
Qty: 90 TABLET | Refills: 3 | Status: SHIPPED | OUTPATIENT
Start: 2019-10-04 | End: 2019-10-15 | Stop reason: SDUPTHER

## 2019-10-04 RX ORDER — NITROGLYCERIN 0.4 MG/1
0.4 TABLET SUBLINGUAL EVERY 5 MIN PRN
Qty: 25 TABLET | Refills: 3 | Status: SHIPPED | OUTPATIENT
Start: 2019-10-04 | End: 2019-10-15 | Stop reason: SDUPTHER

## 2019-10-04 RX ORDER — ISOSORBIDE MONONITRATE 30 MG/1
30 TABLET, EXTENDED RELEASE ORAL DAILY
Qty: 90 TABLET | Refills: 3 | Status: SHIPPED | OUTPATIENT
Start: 2019-10-04 | End: 2019-10-15 | Stop reason: SDUPTHER

## 2019-10-04 RX ORDER — LISINOPRIL 2.5 MG/1
2.5 TABLET ORAL DAILY
Qty: 90 TABLET | Refills: 3 | Status: SHIPPED | OUTPATIENT
Start: 2019-10-04 | End: 2019-10-15 | Stop reason: SDUPTHER

## 2019-10-04 RX ORDER — ATORVASTATIN CALCIUM 80 MG/1
80 TABLET, FILM COATED ORAL DAILY
Qty: 90 TABLET | Refills: 3 | Status: SHIPPED | OUTPATIENT
Start: 2019-10-04 | End: 2019-10-15 | Stop reason: SDUPTHER

## 2019-10-15 DIAGNOSIS — I10 HYPERTENSION, ESSENTIAL: ICD-10-CM

## 2019-10-15 DIAGNOSIS — I25.10 CORONARY ARTERY DISEASE INVOLVING NATIVE CORONARY ARTERY OF NATIVE HEART WITHOUT ANGINA PECTORIS: ICD-10-CM

## 2019-10-15 RX ORDER — METOPROLOL SUCCINATE 50 MG/1
TABLET, EXTENDED RELEASE ORAL
Qty: 90 TABLET | Refills: 3 | Status: SHIPPED | OUTPATIENT
Start: 2019-10-15 | End: 2019-10-19 | Stop reason: SDUPTHER

## 2019-10-15 RX ORDER — LISINOPRIL 2.5 MG/1
TABLET ORAL
Qty: 90 TABLET | Refills: 3 | Status: SHIPPED | OUTPATIENT
Start: 2019-10-15 | End: 2019-10-19 | Stop reason: SDUPTHER

## 2019-10-15 RX ORDER — NITROGLYCERIN 0.4 MG/1
0.4 TABLET SUBLINGUAL EVERY 5 MIN PRN
Qty: 25 TABLET | Refills: 3 | Status: SHIPPED | OUTPATIENT
Start: 2019-10-15 | End: 2023-11-28 | Stop reason: SDUPTHER

## 2019-10-15 RX ORDER — LISINOPRIL 2.5 MG/1
2.5 TABLET ORAL DAILY
Qty: 90 TABLET | Refills: 3 | Status: ON HOLD | OUTPATIENT
Start: 2019-10-15 | End: 2020-05-21 | Stop reason: HOSPADM

## 2019-10-15 RX ORDER — CLOPIDOGREL BISULFATE 75 MG/1
75 TABLET ORAL DAILY
Qty: 90 TABLET | Refills: 3 | Status: SHIPPED | OUTPATIENT
Start: 2019-10-15 | End: 2020-10-19 | Stop reason: SDUPTHER

## 2019-10-15 RX ORDER — CLOPIDOGREL BISULFATE 75 MG/1
TABLET ORAL
Qty: 90 TABLET | Refills: 3 | Status: SHIPPED | OUTPATIENT
Start: 2019-10-15 | End: 2019-10-19 | Stop reason: SDUPTHER

## 2019-10-15 RX ORDER — METOPROLOL SUCCINATE 50 MG/1
50 TABLET, EXTENDED RELEASE ORAL DAILY
Qty: 90 TABLET | Refills: 3 | Status: SHIPPED | OUTPATIENT
Start: 2019-10-15 | End: 2020-10-19 | Stop reason: SDUPTHER

## 2019-10-15 RX ORDER — ISOSORBIDE MONONITRATE 30 MG/1
30 TABLET, EXTENDED RELEASE ORAL DAILY
Qty: 90 TABLET | Refills: 3 | Status: ON HOLD | OUTPATIENT
Start: 2019-10-15 | End: 2020-05-21 | Stop reason: HOSPADM

## 2019-10-15 RX ORDER — ATORVASTATIN CALCIUM 80 MG/1
80 TABLET, FILM COATED ORAL DAILY
Qty: 90 TABLET | Refills: 3 | Status: SHIPPED | OUTPATIENT
Start: 2019-10-15 | End: 2020-10-19 | Stop reason: SDUPTHER

## 2019-10-15 RX ORDER — ATORVASTATIN CALCIUM 80 MG/1
TABLET, FILM COATED ORAL
Qty: 90 TABLET | Refills: 3 | Status: SHIPPED | OUTPATIENT
Start: 2019-10-15 | End: 2019-10-19 | Stop reason: SDUPTHER

## 2019-10-17 ENCOUNTER — PATIENT OUTREACH (OUTPATIENT)
Dept: ADMINISTRATIVE | Facility: OTHER | Age: 69
End: 2019-10-17

## 2019-10-19 ENCOUNTER — HOSPITAL ENCOUNTER (EMERGENCY)
Facility: HOSPITAL | Age: 69
Discharge: HOME | End: 2019-10-19
Attending: EMERGENCY MEDICINE
Payer: MEDICARE

## 2019-10-19 VITALS
WEIGHT: 135 LBS | HEIGHT: 66 IN | DIASTOLIC BLOOD PRESSURE: 85 MMHG | RESPIRATION RATE: 15 BRPM | SYSTOLIC BLOOD PRESSURE: 146 MMHG | HEART RATE: 58 BPM | BODY MASS INDEX: 21.69 KG/M2 | OXYGEN SATURATION: 98 % | TEMPERATURE: 98 F

## 2019-10-19 DIAGNOSIS — R42 LIGHT HEADED: ICD-10-CM

## 2019-10-19 DIAGNOSIS — R51.9 NONINTRACTABLE HEADACHE, UNSPECIFIED CHRONICITY PATTERN, UNSPECIFIED HEADACHE TYPE: Primary | ICD-10-CM

## 2019-10-19 LAB
ALBUMIN SERPL BCP-MCNC: 3.8 G/DL (ref 3.5–5.2)
ALP SERPL-CCNC: 110 U/L (ref 55–135)
ALT SERPL W/O P-5'-P-CCNC: 13 U/L (ref 10–44)
ANION GAP SERPL CALC-SCNC: 9 MMOL/L (ref 8–16)
AST SERPL-CCNC: 17 U/L (ref 10–40)
BASOPHILS # BLD AUTO: 0.04 K/UL (ref 0–0.2)
BASOPHILS NFR BLD: 0.5 % (ref 0–1.9)
BILIRUB SERPL-MCNC: 0.3 MG/DL (ref 0.1–1)
BILIRUB UR QL STRIP: NEGATIVE
BUN SERPL-MCNC: 9 MG/DL (ref 8–23)
CALCIUM SERPL-MCNC: 8.9 MG/DL (ref 8.7–10.5)
CHLORIDE SERPL-SCNC: 109 MMOL/L (ref 95–110)
CLARITY UR: CLEAR
CO2 SERPL-SCNC: 25 MMOL/L (ref 23–29)
COLOR UR: NORMAL
CREAT SERPL-MCNC: 1 MG/DL (ref 0.5–1.4)
DIFFERENTIAL METHOD: ABNORMAL
EOSINOPHIL # BLD AUTO: 0.1 K/UL (ref 0–0.5)
EOSINOPHIL NFR BLD: 0.8 % (ref 0–8)
ERYTHROCYTE [DISTWIDTH] IN BLOOD BY AUTOMATED COUNT: 16.5 % (ref 11.5–14.5)
EST. GFR  (AFRICAN AMERICAN): >60 ML/MIN/1.73 M^2
EST. GFR  (NON AFRICAN AMERICAN): >60 ML/MIN/1.73 M^2
GLUCOSE SERPL-MCNC: 100 MG/DL (ref 70–110)
GLUCOSE UR QL STRIP: NEGATIVE
HCT VFR BLD AUTO: 40.3 % (ref 40–54)
HGB BLD-MCNC: 13.1 G/DL (ref 14–18)
HGB UR QL STRIP: NEGATIVE
IMM GRANULOCYTES # BLD AUTO: 0.02 K/UL (ref 0–0.04)
IMM GRANULOCYTES NFR BLD AUTO: 0.2 % (ref 0–0.5)
KETONES UR QL STRIP: NEGATIVE
LEUKOCYTE ESTERASE UR QL STRIP: NEGATIVE
LYMPHOCYTES # BLD AUTO: 2.9 K/UL (ref 1–4.8)
LYMPHOCYTES NFR BLD: 33.7 % (ref 18–48)
MCH RBC QN AUTO: 29.4 PG (ref 27–31)
MCHC RBC AUTO-ENTMCNC: 32.5 G/DL (ref 32–36)
MCV RBC AUTO: 91 FL (ref 82–98)
MONOCYTES # BLD AUTO: 0.6 K/UL (ref 0.3–1)
MONOCYTES NFR BLD: 6.7 % (ref 4–15)
NEUTROPHILS # BLD AUTO: 5 K/UL (ref 1.8–7.7)
NEUTROPHILS NFR BLD: 58.1 % (ref 38–73)
NITRITE UR QL STRIP: NEGATIVE
NRBC BLD-RTO: 0 /100 WBC
PH UR STRIP: 7 [PH] (ref 5–8)
PLATELET # BLD AUTO: 218 K/UL (ref 150–350)
PMV BLD AUTO: 11.4 FL (ref 9.2–12.9)
POTASSIUM SERPL-SCNC: 4 MMOL/L (ref 3.5–5.1)
PROT SERPL-MCNC: 6.4 G/DL (ref 6–8.4)
PROT UR QL STRIP: NEGATIVE
RBC # BLD AUTO: 4.45 M/UL (ref 4.6–6.2)
SODIUM SERPL-SCNC: 143 MMOL/L (ref 136–145)
SP GR UR STRIP: 1.01 (ref 1–1.03)
URN SPEC COLLECT METH UR: NORMAL
UROBILINOGEN UR STRIP-ACNC: NEGATIVE EU/DL
WBC # BLD AUTO: 8.64 K/UL (ref 3.9–12.7)

## 2019-10-19 PROCEDURE — 63600175 PHARM REV CODE 636 W HCPCS: Mod: HCNC | Performed by: EMERGENCY MEDICINE

## 2019-10-19 PROCEDURE — 96374 THER/PROPH/DIAG INJ IV PUSH: CPT | Mod: HCNC

## 2019-10-19 PROCEDURE — 93005 ELECTROCARDIOGRAM TRACING: CPT | Mod: HCNC

## 2019-10-19 PROCEDURE — 96375 TX/PRO/DX INJ NEW DRUG ADDON: CPT | Mod: HCNC

## 2019-10-19 PROCEDURE — 93010 ELECTROCARDIOGRAM REPORT: CPT | Mod: HCNC,,, | Performed by: INTERNAL MEDICINE

## 2019-10-19 PROCEDURE — 96361 HYDRATE IV INFUSION ADD-ON: CPT | Mod: HCNC

## 2019-10-19 PROCEDURE — 81003 URINALYSIS AUTO W/O SCOPE: CPT | Mod: HCNC

## 2019-10-19 PROCEDURE — 93010 EKG 12-LEAD: ICD-10-PCS | Mod: HCNC,,, | Performed by: INTERNAL MEDICINE

## 2019-10-19 PROCEDURE — 25000003 PHARM REV CODE 250: Mod: HCNC | Performed by: EMERGENCY MEDICINE

## 2019-10-19 PROCEDURE — 99285 EMERGENCY DEPT VISIT HI MDM: CPT | Mod: 25,HCNC

## 2019-10-19 PROCEDURE — 85025 COMPLETE CBC W/AUTO DIFF WBC: CPT | Mod: HCNC

## 2019-10-19 PROCEDURE — 80053 COMPREHEN METABOLIC PANEL: CPT | Mod: HCNC

## 2019-10-19 RX ORDER — PROCHLORPERAZINE EDISYLATE 5 MG/ML
10 INJECTION INTRAMUSCULAR; INTRAVENOUS
Status: COMPLETED | OUTPATIENT
Start: 2019-10-19 | End: 2019-10-19

## 2019-10-19 RX ORDER — ACETAMINOPHEN 500 MG
1000 TABLET ORAL
Status: COMPLETED | OUTPATIENT
Start: 2019-10-19 | End: 2019-10-19

## 2019-10-19 RX ORDER — BUTALBITAL, ACETAMINOPHEN AND CAFFEINE 50; 325; 40 MG/1; MG/1; MG/1
1 TABLET ORAL EVERY 6 HOURS PRN
Qty: 12 TABLET | Refills: 0 | Status: SHIPPED | OUTPATIENT
Start: 2019-10-19 | End: 2019-11-18

## 2019-10-19 RX ORDER — DIPHENHYDRAMINE HYDROCHLORIDE 50 MG/ML
25 INJECTION INTRAMUSCULAR; INTRAVENOUS
Status: COMPLETED | OUTPATIENT
Start: 2019-10-19 | End: 2019-10-19

## 2019-10-19 RX ADMIN — ACETAMINOPHEN 1000 MG: 500 TABLET ORAL at 08:10

## 2019-10-19 RX ADMIN — DIPHENHYDRAMINE HYDROCHLORIDE 25 MG: 50 INJECTION INTRAMUSCULAR; INTRAVENOUS at 09:10

## 2019-10-19 RX ADMIN — SODIUM CHLORIDE 1000 ML: 0.9 INJECTION, SOLUTION INTRAVENOUS at 08:10

## 2019-10-19 RX ADMIN — PROCHLORPERAZINE EDISYLATE 10 MG: 5 INJECTION INTRAMUSCULAR; INTRAVENOUS at 09:10

## 2019-10-20 NOTE — ED TRIAGE NOTES
Patient reports increased left sided headache that started this morning. Headache began as a dull headache and increased throughout the day. He describe his pain as sharp. He is also experiencing dizziness with his headache and some blurred vision that is new. He reports never having these symptoms before. He took 1 baby ASA 81mg today w/o relief in his symptoms. Pain 6/10. Denies numbness or tingling. Denies fever, cough, chills, n/v/d. Wife reports that her  wasn't feeling well today, he was just resting on the couch. Denies chest pain, shortness of breath. Recent MI in March 2018. HTN, High Cholesterol. NAD noted.

## 2019-10-20 NOTE — ED PROVIDER NOTES
Encounter Date: 10/19/2019    SCRIBE #1 NOTE: I, Chapito Padilla, am scribing for, and in the presence of,  Rochelle Medel MD. I have scribed the following portions of the note - Other sections scribed: HPI, ROS, PE.       History     Chief Complaint   Patient presents with    Headache     pt reports LEFT sided headache ongoing all day; pt reports taking 81mg ASA but nothing else for pain; pt denies any other symptoms at this time     68 y.o M with a hx of CAD HTN presents to the ED c/o a gradually worsening and moderate (6/10) left side headache. His headache began while laying in the bed this AM any denies any preceding trauma. No previous episodes of similar headaches. He also c/o light-headedness when transitioning from a lying to standing position. Additionally, he c/o fatigue. He is compliant with his Plavix and ASA 81mg. He denies fever, chills, diaphoresis, nausea, emesis, diarrhea, abdominal pain, blurry vision, chest pain, back pain, SOB and rash. He took a baby aspirin earlier today but denies other treatments at home for headache.    Cardiologist- Viral Lombardi MD    The history is provided by the patient. No  was used.     Review of patient's allergies indicates:  No Known Allergies  Past Medical History:   Diagnosis Date    Coronary artery disease     Hypertension     Myocardial infarction     Nuclear sclerosis of both eyes 6/13/2019     Past Surgical History:   Procedure Laterality Date    CARDIAC CATHETERIZATION      with 4 stents    LEFT HEART CATHETERIZATION Left 5/29/2018    Procedure: Left heart cath;  Surgeon: Viral Lombardi MD;  Location: [facility] CATH LAB;  Service: Cardiology;  Laterality: Left;  RN PREOP 5/28/18     Family History   Problem Relation Age of Onset    No Known Problems Mother     No Known Problems Father     No Known Problems Sister     No Known Problems Brother     No Known Problems Maternal Aunt     No Known Problems Maternal Uncle      Blindness Paternal Aunt     No Known Problems Paternal Uncle     No Known Problems Maternal Grandmother     No Known Problems Maternal Grandfather     No Known Problems Paternal Grandmother     No Known Problems Paternal Grandfather     Amblyopia Neg Hx     Cancer Neg Hx     Cataracts Neg Hx     Diabetes Neg Hx     Glaucoma Neg Hx     Hypertension Neg Hx     Macular degeneration Neg Hx     Retinal detachment Neg Hx     Strabismus Neg Hx     Stroke Neg Hx     Thyroid disease Neg Hx      Social History     Tobacco Use    Smoking status: Former Smoker     Types: Cigarettes     Last attempt to quit: 3/23/2018     Years since quittin.5    Smokeless tobacco: Never Used   Substance Use Topics    Alcohol use: No    Drug use: No     Review of Systems   Constitutional: Positive for fatigue. Negative for chills and fever.   HENT: Negative for congestion and sore throat.    Eyes: Negative for visual disturbance.   Respiratory: Negative for cough and shortness of breath.    Cardiovascular: Negative for chest pain.   Gastrointestinal: Negative for abdominal pain, nausea and vomiting.   Genitourinary: Negative for dysuria.   Skin: Negative for rash.   Allergic/Immunologic: Negative for immunocompromised state.   Neurological: Positive for light-headedness and headaches.       Physical Exam     Initial Vitals [10/19/19 1905]   BP Pulse Resp Temp SpO2   (!) 145/77 80 17 98.1 °F (36.7 °C) 99 %      MAP       --         Physical Exam    Nursing note and vitals reviewed.  Constitutional: He appears well-developed and well-nourished. He is not diaphoretic. No distress.   HENT:   Mouth/Throat: Oropharynx is clear and moist.   Eyes: Conjunctivae and EOM are normal. Pupils are equal, round, and reactive to light.   Neck: Neck supple.   Cardiovascular: Normal rate and regular rhythm.   Pulmonary/Chest: Breath sounds normal. No respiratory distress.   Abdominal: Soft. Bowel sounds are normal.   Musculoskeletal: He  exhibits no edema.   Neurological: He is alert and oriented to person, place, and time. He has normal strength. No cranial nerve deficit or sensory deficit. GCS score is 15. GCS eye subscore is 4. GCS verbal subscore is 5. GCS motor subscore is 6.   No tenderness over L temporal artery. No pronator drift, normal finger to nose testing.   Skin: Skin is warm and dry.   Psychiatric: He has a normal mood and affect.         ED Course   Procedures  Labs Reviewed   CBC W/ AUTO DIFFERENTIAL - Abnormal; Notable for the following components:       Result Value    RBC 4.45 (*)     Hemoglobin 13.1 (*)     RDW 16.5 (*)     All other components within normal limits   COMPREHENSIVE METABOLIC PANEL   URINALYSIS, REFLEX TO URINE CULTURE    Narrative:     Preferred Collection Type->Urine, Clean Catch        ECG Results          EKG 12-lead (Preliminary result)  Result time 10/19/19 20:55:04    ED Interpretation by Rochelle Medel MD (10/19/19 20:55:04)    Normal sinus rhythm, rate 73 beats per minute, T-wave inversions noted in the inferior lateral leads do not appear new compared to previous EKG.                            Imaging Results          CT Head Without Contrast (Final result)  Result time 10/19/19 21:49:59    Final result by Carolyn Orellana MD (10/19/19 21:49:59)                 Impression:      No acute intracranial abnormality detected.      Electronically signed by: Carolyn Orellana  Date:    10/19/2019  Time:    21:49             Narrative:    EXAMINATION:  CT OF THE HEAD WITHOUT    CLINICAL HISTORY:  Headache, acute, norm neuro exam;    TECHNIQUE:  5 mm unenhanced axial images were obtained from the skull base to the vertex.    COMPARISON:  None.    FINDINGS:  Mild frontal cortical atrophy and mild chronic small vessel ischemic changes are present.  There is no acute intracranial hemorrhage, territorial infarct or mass effect, or midline shift. The visualized paranasal sinuses and mastoid air cells are clear.  There is mild scalp induration seen at the parietal vertex.                                 Medical Decision Making:   Initial Assessment:   68-year-old male presents with left-sided headache. Describes is gradual in onset, moderate intensity.  Not associated with any visual change, numbness, weakness, speech difficulty, fever.  He has a nonfocal neurologic exam.  I suspect tension-type headache. I do not suspect subarachnoid hemorrhage, meningitis, subdural hematoma, temporal arteritis, cluster headache. Workup initiated with basic labs, will treat with Tylenol and reassess.  ED Management:  Patient did not have any change in his symptoms after dose of Tylenol.  CT head was ordered as patient has never had a headache before and he is on blood thinners.  CT head was negative for any acute finding.  Patient had improvement in his symptoms after Compazine and Benadryl.  He was given IV fluids.  I will discharge him with Fioricet, I referred him to primary care to follow up as symptoms in several days.  He is advised to return to the ER if needed for any new or worsening symptoms.            Scribe Attestation:   Scribe #1: I performed the above scribed service and the documentation accurately describes the services I performed. I attest to the accuracy of the note.    I, Rochelle Medel MD, personally performed the services described in this documentation. All medical record entries made by the scribe were at my direction and in my presence.  I have reviewed the chart and agree that the record reflects my personal performance and is accurate and complete.           Clinical Impression:       ICD-10-CM ICD-9-CM   1. Nonintractable headache, unspecified chronicity pattern, unspecified headache type R51 784.0   2. Light headed R42 780.4                                Rochelle Medel MD  10/19/19 [id number]

## 2019-10-20 NOTE — ED NOTES
Orthostatic vital signs obtained. Patient tolerated well w/o issues. Did not experience any dizziness at this time. Patient updated on plan of care

## 2019-10-22 ENCOUNTER — OFFICE VISIT (OUTPATIENT)
Dept: CARDIOLOGY | Facility: CLINIC | Age: 69
End: 2019-10-22
Payer: MEDICARE

## 2019-10-22 VITALS
SYSTOLIC BLOOD PRESSURE: 147 MMHG | HEIGHT: 66 IN | OXYGEN SATURATION: 98 % | WEIGHT: 127.88 LBS | BODY MASS INDEX: 20.55 KG/M2 | DIASTOLIC BLOOD PRESSURE: 89 MMHG | HEART RATE: 85 BPM

## 2019-10-22 DIAGNOSIS — I10 HYPERTENSION, ESSENTIAL: ICD-10-CM

## 2019-10-22 DIAGNOSIS — I25.10 CORONARY ARTERY DISEASE INVOLVING NATIVE CORONARY ARTERY OF NATIVE HEART WITHOUT ANGINA PECTORIS: ICD-10-CM

## 2019-10-22 DIAGNOSIS — I50.22 CHRONIC SYSTOLIC HEART FAILURE: ICD-10-CM

## 2019-10-22 DIAGNOSIS — R06.09 DOE (DYSPNEA ON EXERTION): ICD-10-CM

## 2019-10-22 DIAGNOSIS — I21.4 NSTEMI (NON-ST ELEVATED MYOCARDIAL INFARCTION): Primary | ICD-10-CM

## 2019-10-22 PROCEDURE — 99999 PR PBB SHADOW E&M-EST. PATIENT-LVL III: CPT | Mod: PBBFAC,HCNC,, | Performed by: INTERNAL MEDICINE

## 2019-10-22 PROCEDURE — 1101F PR PT FALLS ASSESS DOC 0-1 FALLS W/OUT INJ PAST YR: ICD-10-PCS | Mod: HCNC,CPTII,S$GLB, | Performed by: INTERNAL MEDICINE

## 2019-10-22 PROCEDURE — 99499 RISK ADDL DX/OHS AUDIT: ICD-10-PCS | Mod: HCNC,S$GLB,, | Performed by: INTERNAL MEDICINE

## 2019-10-22 PROCEDURE — 99999 PR PBB SHADOW E&M-EST. PATIENT-LVL III: ICD-10-PCS | Mod: PBBFAC,HCNC,, | Performed by: INTERNAL MEDICINE

## 2019-10-22 PROCEDURE — 99214 OFFICE O/P EST MOD 30 MIN: CPT | Mod: HCNC,S$GLB,, | Performed by: INTERNAL MEDICINE

## 2019-10-22 PROCEDURE — 3079F PR MOST RECENT DIASTOLIC BLOOD PRESSURE 80-89 MM HG: ICD-10-PCS | Mod: HCNC,CPTII,S$GLB, | Performed by: INTERNAL MEDICINE

## 2019-10-22 PROCEDURE — 1101F PT FALLS ASSESS-DOCD LE1/YR: CPT | Mod: HCNC,CPTII,S$GLB, | Performed by: INTERNAL MEDICINE

## 2019-10-22 PROCEDURE — 99214 PR OFFICE/OUTPT VISIT, EST, LEVL IV, 30-39 MIN: ICD-10-PCS | Mod: HCNC,S$GLB,, | Performed by: INTERNAL MEDICINE

## 2019-10-22 PROCEDURE — 3077F PR MOST RECENT SYSTOLIC BLOOD PRESSURE >= 140 MM HG: ICD-10-PCS | Mod: HCNC,CPTII,S$GLB, | Performed by: INTERNAL MEDICINE

## 2019-10-22 PROCEDURE — 99499 UNLISTED E&M SERVICE: CPT | Mod: HCNC,S$GLB,, | Performed by: INTERNAL MEDICINE

## 2019-10-22 PROCEDURE — 3079F DIAST BP 80-89 MM HG: CPT | Mod: HCNC,CPTII,S$GLB, | Performed by: INTERNAL MEDICINE

## 2019-10-22 PROCEDURE — 3077F SYST BP >= 140 MM HG: CPT | Mod: HCNC,CPTII,S$GLB, | Performed by: INTERNAL MEDICINE

## 2019-10-22 NOTE — PROGRESS NOTES
Subjective:    Patient ID:  Sharath Huizar is a 68 y.o. male who presents for follow-up of Coronary Artery Disease      HPI     NSTEMI - 4 stents to RCA 3/23/18, HTN     5/29/18 [facility] - EDP 23, LAD small vessel - occluded mid, Cx - luminal irregularities, RCA stents widely patent     Admitted 3/23/18  Mr. Sharath Huizar is a 67 y.o. male with tobacco abuse who presents to [facility] ED with complaints of chest pain this morning.  He was at work when the pain started, and it was described as mid-epigastric with radiation into his abdomen, was sharp in quality, and was 10/10 in severity at its worst.  He reports some nausea but denies any vomiting.  He also had some diaphoresis but not any palpitations, shortness of breath, hemoptysis, nor any lower extremity pain or swelling.  There were no alleviating or exacerbating factors, and he has never had similar episodes in the past.  He denies any personal or family history of heart attacks.  He does admit to smoking 1 pack a day of cigarettes since his early 20's, and drinks about 2-3 beers a day.     66 y/o male admitted to ICU with NSTEMI.  Underwent LHC with JUANCARLOS x 4 to RCA. Residual LAD disease that will need to be addressed at a later date.  Remained on Aggrastat infusion, now off.  TTE showing new EF 35%. Patient euvolemic. Discharged with cardiology follow up.       Echo 2/1/19  · Moderately decreased left ventricular systolic function. The estimated ejection fraction is 30%  · Eccentric left ventricular hypertrophy.  · Mild left ventricular enlargement.  · Grade I (mild) left ventricular diastolic dysfunction consistent with impaired relaxation.  · Normal right ventricular systolic function.  · Moderate left atrial enlargement.     Echo 7/31/18    1 - Moderately depressed left ventricular systolic function (EF 30-35%).     2 - Eccentric hypertrophy.     3 - Mild left ventricular enlargement.     4 - Moderate left atrial enlargement.     5 - Impaired LV relaxation,  elevated LAP (grade 2 diastolic dysfunction).     6 - Trivial mitral regurgitation.     7 - Trivial tricuspid regurgitation.     8 - Trivial pulmonic regurgitation.      Echo 3/24/18    1 - Moderately depressed left ventricular systolic function (EF 35-40%) Severe inferior hypokinesis.     2 - Concentric hypertrophy.     3 - Impaired LV relaxation, normal LAP (grade 1 diastolic dysfunction).     4 - Trivial mitral regurgitation.     5 - Trivial tricuspid regurgitation.      3/24/18  PCI to the prox, mid, distal RCA with JUANCARLOS x 3  PCI to the PDA with JUANCARLOS x 1  Long mid LAD 75%     Stress test 4/11/18  LVEF: 28 %  Impression: ABNORMAL MYOCARDIAL PERFUSION  1. There is a large size fixed defect of severe intensity that extends from the base to the apical inferior wall of the left ventricle, consistent with myocardial injury. There is trivial nahid-injury ischemia.   2. Resting wall motion is physiologic.   3. There is resting LV dysfunction with a reduced ejection fraction of 28 %.      5/1/18 Imdur added and toprol increased for recurrent CP  5/15/18 Still with exertional CP - imdur gives him a HA  6/12/18 Stable angina. Gets MCKEON with fast walking     8/1/18 Still gets MCKEON after walking 2 blocks  Denies CP  Echo with EF < 35%. I have recommended an AICD - he refuses at this time  Continue with medical Rx     11/1/18 MCKEON improved some  Denies CP  EKG NSR old IMI     2/4/19 Denies CP or SOB  EKG NSR - old IMI  Still declines AICD     6/21/19 Denies CP or SOB  Has been suffering with HA - sound like a sinus HA  EKG NSR - old IMI   BP controlled  Cardiac stable  Still declines AICD  OV 6 months     Went to the ER 10/19/19  68 y.o M with a hx of CAD HTN presents to the ED c/o a gradually worsening and moderate (6/10) left side headache. His headache began while laying in the bed this AM any denies any preceding trauma. No previous episodes of similar headaches. He also c/o light-headedness when transitioning from a lying to  standing position. Additionally, he c/o fatigue. He is compliant with his Plavix and ASA 81mg. He denies fever, chills, diaphoresis, nausea, emesis, diarrhea, abdominal pain, blurry vision, chest pain, back pain, SOB and rash. He took a baby aspirin earlier today but denies other treatments at home for headache.    68-year-old male presents with left-sided headache. Describes is gradual in onset, moderate intensity.  Not associated with any visual change, numbness, weakness, speech difficulty, fever.  He has a nonfocal neurologic exam.  I suspect tension-type headache. I do not suspect subarachnoid hemorrhage, meningitis, subdural hematoma, temporal arteritis, cluster headache. Workup initiated with basic labs, will treat with Tylenol and reassess.  ED Management:  Patient did not have any change in his symptoms after dose of Tylenol.  CT head was ordered as patient has never had a headache before and he is on blood thinners.  CT head was negative for any acute finding.  Patient had improvement in his symptoms after Compazine and Benadryl.  He was given IV fluids.  I will discharge him with Fioricet, I referred him to primary care to follow up as symptoms in several days.  He is advised to return to the ER if needed for any new or worsening symptoms.    HA has improved  Denies CP, SOB, or palpitations  EKG 10/19/19 NSR with PVCs old IMI NSSTT changes    Review of Systems   Constitution: Negative for decreased appetite.   HENT: Negative for ear discharge.    Eyes: Negative for blurred vision.   Endocrine: Negative for polyphagia.   Skin: Negative for nail changes.   Genitourinary: Negative for bladder incontinence.   Neurological: Negative for aphonia.   Psychiatric/Behavioral: Negative for hallucinations.   Allergic/Immunologic: Negative for hives.        Objective:    Physical Exam   Constitutional: He is oriented to person, place, and time. He appears well-developed and well-nourished.   HENT:   Head:  Normocephalic and atraumatic.   Eyes: Pupils are equal, round, and reactive to light. Conjunctivae are normal.   Neck: Normal range of motion. Neck supple.   Cardiovascular: Normal rate, normal heart sounds and intact distal pulses.   Pulmonary/Chest: Effort normal and breath sounds normal.   Abdominal: Soft. Bowel sounds are normal.   Musculoskeletal: Normal range of motion.   Neurological: He is alert and oriented to person, place, and time.   Skin: Skin is warm and dry.         Assessment:       1. NSTEMI (non-ST elevated myocardial infarction)    2. Hypertension, essential    3. Chronic systolic heart failure    4. Coronary artery disease involving native coronary artery of native heart without angina pectoris    5. MCKEON (dyspnea on exertion)         Plan:       Cardiac stable  Still not interested in AICD  OV 6 months with echo

## 2019-10-24 ENCOUNTER — OFFICE VISIT (OUTPATIENT)
Dept: FAMILY MEDICINE | Facility: CLINIC | Age: 69
End: 2019-10-24
Payer: MEDICARE

## 2019-10-24 VITALS
OXYGEN SATURATION: 100 % | SYSTOLIC BLOOD PRESSURE: 110 MMHG | TEMPERATURE: 98 F | HEART RATE: 77 BPM | RESPIRATION RATE: 20 BRPM | DIASTOLIC BLOOD PRESSURE: 70 MMHG | BODY MASS INDEX: 20.3 KG/M2 | HEIGHT: 66 IN | WEIGHT: 126.31 LBS

## 2019-10-24 DIAGNOSIS — I50.22 CHRONIC SYSTOLIC HEART FAILURE: ICD-10-CM

## 2019-10-24 DIAGNOSIS — I10 HYPERTENSION, ESSENTIAL: Primary | ICD-10-CM

## 2019-10-24 DIAGNOSIS — Z12.5 SCREENING FOR PROSTATE CANCER: ICD-10-CM

## 2019-10-24 DIAGNOSIS — I70.0 ATHEROSCLEROSIS OF AORTA: ICD-10-CM

## 2019-10-24 DIAGNOSIS — Z23 NEED FOR VACCINATION: ICD-10-CM

## 2019-10-24 DIAGNOSIS — D64.9 ANEMIA, UNSPECIFIED TYPE: ICD-10-CM

## 2019-10-24 DIAGNOSIS — Z12.11 COLON CANCER SCREENING: ICD-10-CM

## 2019-10-24 PROCEDURE — 90662 FLU VACCINE - HIGH DOSE (65+) PRESERVATIVE FREE IM: ICD-10-PCS | Mod: HCNC,S$GLB,, | Performed by: FAMILY MEDICINE

## 2019-10-24 PROCEDURE — 99499 UNLISTED E&M SERVICE: CPT | Mod: HCNC,S$GLB,, | Performed by: FAMILY MEDICINE

## 2019-10-24 PROCEDURE — 3078F DIAST BP <80 MM HG: CPT | Mod: HCNC,CPTII,S$GLB, | Performed by: FAMILY MEDICINE

## 2019-10-24 PROCEDURE — 3078F PR MOST RECENT DIASTOLIC BLOOD PRESSURE < 80 MM HG: ICD-10-PCS | Mod: HCNC,CPTII,S$GLB, | Performed by: FAMILY MEDICINE

## 2019-10-24 PROCEDURE — 90662 IIV NO PRSV INCREASED AG IM: CPT | Mod: HCNC,S$GLB,, | Performed by: FAMILY MEDICINE

## 2019-10-24 PROCEDURE — G0009 ADMIN PNEUMOCOCCAL VACCINE: HCPCS | Mod: HCNC,S$GLB,, | Performed by: FAMILY MEDICINE

## 2019-10-24 PROCEDURE — 99999 PR PBB SHADOW E&M-EST. PATIENT-LVL III: ICD-10-PCS | Mod: PBBFAC,HCNC,, | Performed by: FAMILY MEDICINE

## 2019-10-24 PROCEDURE — 90732 PNEUMOCOCCAL POLYSACCHARIDE VACCINE 23-VALENT =>2YO SQ IM: ICD-10-PCS | Mod: HCNC,S$GLB,, | Performed by: FAMILY MEDICINE

## 2019-10-24 PROCEDURE — G0008 FLU VACCINE - HIGH DOSE (65+) PRESERVATIVE FREE IM: ICD-10-PCS | Mod: HCNC,S$GLB,, | Performed by: FAMILY MEDICINE

## 2019-10-24 PROCEDURE — 99214 PR OFFICE/OUTPT VISIT, EST, LEVL IV, 30-39 MIN: ICD-10-PCS | Mod: HCNC,25,S$GLB, | Performed by: FAMILY MEDICINE

## 2019-10-24 PROCEDURE — 99499 RISK ADDL DX/OHS AUDIT: ICD-10-PCS | Mod: HCNC,S$GLB,, | Performed by: FAMILY MEDICINE

## 2019-10-24 PROCEDURE — 3074F PR MOST RECENT SYSTOLIC BLOOD PRESSURE < 130 MM HG: ICD-10-PCS | Mod: HCNC,CPTII,S$GLB, | Performed by: FAMILY MEDICINE

## 2019-10-24 PROCEDURE — 1101F PR PT FALLS ASSESS DOC 0-1 FALLS W/OUT INJ PAST YR: ICD-10-PCS | Mod: HCNC,CPTII,S$GLB, | Performed by: FAMILY MEDICINE

## 2019-10-24 PROCEDURE — 99214 OFFICE O/P EST MOD 30 MIN: CPT | Mod: HCNC,25,S$GLB, | Performed by: FAMILY MEDICINE

## 2019-10-24 PROCEDURE — G0008 ADMIN INFLUENZA VIRUS VAC: HCPCS | Mod: HCNC,S$GLB,, | Performed by: FAMILY MEDICINE

## 2019-10-24 PROCEDURE — 99999 PR PBB SHADOW E&M-EST. PATIENT-LVL III: CPT | Mod: PBBFAC,HCNC,, | Performed by: FAMILY MEDICINE

## 2019-10-24 PROCEDURE — 3074F SYST BP LT 130 MM HG: CPT | Mod: HCNC,CPTII,S$GLB, | Performed by: FAMILY MEDICINE

## 2019-10-24 PROCEDURE — 90732 PPSV23 VACC 2 YRS+ SUBQ/IM: CPT | Mod: HCNC,S$GLB,, | Performed by: FAMILY MEDICINE

## 2019-10-24 PROCEDURE — 1101F PT FALLS ASSESS-DOCD LE1/YR: CPT | Mod: HCNC,CPTII,S$GLB, | Performed by: FAMILY MEDICINE

## 2019-10-24 PROCEDURE — G0009 PNEUMOCOCCAL POLYSACCHARIDE VACCINE 23-VALENT =>2YO SQ IM: ICD-10-PCS | Mod: HCNC,S$GLB,, | Performed by: FAMILY MEDICINE

## 2019-10-24 NOTE — PROGRESS NOTES
Patient tolerated flu med. administration well.  Instructed to wait for 15 minutes to monitor for any adverse reactions.  IM injection administered as ordered.  Pt. tolerated well with no complaints.

## 2019-10-24 NOTE — LETTER
October 27, 2019      Rochelle Medel MD  [address] [place] [address]           [facility]  [address]  [place] [address]  Phone: [phone]          Patient: Sharath Huizar   MR Number: [id number]   YOB: 1950   Date of Visit: 10/24/2019       Dear Dr. Rochelle Medel:    Thank you for referring Sharath Huizar to me for evaluation. Attached you will find relevant portions of my assessment and plan of care.    If you have questions, please do not hesitate to call me. I look forward to following Sharath Huizar along with you.    Sincerely,    Wilmer Bangura Jr., MD    Enclosure  CC:  No Recipients    If you would like to receive this communication electronically, please contact [email or web address] or [phone] to request more information on [employer] Link access.    For providers and/or their staff who would like to refer a patient to [facility], please contact us through our one-stop-shop provider referral line, [facility], at [phone].    If you feel you have received this communication in error or would no longer like to receive these types of communications, please e-mail [email or web address]

## 2019-10-25 ENCOUNTER — PES CALL (OUTPATIENT)
Dept: ADMINISTRATIVE | Facility: CLINIC | Age: 69
End: 2019-10-25

## 2019-10-28 NOTE — PROGRESS NOTES
Routine Office Visit    Patient Name: Sharath Huizar    : 1950  MRN: [id number]    Subjective:  Sharath is a 68 y.o. male who presents today for:   Chief Complaint   Patient presents with    Hypertension     pt has questions about prostate     60-year-old male with hypertension, chronic systolic heart failure, and coronary artery disease, comes in for follow-up on these.  He reports taking his medications as prescribed.  He states that he is up-to-date on his cardiac visit.  At his last cardiology visit, he continued to decline ICD.  Reports no acute concerns.  He would like to get prostate cancer screening done.  He is not having any symptomatology.    Past Medical History  Past Medical History:   Diagnosis Date    Coronary artery disease     Hypertension     Myocardial infarction     Nuclear sclerosis of both eyes 2019       Past Surgical History  Past Surgical History:   Procedure Laterality Date    CARDIAC CATHETERIZATION      with 4 stents    LEFT HEART CATHETERIZATION Left 2018    Procedure: Left heart cath;  Surgeon: Viral Lombardi MD;  Location: [facility] CATH LAB;  Service: Cardiology;  Laterality: Left;  RN PREOP 18        Family History  Family History   Problem Relation Age of Onset    No Known Problems Mother     No Known Problems Father     No Known Problems Sister     No Known Problems Brother     No Known Problems Maternal Aunt     No Known Problems Maternal Uncle     Blindness Paternal Aunt     No Known Problems Paternal Uncle     No Known Problems Maternal Grandmother     No Known Problems Maternal Grandfather     No Known Problems Paternal Grandmother     No Known Problems Paternal Grandfather     Amblyopia Neg Hx     Cancer Neg Hx     Cataracts Neg Hx     Diabetes Neg Hx     Glaucoma Neg Hx     Hypertension Neg Hx     Macular degeneration Neg Hx     Retinal detachment Neg Hx     Strabismus Neg Hx     Stroke Neg Hx     Thyroid disease Neg Hx         Social History  Social History     Socioeconomic History    Marital status:      Spouse name: Not on file    Number of children: Not on file    Years of education: Not on file    Highest education level: Not on file   Occupational History    Not on file   Social Needs    Financial resource strain: Not on file    Food insecurity:     Worry: Not on file     Inability: Not on file    Transportation needs:     Medical: Not on file     Non-medical: Not on file   Tobacco Use    Smoking status: Former Smoker     Types: Cigarettes     Last attempt to quit: 3/23/2018     Years since quittin.5    Smokeless tobacco: Never Used   Substance and Sexual Activity    Alcohol use: No    Drug use: No    Sexual activity: Yes     Partners: Female   Lifestyle    Physical activity:     Days per week: Not on file     Minutes per session: Not on file    Stress: Not on file   Relationships    Social connections:     Talks on phone: Not on file     Gets together: Not on file     Attends [employer] service: Not on file     Active member of club or organization: Not on file     Attends meetings of clubs or organizations: Not on file     Relationship status: Not on file   Other Topics Concern    Not on file   Social History Narrative    Not on file       Current Medications  Current Outpatient Medications on File Prior to Visit   Medication Sig Dispense Refill    atorvastatin (LIPITOR) 80 MG tablet Take 1 tablet (80 mg total) by mouth once daily. 90 tablet 3    butalbital-acetaminophen-caffeine -40 mg (FIORICET, ESGIC) -40 mg per tablet Take 1 tablet by mouth every 6 (six) hours as needed for Headaches. 12 tablet 0    clopidogrel (PLAVIX) 75 mg tablet Take 1 tablet (75 mg total) by mouth once daily. 90 tablet 3    isosorbide mononitrate (IMDUR) 30 MG 24 hr tablet Take 1 tablet (30 mg total) by mouth once daily. 90 tablet 3    lisinopril (PRINIVIL,ZESTRIL) 2.5 MG tablet Take 1 tablet (2.5 mg  "total) by mouth once daily. 90 tablet 3    metoprolol succinate (TOPROL-XL) 50 MG 24 hr tablet Take 1 tablet (50 mg total) by mouth once daily. 90 tablet 3    nitroGLYCERIN (NITROSTAT) 0.4 MG SL tablet Place 1 tablet (0.4 mg total) under the tongue every 5 (five) minutes as needed. 25 tablet 3    aspirin (ECOTRIN) 81 MG EC tablet Take 1 tablet (81 mg total) by mouth once daily.  0     No current facility-administered medications on file prior to visit.        Allergies   Review of patient's allergies indicates:  No Known Allergies    Review of Systems   Constitutional: Negative for chills and fever.   Respiratory: Negative for shortness of breath.    Cardiovascular: Negative for chest pain, palpitations and leg swelling.   Gastrointestinal: Negative for abdominal pain, blood in stool, constipation, diarrhea and nausea.   Genitourinary: Negative for dysuria.   Neurological: Negative for dizziness, light-headedness and headaches.     /70 (BP Location: Left arm, Patient Position: Sitting, BP Method: Medium (Automatic))   Pulse 77   Temp 98.2 °F (36.8 °C) (Oral)   Resp 20   Ht 5' 6" (1.676 m)   Wt 57.3 kg (126 lb 5.2 oz)   SpO2 100%   BMI 20.39 kg/m²     Physical Exam   Constitutional: He appears well-developed and well-nourished.   HENT:   Head: Normocephalic.   Right Ear: External ear normal.   Left Ear: External ear normal.   Nose: Nose normal.   Mouth/Throat: No oropharyngeal exudate.   Neck: Normal range of motion. Neck supple. No tracheal deviation present.   Cardiovascular: Normal rate, regular rhythm, normal heart sounds and intact distal pulses.   No murmur heard.  Pulmonary/Chest: Effort normal and breath sounds normal. He has no wheezes. He has no rales.   Abdominal: Soft. Bowel sounds are normal. He exhibits no mass. There is no tenderness.   Musculoskeletal: He exhibits no edema.   Lymphadenopathy:     He has no cervical adenopathy.   Skin: He is not diaphoretic.   Vitals " reviewed.        Assessment/Plan:  Sharath was seen today for hypertension.    Diagnoses and all orders for this visit:    Hypertension, essential  Continue current regimen.  Follow-up in six months.    Chronic systolic heart failure  Follow-up recommendations by Cardiology.    Atherosclerosis of aorta  -     Lipid panel; Future  Continue current statin.    Colon cancer screening  -     Fecal Immunochemical Test (iFOBT); Future  FitKit was given to patient on 10/24/2019      Need for vaccination  -     (In Office Administered) Pneumococcal Polysaccharide Vaccine (23 Valent) (SQ/IM)  -     Influenza - High Dose (65+) (PF) (IM)    Anemia, unspecified type  -     CBC auto differential; Future  -     Iron and TIBC; Future    Screening for prostate cancer  -     PSA, Screening; Future                    -Wilmer Bangura Jr., MD, AAHIVS          This office note has been dictated.  This dictation has been generated using M-Modal Fluency Direct dictation; some phonetic errors may occur.

## 2019-10-29 ENCOUNTER — LAB VISIT (OUTPATIENT)
Dept: LAB | Facility: HOSPITAL | Age: 69
End: 2019-10-29
Attending: FAMILY MEDICINE
Payer: MEDICARE

## 2019-10-29 DIAGNOSIS — D64.9 ANEMIA, UNSPECIFIED TYPE: ICD-10-CM

## 2019-10-29 DIAGNOSIS — I70.0 ATHEROSCLEROSIS OF AORTA: ICD-10-CM

## 2019-10-29 DIAGNOSIS — Z12.5 SCREENING FOR PROSTATE CANCER: ICD-10-CM

## 2019-10-29 LAB
BASOPHILS # BLD AUTO: 0.04 K/UL (ref 0–0.2)
BASOPHILS NFR BLD: 0.4 % (ref 0–1.9)
CHOLEST SERPL-MCNC: 91 MG/DL (ref 120–199)
CHOLEST/HDLC SERPL: 2.8 {RATIO} (ref 2–5)
COMPLEXED PSA SERPL-MCNC: 0.61 NG/ML (ref 0–4)
DIFFERENTIAL METHOD: ABNORMAL
EOSINOPHIL # BLD AUTO: 0.2 K/UL (ref 0–0.5)
EOSINOPHIL NFR BLD: 2.2 % (ref 0–8)
ERYTHROCYTE [DISTWIDTH] IN BLOOD BY AUTOMATED COUNT: 16 % (ref 11.5–14.5)
HCT VFR BLD AUTO: 41.1 % (ref 40–54)
HDLC SERPL-MCNC: 33 MG/DL (ref 40–75)
HDLC SERPL: 36.3 % (ref 20–50)
HGB BLD-MCNC: 13.6 G/DL (ref 14–18)
IMM GRANULOCYTES # BLD AUTO: 0.03 K/UL (ref 0–0.04)
IMM GRANULOCYTES NFR BLD AUTO: 0.3 % (ref 0–0.5)
IRON SERPL-MCNC: 99 UG/DL (ref 45–160)
LDLC SERPL CALC-MCNC: 48.6 MG/DL (ref 63–159)
LYMPHOCYTES # BLD AUTO: 3 K/UL (ref 1–4.8)
LYMPHOCYTES NFR BLD: 29.5 % (ref 18–48)
MCH RBC QN AUTO: 29.4 PG (ref 27–31)
MCHC RBC AUTO-ENTMCNC: 33.1 G/DL (ref 32–36)
MCV RBC AUTO: 89 FL (ref 82–98)
MONOCYTES # BLD AUTO: 0.6 K/UL (ref 0.3–1)
MONOCYTES NFR BLD: 5.3 % (ref 4–15)
NEUTROPHILS # BLD AUTO: 6.4 K/UL (ref 1.8–7.7)
NEUTROPHILS NFR BLD: 62.3 % (ref 38–73)
NONHDLC SERPL-MCNC: 58 MG/DL
NRBC BLD-RTO: 0 /100 WBC
PLATELET # BLD AUTO: 212 K/UL (ref 150–350)
PMV BLD AUTO: 11.3 FL (ref 9.2–12.9)
RBC # BLD AUTO: 4.62 M/UL (ref 4.6–6.2)
SATURATED IRON: 43 % (ref 20–50)
TOTAL IRON BINDING CAPACITY: 231 UG/DL (ref 250–450)
TRANSFERRIN SERPL-MCNC: 156 MG/DL (ref 200–375)
TRIGL SERPL-MCNC: 47 MG/DL (ref 30–150)
WBC # BLD AUTO: 10.3 K/UL (ref 3.9–12.7)

## 2019-10-29 PROCEDURE — 80061 LIPID PANEL: CPT | Mod: HCNC

## 2019-10-29 PROCEDURE — 83540 ASSAY OF IRON: CPT | Mod: HCNC

## 2019-10-29 PROCEDURE — 85025 COMPLETE CBC W/AUTO DIFF WBC: CPT | Mod: HCNC

## 2019-10-29 PROCEDURE — 84153 ASSAY OF PSA TOTAL: CPT | Mod: HCNC

## 2019-10-29 PROCEDURE — 36415 COLL VENOUS BLD VENIPUNCTURE: CPT | Mod: HCNC,PN

## 2019-11-01 ENCOUNTER — LAB VISIT (OUTPATIENT)
Dept: LAB | Facility: HOSPITAL | Age: 69
End: 2019-11-01
Attending: FAMILY MEDICINE
Payer: MEDICARE

## 2019-11-01 ENCOUNTER — PES CALL (OUTPATIENT)
Dept: ADMINISTRATIVE | Facility: CLINIC | Age: 69
End: 2019-11-01

## 2019-11-01 DIAGNOSIS — Z12.11 COLON CANCER SCREENING: ICD-10-CM

## 2019-11-01 PROCEDURE — 82274 ASSAY TEST FOR BLOOD FECAL: CPT | Mod: HCNC

## 2019-11-05 LAB — HEMOCCULT STL QL IA: NEGATIVE

## 2020-02-27 ENCOUNTER — HOSPITAL ENCOUNTER (EMERGENCY)
Facility: HOSPITAL | Age: 70
Discharge: HOME | End: 2020-02-28
Attending: EMERGENCY MEDICINE
Payer: MEDICARE

## 2020-02-27 DIAGNOSIS — K92.0 HEMATEMESIS, PRESENCE OF NAUSEA NOT SPECIFIED: Primary | ICD-10-CM

## 2020-02-27 DIAGNOSIS — J98.4 PULMONARY LESION OF RIGHT SIDE OF CHEST: ICD-10-CM

## 2020-02-27 DIAGNOSIS — R05.9 COUGH: ICD-10-CM

## 2020-02-27 LAB
ALBUMIN SERPL BCP-MCNC: 3.5 G/DL (ref 3.5–5.2)
ALP SERPL-CCNC: 90 U/L (ref 55–135)
ALT SERPL W/O P-5'-P-CCNC: 8 U/L (ref 10–44)
ANION GAP SERPL CALC-SCNC: 9 MMOL/L (ref 8–16)
AST SERPL-CCNC: 12 U/L (ref 10–40)
BASOPHILS # BLD AUTO: 0.04 K/UL (ref 0–0.2)
BASOPHILS NFR BLD: 0.3 % (ref 0–1.9)
BILIRUB SERPL-MCNC: 0.4 MG/DL (ref 0.1–1)
BUN SERPL-MCNC: 20 MG/DL (ref 8–23)
CALCIUM SERPL-MCNC: 8.9 MG/DL (ref 8.7–10.5)
CHLORIDE SERPL-SCNC: 108 MMOL/L (ref 95–110)
CO2 SERPL-SCNC: 24 MMOL/L (ref 23–29)
CREAT SERPL-MCNC: 1 MG/DL (ref 0.5–1.4)
DIFFERENTIAL METHOD: ABNORMAL
EOSINOPHIL # BLD AUTO: 0.1 K/UL (ref 0–0.5)
EOSINOPHIL NFR BLD: 0.6 % (ref 0–8)
ERYTHROCYTE [DISTWIDTH] IN BLOOD BY AUTOMATED COUNT: 14.8 % (ref 11.5–14.5)
EST. GFR  (AFRICAN AMERICAN): >60 ML/MIN/1.73 M^2
EST. GFR  (NON AFRICAN AMERICAN): >60 ML/MIN/1.73 M^2
GLUCOSE SERPL-MCNC: 102 MG/DL (ref 70–110)
HCT VFR BLD AUTO: 38.1 % (ref 40–54)
HGB BLD-MCNC: 12.1 G/DL (ref 14–18)
IMM GRANULOCYTES # BLD AUTO: 0.04 K/UL (ref 0–0.04)
IMM GRANULOCYTES NFR BLD AUTO: 0.3 % (ref 0–0.5)
INR PPP: 1 (ref 0.8–1.2)
LYMPHOCYTES # BLD AUTO: 2.1 K/UL (ref 1–4.8)
LYMPHOCYTES NFR BLD: 17.7 % (ref 18–48)
MCH RBC QN AUTO: 29.7 PG (ref 27–31)
MCHC RBC AUTO-ENTMCNC: 31.8 G/DL (ref 32–36)
MCV RBC AUTO: 94 FL (ref 82–98)
MONOCYTES # BLD AUTO: 0.9 K/UL (ref 0.3–1)
MONOCYTES NFR BLD: 7.1 % (ref 4–15)
NEUTROPHILS # BLD AUTO: 8.8 K/UL (ref 1.8–7.7)
NEUTROPHILS NFR BLD: 74 % (ref 38–73)
NRBC BLD-RTO: 0 /100 WBC
PLATELET # BLD AUTO: 204 K/UL (ref 150–350)
PMV BLD AUTO: 10.8 FL (ref 9.2–12.9)
POTASSIUM SERPL-SCNC: 4.4 MMOL/L (ref 3.5–5.1)
PROT SERPL-MCNC: 6.5 G/DL (ref 6–8.4)
PROTHROMBIN TIME: 11.4 SEC (ref 9–12.5)
RBC # BLD AUTO: 4.07 M/UL (ref 4.6–6.2)
SODIUM SERPL-SCNC: 141 MMOL/L (ref 136–145)
WBC # BLD AUTO: 11.96 K/UL (ref 3.9–12.7)

## 2020-02-27 PROCEDURE — 85610 PROTHROMBIN TIME: CPT | Mod: HCNC

## 2020-02-27 PROCEDURE — C9113 INJ PANTOPRAZOLE SODIUM, VIA: HCPCS | Mod: HCNC | Performed by: EMERGENCY MEDICINE

## 2020-02-27 PROCEDURE — 85025 COMPLETE CBC W/AUTO DIFF WBC: CPT | Mod: HCNC

## 2020-02-27 PROCEDURE — 80053 COMPREHEN METABOLIC PANEL: CPT | Mod: HCNC

## 2020-02-27 PROCEDURE — 63600175 PHARM REV CODE 636 W HCPCS: Mod: HCNC | Performed by: EMERGENCY MEDICINE

## 2020-02-27 PROCEDURE — 96374 THER/PROPH/DIAG INJ IV PUSH: CPT | Mod: HCNC

## 2020-02-27 PROCEDURE — 99284 EMERGENCY DEPT VISIT MOD MDM: CPT | Mod: 25,HCNC

## 2020-02-27 RX ORDER — PANTOPRAZOLE SODIUM 40 MG/10ML
80 INJECTION, POWDER, LYOPHILIZED, FOR SOLUTION INTRAVENOUS
Status: COMPLETED | OUTPATIENT
Start: 2020-02-27 | End: 2020-02-27

## 2020-02-27 RX ADMIN — PANTOPRAZOLE SODIUM 80 MG: 40 INJECTION, POWDER, FOR SOLUTION INTRAVENOUS at 11:02

## 2020-02-28 VITALS
BODY MASS INDEX: 20.89 KG/M2 | RESPIRATION RATE: 18 BRPM | HEIGHT: 66 IN | WEIGHT: 130 LBS | DIASTOLIC BLOOD PRESSURE: 77 MMHG | TEMPERATURE: 98 F | SYSTOLIC BLOOD PRESSURE: 137 MMHG | HEART RATE: 79 BPM | OXYGEN SATURATION: 99 %

## 2020-02-28 PROCEDURE — 63600175 PHARM REV CODE 636 W HCPCS: Mod: HCNC | Performed by: EMERGENCY MEDICINE

## 2020-02-28 RX ORDER — DOXYCYCLINE 100 MG/1
100 CAPSULE ORAL 2 TIMES DAILY
Qty: 20 CAPSULE | Refills: 0 | Status: SHIPPED | OUTPATIENT
Start: 2020-02-28 | End: 2020-03-09

## 2020-02-28 RX ORDER — OMEPRAZOLE 20 MG/1
20 CAPSULE, DELAYED RELEASE ORAL 2 TIMES DAILY
Qty: 28 CAPSULE | Refills: 0 | Status: SHIPPED | OUTPATIENT
Start: 2020-02-28 | End: 2021-02-12 | Stop reason: CLARIF

## 2020-02-28 RX ORDER — ALBUTEROL SULFATE 90 UG/1
1-2 AEROSOL, METERED RESPIRATORY (INHALATION) EVERY 6 HOURS PRN
Qty: 18 G | Refills: 0 | Status: SHIPPED | OUTPATIENT
Start: 2020-02-28 | End: 2021-02-12 | Stop reason: CLARIF

## 2020-02-28 RX ADMIN — SODIUM CHLORIDE 1000 ML: 0.9 INJECTION, SOLUTION INTRAVENOUS at 12:02

## 2020-02-28 NOTE — ED TRIAGE NOTES
"Pt presents to ED via personal transportation from home, reports that 2 hours ago he began vomiting "thick, dark blood." Pt reports he has had a sore throat for over a week with left ear pain.     Pt reports cough for "a couple weeks"     Pt denies abdominal pain, nausea at this time. Denies SOB, CP    PMHx MI 2 years ago, HTN    Pt is AAOx4, able to verbalize and follow commands, ambulate independently.     Wife at the bedside.     "

## 2020-02-28 NOTE — ED PROVIDER NOTES
"Encounter Date: 2/27/2020    SCRIBE #1 NOTE: I, Marianne Rivas, am scribing for, and in the presence of,  Bandar Cisse MD. I have scribed the following portions of the note - Other sections scribed: HPI/ROS/PE.       History     Chief Complaint   Patient presents with    Emesis     pt states he threw up "dark" vomit and thinks it is blood; c/o sore throat also; VSS     This 69 y.o. male with a medical history of CAD, HTN, and MI presents to the ED for an emergent evaluation of x2 "big" episodes of hematemesis. Pt reports he was on the phone with his sister this evening when he suddenly began with vomiting. Pt reports "thick, black spots" and "clumps of blood" in vomit. Of note, pt is currently on Plavix. He denies a hx of stomach ulcers. Pt is a cigarette smoke. No recent ETOH use. No prior episodes of similar symptoms. No alleviating factors. Otherwise, pt denies fever, chills, abdominal pain, diarrhea, and any other associated symptoms.    The history is provided by the patient. No  was used.     Review of patient's allergies indicates:  No Known Allergies  Past Medical History:   Diagnosis Date    Coronary artery disease     Hypertension     Myocardial infarction     Nuclear sclerosis of both eyes 6/13/2019     Past Surgical History:   Procedure Laterality Date    CARDIAC CATHETERIZATION      with 4 stents    LEFT HEART CATHETERIZATION Left 5/29/2018    Procedure: Left heart cath;  Surgeon: Viral Lombardi MD;  Location: [facility] CATH LAB;  Service: Cardiology;  Laterality: Left;  RN PREOP 5/28/18     Family History   Problem Relation Age of Onset    No Known Problems Mother     No Known Problems Father     No Known Problems Sister     No Known Problems Brother     No Known Problems Maternal Aunt     No Known Problems Maternal Uncle     Blindness Paternal Aunt     No Known Problems Paternal Uncle     No Known Problems Maternal Grandmother     No Known Problems Maternal " Grandfather     No Known Problems Paternal Grandmother     No Known Problems Paternal Grandfather     Amblyopia Neg Hx     Cancer Neg Hx     Cataracts Neg Hx     Diabetes Neg Hx     Glaucoma Neg Hx     Hypertension Neg Hx     Macular degeneration Neg Hx     Retinal detachment Neg Hx     Strabismus Neg Hx     Stroke Neg Hx     Thyroid disease Neg Hx      Social History     Tobacco Use    Smoking status: Current Every Day Smoker     Types: Cigarettes     Last attempt to quit: 3/23/2018     Years since quittin.9    Smokeless tobacco: Never Used   Substance Use Topics    Alcohol use: No    Drug use: No     Review of Systems   Constitutional: Negative for chills, diaphoresis and fever.   HENT: Positive for congestion. Negative for sore throat.    Eyes: Negative.  Negative for visual disturbance.   Respiratory: Positive for cough. Negative for shortness of breath.    Cardiovascular: Negative for chest pain and palpitations.   Gastrointestinal: Positive for nausea and vomiting (hematemesis). Negative for abdominal pain, blood in stool and diarrhea.        NO melena or rectal bleeding   Genitourinary: Negative for dysuria, flank pain and frequency.   Musculoskeletal: Negative for back pain.   Skin: Negative for rash and wound.   Neurological: Negative for dizziness, syncope, speech difficulty, weakness, light-headedness, numbness and headaches.        No numbness   Psychiatric/Behavioral: Negative for confusion.   All other systems reviewed and are negative.      Physical Exam     Initial Vitals [20 2226]   BP Pulse Resp Temp SpO2   123/74 88 18 98.3 °F (36.8 °C) 99 %      MAP       --         Physical Exam    Nursing note and vitals reviewed.  Constitutional: He appears well-developed and well-nourished. He is not diaphoretic. No distress.   HENT:   Head: Normocephalic and atraumatic.   Right Ear: External ear normal.   Left Ear: External ear normal.   Nose: Nose normal.   Mouth/Throat:  Oropharynx is clear and moist.   Normal TM on left.   Eyes: Conjunctivae and EOM are normal. Pupils are equal, round, and reactive to light. Right eye exhibits no discharge. Left eye exhibits no discharge. No scleral icterus.   Neck: Neck supple. No tracheal deviation present.   Cardiovascular: Normal rate, regular rhythm and normal heart sounds.   No murmur heard.  Pulmonary/Chest: Breath sounds normal. No stridor. No respiratory distress. He has no wheezes. He has no rhonchi. He has no rales.   Abdominal: Soft. He exhibits no distension. There is no tenderness. There is no rebound and no guarding.   Musculoskeletal: Normal range of motion. He exhibits no edema or tenderness.   Neurological: He is alert and oriented to person, place, and time. He has normal strength. No cranial nerve deficit or sensory deficit. GCS score is 15. GCS eye subscore is 4. GCS verbal subscore is 5. GCS motor subscore is 6.   Skin: Skin is warm and dry. No rash noted.   Psychiatric: He has a normal mood and affect. His behavior is normal. Judgment and thought content normal.         ED Course   Procedures  Labs Reviewed   CBC W/ AUTO DIFFERENTIAL - Abnormal; Notable for the following components:       Result Value    RBC 4.07 (*)     Hemoglobin 12.1 (*)     Hematocrit 38.1 (*)     Mean Corpuscular Hemoglobin Conc 31.8 (*)     RDW 14.8 (*)     Gran # (ANC) 8.8 (*)     Gran% 74.0 (*)     Lymph% 17.7 (*)     All other components within normal limits   COMPREHENSIVE METABOLIC PANEL - Abnormal; Notable for the following components:    ALT 8 (*)     All other components within normal limits   PROTIME-INR          Imaging Results          X-Ray Chest PA And Lateral (Final result)  Result time 02/28/20 00:44:40    Final result by Danie Hwang MD (02/28/20 00:44:40)                 Impression:      Small focal opacity within the right lower lung zone with possible cavitation.  This may reflect focal infectious or inflammatory process.  Future  follow-up is recommended.      Electronically signed by: Danie Hwang MD  Date:    02/28/2020  Time:    00:44             Narrative:    EXAMINATION:  XR CHEST PA AND LATERAL    CLINICAL HISTORY:  Cough    TECHNIQUE:  PA and lateral views of the chest were performed.    COMPARISON:  March 2018.    FINDINGS:  Cardiac silhouette is normal in size.  Lungs are symmetrically expanded.  Small focal opacity with possible cavitation is seen within the right lower lung zone.  No confluent focal consolidation seen.  No evidence of pneumothorax or significant pleural effusion.  No acute osseous abnormality identified.                                 Medical Decision Making:   Initial Assessment:   Patient presents for evaluation of acute hematemesis.  Patient takes Plavix.  Patient denies syncope or dizziness.  No chest pain or shortness of breath. Patient denies melena.  Physical exam is unremarkable. Patient also complains of a productive cough for 3-4 days.  No shortness of breath or chest pain. Will check lab work and orthostatics.  Differential Diagnosis:   Peptic ulcer, gastritis, medication side effect, gastric carcinoma, epistaxis, esophageal varices  Clinical Tests:   Lab Tests: Ordered and Reviewed  The following lab test(s) were unremarkable: CBC, CMP and PT  ED Management:  0050:  Minimal change in H&H.  Patient mildly orthostatic. Patient started on Protonix.  Will give IV fluids.  Discussed with patient and family.  Patient would rather be treated outpatient and be admitted since he feels well. Patient's vital signs are normal. Start patient on PPI and referred to Gastroenterology.  Patient instructed to return if symptoms recur if he develops melena or dizziness.  Cavitary lesion noted on chest x-ray.  This is likely unrelated to his symptoms.   White blood cell count is normal. Patient is afebrile.  Patient smokes cigarettes.  He admits to productive cough for 3 days.  Will place on empiric antibiotics.  He  has been instructed to quit smoking.  Plan to refer to pulmonology after he is treated for his hematemesis.            Scribe Attestation:   Scribe #1: I performed the above scribed service and the documentation accurately describes the services I performed. I attest to the accuracy of the note.                          Clinical Impression:       ICD-10-CM ICD-9-CM   1. Hematemesis, presence of nausea not specified K92.0 578.0   2. Cough R05 786.2   3. Pulmonary lesion of right side of chest J98.4 518.89         Disposition:   Disposition: Discharged  Condition: Stable     ED Disposition Condition    Observation          Scribe Attestation: I, Bandar Cisse MD, personally performed the services described in this documentation. All medical record entries made by the scribe were at my direction and in my presence. I have reviewed the chart and agree that the record reflects my personal performance and is accurate and complete.                    Bandar Cisse MD  02/28/20 0052       Bandar Cisse MD  02/28/20 0222

## 2020-02-28 NOTE — ED NOTES
PT D/C'ED HOME WITH FM, TO F/U WITH PMD, INCREASE PO FLUIDS AND REST, PT ALERT AND ORIENTED X3, RESP EVEN AND UNLABORED, SKIN IS WARM AND DRY, PT AMBULATED FROM ED STEADY GAIT

## 2020-03-06 ENCOUNTER — OFFICE VISIT (OUTPATIENT)
Dept: PULMONOLOGY | Facility: CLINIC | Age: 70
End: 2020-03-06
Payer: MEDICARE

## 2020-03-06 VITALS
WEIGHT: 124.56 LBS | SYSTOLIC BLOOD PRESSURE: 123 MMHG | HEART RATE: 70 BPM | OXYGEN SATURATION: 99 % | HEIGHT: 66 IN | BODY MASS INDEX: 20.02 KG/M2 | DIASTOLIC BLOOD PRESSURE: 78 MMHG

## 2020-03-06 DIAGNOSIS — R04.2 HEMOPTYSIS: Primary | ICD-10-CM

## 2020-03-06 DIAGNOSIS — Z72.89 OTHER PROBLEMS RELATED TO LIFESTYLE: ICD-10-CM

## 2020-03-06 DIAGNOSIS — Z11.4 ENCOUNTER FOR SCREENING FOR HUMAN IMMUNODEFICIENCY VIRUS (HIV): ICD-10-CM

## 2020-03-06 DIAGNOSIS — J98.4 CAVITARY LUNG DISEASE: ICD-10-CM

## 2020-03-06 DIAGNOSIS — G47.33 OSA (OBSTRUCTIVE SLEEP APNEA): ICD-10-CM

## 2020-03-06 DIAGNOSIS — Z72.0 TOBACCO ABUSE: Chronic | ICD-10-CM

## 2020-03-06 DIAGNOSIS — R09.81 NASAL CONGESTION: ICD-10-CM

## 2020-03-06 DIAGNOSIS — R91.1 LUNG NODULE: ICD-10-CM

## 2020-03-06 PROCEDURE — 99999 PR PBB SHADOW E&M-EST. PATIENT-LVL III: ICD-10-PCS | Mod: PBBFAC,HCNC,, | Performed by: INTERNAL MEDICINE

## 2020-03-06 PROCEDURE — 1159F PR MEDICATION LIST DOCUMENTED IN MEDICAL RECORD: ICD-10-PCS | Mod: HCNC,S$GLB,, | Performed by: INTERNAL MEDICINE

## 2020-03-06 PROCEDURE — 99999 PR PBB SHADOW E&M-EST. PATIENT-LVL III: CPT | Mod: PBBFAC,HCNC,, | Performed by: INTERNAL MEDICINE

## 2020-03-06 PROCEDURE — 1126F PR PAIN SEVERITY QUANTIFIED, NO PAIN PRESENT: ICD-10-PCS | Mod: HCNC,S$GLB,, | Performed by: INTERNAL MEDICINE

## 2020-03-06 PROCEDURE — 1101F PR PT FALLS ASSESS DOC 0-1 FALLS W/OUT INJ PAST YR: ICD-10-PCS | Mod: HCNC,CPTII,S$GLB, | Performed by: INTERNAL MEDICINE

## 2020-03-06 PROCEDURE — 1101F PT FALLS ASSESS-DOCD LE1/YR: CPT | Mod: HCNC,CPTII,S$GLB, | Performed by: INTERNAL MEDICINE

## 2020-03-06 PROCEDURE — 99204 PR OFFICE/OUTPT VISIT, NEW, LEVL IV, 45-59 MIN: ICD-10-PCS | Mod: HCNC,S$GLB,, | Performed by: INTERNAL MEDICINE

## 2020-03-06 PROCEDURE — 1159F MED LIST DOCD IN RCRD: CPT | Mod: HCNC,S$GLB,, | Performed by: INTERNAL MEDICINE

## 2020-03-06 PROCEDURE — 99204 OFFICE O/P NEW MOD 45 MIN: CPT | Mod: HCNC,S$GLB,, | Performed by: INTERNAL MEDICINE

## 2020-03-06 PROCEDURE — 1126F AMNT PAIN NOTED NONE PRSNT: CPT | Mod: HCNC,S$GLB,, | Performed by: INTERNAL MEDICINE

## 2020-03-06 RX ORDER — PREDNISONE 20 MG/1
40 TABLET ORAL DAILY
Qty: 10 TABLET | Refills: 0 | Status: SHIPPED | OUTPATIENT
Start: 2020-03-06 | End: 2020-03-11

## 2020-03-06 NOTE — ASSESSMENT & PLAN NOTE
The patient symptomatically has loud snoring, restless sleep with findings of chf. This warrants further investigation for possible obstructive sleep apnea.  Patient will be contacted after sleep study is done.

## 2020-03-06 NOTE — PROGRESS NOTES
"Sharath Huizar  was seen as a new patient  for the evaluation of  abnnormal cxr.    CHIEF COMPLAINT:  Apnea      HISTORY OF PRESENT ILLNESS: Sharath Huizar is a 69 y.o. male  has a past medical history of Coronary artery disease, Hypertension, Myocardial infarction, and Nuclear sclerosis of both eyes (6/13/2019).  Patient presented to ED on 2/27/20 for coughing of "chunks of blood."  2 episodes of hemoptysis prior to ED presentation.  At that time, patient with sore throat but overall feeling better.  cxr with rll opacity.  Patient was discharged with doxycycline and recommend to follow up in pulmonary clinic.      Today, patient denied fever/chills.  No hemoptysis since ED visit on 2/27.  Patient smoked 1 ppd x 30 plus years.  Currently, patient smoke intermittently (approximately 1 pack per week).  No weight loss.  No chest pain.  Former [employer] for 24 years, then [employer] x 18 years.  Retire after mi in 2018.  Currently on asa and plavix.  Still active at home.  Still cut grass.  No known tb contact.  No foreign travel.      PAST MEDICAL HISTORY:    Active Ambulatory Problems     Diagnosis Date Noted    NSTEMI (non-ST elevated myocardial infarction) 03/23/2018    Hypertension, essential 03/23/2018    Tobacco abuse 03/23/2018    Leukocytosis 03/24/2018    Elevated LFTs 03/24/2018    Chronic systolic heart failure 03/25/2018    Coronary artery disease involving native coronary artery of native heart without angina pectoris 03/26/2018    MCKEON (dyspnea on exertion) 05/15/2018    Atherosclerosis of aorta 07/03/2018    Chronic pain of both shoulders 07/24/2018    Decreased range of motion of shoulder 07/24/2018    Weakness of shoulder 07/24/2018    Refractive error 06/13/2019    Nuclear sclerosis of both eyes 06/13/2019    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    Cavitary lung disease 03/06/2020    Nasal congestion 03/06/2020    DANYELLE (obstructive sleep apnea) 03/06/2020    " Hemoptysis 2020     Resolved Ambulatory Problems     Diagnosis Date Noted    No Resolved Ambulatory Problems     Past Medical History:   Diagnosis Date    Coronary artery disease     Hypertension     Myocardial infarction                 PAST SURGICAL HISTORY:    Past Surgical History:   Procedure Laterality Date    CARDIAC CATHETERIZATION      with 4 stents    LEFT HEART CATHETERIZATION Left 2018    Procedure: Left heart cath;  Surgeon: Viral Lombardi MD;  Location: [facility] CATH LAB;  Service: Cardiology;  Laterality: Left;  RN PREOP 18         FAMILY HISTORY:                Family History   Problem Relation Age of Onset    No Known Problems Mother     No Known Problems Father     No Known Problems Sister     No Known Problems Brother     No Known Problems Maternal Aunt     No Known Problems Maternal Uncle     Blindness Paternal Aunt     No Known Problems Paternal Uncle     No Known Problems Maternal Grandmother     No Known Problems Maternal Grandfather     No Known Problems Paternal Grandmother     No Known Problems Paternal Grandfather     Amblyopia Neg Hx     Cancer Neg Hx     Cataracts Neg Hx     Diabetes Neg Hx     Glaucoma Neg Hx     Hypertension Neg Hx     Macular degeneration Neg Hx     Retinal detachment Neg Hx     Strabismus Neg Hx     Stroke Neg Hx     Thyroid disease Neg Hx        SOCIAL HISTORY:          Tobacco:   Social History     Tobacco Use   Smoking Status Current Some Day Smoker    Packs/day: 1.00    Years: 30.00    Pack years: 30.00    Types: Cigarettes    Last attempt to quit: 3/23/2018    Years since quittin.9   Smokeless Tobacco Never Used     alcohol use:    Social History     Substance and Sexual Activity   Alcohol Use No               Occupation:  [employer] x 42 years    ALLERGIES:  Review of patient's allergies indicates:  No Known Allergies    CURRENT MEDICATIONS:    Current Outpatient Medications   Medication Sig Dispense Refill     albuterol (PROVENTIL/VENTOLIN HFA) 90 mcg/actuation inhaler Inhale 1-2 puffs into the lungs every 6 (six) hours as needed for Wheezing. Rescue 18 g 0    aspirin (ECOTRIN) 81 MG EC tablet Take 1 tablet (81 mg total) by mouth once daily.  0    atorvastatin (LIPITOR) 80 MG tablet Take 1 tablet (80 mg total) by mouth once daily. 90 tablet 3    clopidogrel (PLAVIX) 75 mg tablet Take 1 tablet (75 mg total) by mouth once daily. 90 tablet 3    doxycycline (VIBRAMYCIN) 100 MG Cap Take 1 capsule (100 mg total) by mouth 2 (two) times daily. for 10 days 20 capsule 0    isosorbide mononitrate (IMDUR) 30 MG 24 hr tablet Take 1 tablet (30 mg total) by mouth once daily. 90 tablet 3    lisinopril (PRINIVIL,ZESTRIL) 2.5 MG tablet Take 1 tablet (2.5 mg total) by mouth once daily. 90 tablet 3    metoprolol succinate (TOPROL-XL) 50 MG 24 hr tablet Take 1 tablet (50 mg total) by mouth once daily. 90 tablet 3    nitroGLYCERIN (NITROSTAT) 0.4 MG SL tablet Place 1 tablet (0.4 mg total) under the tongue every 5 (five) minutes as needed. 25 tablet 3    omeprazole (PRILOSEC) 20 MG capsule Take 1 capsule (20 mg total) by mouth 2 (two) times daily. 28 capsule 0    predniSONE (DELTASONE) 20 MG tablet Take 2 tablets (40 mg total) by mouth once daily. for 5 days 10 tablet 0     No current facility-administered medications for this visit.                   REVIEW OF SYSTEMS:     Pulmonary related symptoms as per HPI.  Gen:  no weight loss, no fever, no night sweat  HEENT:  no visual changes, no sore throat, no hearing loss  CV:  No chest pain, no orthopnea, no PND  GI:  no melena, no hematochezia, no diarhea, no constipation.  :  no dysuria, no hematuria, no hesistancy, no dribbling  Neuro:  no syncope, no vertigo, no tinitus  Psych:  No homocide or suicide ideation; no depression.  Endocrine:  No heat or cold intolerance.  Sleep:  +loud snoring; + witnessed apnea by wife.  Chronic fatigue  Otherwise, a balance of systems reviewed is  "negative.          PHYSICAL EXAM:  Vitals:    03/06/20 0848   BP: 123/78   Pulse: 70   SpO2: 99%   Weight: 56.5 kg (124 lb 9 oz)   Height: 5' 6" (1.676 m)   PainSc: 0-No pain     Body mass index is 20.1 kg/m².     GENERAL:  well develop; no apparent distress  HEENT:  no nasal congestion; no discharge noted; class 2 modified mallampatti.   NECK:  supple; no palpable masses.  CARDIO: regular rate and rhythm  PULM:  clear to auscultation bilaterally; no intercostals retractions; no accessory muscle usage   ABDOMEN:  soft nontender/nondistended.  +bowel sound  EXTREMITIES no cce  NEURO:  CN II-XII intact.  5/5 motor in all extremities.  sensation grossly intact   to light touch.  PSYCH:  normal affect.  Alert and oriented x 4    LABS  Pulmonary Functions Testing Results(personally reviewed):  none  ABG (personally reviewed):  none  CXR (personally reviewed):  2/28/20 cystic density right lower lung.  Unclear if it was present on 3/23/18 cxr.  CT CHEST(personally reviewed):  none    Echo 2/119  · Moderately decreased left ventricular systolic function. The estimated ejection fraction is 30%  · Eccentric left ventricular hypertrophy.  · Mild left ventricular enlargement.  · Grade I (mild) left ventricular diastolic dysfunction consistent with impaired relaxation.  · Normal right ventricular systolic function.  · Moderate left atrial enlargement.    ASSESSMENT/PLAN  Problem List Items Addressed This Visit     Cavitary lung disease    Overview     possibly present sine 2018.  Infection vs inflammatory.  Malignancy cannot be ruled out.  Sputum culture and afb.  Quantiferon.  Ct of chest.           Relevant Medications    predniSONE (DELTASONE) 20 MG tablet    Other Relevant Orders    Complete PFT with bronchodilator    HIV 1/2 Ag/Ab (4th Gen)    Quantiferon Gold TB    AFB Culture & Smear    Hemoptysis - Primary    Overview     resolved.  bronchitis vs cavitary disease.  Ct of chest.  Encourage smoking cessation.           " Nasal congestion    Overview     Enlargement of inferior turbinate on exam.           Current Assessment & Plan     Sinus irrigation and nasal steroid.           DANYELLE (obstructive sleep apnea)    Current Assessment & Plan     The patient symptomatically has loud snoring, restless sleep with findings of chf. This warrants further investigation for possible obstructive sleep apnea.  Patient will be contacted after sleep study is done.             Tobacco abuse (Chronic)    Current Assessment & Plan     encourage complete smoking cessation.               Other Visit Diagnoses     Lung nodule        Relevant Orders    CT Chest Without Contrast    Other problems related to lifestyle         Relevant Orders    HIV 1/2 Ag/Ab (4th Gen)    Encounter for screening for human immunodeficiency virus (HIV)         Relevant Orders    HIV 1/2 Ag/Ab (4th Gen)          Patient will No follow-ups on file. with md/np.

## 2020-03-17 ENCOUNTER — OFFICE VISIT (OUTPATIENT)
Dept: FAMILY MEDICINE | Facility: CLINIC | Age: 70
End: 2020-03-17
Payer: MEDICARE

## 2020-03-17 VITALS
RESPIRATION RATE: 19 BRPM | OXYGEN SATURATION: 99 % | WEIGHT: 120.25 LBS | HEIGHT: 66 IN | HEART RATE: 74 BPM | TEMPERATURE: 99 F | DIASTOLIC BLOOD PRESSURE: 60 MMHG | BODY MASS INDEX: 19.32 KG/M2 | SYSTOLIC BLOOD PRESSURE: 124 MMHG

## 2020-03-17 DIAGNOSIS — I25.118 CORONARY ARTERY DISEASE WITH EXERTIONAL ANGINA: ICD-10-CM

## 2020-03-17 DIAGNOSIS — J06.9 VIRAL URI WITH COUGH: ICD-10-CM

## 2020-03-17 DIAGNOSIS — R63.4 UNINTENTIONAL WEIGHT LOSS: ICD-10-CM

## 2020-03-17 DIAGNOSIS — I50.22 CHRONIC SYSTOLIC HEART FAILURE: ICD-10-CM

## 2020-03-17 DIAGNOSIS — R39.11 URINARY HESITANCY: ICD-10-CM

## 2020-03-17 DIAGNOSIS — K92.2 GASTROINTESTINAL HEMORRHAGE, UNSPECIFIED GASTROINTESTINAL HEMORRHAGE TYPE: Primary | ICD-10-CM

## 2020-03-17 DIAGNOSIS — I70.0 ATHEROSCLEROSIS OF AORTA: ICD-10-CM

## 2020-03-17 PROBLEM — I21.4 NSTEMI (NON-ST ELEVATED MYOCARDIAL INFARCTION): Status: RESOLVED | Noted: 2018-03-23 | Resolved: 2020-03-17

## 2020-03-17 PROCEDURE — 3074F PR MOST RECENT SYSTOLIC BLOOD PRESSURE < 130 MM HG: ICD-10-PCS | Mod: HCNC,CPTII,S$GLB, | Performed by: FAMILY MEDICINE

## 2020-03-17 PROCEDURE — 99214 OFFICE O/P EST MOD 30 MIN: CPT | Mod: HCNC,S$GLB,, | Performed by: FAMILY MEDICINE

## 2020-03-17 PROCEDURE — 1101F PR PT FALLS ASSESS DOC 0-1 FALLS W/OUT INJ PAST YR: ICD-10-PCS | Mod: HCNC,CPTII,S$GLB, | Performed by: FAMILY MEDICINE

## 2020-03-17 PROCEDURE — 1101F PT FALLS ASSESS-DOCD LE1/YR: CPT | Mod: HCNC,CPTII,S$GLB, | Performed by: FAMILY MEDICINE

## 2020-03-17 PROCEDURE — 1159F PR MEDICATION LIST DOCUMENTED IN MEDICAL RECORD: ICD-10-PCS | Mod: HCNC,S$GLB,, | Performed by: FAMILY MEDICINE

## 2020-03-17 PROCEDURE — 3074F SYST BP LT 130 MM HG: CPT | Mod: HCNC,CPTII,S$GLB, | Performed by: FAMILY MEDICINE

## 2020-03-17 PROCEDURE — 99499 RISK ADDL DX/OHS AUDIT: ICD-10-PCS | Mod: HCNC,S$GLB,, | Performed by: FAMILY MEDICINE

## 2020-03-17 PROCEDURE — 3078F PR MOST RECENT DIASTOLIC BLOOD PRESSURE < 80 MM HG: ICD-10-PCS | Mod: HCNC,CPTII,S$GLB, | Performed by: FAMILY MEDICINE

## 2020-03-17 PROCEDURE — 99214 PR OFFICE/OUTPT VISIT, EST, LEVL IV, 30-39 MIN: ICD-10-PCS | Mod: HCNC,S$GLB,, | Performed by: FAMILY MEDICINE

## 2020-03-17 PROCEDURE — 99999 PR PBB SHADOW E&M-EST. PATIENT-LVL IV: ICD-10-PCS | Mod: PBBFAC,HCNC,, | Performed by: FAMILY MEDICINE

## 2020-03-17 PROCEDURE — 99499 UNLISTED E&M SERVICE: CPT | Mod: HCNC,S$GLB,, | Performed by: FAMILY MEDICINE

## 2020-03-17 PROCEDURE — 3078F DIAST BP <80 MM HG: CPT | Mod: HCNC,CPTII,S$GLB, | Performed by: FAMILY MEDICINE

## 2020-03-17 PROCEDURE — 99999 PR PBB SHADOW E&M-EST. PATIENT-LVL IV: CPT | Mod: PBBFAC,HCNC,, | Performed by: FAMILY MEDICINE

## 2020-03-17 PROCEDURE — 1159F MED LIST DOCD IN RCRD: CPT | Mod: HCNC,S$GLB,, | Performed by: FAMILY MEDICINE

## 2020-03-17 RX ORDER — OFLOXACIN 3 MG/ML
SOLUTION/ DROPS OPHTHALMIC
Qty: 10 ML | Refills: 0 | Status: SHIPPED | OUTPATIENT
Start: 2020-03-17 | End: 2021-02-12 | Stop reason: CLARIF

## 2020-03-17 RX ORDER — PROMETHAZINE HYDROCHLORIDE AND DEXTROMETHORPHAN HYDROBROMIDE 6.25; 15 MG/5ML; MG/5ML
5 SYRUP ORAL
Qty: 240 ML | Refills: 0 | Status: SHIPPED | OUTPATIENT
Start: 2020-03-17 | End: 2021-02-12 | Stop reason: CLARIF

## 2020-03-17 NOTE — LETTER
March 19, 2020      Rochelle Medel MD  [address] [place] [address]           [facility]  [address]  [place] [address]  Phone: [phone]          Patient: Sharath Huizar   MR Number: [id number]   YOB: 1950   Date of Visit: 3/17/2020       Dear Dr. Rochelle Medel:    Thank you for referring Sharath Huizar to me for evaluation. Attached you will find relevant portions of my assessment and plan of care.    If you have questions, please do not hesitate to call me. I look forward to following Sharath Huizar along with you.    Sincerely,    Wilmer Bangura Jr., MD    Enclosure  CC:  No Recipients    If you would like to receive this communication electronically, please contact [email or web address] or [phone] to request more information on [employer] access.    For providers and/or their staff who would like to refer a patient to [facility], please contact us through our one-stop-shop provider referral line, [facility], at [phone].    If you feel you have received this communication in error or would no longer like to receive these types of communications, please e-mail [email or web address]

## 2020-03-18 ENCOUNTER — HOSPITAL ENCOUNTER (OUTPATIENT)
Dept: RADIOLOGY | Facility: HOSPITAL | Age: 70
Discharge: HOME | End: 2020-03-18
Attending: INTERNAL MEDICINE
Payer: MEDICARE

## 2020-03-18 ENCOUNTER — HOSPITAL ENCOUNTER (OUTPATIENT)
Dept: RADIOLOGY | Facility: HOSPITAL | Age: 70
Discharge: HOME | End: 2020-03-18
Attending: FAMILY MEDICINE
Payer: MEDICARE

## 2020-03-18 DIAGNOSIS — R91.1 LUNG NODULE: ICD-10-CM

## 2020-03-18 DIAGNOSIS — R63.4 UNINTENTIONAL WEIGHT LOSS: ICD-10-CM

## 2020-03-18 DIAGNOSIS — K92.2 GASTROINTESTINAL HEMORRHAGE, UNSPECIFIED GASTROINTESTINAL HEMORRHAGE TYPE: ICD-10-CM

## 2020-03-18 PROCEDURE — 25500020 PHARM REV CODE 255: Mod: HCNC | Performed by: FAMILY MEDICINE

## 2020-03-18 PROCEDURE — 74177 CT ABD & PELVIS W/CONTRAST: CPT | Mod: TC,HCNC

## 2020-03-18 PROCEDURE — 71250 CT CHEST WITHOUT CONTRAST: ICD-10-PCS | Mod: 26,HCNC,, | Performed by: RADIOLOGY

## 2020-03-18 PROCEDURE — 74177 CT ABDOMEN PELVIS WITH CONTRAST: ICD-10-PCS | Mod: 26,HCNC,, | Performed by: RADIOLOGY

## 2020-03-18 PROCEDURE — 74177 CT ABD & PELVIS W/CONTRAST: CPT | Mod: 26,HCNC,, | Performed by: RADIOLOGY

## 2020-03-18 PROCEDURE — 71250 CT THORAX DX C-: CPT | Mod: 26,HCNC,, | Performed by: RADIOLOGY

## 2020-03-18 PROCEDURE — 71250 CT THORAX DX C-: CPT | Mod: TC,HCNC

## 2020-03-18 RX ADMIN — IOHEXOL 15 ML: 300 INJECTION, SOLUTION INTRAVENOUS at 12:03

## 2020-03-18 RX ADMIN — IOHEXOL 85 ML: 350 INJECTION, SOLUTION INTRAVENOUS at 12:03

## 2020-03-19 ENCOUNTER — TELEPHONE (OUTPATIENT)
Dept: FAMILY MEDICINE | Facility: CLINIC | Age: 70
End: 2020-03-19

## 2020-03-19 DIAGNOSIS — R91.1 LUNG NODULE: ICD-10-CM

## 2020-03-19 DIAGNOSIS — D50.8 OTHER IRON DEFICIENCY ANEMIA: Primary | ICD-10-CM

## 2020-03-19 RX ORDER — FERROUS SULFATE 325(65) MG
325 TABLET ORAL
Qty: 90 TABLET | Refills: 0 | Status: SHIPPED | OUTPATIENT
Start: 2020-03-19 | End: 2020-04-17

## 2020-03-19 NOTE — TELEPHONE ENCOUNTER
Spoke with patient and wife on the phone. Gave results of lab test and CT scan. Advised patient will need PET scan based on CT scan results. Order placed.  Patient questions answered

## 2020-03-19 NOTE — PROGRESS NOTES
"Subjective:       Patient ID: Sharath Huizar is a 69 y.o. male.    Chief Complaint: Hospital Follow Up; Sore Throat; and Otalgia    HPI   69 year old male comes in for emergency room follow up after having an episode of vomiting blood. He states that that it happened while speaking on the phone. The patient is a smoker and is on ASA and Plavix since MI and stents in 2018. Since his hospital discharge he had one episode of spitting blood but no vomit. He also reports that he has been losing weight unintentionally. He does state that he has no appetite .   Patient also reports that recently he has been having some upper respiratory symptoms. These include left ear pain, runny nose, sore throat, and postnasal drip. No fever, chills, chest pain, or shortness of breath.  He also reports that when he goes to the bathroom, or has the urge to go to the bathroom, to urinate, it takes a while    Review of Systems   Constitutional: Positive for chills and fatigue. Negative for fever.   HENT: Positive for congestion, postnasal drip, rhinorrhea, sinus pressure and sore throat. Negative for ear discharge.    Eyes: Negative for discharge and visual disturbance.   Respiratory: Positive for cough (productive). Negative for shortness of breath and wheezing.    Cardiovascular: Negative for palpitations.   Gastrointestinal: Negative for abdominal pain, blood in stool, constipation and diarrhea.   Genitourinary: Negative for dysuria.   Skin: Negative for rash.       Objective:     /60 (BP Location: Left arm, Patient Position: Sitting, BP Method: Small (Manual))   Pulse 74   Temp 98.5 °F (36.9 °C) (Oral)   Resp 19   Ht 5' 6" (1.676 m)   Wt 54.5 kg (120 lb 4.2 oz)   SpO2 99%   BMI 19.41 kg/m²     Physical Exam   Constitutional: He is oriented to person, place, and time. He appears well-developed and well-nourished.   HENT:   Head: Normocephalic.   Right Ear: External ear normal.   Left Ear: External ear normal.   Nose: Nose " normal.   Mouth/Throat: No oropharyngeal exudate.   Neck: Normal range of motion. Neck supple. No tracheal deviation present.   Cardiovascular: Normal rate, regular rhythm, normal heart sounds and intact distal pulses.   No murmur heard.  Pulmonary/Chest: Effort normal and breath sounds normal. He has no wheezes. He has no rales.   Abdominal: Soft. Bowel sounds are normal. He exhibits no mass. There is no tenderness. There is no rigidity, no rebound, no guarding and no CVA tenderness.   Musculoskeletal: He exhibits no edema.   Lymphadenopathy:     He has no cervical adenopathy.   Neurological: He is oriented to person, place, and time. He has normal strength. He displays no atrophy. No sensory deficit. Gait normal.   Reflex Scores:       Patellar reflexes are 2+ on the right side and 2+ on the left side.  Skin: He is not diaphoretic.   Vitals reviewed.      Assessment:       1. Gastrointestinal hemorrhage, unspecified gastrointestinal hemorrhage type    2. Unintentional weight loss    3. Viral URI with cough    4. Urinary hesitancy    5. Chronic systolic heart failure    6. Coronary artery disease with exertional angina    7. Atherosclerosis of aorta        Plan:       Sharath was seen today for hospital follow up, sore throat and otalgia.    Diagnoses and all orders for this visit:    Gastrointestinal hemorrhage, unspecified gastrointestinal hemorrhage type  -     CBC auto differential; Future  -     Iron and TIBC; Future  -     Ferritin; Future  -     CT Abdomen Pelvis With Contrast; Future  Will recheck blood count.  Check CT abdomen/pelvis.    Unintentional weight loss  -     TSH; Future  -     Ambulatory referral/consult to Gastroenterology; Future  -     CBC auto differential; Future  -     Iron and TIBC; Future  -     Ferritin; Future  -     CT Abdomen Pelvis With Contrast; Future  Will do a weightloss work up, including thyroid function, CBC, and referral to GI in addition to check CT scan  abdo/pelvis.  Already has CT chest ordered.    Viral URI with cough  -     ofloxacin (OCUFLOX) 0.3 % ophthalmic solution; 5 drops in left ear BID x 10 days  -     promethazine-dextromethorphan (PROMETHAZINE-DM) 6.25-15 mg/5 mL Syrp; Take 5 mLs by mouth every 4 to 6 hours as needed.  Symptomatic care advised.  Rest, fluids, and prescribed regimen    Urinary hesitancy  -     PROSTATE SPECIFIC ANTIGEN, DIAGNOSTIC; Future  -     Urinalysis; Future  -     Urine culture; Future  Check PSA and urine studies.     Chronic systolic heart failure  No acute concerns reported  Continues to decline AICD    Coronary artery disease with exertional angina  No acute concerns reported    Atherosclerosis of aorta  On Lipitor

## 2020-03-20 ENCOUNTER — TELEPHONE (OUTPATIENT)
Dept: PULMONOLOGY | Facility: HOSPITAL | Age: 70
End: 2020-03-20

## 2020-03-20 DIAGNOSIS — R91.1 LUNG NODULE: ICD-10-CM

## 2020-03-20 NOTE — TELEPHONE ENCOUNTER
Attempt to contact patient via phone without success.  Left a detailed message.  rll cystic changes.  Suspect infectious or inflammatory etiology.  Cannot rule out malignancy.  Await sputum culture to finalize.  Recommend repeat ct in 3 months.      Please schedule ct in 3 months.

## 2020-03-25 ENCOUNTER — TELEPHONE (OUTPATIENT)
Dept: FAMILY MEDICINE | Facility: CLINIC | Age: 70
End: 2020-03-25

## 2020-03-27 ENCOUNTER — TELEPHONE (OUTPATIENT)
Dept: FAMILY MEDICINE | Facility: CLINIC | Age: 70
End: 2020-03-27

## 2020-03-27 ENCOUNTER — HOSPITAL ENCOUNTER (OUTPATIENT)
Dept: RADIOLOGY | Facility: HOSPITAL | Age: 70
Discharge: HOME | End: 2020-03-27
Attending: FAMILY MEDICINE
Payer: MEDICARE

## 2020-03-27 DIAGNOSIS — R91.1 LUNG NODULE: ICD-10-CM

## 2020-03-27 DIAGNOSIS — R68.89 ABNORMAL NECK FINDING: ICD-10-CM

## 2020-03-27 DIAGNOSIS — R94.2 ABNORMAL PET SCAN OF LUNG: ICD-10-CM

## 2020-03-27 DIAGNOSIS — J38.7 SUPRAGLOTTIC MASS: Primary | ICD-10-CM

## 2020-03-27 DIAGNOSIS — R49.0 HOARSE VOICE QUALITY: ICD-10-CM

## 2020-03-27 LAB — POCT GLUCOSE: 98 MG/DL (ref 70–110)

## 2020-03-27 PROCEDURE — 78815 NM PET CT ROUTINE: ICD-10-PCS | Mod: 26,HCNC,PS, | Performed by: RADIOLOGY

## 2020-03-27 PROCEDURE — 78815 PET IMAGE W/CT SKULL-THIGH: CPT | Mod: 26,HCNC,PS, | Performed by: RADIOLOGY

## 2020-03-27 PROCEDURE — 78815 PET IMAGE W/CT SKULL-THIGH: CPT | Mod: TC,HCNC,PI

## 2020-03-27 PROCEDURE — A9552 F18 FDG: HCPCS | Mod: HCNC

## 2020-03-27 NOTE — TELEPHONE ENCOUNTER
Spoke to wife and patient on phone via 3-way call.  Informed them of PET scan showing spot in throat area that is concerning and need to see ENT provider. Importance of seeing ENT stressed. Informed them that spot on lung can be related to spot on throat.   Patient and wife expressed understanding and will call Monday for ENT referral.    Provided opportunity for questions.

## 2020-03-30 ENCOUNTER — TELEPHONE (OUTPATIENT)
Dept: FAMILY MEDICINE | Facility: CLINIC | Age: 70
End: 2020-03-30

## 2020-04-16 ENCOUNTER — TELEPHONE (OUTPATIENT)
Dept: OTOLARYNGOLOGY | Facility: CLINIC | Age: 70
End: 2020-04-16

## 2020-04-16 ENCOUNTER — TELEPHONE (OUTPATIENT)
Dept: FAMILY MEDICINE | Facility: CLINIC | Age: 70
End: 2020-04-16

## 2020-04-16 ENCOUNTER — HOSPITAL ENCOUNTER (OUTPATIENT)
Dept: RADIOLOGY | Facility: HOSPITAL | Age: 70
Discharge: HOME | End: 2020-04-16
Attending: OTOLARYNGOLOGY
Payer: MEDICARE

## 2020-04-16 DIAGNOSIS — C32.9 MALIGNANT NEOPLASM OF LARYNX: ICD-10-CM

## 2020-04-16 PROCEDURE — 70491 CT SOFT TISSUE NECK WITH CONTRAST: ICD-10-PCS | Mod: 26,HCNC,, | Performed by: RADIOLOGY

## 2020-04-16 PROCEDURE — 25500020 PHARM REV CODE 255: Mod: HCNC | Performed by: OTOLARYNGOLOGY

## 2020-04-16 PROCEDURE — 70491 CT SOFT TISSUE NECK W/DYE: CPT | Mod: TC,HCNC

## 2020-04-16 PROCEDURE — 70491 CT SOFT TISSUE NECK W/DYE: CPT | Mod: 26,HCNC,, | Performed by: RADIOLOGY

## 2020-04-16 RX ADMIN — IOHEXOL 75 ML: 350 INJECTION, SOLUTION INTRAVENOUS at 04:04

## 2020-04-16 NOTE — TELEPHONE ENCOUNTER
Chart reviewed and called patient to come in tomorrow. Will order ct neck with contrast for better eval of neck nodes and disease extent as ct portion of a pet/ct not ideal for evaluating this. Needs to be done urgently despite covid pandemic, from what I can see on ct portion of pet, has large supraglottic mass extending towards glottis, need to scope patient to determine airway status and set up for biopsy urgently

## 2020-04-16 NOTE — TELEPHONE ENCOUNTER
Scheduled Patient to see Dr. Vallejo tomorrow due to Supraglottic Mass.  Dr. Vallejo scheduled Patient for a Stat CT Scan today at 3:00 PM.  Notified Patient and he has full understanding.  Advised patient not to eat or drink anything as of right now until after his Ct Scan.

## 2020-04-16 NOTE — TELEPHONE ENCOUNTER
----- Message from Kathryn Gomes sent at 4/16/2020  2:32 PM CDT -----  Contact: Slim Gray/ Spouse/  [phone]  Type: Patient Call Back    Who called:  Spouse    What is the request in detail:  Patients spouse would like staff to give her a call.  Thank you    Would the patient rather a call back or a response via [facility]?   Call back    Best call back number:   [phone]

## 2020-04-16 NOTE — TELEPHONE ENCOUNTER
I called and spoke to the pt wife and she is asking for results from today's ENT visit. I advised pt wife she would need to contact the ENT for those results.

## 2020-04-17 ENCOUNTER — OFFICE VISIT (OUTPATIENT)
Dept: OTOLARYNGOLOGY | Facility: CLINIC | Age: 70
End: 2020-04-17
Payer: MEDICARE

## 2020-04-17 ENCOUNTER — TELEPHONE (OUTPATIENT)
Dept: FAMILY MEDICINE | Facility: CLINIC | Age: 70
End: 2020-04-17

## 2020-04-17 ENCOUNTER — TELEPHONE (OUTPATIENT)
Dept: OTOLARYNGOLOGY | Facility: CLINIC | Age: 70
End: 2020-04-17

## 2020-04-17 ENCOUNTER — HOSPITAL ENCOUNTER (OUTPATIENT)
Dept: PREADMISSION TESTING | Facility: HOSPITAL | Age: 70
Discharge: HOME | End: 2020-04-17
Attending: FAMILY MEDICINE
Payer: MEDICARE

## 2020-04-17 ENCOUNTER — ANESTHESIA EVENT (OUTPATIENT)
Dept: SURGERY | Facility: HOSPITAL | Age: 70
End: 2020-04-17
Payer: MEDICARE

## 2020-04-17 VITALS
BODY MASS INDEX: 19.67 KG/M2 | SYSTOLIC BLOOD PRESSURE: 130 MMHG | DIASTOLIC BLOOD PRESSURE: 60 MMHG | HEIGHT: 66 IN | WEIGHT: 122.38 LBS

## 2020-04-17 VITALS
HEART RATE: 71 BPM | HEIGHT: 66 IN | OXYGEN SATURATION: 100 % | WEIGHT: 122.81 LBS | SYSTOLIC BLOOD PRESSURE: 127 MMHG | TEMPERATURE: 97 F | DIASTOLIC BLOOD PRESSURE: 82 MMHG | RESPIRATION RATE: 20 BRPM | BODY MASS INDEX: 19.74 KG/M2

## 2020-04-17 DIAGNOSIS — J38.7 SUPRAGLOTTIC MASS: Primary | ICD-10-CM

## 2020-04-17 DIAGNOSIS — R91.8 LUNG MASS: ICD-10-CM

## 2020-04-17 DIAGNOSIS — J38.7 SUPRAGLOTTIC MASS: ICD-10-CM

## 2020-04-17 LAB
ALBUMIN SERPL BCP-MCNC: 3.8 G/DL (ref 3.5–5.2)
ALP SERPL-CCNC: 108 U/L (ref 55–135)
ALT SERPL W/O P-5'-P-CCNC: 10 U/L (ref 10–44)
ANION GAP SERPL CALC-SCNC: 7 MMOL/L (ref 8–16)
AST SERPL-CCNC: 16 U/L (ref 10–40)
BASOPHILS # BLD AUTO: 0.04 K/UL (ref 0–0.2)
BASOPHILS NFR BLD: 0.4 % (ref 0–1.9)
BILIRUB SERPL-MCNC: 0.4 MG/DL (ref 0.1–1)
BUN SERPL-MCNC: 8 MG/DL (ref 8–23)
CALCIUM SERPL-MCNC: 9 MG/DL (ref 8.7–10.5)
CHLORIDE SERPL-SCNC: 109 MMOL/L (ref 95–110)
CO2 SERPL-SCNC: 26 MMOL/L (ref 23–29)
CREAT SERPL-MCNC: 0.9 MG/DL (ref 0.5–1.4)
DIFFERENTIAL METHOD: ABNORMAL
EOSINOPHIL # BLD AUTO: 0.1 K/UL (ref 0–0.5)
EOSINOPHIL NFR BLD: 0.9 % (ref 0–8)
ERYTHROCYTE [DISTWIDTH] IN BLOOD BY AUTOMATED COUNT: 15.9 % (ref 11.5–14.5)
EST. GFR  (AFRICAN AMERICAN): >60 ML/MIN/1.73 M^2
EST. GFR  (NON AFRICAN AMERICAN): >60 ML/MIN/1.73 M^2
GLUCOSE SERPL-MCNC: 100 MG/DL (ref 70–110)
HCT VFR BLD AUTO: 38.1 % (ref 40–54)
HGB BLD-MCNC: 12 G/DL (ref 14–18)
IMM GRANULOCYTES # BLD AUTO: 0.02 K/UL (ref 0–0.04)
IMM GRANULOCYTES NFR BLD AUTO: 0.2 % (ref 0–0.5)
LYMPHOCYTES # BLD AUTO: 2.3 K/UL (ref 1–4.8)
LYMPHOCYTES NFR BLD: 25 % (ref 18–48)
MCH RBC QN AUTO: 29.9 PG (ref 27–31)
MCHC RBC AUTO-ENTMCNC: 31.5 G/DL (ref 32–36)
MCV RBC AUTO: 95 FL (ref 82–98)
MONOCYTES # BLD AUTO: 0.6 K/UL (ref 0.3–1)
MONOCYTES NFR BLD: 6.1 % (ref 4–15)
NEUTROPHILS # BLD AUTO: 6.3 K/UL (ref 1.8–7.7)
NEUTROPHILS NFR BLD: 67.4 % (ref 38–73)
NRBC BLD-RTO: 0 /100 WBC
PLATELET # BLD AUTO: 232 K/UL (ref 150–350)
PMV BLD AUTO: 10 FL (ref 9.2–12.9)
POTASSIUM SERPL-SCNC: 4.5 MMOL/L (ref 3.5–5.1)
PROT SERPL-MCNC: 6.5 G/DL (ref 6–8.4)
RBC # BLD AUTO: 4.01 M/UL (ref 4.6–6.2)
SODIUM SERPL-SCNC: 142 MMOL/L (ref 136–145)
WBC # BLD AUTO: 9.37 K/UL (ref 3.9–12.7)

## 2020-04-17 PROCEDURE — 1101F PR PT FALLS ASSESS DOC 0-1 FALLS W/OUT INJ PAST YR: ICD-10-PCS | Mod: CPTII,S$GLB,, | Performed by: OTOLARYNGOLOGY

## 2020-04-17 PROCEDURE — 80053 COMPREHEN METABOLIC PANEL: CPT | Mod: HCNC

## 2020-04-17 PROCEDURE — 1159F PR MEDICATION LIST DOCUMENTED IN MEDICAL RECORD: ICD-10-PCS | Mod: S$GLB,,, | Performed by: OTOLARYNGOLOGY

## 2020-04-17 PROCEDURE — 99499 RISK ADDL DX/OHS AUDIT: ICD-10-PCS | Mod: S$GLB,,, | Performed by: OTOLARYNGOLOGY

## 2020-04-17 PROCEDURE — 3078F PR MOST RECENT DIASTOLIC BLOOD PRESSURE < 80 MM HG: ICD-10-PCS | Mod: CPTII,S$GLB,, | Performed by: OTOLARYNGOLOGY

## 2020-04-17 PROCEDURE — 3075F PR MOST RECENT SYSTOLIC BLOOD PRESS GE 130-139MM HG: ICD-10-PCS | Mod: CPTII,S$GLB,, | Performed by: OTOLARYNGOLOGY

## 2020-04-17 PROCEDURE — 3075F SYST BP GE 130 - 139MM HG: CPT | Mod: CPTII,S$GLB,, | Performed by: OTOLARYNGOLOGY

## 2020-04-17 PROCEDURE — 99204 OFFICE O/P NEW MOD 45 MIN: CPT | Mod: 25,S$GLB,, | Performed by: OTOLARYNGOLOGY

## 2020-04-17 PROCEDURE — 31575 DIAGNOSTIC LARYNGOSCOPY: CPT | Mod: S$GLB,,, | Performed by: OTOLARYNGOLOGY

## 2020-04-17 PROCEDURE — 1159F MED LIST DOCD IN RCRD: CPT | Mod: S$GLB,,, | Performed by: OTOLARYNGOLOGY

## 2020-04-17 PROCEDURE — 85025 COMPLETE CBC W/AUTO DIFF WBC: CPT | Mod: HCNC

## 2020-04-17 PROCEDURE — 99204 PR OFFICE/OUTPT VISIT, NEW, LEVL IV, 45-59 MIN: ICD-10-PCS | Mod: 25,S$GLB,, | Performed by: OTOLARYNGOLOGY

## 2020-04-17 PROCEDURE — 1125F PR PAIN SEVERITY QUANTIFIED, PAIN PRESENT: ICD-10-PCS | Mod: S$GLB,,, | Performed by: OTOLARYNGOLOGY

## 2020-04-17 PROCEDURE — 31575 PR LARYNGOSCOPY, FLEXIBLE; DIAGNOSTIC: ICD-10-PCS | Mod: S$GLB,,, | Performed by: OTOLARYNGOLOGY

## 2020-04-17 PROCEDURE — 93005 ELECTROCARDIOGRAM TRACING: CPT

## 2020-04-17 PROCEDURE — 99499 UNLISTED E&M SERVICE: CPT | Mod: S$GLB,,, | Performed by: OTOLARYNGOLOGY

## 2020-04-17 PROCEDURE — 3078F DIAST BP <80 MM HG: CPT | Mod: CPTII,S$GLB,, | Performed by: OTOLARYNGOLOGY

## 2020-04-17 PROCEDURE — 93010 ELECTROCARDIOGRAM REPORT: CPT | Mod: S$GLB,,, | Performed by: INTERNAL MEDICINE

## 2020-04-17 PROCEDURE — 36415 COLL VENOUS BLD VENIPUNCTURE: CPT | Mod: HCNC

## 2020-04-17 PROCEDURE — 93010 EKG 12-LEAD: ICD-10-PCS | Mod: S$GLB,,, | Performed by: INTERNAL MEDICINE

## 2020-04-17 PROCEDURE — 1125F AMNT PAIN NOTED PAIN PRSNT: CPT | Mod: S$GLB,,, | Performed by: OTOLARYNGOLOGY

## 2020-04-17 PROCEDURE — 1101F PT FALLS ASSESS-DOCD LE1/YR: CPT | Mod: CPTII,S$GLB,, | Performed by: OTOLARYNGOLOGY

## 2020-04-17 RX ORDER — LIDOCAINE HYDROCHLORIDE 10 MG/ML
1 INJECTION, SOLUTION EPIDURAL; INFILTRATION; INTRACAUDAL; PERINEURAL ONCE
Status: CANCELLED | OUTPATIENT
Start: 2020-04-17 | End: 2020-04-17

## 2020-04-17 RX ORDER — DEXAMETHASONE SODIUM PHOSPHATE 4 MG/ML
4 INJECTION, SOLUTION INTRA-ARTICULAR; INTRALESIONAL; INTRAMUSCULAR; INTRAVENOUS; SOFT TISSUE
Status: CANCELLED | OUTPATIENT
Start: 2020-04-17

## 2020-04-17 RX ORDER — SODIUM CHLORIDE 0.9 % (FLUSH) 0.9 %
10 SYRINGE (ML) INJECTION
Status: DISCONTINUED | OUTPATIENT
Start: 2020-04-17 | End: 2020-04-20 | Stop reason: HOSPADM

## 2020-04-17 NOTE — H&P (VIEW-ONLY)
Otolaryngology Clinic Note  Date:  04/17/2020     Chief complaint:  Chief Complaint   Patient presents with    Sore Throat     Throat Mass     History of Present Illness  Sharath Huizar is a 69 y.o. male  presenting today for a new evaluation and treatment of throat mass. Patient was seen in ER 2-27-20 with hemoptysis. He has noted hoarse voice and hemoptysis for a couple of months. Hemoptysis has resolved. He also has had some left temporal headaches. Coughing up white phlegm . Now with left otalgia. appettite decreased and has lost about 15 pounds. He has some dysphagia to solids . Denies aspiration symptoms. Also having throat pain .     Past Medical History  Past Medical History:   Diagnosis Date    Coronary artery disease     Hypertension     Myocardial infarction     NSTEMI (non-ST elevated myocardial infarction) 3/23/2018    Nuclear sclerosis of both eyes 6/13/2019        Past Surgical History  Past Surgical History:   Procedure Laterality Date    CARDIAC CATHETERIZATION      with 4 stents    LEFT HEART CATHETERIZATION Left 5/29/2018    Procedure: Left heart cath;  Surgeon: Viral Lombardi MD;  Location: [facility] CATH LAB;  Service: Cardiology;  Laterality: Left;  RN PREOP 5/28/18        Medications  Current Outpatient Medications on File Prior to Visit   Medication Sig Dispense Refill    albuterol (PROVENTIL/VENTOLIN HFA) 90 mcg/actuation inhaler Inhale 1-2 puffs into the lungs every 6 (six) hours as needed for Wheezing. Rescue 18 g 0    aspirin (ECOTRIN) 81 MG EC tablet Take 1 tablet (81 mg total) by mouth once daily.  0    atorvastatin (LIPITOR) 80 MG tablet Take 1 tablet (80 mg total) by mouth once daily. 90 tablet 3    clopidogrel (PLAVIX) 75 mg tablet Take 1 tablet (75 mg total) by mouth once daily. 90 tablet 3    ferrous sulfate (FEOSOL) 325 mg (65 mg iron) Tab tablet Take 1 tablet (325 mg total) by mouth daily with breakfast. 90 tablet 0    isosorbide mononitrate (IMDUR) 30 MG 24 hr tablet  Take 1 tablet (30 mg total) by mouth once daily. 90 tablet 3    lisinopril (PRINIVIL,ZESTRIL) 2.5 MG tablet Take 1 tablet (2.5 mg total) by mouth once daily. 90 tablet 3    metoprolol succinate (TOPROL-XL) 50 MG 24 hr tablet Take 1 tablet (50 mg total) by mouth once daily. 90 tablet 3    nitroGLYCERIN (NITROSTAT) 0.4 MG SL tablet Place 1 tablet (0.4 mg total) under the tongue every 5 (five) minutes as needed. 25 tablet 3    ofloxacin (OCUFLOX) 0.3 % ophthalmic solution 5 drops in left ear BID x 10 days 10 mL 0    omeprazole (PRILOSEC) 20 MG capsule Take 1 capsule (20 mg total) by mouth 2 (two) times daily. 28 capsule 0    promethazine-dextromethorphan (PROMETHAZINE-DM) 6.25-15 mg/5 mL Syrp Take 5 mLs by mouth every 4 to 6 hours as needed. 240 mL 0     Current Facility-Administered Medications on File Prior to Visit   Medication Dose Route Frequency Provider Last Rate Last Dose    [COMPLETED] iohexoL (OMNIPAQUE 350) injection 75 mL  75 mL Intravenous ONCE PRN Genoveva Vallejo MD   75 mL at 04/16/20 1639       Review of Systems  Review of Systems   Constitutional: Positive for weight loss (15 pounds).   HENT: Positive for ear pain and sore throat.    Eyes: Negative for double vision.   Respiratory: Positive for hemoptysis.    Cardiovascular: Negative for chest pain.   Gastrointestinal: Negative for blood in stool, constipation and diarrhea.   Genitourinary: Negative for hematuria.   Musculoskeletal: Positive for neck pain.   Neurological: Negative for dizziness.   Endo/Heme/Allergies: Does not bruise/bleed easily.        Social History   reports that he has been smoking cigarettes. He has a 30.00 pack-year smoking history. He has never used smokeless tobacco. He reports that he does not drink alcohol or use drugs.     Family History  Family History   Problem Relation Age of Onset    Blindness Paternal Aunt         Physical Exam   Vitals:    04/17/20 1009   BP: 130/60    Body mass index is 19.75 kg/m².          GENERAL: alert, no acute distress.  HEAD: normocephalic.   SKIN: no lesions of skin of head and neck area.  EYES: lids and lashes normal. Pupils equal . Extraocular motions intact. No proptosis  EARS: external ear without lesion, normal pinna shape and position  NOSE: external nose without abnormality, septum grossly midline on anterior rhinoscopy  ORAL CAVITY/OROPHARYNX: dentition poor- wears upper denture, remaining lower teeth anteriorly in poor condition.  no lesion of gingiva/buccal mucosa/floor of mouth/tongue. tongue midline and mobile. No fasciculations.   NECK: trachea midline. No discrete palpable thyroid nodule. No parotid mass nor tenderness. No submandibular gland mass nor tenderness. No scars.  LYMPH NODES:unable to palpate nodes seen on imaging  RESPIRATORY: no stridor, no stertor. Voice raspy. Respirations nonlabored.  NEURO: alert, responds to questions appropriately.   Cranial nerve exam as indicated in above sections and additionally showed  Facial movement symmetric with good eye closure and symmetric smile.   PSYCH:mood appropriate      PROCEDURE NOTE  NAME OF PROCEDURE: Flexible Laryngoscopy, diagnostic  INDICATIONS: laryngeal mass  PRE-PROCEDURE DIAGNOSIS:laryngeal mass  POST-PROCEDURE DIAGNOSIS:same  FINDINGS:left supraglottic mass extending slightly across midline, movement of left tvc seems restricted, mass obstructing complete view.    Consent: After procedure was explained in detail and all questions answered, verbal consent was obtained for performing flexible laryngoscopy.  Anesthesia: topical 4% lidocaine and neosynephrine  Procedure: With patient in seated position, the scope was inserted into the right nasal passageway and advanced atraumatically into the nasopharynx to examine the following structures  Nasopharynx: no mass or lesion noted in nasopharynx.   Oropharynx: base of tongue without  mass or ulceration. Lingual tonsils normal in appearance  Hypopharynx: posterior  pharyngeal wall without mass or lesion.  pooling of secretions. Pyriform sinuses visible without mass or lesion  Larynx: mass of glottic surface of epiglottis on left side extending slightly to right side of epiglottis and down along the left a-e fold obstructing view of true cord . Right cord mobile.  Postcricoid region without edema or lesion  Subglottis: visualized portion of subglottis normal in appearance    After examination performed, the scope was removed atraumatically . The patient tolerated the procedure well.    Imaging:  The patient has pertinent and/or recent imaging of the head and neck. PET/CT performed 3-19-20 and CT neck with contrast 4-16-20 ( in [facility] system) reports and images personally reviewed. Large mostly left sided  supraglotttic mass crossing midline with possible erosion into inner cortex of thyroid cartilage ( motion artifact ) and involvement of preepiglottic space concerning for T3 lesion. Small level 3 nodes with necrosis concerning for metastatic involvement. On PET scan, necrotic pulmonary nodule in periphery of right lung that is pet avid. Supraglottic mass and neck nodes also pet avid      Assessment  1. Supraglottic mass  2. Solitary pulmonary nodule      Plan:  Discussed plan of care with patient in detail and all questions answered. Patient reported understanding of plan of care. I reviewed images of PET scan and CT with patient as well as photodocuments of endoscopic exam   I will plan for DL with biopsy of supraglottic mass for better eval and path determination. 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 We discussed risks/benefits and indications. I think he will be able to be intubated with glidescope but I also discussed with him about tracheostomy if there is an airway issue and in an emergency situation.   I spoke with his wife over the phone  Spoke with his cardiologist Dr. Nicko carrasquillo to proceed. I am fine with him continuing his aspirin/plavix .    Will order ct  guided lung biopsy to determine if second primary  Will present case at tumor board. We discussed that options for curative treatment for throat ( as long as pulm nodule is not a met, given small nodes in neck and solitary status of node would favor it is not a met, pulm note by Dr. Samaniego states favors infectious/inflammatory etiology but can't rule out malignancy to which I agree and think if it is a malignancy would more likely be a second primary) would be surgery or chemo/RT. We will discuss this further once we have all of the information.   Advised pt to improve nutrition with boost shakes.       Surgery planned urgently for 4-20-20. Risk of delaying surgery is that patient could have airway decompensation if procedure pushed back to time when covid restrictions lifted.

## 2020-04-17 NOTE — PLAN OF CARE
Pre-operative instructions, medication directives and pain scales reviewed with patient. All questions the patient had  were answered. Re-assurance about surgical procedure and day of surgery routine given as needed. The patient verbalized understanding of the pre-op instructions.Labs and EKG done.

## 2020-04-17 NOTE — DISCHARGE INSTRUCTIONS
Your procedure  is scheduled for _Monday 4/20_________.    Call [phone] between 2pm and 5pm on _______to find out your arrival time for the day of surgery.    Report to EMERGENCY ROOM at ____ AM on the 1ST floor of the hospital.      Important instructions:   Do not eat or drink after 12 midnight, including water.  It is okay to brush your teeth.  Do not have gum, candy or mints.     Take only these medications with a small swallow of water on the morning of your surgery _METOPROLOL____________        Stop taking , Ibuprofen, Motrin and Aleve , Fish oil, and Vitamin E for at least 7 days before your surgery. You may use Tylenol unless otherwise instructed by your doctor.                     Prep instructions:    SHOWER   OTHER_____________     Please shower the night before and the morning of your surgery.         You may wear deodorant only.      Do not wear powder, body lotion or perfume/cologne.     Do not wear any jewelry or have any metal on your body.     You will be asked to remove any dentures or partials for the procedure.       If you are going home on the same day of surgery, you must arrange for a family member or a friend to drive you home.  Public transportation is prohibited.  You will not be able to drive home if you were given anesthesia or sedation.       Wear loose fitting clothes allowing for bandages.     Please leave money and valuables home.       You may bring your cell phone.     Call the doctor if fever or illness should occur before your surgery.    Call [phone] to contact us here if needed.

## 2020-04-17 NOTE — TELEPHONE ENCOUNTER
I called Kd in [facility] regarding this Patient that Dr. Vallejo put an order in for a needle guided biopsy lung.  Patient is having Surgery on Monday due to Throat Mass.  Kd said he will speak with the Radiologist and see when next wk this can be done. Notified Dr. Vallejo.

## 2020-04-17 NOTE — PATIENT INSTRUCTIONS
Please try to drink protein shakes or other nutrition drinks such as boost to help improve your nutrition.       Throat Tumor  You have a tumor in your throat/voicebox. A tumor is a mass of abnormal cells. To learn more about your tumor, your doctor will evaluate you.  The results help your doctor and healthcare team plan the best treatment for you.    Your physical exam  The physical exam was done today in my office.  The following was done during the exam :    · Panendoscopy. A tube with a camera (endoscope) was put into your  nose, and  down into your throat to see the tumor and confirm what we saw in the throat on your imaging tests ( see below)        Imaging tests  You have had one or more imaging tests.   · CT scan. This uses a computer and X-rays to take detailed pictures of your body.this was done yesterday of your neck to get a better look at the tumor and neck lymph nodes. It appears the tumor is mostly on the left side of the voicebox and is crossing over slightly to the right side. There are 2 lymph nodes , 1 on the right side and 1 on the left side that look to have cancer in them. Lymph nodes are structures that filter the blood and can have cancer in them .   · PET-CT scan. A positron emission tomography (PET) scan uses a small amount of a radioactive substance to show areas that might be cancer. A PET-CT scan are two tests done at the same time. This creates a more detailed image. This looks at how cells take up sugar. Tumors often use a lot of sugar. In addition to the area in the throat and lymph nodes in neck, there is an area in the lung that is taking up a lot of sugar. This may be a separate tumor of the lung or it may be spread of tumor from the throat. I believe it is more likely that it is a second tumor.   Direct pharyngoscopy and laryngoscopy  For a closer look at your throat, larynx, and other nearby tissues, we will do  direct pharyngoscopy and laryngoscopy in the operating room. During  this test, the provider puts a lighted tube (laryngoscope) into your throat. Direct laryngoscopy will be done in the hospital  in the operating room under given general anesthesia. This medicine helps you relax and sleep through the test.  Biopsy  A biopsy means the healthcare provider removes a sample of your tumor. This sample is then sent to a lab and studied. This helps show whether the tumor is cancer.  Deciding on treatment  Treatment depends on the tumors size, type, and where it is. Treatment also depends on whether the tumor is cancer. Treatment may include one or more of the following:  · Surgery  · Radiation therapy  · Chemotherapy   We will discuss treatment options further after biopsy. We may need to do more workup of the area of the lung before we do the treatment of the throat. Because there is more tumor in the throat, we may decide to go ahead and treat that. I will present your case to a tumor board ( medical oncologist, radiation oncologist and surgeon) to discuss the best next steps.   I am also going to contact your cardiologist to ensure we don't need to do any other testing prior to the procedure.     You should be able to go home after the surgery. When you go to get bloodwork today , they will let you know what time to come to the hospital for your surgery on 4-20-20. The procedure itself will take about an hour ( that includes going to sleep and waking up).    Please reach out if you have any questions or concerns. I am happy to discuss at any time, as I know this is a lot of information.     Genoveva Vallejo MD  Otolaryngology  [facility]  Office: [phone]  Cell: [phone]

## 2020-04-17 NOTE — PROGRESS NOTES
Otolaryngology Clinic Note  Date:  04/17/2020     Chief complaint:  Chief Complaint   Patient presents with    Sore Throat     Throat Mass     History of Present Illness  Sharath Huizar is a 69 y.o. male  presenting today for a new evaluation and treatment of throat mass. Patient was seen in ER 2-27-20 with hemoptysis. He has noted hoarse voice and hemoptysis for a couple of months. Hemoptysis has resolved. He also has had some left temporal headaches. Coughing up white phlegm . Now with left otalgia. appettite decreased and has lost about 15 pounds. He has some dysphagia to solids . Denies aspiration symptoms. Also having throat pain .     Past Medical History  Past Medical History:   Diagnosis Date    Coronary artery disease     Hypertension     Myocardial infarction     NSTEMI (non-ST elevated myocardial infarction) 3/23/2018    Nuclear sclerosis of both eyes 6/13/2019        Past Surgical History  Past Surgical History:   Procedure Laterality Date    CARDIAC CATHETERIZATION      with 4 stents    LEFT HEART CATHETERIZATION Left 5/29/2018    Procedure: Left heart cath;  Surgeon: Viral Lombardi MD;  Location: [facility] CATH LAB;  Service: Cardiology;  Laterality: Left;  RN PREOP 5/28/18        Medications  Current Outpatient Medications on File Prior to Visit   Medication Sig Dispense Refill    albuterol (PROVENTIL/VENTOLIN HFA) 90 mcg/actuation inhaler Inhale 1-2 puffs into the lungs every 6 (six) hours as needed for Wheezing. Rescue 18 g 0    aspirin (ECOTRIN) 81 MG EC tablet Take 1 tablet (81 mg total) by mouth once daily.  0    atorvastatin (LIPITOR) 80 MG tablet Take 1 tablet (80 mg total) by mouth once daily. 90 tablet 3    clopidogrel (PLAVIX) 75 mg tablet Take 1 tablet (75 mg total) by mouth once daily. 90 tablet 3    ferrous sulfate (FEOSOL) 325 mg (65 mg iron) Tab tablet Take 1 tablet (325 mg total) by mouth daily with breakfast. 90 tablet 0    isosorbide mononitrate (IMDUR) 30 MG 24 hr tablet  Take 1 tablet (30 mg total) by mouth once daily. 90 tablet 3    lisinopril (PRINIVIL,ZESTRIL) 2.5 MG tablet Take 1 tablet (2.5 mg total) by mouth once daily. 90 tablet 3    metoprolol succinate (TOPROL-XL) 50 MG 24 hr tablet Take 1 tablet (50 mg total) by mouth once daily. 90 tablet 3    nitroGLYCERIN (NITROSTAT) 0.4 MG SL tablet Place 1 tablet (0.4 mg total) under the tongue every 5 (five) minutes as needed. 25 tablet 3    ofloxacin (OCUFLOX) 0.3 % ophthalmic solution 5 drops in left ear BID x 10 days 10 mL 0    omeprazole (PRILOSEC) 20 MG capsule Take 1 capsule (20 mg total) by mouth 2 (two) times daily. 28 capsule 0    promethazine-dextromethorphan (PROMETHAZINE-DM) 6.25-15 mg/5 mL Syrp Take 5 mLs by mouth every 4 to 6 hours as needed. 240 mL 0     Current Facility-Administered Medications on File Prior to Visit   Medication Dose Route Frequency Provider Last Rate Last Dose    [COMPLETED] iohexoL (OMNIPAQUE 350) injection 75 mL  75 mL Intravenous ONCE PRN Genoveva Vallejo MD   75 mL at 04/16/20 1639       Review of Systems  Review of Systems   Constitutional: Positive for weight loss (15 pounds).   HENT: Positive for ear pain and sore throat.    Eyes: Negative for double vision.   Respiratory: Positive for hemoptysis.    Cardiovascular: Negative for chest pain.   Gastrointestinal: Negative for blood in stool, constipation and diarrhea.   Genitourinary: Negative for hematuria.   Musculoskeletal: Positive for neck pain.   Neurological: Negative for dizziness.   Endo/Heme/Allergies: Does not bruise/bleed easily.        Social History   reports that he has been smoking cigarettes. He has a 30.00 pack-year smoking history. He has never used smokeless tobacco. He reports that he does not drink alcohol or use drugs.     Family History  Family History   Problem Relation Age of Onset    Blindness Paternal Aunt         Physical Exam   Vitals:    04/17/20 1009   BP: 130/60    Body mass index is 19.75 kg/m².          GENERAL: alert, no acute distress.  HEAD: normocephalic.   SKIN: no lesions of skin of head and neck area.  EYES: lids and lashes normal. Pupils equal . Extraocular motions intact. No proptosis  EARS: external ear without lesion, normal pinna shape and position  NOSE: external nose without abnormality, septum grossly midline on anterior rhinoscopy  ORAL CAVITY/OROPHARYNX: dentition poor- wears upper denture, remaining lower teeth anteriorly in poor condition.  no lesion of gingiva/buccal mucosa/floor of mouth/tongue. tongue midline and mobile. No fasciculations.   NECK: trachea midline. No discrete palpable thyroid nodule. No parotid mass nor tenderness. No submandibular gland mass nor tenderness. No scars.  LYMPH NODES:unable to palpate nodes seen on imaging  RESPIRATORY: no stridor, no stertor. Voice raspy. Respirations nonlabored.  NEURO: alert, responds to questions appropriately.   Cranial nerve exam as indicated in above sections and additionally showed  Facial movement symmetric with good eye closure and symmetric smile.   PSYCH:mood appropriate      PROCEDURE NOTE  NAME OF PROCEDURE: Flexible Laryngoscopy, diagnostic  INDICATIONS: laryngeal mass  PRE-PROCEDURE DIAGNOSIS:laryngeal mass  POST-PROCEDURE DIAGNOSIS:same  FINDINGS:left supraglottic mass extending slightly across midline, movement of left tvc seems restricted, mass obstructing complete view.    Consent: After procedure was explained in detail and all questions answered, verbal consent was obtained for performing flexible laryngoscopy.  Anesthesia: topical 4% lidocaine and neosynephrine  Procedure: With patient in seated position, the scope was inserted into the right nasal passageway and advanced atraumatically into the nasopharynx to examine the following structures  Nasopharynx: no mass or lesion noted in nasopharynx.   Oropharynx: base of tongue without  mass or ulceration. Lingual tonsils normal in appearance  Hypopharynx: posterior  pharyngeal wall without mass or lesion.  pooling of secretions. Pyriform sinuses visible without mass or lesion  Larynx: mass of glottic surface of epiglottis on left side extending slightly to right side of epiglottis and down along the left a-e fold obstructing view of true cord . Right cord mobile.  Postcricoid region without edema or lesion  Subglottis: visualized portion of subglottis normal in appearance    After examination performed, the scope was removed atraumatically . The patient tolerated the procedure well.    Imaging:  The patient has pertinent and/or recent imaging of the head and neck. PET/CT performed 3-19-20 and CT neck with contrast 4-16-20 ( in [facility] system) reports and images personally reviewed. Large mostly left sided  supraglotttic mass crossing midline with possible erosion into inner cortex of thyroid cartilage ( motion artifact ) and involvement of preepiglottic space concerning for T3 lesion. Small level 3 nodes with necrosis concerning for metastatic involvement. On PET scan, necrotic pulmonary nodule in periphery of right lung that is pet avid. Supraglottic mass and neck nodes also pet avid      Assessment  1. Supraglottic mass  2. Solitary pulmonary nodule      Plan:  Discussed plan of care with patient in detail and all questions answered. Patient reported understanding of plan of care. I reviewed images of PET scan and CT with patient as well as photodocuments of endoscopic exam   I will plan for DL with biopsy of supraglottic mass for better eval and path determination. 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 We discussed risks/benefits and indications. I think he will be able to be intubated with glidescope but I also discussed with him about tracheostomy if there is an airway issue and in an emergency situation.   I spoke with his wife over the phone  Spoke with his cardiologist Dr. Nicko carrasquillo to proceed. I am fine with him continuing his aspirin/plavix .    Will order ct  guided lung biopsy to determine if second primary  Will present case at tumor board. We discussed that options for curative treatment for throat ( as long as pulm nodule is not a met, given small nodes in neck and solitary status of node would favor it is not a met, pulm note by Dr. Samaniego states favors infectious/inflammatory etiology but can't rule out malignancy to which I agree and think if it is a malignancy would more likely be a second primary) would be surgery or chemo/RT. We will discuss this further once we have all of the information.   Advised pt to improve nutrition with boost shakes.       Surgery planned urgently for 4-20-20. Risk of delaying surgery is that patient could have airway decompensation if procedure pushed back to time when covid restrictions lifted.

## 2020-04-17 NOTE — TELEPHONE ENCOUNTER
----- Message from Jennifer Prescott sent at 4/17/2020 12:36 PM CDT -----  Contact: pt wife lorena dawsonan  Type: Patient Call Back    Who called:lorena jama    What is the request in detail:pt is having surgery Monday but wants to speak to Dr prior to surgery    Can the clinic reply by [facility]?no    Would the patient rather a call back or a response via [facility]?call    Best call back number:

## 2020-04-17 NOTE — TELEPHONE ENCOUNTER
Patient is having a Microlaryngoscopy and Biopsy Stat, due to Throat Mass,  Needing a Cardiology Clearance if possible.  Dr. Vallejo said that he does not need to get off of any blood thinners.  Under General Anesthesia. Please advise.

## 2020-04-17 NOTE — LETTER
April 17, 2020      Wilmer Bangura Jr., MD  [address] [facility]  [place] [address]           [facility]  [address]   [place] [address]  Phone: [phone]          Patient: Sharath Huizar   MR Number: [id number]   YOB: 1950   Date of Visit: 4/17/2020       Dear Dr. Wilmer Bangura Jr.:    Thank you for referring Sharath Huizar to me for evaluation. Attached you will find relevant portions of my assessment and plan of care.    If you have questions, please do not hesitate to call me. I look forward to following Sharath Huizar along with you.    Sincerely,    Genoveva Vallejo MD    Enclosure  CC:  No Recipients    If you would like to receive this communication electronically, please contact [email or web address] or [phone] to request more information on [facility] [employer] access.    For providers and/or their staff who would like to refer a patient to [facility], please contact us through our one-stop-shop provider referral line, [facility], at [phone].    If you feel you have received this communication in error or would no longer like to receive these types of communications, please e-mail [email or web address]

## 2020-04-20 ENCOUNTER — HOSPITAL ENCOUNTER (OUTPATIENT)
Facility: HOSPITAL | Age: 70
Discharge: HOME | End: 2020-04-20
Attending: OTOLARYNGOLOGY | Admitting: OTOLARYNGOLOGY
Payer: MEDICARE

## 2020-04-20 ENCOUNTER — TELEPHONE (OUTPATIENT)
Dept: INTERVENTIONAL RADIOLOGY/VASCULAR | Facility: HOSPITAL | Age: 70
End: 2020-04-20

## 2020-04-20 ENCOUNTER — ANESTHESIA (OUTPATIENT)
Dept: SURGERY | Facility: HOSPITAL | Age: 70
End: 2020-04-20
Payer: MEDICARE

## 2020-04-20 VITALS
DIASTOLIC BLOOD PRESSURE: 80 MMHG | BODY MASS INDEX: 19.74 KG/M2 | SYSTOLIC BLOOD PRESSURE: 140 MMHG | WEIGHT: 122.81 LBS | HEART RATE: 78 BPM | RESPIRATION RATE: 16 BRPM | HEIGHT: 66 IN | OXYGEN SATURATION: 93 % | TEMPERATURE: 97 F

## 2020-04-20 DIAGNOSIS — J38.7 SUPRAGLOTTIC MASS: ICD-10-CM

## 2020-04-20 LAB — SARS-COV-2 RDRP RESP QL NAA+PROBE: NEGATIVE

## 2020-04-20 PROCEDURE — 71000033 HC RECOVERY, INTIAL HOUR: Mod: HCNC | Performed by: OTOLARYNGOLOGY

## 2020-04-20 PROCEDURE — 71000039 HC RECOVERY, EACH ADD'L HOUR: Mod: HCNC | Performed by: OTOLARYNGOLOGY

## 2020-04-20 PROCEDURE — 31536 LARYNGOSCOPY W/BX & OP SCOPE: CPT | Mod: HCNC,,, | Performed by: OTOLARYNGOLOGY

## 2020-04-20 PROCEDURE — 71000015 HC POSTOP RECOV 1ST HR: Mod: HCNC | Performed by: OTOLARYNGOLOGY

## 2020-04-20 PROCEDURE — D9220A PRA ANESTHESIA: ICD-10-PCS | Mod: HCNC,,, | Performed by: REGISTERED NURSE

## 2020-04-20 PROCEDURE — 63600175 PHARM REV CODE 636 W HCPCS: Mod: HCNC | Performed by: REGISTERED NURSE

## 2020-04-20 PROCEDURE — 88305 TISSUE EXAM BY PATHOLOGIST: ICD-10-PCS | Mod: 26,HCNC,, | Performed by: PATHOLOGY

## 2020-04-20 PROCEDURE — 88342 CHG IMMUNOCYTOCHEMISTRY: ICD-10-PCS | Mod: 26,HCNC,, | Performed by: PATHOLOGY

## 2020-04-20 PROCEDURE — 37000008 HC ANESTHESIA 1ST 15 MINUTES: Mod: HCNC | Performed by: OTOLARYNGOLOGY

## 2020-04-20 PROCEDURE — 88342 IMHCHEM/IMCYTCHM 1ST ANTB: CPT | Mod: HCNC | Performed by: PATHOLOGY

## 2020-04-20 PROCEDURE — 88305 TISSUE EXAM BY PATHOLOGIST: CPT | Mod: HCNC | Performed by: PATHOLOGY

## 2020-04-20 PROCEDURE — 88305 TISSUE EXAM BY PATHOLOGIST: CPT | Mod: 26,HCNC,, | Performed by: PATHOLOGY

## 2020-04-20 PROCEDURE — 25000003 PHARM REV CODE 250: Mod: HCNC | Performed by: REGISTERED NURSE

## 2020-04-20 PROCEDURE — D9220A PRA ANESTHESIA: ICD-10-PCS | Mod: HCNC,,, | Performed by: ANESTHESIOLOGY

## 2020-04-20 PROCEDURE — 88342 IMHCHEM/IMCYTCHM 1ST ANTB: CPT | Mod: 26,HCNC,, | Performed by: PATHOLOGY

## 2020-04-20 PROCEDURE — 36000709 HC OR TIME LEV III EA ADD 15 MIN: Mod: HCNC | Performed by: OTOLARYNGOLOGY

## 2020-04-20 PROCEDURE — U0002 COVID-19 LAB TEST NON-CDC: HCPCS | Mod: HCNC

## 2020-04-20 PROCEDURE — 25000242 PHARM REV CODE 250 ALT 637 W/ HCPCS: Mod: HCNC

## 2020-04-20 PROCEDURE — D9220A PRA ANESTHESIA: Mod: HCNC,,, | Performed by: ANESTHESIOLOGY

## 2020-04-20 PROCEDURE — 31536 PR LARYNGOSCOPY,DIRCT,OP SCOPE,BIOPSY: ICD-10-PCS | Mod: HCNC,,, | Performed by: OTOLARYNGOLOGY

## 2020-04-20 PROCEDURE — 25000003 PHARM REV CODE 250: Mod: HCNC | Performed by: OTOLARYNGOLOGY

## 2020-04-20 PROCEDURE — 25000003 PHARM REV CODE 250: Mod: HCNC | Performed by: ANESTHESIOLOGY

## 2020-04-20 PROCEDURE — 63600175 PHARM REV CODE 636 W HCPCS: Mod: HCNC

## 2020-04-20 PROCEDURE — 37000009 HC ANESTHESIA EA ADD 15 MINS: Mod: HCNC | Performed by: OTOLARYNGOLOGY

## 2020-04-20 PROCEDURE — 36000708 HC OR TIME LEV III 1ST 15 MIN: Mod: HCNC | Performed by: OTOLARYNGOLOGY

## 2020-04-20 PROCEDURE — D9220A PRA ANESTHESIA: Mod: HCNC,,, | Performed by: REGISTERED NURSE

## 2020-04-20 PROCEDURE — 71000016 HC POSTOP RECOV ADDL HR: Mod: HCNC | Performed by: OTOLARYNGOLOGY

## 2020-04-20 RX ORDER — ACETAMINOPHEN 10 MG/ML
INJECTION, SOLUTION INTRAVENOUS
Status: COMPLETED
Start: 2020-04-20 | End: 2020-04-20

## 2020-04-20 RX ORDER — SODIUM CHLORIDE 0.9 % (FLUSH) 0.9 %
10 SYRINGE (ML) INJECTION
Status: DISCONTINUED | OUTPATIENT
Start: 2020-04-20 | End: 2020-04-20 | Stop reason: HOSPADM

## 2020-04-20 RX ORDER — ETOMIDATE 2 MG/ML
INJECTION INTRAVENOUS
Status: DISCONTINUED | OUTPATIENT
Start: 2020-04-20 | End: 2020-04-20

## 2020-04-20 RX ORDER — DEXAMETHASONE SODIUM PHOSPHATE 4 MG/ML
INJECTION, SOLUTION INTRA-ARTICULAR; INTRALESIONAL; INTRAMUSCULAR; INTRAVENOUS; SOFT TISSUE
Status: DISCONTINUED | OUTPATIENT
Start: 2020-04-20 | End: 2020-04-20

## 2020-04-20 RX ORDER — ROCURONIUM BROMIDE 10 MG/ML
INJECTION, SOLUTION INTRAVENOUS
Status: DISCONTINUED | OUTPATIENT
Start: 2020-04-20 | End: 2020-04-20

## 2020-04-20 RX ORDER — DEXAMETHASONE SODIUM PHOSPHATE 4 MG/ML
4 INJECTION, SOLUTION INTRA-ARTICULAR; INTRALESIONAL; INTRAMUSCULAR; INTRAVENOUS; SOFT TISSUE
Status: DISCONTINUED | OUTPATIENT
Start: 2020-04-20 | End: 2020-04-20 | Stop reason: HOSPADM

## 2020-04-20 RX ORDER — ACETAMINOPHEN 10 MG/ML
1000 INJECTION, SOLUTION INTRAVENOUS ONCE
Status: DISCONTINUED | OUTPATIENT
Start: 2020-04-20 | End: 2020-04-20 | Stop reason: HOSPADM

## 2020-04-20 RX ORDER — FENTANYL CITRATE 50 UG/ML
INJECTION, SOLUTION INTRAMUSCULAR; INTRAVENOUS
Status: DISCONTINUED | OUTPATIENT
Start: 2020-04-20 | End: 2020-04-20

## 2020-04-20 RX ORDER — SUCCINYLCHOLINE CHLORIDE 20 MG/ML
INJECTION INTRAMUSCULAR; INTRAVENOUS
Status: DISCONTINUED | OUTPATIENT
Start: 2020-04-20 | End: 2020-04-20

## 2020-04-20 RX ORDER — LIDOCAINE HYDROCHLORIDE 10 MG/ML
1 INJECTION, SOLUTION EPIDURAL; INFILTRATION; INTRACAUDAL; PERINEURAL ONCE
Status: DISCONTINUED | OUTPATIENT
Start: 2020-04-20 | End: 2020-04-20 | Stop reason: HOSPADM

## 2020-04-20 RX ORDER — SODIUM CHLORIDE, SODIUM LACTATE, POTASSIUM CHLORIDE, CALCIUM CHLORIDE 600; 310; 30; 20 MG/100ML; MG/100ML; MG/100ML; MG/100ML
INJECTION, SOLUTION INTRAVENOUS CONTINUOUS PRN
Status: DISCONTINUED | OUTPATIENT
Start: 2020-04-20 | End: 2020-04-20

## 2020-04-20 RX ORDER — HYDROMORPHONE HYDROCHLORIDE 2 MG/ML
0.2 INJECTION, SOLUTION INTRAMUSCULAR; INTRAVENOUS; SUBCUTANEOUS EVERY 5 MIN PRN
Status: DISCONTINUED | OUTPATIENT
Start: 2020-04-20 | End: 2020-04-20 | Stop reason: HOSPADM

## 2020-04-20 RX ORDER — SODIUM CHLORIDE 9 MG/ML
INJECTION, SOLUTION INTRAVENOUS CONTINUOUS
Status: DISCONTINUED | OUTPATIENT
Start: 2020-04-20 | End: 2022-03-22

## 2020-04-20 RX ORDER — LIDOCAINE HYDROCHLORIDE 20 MG/ML
INJECTION INTRAVENOUS
Status: DISCONTINUED | OUTPATIENT
Start: 2020-04-20 | End: 2020-04-20

## 2020-04-20 RX ORDER — OXYMETAZOLINE HCL 0.05 %
2 SPRAY, NON-AEROSOL (ML) NASAL 2 TIMES DAILY
Status: DISCONTINUED | OUTPATIENT
Start: 2020-04-20 | End: 2020-04-20 | Stop reason: HOSPADM

## 2020-04-20 RX ORDER — ONDANSETRON 2 MG/ML
INJECTION INTRAMUSCULAR; INTRAVENOUS
Status: DISCONTINUED | OUTPATIENT
Start: 2020-04-20 | End: 2020-04-20

## 2020-04-20 RX ORDER — LIDOCAINE HYDROCHLORIDE 10 MG/ML
1 INJECTION, SOLUTION EPIDURAL; INFILTRATION; INTRACAUDAL; PERINEURAL ONCE
Status: DISCONTINUED | OUTPATIENT
Start: 2020-04-20 | End: 2021-02-17

## 2020-04-20 RX ORDER — SODIUM CHLORIDE 9 MG/ML
INJECTION, SOLUTION INTRAVENOUS CONTINUOUS PRN
Status: DISCONTINUED | OUTPATIENT
Start: 2020-04-20 | End: 2020-04-20

## 2020-04-20 RX ORDER — OXYMETAZOLINE HCL 0.05 %
SPRAY, NON-AEROSOL (ML) NASAL
Status: DISCONTINUED | OUTPATIENT
Start: 2020-04-20 | End: 2020-04-20 | Stop reason: HOSPADM

## 2020-04-20 RX ORDER — HYDROCODONE BITARTRATE AND ACETAMINOPHEN 5; 325 MG/1; MG/1
1 TABLET ORAL EVERY 6 HOURS PRN
Qty: 10 TABLET | Refills: 0 | Status: SHIPPED | OUTPATIENT
Start: 2020-04-20 | End: 2020-07-20

## 2020-04-20 RX ORDER — HYDRALAZINE HYDROCHLORIDE 20 MG/ML
INJECTION INTRAMUSCULAR; INTRAVENOUS
Status: DISCONTINUED | OUTPATIENT
Start: 2020-04-20 | End: 2020-04-20

## 2020-04-20 RX ORDER — LIDOCAINE HYDROCHLORIDE 40 MG/ML
SOLUTION TOPICAL
Status: DISCONTINUED
Start: 2020-04-20 | End: 2020-04-20 | Stop reason: HOSPADM

## 2020-04-20 RX ADMIN — HYDRALAZINE HYDROCHLORIDE 5 MG: 20 INJECTION INTRAMUSCULAR; INTRAVENOUS at 01:04

## 2020-04-20 RX ADMIN — FENTANYL CITRATE 50 MCG: 50 INJECTION INTRAMUSCULAR; INTRAVENOUS at 12:04

## 2020-04-20 RX ADMIN — ROCURONIUM BROMIDE 20 MG: 10 INJECTION, SOLUTION INTRAVENOUS at 12:04

## 2020-04-20 RX ADMIN — ACETAMINOPHEN 1000 MG: 10 INJECTION, SOLUTION INTRAVENOUS at 02:04

## 2020-04-20 RX ADMIN — DEXAMETHASONE SODIUM PHOSPHATE 8 MG: 4 INJECTION, SOLUTION INTRAMUSCULAR; INTRAVENOUS at 12:04

## 2020-04-20 RX ADMIN — RACEPINEPHRINE HYDROCHLORIDE 0.5 ML: 11.25 SOLUTION RESPIRATORY (INHALATION) at 01:04

## 2020-04-20 RX ADMIN — Medication 20 MG: at 01:04

## 2020-04-20 RX ADMIN — Medication 25 MG: at 12:04

## 2020-04-20 RX ADMIN — SUCCINYLCHOLINE CHLORIDE 100 MG: 20 INJECTION, SOLUTION INTRAMUSCULAR; INTRAVENOUS at 12:04

## 2020-04-20 RX ADMIN — Medication 80 MG: at 12:04

## 2020-04-20 RX ADMIN — ETOMIDATE 16 MG: 2 INJECTION, SOLUTION INTRAVENOUS at 12:04

## 2020-04-20 RX ADMIN — SODIUM CHLORIDE, SODIUM LACTATE, POTASSIUM CHLORIDE, AND CALCIUM CHLORIDE: .6; .31; .03; .02 INJECTION, SOLUTION INTRAVENOUS at 12:04

## 2020-04-20 RX ADMIN — ONDANSETRON 4 MG: 2 INJECTION, SOLUTION INTRAMUSCULAR; INTRAVENOUS at 12:04

## 2020-04-20 RX ADMIN — SODIUM CHLORIDE: 0.9 INJECTION, SOLUTION INTRAVENOUS at 09:04

## 2020-04-20 RX ADMIN — SODIUM CHLORIDE: 0.9 INJECTION, SOLUTION INTRAVENOUS at 12:04

## 2020-04-20 NOTE — OP NOTE
[facility]  Otolaryngology Head and Neck Surgery  Operative Note    SUMMARY     Date of Procedure: 4/20/2020     Procedure: Microdirect laryngoscopy with biopsy     Surgeon(s) and Role:     * Genoveva Vallejo MD - Primary    Assisting Surgeon: None    Pre-Operative Diagnosis: Supraglottic mass [J38.7]    Post-Operative Diagnosis: Post-Op Diagnosis Codes:     * Supraglottic mass [J38.7]    Anesthesia: General    Operative Findings:bulky and friable supraglottic mass on left side of glottic surface of epiglottis extending over slightly on to the right. Extends down along left aryepiglottic fold and true vocal fold on left. The right vocal fold did not appear to be involved. No lesion of pyriform sinus. Palpation of vallecula and base of tongue was soft. No additional lesions were noted in the oral cavity or oropharynx.     Operative report in detail: The patient was identified in the holding area per routine. He was taken to the operating room and placed supine on the operating table. The patient was intubated transorally by the anesthesiology service, and an adequate level of general endotracheal anesthesia was achieved. The head of the patient's bed was rotated 90 degrees away from anesthesia. his eyes were protected with tape, and a maxillary tooth guard  was placed. The patient was draped in the standard fashion for laryngoscopy, and a timeout was performed and the correct patient and procedure were identified.    The [facility] laryngoscope was inserted in the patient's oral cavity and advanced to visualize the posterior oropharynx, hypopharynx, and endolarynx, with the above findings noted.  Visualization was optimized with the 0 degree telescope. Photodocumentation was obtained.    Multiple biopsies were obtained and sent for permanent pathologic analysis. Hemostasis was accomplished with topical application of Afrin-soaked pledgets. The scope and tooth protector were then removed, and the  procedure terminated. The patient was handed back over to anesthesia and extubated .    Complications: None    Estimated Blood Loss (EBL): 20 mL           Specimens:   Left supraglottic mass for permanent            Condition: Stable    Disposition: patient noted to have some stridor and mildly increased work of breathing after extubation which improved with nebulized lidocaine and racemic epi; transferred to PACU once stable    Attestation: I was present and scrubbed for the entire procedure.

## 2020-04-20 NOTE — TRANSFER OF CARE
"Anesthesia Transfer of Care Note    Patient: Sharath Huizar    Procedure(s) Performed: Procedure(s) (LRB):  LARYNGOSCOPY, DIRECT (N/A)    Patient location: PACU    Anesthesia Type: general    Transport from OR: Transported from OR on 100% O2 by closed face mask with adequate spontaneous ventilation    Post pain: adequate analgesia    Post assessment: no apparent anesthetic complications    Post vital signs: stable    Level of consciousness: responds to stimulation    Nausea/Vomiting: no nausea/vomiting    Complications: none    Transfer of care protocol was followed      Last vitals:   Visit Vitals  BP (!) 178/104   Pulse 96   Temp 36 °C (96.8 °F) (Skin)   Resp 20   Ht 5' 6" (1.676 m)   Wt 55.7 kg (122 lb 12.7 oz)   SpO2 95%   BMI 19.82 kg/m²     "

## 2020-04-20 NOTE — OR NURSING
Resp effort less labored.  Pt appear comfortable with SaO2 in mid 90's on 10 lpm/simple face mask.  Denies pain and SOB at this time.

## 2020-04-20 NOTE — ANESTHESIA PREPROCEDURE EVALUATION
04/20/2020    Pre-operative evaluation for Procedure(s) (LRB):  LARYNGOSCOPY, DIRECT, DIAGNOSTIC, WITH BRONCHOSCOPY AND ESOPHAGOSCOPY with biopsy, possible emergency tracheostomy (N/A)  CREATION, TRACHEOSTOMY-POSSIBLE (N/A)    Sharath Huizar is a 69 y.o. male     Patient Active Problem List   Diagnosis    Hypertension, essential    Tobacco abuse    Leukocytosis    Elevated LFTs    Chronic systolic heart failure    Coronary artery disease involving native coronary artery of native heart without angina pectoris    MCKEON (dyspnea on exertion)    Atherosclerosis of aorta    Chronic pain of both shoulders    Decreased range of motion of shoulder    Weakness of shoulder    Refractive error    Nuclear sclerosis of both eyes    Asteroid hyalosis of right eye    Cavitary lung disease    Nasal congestion    DANYELLE (obstructive sleep apnea)    Hemoptysis    Coronary artery disease with exertional angina    Supraglottic mass       Review of patient's allergies indicates:  No Known Allergies    No current facility-administered medications on file prior to encounter.      Current Outpatient Medications on File Prior to Encounter   Medication Sig Dispense Refill    aspirin (ECOTRIN) 81 MG EC tablet Take 1 tablet (81 mg total) by mouth once daily.  0    atorvastatin (LIPITOR) 80 MG tablet Take 1 tablet (80 mg total) by mouth once daily. 90 tablet 3    clopidogrel (PLAVIX) 75 mg tablet Take 1 tablet (75 mg total) by mouth once daily. 90 tablet 3    isosorbide mononitrate (IMDUR) 30 MG 24 hr tablet Take 1 tablet (30 mg total) by mouth once daily. 90 tablet 3    lisinopril (PRINIVIL,ZESTRIL) 2.5 MG tablet Take 1 tablet (2.5 mg total) by mouth once daily. 90 tablet 3    metoprolol succinate (TOPROL-XL) 50 MG 24 hr tablet Take 1 tablet (50 mg total) by mouth once daily. 90 tablet 3    omeprazole (PRILOSEC) 20 MG capsule Take 1 capsule (20 mg total) by mouth 2 (two) times daily. 28 capsule 0    albuterol  (PROVENTIL/VENTOLIN HFA) 90 mcg/actuation inhaler Inhale 1-2 puffs into the lungs every 6 (six) hours as needed for Wheezing. Rescue (Patient not taking: Reported on 4/17/2020) 18 g 0    nitroGLYCERIN (NITROSTAT) 0.4 MG SL tablet Place 1 tablet (0.4 mg total) under the tongue every 5 (five) minutes as needed. 25 tablet 3    ofloxacin (OCUFLOX) 0.3 % ophthalmic solution 5 drops in left ear BID x 10 days 10 mL 0    promethazine-dextromethorphan (PROMETHAZINE-DM) 6.25-15 mg/5 mL Syrp Take 5 mLs by mouth every 4 to 6 hours as needed. 240 mL 0     VITALS  Vitals:    04/20/20 0901   BP: 126/83   Pulse: 68   Resp: 16   Temp: 36.3 °C (97.3 °F)       LABS  CBC:   Recent Labs     04/17/20  1215   WBC 9.37   RBC 4.01*   HGB 12.0*   HCT 38.1*      MCV 95   MCH 29.9   MCHC 31.5*       CMP:   Recent Labs     04/17/20  1215      K 4.5      CO2 26   BUN 8   CREATININE 0.9      CALCIUM 9.0   ALBUMIN 3.8   PROT 6.5   ALKPHOS 108   ALT 10   AST 16   BILITOT 0.4       2D Echo:  Results for orders placed or performed during the hospital encounter of 09/17/18   2D Echo w/ Color Flow Doppler   Result Value Ref Range    QEF 35 (A) 55 - 65    Mitral Valve Regurgitation MILD     Diastolic Dysfunction Yes (A)     Est. PA Systolic Pressure 22.89     Tricuspid Valve Regurgitation TRIVIAL          Anesthesia Evaluation    I have reviewed the Patient Summary Reports.     I have reviewed the Medications.     Review of Systems  Anesthesia Hx:  No problems with previous Anesthesia  Denies Family Hx of Anesthesia complications.   Denies Personal Hx of Anesthesia complications.   Social:  Smoker    Hematology/Oncology:  Hematology Normal   Oncology Normal     EENT/Dental:EENT/Dental Normal   Cardiovascular:   Hypertension Past MI CAD   Angina MCKEON    Pulmonary:   Shortness of breath Sleep Apnea    Renal/:  Renal/ Normal     Hepatic/GI:  Hepatic/GI Normal    Musculoskeletal:  Musculoskeletal Normal     Neurological:  Neurology Normal    Endocrine:  Endocrine Normal    Dermatological:  Skin Normal    Psych:  Psychiatric Normal           Physical Exam  General:  Cachexia    Airway/Jaw/Neck:  Airway Findings: Mouth Opening: Normal Tongue: Normal  General Airway Assessment: Adult  Mallampati: III  Jaw/Neck Findings:  Neck ROM: Normal ROM      Dental:  Dental Findings: Periodontal disease, Mild   Chest/Lungs:  Chest/Lungs Findings: Normal Respiratory Rate     Heart/Vascular:  Heart Findings: Rate: Normal        Mental Status:  Mental Status Findings:  Cooperative, Alert and Oriented         Anesthesia Plan  Type of Anesthesia, risks & benefits discussed:  Anesthesia Type:  general  Patient's Preference:   Intra-op Monitoring Plan: standard ASA monitors  Intra-op Monitoring Plan Comments:   Post Op Pain Control Plan:   Post Op Pain Control Plan Comments:   Induction:   IV  Beta Blocker:  Patient is on a Beta-Blocker and has received one dose within the past 24 hours (No further documentation required).       Informed Consent: Patient understands risks and agrees with Anesthesia plan.  Questions answered. Anesthesia consent signed with patient.  ASA Score: 3     Day of Surgery Review of History & Physical: I have interviewed and examined the patient. I have reviewed the patient's H&P dated:  There are no significant changes.          Ready For Surgery From Anesthesia Perspective.

## 2020-04-20 NOTE — BRIEF OP NOTE
[facility]  Brief Operative Note    SUMMARY     Surgery Date: 4/20/2020     Surgeon(s) and Role:     * Genoveva Vallejo MD - Primary    Assisting Surgeon: None    Pre-op Diagnosis:  Supraglottic mass [J38.7]    Post-op Diagnosis:  Post-Op Diagnosis Codes:     * Supraglottic mass [J38.7]    Procedure(s) (LRB):  LARYNGOSCOPY, DIRECT (N/A) with biopsy    Anesthesia: General    Description of Procedure: direct laryngoscopy and several biopsies performed    Description of the findings of the procedure: bulky supraglottic mass on left side of glottic surface of epiglottis extending over slightly on to the right. Extends down along left a-e fold and true vocal fold on left. No lesion of pyriform sinus.     Estimated Blood Loss: 20 mL         Specimens:   Left supraglottic mass for permanent    Complications: none

## 2020-04-20 NOTE — DISCHARGE INSTRUCTIONS
DIET: You may resume your home diet. If nausea is present, increase your diet gradually with fluids and bland foods    ACTIVITY LEVEL: You have received sedation or an anesthetic, you may feel sleepy for several hours. Rest until you are more awake. Gradually resume your normal activities    Medications: Pain medication should be taken only if needed and as directed.      Use incentive spirometer as instructed.     No driving, alcoholic beverages or signing legal documents for next 24 hours or while taking pain medication.       CALL THE DOCTOR:    For any obvious bleeding (some dried blood over the incision is normal).      Redness, swelling, foul smell around incision or fever over 101.   Shortness of breath, Coughing up Bloody sputum, Pains or Swelling in your Calves .   Persistent pain or nausea not relieved by medication.    If any unusual problems or difficulties occur contact your doctor. If you cannot contact your doctor but feel your signs and symptoms warrant a physicians attention return to the emergency room.          Fall Prevention  Millions of people fall every year and injure themselves. You may have had anesthesia or sedation which may increase your risk of falling. You may have health issues that put you at an increased risk of falling.     Here are ways to reduce your risk of falling.  ·   · Make your home safe by keeping walkways clear of objects you may trip over.  · Use non-slip pads under rugs. Do not use area rugs or small throw rugs.  · Use non-slip mats in bathtubs and showers.  · Install handrails and lights on staircases.  · Do not walk in poorly lit areas.  · Do not stand on chairs or wobbly ladders.  · Use caution when reaching overhead or looking upward. This position can cause a loss of balance.  · Be sure your shoes fit properly, have non-slip bottoms and are in good condition.   · Wear shoes both inside and out. Avoid going barefoot or wearing slippers.  · Be cautious when  going up and down stairs, curbs, and when walking on uneven sidewalks.  · If your balance is poor, consider using a cane or walker.  · If your fall was related to alcohol use, stop or limit alcohol intake.   · If your fall was related to use of sleeping medicines, talk to your doctor about this. You may need to reduce your dosage at bedtime if you awaken during the night to go to the bathroom.    · To reduce the need for nighttime bathroom trips:  ¨ Avoid drinking fluids for several hours before going to bed  ¨ Empty your bladder before going to bed  ¨ Men can keep a urinal at the bedside  · Stay as active as you can. Balance, flexibility, strength, and endurance all come from exercise. They all play a role in preventing falls. Ask your healthcare provider which types of activity are right for you.  · Get your vision checked on a regular basis.  · If you have pets, know where they are before you stand up or walk so you don't trip over them.  · Use night lights.

## 2020-04-20 NOTE — ANESTHESIA POSTPROCEDURE EVALUATION
Anesthesia Post Evaluation    Patient: Sharath Huizar    Procedure(s) Performed: Procedure(s) (LRB):  LARYNGOSCOPY, DIRECT (N/A)    Final Anesthesia Type: general    Patient location during evaluation: PACU  Patient participation: Yes- Able to Participate  Level of consciousness: awake and alert and oriented  Post-procedure vital signs: reviewed and stable  Pain management: adequate  Airway patency: patent    PONV status at discharge: No PONV  Anesthetic complications: no      Cardiovascular status: blood pressure returned to baseline, hemodynamically stable and stable  Respiratory status: unassisted, spontaneous ventilation and room air  Hydration status: euvolemic  Follow-up not needed.          Vitals Value Taken Time   /66 4/20/2020  2:26 PM   Temp 36 °C (96.8 °F) 4/20/2020  1:20 PM   Pulse 76 4/20/2020  2:26 PM   Resp 21 4/20/2020  2:26 PM   SpO2 93 % 4/20/2020  2:26 PM         Event Time     Out of Recovery 14:35:43          Pain/Marcellus Score: Pain Rating Prior to Med Admin: 0 (4/20/2020  2:27 PM)  Pain Rating Post Med Admin: 0 (4/20/2020  2:27 PM)

## 2020-04-20 NOTE — OR NURSING
Christi Vallejo and Simin at bedside.Neb treatment with racemic epi in progress.  Patient with mildly labored breathing with occasional stridor.  SaO2 upper 90's.

## 2020-04-20 NOTE — OR NURSING
Xylocaine nebulization treatment initiated per Dr Duval.  Pt with moderately labored breathing.  SaO2 decreasing to lower 90's and upper 80's.

## 2020-04-21 NOTE — DISCHARGE SUMMARY
Discharge Note    OUTCOME: Patient tolerated treatment/procedure well without complication and is now ready for discharge.    DISPOSITION: Home or Self Care    FINAL DIAGNOSIS: supraglottic mass    FOLLOWUP: In clinic    DISCHARGE INSTRUCTIONS:    Discharge Procedure Orders   Diet general     No dressing needed     Call MD for:  severe uncontrolled pain   Order Comments: Can take tylenol 325 mg every 6 hours as needed. If pain not controlled with tylenol, can take prescription pain medication. This medication also has tylenol in it. Do not exceed 1500 mg of tylenol per day

## 2020-04-23 ENCOUNTER — TELEPHONE (OUTPATIENT)
Dept: SURGERY | Facility: CLINIC | Age: 70
End: 2020-04-23

## 2020-04-23 NOTE — TELEPHONE ENCOUNTER
Spoke with pt wife informed her that  paperwork is available for . Pt wife verbalized understanding.          ----- Message from Lorin Howard sent at 4/23/2020 10:50 AM CDT -----  Type: Patient Call Back    Who called: WifeJustin Smalls    What is the request in detail: patient's wife states someone was suppose to contact her in order to get her records from his surgery.     Can the clinic reply by [facility]? No     Would the patient rather a call back or a response via [facility]?  Call     Best call back number:[phone]

## 2020-04-24 ENCOUNTER — TELEPHONE (OUTPATIENT)
Dept: CARDIOLOGY | Facility: CLINIC | Age: 70
End: 2020-04-24

## 2020-04-24 ENCOUNTER — DOCUMENTATION ONLY (OUTPATIENT)
Dept: OTOLARYNGOLOGY | Facility: CLINIC | Age: 70
End: 2020-04-24

## 2020-04-24 ENCOUNTER — TELEPHONE (OUTPATIENT)
Dept: OTOLARYNGOLOGY | Facility: CLINIC | Age: 70
End: 2020-04-24

## 2020-04-24 DIAGNOSIS — C32.9 SQUAMOUS CELL CARCINOMA OF LARYNX: Primary | ICD-10-CM

## 2020-04-24 RX ORDER — PROTEIN SUPPLEMENT
POWDER (GRAM) ORAL
Refills: 0 | Status: CANCELLED | COMMUNITY
Start: 2020-04-24

## 2020-04-24 RX ORDER — HYDROCODONE BITARTRATE AND ACETAMINOPHEN 5; 325 MG/1; MG/1
1 TABLET ORAL EVERY 6 HOURS PRN
Qty: 20 TABLET | Refills: 0 | Status: SHIPPED | OUTPATIENT
Start: 2020-04-24 | End: 2020-05-01

## 2020-04-24 NOTE — TELEPHONE ENCOUNTER
----- Message from Laly Henriquez MA sent at 4/24/2020  4:03 PM CDT -----  Pt wants to know do he still needs to take this RX isosorbide mononitrate (IMDUR) 30 MG

## 2020-04-24 NOTE — TELEPHONE ENCOUNTER
Called Mr. العراقيandra, was able to schedule his appointments to see Jeremy Aguilar MD Hematology Oncology, and Henri Mosher MD Radiation Oncology and also Alberto Fowler which is Head and Neck Cancer Surgeon.

## 2020-04-24 NOTE — PROGRESS NOTES
Patient's case presented at head and neck tumor board on 4-23-20.   Board agrees with plan for IR guided biopsy of lung nodule .    Will set up for SLP appointment with Mariya Rome  Will also have him scheduled to see heme onc and rad onc and HN surgeon  at [facility] to discuss further treatment options.    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   Pending lung biopsy results, treatment of primary would be wither TL with bilateral neck dissection vs CRT.

## 2020-04-25 ENCOUNTER — HOSPITAL ENCOUNTER (OUTPATIENT)
Dept: PREADMISSION TESTING | Facility: HOSPITAL | Age: 70
Discharge: HOME | End: 2020-04-25
Attending: RADIOLOGY
Payer: MEDICARE

## 2020-04-25 DIAGNOSIS — D49.9 NEOPLASM OF UNSPECIFIED BEHAVIOR OF UNSPECIFIED SITE: ICD-10-CM

## 2020-04-25 DIAGNOSIS — J38.7 SUPRAGLOTTIC MASS: ICD-10-CM

## 2020-04-25 LAB
INR PPP: 1 (ref 0.8–1.2)
PROTHROMBIN TIME: 10.6 SEC (ref 9–12.5)
SARS-COV-2 RDRP RESP QL NAA+PROBE: NEGATIVE

## 2020-04-25 PROCEDURE — 85610 PROTHROMBIN TIME: CPT | Mod: HCNC

## 2020-04-25 PROCEDURE — 36415 COLL VENOUS BLD VENIPUNCTURE: CPT | Mod: HCNC

## 2020-04-25 PROCEDURE — U0002 COVID-19 LAB TEST NON-CDC: HCPCS | Mod: HCNC

## 2020-04-27 ENCOUNTER — TELEPHONE (OUTPATIENT)
Dept: FAMILY MEDICINE | Facility: CLINIC | Age: 70
End: 2020-04-27

## 2020-04-27 ENCOUNTER — HOSPITAL ENCOUNTER (OUTPATIENT)
Facility: HOSPITAL | Age: 70
Discharge: HOME | End: 2020-04-27
Attending: RADIOLOGY | Admitting: RADIOLOGY
Payer: MEDICARE

## 2020-04-27 ENCOUNTER — TELEPHONE (OUTPATIENT)
Dept: OTOLARYNGOLOGY | Facility: CLINIC | Age: 70
End: 2020-04-27

## 2020-04-27 VITALS
OXYGEN SATURATION: 100 % | HEART RATE: 68 BPM | BODY MASS INDEX: 19.61 KG/M2 | SYSTOLIC BLOOD PRESSURE: 135 MMHG | WEIGHT: 122 LBS | DIASTOLIC BLOOD PRESSURE: 68 MMHG | RESPIRATION RATE: 18 BRPM | HEIGHT: 66 IN | TEMPERATURE: 98 F

## 2020-04-27 DIAGNOSIS — J98.4 CAVITARY LUNG DISEASE: ICD-10-CM

## 2020-04-27 DIAGNOSIS — R91.8 LUNG MASS: ICD-10-CM

## 2020-04-27 DIAGNOSIS — J38.7 SUPRAGLOTTIC MASS: Primary | ICD-10-CM

## 2020-04-27 PROCEDURE — 63600175 PHARM REV CODE 636 W HCPCS: Mod: HCNC | Performed by: RADIOLOGY

## 2020-04-27 PROCEDURE — 88305 TISSUE EXAM BY PATHOLOGIST: CPT | Mod: 26,HCNC,, | Performed by: PATHOLOGY

## 2020-04-27 PROCEDURE — 88333 PATH CONSLTJ SURG CYTO XM 1: CPT | Mod: HCNC | Performed by: PATHOLOGY

## 2020-04-27 PROCEDURE — 88341 PR IHC OR ICC EACH ADD'L SINGLE ANTIBODY  STAINPR: ICD-10-PCS | Mod: 26,HCNC,, | Performed by: PATHOLOGY

## 2020-04-27 PROCEDURE — 88341 IMHCHEM/IMCYTCHM EA ADD ANTB: CPT | Mod: HCNC | Performed by: PATHOLOGY

## 2020-04-27 PROCEDURE — 88341 IMHCHEM/IMCYTCHM EA ADD ANTB: CPT | Mod: 26,HCNC,, | Performed by: PATHOLOGY

## 2020-04-27 PROCEDURE — 88360 TUMOR IMMUNOHISTOCHEM/MANUAL: CPT | Mod: HCNC | Performed by: PATHOLOGY

## 2020-04-27 PROCEDURE — 88333 PR  INTRAOPERATIVE CYTO PATH CONSULT, INITIAL SITE: ICD-10-PCS | Mod: 26,HCNC,, | Performed by: PATHOLOGY

## 2020-04-27 PROCEDURE — 88305 TISSUE EXAM BY PATHOLOGIST: ICD-10-PCS | Mod: 26,HCNC,, | Performed by: PATHOLOGY

## 2020-04-27 PROCEDURE — 25000003 PHARM REV CODE 250: Mod: HCNC

## 2020-04-27 PROCEDURE — 88305 TISSUE EXAM BY PATHOLOGIST: CPT | Mod: HCNC | Performed by: PATHOLOGY

## 2020-04-27 PROCEDURE — 88342 IMHCHEM/IMCYTCHM 1ST ANTB: CPT | Mod: 26,HCNC,, | Performed by: PATHOLOGY

## 2020-04-27 PROCEDURE — 88333 PATH CONSLTJ SURG CYTO XM 1: CPT | Mod: 26,HCNC,, | Performed by: PATHOLOGY

## 2020-04-27 PROCEDURE — 88342 IMHCHEM/IMCYTCHM 1ST ANTB: CPT | Mod: HCNC,59 | Performed by: PATHOLOGY

## 2020-04-27 PROCEDURE — 88342 CHG IMMUNOCYTOCHEMISTRY: ICD-10-PCS | Mod: 26,HCNC,, | Performed by: PATHOLOGY

## 2020-04-27 RX ORDER — HYDROCODONE BITARTRATE AND ACETAMINOPHEN 5; 325 MG/1; MG/1
TABLET ORAL
Status: COMPLETED
Start: 2020-04-27 | End: 2020-04-27

## 2020-04-27 RX ORDER — MIDAZOLAM HYDROCHLORIDE 1 MG/ML
INJECTION INTRAMUSCULAR; INTRAVENOUS CODE/TRAUMA/SEDATION MEDICATION
Status: COMPLETED | OUTPATIENT
Start: 2020-04-27 | End: 2020-04-27

## 2020-04-27 RX ORDER — FENTANYL CITRATE 50 UG/ML
INJECTION, SOLUTION INTRAMUSCULAR; INTRAVENOUS CODE/TRAUMA/SEDATION MEDICATION
Status: COMPLETED | OUTPATIENT
Start: 2020-04-27 | End: 2020-04-27

## 2020-04-27 RX ORDER — HYDROCODONE BITARTRATE AND ACETAMINOPHEN 5; 325 MG/1; MG/1
1 TABLET ORAL EVERY 4 HOURS PRN
Status: DISCONTINUED | OUTPATIENT
Start: 2020-04-27 | End: 2020-04-27 | Stop reason: HOSPADM

## 2020-04-27 RX ADMIN — HYDROCODONE BITARTRATE AND ACETAMINOPHEN 1 TABLET: 5; 325 TABLET ORAL at 12:04

## 2020-04-27 RX ADMIN — MIDAZOLAM HYDROCHLORIDE 1 MG: 1 INJECTION, SOLUTION INTRAMUSCULAR; INTRAVENOUS at 09:04

## 2020-04-27 RX ADMIN — FENTANYL CITRATE 50 MCG: 50 INJECTION INTRAMUSCULAR; INTRAVENOUS at 09:04

## 2020-04-27 NOTE — TELEPHONE ENCOUNTER
----- Message from Allie Urrutia sent at 4/27/2020 10:10 AM CDT -----  Contact: Rosita w/[facility] [employer] [phone] Ref # [id number]  Type:  Call Back    Who called: Rosita jah/[facility]    What is the request in detail: Need to speak to nurse in regards to patient. Need to know the 1st time the patient was seen by Dr Vallejo? And when was the 1st time Dr Vallejo treated him for cancer? Also, need procedure codes for dates of service 04-17-20 and 04-20-20. Please call.     Would the patient rather a call back or a response via [facility]? Call back    Best call back number:[phone] Ref # [id number]

## 2020-04-27 NOTE — SEDATION DOCUMENTATION
Report called to Isaac. CXR ordered for now and 3 hours...for 1315.  VSS. No s/s of complications. Pt O2 sats 100% room air.

## 2020-04-27 NOTE — H&P
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 male with pertinent PMHx of biopsy-proven left supraglottic SCCa with PET/CT showing RLL pulmonary cavitary mass concerning for metastasis.     New outpatient IR consult placed for CT-guided core biopsy of this lesion.     Past Medical History:   Diagnosis Date    Coronary artery disease     Hypertension     Myocardial infarction     NSTEMI (non-ST elevated myocardial infarction) 3/23/2018    Nuclear sclerosis of both eyes 6/13/2019     Past Surgical History:   Procedure Laterality Date    CARDIAC CATHETERIZATION      with 4 stents    DIRECT LARYNGOSCOPY N/A 4/20/2020    Procedure: LARYNGOSCOPY, DIRECT;  Surgeon: Genoveva Vallejo MD;  Location: [facility] OR;  Service: ENT;  Laterality: N/A;    LEFT HEART CATHETERIZATION Left 5/29/2018    Procedure: Left heart cath;  Surgeon: Viral Lombardi MD;  Location: [facility] CATH LAB;  Service: Cardiology;  Laterality: Left;  RN PREOP 5/28/18     Home Meds:   Prior to Admission medications    Medication Sig Start Date End Date Taking? Authorizing Provider   albuterol (PROVENTIL/VENTOLIN HFA) 90 mcg/actuation inhaler Inhale 1-2 puffs into the lungs every 6 (six) hours as needed for Wheezing. Rescue  Patient not taking: Reported on 4/17/2020 2/28/20   Bandar Cisse MD   aspirin (ECOTRIN) 81 MG EC tablet Take 1 tablet (81 mg total) by mouth once daily.  Patient taking differently: Take 81 mg by mouth once daily.  3/26/18 4/24/20  Shreya Khan MD   atorvastatin (LIPITOR) 80 MG tablet Take 1 tablet (80 mg total) by mouth once daily. 10/15/19 10/14/20  Viral Lombardi MD   clopidogrel (PLAVIX) 75 mg tablet Take 1 tablet (75 mg total) by mouth once daily.  Patient taking differently: Take 75 mg by mouth once daily.  10/15/19 10/14/20  Viral Lombardi MD   HYDROcodone-acetaminophen  (NORCO) 5-325 mg per tablet Take 1 tablet by mouth every 6 (six) hours as needed for Pain. 4/20/20   Genoveva Vallejo MD   HYDROcodone-acetaminophen (NORCO) 5-325 mg per tablet Take 1 tablet by mouth every 6 (six) hours as needed for Pain. 4/24/20 5/1/20  Genoveva Vallejo MD   isosorbide mononitrate (IMDUR) 30 MG 24 hr tablet Take 1 tablet (30 mg total) by mouth once daily. 10/15/19 10/14/20  Viral Lombardi MD   lisinopril (PRINIVIL,ZESTRIL) 2.5 MG tablet Take 1 tablet (2.5 mg total) by mouth once daily. 10/15/19 10/14/20  Viral Lombardi MD   metoprolol succinate (TOPROL-XL) 50 MG 24 hr tablet Take 1 tablet (50 mg total) by mouth once daily. 10/15/19 10/14/20  Viral Lombardi MD   nitroGLYCERIN (NITROSTAT) 0.4 MG SL tablet Place 1 tablet (0.4 mg total) under the tongue every 5 (five) minutes as needed. 10/15/19 10/14/20  Viral Lombardi MD   ofloxacin (OCUFLOX) 0.3 % ophthalmic solution 5 drops in left ear BID x 10 days 3/17/20   Wilmer Bangura Jr., MD   omeprazole (PRILOSEC) 20 MG capsule Take 1 capsule (20 mg total) by mouth 2 (two) times daily. 2/28/20 2/27/21  Bandar Cisse MD   promethazine-dextromethorphan (PROMETHAZINE-DM) 6.25-15 mg/5 mL Syrp Take 5 mLs by mouth every 4 to 6 hours as needed. 3/17/20   Wilmer Bangura Jr., MD     Anticoagulants/Antiplatelets: aspirin and Plavix, held    Allergies: Review of patient's allergies indicates:  No Known Allergies     Sedation History:  no adverse reactions    Review of Systems:   Hematological: no known coagulopathies  Respiratory: no cough, shortness of breath, or wheezing  Cardiovascular: no chest pain or dyspnea on exertion  Gastrointestinal: no abdominal pain, change in bowel habits, or black or bloody stools  Genito-Urinary: no dysuria, trouble voiding, or hematuria  Musculoskeletal: negative  Neurological: no TIA or stroke symptoms     OBJECTIVE:     Vital Signs (Most Recent)  Temp: 98 °F (36.7 °C) (04/27/20 0821)  Pulse: 70 (04/27/20  0821)  Resp: 20 (04/27/20 0821)  BP: 133/71 (04/27/20 0821)  SpO2: 98 % (04/27/20 0821)    Physical Exam:  ASA:  III  Mallampati: III    General: no acute distress  Mental Status: alert and oriented to person, place and time  HEENT: normocephalic, atraumatic  Chest: unlabored breathing  Heart: regular heart rate  Abdomen: nondistended  Extremity: moves all extremities    Laboratory  Lab Results   Component Value Date    INR 1.0 04/25/2020       Lab Results   Component Value Date    WBC 9.37 04/17/2020    HGB 12.0 (L) 04/17/2020    HCT 38.1 (L) 04/17/2020    MCV 95 04/17/2020     04/17/2020      Lab Results   Component Value Date     04/17/2020     04/17/2020    K 4.5 04/17/2020     04/17/2020    CO2 26 04/17/2020    BUN 8 04/17/2020    CREATININE 0.9 04/17/2020    CALCIUM 9.0 04/17/2020    MG 2.2 03/24/2018    ALT 10 04/17/2020    AST 16 04/17/2020    ALBUMIN 3.8 04/17/2020    BILITOT 0.4 04/17/2020     ASSESSMENT/PLAN:     69 y.o. male with pertinent PMHx of biopsy-proven left supraglottic SCCa with PET/CT showing RLL pulmonary cavitary mass concerning for metastasis.     1. RLL cavitary pulmonary mass - Will attempt CT-guided 20-gauge percutaneous core biopsy of RLL mass under moderate conscious sedation.    Risks (including, but not limited to, pain, bleeding, infection, damage to nearby structures, failure to obtain sufficient material for a diagnosis, the need for additional procedures, and death), benefits, and alternatives were discussed with the patient. All questions were answered to the best of my abilities. The patient wishes to proceed with the procedure. Written informed consent was obtained.    Thank you for considering IR for the care of your patient.     Clifton Richard MD  Interventional Radiology

## 2020-04-27 NOTE — TELEPHONE ENCOUNTER
Patient's insurance requesting information on patients health in regards to if patient has had a cancer diagnosis previously and they where informed that without a signed release of information that information could not be verbally given and they would have to send a release form to [phone] and we could get it to the medical records dept for further processing.

## 2020-04-27 NOTE — TELEPHONE ENCOUNTER
I calling to confirm that patient never was treated for cancer in the past. Spoke to Teo from [facility] ref#[id number]

## 2020-04-27 NOTE — TELEPHONE ENCOUNTER
Spoke with Shreya with [employer], advising her that we need a signed authorization from Patient to release any information to them.

## 2020-04-27 NOTE — TELEPHONE ENCOUNTER
----- Message from Michael Howard sent at 4/27/2020  9:19 AM CDT -----  Contact: [facility] Romain Becker  Type: Patient Call Back    Who called:Rosita    What is the request in detail: She is requesting more information on patient's health staus. Please advise.    Can the clinic reply by [facility]? No    Would the patient rather a call back or a response via [facility]? Call    Best call back number: [phone]    Additional Information: reference number: [id number]

## 2020-04-27 NOTE — BRIEF OP NOTE
Radiology Post-Procedure Note    Pre Op Diagnosis: Left supraglottic SCCa with RLL cavitary mass  Post Op Diagnosis: Same    Procedure: CT-guided percutaneous 20-gauge core biopsy of RLL mass    Procedure performed by: Clifton Richard MD    Written Informed Consent Obtained: Yes  Specimen Removed: YES, 20-gauge cores x 5  Estimated Blood Loss: Minimal    Findings:   Successful CT-guided percutaneous 20-gauge core biopsy of RLL mass under moderate conscious sedation. Patient tolerated the procedure well. No immediate post-procedural complications noted.     CXR now and in 3 hours prior to tentative discharge.     Thank you for considering IR for the care of your patient.     Clifton Richard MD  Interventional Radiology

## 2020-04-27 NOTE — DISCHARGE INSTRUCTIONS
BATHING:  ? You may shower in 2 days. .  DRESSING:  ? Remove dressing in 2 days.        ACTIVITY LEVEL: If you have received sedation or an anesthetic, you may feel sleepy for several hours. Rest until you are more awake. Gradually resume your normal activities    Do not drive, drink alcohol, or sign legal documents for 24 hours, or if taking narcotic pain medication.      DIET: You may resume your home diet. If nausea is present, increase your diet gradually with fluids and bland foods.    Medications: Pain medication should be taken only if needed and as directed. If antibiotics are prescribed, the medication should be taken until completed. You will be given an updated list of you medications.  ? No driving, alcoholic beverages or signing legal documents for next 24 hours if you have had sedation, or while taking pain medication    CALL THE DOCTOR:   For any obvious bleeding (some dried blood over the incision is normal).     Redness, swelling, foul smell around incision or fever over 101.  Shortness of breath.  Persistent pain or nausea not relieved by medication.  Call [phone]    to speak with an Interventional Radiologist.    If any unusual problems or difficulties occur contact your doctor. If you cannot contact your doctor but feel your signs and symptoms warrant a physicians attention return to the emergency room.    Fall Prevention  Millions of people fall every year and injure themselves. You may have had anesthesia or sedation which may increase your risk of falling. You may have health issues that put you at an increased risk of falling.     Here are ways to reduce your risk of falling.  ·   · Make your home safe by keeping walkways clear of objects you may trip over.  · Use non-slip pads under rugs. Do not use area rugs or small throw rugs.  · Use non-slip mats in bathtubs and showers.  · Install handrails and lights on staircases.  · Do not walk in poorly lit areas.  · Do not stand on chairs or  wobbly ladders.  · Use caution when reaching overhead or looking upward. This position can cause a loss of balance.  · Be sure your shoes fit properly, have non-slip bottoms and are in good condition.   · Wear shoes both inside and out. Avoid going barefoot or wearing slippers.  · Be cautious when going up and down stairs, curbs, and when walking on uneven sidewalks.  · If your balance is poor, consider using a cane or walker.  · If your fall was related to alcohol use, stop or limit alcohol intake.   · If your fall was related to use of sleeping medicines, talk to your doctor about this. You may need to reduce your dosage at bedtime if you awaken during the night to go to the bathroom.    · To reduce the need for nighttime bathroom trips:  ¨ Avoid drinking fluids for several hours before going to bed  ¨ Empty your bladder before going to bed  ¨ Men can keep a urinal at the bedside  · Stay as active as you can. Balance, flexibility, strength, and endurance all come from exercise. They all play a role in preventing falls. Ask your healthcare provider which types of activity are right for you.  · Get your vision checked on a regular basis.  · If you have pets, know where they are before you stand up or walk so you don't trip over them.  · Use night lights      .

## 2020-04-27 NOTE — DISCHARGE SUMMARY
Radiology Discharge Summary      Hospital Course: No complications    Admit Date: 4/27/2020  Discharge Date: 04/27/2020     Instructions Given to Patient: Yes    Diet: Resume prior diet     Activity: activity as tolerated and no driving for today    Description of Condition on Discharge: Stable    Vital Signs (Most Recent): Temp: 98 °F (36.7 °C) (04/27/20 0821)  Pulse: 72 (04/27/20 1000)  Resp: 19 (04/27/20 1000)  BP: 128/75 (04/27/20 1000)  SpO2: 100 % (04/27/20 1000)    Discharge Disposition: Home    Discharge Diagnosis:   70 y/o M with left supraglottic SCCa and RLL mass s/p successful CT-guided percutaneous 20-gauge core biopsy of RLL mass under moderate conscious sedation. Patient tolerated the procedure well. No immediate post-procedural complications noted.     Thank you for considering IR for the care of your patient.     Clifton Richard MD  Interventional Radiology      Tentative D/C @ 1108

## 2020-04-28 RX ORDER — PROTEIN SUPPLEMENT
POWDER (GRAM) ORAL
Refills: 0 | Status: CANCELLED | OUTPATIENT
Start: 2020-04-28

## 2020-04-28 NOTE — TELEPHONE ENCOUNTER
----- Message from  Rajesh sent at 4/28/2020 12:50 PM CDT -----  Type: Patient Call Back    Who called: spouse/ Slim Isaac     What is the request in detail: pt's spouse requesting a call back from provider regarding surgery pt had yesterday.     Can the clinic reply by [facility]? No     Would the patient rather a call back or a response via [facility]? Call back     Best call back number: [phone]    Additional Information:

## 2020-04-28 NOTE — TELEPHONE ENCOUNTER
Wife called and Mr. Huizar is suffering with Constipation.  Has not taken any Pain medicine in 2 days.  Please advise.

## 2020-04-29 ENCOUNTER — OFFICE VISIT (OUTPATIENT)
Dept: OTOLARYNGOLOGY | Facility: CLINIC | Age: 70
End: 2020-04-29
Payer: MEDICARE

## 2020-04-29 DIAGNOSIS — C32.9 LARYNX CANCER: Primary | ICD-10-CM

## 2020-04-29 PROCEDURE — 99441 PR PHYSICIAN TELEPHONE EVALUATION 5-10 MIN: ICD-10-PCS | Mod: 95,,, | Performed by: OTOLARYNGOLOGY

## 2020-04-29 PROCEDURE — 99441 PR PHYSICIAN TELEPHONE EVALUATION 5-10 MIN: CPT | Mod: 95,,, | Performed by: OTOLARYNGOLOGY

## 2020-04-29 RX ORDER — PROTEIN SUPPLEMENT
1 POWDER (GRAM) ORAL 3 TIMES DAILY
Qty: 90 CAN | Refills: 3 | Status: SHIPPED | OUTPATIENT
Start: 2020-04-29 | End: 2021-02-12 | Stop reason: CLARIF

## 2020-04-29 RX ORDER — DOCUSATE SODIUM 100 MG/1
100 CAPSULE, LIQUID FILLED ORAL 2 TIMES DAILY PRN
Qty: 30 CAPSULE | Refills: 0 | Status: SHIPPED | OUTPATIENT
Start: 2020-04-29 | End: 2020-07-13 | Stop reason: SDUPTHER

## 2020-04-29 NOTE — PROGRESS NOTES
Established Patient - Audio Only Telehealth Visit     The patient location is:home  The chief complaint leading to consultation is: postop  Visit type: Virtual visit with audio only (telephone)     The reason for the audio only service rather than synchronous audio and video virtual visit was related to technical difficulties or patient preference/necessity.        HPI: s/p dl biopsy/debulking 4-20-20 for supraglottic mass, results showed scca  Discussed case at telemedicine tumor board last week . He has appointments set up for rad onc, heme onc and head and neck surgeon and speech therapist. He had IR guided biopsy of lung nodule 4-27-20, results pending. Results of lung biopsy will determine treatment planning  Doing well . Pain better controlled now since surgery. Has not needed meds since Monday  Having constipation now.  No breathing issues       Assessment and plan:    Add fiber to diet, will send rx for colace  Tylenol for pain, severe pain can use norco. Counseled on risk and side effects of pain medication  Confirmed pt aware of appointments  Reiterated that lung biopsy will determine treatment plan and that if not second primary, would be looking at non curative therapy ( he would be candidate for immunotherapy or clinical trial - can rediscuss at tumor board if needed)   rx for boost sent to pharmacy- trying to get this covered by insurance if possible to help pt with nutrition prior to initiating any treatment since he has lost weight and not ideal nutritional status at this time         This service was not originating from a related E/M service provided within the previous 7 days nor will  to an E/M service or procedure within the next 24 hours or my soonest available appointment.  Prevailing standard of care was able to be met in this audio-only visit.

## 2020-05-05 ENCOUNTER — TELEPHONE (OUTPATIENT)
Dept: OTOLARYNGOLOGY | Facility: CLINIC | Age: 70
End: 2020-05-05

## 2020-05-05 ENCOUNTER — CLINICAL SUPPORT (OUTPATIENT)
Dept: SPEECH THERAPY | Facility: HOSPITAL | Age: 70
End: 2020-05-05
Attending: OTOLARYNGOLOGY
Payer: MEDICARE

## 2020-05-05 ENCOUNTER — OFFICE VISIT (OUTPATIENT)
Dept: HEMATOLOGY/ONCOLOGY | Facility: CLINIC | Age: 70
End: 2020-05-05
Payer: MEDICARE

## 2020-05-05 VITALS
HEART RATE: 80 BPM | WEIGHT: 126.31 LBS | OXYGEN SATURATION: 99 % | RESPIRATION RATE: 20 BRPM | HEIGHT: 66 IN | SYSTOLIC BLOOD PRESSURE: 141 MMHG | BODY MASS INDEX: 20.3 KG/M2 | DIASTOLIC BLOOD PRESSURE: 89 MMHG | TEMPERATURE: 98 F

## 2020-05-05 DIAGNOSIS — C32.9 SQUAMOUS CELL CARCINOMA OF LARYNX: ICD-10-CM

## 2020-05-05 DIAGNOSIS — C32.9 LARYNX CANCER: Primary | ICD-10-CM

## 2020-05-05 DIAGNOSIS — R13.12 DYSPHAGIA, OROPHARYNGEAL: ICD-10-CM

## 2020-05-05 DIAGNOSIS — C34.31 MALIGNANT NEOPLASM OF LOWER LOBE OF RIGHT LUNG: ICD-10-CM

## 2020-05-05 DIAGNOSIS — J98.4 CAVITARY LUNG DISEASE: ICD-10-CM

## 2020-05-05 DIAGNOSIS — R49.0 DYSPHONIA: Primary | ICD-10-CM

## 2020-05-05 DIAGNOSIS — C77.0 SECONDARY MALIGNANT NEOPLASM OF CERVICOFACIAL LYMPH NODE: ICD-10-CM

## 2020-05-05 PROCEDURE — 1101F PT FALLS ASSESS-DOCD LE1/YR: CPT | Mod: HCNC,CPTII,S$GLB, | Performed by: INTERNAL MEDICINE

## 2020-05-05 PROCEDURE — 99999 PR PBB SHADOW E&M-EST. PATIENT-LVL V: CPT | Mod: PBBFAC,HCNC,, | Performed by: INTERNAL MEDICINE

## 2020-05-05 PROCEDURE — 3079F DIAST BP 80-89 MM HG: CPT | Mod: HCNC,CPTII,S$GLB, | Performed by: INTERNAL MEDICINE

## 2020-05-05 PROCEDURE — 1101F PR PT FALLS ASSESS DOC 0-1 FALLS W/OUT INJ PAST YR: ICD-10-PCS | Mod: HCNC,CPTII,S$GLB, | Performed by: INTERNAL MEDICINE

## 2020-05-05 PROCEDURE — 1159F MED LIST DOCD IN RCRD: CPT | Mod: HCNC,S$GLB,, | Performed by: INTERNAL MEDICINE

## 2020-05-05 PROCEDURE — 3077F PR MOST RECENT SYSTOLIC BLOOD PRESSURE >= 140 MM HG: ICD-10-PCS | Mod: HCNC,CPTII,S$GLB, | Performed by: INTERNAL MEDICINE

## 2020-05-05 PROCEDURE — 3077F SYST BP >= 140 MM HG: CPT | Mod: HCNC,CPTII,S$GLB, | Performed by: INTERNAL MEDICINE

## 2020-05-05 PROCEDURE — 1125F AMNT PAIN NOTED PAIN PRSNT: CPT | Mod: HCNC,S$GLB,, | Performed by: INTERNAL MEDICINE

## 2020-05-05 PROCEDURE — 1159F PR MEDICATION LIST DOCUMENTED IN MEDICAL RECORD: ICD-10-PCS | Mod: HCNC,S$GLB,, | Performed by: INTERNAL MEDICINE

## 2020-05-05 PROCEDURE — 99999 PR PBB SHADOW E&M-EST. PATIENT-LVL V: ICD-10-PCS | Mod: PBBFAC,HCNC,, | Performed by: INTERNAL MEDICINE

## 2020-05-05 PROCEDURE — 99205 OFFICE O/P NEW HI 60 MIN: CPT | Mod: HCNC,S$GLB,, | Performed by: INTERNAL MEDICINE

## 2020-05-05 PROCEDURE — 92610 EVALUATE SWALLOWING FUNCTION: CPT | Mod: GN,HCNC | Performed by: SPEECH-LANGUAGE PATHOLOGIST

## 2020-05-05 PROCEDURE — 99499 UNLISTED E&M SERVICE: CPT | Mod: HCNC,S$GLB,, | Performed by: INTERNAL MEDICINE

## 2020-05-05 PROCEDURE — 99205 PR OFFICE/OUTPT VISIT, NEW, LEVL V, 60-74 MIN: ICD-10-PCS | Mod: HCNC,S$GLB,, | Performed by: INTERNAL MEDICINE

## 2020-05-05 PROCEDURE — 99499 RISK ADDL DX/OHS AUDIT: ICD-10-PCS | Mod: HCNC,S$GLB,, | Performed by: INTERNAL MEDICINE

## 2020-05-05 PROCEDURE — 1125F PR PAIN SEVERITY QUANTIFIED, PAIN PRESENT: ICD-10-PCS | Mod: HCNC,S$GLB,, | Performed by: INTERNAL MEDICINE

## 2020-05-05 PROCEDURE — 3079F PR MOST RECENT DIASTOLIC BLOOD PRESSURE 80-89 MM HG: ICD-10-PCS | Mod: HCNC,CPTII,S$GLB, | Performed by: INTERNAL MEDICINE

## 2020-05-05 RX ORDER — ONDANSETRON HYDROCHLORIDE 8 MG/1
8 TABLET, FILM COATED ORAL 4 TIMES DAILY PRN
Qty: 60 TABLET | Refills: 2 | Status: SHIPPED | OUTPATIENT
Start: 2020-05-05 | End: 2021-02-12 | Stop reason: CLARIF

## 2020-05-05 RX ORDER — PROMETHAZINE HYDROCHLORIDE 6.25 MG/5ML
25 SYRUP ORAL EVERY 6 HOURS PRN
Qty: 473 ML | Refills: 6 | Status: SHIPPED | OUTPATIENT
Start: 2020-05-05 | End: 2021-02-12 | Stop reason: CLARIF

## 2020-05-05 NOTE — Clinical Note
Can you please look at his micro as he has AFP positive on prelim, He has larynx cancer and we are trying to treat him with chemo/RT.

## 2020-05-05 NOTE — TELEPHONE ENCOUNTER
----- Message from Allie Urrutia sent at 5/5/2020 10:43 AM CDT -----  Contact: Lan Smalls [phone]  Type:  Test Results    Who Called: Lan Smalls    Name of Test (Lab/Mammo/Etc):  Biopsy of lungs    Date of Test: 04-27-20    Where the test was performed: [facility]    Would the patient rather a call back or a response via [facility]? Call back    Best Call Back Number: [phone]    Additional Information:  Would like to get a copy of all the results. Please call    For Clinical Team:Has the provider reviewed the results?

## 2020-05-05 NOTE — TELEPHONE ENCOUNTER
[facility] called into wrong physician. [facility] notified that Dr. Lopez was ordering physician. Spoke with Phyllis. Dr. Vallejo forward the message to Dr. Lopez.

## 2020-05-05 NOTE — Clinical Note
If he is to get Cisplatin or another ototoxic agent, I'd like to get a baseline audio on him, please.  Thanks.Please let me know if you have any questions about his visit.Mariya

## 2020-05-05 NOTE — LETTER
May 5, 2020      Genoveva Vallejo MD  [address] [facility] [address]  [place] [address]           [facility] Hematology Oncology  [facility]  [place] [address]  Phone: [phone]          Patient: Sharath Huizar   MR Number: [id number]   YOB: 1950   Date of Visit: 5/5/2020       Dear Dr. Genoveva Vallejo:    Thank you for referring Sharath Huizar to me for evaluation. Attached you will find relevant portions of my assessment and plan of care.    If you have questions, please do not hesitate to call me. I look forward to following Sharath Huizar along with you.    Sincerely,    Pilar Aguilar MD    Enclosure  CC:  No Recipients    If you would like to receive this communication electronically, please contact [email or web address] or [phone] to request more information on [employer] Link access.    For providers and/or their staff who would like to refer a patient to [facility], please contact us through our one-stop-shop provider referral line, Usha Dawkins, at [phone].    If you feel you have received this communication in error or would no longer like to receive these types of communications, please e-mail [email or web address]

## 2020-05-05 NOTE — PATIENT INSTRUCTIONS
Cisplatin injection  What is this medicine?  CISPLATIN (SIS neno tin) is a chemotherapy drug. It targets fast dividing cells, like cancer cells, and causes these cells to die. This medicine is used to treat many types of cancer like bladder, ovarian, and testicular cancers.  How should I use this medicine?  This drug is given as an infusion into a vein. It is administered in a hospital or clinic by a specially trained health care professional.  Talk to your pediatrician regarding the use of this medicine in children. Special care may be needed.  What side effects may I notice from receiving this medicine?  Side effects that you should report to your doctor or health care professional as soon as possible:  · allergic reactions like skin rash, itching or hives, swelling of the face, lips, or tongue  · signs of infection - fever or chills, cough, sore throat, pain or difficulty passing urine  · signs of decreased platelets or bleeding - bruising, pinpoint red spots on the skin, black, tarry stools, nosebleeds  · signs of decreased red blood cells - unusually weak or tired, fainting spells, lightheadedness  · breathing problems  · changes in hearing  · gout pain  · low blood counts - This drug may decrease the number of white blood cells, red blood cells and platelets. You may be at increased risk for infections and bleeding.  · nausea and vomiting  · pain, swelling, redness or irritation at the injection site  · pain, tingling, numbness in the hands or feet  · problems with balance, movement  · trouble passing urine or change in the amount of urine  Side effects that usually do not require medical attention (report to your doctor or health care professional if they continue or are bothersome):  · changes in vision  · loss of appetite  · metallic taste in the mouth or changes in taste  What may interact with this medicine?  · dofetilide  · foscarnet  · medicines for seizures  · medicines to increase blood counts like  filgrastim, pegfilgrastim, sargramostim  · probenecid  · pyridoxine used with altretamine  · rituximab  · some antibiotics like amikacin, gentamicin, neomycin, polymyxin B, streptomycin, tobramycin  · sulfinpyrazone  · vaccines  · zalcitabine  Talk to your doctor or health care professional before taking any of these medicines:  · acetaminophen  · aspirin  · ibuprofen  · ketoprofen  · naproxen  What if I miss a dose?  It is important not to miss a dose. Call your doctor or health care professional if you are unable to keep an appointment.  Where should I keep my medicine?  This drug is given in a hospital or clinic and will not be stored at home.  What should I tell my health care provider before I take this medicine?  They need to know if you have any of these conditions:  · blood disorders  · hearing problems  · kidney disease  · recent or ongoing radiation therapy  · an unusual or allergic reaction to cisplatin, carboplatin, other chemotherapy, other medicines, foods, dyes, or preservatives  · pregnant or trying to get pregnant  · breast-feeding  What should I watch for while using this medicine?  Your condition will be monitored carefully while you are receiving this medicine. You will need important blood work done while you are taking this medicine.  This drug may make you feel generally unwell. This is not uncommon, as chemotherapy can affect healthy cells as well as cancer cells. Report any side effects. Continue your course of treatment even though you feel ill unless your doctor tells you to stop.  In some cases, you may be given additional medicines to help with side effects. Follow all directions for their use.  Call your doctor or health care professional for advice if you get a fever, chills or sore throat, or other symptoms of a cold or flu. Do not treat yourself. This drug decreases your body's ability to fight infections. Try to avoid being around people who are sick.  This medicine may increase  your risk to bruise or bleed. Call your doctor or health care professional if you notice any unusual bleeding.  Be careful brushing and flossing your teeth or using a toothpick because you may get an infection or bleed more easily. If you have any dental work done, tell your dentist you are receiving this medicine.  Avoid taking products that contain aspirin, acetaminophen, ibuprofen, naproxen, or ketoprofen unless instructed by your doctor. These medicines may hide a fever.  Do not become pregnant while taking this medicine. Women should inform their doctor if they wish to become pregnant or think they might be pregnant. There is a potential for serious side effects to an unborn child. Talk to your health care professional or pharmacist for more information. Do not breast-feed an infant while taking this medicine.  Drink fluids as directed while you are taking this medicine. This will help protect your kidneys.  Call your doctor or health care professional if you get diarrhea. Do not treat yourself.  NOTE:This sheet is a summary. It may not cover all possible information. If you have questions about this medicine, talk to your doctor, pharmacist, or health care provider. Copyright© 2017 [employer]

## 2020-05-05 NOTE — Clinical Note
Schedule with Rad/Onc ASAP, with Dr. Mosher, I already spoke with him.Get auth for weekly cisplatin. Schedule CBC,CMP, mag and phos and see me to start Cisplatin on same day he starts RT. IV fluids on day 2

## 2020-05-05 NOTE — PROGRESS NOTES
"Subjective:       Patient ID: Sharath Huizar is a 69 y.o. male.    Chief Complaint: Larynx cancer    Referring Physician: Dr. Genoveva Vallejo, ENT    HPI Sharath Huizar is a 69 y.o. male  presenting today for a new diagnosis of Larynx cancer. Patient was seen in ER 2-27-20 with hemoptysis and hoarse voice for a couple of months along with left temporal headaches.   He underwent chest xray on 2/28/2020 which revealed "Small focal opacity within the right lower lung zone with possible cavitation.  This may reflect focal infectious or inflammatory process"  He was referred to pulmonary and underwent CT chest, abdomen/pelvis on 3/18/2020 which revealed "a 2.2 cm noncalcified nodule seen at the right lung base.  May represent consolidation however malignancy can not be excluded.  PET-CT is recommended for further evaluation"  PET scan on 3/27/2020 revealed "Hypermetabolic left supraglottic soft tissue mass concerning for primary laryngeal cancer.  Two hypermetabolic bilateral cervical lymph nodes as well as a hypermetabolic right lower lobe partially cavitary pulmonary mass concerning for metastatic disease.  Recommend ENT consultation and dedicated contrast enhance neck CT"  He was referred to ENT and underwent CT neck on 4/16/2020 which revealed Stable left supraglottic laryngeal mass and bilateral cervical metastatic adenopathy.    He underwent direct laryngoscopy on 4/20/2020 and that revealed "bulky and friable supraglottic mass on left side of glottic surface of epiglottis extending over slightly on to the right. Extends down along left aryepiglottic fold and true vocal fold on left. The right vocal fold did not appear to be involved. No lesion of pyriform sinus. Palpation of vallecula and base of tongue was soft. No additional lesions were noted in the oral cavity or oropharynx"  Pathology revealed squamous cell cancer  His case was discussed in head and Neck tumor board and he underwent left lung biopsy on " 4/27/2020 and pathology reveals squamous/adenosquamous cell cancer   he comes in for a initial visit to discuss further management. He continues to have hoarseness of voice but notes that his hemoptysis has resolved.  He has lost about 12 lbs in 2 months but denies any trouble swallowing. His ECOG PS is zero.      Review of Systems   Constitutional: Positive for fatigue. Negative for appetite change and unexpected weight change.   HENT: Positive for voice change. Negative for mouth sores.    Eyes: Negative for visual disturbance.   Respiratory: Negative for cough and shortness of breath.    Cardiovascular: Negative for chest pain.   Gastrointestinal: Negative for abdominal pain and diarrhea.   Genitourinary: Negative for frequency.   Musculoskeletal: Negative for back pain.   Skin: Negative for rash.   Neurological: Negative for headaches.   Hematological: Negative for adenopathy.   Psychiatric/Behavioral: The patient is not nervous/anxious.    All other systems reviewed and are negative.      Allergies:  Review of patient's allergies indicates:  No Known Allergies    Medications:  Current Outpatient Medications   Medication Sig Dispense Refill    aspirin (ECOTRIN) 81 MG EC tablet Take 1 tablet (81 mg total) by mouth once daily. (Patient taking differently: Take 81 mg by mouth once daily. )  0    atorvastatin (LIPITOR) 80 MG tablet Take 1 tablet (80 mg total) by mouth once daily. 90 tablet 3    clopidogrel (PLAVIX) 75 mg tablet Take 1 tablet (75 mg total) by mouth once daily. (Patient taking differently: Take 75 mg by mouth once daily. ) 90 tablet 3    isosorbide mononitrate (IMDUR) 30 MG 24 hr tablet Take 1 tablet (30 mg total) by mouth once daily. 90 tablet 3    lisinopril (PRINIVIL,ZESTRIL) 2.5 MG tablet Take 1 tablet (2.5 mg total) by mouth once daily. 90 tablet 3    metoprolol succinate (TOPROL-XL) 50 MG 24 hr tablet Take 1 tablet (50 mg total) by mouth once daily. 90 tablet 3    nitroGLYCERIN  (NITROSTAT) 0.4 MG SL tablet Place 1 tablet (0.4 mg total) under the tongue every 5 (five) minutes as needed. 25 tablet 3    promethazine-dextromethorphan (PROMETHAZINE-DM) 6.25-15 mg/5 mL Syrp Take 5 mLs by mouth every 4 to 6 hours as needed. 240 mL 0    albuterol (PROVENTIL/VENTOLIN HFA) 90 mcg/actuation inhaler Inhale 1-2 puffs into the lungs every 6 (six) hours as needed for Wheezing. Rescue (Patient not taking: Reported on 4/17/2020) 18 g 0    docusate sodium (COLACE) 100 MG capsule Take 1 capsule (100 mg total) by mouth 2 (two) times daily as needed for Constipation. (Patient not taking: Reported on 5/5/2020) 30 capsule 0    HYDROcodone-acetaminophen (NORCO) 5-325 mg per tablet Take 1 tablet by mouth every 6 (six) hours as needed for Pain. (Patient not taking: Reported on 5/5/2020) 10 tablet 0    ofloxacin (OCUFLOX) 0.3 % ophthalmic solution 5 drops in left ear BID x 10 days (Patient not taking: Reported on 5/5/2020) 10 mL 0    omeprazole (PRILOSEC) 20 MG capsule Take 1 capsule (20 mg total) by mouth 2 (two) times daily. 28 capsule 0    ondansetron (ZOFRAN) 8 MG tablet Take 1 tablet (8 mg total) by mouth 4 (four) times daily as needed for Nausea. 60 tablet 2    promethazine (PHENERGAN) 6.25 mg/5 mL syrup Take 20 mLs (25 mg total) by mouth every 6 (six) hours as needed for Nausea. 473 mL 6    protein (ENSURE HIGH PROTEIN) Powd Take 1 Can by mouth 3 (three) times daily. (Patient not taking: Reported on 5/5/2020) 90 Can 3     No current facility-administered medications for this visit.      Facility-Administered Medications Ordered in Other Visits   Medication Dose Route Frequency Provider Last Rate Last Dose    0.9%  NaCl infusion   Intravenous Continuous Cindy Zhao MD 10 mL/hr at 04/20/20 0904      lidocaine (PF) 10 mg/ml (1%) injection 10 mg  1 mL Intradermal Once Cindy Zhao MD           PMH:  Past Medical History:   Diagnosis Date    Coronary artery disease     Hypertension      Myocardial infarction     NSTEMI (non-ST elevated myocardial infarction) 3/23/2018    Nuclear sclerosis of both eyes 6/13/2019       PSH:  Past Surgical History:   Procedure Laterality Date    CARDIAC CATHETERIZATION      with 4 stents    DIRECT LARYNGOSCOPY N/A 4/20/2020    Procedure: LARYNGOSCOPY, DIRECT;  Surgeon: Genoveva Vallejo MD;  Location: [facility] OR;  Service: ENT;  Laterality: N/A;    LEFT HEART CATHETERIZATION Left 5/29/2018    Procedure: Left heart cath;  Surgeon: Viral Lombardi MD;  Location: [facility] CATH LAB;  Service: Cardiology;  Laterality: Left;  RN PREOP 5/28/18    LUNG BIOPSY N/A 4/27/2020    Procedure: BIOPSY, LUNG;  Surgeon: [facility] Diagnostic Provider;  Location: [facility] OR;  Service: Radiology;  Laterality: N/A;  9am start--RN PHONE PREOP 4/24----COVID NEGATIVE--pt       FamHx:  Family History   Problem Relation Age of Onset    No Known Problems Mother     No Known Problems Father     No Known Problems Sister     No Known Problems Brother     No Known Problems Maternal Aunt     No Known Problems Maternal Uncle     Blindness Paternal Aunt     No Known Problems Paternal Uncle     No Known Problems Maternal Grandmother     No Known Problems Maternal Grandfather     No Known Problems Paternal Grandmother     No Known Problems Paternal Grandfather     Amblyopia Neg Hx     Cancer Neg Hx     Cataracts Neg Hx     Diabetes Neg Hx     Glaucoma Neg Hx     Hypertension Neg Hx     Macular degeneration Neg Hx     Retinal detachment Neg Hx     Strabismus Neg Hx     Stroke Neg Hx     Thyroid disease Neg Hx        SocHx:  Social History     Socioeconomic History    Marital status:      Spouse name: Not on file    Number of children: Not on file    Years of education: Not on file    Highest education level: Not on file   Occupational History    Not on file   Social Needs    Financial resource strain: Not on file    Food insecurity:     Worry: Not on file     Inability: Not  on file    Transportation needs:     Medical: Not on file     Non-medical: Not on file   Tobacco Use    Smoking status: Former Smoker     Packs/day: 0.25     Years: 30.00     Pack years: 7.50     Types: Cigarettes     Last attempt to quit: 3/23/2018     Years since quittin.1    Smokeless tobacco: Never Used   Substance and Sexual Activity    Alcohol use: No    Drug use: No    Sexual activity: Yes     Partners: Female   Lifestyle    Physical activity:     Days per week: Not on file     Minutes per session: Not on file    Stress: Not on file   Relationships    Social connections:     Talks on phone: Not on file     Gets together: Not on file     Attends Sabianist service: Not on file     Active member of club or organization: Not on file     Attends meetings of clubs or organizations: Not on file     Relationship status: Not on file   Other Topics Concern    Not on file   Social History Narrative    Not on file         Objective:      Physical Exam  Alert and oriented X 3  decayed teeth in lower jaw. Dentures in upper jaw.      LABS:  WBC   Date Value Ref Range Status   2020 9.37 3.90 - 12.70 K/uL Final     Hemoglobin   Date Value Ref Range Status   2020 12.0 (L) 14.0 - 18.0 g/dL Final     Hematocrit   Date Value Ref Range Status   2020 38.1 (L) 40.0 - 54.0 % Final     Platelets   Date Value Ref Range Status   2020 232 150 - 350 K/uL Final     Gran # (ANC)   Date Value Ref Range Status   2020 6.3 1.8 - 7.7 K/uL Final     Gran%   Date Value Ref Range Status   2020 67.4 38.0 - 73.0 % Final       Chemistry        Component Value Date/Time     2020 1215    K 4.5 2020 1215     2020 1215    CO2 26 2020 1215    BUN 8 2020 1215    CREATININE 0.9 2020 1215     2020 1215        Component Value Date/Time    CALCIUM 9.0 2020 1215    ALKPHOS 108 2020 1215    AST 16 2020 1215    ALT 10 2020 1215     BILITOT 0.4 04/17/2020 1215    ESTGFRAFRICA >60 04/17/2020 1215    EGFRNONAA >60 04/17/2020 1215          Assessment:       1. Larynx cancer    2. 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 Malignant neoplasm of lower lobe of right lung    5. Cavitary lung disease        Plan:         1,2,3. Stage IV Larynx cancer. Discussed definitive chemo/RT with Cisplatin weekly. He needs dental clearance and will need to see Dr. Mosher in radiation oncology.  Escribed Zofran and phenergan and will schedule for audiogram. Side effects of chemo/RT were discussed as well.  4. Most likely SBRT to this lesion since appears to be synchronous primary  5. Prelim with AFB, sent message to Infectious Disease, Dr. Baumgarten for her input.  Above care plan was discussed with patient and all questions were addressed to his satisfaction

## 2020-05-05 NOTE — PLAN OF CARE
START ON PATHWAY REGIMEN - Head and Neck    [id number]        Cisplatin (Platinol)           Additional Orders: Chemotherapy given concurrently with radiation.    **Always confirm dose/schedule in your pharmacy ordering system**    Patient Characteristics:  Larynx, Stage III, RASHEEDA, IVB; Unresectable  Disease Classification: Larynx  Current Disease Status: No Distant Metastases and No Recurrent Disease  AJCC T Category: T3  AJCC 8 Stage Grouping: RASHEEDA  AJCC N Category: cN2  AJCC M Category: M0  Intent of Therapy:  Curative Intent, Discussed with Patient

## 2020-05-05 NOTE — PROGRESS NOTES
REFERRING PHYSICIAN:  Genoveva Vallejo M.D., Otorhinolaryngologist  LENGTH OF VISIT:  1 hour    REASON FOR REFERRAL:  Sharath Huizar, age 69, was referred by Dr. Genoveva Vallejo,otorhinolaryngologist, for pre-treatment assessment and counseling in anticipation of CRT (scheduling pending) to treat supraglottic cancer.  He was unaccompanied per current COVID-19 restrictions.    During the early part of the visit, Mr. Huizar received a call from Dr. Vallejo re: the final path on his lung biospy.    MEDICAL HISTORY:  Past Medical History:   Diagnosis Date    Coronary artery disease     Hypertension     Myocardial infarction     NSTEMI (non-ST elevated myocardial infarction) 3/23/2018    Nuclear sclerosis of both eyes 6/13/2019       SURGICAL HISTORY:  Past Surgical History:   Procedure Laterality Date    CARDIAC CATHETERIZATION      with 4 stents    DIRECT LARYNGOSCOPY N/A 4/20/2020    Procedure: LARYNGOSCOPY, DIRECT;  Surgeon: Genoveva Vallejo MD;  Location: [facility] OR;  Service: ENT;  Laterality: N/A;    LEFT HEART CATHETERIZATION Left 5/29/2018    Procedure: Left heart cath;  Surgeon: Viral Lombardi MD;  Location: [facility] CATH LAB;  Service: Cardiology;  Laterality: Left;  RN PREOP 5/28/18    LUNG BIOPSY N/A 4/27/2020    Procedure: BIOPSY, LUNG;  Surgeon: Jones Diagnostic Provider;  Location: [facility] OR;  Service: Radiology;  Laterality: N/A;  9am start--RN PHONE PREOP 4/24----COVID NEGATIVE--pt       ONCOLOGIC and TREATMENT HISTORY of problem for which patient is referred:     Larynx cancer    5/5/2020 Initial Diagnosis     Larynx cancer      5/11/2020 -  Chemotherapy     Treatment Summary   Plan Name: OP HEAD NECK CISPLATIN WEEKLY + RADIOTHERAPY  Treatment Goal: Curative  Status: Active  Start Date: 5/12/2020 (Planned)  End Date: 6/16/2020 (Planned)  Provider: Pilar Aguilar MD  Chemotherapy: CISplatin (PLATINOL) 40 mg/m2 = 65 mg in sodium chloride 0.9% 500 mL chemo infusion, 40 mg/m2, Intravenous,  Clinic/HOD 1 time, 0 of 6 cycles       SWALLOWING HISTORY:  Taking a regular diet with thin liquids.  Reported odynophagia off and on, but denied s/s of aspiration or dysphagia.    FAMILY HISTORY:  Family History   Problem Relation Age of Onset    No Known Problems Mother     No Known Problems Father     No Known Problems Sister     No Known Problems Brother     No Known Problems Maternal Aunt     No Known Problems Maternal Uncle     Blindness Paternal Aunt     No Known Problems Paternal Uncle     No Known Problems Maternal Grandmother     No Known Problems Maternal Grandfather     No Known Problems Paternal Grandmother     No Known Problems Paternal Grandfather     Amblyopia Neg Hx     Cancer Neg Hx     Cataracts Neg Hx     Diabetes Neg Hx     Glaucoma Neg Hx     Hypertension Neg Hx     Macular degeneration Neg Hx     Retinal detachment Neg Hx     Strabismus Neg Hx     Stroke Neg Hx     Thyroid disease Neg Hx        SOCIAL HISTORY:  Mr. Huizar lives in [place] with his wife.  He is a retired .  He retired after his heart attack in 2018.  He enjoys fishing and tending his vegetable garden.  The Juanjose have 3 living children (and one who passed away) and many grandchildren    Tobacco use:  Down to 1-2 cigarettes/week.  Had been involved in the Smoking Cessation Program, but not currently active since COVID crisis.  ETOH use:  Rare; stopped except for a beer at holidays and his birthday.    BEHAVIOR:  Mr. Larios was a very pleasant man.  His affect and social interaction were appropriate for situation and setting.  he was fully cooperative for the assessment. Results are considered indicative of his current levels of speech and swallowing functioning.    HEARING:  Subjectively, within normal limits.  He feels that his L ear has poorer hearing acuity since onset of pain that radiates to his ear.  Denied tinnitus.  Last audio likely when he was working, more than 2 years  "ago.    ASSESSMENT:  Oral Peripheral:     Mandible: 43 mm via TheraBite measuring tool.  Avg = 4- mm.   Lips:  within normal limits  for rounding, spreading, and alternating as well as impounding air, smacking, and repetition of /p/.   Tongue:  within normal limits  for protrusion, lateralization, elevation, retroflexion.  He produces "er" with retroflexion.  AMRs for /t/, /k/, and diadochokinetics were within normal limits.   Velum and Hard Palate:  Velum within normal limits. Wearing upper denture plate; obscured hard palate.    Reflexes:  Gag: + per patient report Swallow: +   Dentition:  Edentulous maxillary arch; wears denture plate.  Own dentition on mandibular arch, some irregular.   Oropharynx: within normal limits     Speech:  100% intelligible with no distortions.       Swallowing:  Within normal limits for timing of initiation and laryngeal elevation on palpation during continuous swallowing of plain water.  No s/s of aspiration.    Voice/Resonance:  Moderately rough with some increased breathiness.    COUNSELING:  Sharath Huizar was engaged in discussion of normal swallowing function, possible effects of CRT, and therapeutic intervention.    Reviewed the basics of the normal pharyngeal swallow using Follow the Swallow.    Possible side effects related to treatment:  Xerostomia:   Provided written resources for OTC and homemade (baking soda rinses) treatments for dry mouth.    Irritation:  Discussed that the treatment itself can cause irritation to the tissues plus certain tastes and textures may be found to be irritating as well.  Described mucositis and advised that should it occur during XRT, the Radiation Oncologist would prescribe appropriate treatment.  Changes in taste: Discussed this possibility and the resource of Nutrition to assist in making diet changes that may help address this.  Radiation fibrosis: Described this effect and the possible need for continued use of swallowing exercises for an " "extended time (including lifelong) after completion of XRT to keep the structures involved in swallowing functioning at their maximum potential for safe swallowing.    Lymphedema:  Described this and its possible emergence following surgery and/or XRT and that it is treatable.    Reviewed and provided written instructions for swallowing strategies and exercises as follows:  Strategies/techniques:  1. Appropriate seating  2. Smaller size of sips and bites  3. Swallowing one sip and bite at a time  4. Rate of eating and drinking  5. Dry swallows  6. Alternate liquid and solid swallows    Exercises:  Reviewed each and its rationale in supporting various aspects of swallowing function.  Sharath Huizar was instructed to initiate these now and continue to perform them 4 times per day, 10 repetitions throughout his XRT and 3 months beyond:  1. Breath-hold maneuver  2. Mendelsohn maneuver ("kick" modification)  3. Tongue base retraction  4. Tongue-anchor swallow (Nilda)   5. Chin tuck against resistance  6. Effortful swallow      Discussed the goal of swallowing whatever is possible, as much as possible throughout  treatment for the best possible outcome (which may be relative to a given patient) and using a PEG tube supplementally to be sure he is obtaining optimum nutrition and calories to adequately maintain his strength and stamina.  Discussed that he may need to change the consistency of what he can swallow (e.g., if chewable solids are too hard/painful/irritating, change to pureed foods or liquids) and this is perfectly acceptable within the goal of continuing to swallow throughout the treatment and beyond.  Advised that most patients begin to experience swallowing problems about half-way through XRT.  Should he begin to exhibit any swallowing problems such as signs/symptoms of aspiration, a Modified Barium Swallow Study may be ordered to assess his swallowing function.  Reiterated that the swallowing exercises are " done in an effort to prevent or limit swallowing function problems during or after XRT and that he should continue to do them even if he does not think he is having a problem with swallowing safely.    Not a candidate for digital medicine program at present as he is not on the portal.  He was observed to have and use a smart phone.    IMPRESSIONS:   This 68 yo man appears to present with   1.  Dysphonia.  2.  Speech articulation and swallowing within normal limits at this time.  3.  At-risk worsening voice quality and swallowing function during/after CRT.  4.  Laryngeal cancer.    RECOMMENDATIONS/PLAN OF CARE:  It is felt that Mr. Huizar would benefit from  1.  Continuation of his current regular consistency diet with thin liquids using the above strategies and common aspiration precautions, including, but not limited to monitoring for any signs/symptoms of aspiration (such as wet/gurgly voice that does not clear with coughing, inability to make any voice sounds, any persistent coughing with oral intake, otherwise unexplained fever, unexplained increased or new difficulty or discomfort breathing, unexplained increase in  sleepiness/lethargy/significant fatigue, unexplained increase or new onset confusion or change in cognitive functioning, or any other unexplained change in health or well-being that could be related to swallowing).  2.  Changing diet consistency as needed to facilitate swallowing.  3.  Begin swallowing exercises 1-2 weeks prior to onset of XRT and continue 4x/day through at least 3 months after completion of treatment.  4.  Consider audiologic evaluation (as there is none on file here) if a potentially ototoxic chemotherapy agent will be used.  5.  Monitor weight and hydration.  6.  Return to my clinic at mid-point of radiation and again 1 month after completion of treatment in conjunction with return to Dr. Vallejo's and/or Dr. Fowler's clinic.

## 2020-05-06 ENCOUNTER — TELEPHONE (OUTPATIENT)
Dept: PULMONOLOGY | Facility: CLINIC | Age: 70
End: 2020-05-06

## 2020-05-06 ENCOUNTER — DOCUMENTATION ONLY (OUTPATIENT)
Dept: HEMATOLOGY/ONCOLOGY | Facility: CLINIC | Age: 70
End: 2020-05-06

## 2020-05-06 ENCOUNTER — TELEPHONE (OUTPATIENT)
Dept: OTOLARYNGOLOGY | Facility: CLINIC | Age: 70
End: 2020-05-06

## 2020-05-06 ENCOUNTER — DOCUMENTATION ONLY (OUTPATIENT)
Dept: CARDIOTHORACIC SURGERY | Facility: HOSPITAL | Age: 70
End: 2020-05-06

## 2020-05-06 NOTE — TELEPHONE ENCOUNTER
Critical Value was given over the phone from lab. Positive AFB culture. Ordered by Dr. Genoveva Vallejo. The lab attempted to give critical value to Dr. Vallejo but was redirected to Dr. Lopez.

## 2020-05-06 NOTE — PLAN OF CARE
IMPRESSIONS:   This 68 yo man appears to present with   1.  Dysphonia.  2.  Speech articulation and swallowing within normal limits at this time.  3.  At-risk worsening voice quality and swallowing function during/after CRT.  4.  Laryngeal cancer.    RECOMMENDATIONS/PLAN OF CARE:  It is felt that Mr. Huizar would benefit from  1.  Continuation of his current regular consistency diet with thin liquids using the above strategies and common aspiration precautions, including, but not limited to monitoring for any signs/symptoms of aspiration (such as wet/gurgly voice that does not clear with coughing, inability to make any voice sounds, any persistent coughing with oral intake, otherwise unexplained fever, unexplained increased or new difficulty or discomfort breathing, unexplained increase in  sleepiness/lethargy/significant fatigue, unexplained increase or new onset confusion or change in cognitive functioning, or any other unexplained change in health or well-being that could be related to swallowing).  2.  Changing diet consistency as needed to facilitate swallowing.  3.  Begin swallowing exercises 1-2 weeks prior to onset of XRT and continue 4x/day through at least 3 months after completion of treatment.  4.  Consider audiologic evaluation (as there is none on file here) if a potentially ototoxic chemotherapy agent will be used.  5.  Monitor weight and hydration.  6.  Return to my clinic at mid-point of radiation and again 1 month after completion of treatment in conjunction with return to Dr. Vallejo's and/or Dr. Fowler's clinic.

## 2020-05-06 NOTE — PROGRESS NOTES
Date: 05/08/2020     Referring provider: Pilar Aguilar MD     Radiation Oncology Consultation    Reason: Synchronous primary stage I [id number] lung and stage RASHEEDA [id number] larynx cancers    HPI: Sharath Huizar is a 69 y.o. 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    He was diagnosed in 2/20 after presenting to the ED with worsening vocal hoarseness and hemoptysis over the preceding month. He underwent chest x-ray which demonstrated a suspicious RLL lung opacity.     He underwent CT C/A/P imaging on 3/18/20 which demonstrated a 2.2 cm RLL lung nodule. PET/CT on 3/27/20 demonstrated an FDG avid L supraglottic mass in the pre-epiglottic space, SUV max 15.0, involving the L epiglottis extending into the L pre-epiglottic and paraglottic space to the level of the TVCs, as well as hypermetabolic bilateral level III LNs. Also noted was the previously known RLL lung mass, 3.0 cm in size, SUV max 6.6.    He was referred to [facility] and seen by Dr. Valljeo. CT neck imaging on 4/16/20 demonstrated known 4 cm supraglottic mass extending to the TVCs with 2 enlarged R level 3 LNs and 1 enlarged L level III LN. DL and biopsy of the supraglottic mass was performed on 4/20/20, with pathology demonstrating SCC (p16 pending).     His case was discussed at head and neck tumor board with recommendations for biopsy of the RLL lung mass. This was performed on 4/27/20 and demonstrated squamous/adenosquamous carcinoma. He was seen by Dr. Aguilar on 5/5/20 with recommendations for chemoradiation for his locally advanced supraglottic larynx cancer. His case was further discussed at lung tumor board on 5/6/20, with consensus recommendations for upfront SBRT to the RLL lung lesion, to be followed by chemoradiation for his larynx cancer. He presents today for additional treatment discussion regarding radiotherapy. He was recently AFB positive on culture, suggestive of  tuberculosis infection.    Today, he notes continued vocal changes, mild dysphagia, dry cough but denies other pain, nausea, vomiting, hemoptysis or weakness. He is scheduled for pulmonology visit with Dr. Lopez on 20.     Prior Radiation History: Denies    Past Medical History:   Diagnosis Date    Coronary artery disease     Hypertension     Myocardial infarction     NSTEMI (non-ST elevated myocardial infarction) 3/23/2018    Nuclear sclerosis of both eyes 2019       Past Surgical History:   Procedure Laterality Date    CARDIAC CATHETERIZATION      with 4 stents    DIRECT LARYNGOSCOPY N/A 2020    Procedure: LARYNGOSCOPY, DIRECT;  Surgeon: Genoveva Vallejo MD;  Location: [facility] OR;  Service: ENT;  Laterality: N/A;    LEFT HEART CATHETERIZATION Left 2018    Procedure: Left heart cath;  Surgeon: Viral Lombardi MD;  Location: [facility] CATH LAB;  Service: Cardiology;  Laterality: Left;  RN PREOP 18    LUNG BIOPSY N/A 2020    Procedure: BIOPSY, LUNG;  Surgeon: Jones Diagnostic Provider;  Location: [facility] OR;  Service: Radiology;  Laterality: N/A;  9am start--RN PHONE PREOP ----COVID NEGATIVE--pt       Social History     Tobacco Use    Smoking status: Former Smoker     Packs/day: 0.25     Years: 30.00     Pack years: 7.50     Types: Cigarettes     Last attempt to quit: 3/23/2018     Years since quittin.1    Smokeless tobacco: Never Used   Substance Use Topics    Alcohol use: No    Drug use: No       Cancer-related family history is negative for Cancer.    Current Outpatient Medications on File Prior to Visit   Medication Sig Dispense Refill    albuterol (PROVENTIL/VENTOLIN HFA) 90 mcg/actuation inhaler Inhale 1-2 puffs into the lungs every 6 (six) hours as needed for Wheezing. Rescue (Patient not taking: Reported on 2020) 18 g 0    aspirin (ECOTRIN) 81 MG EC tablet Take 1 tablet (81 mg total) by mouth once daily. (Patient taking differently: Take 81 mg by mouth once  daily. )  0    atorvastatin (LIPITOR) 80 MG tablet Take 1 tablet (80 mg total) by mouth once daily. 90 tablet 3    clopidogrel (PLAVIX) 75 mg tablet Take 1 tablet (75 mg total) by mouth once daily. (Patient taking differently: Take 75 mg by mouth once daily. ) 90 tablet 3    docusate sodium (COLACE) 100 MG capsule Take 1 capsule (100 mg total) by mouth 2 (two) times daily as needed for Constipation. (Patient not taking: Reported on 5/5/2020) 30 capsule 0    HYDROcodone-acetaminophen (NORCO) 5-325 mg per tablet Take 1 tablet by mouth every 6 (six) hours as needed for Pain. (Patient not taking: Reported on 5/5/2020) 10 tablet 0    isosorbide mononitrate (IMDUR) 30 MG 24 hr tablet Take 1 tablet (30 mg total) by mouth once daily. 90 tablet 3    lisinopril (PRINIVIL,ZESTRIL) 2.5 MG tablet Take 1 tablet (2.5 mg total) by mouth once daily. 90 tablet 3    metoprolol succinate (TOPROL-XL) 50 MG 24 hr tablet Take 1 tablet (50 mg total) by mouth once daily. 90 tablet 3    nitroGLYCERIN (NITROSTAT) 0.4 MG SL tablet Place 1 tablet (0.4 mg total) under the tongue every 5 (five) minutes as needed. 25 tablet 3    ofloxacin (OCUFLOX) 0.3 % ophthalmic solution 5 drops in left ear BID x 10 days (Patient not taking: Reported on 5/5/2020) 10 mL 0    omeprazole (PRILOSEC) 20 MG capsule Take 1 capsule (20 mg total) by mouth 2 (two) times daily. 28 capsule 0    ondansetron (ZOFRAN) 8 MG tablet Take 1 tablet (8 mg total) by mouth 4 (four) times daily as needed for Nausea. 60 tablet 2    promethazine (PHENERGAN) 6.25 mg/5 mL syrup Take 20 mLs (25 mg total) by mouth every 6 (six) hours as needed for Nausea. 473 mL 6    promethazine-dextromethorphan (PROMETHAZINE-DM) 6.25-15 mg/5 mL Syrp Take 5 mLs by mouth every 4 to 6 hours as needed. 240 mL 0    protein (ENSURE HIGH PROTEIN) Powd Take 1 Can by mouth 3 (three) times daily. (Patient not taking: Reported on 5/5/2020) 90 Can 3     Current Facility-Administered Medications on File  Prior to Visit   Medication Dose Route Frequency Provider Last Rate Last Dose    0.9%  NaCl infusion   Intravenous Continuous Cindy Zhao MD 10 mL/hr at 04/20/20 0904      lidocaine (PF) 10 mg/ml (1%) injection 10 mg  1 mL Intradermal Once Cindy Zhao MD           Review of patient's allergies indicates:  No Known Allergies    Review of Systems   Constitutional: Positive for fatigue. Negative for fever.   HENT: Positive for sore throat, trouble swallowing and voice change. Negative for congestion.    Respiratory: Positive for cough. Negative for shortness of breath and wheezing.    Gastrointestinal: Negative for abdominal distention, abdominal pain, diarrhea, nausea and vomiting.   Genitourinary: Negative for hematuria.   Musculoskeletal: Negative for back pain and neck pain.   Neurological: Negative for dizziness and weakness.   Hematological: Positive for adenopathy.   Psychiatric/Behavioral: Negative for decreased concentration. The patient is not nervous/anxious.         Vital Signs:   Vitals:    05/08/20 1054   BP: 112/64   Pulse: 69   Resp: 18         ECOG Performance Status: 1 - Ambulates, capable of light work    Physical exam:    GENERAL: Patient is alert and oriented, in no acute distress. Wife present during visit.  HEENT:Extraocular muscles are intact.  Oropharynx is clear without lesions. Poor dentition.  NECK: Supple.  Palpable bilateral level III cervical adenopathy.  PULMONARY: Appropriate work of breathing.  CARDIOVASCULAR: No edema.  GI: Soft, nontender and nondistended.    MUSCULOSKELETAL: Range of motion appears normal in all 4 extremities.  LYMPHATICS: Palpable cervical LN bilaterally as above, no supraclavicular or axillary adenopathy.  EXTREMITIES: No clubbing, cyanosis, or edema.    SKIN: No visible lesions.  NEUROLOGIC: CN II-XII are grossly intact.  Motor strength is 5/5 in bilateral upper and lower extremities and symmetric.    Sensory exam is grossly intact to touch.  Gait  is normal.    PSYCHIATRIC: Patient is alert and oriented x 3.  Normal mood and affect.    Labs: I have personally reviewed the patient's available labs and reports and summarized pertinent findings above in HPI.     Imaging: I have personally reviewed the patient's available imaging and reports and summarized pertinent findings above in HPI.     Pathology: I have personally reviewed the patient's available pathology and summarized pertinent findings above in HPI.     Assessment:  Sharath Huizar is a 69 y.o. male with recently diagnosed synchronous primary stage I [id number] lung and stage RASHEEDA [id number] larynx cancers.    Plan:  I was present for MDC discussion and agree with consensus recommendations for upfront lung SBRT to be followed by chemoradiation of his locally advanced supraglottic larynx cancer. The patient had a recent positive AFB test and will follow up regarding recommendations with pulmonology on 5/11/20.     Once treatment regimen for TB is established, the patient will be scheduled for CT simulation to begin the radiation treatment planning process for his lung SBRT. My tentative plan is to treat his lung mass in 3-4 fractions, pending additional planning.     Following completion of his lung SBRT, he will undergo a new CT simulation for planning for chemoradiation regarding his larynx cancer, likely in 35 fractions to the mass and bilateral cervical necks, pending additional planning. CT simulation will take place following dental extractions.     He has already been referred to SLP, who will continue to follow. I will place referrals to nutrition and surgical oncology as the patient will require a feeding tube prior to treatment due to the location and size of his supraglottic mass with bilateral involved cervical LN, which will receive high dose radiation.    Plan is subject to change pending further analysis.    HEAD & NECK & THORAX INFORMED CONSENT: Acute and delayed side effects of head and neck  and thorax radiation, including but not limited to fatigue, redness of the skin, desquamation, dysphagia, odynophagia, mucositis, cough, esophagitis and long term difficulties with swallowing, feeding tube dependency, fibrosis, xerostomia, hypothyroidism, infection as well as rare but catastrophic injury to the spinal cord, mandible, heart, lung, esophagus, and brainstem were discussed with the patient in great detail today.    I reviewed the rationale and goal of the proposed treatment course. The radiotherapeutic procedure with associated side effects and potential complications were explained. Sharath العراقيbrandeekimi had the opportunity to ask questions which were answered to the best of my knowledge. The patient voiced understanding of the above and has elected to proceed with treatment. Consent form was signed and the patient was given our contact information should further questions arise.    HEAD AND NECK AND THORAX SBRT/IMRT/IGRT MEDICAL NECESSITY: This patient's case requires SBRT and IMRT in order to spare critical normal structures, including the spinal cord, mandible and salivary glands, while simultaneously conforming the high dose radiation volume to accurately match the tumor volume. In this case, the target volume is irregular and only SBRT (lung) and IMRT (head and neck) may adequately cover the target volume while protecting closely approximated normal structures. Daily image guidance (IGRT) aims the beams most accurately, minimizing the risk of geographic miss of his target volume, and is also medically necessary.    I spent approximately 60 minutes reviewing the available records and evaluating the patient, out of which over 50% of the time was spent face to face with the patient in counseling and coordinating this patient's care.    Thank you for allowing me to participate in the care of this delightful patient. Please feel free to contact me with any questions or concerns.    Fernando Mosher MD  Radiation  Oncology  [facility]

## 2020-05-06 NOTE — PATIENT CARE CONFERENCE
[facility]      THORACIC MULTIDISCIPLINARY TUMOR BOARD  PATIENT REVIEW FORM  ________________________________________________________________________    CLINIC #: [id number]  DATE: 05/06/2020    DIAGNOSIS: Laryngeal Carcinoma and Right Lower Lobe Lung Mass     PRESENTER: Dr. 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 Patient presented to ED with hoarseness and and hemoptysis. Subsequent CT and PET imaging revealed left supraglottic soft tissue mass and 2.2cm right lower lobe lung mass. Two hypermetabolic bilateral cervical lymph nodes as well as a hypermetabolic right lower lobe partially cavitary pulmonary mass concerning for metastatic disease. He underwent direct laryngoscopy on 4/20/2020 which showed bulky and friable supraglottic mass on left side of glottic surface of epiglottis extending over slightly on to the right. Extends down along left aryepiglottic fold and true vocal fold on left. The right vocal fold did not appear to be involved. Pathology revealed squamous cell cancer. p16 pending. He then underwent a CT guided biopsy on 4/27/20 which showed squamous cell carcinoma with glandular features suggestive of primary adenosquamous pulmonary carcinoma.       BOARD RECOMMENDATIONS: Recommend treating patient for two primary malignancies. Patient is not a candidate for surgical intervention due to difficult airway. Recommend treating lung mass with SBRT and starting concurrent chemoradiation for head and neck malignancy.      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         (i.e., NCCN, NCI, PD, ACO, AUA, etc.)    PRESENTATION AT CANCER CONFERENCE:         [] Prospective    [] Retrospective     [] Follow-Up          [] Eligible for clinical trial

## 2020-05-06 NOTE — TELEPHONE ENCOUNTER
My office staff called patient to see if he would prefer seeing audiology at [facility]. Order placed already and pended in his therapy plan by Dr. Aguilar , he has appointment with pulm here on Monday 5-11-20 , will have him scheduled for audio here to avoid having to go to multiple locations.

## 2020-05-06 NOTE — TELEPHONE ENCOUNTER
----- Message from Aden Lopez MD sent at 5/6/2020  3:12 PM CDT -----  Please schedule routine clinic follow up with me.  thanks  ----- Message -----  From: Genoveva Vallejo MD  Sent: 5/5/2020   3:14 PM CDT  To: Aden Lopez MD, Wilmer Bangura Jr., MD    This order somehow got routed under me when the patient came in for surgery . It looked like Dr. Lopez had originally ordered this. I wanted to make sure it got sent to the correct physician in case any treatment for that is needed.

## 2020-05-06 NOTE — PROGRESS NOTES
Received referral yesterday from the clinic that the patient is in need of assistance with getting referral to dentist for clearance to start treatment. Called the patient to discuss. He said he currently is not seeing a dentist.He last saw a dentist 10 years ago. That dentist has since retired. He does not have a preference on who he sees just requests someone near his home. He says that he will have transportation to this appointment. Today performed search in his network. Contacted Dr. Allen's office. Provided them with his information and what he needed a appointment for. Doctor is gone for the day. She will contact the doctor to discuss and contact me back if they can accept the patient.

## 2020-05-07 LAB
FINAL PATHOLOGIC DIAGNOSIS: NORMAL
GROSS: NORMAL
MICROSCOPIC EXAM: NORMAL
SUPPLEMENTAL DIAGNOSIS: NORMAL

## 2020-05-08 ENCOUNTER — INITIAL CONSULT (OUTPATIENT)
Dept: RADIATION ONCOLOGY | Facility: CLINIC | Age: 70
End: 2020-05-08
Payer: MEDICARE

## 2020-05-08 ENCOUNTER — DOCUMENTATION ONLY (OUTPATIENT)
Dept: HEMATOLOGY/ONCOLOGY | Facility: CLINIC | Age: 70
End: 2020-05-08

## 2020-05-08 VITALS
BODY MASS INDEX: 20.25 KG/M2 | HEIGHT: 66 IN | RESPIRATION RATE: 18 BRPM | HEART RATE: 69 BPM | SYSTOLIC BLOOD PRESSURE: 112 MMHG | WEIGHT: 126 LBS | OXYGEN SATURATION: 100 % | DIASTOLIC BLOOD PRESSURE: 64 MMHG

## 2020-05-08 DIAGNOSIS — C32.9 LARYNX CANCER: Primary | ICD-10-CM

## 2020-05-08 DIAGNOSIS — C77.0 SECONDARY MALIGNANT NEOPLASM OF CERVICOFACIAL LYMPH NODE: ICD-10-CM

## 2020-05-08 DIAGNOSIS — C34.31 MALIGNANT NEOPLASM OF LOWER LOBE OF RIGHT LUNG: ICD-10-CM

## 2020-05-08 PROBLEM — C34.90 LUNG CANCER: Status: ACTIVE | Noted: 2020-05-08

## 2020-05-08 LAB
FINAL PATHOLOGIC DIAGNOSIS: NORMAL
GROSS: NORMAL
SUPPLEMENTAL DIAGNOSIS: NORMAL

## 2020-05-08 PROCEDURE — 1101F PT FALLS ASSESS-DOCD LE1/YR: CPT | Mod: HCNC,CPTII,S$GLB, | Performed by: RADIOLOGY

## 2020-05-08 PROCEDURE — 3078F PR MOST RECENT DIASTOLIC BLOOD PRESSURE < 80 MM HG: ICD-10-PCS | Mod: HCNC,CPTII,S$GLB, | Performed by: RADIOLOGY

## 2020-05-08 PROCEDURE — 3078F DIAST BP <80 MM HG: CPT | Mod: HCNC,CPTII,S$GLB, | Performed by: RADIOLOGY

## 2020-05-08 PROCEDURE — 1159F PR MEDICATION LIST DOCUMENTED IN MEDICAL RECORD: ICD-10-PCS | Mod: HCNC,S$GLB,, | Performed by: RADIOLOGY

## 2020-05-08 PROCEDURE — 1126F AMNT PAIN NOTED NONE PRSNT: CPT | Mod: HCNC,S$GLB,, | Performed by: RADIOLOGY

## 2020-05-08 PROCEDURE — 1159F MED LIST DOCD IN RCRD: CPT | Mod: HCNC,S$GLB,, | Performed by: RADIOLOGY

## 2020-05-08 PROCEDURE — 1101F PR PT FALLS ASSESS DOC 0-1 FALLS W/OUT INJ PAST YR: ICD-10-PCS | Mod: HCNC,CPTII,S$GLB, | Performed by: RADIOLOGY

## 2020-05-08 PROCEDURE — 3074F SYST BP LT 130 MM HG: CPT | Mod: HCNC,CPTII,S$GLB, | Performed by: RADIOLOGY

## 2020-05-08 PROCEDURE — 1126F PR PAIN SEVERITY QUANTIFIED, NO PAIN PRESENT: ICD-10-PCS | Mod: HCNC,S$GLB,, | Performed by: RADIOLOGY

## 2020-05-08 PROCEDURE — 99205 OFFICE O/P NEW HI 60 MIN: CPT | Mod: HCNC,S$GLB,, | Performed by: RADIOLOGY

## 2020-05-08 PROCEDURE — 99205 PR OFFICE/OUTPT VISIT, NEW, LEVL V, 60-74 MIN: ICD-10-PCS | Mod: HCNC,S$GLB,, | Performed by: RADIOLOGY

## 2020-05-08 PROCEDURE — 3074F PR MOST RECENT SYSTOLIC BLOOD PRESSURE < 130 MM HG: ICD-10-PCS | Mod: HCNC,CPTII,S$GLB, | Performed by: RADIOLOGY

## 2020-05-08 PROCEDURE — 99999 PR PBB SHADOW E&M-EST. PATIENT-LVL IV: ICD-10-PCS | Mod: PBBFAC,HCNC,, | Performed by: RADIOLOGY

## 2020-05-08 PROCEDURE — 99999 PR PBB SHADOW E&M-EST. PATIENT-LVL IV: CPT | Mod: PBBFAC,HCNC,, | Performed by: RADIOLOGY

## 2020-05-08 NOTE — LETTER
May 8, 2020      Pilar Aguilar MD  [address]  [facility] [address]           [facility]  [address]  [facility] [address]  Phone: [phone]          Patient: Sharath Huizar   MR Number: [id number]   YOB: 1950   Date of Visit: 5/8/2020       Dear Dr. Pilar Aguilar:    Thank you for referring Sharath Huizar to me for evaluation. Attached you will find relevant portions of my assessment and plan of care.    If you have questions, please do not hesitate to call me. I look forward to following Sharath Huizar along with you.    Sincerely,    Henri Mosher MD    Enclosure  CC:  No Recipients    If you would like to receive this communication electronically, please contact [email or web address] or [phone] to request more information on [facility] Link access.    For providers and/or their staff who would like to refer a patient to [facility], please contact us through our one-stop-shop provider referral line, [facility] , at [phone].    If you feel you have received this communication in error or would no longer like to receive these types of communications, please e-mail [email or web address]

## 2020-05-08 NOTE — PROGRESS NOTES
Clinic Note  10/10/2019      Subjective:         Chief Complaint:   MCKENZIE Yadav is a 67 y.o. male recently diagnosed with prostate cancer. Here with wife, son and daughter in law.  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  Also complains of pelvic and groin pain. Has peyronies.    Stage- T1C  PSA 3.35  Biopsy (8/21/2019)- Quilcene 3+4 right apex 2/2 cores 50%; Quilcene 6 left base 5%, left middle 5%, left apex 5%.  Volume-  YURY score- 2 low risk  NCCN- favorable intermediate      No results found for: PSA, PSADIAG, PSATOTAL, PSAFREE, PSAFREEPCT   Past Medical History:   Diagnosis Date    Acid reflux     Cervical disc herniation     Depression     Heart murmur     Hyperlipidemia     Lumbar disc herniation     Nephrolithiasis     Upper GI bleed      Family History   Problem Relation Age of Onset    Cancer Mother     Cancer Father     Cancer Brother      Social History     Socioeconomic History    Marital status:      Spouse name: Not on file    Number of children: Not on file    Years of education: Not on file    Highest education level: Not on file   Occupational History    Not on file   Social Needs    Financial resource strain: Not on file    Food insecurity:     Worry: Not on file     Inability: Not on file    Transportation needs:     Medical: Not on file     Non-medical: Not on file   Tobacco Use    Smoking status: Former Smoker    Smokeless tobacco: Never Used   Substance and Sexual Activity    Alcohol use: No    Drug use: No    Sexual activity: Not on file   Lifestyle    Physical activity:     Days per week: Not on file     Minutes per session: Not on file    Stress: Not on file   Relationships    Social connections:     Talks on phone: Not on file     Gets together: Not on file     Attends Caodaism service: Not on file     Active member of club or organization: Not on file     Attends meetings of clubs or organizations: Not on file     Relationship status: Not on  Received call back from pharmacy that medication would be $10. Called patient but there was no answer. Called wife. She said that the $10 would be affordable and that they could pay that amount. Gave her instructions on where the pharmacy is located.    "file   Other Topics Concern    Not on file   Social History Narrative    Not on file     Past Surgical History:   Procedure Laterality Date    EYE SURGERY       Patient Active Problem List   Diagnosis    DDD (degenerative disc disease), cervical    DDD (degenerative disc disease), lumbar    Chronic pain due to trauma    Borderline diabetes    Depression    Essential hypertension, benign    Mixed hyperlipidemia    GERD (gastroesophageal reflux disease)    Episodic tension-type headache, not intractable    Osteoarthritis of glenohumeral joint, left    Atherosclerosis of abdominal aorta    Cancer of prostate with intermediate recurrence risk (stage T2b-c or Nia 7 or PSA 10-20)     Review of Systems   Constitutional: Negative for appetite change, chills, fatigue, fever and unexpected weight change.   HENT: Negative for nosebleeds.    Respiratory: Negative for shortness of breath and wheezing.    Cardiovascular: Negative for chest pain, palpitations and leg swelling.   Gastrointestinal: Negative for abdominal distention, abdominal pain, constipation, diarrhea, nausea and vomiting.   Genitourinary: Negative for dysuria and hematuria.   Musculoskeletal: Negative for arthralgias and back pain.   Skin: Negative for pallor.   Neurological: Negative for dizziness, seizures and syncope.   Hematological: Negative for adenopathy.   Psychiatric/Behavioral: Negative for dysphoric mood.         Objective:      There were no vitals taken for this visit.  Estimated body mass index is 27.49 kg/m² as calculated from the following:    Height as of 9/17/19: 5' 7.75" (1.721 m).    Weight as of 9/17/19: 81.4 kg (179 lb 7.3 oz).  Physical Exam   Constitutional: He is oriented to person, place, and time. He appears well-developed and well-nourished. No distress.   HENT:   Head: Atraumatic.   Neck: No tracheal deviation present.   Cardiovascular: Normal rate.    Pulmonary/Chest: Effort normal. No respiratory distress. He has " no wheezes.   Abdominal: Soft. Bowel sounds are normal. He exhibits no distension and no mass. There is no tenderness. There is no rebound and no guarding.   Neurological: He is alert and oriented to person, place, and time.   Skin: Skin is warm and dry. He is not diaphoretic.     Psychiatric: He has a normal mood and affect. His behavior is normal. Judgment and thought content normal.         Assessment and Plan:           Problem List Items Addressed This Visit     Cancer of prostate with intermediate recurrence risk (stage T2b-c or Nia 7 or PSA 10-20) - Primary          Follow up:   Today's visit was spent almost entirely on counseling. We reviewed his diagnosis, stage, grade, risk group, and prognosis. We discussed [place] (NCCN) and YURY risk stratification  We discussed the concept of low risk, intermediate risk, and high risk disease. We also reviewed the NCCN treatment nomogram.We discussed the different treatment options including active surveillance (as well as the surveillance protocol), prostate brachytherapy, IMRT, IGRT, cryotherapy, HIFU and both open and robotic prostatectomy.We also discussed the advantages, disadvantages, risks and benefits, as well as complications of each option. Regarding radiation therapy we discussed treatment planning, the different techniques, short and long term complications. These included radiation cystitis, radiation proctitis, and impotence. We discussed success, failure, and salvage therapeutic options.Also discussed the use of SpaceOAR for brachytherapy and EBRT and fiducials for EBRT.   We discussed surgical therapy in depth including preoperative preparation, surgical technique (including bladder neck and nerve-sparing techniques), postoperative recuperation and recovery, and short and long term complications including UTI, bleeding, blood clots,catheter dislodgement, etc. We discussed the risks of reoperation, incontinence, impotence, and recurrence. We  discussed preop and postop Kegels, post op penile rehab, and treatment options for incontinence and impotence. We discussed rates of cancer free survival and recurrence, as well as salvage therapeutic options. We discussed the possible  indications for adjuvant radiation therapy.   I answered questions and addressed concerns. Also discussed the Prolaris test to get genetic information about this tumors potential future behavior.   Patient interested in robotic assisted laparoscopic prostatectomy.  My nurse will instruct the patient on Kegel exercises today and give them the Kegel exercise instruction sheet.   The patient will meet with our  Laya today to find a date for surgery and begin preparation for surgery. Will also receive our handout on NPO guidelines and preop hydration. Patient will also receive information and education on preoperative skin preparation and postop wound care.   Told him surgery was for his prostate cancer treatment and was not a treatment for his pelvic and groin pain. That after the surgery his pain could be worse, unchanged or possibly improved. However since the etiology is unclear I have no way to predict where he will wind up.  MRI for staging.  I spent 25 minutes with the patient of which more than half was spent in direct consultation with the patient in regards to our treatment and plan.     Letter to Dr. De La Rosa.    Jerardo Clements

## 2020-05-08 NOTE — PROGRESS NOTES
Received call back from Dr. Allen's office and they can accept the patient. She provided me several appointment dates and times for the patient to choose. Called the patient. Spoke with wife and patient. He elected for Monday 5/11 at noon. The dentist office will call Friday to provide information and ask screening questions. Made them aware of this. Wife stated that they were given 2 prescriptions at md appointment. She stated that they could only afford one. Discussed having prescription transferred so that we could assist her. She said she would contact [facility] to have it transferred to the [facility] pharmacy. They have an appointment Friday morning at [facility] so they can pick it up then. Called [facility] this am to check cost of the medication. Pharmacy stated that the prescription had not been transferred. They will contact [facility] to have it transferred and let me know when it is ready and cost.

## 2020-05-11 ENCOUNTER — CLINICAL SUPPORT (OUTPATIENT)
Dept: AUDIOLOGY | Facility: CLINIC | Age: 70
End: 2020-05-11
Payer: MEDICARE

## 2020-05-11 ENCOUNTER — OFFICE VISIT (OUTPATIENT)
Dept: PULMONOLOGY | Facility: CLINIC | Age: 70
End: 2020-05-11
Payer: MEDICARE

## 2020-05-11 ENCOUNTER — NURSE TRIAGE (OUTPATIENT)
Dept: ADMINISTRATIVE | Facility: CLINIC | Age: 70
End: 2020-05-11

## 2020-05-11 VITALS
BODY MASS INDEX: 20.27 KG/M2 | OXYGEN SATURATION: 99 % | WEIGHT: 126.13 LBS | DIASTOLIC BLOOD PRESSURE: 87 MMHG | SYSTOLIC BLOOD PRESSURE: 135 MMHG | HEIGHT: 66 IN | RESPIRATION RATE: 20 BRPM | HEART RATE: 73 BPM

## 2020-05-11 DIAGNOSIS — J98.4 CAVITARY LUNG DISEASE: ICD-10-CM

## 2020-05-11 DIAGNOSIS — H90.71 MIXED CONDUCTIVE AND SENSORINEURAL HEARING LOSS OF RIGHT EAR WITH UNRESTRICTED HEARING OF LEFT EAR: Primary | ICD-10-CM

## 2020-05-11 DIAGNOSIS — C32.9 LARYNX CANCER: ICD-10-CM

## 2020-05-11 DIAGNOSIS — H93.13 TINNITUS OF BOTH EARS: ICD-10-CM

## 2020-05-11 PROCEDURE — 3075F PR MOST RECENT SYSTOLIC BLOOD PRESS GE 130-139MM HG: ICD-10-PCS | Mod: HCNC,CPTII,S$GLB, | Performed by: INTERNAL MEDICINE

## 2020-05-11 PROCEDURE — 92550 PR TYMPANOMETRY AND REFLEX THRESHOLD MEASUREMENTS: ICD-10-PCS | Mod: S$GLB,,, | Performed by: AUDIOLOGIST

## 2020-05-11 PROCEDURE — 99214 PR OFFICE/OUTPT VISIT, EST, LEVL IV, 30-39 MIN: ICD-10-PCS | Mod: HCNC,S$GLB,, | Performed by: INTERNAL MEDICINE

## 2020-05-11 PROCEDURE — 3075F SYST BP GE 130 - 139MM HG: CPT | Mod: HCNC,CPTII,S$GLB, | Performed by: INTERNAL MEDICINE

## 2020-05-11 PROCEDURE — 1101F PR PT FALLS ASSESS DOC 0-1 FALLS W/OUT INJ PAST YR: ICD-10-PCS | Mod: HCNC,CPTII,S$GLB, | Performed by: INTERNAL MEDICINE

## 2020-05-11 PROCEDURE — 99999 PR PBB SHADOW E&M-EST. PATIENT-LVL IV: CPT | Mod: PBBFAC,HCNC,, | Performed by: INTERNAL MEDICINE

## 2020-05-11 PROCEDURE — 3079F DIAST BP 80-89 MM HG: CPT | Mod: HCNC,CPTII,S$GLB, | Performed by: INTERNAL MEDICINE

## 2020-05-11 PROCEDURE — 1125F AMNT PAIN NOTED PAIN PRSNT: CPT | Mod: HCNC,S$GLB,, | Performed by: INTERNAL MEDICINE

## 2020-05-11 PROCEDURE — 92557 PR COMPREHENSIVE HEARING TEST: ICD-10-PCS | Mod: S$GLB,,, | Performed by: AUDIOLOGIST

## 2020-05-11 PROCEDURE — 3079F PR MOST RECENT DIASTOLIC BLOOD PRESSURE 80-89 MM HG: ICD-10-PCS | Mod: HCNC,CPTII,S$GLB, | Performed by: INTERNAL MEDICINE

## 2020-05-11 PROCEDURE — 1101F PT FALLS ASSESS-DOCD LE1/YR: CPT | Mod: HCNC,CPTII,S$GLB, | Performed by: INTERNAL MEDICINE

## 2020-05-11 PROCEDURE — 1159F PR MEDICATION LIST DOCUMENTED IN MEDICAL RECORD: ICD-10-PCS | Mod: HCNC,S$GLB,, | Performed by: INTERNAL MEDICINE

## 2020-05-11 PROCEDURE — 92557 COMPREHENSIVE HEARING TEST: CPT | Mod: S$GLB,,, | Performed by: AUDIOLOGIST

## 2020-05-11 PROCEDURE — 99214 OFFICE O/P EST MOD 30 MIN: CPT | Mod: HCNC,S$GLB,, | Performed by: INTERNAL MEDICINE

## 2020-05-11 PROCEDURE — 99999 PR PBB SHADOW E&M-EST. PATIENT-LVL IV: ICD-10-PCS | Mod: PBBFAC,HCNC,, | Performed by: INTERNAL MEDICINE

## 2020-05-11 PROCEDURE — 1159F MED LIST DOCD IN RCRD: CPT | Mod: HCNC,S$GLB,, | Performed by: INTERNAL MEDICINE

## 2020-05-11 PROCEDURE — 92550 TYMPANOMETRY & REFLEX THRESH: CPT | Mod: S$GLB,,, | Performed by: AUDIOLOGIST

## 2020-05-11 PROCEDURE — 1125F PR PAIN SEVERITY QUANTIFIED, PAIN PRESENT: ICD-10-PCS | Mod: HCNC,S$GLB,, | Performed by: INTERNAL MEDICINE

## 2020-05-11 NOTE — TELEPHONE ENCOUNTER
Spoke to patient though post procedure monitoring calls. Patient denies any cough, fever, or difficulty breathing since procedure.      Reason for Disposition   General information question, no triage required and triager able to answer question    Additional Information   Negative: [1] Caller is not with the adult (patient) AND [2] reporting urgent symptoms   Negative: Lab result questions   Negative: Medication questions   Negative: Caller can't be reached by phone   Negative: Caller has already spoken to PCP or another triager   Negative: RN needs further essential information from caller in order to complete triage   Negative: Requesting regular office appointment   Negative: [1] Caller requesting NON-URGENT health information AND [2] PCP's office is the best resource   Negative: Health Information question, no triage required and triager able to answer question    Protocols used: INFORMATION ONLY CALL-A-

## 2020-05-11 NOTE — PROGRESS NOTES
Click on link to view Audiogram  Document on 5/11/2020  9:45 AM by Jacque Santa MA CCC-A: Audiogram    Sharath Huizar was seen today for an audiologic evaluation for pain in the left ear.  He also reported occasional tinnitus and denied dizziness.    Tympanometry revealed a Type B tympanogram with a 9.7 ear canal volume for the right ear and a Type A tympanogram with a 1.6 ear canal volume for the left ear.  Audiogram results revealed hearing within normal limits for the left ear and a mild-moderate mixed hearing loss for the right ear.  Speech audiometry revealed a speech reception threshold at 15dBHL with a word recognition score of 88% for the left ear and a speech reception threshold at 30dBHL with a word recognition score of 92% for the right ear.    Recommendations:  1. Otologic evaluation  2. Annual Audiogram

## 2020-05-12 ENCOUNTER — TELEPHONE (OUTPATIENT)
Dept: CARDIOLOGY | Facility: CLINIC | Age: 70
End: 2020-05-12

## 2020-05-12 NOTE — TELEPHONE ENCOUNTER
----- Message from Laly Henriquez MA sent at 5/11/2020  3:51 PM CDT -----      ----- Message -----  From: Allie Angeles RN  Sent: 5/11/2020   3:39 PM CDT  To: Harjit Stratton Staff    This patient has a referral to surgical oncology regarding placement of feeding tube.  Dr Cardenas requests cardiac clearance to stop his Plavix.    Please adviseChe

## 2020-05-14 ENCOUNTER — OFFICE VISIT (OUTPATIENT)
Dept: SURGERY | Facility: CLINIC | Age: 70
End: 2020-05-14
Payer: MEDICARE

## 2020-05-14 ENCOUNTER — TELEPHONE (OUTPATIENT)
Dept: SURGERY | Facility: CLINIC | Age: 70
End: 2020-05-14

## 2020-05-14 ENCOUNTER — TELEPHONE (OUTPATIENT)
Dept: FAMILY MEDICINE | Facility: CLINIC | Age: 70
End: 2020-05-14

## 2020-05-14 DIAGNOSIS — C32.9 LARYNX CANCER: Primary | ICD-10-CM

## 2020-05-14 DIAGNOSIS — C32.9 LARYNX CANCER: ICD-10-CM

## 2020-05-14 DIAGNOSIS — C77.0 SECONDARY MALIGNANT NEOPLASM OF CERVICOFACIAL LYMPH NODE: ICD-10-CM

## 2020-05-14 PROCEDURE — 99443 PR PHYSICIAN TELEPHONE EVALUATION 21-30 MIN: CPT | Mod: HCNC,95,, | Performed by: SURGERY

## 2020-05-14 PROCEDURE — 99443 PR PHYSICIAN TELEPHONE EVALUATION 21-30 MIN: ICD-10-PCS | Mod: HCNC,95,, | Performed by: SURGERY

## 2020-05-14 RX ORDER — TAMSULOSIN HYDROCHLORIDE 0.4 MG/1
0.4 CAPSULE ORAL ONCE
Status: CANCELLED | OUTPATIENT
Start: 2020-05-21

## 2020-05-14 RX ORDER — SODIUM CHLORIDE 9 MG/ML
INJECTION, SOLUTION INTRAVENOUS CONTINUOUS
Status: CANCELLED | OUTPATIENT
Start: 2020-05-14

## 2020-05-14 RX ORDER — METRONIDAZOLE 500 MG/100ML
500 INJECTION, SOLUTION INTRAVENOUS
Status: CANCELLED | OUTPATIENT
Start: 2020-05-21

## 2020-05-14 RX ORDER — LIDOCAINE HYDROCHLORIDE 10 MG/ML
1 INJECTION, SOLUTION EPIDURAL; INFILTRATION; INTRACAUDAL; PERINEURAL ONCE
Status: CANCELLED | OUTPATIENT
Start: 2020-05-14 | End: 2020-05-14

## 2020-05-14 NOTE — TELEPHONE ENCOUNTER
----- Message from Andrew Howard sent at 5/14/2020  9:45 AM CDT -----  Contact: JULIAN PEDRO [[id number]]  Name of Who is Calling: JULIAN PEDRO [[id number]]    What is the request in detail:JULIAN PEDRO [[id number]] is calling in regards to paperwork that was sent over a week ago ....  Please contact to further discuss and advise      Can the clinic reply by [facility]: NO      [facility] Back [facility]:  [phone] (home)

## 2020-05-14 NOTE — Clinical Note
Han Samaniego,Can you please look at his micro and clear him, as it says AFB on it and the dentist is refusing to see him He has new larynx cancer and needs dental work prior to starting chemo/RT

## 2020-05-14 NOTE — LETTER
May 14, 2020      Henri Mosher MD  [address]  [facility] [address]           [facility] - Gen Surg/Surg Onc  [address]  [facility] [address]  Phone: [phone]          Patient: Sharath Huizar   MR Number: [id number]   YOB: 1950   Date of Visit: 5/14/2020       Dear Dr. Henri Mosher:    Thank you for referring Sharath Huizar to me for evaluation. Attached you will find relevant portions of my assessment and plan of care.    If you have questions, please do not hesitate to call me. I look forward to following Sharath Huizar along with you.    Sincerely,    Josh Cardenas MD    Enclosure  CC:  No Recipients    If you would like to receive this communication electronically, please contact [email or web address] or [phone] to request more information on [facility] Link access.    For providers and/or their staff who would like to refer a patient to [facility], please contact us through our one-stop-shop provider referral line, [facility], at [phone].    If you feel you have received this communication in error or would no longer like to receive these types of communications, please e-mail [email or web address]

## 2020-05-14 NOTE — PROGRESS NOTES
Established Patient - Audio Only Telehealth Visit     The patient location is: home  The chief complaint leading to consultation is: dysphagia, synchronous laryngeal and lung carcinoma  Visit type: Virtual visit with audio only (telephone)  Total time spent with patient: 25       The reason for the audio only service rather than synchronous audio and video virtual visit was related to technical difficulties or patient preference/necessity.     Each patient to whom I provide medical services by telemedicine is:  (1) informed of the relationship between the physician and patient and the respective role of any other health care provider with respect to management of the patient; and (2) notified that they may decline to receive medical services by telemedicine and may withdraw from such care at any time. Patient verbally consented to receive this service via voice-only telephone call.       HPI: Mr. Huizar is a 68yo M with new dx synchronous laryngeal and lung cancer referred to me for consideration of PEG/feeding tube placement. He is currently swallowing with minimal difficulty. He has no history of abdominal surgery. He is on plavix for CAD but has seen his cardiologist for clearance and is ok to stop it perioperatively.     Assessment and plan:  Will plan for PEG in OR next Thursday, vs open G tube if endoscopy is not technically possible although I doubt that based on his ability to swallow well now. He will stop plavix 5 days pre op. Will reach out to his oncology team to plan home health orders so they can manage his tube feeds.       This service was not originating from a related E/M service provided within the previous 7 days nor will  to an E/M service or procedure within the next 24 hours or my soonest available appointment.  Prevailing standard of care was able to be met in this audio-only visit.

## 2020-05-14 NOTE — Clinical Note
I am planning to do a PEG next week for Mr. Huizar. His wife has tons of questions about his diagnoses since he has trouble relaying information to her, I tried to answer them as best I could. He supposedly was also seen at [facility] and claims they tried to do a feeding tube and want him to come back to try again? This was very confusing and neither he nor his wife would explain well. I told them if they were getting their treatment here for cancer, we should probably put in the tube.

## 2020-05-14 NOTE — TELEPHONE ENCOUNTER
Called pt's wife. Paperwork was mailed to [facility]. We gave her the number to medical records to request his complete records.

## 2020-05-15 ENCOUNTER — TELEPHONE (OUTPATIENT)
Dept: HEMATOLOGY/ONCOLOGY | Facility: CLINIC | Age: 70
End: 2020-05-15

## 2020-05-15 DIAGNOSIS — C32.9 LARYNX CANCER: Primary | ICD-10-CM

## 2020-05-15 DIAGNOSIS — Z13.9 SCREENING FOR CONDITION: ICD-10-CM

## 2020-05-15 NOTE — TELEPHONE ENCOUNTER
----- Message from Phoebe Polanco RN sent at 5/12/2020  4:26 PM CDT -----  140 pm on the 19th.  ~Phoebe    ----- Message -----  From: Ana Hodge  Sent: 5/12/2020   4:25 PM CDT  To: [facility]  Pool, Lauren CHILDS Staff    PT has been approved for infusion. No available office time. Pls advise.   ----- Message -----  From: Chely Lunsford  Sent: 5/5/2020   1:48 PM CDT  To: Chely Lunsford, [facility]  Pool    Get auth for weekly cisplatin. Schedule CBC,CMP, mag and phos and see me to start Cisplatin on same day he starts RT. IV fluids on day 2

## 2020-05-18 ENCOUNTER — TELEPHONE (OUTPATIENT)
Dept: HEMATOLOGY/ONCOLOGY | Facility: CLINIC | Age: 70
End: 2020-05-18

## 2020-05-18 ENCOUNTER — CLINICAL SUPPORT (OUTPATIENT)
Dept: HEMATOLOGY/ONCOLOGY | Facility: CLINIC | Age: 70
End: 2020-05-18
Payer: MEDICARE

## 2020-05-18 ENCOUNTER — LAB VISIT (OUTPATIENT)
Dept: LAB | Facility: HOSPITAL | Age: 70
End: 2020-05-18
Payer: MEDICARE

## 2020-05-18 ENCOUNTER — TELEPHONE (OUTPATIENT)
Dept: PULMONOLOGY | Facility: CLINIC | Age: 70
End: 2020-05-18

## 2020-05-18 DIAGNOSIS — Z13.9 SCREENING FOR CONDITION: ICD-10-CM

## 2020-05-18 DIAGNOSIS — C32.9 LARYNX CANCER: ICD-10-CM

## 2020-05-18 LAB
ALBUMIN SERPL BCP-MCNC: 3.8 G/DL (ref 3.5–5.2)
ALP SERPL-CCNC: 117 U/L (ref 55–135)
ALT SERPL W/O P-5'-P-CCNC: 21 U/L (ref 10–44)
ANION GAP SERPL CALC-SCNC: 8 MMOL/L (ref 8–16)
AST SERPL-CCNC: 21 U/L (ref 10–40)
BILIRUB SERPL-MCNC: 0.2 MG/DL (ref 0.1–1)
BUN SERPL-MCNC: 17 MG/DL (ref 8–23)
CALCIUM SERPL-MCNC: 9.2 MG/DL (ref 8.7–10.5)
CHLORIDE SERPL-SCNC: 109 MMOL/L (ref 95–110)
CO2 SERPL-SCNC: 24 MMOL/L (ref 23–29)
CREAT SERPL-MCNC: 0.9 MG/DL (ref 0.5–1.4)
ERYTHROCYTE [DISTWIDTH] IN BLOOD BY AUTOMATED COUNT: 15.1 % (ref 11.5–14.5)
EST. GFR  (AFRICAN AMERICAN): >60 ML/MIN/1.73 M^2
EST. GFR  (NON AFRICAN AMERICAN): >60 ML/MIN/1.73 M^2
GLUCOSE SERPL-MCNC: 142 MG/DL (ref 70–110)
HCT VFR BLD AUTO: 39 % (ref 40–54)
HGB BLD-MCNC: 12 G/DL (ref 14–18)
IMM GRANULOCYTES # BLD AUTO: 0.01 K/UL (ref 0–0.04)
MAGNESIUM SERPL-MCNC: 1.9 MG/DL (ref 1.6–2.6)
MCH RBC QN AUTO: 29.8 PG (ref 27–31)
MCHC RBC AUTO-ENTMCNC: 30.8 G/DL (ref 32–36)
MCV RBC AUTO: 97 FL (ref 82–98)
NEUTROPHILS # BLD AUTO: 5.7 K/UL (ref 1.8–7.7)
PHOSPHATE SERPL-MCNC: 3.3 MG/DL (ref 2.7–4.5)
PLATELET # BLD AUTO: 245 K/UL (ref 150–350)
PMV BLD AUTO: 10.5 FL (ref 9.2–12.9)
POTASSIUM SERPL-SCNC: 4.1 MMOL/L (ref 3.5–5.1)
PROT SERPL-MCNC: 7 G/DL (ref 6–8.4)
RBC # BLD AUTO: 4.03 M/UL (ref 4.6–6.2)
SARS-COV-2 RNA RESP QL NAA+PROBE: NOT DETECTED
SODIUM SERPL-SCNC: 141 MMOL/L (ref 136–145)
WBC # BLD AUTO: 9.84 K/UL (ref 3.9–12.7)

## 2020-05-18 PROCEDURE — U0003 INFECTIOUS AGENT DETECTION BY NUCLEIC ACID (DNA OR RNA); SEVERE ACUTE RESPIRATORY SYNDROME CORONAVIRUS 2 (SARS-COV-2) (CORONAVIRUS DISEASE [COVID-19]), AMPLIFIED PROBE TECHNIQUE, MAKING USE OF HIGH THROUGHPUT TECHNOLOGIES AS DESCRIBED BY CMS-2020-01-R: HCPCS | Mod: HCNC

## 2020-05-18 PROCEDURE — 36415 COLL VENOUS BLD VENIPUNCTURE: CPT | Mod: HCNC

## 2020-05-18 PROCEDURE — 84100 ASSAY OF PHOSPHORUS: CPT | Mod: HCNC

## 2020-05-18 PROCEDURE — 85027 COMPLETE CBC AUTOMATED: CPT | Mod: HCNC

## 2020-05-18 PROCEDURE — 80053 COMPREHEN METABOLIC PANEL: CPT | Mod: HCNC

## 2020-05-18 PROCEDURE — 83735 ASSAY OF MAGNESIUM: CPT | Mod: HCNC

## 2020-05-18 NOTE — TELEPHONE ENCOUNTER
----- Message from Aden Lopez MD sent at 5/18/2020  8:53 AM CDT -----  Can we contact Micro and ask for speciation?  If they cannot, can they give us a timeline of when it will happen.  Aden  ----- Message -----  From: Pilar Aguilar MD  Sent: 5/15/2020   4:40 PM CDT  To: Aden Lopez MD    Hi Aden,  Can you please look at his micro and clear him, as it says AFB on it and the dentist is refusing to see him   He has new larynx cancer and needs dental work prior to starting chemo/RT

## 2020-05-18 NOTE — TELEPHONE ENCOUNTER
Spoke with Micro who stated patient's specimen sample had to be isolated and should be ready for stain tomorrow 5/19/2020. Once it is stained it will have to prepared to mail out and once mailed will take 2 weeks to process.

## 2020-05-18 NOTE — TELEPHONE ENCOUNTER
Phoebe,  Can you please get this over to his dentist and see if they can get his dental work done. Pulmonary does not think it is TB  I am attaching Mary Jo as well

## 2020-05-18 NOTE — TELEPHONE ENCOUNTER
According to Micro, AFB speciation can take another 2 weeks.  This in part due to scant quantity of AFB on sputum smear.  In light of negative quantiferon, I do not suspect mycobacterium tuberculosis.  Most likely contaminiation.  No special infection precaution is needed.

## 2020-05-18 NOTE — TELEPHONE ENCOUNTER
----- Message from Pilar Aguilar MD sent at 5/15/2020  4:40 PM CDT -----  Han Samaniego,  Can you please look at his micro and clear him, as it says AFB on it and the dentist is refusing to see him   He has new larynx cancer and needs dental work prior to starting chemo/RT

## 2020-05-19 ENCOUNTER — TELEPHONE (OUTPATIENT)
Dept: RADIATION ONCOLOGY | Facility: CLINIC | Age: 70
End: 2020-05-19

## 2020-05-19 ENCOUNTER — TELEPHONE (OUTPATIENT)
Dept: HEMATOLOGY/ONCOLOGY | Facility: CLINIC | Age: 70
End: 2020-05-19

## 2020-05-19 NOTE — TELEPHONE ENCOUNTER
"Mary Jo/Cassandra,  I spoke with patient and wife today. They said the dental work was postponed, until there was documentation he did not have TB. That has been documented, and they have not been contacted with any new dental appointments.  They did not know the name of the dentist to call either.  Wife said to nurse---"please call cassandra, she knows."    Message routed to mary jo and cassandra (do either of you know the status of his dental work or who to call?) (dr estevse, just [facility])  "

## 2020-05-19 NOTE — TELEPHONE ENCOUNTER
Phone call to Mrs Huizar confirmed appt for FRi 5/22 for simulation for Lung radiation at 11 am  She states he is getting a Peg tube on Thursday

## 2020-05-19 NOTE — TELEPHONE ENCOUNTER
----- Message from Pilar Aguilar MD sent at 5/18/2020  4:56 PM CDT -----  I don't think he has been sim'd yet. ALso see other message for dental clearance. He is seeing us on 5/19 as well

## 2020-05-20 ENCOUNTER — TELEPHONE (OUTPATIENT)
Dept: SURGERY | Facility: CLINIC | Age: 70
End: 2020-05-20

## 2020-05-20 ENCOUNTER — ANESTHESIA EVENT (OUTPATIENT)
Dept: SURGERY | Facility: HOSPITAL | Age: 70
End: 2020-05-20
Payer: MEDICARE

## 2020-05-20 ENCOUNTER — LAB VISIT (OUTPATIENT)
Dept: INTERNAL MEDICINE | Facility: CLINIC | Age: 70
End: 2020-05-20
Payer: MEDICARE

## 2020-05-20 DIAGNOSIS — C32.9 LARYNX CANCER: ICD-10-CM

## 2020-05-20 DIAGNOSIS — C32.9 LARYNX CANCER: Primary | ICD-10-CM

## 2020-05-20 PROCEDURE — U0003 INFECTIOUS AGENT DETECTION BY NUCLEIC ACID (DNA OR RNA); SEVERE ACUTE RESPIRATORY SYNDROME CORONAVIRUS 2 (SARS-COV-2) (CORONAVIRUS DISEASE [COVID-19]), AMPLIFIED PROBE TECHNIQUE, MAKING USE OF HIGH THROUGHPUT TECHNOLOGIES AS DESCRIBED BY CMS-2020-01-R: HCPCS | Mod: HCNC

## 2020-05-20 NOTE — ANESTHESIA PREPROCEDURE EVALUATION
[facility]  Anesthesia Pre-Operative Evaluation       Patient Name: Sharath Huizar  YOB: 1950  MRN: [id number]  [facility]: [id number]      Code Status: Full Code   Date of Procedure: 5/21/2020  Procedure: Procedure(s) (LRB):  INSERTION, PEG TUBE (N/A)  Anesthesia: General/MAC  Pre-Operative Diagnosis: Final diagnoses:  None  Proceduralist/Surgeon(s) and Role:     * Josh Cardenas MD - Primary    SUBJECTIVE:   Sharath Huizar is a 69 y.o. male w/ a significant PMHx of HTN, CAD, NSTEMI (2/2019), and synchronous laryngeal and lung cancer referred to me for consideration of PEG/feeding tube placement. He is currently swallowing with minimal difficulty. He has no history of abdominal surgery. He is on plavix for CAD but has been instructed to hold 5days pre-operatively.     Plan for PEG vs Open G tube if endoscopy is not technically possible.    Patient now presents for the above procedure(s).    2D ECHO: TTE 2/1/19    · Moderately decreased left ventricular systolic function. The estimated ejection fraction is 30%  · Eccentric left ventricular hypertrophy.  · Mild left ventricular enlargement.  · Grade I (mild) left ventricular diastolic dysfunction consistent with impaired relaxation.  · Normal right ventricular systolic function.  · Moderate left atrial enlargement.    Prev airway:     04/20/20; Placement Time: 1222; Method of Intubation: Glidescope; Inserted by: CRNA; Airway Device: Endotracheal Tube; Mask Ventilation: Easy - oral; Intubated: Postinduction; Blade: Yfn #4; Airway Device Size: 6.0; Style: Cuffed; Cuff Inflation: Minimal occlusive pressure; Placement Verified By: Auscultation, Capnometry, ETT Condensation; Grade: Grade III; Complicating Factors: Supraglottic mass or abscess; Intubation Findings: Positive EtCO2, Bilateral breath sounds, Atraumatic/Condition of teeth unchanged;  Depth of Insertion: 24; Securment: Lips; Complications: None;    Drips: None  documented.    Patient now presents for the above procedure(s). Pt appropriately NPO.   ALLERGIES:   Review of patient's allergies indicates:  No Known Allergies  LDA:      Lines/Drains/Airways     None                Anesthesia Evaluation      Airway   Mallampati: III  TM distance: Normal, at least 6 cm  Neck ROM: Normal ROM  Dental    (+) In tact    Pulmonary    (+) shortness of breath, sleep apnea,   Cardiovascular   (+) hypertension, past MI, CAD, angina, MCKEON,     Neuro/Psych      GI/Hepatic/Renal      Endo/Other    Abdominal                     MEDICATIONS:     Antibiotics (From admission, onward)    Start     Stop Route Frequency Ordered    05/21/20 0654  cefTRIAXone (ROCEPHIN) 2 g/50 mL D5W IVPB      -- IV On Call Procedure 05/21/20 0654 05/21/20 0654  metronidazole IVPB 500 mg      -- IV On Call Procedure 05/21/20 0654        VTE Risk Mitigation (From admission, onward)         Ordered     Place sequential compression device  Until discontinued      05/21/20 0654              Anticoagulants     None        Current Outpatient Medications on File Prior to Encounter   Medication Sig Dispense Refill Last Dose    albuterol (PROVENTIL/VENTOLIN HFA) 90 mcg/actuation inhaler Inhale 1-2 puffs into the lungs every 6 (six) hours as needed for Wheezing. Rescue (Patient not taking: Reported on 4/17/2020) 18 g 0 Not Taking    aspirin (ECOTRIN) 81 MG EC tablet Take 1 tablet (81 mg total) by mouth once daily. (Patient taking differently: Take 81 mg by mouth once daily. )  0 Taking    atorvastatin (LIPITOR) 80 MG tablet Take 1 tablet (80 mg total) by mouth once daily. 90 tablet 3 Taking    clopidogrel (PLAVIX) 75 mg tablet Take 1 tablet (75 mg total) by mouth once daily. (Patient taking differently: Take 75 mg by mouth once daily. ) 90 tablet 3 Taking    docusate sodium (COLACE) 100 MG capsule Take 1 capsule (100 mg total) by mouth 2 (two) times daily as needed for Constipation. (Patient not taking: Reported on  5/5/2020) 30 capsule 0 Not Taking    HYDROcodone-acetaminophen (NORCO) 5-325 mg per tablet Take 1 tablet by mouth every 6 (six) hours as needed for Pain. (Patient not taking: Reported on 5/5/2020) 10 tablet 0 Not Taking    isosorbide mononitrate (IMDUR) 30 MG 24 hr tablet Take 1 tablet (30 mg total) by mouth once daily. 90 tablet 3 Taking    lisinopril (PRINIVIL,ZESTRIL) 2.5 MG tablet Take 1 tablet (2.5 mg total) by mouth once daily. 90 tablet 3 Taking    metoprolol succinate (TOPROL-XL) 50 MG 24 hr tablet Take 1 tablet (50 mg total) by mouth once daily. 90 tablet 3 Taking    nitroGLYCERIN (NITROSTAT) 0.4 MG SL tablet Place 1 tablet (0.4 mg total) under the tongue every 5 (five) minutes as needed. (Patient not taking: Reported on 5/8/2020) 25 tablet 3 Not Taking    ofloxacin (OCUFLOX) 0.3 % ophthalmic solution 5 drops in left ear BID x 10 days (Patient not taking: Reported on 5/5/2020) 10 mL 0 Not Taking    omeprazole (PRILOSEC) 20 MG capsule Take 1 capsule (20 mg total) by mouth 2 (two) times daily. (Patient not taking: Reported on 5/8/2020) 28 capsule 0 Not Taking    ondansetron (ZOFRAN) 8 MG tablet Take 1 tablet (8 mg total) by mouth 4 (four) times daily as needed for Nausea. (Patient not taking: Reported on 5/8/2020) 60 tablet 2 Not Taking    promethazine (PHENERGAN) 6.25 mg/5 mL syrup Take 20 mLs (25 mg total) by mouth every 6 (six) hours as needed for Nausea. (Patient not taking: Reported on 5/8/2020) 473 mL 6 Not Taking    promethazine-dextromethorphan (PROMETHAZINE-DM) 6.25-15 mg/5 mL Syrp Take 5 mLs by mouth every 4 to 6 hours as needed. (Patient not taking: Reported on 5/8/2020) 240 mL 0 Not Taking    protein (ENSURE HIGH PROTEIN) Powd Take 1 Can by mouth 3 (three) times daily. (Patient not taking: Reported on 5/14/2020) 90 Can 3 Not Taking     Current Facility-Administered Medications   Medication Dose Route Frequency Provider Last Rate Last Dose    0.9%  NaCl infusion   Intravenous Continuous  Josh Cardenas MD        cefTRIAXone (ROCEPHIN) 2 g/50 mL D5W IVPB  2 g Intravenous On Call Procedure Josh Cardenas MD        And    metronidazole IVPB 500 mg  500 mg Intravenous On Call Procedure Josh Cardenas MD        lidocaine (PF) 10 mg/ml (1%) injection 10 mg  1 mL Intradermal Once Josh Cardenas MD        tamsulosin 24 hr capsule 0.4 mg  0.4 mg Oral Once Josh Cardenas MD         Facility-Administered Medications Ordered in Other Encounters   Medication Dose Route Frequency Provider Last Rate Last Dose    0.9%  NaCl infusion   Intravenous Continuous Cindy Zhao MD 10 mL/hr at 04/20/20 0904      lidocaine (PF) 10 mg/ml (1%) injection 10 mg  1 mL Intradermal Once Cindy Zhao MD              History:     Active Hospital Problems    Diagnosis  POA    Larynx cancer [C32.9]  Yes     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        Resolved Hospital Problems   No resolved problems to display.     Past Surgical History:   Procedure Laterality Date    CARDIAC CATHETERIZATION      with 4 stents    DIRECT LARYNGOSCOPY N/A 4/20/2020    Procedure: LARYNGOSCOPY, DIRECT;  Surgeon: Genoveva Vallejo MD;  Location: [facility] OR;  Service: ENT;  Laterality: N/A;    LEFT HEART CATHETERIZATION Left 5/29/2018    Procedure: Left heart cath;  Surgeon: Viral Lombardi MD;  Location: [facility] CATH LAB;  Service: Cardiology;  Laterality: Left;  RN PREOP 5/28/18    LUNG BIOPSY N/A 4/27/2020    Procedure: BIOPSY, LUNG;  Surgeon: [facility] Diagnostic Provider;  Location: [facility] OR;  Service: Radiology;  Laterality: N/A;  9am start--RN PHONE PREOP 4/24----COVID NEGATIVE--pt     Alcohol use:    Social History     Substance and Sexual Activity   Alcohol Use No          OBJECTIVE:     Vital Signs (Most Recent):    Vital Signs Range (Last 24H):          There is no height or weight on file to calculate BMI.   Wt Readings from Last 4 Encounters:   05/11/20 57.2 kg (126 lb 1.7 oz)   05/08/20 57.2 kg (126 lb)    05/05/20 57.3 kg (126 lb 5.2 oz)   04/27/20 55.3 kg (122 lb)       Significant Labs:  Lab Results   Component Value Date    WBC 9.84 05/18/2020    HGB 12.0 (L) 05/18/2020    HCT 39.0 (L) 05/18/2020     05/18/2020     05/18/2020    K 4.1 05/18/2020     05/18/2020    CREATININE 0.9 05/18/2020    BUN 17 05/18/2020    CO2 24 05/18/2020     (H) 05/18/2020    CALCIUM 9.2 05/18/2020    MG 1.9 05/18/2020    PHOS 3.3 05/18/2020    ALKPHOS 117 05/18/2020    ALT 21 05/18/2020    AST 21 05/18/2020    ALBUMIN 3.8 05/18/2020    INR 1.0 04/25/2020    APTT 26.3 05/28/2018    HGBA1C 4.9 03/23/2018     No LMP for male patient.  No results found for this or any previous visit (from the past 60 hour(s)).    EKG:   Results for orders placed or performed in visit on 04/17/20   EKG 12-lead    Collection Time: 04/17/20 12:15 PM    Narrative    Test Reason : J38.7,    Vent. Rate : 063 BPM     Atrial Rate : 063 BPM     P-R Int : 148 ms          QRS Dur : 108 ms      QT Int : 432 ms       P-R-T Axes : 042 063 230 degrees     QTc Int : 442 ms    Sinus rhythm with frequent Premature ventricular complexes  Possible Inferior infarct (cited on or before 28-MAY-2018)  Abnormal ECG  When compared with ECG of 19-OCT-2019 19:33,  Significant changes have occurred  Confirmed by Viral Lombardi MD ([id number]) on 4/17/2020 2:58:54 PM    Referred By: JUAN CARR           Confirmed By:Viral Lombardi MD       TTE:  Results for orders placed or performed during the hospital encounter of 02/01/19   Transthoracic echo (TTE) complete   Result Value Ref Range    BSA 1.7 m2    LA WIDTH 4.68 cm    AORTIC VALVE CUSP SEPERATION 2.20 cm    PV PEAK VELOCITY 0.87 cm/s    LVIDD 5.07 3.5 - 6.0 cm    IVS 1.32 (A) 0.6 - 1.1 cm    PW 1.10 0.6 - 1.1 cm    Ao root annulus 3.58 cm    LVIDS 4.63 (A) 2.1 - 4.0 cm    FS 9 28 - 44 %    LA volume 87.87 cm3    Sinus 3.36 cm    STJ 2.43 cm    Ascending aorta 2.70 cm    LV mass 241.77 g    LA size 3.78 cm    RVDD  3.58 cm    TAPSE 1.11 cm    RV S' 12.97 m/s    Left Ventricle Relative Wall Thickness 0.43 cm    AV mean gradient 3.39 mmHg    AV valve area 2.11 cm2    AV Velocity Ratio 0.57     AV index (prosthetic) 0.54     E/A ratio 0.63     E wave decelartion time 242.27 msec    IVRT 0.17 msec    Pulm vein S/D ratio 1.59     LVOT diameter 2.22 cm    LVOT area 3.87 cm2    LVOT peak titi 0.26768546152 m/s    LVOT peak VTI 15.19 cm    Ao peak titi 1.20 m/s    Ao VTI 27.90 cm    LVOT stroke volume 58.77 cm3    AV peak gradient 5.76 mmHg    MV Peak E Titi 0.64 m/s    TR Max Titi 2.49 m/s    MV Peak A Titi 1.01 m/s    PV Peak S Titi 0.54 m/s    PV Peak D Titi 0.34 m/s    LV Systolic Volume 98.85 mL    LV Systolic Volume Index 58.0 mL/m2    LV Diastolic Volume 121.93 mL    LV Diastolic Volume Index 71.59 mL/m2    LA Volume Index 51.6 mL/m2    LV Mass Index 142.0 g/m2    RA Major Axis 4.82 cm    Left Atrium Minor Axis 5.93 cm    Left Atrium Major Axis 5.76 cm    Triscuspid Valve Regurgitation Peak Gradient 24.80 mmHg    RA Width 3.79 cm    Right Atrial Pressure (from IVC) 3 mmHg    TV rest pulmonary artery pressure 28 mmHg    Narrative    · Moderately decreased left ventricular systolic function. The estimated   ejection fraction is 30%  · Eccentric left ventricular hypertrophy.  · Mild left ventricular enlargement.  · Grade I (mild) left ventricular diastolic dysfunction consistent with   impaired relaxation.  · Normal right ventricular systolic function.  · Moderate left atrial enlargement.        No results found for: EF  Results for orders placed or performed during the hospital encounter of 09/17/18   2D Echo w/ Color Flow Doppler   Result Value Ref Range    QEF 35 (A) 55 - 65    Mitral Valve Regurgitation MILD     Diastolic Dysfunction Yes (A)     Est. PA Systolic Pressure 22.89     Tricuspid Valve Regurgitation TRIVIAL      NICKI:  No results found for this or any previous visit.  Stress Test:  No results found for this or any previous  visit.   [facility]:  Results for orders placed during the hospital encounter of 05/29/18   Cath lab procedure    Narrative    Date of Procedure:  05/29/2018    A. Indication/Pre-Operative Diagnosis     The patient is a 67 year old male that was referred for catheterization for abnormal CV function study (Intermediate risk) and chest discomfort.     B. Summary/Post-Operative Diagnosis       Two vessel coronary artery disease.    Patent RCA stents.    LAD subtotal occlusion mid - small vessel - poor target for PCI.    C. HPI     I have reviewed the history and physical completed on 05/15/2018. The patient has been examined and I concur with the findings from 05/15/2018.     Patient history was obtained from the patient.     The ASA Score was Class II.     Height: 66 in.      Weight: 134 lbs.      BMI: 21.60 kg/m2    Laboratory data revealed:     05/28/2018 BUN  7, CREAT  0.9, GLU  128, HCT  40.2, HGB  13.4, INR  1.1, K  3.4, NA  141, PLT  223, PTI  11.4, WBCIR  8.78, APTT  26.3                D. Hemodynamic Results     LVEDP (Pre): 23 mmHg    E. Angiographic Results     Diagnostic:          Patient has a right dominant coronary artery.        - Left Main Coronary Artery:             The LM is normal. There is MARIA ANTONIA 3 flow.     - Left Anterior Descending Artery:             The mid LAD has subtotal (99). There is MARIA ANTONIA 3 flow. small vessel - poor target for PCI     - Left Circumflex Artery:             The LCX has luminal irregularities. There is MARIA ANTONIA 3 flow.     - Right Coronary Artery:             The RCA has luminal irregularities. There is MARIA ANTONIA 3 flow. stents are widely patent     - Common Femoral Artery:             The right CFA is normal.    F. Details of Procedure     PROCEDURES PERFORMED: Placement of Closure Device, Coronary Angio and [facility]    ANESTHESIA: Conscious sedation was achieved with 50 mcg of FENTANYL and 1 mg of MIDAZOLAM (VERSED) 1MG/ML. Local anesthesia was achieved with 20 ml of LIDOCAINE 1% 20ML .  Moderate conscious sedation was performed and cardiorespiratory functions were   monitored the entire procedure by Horacio Shepherd RN. Sedation began at 07:26 AM and concluded at 07:40 AM, totalling 14.0 minutes.     PRIMARY SURGEON: Viral Lombardi MD    COMPLICATIONS: There were no complications.    Medications given on sterile field: Lidocaine 1% 20ml (20 ml).    Medications given during procedure: Heparin Bag 1000 Units/500ml (3 bags), Fentanyl (50 mcg) and Midazolam (versed) 1mg/ml (1 mg).    The patient was brought to the catheterization laboratory. The Acist Kit Syr Reusable was flushed and connected to contrast. The Acist Kit Hand Control High Pressur was flushed and connected to contrast. The Acist Kit Manifold Low Press Tubing was   flushed and connected to contrast. The Pads Defib / Rts were applied to chest. Bilateral groin prepped and draped. After local anesthesia, a Sheath 6f 11cm was inserted into the right femoral artery. Angiography performed to evaluate for closure device   in the right femoral artery.     AORTA    A Wire .035 J 145 Fc was inserted into the Aorta. A Cath Dxterity 6fr Jl4 was inserted into the ostial LM. The Wire .035 J 145 Fc was removed. Angiography performed in multiple views in the left coronary arteries. The Cath Dxterity 6fr Jl4 was removed.     RCA    A Cath 6f Ntr was inserted into the ostial RCA. Angiography performed in multiple views in the right coronary arteries. The Cath 6f Ntr was removed.     Left  VENTRICLE    A Cath 6f Pigtail was inserted into the left ventricle. Hemodynamics recorded in the left ventricle.     AORTA    The Cath 6f Pigtail was repositioned into the Aorta. Hemodynamics recorded in the Aorta.     The Cath 6f Pigtail was removed. The Sheath 6f 11cm was removed. A Closure 6fr Angio-seal was inserted into the right femoral artery. A Closure 6fr Angio-seal was deployed into the right femoral artery. The Tegaderm 4x4 was applied to right groin. Total    Contrast Omni 350 500ml injected was 43.0 ml. Total Contrast Omni 350 500ml used was 63.0 ml.       Fluoroscopy Time 1.1 minutes   Radiation Dose 356.9 mGy   Contrast Injected 43 ml Contrast Omni 350 500ml   Contrast Used  63 ml Contrast Omni 350 500ml            Procedure log documented by Nicole Price RT and verified by Viral Lombardi MD    ESTIMATED BLOOD LOSS is < 50 cc.    SPECIMEN: No specimen.     G. Recommendations     1. Continue medical management.    I certify that I was present for catheter insertion, catheter manipulation, angiography, and angiographic interpretation of this patient.     This document has been electronically    SIGNED BY: Viral Lombardi MD On: 05/29/2018 07:58      PFT:  No results found for: FEV1, FVC, VME9ZYS, TLC, DLCO     ASSESSMENT/PLAN:     Anesthesia Evaluation    I have reviewed the Patient Summary Reports.     I have reviewed the Medications.     Review of Systems  Anesthesia Hx:  No problems with previous Anesthesia  Neg history of prior surgery. Denies Family Hx of Anesthesia complications.   Denies Personal Hx of Anesthesia complications.   Social:  Smoker    Hematology/Oncology:  Hematology Normal   Oncology Normal     EENT/Dental:EENT/Dental Normal   Cardiovascular:   Hypertension Past MI CAD   Angina MCKEON    Pulmonary:   Shortness of breath Sleep Apnea    Renal/:  Renal/ Normal     Hepatic/GI:  Hepatic/GI Normal    Musculoskeletal:  Musculoskeletal Normal    Neurological:  Neurology Normal    Endocrine:  Endocrine Normal    Dermatological:  Skin Normal    Psych:  Psychiatric Normal           Physical Exam  General:  Well nourished    Airway/Jaw/Neck:  Airway Findings: Mouth Opening: Normal Tongue: Normal  General Airway Assessment: Adult  Mallampati: III  Improves to II with phonation.  TM Distance: Normal, at least 6 cm  Jaw/Neck Findings:  Neck ROM: Normal ROM     Eyes/Ears/Nose:  EYES/EARS/NOSE FINDINGS: Normal   Dental:  Dental Findings: In tact    Chest/Lungs:  Chest/Lungs Findings: Clear to auscultation     Heart/Vascular:  Heart Findings: Rhythm: Regular Rhythm  Sounds: Normal  Heart murmur: negative Vascular Findings: Normal    Abdomen:  Abdomen Findings: Normal    Musculoskeletal:  Musculoskeletal Findings: Normal   Skin:  Skin Findings: Normal    Mental Status:  Mental Status Findings:  Cooperative, Alert and Oriented         Anesthesia Plan  Type of Anesthesia, risks & benefits discussed:  Anesthesia Type:  general  Patient's Preference:   Intra-op Monitoring Plan: standard ASA monitors  Intra-op Monitoring Plan Comments:   Post Op Pain Control Plan: multimodal analgesia  Post Op Pain Control Plan Comments:   Induction:   IV  Beta Blocker:  Patient is not currently on a Beta-Blocker (No further documentation required).       Informed Consent: Patient understands risks and agrees with Anesthesia plan.  Questions answered. Anesthesia consent signed with patient.  ASA Score: 2     Day of Surgery Review of History & Physical:     H&P completed by Anesthesiologist.   Anesthesia Plan Notes: Chart reviewed, patient interviewed and examined.  The anesthetic plan was explained.  Risks, benefits, and alternatives were discussed. Questions were answered and the consent was signed.        JOVANNY Soto M.D.         Ready For Surgery From Anesthesia Perspective.

## 2020-05-20 NOTE — TELEPHONE ENCOUNTER
Pre-operative instructions given to pt/wife verbally. Instructed to park on second level in patient parking garage and report to Day of Surgery for 6am. Taught to shower with Hibiclens tonight and morning of surgery. Instructed to wear comfortable clothes, bring robe and wear  shoes for after surgery. Instructed nothing to eat after 9 pm and clears until 5 am. Instructed the only medications to take before surgery are Lopressor ASA with sip of water. Pt has been holding Plavix for 5 days. All questions addressed, Pt verbalized understanding.

## 2020-05-21 ENCOUNTER — ANESTHESIA (OUTPATIENT)
Dept: SURGERY | Facility: HOSPITAL | Age: 70
End: 2020-05-21
Payer: MEDICARE

## 2020-05-21 ENCOUNTER — HOSPITAL ENCOUNTER (OUTPATIENT)
Facility: HOSPITAL | Age: 70
Discharge: HOME | End: 2020-05-21
Attending: SURGERY | Admitting: SURGERY
Payer: MEDICARE

## 2020-05-21 VITALS
OXYGEN SATURATION: 95 % | DIASTOLIC BLOOD PRESSURE: 82 MMHG | WEIGHT: 120 LBS | HEART RATE: 73 BPM | HEIGHT: 66 IN | TEMPERATURE: 98 F | RESPIRATION RATE: 19 BRPM | SYSTOLIC BLOOD PRESSURE: 148 MMHG | BODY MASS INDEX: 19.29 KG/M2

## 2020-05-21 DIAGNOSIS — C32.9 LARYNX CANCER: ICD-10-CM

## 2020-05-21 LAB
ANION GAP SERPL CALC-SCNC: 6 MMOL/L (ref 8–16)
BASOPHILS # BLD AUTO: 0.05 K/UL (ref 0–0.2)
BASOPHILS NFR BLD: 0.6 % (ref 0–1.9)
BUN SERPL-MCNC: 12 MG/DL (ref 8–23)
CALCIUM SERPL-MCNC: 8.7 MG/DL (ref 8.7–10.5)
CHLORIDE SERPL-SCNC: 109 MMOL/L (ref 95–110)
CO2 SERPL-SCNC: 25 MMOL/L (ref 23–29)
CREAT SERPL-MCNC: 0.8 MG/DL (ref 0.5–1.4)
DIFFERENTIAL METHOD: ABNORMAL
EOSINOPHIL # BLD AUTO: 0.2 K/UL (ref 0–0.5)
EOSINOPHIL NFR BLD: 2.1 % (ref 0–8)
ERYTHROCYTE [DISTWIDTH] IN BLOOD BY AUTOMATED COUNT: 14.9 % (ref 11.5–14.5)
EST. GFR  (AFRICAN AMERICAN): >60 ML/MIN/1.73 M^2
EST. GFR  (NON AFRICAN AMERICAN): >60 ML/MIN/1.73 M^2
GLUCOSE SERPL-MCNC: 98 MG/DL (ref 70–110)
HCT VFR BLD AUTO: 37.5 % (ref 40–54)
HGB BLD-MCNC: 11.7 G/DL (ref 14–18)
IMM GRANULOCYTES # BLD AUTO: 0.03 K/UL (ref 0–0.04)
IMM GRANULOCYTES NFR BLD AUTO: 0.3 % (ref 0–0.5)
LYMPHOCYTES # BLD AUTO: 2.5 K/UL (ref 1–4.8)
LYMPHOCYTES NFR BLD: 28.9 % (ref 18–48)
MCH RBC QN AUTO: 30 PG (ref 27–31)
MCHC RBC AUTO-ENTMCNC: 31.2 G/DL (ref 32–36)
MCV RBC AUTO: 96 FL (ref 82–98)
MONOCYTES # BLD AUTO: 0.5 K/UL (ref 0.3–1)
MONOCYTES NFR BLD: 6.2 % (ref 4–15)
NEUTROPHILS # BLD AUTO: 5.4 K/UL (ref 1.8–7.7)
NEUTROPHILS NFR BLD: 61.9 % (ref 38–73)
NRBC BLD-RTO: 0 /100 WBC
PLATELET # BLD AUTO: 215 K/UL (ref 150–350)
PMV BLD AUTO: 11 FL (ref 9.2–12.9)
POTASSIUM SERPL-SCNC: 4.2 MMOL/L (ref 3.5–5.1)
RBC # BLD AUTO: 3.9 M/UL (ref 4.6–6.2)
SARS-COV-2 RNA RESP QL NAA+PROBE: NOT DETECTED
SODIUM SERPL-SCNC: 140 MMOL/L (ref 136–145)
WBC # BLD AUTO: 8.7 K/UL (ref 3.9–12.7)

## 2020-05-21 PROCEDURE — 94761 N-INVAS EAR/PLS OXIMETRY MLT: CPT | Mod: HCNC

## 2020-05-21 PROCEDURE — S0030 INJECTION, METRONIDAZOLE: HCPCS | Mod: HCNC | Performed by: SURGERY

## 2020-05-21 PROCEDURE — 99900035 HC TECH TIME PER 15 MIN (STAT): Mod: HCNC

## 2020-05-21 PROCEDURE — 37000008 HC ANESTHESIA 1ST 15 MINUTES: Mod: HCNC | Performed by: SURGERY

## 2020-05-21 PROCEDURE — 36000706: Mod: HCNC | Performed by: SURGERY

## 2020-05-21 PROCEDURE — D9220A PRA ANESTHESIA: Mod: HCNC,,, | Performed by: ANESTHESIOLOGY

## 2020-05-21 PROCEDURE — 25000003 PHARM REV CODE 250: Mod: HCNC | Performed by: SURGERY

## 2020-05-21 PROCEDURE — 85025 COMPLETE CBC W/AUTO DIFF WBC: CPT | Mod: HCNC

## 2020-05-21 PROCEDURE — 71000015 HC POSTOP RECOV 1ST HR: Mod: HCNC | Performed by: SURGERY

## 2020-05-21 PROCEDURE — 36000707: Mod: HCNC | Performed by: SURGERY

## 2020-05-21 PROCEDURE — 25000003 PHARM REV CODE 250: Mod: HCNC | Performed by: STUDENT IN AN ORGANIZED HEALTH CARE EDUCATION/TRAINING PROGRAM

## 2020-05-21 PROCEDURE — 80048 BASIC METABOLIC PNL TOTAL CA: CPT | Mod: HCNC

## 2020-05-21 PROCEDURE — 43246 PR EGD, FLEX, W/PLCMT, GASTROSTOMY TUBE: ICD-10-PCS | Mod: HCNC,,, | Performed by: SURGERY

## 2020-05-21 PROCEDURE — 71000033 HC RECOVERY, INTIAL HOUR: Mod: HCNC | Performed by: SURGERY

## 2020-05-21 PROCEDURE — 71000039 HC RECOVERY, EACH ADD'L HOUR: Mod: HCNC | Performed by: SURGERY

## 2020-05-21 PROCEDURE — 37000009 HC ANESTHESIA EA ADD 15 MINS: Mod: HCNC | Performed by: SURGERY

## 2020-05-21 PROCEDURE — 63600175 PHARM REV CODE 636 W HCPCS: Mod: HCNC | Performed by: SURGERY

## 2020-05-21 PROCEDURE — 63600175 PHARM REV CODE 636 W HCPCS: Mod: HCNC | Performed by: STUDENT IN AN ORGANIZED HEALTH CARE EDUCATION/TRAINING PROGRAM

## 2020-05-21 PROCEDURE — D9220A PRA ANESTHESIA: ICD-10-PCS | Mod: HCNC,,, | Performed by: ANESTHESIOLOGY

## 2020-05-21 PROCEDURE — 27800903 OPTIME MED/SURG SUP & DEVICES OTHER IMPLANTS: Mod: HCNC | Performed by: SURGERY

## 2020-05-21 PROCEDURE — 43246 EGD PLACE GASTROSTOMY TUBE: CPT | Mod: HCNC,,, | Performed by: SURGERY

## 2020-05-21 RX ORDER — LIDOCAINE HYDROCHLORIDE 10 MG/ML
1 INJECTION, SOLUTION EPIDURAL; INFILTRATION; INTRACAUDAL; PERINEURAL ONCE
Status: COMPLETED | OUTPATIENT
Start: 2020-05-21 | End: 2020-05-21

## 2020-05-21 RX ORDER — ROCURONIUM BROMIDE 10 MG/ML
INJECTION, SOLUTION INTRAVENOUS
Status: DISCONTINUED | OUTPATIENT
Start: 2020-05-21 | End: 2020-05-21

## 2020-05-21 RX ORDER — SODIUM CHLORIDE 9 MG/ML
INJECTION, SOLUTION INTRAVENOUS CONTINUOUS
Status: DISCONTINUED | OUTPATIENT
Start: 2020-05-21 | End: 2020-05-21 | Stop reason: HOSPADM

## 2020-05-21 RX ORDER — SODIUM CHLORIDE 0.9 % (FLUSH) 0.9 %
10 SYRINGE (ML) INJECTION
Status: DISCONTINUED | OUTPATIENT
Start: 2020-05-21 | End: 2020-05-21 | Stop reason: HOSPADM

## 2020-05-21 RX ORDER — PROPOFOL 10 MG/ML
VIAL (ML) INTRAVENOUS
Status: DISCONTINUED | OUTPATIENT
Start: 2020-05-21 | End: 2020-05-21

## 2020-05-21 RX ORDER — KETAMINE HCL IN 0.9 % NACL 50 MG/5 ML
SYRINGE (ML) INTRAVENOUS
Status: DISCONTINUED | OUTPATIENT
Start: 2020-05-21 | End: 2020-05-21

## 2020-05-21 RX ORDER — SODIUM CHLORIDE 9 MG/ML
INJECTION, SOLUTION INTRAVENOUS CONTINUOUS PRN
Status: DISCONTINUED | OUTPATIENT
Start: 2020-05-21 | End: 2020-05-21

## 2020-05-21 RX ORDER — METRONIDAZOLE 500 MG/100ML
500 INJECTION, SOLUTION INTRAVENOUS
Status: COMPLETED | OUTPATIENT
Start: 2020-05-21 | End: 2020-05-21

## 2020-05-21 RX ORDER — FENTANYL CITRATE 50 UG/ML
INJECTION, SOLUTION INTRAMUSCULAR; INTRAVENOUS
Status: DISCONTINUED | OUTPATIENT
Start: 2020-05-21 | End: 2020-05-21

## 2020-05-21 RX ORDER — FENTANYL CITRATE 50 UG/ML
25 INJECTION, SOLUTION INTRAMUSCULAR; INTRAVENOUS EVERY 5 MIN PRN
Status: DISCONTINUED | OUTPATIENT
Start: 2020-05-21 | End: 2020-05-21 | Stop reason: HOSPADM

## 2020-05-21 RX ORDER — TAMSULOSIN HYDROCHLORIDE 0.4 MG/1
0.4 CAPSULE ORAL ONCE
Status: COMPLETED | OUTPATIENT
Start: 2020-05-21 | End: 2020-05-21

## 2020-05-21 RX ORDER — ONDANSETRON 2 MG/ML
INJECTION INTRAMUSCULAR; INTRAVENOUS
Status: DISCONTINUED | OUTPATIENT
Start: 2020-05-21 | End: 2020-05-21

## 2020-05-21 RX ORDER — LIDOCAINE HCL/PF 100 MG/5ML
SYRINGE (ML) INTRAVENOUS
Status: DISCONTINUED | OUTPATIENT
Start: 2020-05-21 | End: 2020-05-21

## 2020-05-21 RX ORDER — LIDOCAINE HYDROCHLORIDE 10 MG/ML
INJECTION, SOLUTION EPIDURAL; INFILTRATION; INTRACAUDAL; PERINEURAL
Status: DISCONTINUED | OUTPATIENT
Start: 2020-05-21 | End: 2020-05-21 | Stop reason: HOSPADM

## 2020-05-21 RX ADMIN — Medication 15 MG: at 08:05

## 2020-05-21 RX ADMIN — ROCURONIUM BROMIDE 30 MG: 10 INJECTION, SOLUTION INTRAVENOUS at 08:05

## 2020-05-21 RX ADMIN — PROPOFOL 150 MG: 10 INJECTION, EMULSION INTRAVENOUS at 08:05

## 2020-05-21 RX ADMIN — METRONIDAZOLE 500 MG: 500 SOLUTION INTRAVENOUS at 07:05

## 2020-05-21 RX ADMIN — LIDOCAINE HYDROCHLORIDE 100 MG: 20 INJECTION, SOLUTION INTRAVENOUS at 08:05

## 2020-05-21 RX ADMIN — ONDANSETRON 4 MG: 2 INJECTION, SOLUTION INTRAMUSCULAR; INTRAVENOUS at 09:05

## 2020-05-21 RX ADMIN — SUGAMMADEX 100 MG: 100 INJECTION, SOLUTION INTRAVENOUS at 09:05

## 2020-05-21 RX ADMIN — CEFTRIAXONE SODIUM 2 G: 2 INJECTION, SOLUTION INTRAVENOUS at 09:05

## 2020-05-21 RX ADMIN — FENTANYL CITRATE 25 MCG: 50 INJECTION INTRAMUSCULAR; INTRAVENOUS at 10:05

## 2020-05-21 RX ADMIN — SODIUM CHLORIDE: 0.9 INJECTION, SOLUTION INTRAVENOUS at 08:05

## 2020-05-21 RX ADMIN — FENTANYL CITRATE 50 MCG: 50 INJECTION, SOLUTION INTRAMUSCULAR; INTRAVENOUS at 08:05

## 2020-05-21 RX ADMIN — LIDOCAINE HYDROCHLORIDE: 10 INJECTION, SOLUTION EPIDURAL; INFILTRATION; INTRACAUDAL; PERINEURAL at 07:05

## 2020-05-21 RX ADMIN — TAMSULOSIN HYDROCHLORIDE 0.4 MG: 0.4 CAPSULE ORAL at 07:05

## 2020-05-21 RX ADMIN — FENTANYL CITRATE 25 MCG: 50 INJECTION, SOLUTION INTRAMUSCULAR; INTRAVENOUS at 09:05

## 2020-05-21 NOTE — OP NOTE
[facility]  General Surgery  Operative Note       Surgery Date: 5/21/2020     Surgeon: Surgeon(s) and Role:     * Josh Cardenas MD - Primary    Pre-op Diagnosis:  Larynx cancer [C32.9]    Post-op Diagnosis:  Larynx cancer [C32.9]    Procedure:  Procedure(s) (LRB):  INSERTION, PEG TUBE (N/A)    Indications: Sharath Huizar is a 69 y.o. male with laryngeal and lung cancer    Anesthesia: General    Findings:  PEG placed measuring at 2 cm on the skin. No apparent complications    Implants:    20-Chadian gastrostomy tube     Specimens:   Specimens (From admission, onward)    None          Estimated Blood Loss: None    Procedure in detail:   The patient was seen and identified in the preop holding area.  The risks benefits and alternatives of the procedure were discussed and consent was verified.  The patient was then taken to the operating room.  They were placed on the table in the supine position.  Preop antibiotics were administered followed by general anesthesia.  A time-out was performed which verified that this was the correct patient and procedure and all participants agreed.     An upper endoscope was introduced into the oropharynx and guided down into the esophagus. The NGT was removed and then the scope was advanced the rest of the way into the stomach. The stomach was insufflated with air. We then identified an appropriate position for gastrostomy tube placement 2 finger-breadths below the left subcostal margin. Palpation of the anterior abdominal wall at this point was visualized endoscopically and transillumination from the endoscope was visualized through the anterior abdominal wall. The patient's abdomen was prepped and draped. A 1.5 cm transverse skin incision was created and then the stomach was cannulated with a catheter loaded on a needle. This was grasped by a snare which had been passed through the endoscope. The needle was removed, a guidewire was placed, and the snare was used  to grasp the guidewire. The endoscope, snare, and guidewire were all withdrawn from the patient's mouth. A 20-Israeli gastrostomy tube was loaded onto the guidewire and pulled through the anterior abdominal wall via Seldinger technique. Repeat endoscopy was performed with the gastrostomy tube at the 2 cm  at the skin. There was no blanching of the gastric mucosa, and when the tube was twisted, the button did not grab the mucosa. The insufflation in the stomach was evacuated, and the endoscope was removed. The gastrostomy tube was secured in place using the supplied devices and connected to a bag for gravity drainage.    The patient tolerated the procedure well. Dr. Cardenas was present for cardenas parts of the procedure. All sponge, instrument, and needle counts were correct at the termination of the PEG procedure.        Condition: Good    Complications: None    Disposition: PACU - hemodynamically stable.            Jacque Soriano MD  General Surgery, PGY-1  [phone]

## 2020-05-21 NOTE — BRIEF OP NOTE
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    DISPOSITION: Home or Self Care    FINAL DIAGNOSIS:  Larynx cancer    FOLLOWUP: In clinic as needed    DISCHARGE INSTRUCTIONS:    Discharge Procedure Orders   Other restrictions (specify):   Order Comments: Okay to shower tomorrow. Please do not soak in water such as a bath, hot tub, or pool for 2 weeks   Please leave your new gastrostomy tube clamped (white piece pushed down). Do not eat or drink anything for 6 hours. Then may start with sips of water and advance as tolerated.     No driving until:   Order Comments: No longer taking narcotic pain meds     Notify your health care provider if you experience any of the following:  temperature >100.4     Notify your health care provider if you experience any of the following:  persistent nausea and vomiting or diarrhea     Notify your health care provider if you experience any of the following:  severe uncontrolled pain     Notify your health care provider if you experience any of the following:  redness, tenderness, or signs of infection (pain, swelling, redness, odor or green/yellow discharge around incision site)     Notify your health care provider if you experience any of the following:  difficulty breathing or  increased cough     Notify your health care provider if you experience any of the following:  severe persistent headache     Notify your health care provider if you experience any of the following:  worsening rash     Notify your health care provider if you experience any of the following:  persistent dizziness, light-headedness, or visual disturbances     Notify your health care provider if you experience any of the following:  increased confusion or weakness     No dressing needed

## 2020-05-21 NOTE — PROGRESS NOTES
Discharged off unit in wheelchair with staff Belongings at side. Wife will be bringing patient home.

## 2020-05-21 NOTE — ANESTHESIA POSTPROCEDURE EVALUATION
Anesthesia Post Evaluation    Patient: Sharath Huizar    Procedure(s) Performed: Procedure(s) (LRB):  INSERTION, PEG TUBE (N/A)    Final Anesthesia Type: general    Patient location during evaluation: PACU  Patient participation: Yes- Able to Participate  Level of consciousness: awake and alert  Post-procedure vital signs: reviewed and stable  Pain management: adequate  Airway patency: patent    PONV status at discharge: No PONV  Anesthetic complications: no      Cardiovascular status: blood pressure returned to baseline  Respiratory status: unassisted  Hydration status: euvolemic  Follow-up not needed.          Vitals Value Taken Time   /82 5/21/2020 11:02 AM   Temp 36.4 °C (97.5 °F) 5/21/2020 10:30 AM   Pulse 73 5/21/2020 11:15 AM   Resp 19 5/21/2020 11:15 AM   SpO2 95 % 5/21/2020 11:15 AM         Event Time     Out of Recovery 10:50:00          Pain/Marcellus Score: Pain Rating Prior to Med Admin: 5 (5/21/2020 10:10 AM)  Marcellus Score: 10 (5/21/2020 11:15 AM)

## 2020-05-21 NOTE — ANESTHESIA PROCEDURE NOTES
Intubation  Performed by: Herrera Henriquez MD  Authorized by: Dori Soto MD     Intubation:     Induction:  Intravenous    Intubated:  Postinduction    Mask Ventilation:  Easy mask    Attempts:  1    Attempted By:  Student    Method of Intubation:  Video laryngoscopy    Blade:  Yfn 4    Laryngeal View Grade: Grade I - full view of chords      Difficult Airway Encountered?: No      Complications:  None    Airway Device:  Oral endotracheal tube    Airway Device Size:  6.0    Style/Cuff Inflation:  Cuffed    Secured at:  The lips    Placement Verified By:  Capnometry    DIFFICULT INTUBATION DESCRIPTOR: Distorted oropharngeal anatomy.     Findings Post-Intubation:  BS equal bilateral

## 2020-05-21 NOTE — TRANSFER OF CARE
"Anesthesia Transfer of Care Note    Patient: Sharath Huizar    Procedure(s) Performed: Procedure(s) (LRB):  INSERTION, PEG TUBE (N/A)    Patient location: PACU    Anesthesia Type: general    Transport from OR: Transported from OR on 2-3 L/min O2 by NC with adequate spontaneous ventilation    Post pain: adequate analgesia    Post assessment: no apparent anesthetic complications    Post vital signs: stable    Level of consciousness: awake, alert and oriented    Nausea/Vomiting: no nausea/vomiting    Complications: none    Transfer of care protocol was followed      Last vitals:   Visit Vitals  /80 (BP Location: Right arm, Patient Position: Lying)   Pulse 73   Temp 36.3 °C (97.3 °F) (Temporal)   Resp 16   Ht 5' 6" (1.676 m)   Wt 54.4 kg (120 lb)   SpO2 100%   BMI 19.37 kg/m²     "

## 2020-05-21 NOTE — INTERVAL H&P NOTE
The patient has been examined and the H&P has been reviewed:    I concur with the findings and no changes have occurred since H&P was written.     PE performed:  AAO  Chest clear  Abd soft, flat, no scars  No edema    Anesthesia/Surgery risks, benefits and alternative options discussed and understood by patient/family.    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        Resolved Hospital Problems   No resolved problems to display.

## 2020-05-21 NOTE — PROGRESS NOTES
Patient ready for discharge. Pt received instructions verbalized understanding about peg tube care. Explained that patient must be NPO for 6 hrs then can resume diet at 1600. Condition stable. No issues noted in pacu care. Spoke with wife on phone states did not speak to doctor about updates after surgery. Laverne CRONIN Returned call states will call wife.

## 2020-05-22 ENCOUNTER — CLINICAL SUPPORT (OUTPATIENT)
Dept: HEMATOLOGY/ONCOLOGY | Facility: CLINIC | Age: 70
End: 2020-05-22
Payer: MEDICARE

## 2020-05-22 ENCOUNTER — HOSPITAL ENCOUNTER (OUTPATIENT)
Dept: RADIATION THERAPY | Facility: HOSPITAL | Age: 70
Discharge: HOME | End: 2020-05-22
Attending: RADIOLOGY
Payer: MEDICARE

## 2020-05-22 VITALS — WEIGHT: 129.69 LBS | HEIGHT: 66 IN | BODY MASS INDEX: 20.84 KG/M2

## 2020-05-22 DIAGNOSIS — C32.9 SQUAMOUS CELL CARCINOMA OF LARYNX: ICD-10-CM

## 2020-05-22 DIAGNOSIS — C32.9 LARYNX CANCER: ICD-10-CM

## 2020-05-22 DIAGNOSIS — Z71.3 NUTRITIONAL COUNSELING: Primary | ICD-10-CM

## 2020-05-22 DIAGNOSIS — R63.4 WEIGHT LOSS, UNINTENTIONAL: ICD-10-CM

## 2020-05-22 DIAGNOSIS — R13.10 DYSPHAGIA, UNSPECIFIED TYPE: ICD-10-CM

## 2020-05-22 DIAGNOSIS — Z93.1 S/P PERCUTANEOUS ENDOSCOPIC GASTROSTOMY (PEG) TUBE PLACEMENT: ICD-10-CM

## 2020-05-22 DIAGNOSIS — C77.0 SECONDARY MALIGNANT NEOPLASM OF CERVICOFACIAL LYMPH NODE: ICD-10-CM

## 2020-05-22 PROCEDURE — 97802 MEDICAL NUTRITION INDIV IN: CPT | Mod: HCNC,,, | Performed by: DIETITIAN, REGISTERED

## 2020-05-22 PROCEDURE — 77290 THER RAD SIMULAJ FIELD CPLX: CPT | Mod: TC,HCNC | Performed by: RADIOLOGY

## 2020-05-22 PROCEDURE — 77334 RADIATION TREATMENT AID(S): CPT | Mod: TC,HCNC | Performed by: RADIOLOGY

## 2020-05-22 PROCEDURE — 77263 THER RADIOLOGY TX PLNG CPLX: CPT | Mod: HCNC,,, | Performed by: RADIOLOGY

## 2020-05-22 PROCEDURE — 97802 PR MED NUTR THER, 1ST, INDIV, EA 15 MIN: ICD-10-PCS | Mod: HCNC,,, | Performed by: DIETITIAN, REGISTERED

## 2020-05-22 PROCEDURE — 77334 PR  RADN TREATMENT AID(S) COMPLX: ICD-10-PCS | Mod: 26,HCNC,, | Performed by: RADIOLOGY

## 2020-05-22 PROCEDURE — 77290 PR  SET RADN THERAPY FIELD COMPLEX: ICD-10-PCS | Mod: 26,HCNC,, | Performed by: RADIOLOGY

## 2020-05-22 PROCEDURE — 77014 HC CT GUIDANCE RADIATION THERAPY FLDS PLACEMENT: CPT | Mod: TC,HCNC | Performed by: RADIOLOGY

## 2020-05-22 PROCEDURE — 77334 RADIATION TREATMENT AID(S): CPT | Mod: 26,HCNC,, | Performed by: RADIOLOGY

## 2020-05-22 PROCEDURE — 77263 PR  RADIATION THERAPY PLAN COMPLEX: ICD-10-PCS | Mod: HCNC,,, | Performed by: RADIOLOGY

## 2020-05-22 PROCEDURE — 77290 THER RAD SIMULAJ FIELD CPLX: CPT | Mod: 26,HCNC,, | Performed by: RADIOLOGY

## 2020-05-22 NOTE — PROGRESS NOTES
"This appointment was scheduled as an audio only visit, however patient was seen and assessed in person in the radiation oncology clinic after his simulation.    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 It was felt he required a feeding tube prior to treatment due to the location and size of his supraglottic mass with bilateral involved cervical LN, which will receive high dose radiation. He reports mild dysphagia and a slight decrease in appetite, which has led to a 10-15lb weight loss. He has tried to drink Boost to improve his protein-calorie intake but has been unable to afford it. He drinks about 24-32oz water per day. Also drinks orange juice.   He has not been shown how to use or care for feeding tube.      Weight:58.8 kg (129 lb 11.2 oz)   Height:5' 6" (1.676 m)  BMI:Body mass index is 20.93 kg/m².   IBW: Ideal body weight: 63.8 kg (140 lb 10.5 oz)    Usual BW: 140-145lb   Weight Change: 10-15lb loss    Nutrition focused physical findings: thin, mild muscle and fat losses    Allergies: Patient has no known allergies.    Current Medications:    Current Outpatient Medications:     albuterol (PROVENTIL/VENTOLIN HFA) 90 mcg/actuation inhaler, Inhale 1-2 puffs into the lungs every 6 (six) hours as needed for Wheezing. Rescue, Disp: 18 g, Rfl: 0    aspirin (ECOTRIN) 81 MG EC tablet, Take 1 tablet (81 mg total) by mouth once daily. (Patient taking differently: Take 81 mg " by mouth once daily. ), Disp: , Rfl: 0    atorvastatin (LIPITOR) 80 MG tablet, Take 1 tablet (80 mg total) by mouth once daily., Disp: 90 tablet, Rfl: 3    clopidogrel (PLAVIX) 75 mg tablet, Take 1 tablet (75 mg total) by mouth once daily. (Patient taking differently: Take 75 mg by mouth once daily. ), Disp: 90 tablet, Rfl: 3    docusate sodium (COLACE) 100 MG capsule, Take 1 capsule (100 mg total) by mouth 2 (two) times daily as needed for Constipation., Disp: 30 capsule, Rfl: 0    HYDROcodone-acetaminophen (NORCO) 5-325 mg per tablet, Take 1 tablet by mouth every 6 (six) hours as needed for Pain. (Patient not taking: Reported on 5/5/2020), Disp: 10 tablet, Rfl: 0    metoprolol succinate (TOPROL-XL) 50 MG 24 hr tablet, Take 1 tablet (50 mg total) by mouth once daily., Disp: 90 tablet, Rfl: 3    nitroGLYCERIN (NITROSTAT) 0.4 MG SL tablet, Place 1 tablet (0.4 mg total) under the tongue every 5 (five) minutes as needed. (Patient not taking: Reported on 5/8/2020), Disp: 25 tablet, Rfl: 3    ofloxacin (OCUFLOX) 0.3 % ophthalmic solution, 5 drops in left ear BID x 10 days (Patient not taking: Reported on 5/5/2020), Disp: 10 mL, Rfl: 0    omeprazole (PRILOSEC) 20 MG capsule, Take 1 capsule (20 mg total) by mouth 2 (two) times daily. (Patient not taking: Reported on 5/8/2020), Disp: 28 capsule, Rfl: 0    ondansetron (ZOFRAN) 8 MG tablet, Take 1 tablet (8 mg total) by mouth 4 (four) times daily as needed for Nausea. (Patient not taking: Reported on 5/8/2020), Disp: 60 tablet, Rfl: 2    promethazine (PHENERGAN) 6.25 mg/5 mL syrup, Take 20 mLs (25 mg total) by mouth every 6 (six) hours as needed for Nausea. (Patient not taking: Reported on 5/8/2020), Disp: 473 mL, Rfl: 6    promethazine-dextromethorphan (PROMETHAZINE-DM) 6.25-15 mg/5 mL Syrp, Take 5 mLs by mouth every 4 to 6 hours as needed. (Patient not taking: Reported on 5/8/2020), Disp: 240 mL, Rfl: 0    protein (ENSURE HIGH PROTEIN) Powd, Take 1 Can by mouth  3 (three) times daily. (Patient not taking: Reported on 5/14/2020), Disp: 90 Can, Rfl: 3  No current facility-administered medications for this visit.     Facility-Administered Medications Ordered in Other Visits:     0.9%  NaCl infusion, , Intravenous, Continuous, Cindy Zhao MD, Last Rate: 10 mL/hr at 04/20/20 0904    lidocaine (PF) 10 mg/ml (1%) injection 10 mg, 1 mL, Intradermal, Once, Cindy Zhao MD    Food/medication interactions noted: avoid grapefruit     Vitamins/Supplements: none     Labs: Reviewed -no concerning nutrition related labs     Nutrition Diagnosis    Problem: inadequate protein/energy intake  Etiology (related to): dysphagia   Signs/Symptoms (as evidenced by): weight loss    Nutrition Intervention    Nutrition Prescription   2360Kcals (40kcal/kg)  89 g protein (1.5g/kg)   7543-0201 mL fluid (35-40mL/kg)    Recommendations:  High protein, high calorie diet   Boost Plus 2-3 times daily PO or via G tube (based on symptoms/seveiry of dysphagia).   He will eventually require Boost Plus x6 cans daily via G tube when he is no longer able to tolerate PO.  Counseled on importance of increasing water intake to about 2L per day    Materials Provided/Reviewed   Bolus tube feeding handout   Provided education on care and use of PEG tube. His wife was able to appropriately provide teach back and flush tube with 30mL water without assistance. His wife worked previously in a nursing home (10 years ago) and helped patients use/care for feeding tubes so she feels comfortable with process.   Patient referred to [facility] for tube feeding supplies     Nutrition Monitoring and Evaluation    Monitor: energy intake, diet tolerance , weight and diet education needs     Goals: maintain weight     Motivation to change/anticipated barriers: patient has some financial barriers- will work with [facility] on tube feeding coverage     Follow up Weekly in Radiation Therapy     Communication to referring provider/care  team: discussed with provider; note available in chart     Counseling time: 30 Minutes    Lashay Alanis, MPH, RD, , LDN, FAND   [phone]         This service was not originating from a related E/M service provided within the previous 7 days nor will  to an E/M service or procedure within the next 24 hours or my soonest available appointment.  Prevailing standard of care was able to be met in this audio-only visit.

## 2020-05-25 PROCEDURE — 77301 RADIOTHERAPY DOSE PLAN IMRT: CPT | Mod: 26,HCNC,, | Performed by: RADIOLOGY

## 2020-05-25 PROCEDURE — 77301 PR  INTEN MOD RADIOTHER PLAN W/DOSE VOL HIST: ICD-10-PCS | Mod: 26,HCNC,, | Performed by: RADIOLOGY

## 2020-05-25 PROCEDURE — 77301 RADIOTHERAPY DOSE PLAN IMRT: CPT | Mod: TC,HCNC | Performed by: RADIOLOGY

## 2020-05-25 PROCEDURE — 77293 PR RESPIRATORY MOTION MGMT SIMULATION: ICD-10-PCS | Mod: 26,HCNC,, | Performed by: RADIOLOGY

## 2020-05-25 PROCEDURE — 77293 RESPIRATOR MOTION MGMT SIMUL: CPT | Mod: TC,HCNC | Performed by: RADIOLOGY

## 2020-05-25 PROCEDURE — 77293 RESPIRATOR MOTION MGMT SIMUL: CPT | Mod: 26,HCNC,, | Performed by: RADIOLOGY

## 2020-05-26 PROCEDURE — 77338 PR  MLC IMRT DESIGN & CONSTRUCTION PER IMRT PLAN: ICD-10-PCS | Mod: 26,HCNC,, | Performed by: RADIOLOGY

## 2020-05-26 PROCEDURE — 77338 DESIGN MLC DEVICE FOR IMRT: CPT | Mod: TC,HCNC | Performed by: RADIOLOGY

## 2020-05-26 PROCEDURE — 77300 PR RADIATION THERAPY,DOSIMETRY PLAN: ICD-10-PCS | Mod: 26,HCNC,, | Performed by: RADIOLOGY

## 2020-05-26 PROCEDURE — 77370 RADIATION PHYSICS CONSULT: CPT | Mod: HCNC | Performed by: RADIOLOGY

## 2020-05-26 PROCEDURE — 77300 RADIATION THERAPY DOSE PLAN: CPT | Mod: TC,HCNC | Performed by: RADIOLOGY

## 2020-05-26 PROCEDURE — 77470 PR  SPECIAL RADIATION TREATMENT: ICD-10-PCS | Mod: 26,59,HCNC, | Performed by: RADIOLOGY

## 2020-05-26 PROCEDURE — 77470 SPECIAL RADIATION TREATMENT: CPT | Mod: 26,59,HCNC, | Performed by: RADIOLOGY

## 2020-05-26 PROCEDURE — 77300 RADIATION THERAPY DOSE PLAN: CPT | Mod: 26,HCNC,, | Performed by: RADIOLOGY

## 2020-05-26 PROCEDURE — 77470 SPECIAL RADIATION TREATMENT: CPT | Mod: 59,TC,HCNC | Performed by: RADIOLOGY

## 2020-05-26 PROCEDURE — 77338 DESIGN MLC DEVICE FOR IMRT: CPT | Mod: 26,HCNC,, | Performed by: RADIOLOGY

## 2020-05-27 ENCOUNTER — TELEPHONE (OUTPATIENT)
Dept: OTOLARYNGOLOGY | Facility: CLINIC | Age: 70
End: 2020-05-27

## 2020-05-27 NOTE — TELEPHONE ENCOUNTER
Call center called me in reference to an  on the telephone wanting to speak with Dr. Vallejo regarding Mr. Sharath Huizar.  I asked the  what is the Patients  and he did not have that.  I explained to Mr. Artur Antoine  that we need a written authorization from the Patient giving Dr. Vallejo the rights to speak with an  .  Mr. Antoine said that the Patient's wife told him to call Dr. Vallejo this morning and that she would speak with him.  I explained in detail again that we need a written authorization .

## 2020-05-28 PROCEDURE — 77373 STRTCTC BDY RAD THER TX DLVR: CPT | Mod: HCNC | Performed by: RADIOLOGY

## 2020-05-28 PROCEDURE — 77014 HC CT GUIDANCE RADIATION THERAPY FLDS PLACEMENT: CPT | Mod: TC,HCNC | Performed by: RADIOLOGY

## 2020-06-01 ENCOUNTER — TELEPHONE (OUTPATIENT)
Dept: FAMILY MEDICINE | Facility: CLINIC | Age: 70
End: 2020-06-01

## 2020-06-01 ENCOUNTER — HOSPITAL ENCOUNTER (OUTPATIENT)
Dept: RADIATION THERAPY | Facility: HOSPITAL | Age: 70
Discharge: HOME | End: 2020-06-01
Attending: RADIOLOGY
Payer: MEDICARE

## 2020-06-01 NOTE — TELEPHONE ENCOUNTER
I attempted to contact Soni and the phone was busy. I was unable to leave a message. Plavix was written by Cardiology.

## 2020-06-01 NOTE — TELEPHONE ENCOUNTER
"----- Message from Sylwia Billings sent at 6/1/2020  1:22 PM CDT -----  Contact: [facility] "[facility]" [phone]  Type: Patient Call Back    Who called: Soni "[facility]"    What is the request in detail: calling in regards to pt. Blood thinners, stated pt. Also needs an extraction and stated pt. Hasn't taken any of his medication in two days.     Can the clinic reply by [facility]? Call back     Would the patient rather a call back or a response via [facility]? Call back     Best call back number: [phone]      "

## 2020-06-01 NOTE — TELEPHONE ENCOUNTER
"----- Message from Sylwia Billings sent at 6/1/2020  1:22 PM CDT -----  Contact: [facility]i "[facility]" [phone]  Type: Patient Call Back    Who called: [facility]i "[facility]"    What is the request in detail: calling in regards to pt. Blood thinners, stated pt. Also needs an extraction and stated pt. Hasn't taken any of his medication in two days.     Can the clinic reply by [facility]? Call back     Would the patient rather a call back or a response via [facility]? Call back     Best call back number: [phone]      "

## 2020-06-01 NOTE — TELEPHONE ENCOUNTER
I spoke to Soni and advised Plavix was written by Cardiology and she explained that the pt is giving conflicting stories so they have contacted his  to assist them and they will call back with needed clearances.

## 2020-06-02 PROCEDURE — 77373 STRTCTC BDY RAD THER TX DLVR: CPT | Mod: HCNC | Performed by: RADIOLOGY

## 2020-06-02 PROCEDURE — 77014 HC CT GUIDANCE RADIATION THERAPY FLDS PLACEMENT: CPT | Mod: TC,HCNC,59 | Performed by: RADIOLOGY

## 2020-06-03 ENCOUNTER — NURSE TRIAGE (OUTPATIENT)
Dept: ADMINISTRATIVE | Facility: CLINIC | Age: 70
End: 2020-06-03

## 2020-06-03 NOTE — TELEPHONE ENCOUNTER
Spoke with patient's wife Slim Gray on behalf of [facility] post procedural symptom tracker   Patient stated  has not had any covid symptoms since his procedure on May 21

## 2020-06-04 PROCEDURE — 77014 HC CT GUIDANCE RADIATION THERAPY FLDS PLACEMENT: CPT | Mod: TC,HCNC | Performed by: RADIOLOGY

## 2020-06-04 PROCEDURE — 77373 STRTCTC BDY RAD THER TX DLVR: CPT | Mod: HCNC | Performed by: RADIOLOGY

## 2020-06-05 ENCOUNTER — TELEPHONE (OUTPATIENT)
Dept: CARDIOLOGY | Facility: CLINIC | Age: 70
End: 2020-06-05

## 2020-06-05 NOTE — TELEPHONE ENCOUNTER
----- Message from Aden Lopez MD sent at 6/2/2020  1:42 PM CDT -----  Mary Jo,   I am out of town and will not be back until next Monday 6/8/20.  I do not have any restriction from pulmonary perspective.  Please refer to my note on 5/18/20.  If you still need a letter from me, I can take care of it when I am back in [place].  Aden  ----- Message -----  From: Mary Jo Puga LCSW  Sent: 6/2/2020   8:44 AM CDT  To: Aden Lopez MD, Viral Lombardi MD, #    I have been working with dental office for Mr. Huizar to find out what they need to move forward with his extractions. He will need 8 teeth extracted. The are requiring a letter from each of his doctors stating that he is medically cleared to have his teeth extracted. They also ask in this letter to please put recommendations or instructions on current medications that need to be taken or not taken prior to these extractions. The patient is not clear on what to take. He told them yesterday he stopped his blood thinner for 2 days. I will be more than happy to come  these letters or if they can be emailed to me at  [email or web address]. I have the financial piece figured out. Thank you in advance for any help you can give me.       Mary Jo Puga LCSW

## 2020-06-08 PROCEDURE — 77373 STRTCTC BDY RAD THER TX DLVR: CPT | Mod: HCNC | Performed by: RADIOLOGY

## 2020-06-08 PROCEDURE — 77014 HC CT GUIDANCE RADIATION THERAPY FLDS PLACEMENT: CPT | Mod: TC,HCNC | Performed by: RADIOLOGY

## 2020-06-11 PROCEDURE — 77336 RADIATION PHYSICS CONSULT: CPT | Mod: HCNC | Performed by: RADIOLOGY

## 2020-06-15 ENCOUNTER — TELEPHONE (OUTPATIENT)
Dept: RADIATION ONCOLOGY | Facility: CLINIC | Age: 70
End: 2020-06-15

## 2020-06-15 ENCOUNTER — TELEPHONE (OUTPATIENT)
Dept: OTOLARYNGOLOGY | Facility: CLINIC | Age: 70
End: 2020-06-15

## 2020-06-15 NOTE — TELEPHONE ENCOUNTER
"----- Message from Doris Gonzalez sent at 6/15/2020  2:18 PM CDT -----  Regarding: slim wife " [phone]  .Type: Patient Call Back    Who called: Slim Gray "wife"    What is the request in detail:  Pt is requesting to speak to Dr. Vallejo regarding a personal matter     Can the clinic reply by [facility]? Call back     Would the patient rather a call back or a response via [facility]?  Call back     Best call back number: [phone]            "

## 2020-06-15 NOTE — TELEPHONE ENCOUNTER
----- Message from Dora Hu sent at 6/15/2020 10:08 AM CDT -----  Regarding: Questions  Contact: Mrs Bhupendra Huizar would like to speak to you about the pt. Please call at [phone].  Return call to Mrs Huizar who states Mr Huizar had all his teeth pulled today  Instructed that I would call her back on Fri top schedule his simulation after he heals from extractions

## 2020-06-16 ENCOUNTER — TELEPHONE (OUTPATIENT)
Dept: FAMILY MEDICINE | Facility: CLINIC | Age: 70
End: 2020-06-16

## 2020-06-16 NOTE — TELEPHONE ENCOUNTER
"----- Message from Wilmer Bangura Jr., MD sent at 6/15/2020  8:30 PM CDT -----  Regarding: RE: yari "wife" [phone]  Please contact patient/wife and ask what is the question  ----- Message -----  From: Swetha Ordonez MA  Sent: 6/15/2020   2:49 PM CDT  To: Wilmer Bangura Jr., MD  Subject: FW: yari "wife" [phone]                     ----- Message -----  From: Doris Gonzalez  Sent: 6/15/2020   2:24 PM CDT  To: Willem Guillen Staff  Subject: yari "wife" [phone]                       .Type: Patient Call Back    Who called Slim "wife"    What is the request in detail: Requesting to speak to Dr. Bangura in regards to a person matter     Can the clinic reply by [facility]? Call  back     Would the patient rather a call back or a response via [facility]? Call back     Best call back number: [phone]                "

## 2020-06-16 NOTE — TELEPHONE ENCOUNTER
Best person to discuss the cancers would be the oncologist, if they have any questions with regards to the cancers

## 2020-06-17 ENCOUNTER — TELEPHONE (OUTPATIENT)
Dept: OTOLARYNGOLOGY | Facility: CLINIC | Age: 70
End: 2020-06-17

## 2020-06-17 NOTE — TELEPHONE ENCOUNTER
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 He is getting curative therapy therefore I am assuming they are treating the lung as a second primary tumor ( ie throat is one cancer and lung is another cancer) however you can get metastasis from the throat to the lung. the fact there is only one lung lesion and no neck disease also favors this to be a second primary in the lung however she needs to talk to the medical oncologist that is treating the cancer to confirm this. I am not treating his cancer, I have only done the throat biopsy and got him scheduled with the other visits /presented him at tumor board conference. She needs to speak with the medical oncologist regarding treatment and diagnoses . Thanks

## 2020-06-17 NOTE — TELEPHONE ENCOUNTER
----- Message from Soni Mcdonough sent at 6/17/2020 12:28 PM CDT -----  Type: Patient Call Back    Who called: Slim Gray (wife)    What is the request in detail: Slim Gray (wife) is requesting a call back. She states that she would like to discuss her 's care. She states that she was not with him for his last visit.   Please advise.    Can the clinic reply by [facility]? No    Best call back number: [phone]    Additional Information: N/A

## 2020-06-17 NOTE — TELEPHONE ENCOUNTER
Spoke with Ms. Huizar concerning her  Mr. Sharath Huizar whom is the Patient.  She would like a letter from you with the type of Cancer in his Lung, and the type in his Throat.  She also wants the letter to say that these 2 Cancers are not connected.    Please advise.

## 2020-06-17 NOTE — TELEPHONE ENCOUNTER
Patient's wife informed to contact the Oncologist for her request and expressed understanding and released the call.

## 2020-06-17 NOTE — TELEPHONE ENCOUNTER
Patient's wife would like the message from Dr. Vallejo to be typed up by you since she cant retrieve from Patient Portal.

## 2020-06-18 ENCOUNTER — TELEPHONE (OUTPATIENT)
Dept: RADIATION ONCOLOGY | Facility: CLINIC | Age: 70
End: 2020-06-18

## 2020-06-18 ENCOUNTER — TELEPHONE (OUTPATIENT)
Dept: OTOLARYNGOLOGY | Facility: CLINIC | Age: 70
End: 2020-06-18

## 2020-06-18 NOTE — TELEPHONE ENCOUNTER
Called Patient and no answer, left message on voicemail to call me back.  A letter has been written from Dr. Vallejo to release to Patient concerning his Cancer.

## 2020-06-18 NOTE — TELEPHONE ENCOUNTER
Patients wife called me back and I advised her that Mr. Huizar can  the letter from our office as per Dr. Vallejo.  Patients wife is wanting a copy of his Pathology Reports also.  I approved with my Manager Yuli Velasco with a written authorization from the Patient to release to him and him only.  Patient will come to office and  Letter and Pathology Reports and will sign a written authorization.

## 2020-06-18 NOTE — TELEPHONE ENCOUNTER
You can send her the following in a letter. Can you also please copy the MA at the med onc and rad onc office so that they can reach out to her to discuss this issue with her . Thanks     In regard to Mr. Huizar's cancer, he needs to speak to his treating medical and radiation oncologists to discuss the status of his cancer. I performed his biopsy of the throat. He also has a spot on his lung that was biopsied by another doctor. In some cases, cancer in the lung can start on its own and in other cases it can spread from the throat to the lung.  Most commonly, the throat cancer will spread first to lymph nodes in the neck and then to the lung but in very rare instances it can spread directly to the lung from the throat. Sometimes (for example in a patient that has a history of smoking), you can have two cancers that are discovered at the same time. Please discuss with the medical oncologist and radiation oncologist about their opinion of the answer to these questions. They can help you with interpreting the pathology reports (which you can request reports from medical records). Although I did the biopsy of his throat and discussed his case at tumor board, I am not treating his cancer and it would be better for communications to come from the doctors that are treating his cancer.       Me         6/17/20 4:08 PM  Note     Patient's wife called back stating that she needs a letter stating the Diagnosis of the Throat Cancer and the Lung Cancer , the names of the Cancer and if they are two separtate Cancers.      Me  to Genoveva Vallejo MD          6/17/20 4:08 PM  Please advise            6/17/20 4:07 PM    You contacted Slim Huizar    Me         6/17/20 4:07 PM  Note     ----- Message from Mary Mena sent at 6/17/2020  3:59 PM CDT -----  Type: Patient Call Back     Who called: pt      What is the request in detail: pt requesting to speak to Ms. Kumar. Pt declined to provide reason for the call.      Can the  clinic reply by [facility]? No      Would the patient rather a call back or a response via [facility]? Call back      Best call back number: [phone]     Additional Information:                  Additional Documentation    Encounter Info:    Billing Info,   History,   Detailed Report,   Education,   Care Plan,   Allergies      Visit Pharmacy    [id number]  [place], [place] - [address] [facility] [address]   Not recorded   Orders Placed     None  Medication Renewals and Changes       None     Medication List   Visit Diagnoses       None     Problem List

## 2020-06-18 NOTE — TELEPHONE ENCOUNTER
Phone call to Mrs Huizar  Her  is recovering from dental extractions  Simulation appt scheduled for 6/24/20 and confirmed

## 2020-06-23 ENCOUNTER — TELEPHONE (OUTPATIENT)
Dept: OTOLARYNGOLOGY | Facility: CLINIC | Age: 70
End: 2020-06-23

## 2020-06-23 ENCOUNTER — HOSPITAL ENCOUNTER (OUTPATIENT)
Dept: RADIOLOGY | Facility: HOSPITAL | Age: 70
Discharge: HOME | End: 2020-06-23
Attending: INTERNAL MEDICINE
Payer: MEDICARE

## 2020-06-23 DIAGNOSIS — R91.1 LUNG NODULE: ICD-10-CM

## 2020-06-23 PROCEDURE — 71250 CT CHEST WITHOUT CONTRAST: ICD-10-PCS | Mod: 26,HCNC,, | Performed by: RADIOLOGY

## 2020-06-23 PROCEDURE — 71250 CT THORAX DX C-: CPT | Mod: 26,HCNC,, | Performed by: RADIOLOGY

## 2020-06-23 PROCEDURE — 71250 CT THORAX DX C-: CPT | Mod: TC,HCNC

## 2020-06-23 NOTE — TELEPHONE ENCOUNTER
Patients wife called me to let me know that he will be in today to sign the release of information to get his Pathology reports and the letter typed up from Dr. Vallejo.

## 2020-06-24 ENCOUNTER — HOSPITAL ENCOUNTER (OUTPATIENT)
Dept: RADIATION THERAPY | Facility: HOSPITAL | Age: 70
Discharge: HOME | End: 2020-06-24
Attending: RADIOLOGY
Payer: MEDICARE

## 2020-06-24 DIAGNOSIS — C32.9 SQUAMOUS CELL CARCINOMA OF LARYNX: Primary | ICD-10-CM

## 2020-06-24 PROCEDURE — 77290 THER RAD SIMULAJ FIELD CPLX: CPT | Mod: 26,HCNC,, | Performed by: RADIOLOGY

## 2020-06-24 PROCEDURE — 77334 RADIATION TREATMENT AID(S): CPT | Mod: TC,HCNC | Performed by: RADIOLOGY

## 2020-06-24 PROCEDURE — 77334 RADIATION TREATMENT AID(S): CPT | Mod: 26,HCNC,, | Performed by: RADIOLOGY

## 2020-06-24 PROCEDURE — 77290 THER RAD SIMULAJ FIELD CPLX: CPT | Mod: TC,HCNC | Performed by: RADIOLOGY

## 2020-06-24 PROCEDURE — 77290 PR  SET RADN THERAPY FIELD COMPLEX: ICD-10-PCS | Mod: 26,HCNC,, | Performed by: RADIOLOGY

## 2020-06-24 PROCEDURE — 77263 THER RADIOLOGY TX PLNG CPLX: CPT | Mod: HCNC,,, | Performed by: RADIOLOGY

## 2020-06-24 PROCEDURE — 77263 PR  RADIATION THERAPY PLAN COMPLEX: ICD-10-PCS | Mod: HCNC,,, | Performed by: RADIOLOGY

## 2020-06-24 PROCEDURE — 77334 PR  RADN TREATMENT AID(S) COMPLX: ICD-10-PCS | Mod: 26,HCNC,, | Performed by: RADIOLOGY

## 2020-06-24 PROCEDURE — 77014 HC CT GUIDANCE RADIATION THERAPY FLDS PLACEMENT: CPT | Mod: TC,HCNC | Performed by: RADIOLOGY

## 2020-07-01 ENCOUNTER — HOSPITAL ENCOUNTER (OUTPATIENT)
Dept: RADIATION THERAPY | Facility: HOSPITAL | Age: 70
Discharge: HOME | End: 2020-07-01
Attending: RADIOLOGY
Payer: MEDICARE

## 2020-07-02 ENCOUNTER — CLINICAL SUPPORT (OUTPATIENT)
Dept: HEMATOLOGY/ONCOLOGY | Facility: CLINIC | Age: 70
End: 2020-07-02
Payer: MEDICARE

## 2020-07-02 DIAGNOSIS — C32.9 LARYNX CANCER: ICD-10-CM

## 2020-07-02 LAB — SARS-COV-2 RNA RESP QL NAA+PROBE: NOT DETECTED

## 2020-07-02 PROCEDURE — 77301 PR  INTEN MOD RADIOTHER PLAN W/DOSE VOL HIST: ICD-10-PCS | Mod: 26,HCNC,, | Performed by: RADIOLOGY

## 2020-07-02 PROCEDURE — U0003 INFECTIOUS AGENT DETECTION BY NUCLEIC ACID (DNA OR RNA); SEVERE ACUTE RESPIRATORY SYNDROME CORONAVIRUS 2 (SARS-COV-2) (CORONAVIRUS DISEASE [COVID-19]), AMPLIFIED PROBE TECHNIQUE, MAKING USE OF HIGH THROUGHPUT TECHNOLOGIES AS DESCRIBED BY CMS-2020-01-R: HCPCS | Mod: HCNC

## 2020-07-02 PROCEDURE — 77301 RADIOTHERAPY DOSE PLAN IMRT: CPT | Mod: TC,HCNC | Performed by: RADIOLOGY

## 2020-07-02 PROCEDURE — 77301 RADIOTHERAPY DOSE PLAN IMRT: CPT | Mod: 26,HCNC,, | Performed by: RADIOLOGY

## 2020-07-03 PROCEDURE — 77338 PR  MLC IMRT DESIGN & CONSTRUCTION PER IMRT PLAN: ICD-10-PCS | Mod: 26,HCNC,, | Performed by: RADIOLOGY

## 2020-07-03 PROCEDURE — 77300 PR RADIATION THERAPY,DOSIMETRY PLAN: ICD-10-PCS | Mod: 26,HCNC,, | Performed by: RADIOLOGY

## 2020-07-03 PROCEDURE — 77338 DESIGN MLC DEVICE FOR IMRT: CPT | Mod: 26,HCNC,, | Performed by: RADIOLOGY

## 2020-07-03 PROCEDURE — 77300 RADIATION THERAPY DOSE PLAN: CPT | Mod: 26,HCNC,, | Performed by: RADIOLOGY

## 2020-07-03 PROCEDURE — 77338 DESIGN MLC DEVICE FOR IMRT: CPT | Mod: TC,HCNC | Performed by: RADIOLOGY

## 2020-07-03 PROCEDURE — 77300 RADIATION THERAPY DOSE PLAN: CPT | Mod: TC,HCNC | Performed by: RADIOLOGY

## 2020-07-06 ENCOUNTER — DOCUMENTATION ONLY (OUTPATIENT)
Dept: HEMATOLOGY/ONCOLOGY | Facility: CLINIC | Age: 70
End: 2020-07-06

## 2020-07-06 ENCOUNTER — INFUSION (OUTPATIENT)
Dept: INFUSION THERAPY | Facility: HOSPITAL | Age: 70
End: 2020-07-06
Attending: INTERNAL MEDICINE
Payer: MEDICARE

## 2020-07-06 ENCOUNTER — OFFICE VISIT (OUTPATIENT)
Dept: HEMATOLOGY/ONCOLOGY | Facility: CLINIC | Age: 70
End: 2020-07-06
Payer: MEDICARE

## 2020-07-06 VITALS
RESPIRATION RATE: 18 BRPM | DIASTOLIC BLOOD PRESSURE: 76 MMHG | HEIGHT: 66 IN | TEMPERATURE: 98 F | WEIGHT: 130.5 LBS | HEART RATE: 84 BPM | BODY MASS INDEX: 20.97 KG/M2 | SYSTOLIC BLOOD PRESSURE: 130 MMHG

## 2020-07-06 VITALS
HEART RATE: 75 BPM | SYSTOLIC BLOOD PRESSURE: 140 MMHG | DIASTOLIC BLOOD PRESSURE: 73 MMHG | TEMPERATURE: 96 F | RESPIRATION RATE: 18 BRPM

## 2020-07-06 DIAGNOSIS — C32.9 LARYNX CANCER: Primary | ICD-10-CM

## 2020-07-06 DIAGNOSIS — C77.0 SECONDARY MALIGNANCY OF CERVICOFACIAL LYMPH NODE: ICD-10-CM

## 2020-07-06 PROCEDURE — 1159F PR MEDICATION LIST DOCUMENTED IN MEDICAL RECORD: ICD-10-PCS | Mod: HCNC,S$GLB,, | Performed by: INTERNAL MEDICINE

## 2020-07-06 PROCEDURE — 63600175 PHARM REV CODE 636 W HCPCS: Mod: HCNC | Performed by: INTERNAL MEDICINE

## 2020-07-06 PROCEDURE — 3008F PR BODY MASS INDEX (BMI) DOCUMENTED: ICD-10-PCS | Mod: HCNC,CPTII,S$GLB, | Performed by: INTERNAL MEDICINE

## 2020-07-06 PROCEDURE — 1159F MED LIST DOCD IN RCRD: CPT | Mod: HCNC,S$GLB,, | Performed by: INTERNAL MEDICINE

## 2020-07-06 PROCEDURE — 99215 PR OFFICE/OUTPT VISIT, EST, LEVL V, 40-54 MIN: ICD-10-PCS | Mod: HCNC,S$GLB,, | Performed by: INTERNAL MEDICINE

## 2020-07-06 PROCEDURE — 1126F AMNT PAIN NOTED NONE PRSNT: CPT | Mod: HCNC,S$GLB,, | Performed by: INTERNAL MEDICINE

## 2020-07-06 PROCEDURE — 77014 PR  CT GUIDANCE PLACEMENT RAD THERAPY FIELDS: ICD-10-PCS | Mod: 26,HCNC,, | Performed by: RADIOLOGY

## 2020-07-06 PROCEDURE — 3008F BODY MASS INDEX DOCD: CPT | Mod: HCNC,CPTII,S$GLB, | Performed by: INTERNAL MEDICINE

## 2020-07-06 PROCEDURE — 77470 SPECIAL RADIATION TREATMENT: CPT | Mod: 26,59,HCNC, | Performed by: RADIOLOGY

## 2020-07-06 PROCEDURE — 99999 PR PBB SHADOW E&M-EST. PATIENT-LVL IV: CPT | Mod: PBBFAC,HCNC,, | Performed by: INTERNAL MEDICINE

## 2020-07-06 PROCEDURE — 77014 HC CT GUIDANCE RADIATION THERAPY FLDS PLACEMENT: CPT | Mod: TC,HCNC | Performed by: RADIOLOGY

## 2020-07-06 PROCEDURE — 3075F PR MOST RECENT SYSTOLIC BLOOD PRESS GE 130-139MM HG: ICD-10-PCS | Mod: HCNC,CPTII,S$GLB, | Performed by: INTERNAL MEDICINE

## 2020-07-06 PROCEDURE — 77470 SPECIAL RADIATION TREATMENT: CPT | Mod: 59,TC,HCNC | Performed by: RADIOLOGY

## 2020-07-06 PROCEDURE — 25000003 PHARM REV CODE 250: Mod: HCNC | Performed by: INTERNAL MEDICINE

## 2020-07-06 PROCEDURE — 96367 TX/PROPH/DG ADDL SEQ IV INF: CPT | Mod: HCNC

## 2020-07-06 PROCEDURE — 99499 RISK ADDL DX/OHS AUDIT: ICD-10-PCS | Mod: S$GLB,,, | Performed by: INTERNAL MEDICINE

## 2020-07-06 PROCEDURE — 99215 OFFICE O/P EST HI 40 MIN: CPT | Mod: HCNC,S$GLB,, | Performed by: INTERNAL MEDICINE

## 2020-07-06 PROCEDURE — 96361 HYDRATE IV INFUSION ADD-ON: CPT | Mod: HCNC

## 2020-07-06 PROCEDURE — 77014 PR  CT GUIDANCE PLACEMENT RAD THERAPY FIELDS: CPT | Mod: 26,HCNC,, | Performed by: RADIOLOGY

## 2020-07-06 PROCEDURE — 77386 HC IMRT, COMPLEX: CPT | Mod: HCNC | Performed by: RADIOLOGY

## 2020-07-06 PROCEDURE — 1101F PT FALLS ASSESS-DOCD LE1/YR: CPT | Mod: HCNC,CPTII,S$GLB, | Performed by: INTERNAL MEDICINE

## 2020-07-06 PROCEDURE — 77470 PR  SPECIAL RADIATION TREATMENT: ICD-10-PCS | Mod: 26,59,HCNC, | Performed by: RADIOLOGY

## 2020-07-06 PROCEDURE — 1101F PR PT FALLS ASSESS DOC 0-1 FALLS W/OUT INJ PAST YR: ICD-10-PCS | Mod: HCNC,CPTII,S$GLB, | Performed by: INTERNAL MEDICINE

## 2020-07-06 PROCEDURE — 3075F SYST BP GE 130 - 139MM HG: CPT | Mod: HCNC,CPTII,S$GLB, | Performed by: INTERNAL MEDICINE

## 2020-07-06 PROCEDURE — 96366 THER/PROPH/DIAG IV INF ADDON: CPT | Mod: HCNC

## 2020-07-06 PROCEDURE — 96413 CHEMO IV INFUSION 1 HR: CPT | Mod: HCNC

## 2020-07-06 PROCEDURE — 96375 TX/PRO/DX INJ NEW DRUG ADDON: CPT | Mod: HCNC

## 2020-07-06 PROCEDURE — 99499 UNLISTED E&M SERVICE: CPT | Mod: S$GLB,,, | Performed by: INTERNAL MEDICINE

## 2020-07-06 PROCEDURE — 99999 PR PBB SHADOW E&M-EST. PATIENT-LVL IV: ICD-10-PCS | Mod: PBBFAC,HCNC,, | Performed by: INTERNAL MEDICINE

## 2020-07-06 PROCEDURE — 1126F PR PAIN SEVERITY QUANTIFIED, NO PAIN PRESENT: ICD-10-PCS | Mod: HCNC,S$GLB,, | Performed by: INTERNAL MEDICINE

## 2020-07-06 PROCEDURE — 3078F DIAST BP <80 MM HG: CPT | Mod: HCNC,CPTII,S$GLB, | Performed by: INTERNAL MEDICINE

## 2020-07-06 PROCEDURE — 3078F PR MOST RECENT DIASTOLIC BLOOD PRESSURE < 80 MM HG: ICD-10-PCS | Mod: HCNC,CPTII,S$GLB, | Performed by: INTERNAL MEDICINE

## 2020-07-06 RX ORDER — HEPARIN 100 UNIT/ML
500 SYRINGE INTRAVENOUS
Status: DISCONTINUED | OUTPATIENT
Start: 2020-07-06 | End: 2020-07-06 | Stop reason: HOSPADM

## 2020-07-06 RX ORDER — HEPARIN 100 UNIT/ML
500 SYRINGE INTRAVENOUS
Status: CANCELLED | OUTPATIENT
Start: 2020-07-06

## 2020-07-06 RX ORDER — SODIUM CHLORIDE 0.9 % (FLUSH) 0.9 %
10 SYRINGE (ML) INJECTION
Status: DISCONTINUED | OUTPATIENT
Start: 2020-07-06 | End: 2020-07-06 | Stop reason: HOSPADM

## 2020-07-06 RX ORDER — SODIUM CHLORIDE 0.9 % (FLUSH) 0.9 %
10 SYRINGE (ML) INJECTION
Status: CANCELLED | OUTPATIENT
Start: 2020-07-06

## 2020-07-06 RX ADMIN — APREPITANT 130 MG: 130 INJECTION, EMULSION INTRAVENOUS at 12:07

## 2020-07-06 RX ADMIN — CISPLATIN 65 MG: 1 INJECTION INTRAVENOUS at 12:07

## 2020-07-06 RX ADMIN — SODIUM CHLORIDE 500 ML: 0.9 INJECTION, SOLUTION INTRAVENOUS at 01:07

## 2020-07-06 RX ADMIN — POTASSIUM CHLORIDE: 2 INJECTION, SOLUTION, CONCENTRATE INTRAVENOUS at 10:07

## 2020-07-06 RX ADMIN — PALONOSETRON HYDROCHLORIDE: 0.25 INJECTION INTRAVENOUS at 12:07

## 2020-07-06 NOTE — PROGRESS NOTES
"Subjective:       Patient ID: Sharath Huizar is a 69 y.o. male.    Chief Complaint: larynx cancer  Sharath Huizar is a 69 y.o. male  presenting today for a new diagnosis of Larynx cancer. Patient was seen in ER 2-27-20 with hemoptysis and hoarse voice for a couple of months along with left temporal headaches.   He underwent chest xray on 2/28/2020 which revealed "Small focal opacity within the right lower lung zone with possible cavitation.  This may reflect focal infectious or inflammatory process"  He was referred to pulmonary and underwent CT chest, abdomen/pelvis on 3/18/2020 which revealed "a 2.2 cm noncalcified nodule seen at the right lung base.  May represent consolidation however malignancy can not be excluded.  PET-CT is recommended for further evaluation"  PET scan on 3/27/2020 revealed "Hypermetabolic left supraglottic soft tissue mass concerning for primary laryngeal cancer.  Two hypermetabolic bilateral cervical lymph nodes as well as a hypermetabolic right lower lobe partially cavitary pulmonary mass concerning for metastatic disease.  Recommend ENT consultation and dedicated contrast enhance neck CT"  He was referred to ENT and underwent CT neck on 4/16/2020 which revealed Stable left supraglottic laryngeal mass and bilateral cervical metastatic adenopathy.     He underwent direct laryngoscopy on 4/20/2020 and that revealed "bulky and friable supraglottic mass on left side of glottic surface of epiglottis extending over slightly on to the right. Extends down along left aryepiglottic fold and true vocal fold on left. The right vocal fold did not appear to be involved. No lesion of pyriform sinus. Palpation of vallecula and base of tongue was soft. No additional lesions were noted in the oral cavity or oropharynx"  Pathology revealed squamous cell cancer  His case was discussed in head and Neck tumor board and he underwent left lung biopsy on 4/27/2020 and pathology reveals squamous/adenosquamous cell " cancer    HPIHe has completed RT to his lung and comes in today to start chemo RT for his larynx cancer. He occasionally has hemoptysis. Hoarseness is still present. He is able to swallow both solids abd liquids.  He has a PEG tube but has not been using it. He has had dental clearance.    Review of Systems   Constitutional: Negative for appetite change, fatigue and unexpected weight change.   HENT: Positive for voice change. Negative for mouth sores.    Eyes: Negative for visual disturbance.   Respiratory: Negative for cough and shortness of breath.    Cardiovascular: Negative for chest pain.   Gastrointestinal: Positive for reflux. Negative for abdominal pain and diarrhea.   Genitourinary: Negative for frequency.   Musculoskeletal: Negative for back pain.   Integumentary:  Negative for rash.   Neurological: Negative for headaches.   Hematological: Negative for adenopathy.   Psychiatric/Behavioral: The patient is not nervous/anxious.    All other systems reviewed and are negative.        Objective:      Physical Exam  Vitals signs reviewed.   Constitutional:       Appearance: He is well-developed.   HENT:      Mouth/Throat:      Pharynx: No oropharyngeal exudate.   Cardiovascular:      Rate and Rhythm: Normal rate.      Heart sounds: Normal heart sounds.   Pulmonary:      Effort: Pulmonary effort is normal.      Breath sounds: Normal breath sounds. No wheezing.   Abdominal:      General: Bowel sounds are normal.      Palpations: Abdomen is soft.      Tenderness: There is no abdominal tenderness.   Musculoskeletal:         General: No tenderness.   Lymphadenopathy:      Cervical: No cervical adenopathy.   Skin:     General: Skin is warm and dry.      Findings: No rash.   Neurological:      Mental Status: He is alert and oriented to person, place, and time.      Coordination: Coordination normal.   Psychiatric:         Thought Content: Thought content normal.         Judgment: Judgment normal.           LABS:  WBC    Date Value Ref Range Status   07/06/2020 8.85 3.90 - 12.70 K/uL Final     Hemoglobin   Date Value Ref Range Status   07/06/2020 11.8 (L) 14.0 - 18.0 g/dL Final     Hematocrit   Date Value Ref Range Status   07/06/2020 37.7 (L) 40.0 - 54.0 % Final     Platelets   Date Value Ref Range Status   07/06/2020 216 150 - 350 K/uL Final     Gran # (ANC)   Date Value Ref Range Status   07/06/2020 5.6 1.8 - 7.7 K/uL Final     Comment:     The ANC is based on a white cell differential from an   automated cell counter. It has not been microscopically   reviewed for the presence of abnormal cells. Clinical   correlation is required.         Chemistry        Component Value Date/Time     07/06/2020 0808    K 4.3 07/06/2020 0808     07/06/2020 0808    CO2 26 07/06/2020 0808    BUN 19 07/06/2020 0808    CREATININE 1.1 07/06/2020 0808     (H) 07/06/2020 0808        Component Value Date/Time    CALCIUM 9.3 07/06/2020 0808    ALKPHOS 114 07/06/2020 0808    AST 19 07/06/2020 0808    ALT 20 07/06/2020 0808    BILITOT 0.2 07/06/2020 0808    ESTGFRAFRICA >60.0 07/06/2020 0808    EGFRNONAA >60.0 07/06/2020 0808           Assessment:       1. 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 He will proceed with weekly Cisplatin with concurrent RT. Due to his EF of 30% will only administer 1 liter of IV fluids today with chemo. He has a PEG tube in place and advised to take in at least 2 liters of water through that per day. He will take 3 boost/day in addition to eating his meals, since he is able to swallow currently. We discussed future issues with swallowing on his chemo/RT and advised him to increase his boost intake if he experiences any issues with swallowing  Side effects of chemo were discussed and consents were signed.   Will plan on seeing him weekly during the course of his chemo/RT  Above care plan was discussed with patient and all questions were addressed to his  satisfaction

## 2020-07-06 NOTE — PLAN OF CARE
Pt received C1D1 Of cisplatin; tolerated well. Pre and post hydration infused over 2 hour each due to decreased cardiac output. VSS and NAD. Pt instructed to call MD with any concerns. Pt discharged home independently.       Problem: Anemia (Chemotherapy Effects)  Goal: Anemia Symptom Improvement  Outcome: Ongoing, Progressing     Problem: Urinary Bleeding Risk or Actual (Chemotherapy Effects)  Goal: Absence of Hematuria  Outcome: Ongoing, Progressing     Problem: Nausea and Vomiting (Chemotherapy Effects)  Goal: Fluid and Electrolyte Balance  Outcome: Ongoing, Progressing     Problem: Neurotoxicity (Chemotherapy Effects)  Goal: Neurotoxicity Symptom Control  Outcome: Ongoing, Progressing     Problem: Neutropenia (Chemotherapy Effects)  Goal: Absence of Infection  Outcome: Ongoing, Progressing     Problem: Oral Mucositis (Chemotherapy Effects)  Goal: Improved Oral Mucous Membrane Integrity  Outcome: Ongoing, Progressing     Problem: Thrombocytopenia Bleeding Risk (Chemotherapy Effects)  Goal: Absence of Bleeding  Outcome: Ongoing, Progressing

## 2020-07-06 NOTE — PROGRESS NOTES
Oncology Nutrition  Radiation Oncology Weekly Check     Treatment Week: 1 (Day 1)    Sharath Huizar   1950    Reason for Visit: Pt here for weekly visit due to high nutritional risk radiation therapy treatment (laryngeal cancer)    Nutrition Assessment    Today is first day of chemoradiation for laryngeal cancer. Patient has no concerns or complaints from previous visit. His PEG tube is very clean and patent. He is tolerating a soft diet and drinking two Boost daily PO. Weight is stable.     Weight:  130lb   Height: 66in    Usual BW: 140-145lb  Weight Change: down 15lb from usual but currently stable     Vitamins/Supplements: Boost     Nutrition Impact Symptoms    None at present     Activities/Functional Status: Little change from baseline     Nutrition focused physical findings: thin, mild to moderate muscle losses     Nutrition Recommendations    Continue soft diet with Boost BID  Flush PEG daily with 50mL water  Take antiemetics as prescribed   Drink at least 2000mL fluids daily     Follow up weekly       Lashay Alanis, MPH, RD, , LDN, FAND   [phone]

## 2020-07-07 PROCEDURE — 77386 HC IMRT, COMPLEX: CPT | Mod: HCNC | Performed by: RADIOLOGY

## 2020-07-07 PROCEDURE — 77014 PR  CT GUIDANCE PLACEMENT RAD THERAPY FIELDS: ICD-10-PCS | Mod: 26,HCNC,, | Performed by: RADIOLOGY

## 2020-07-07 PROCEDURE — 77014 HC CT GUIDANCE RADIATION THERAPY FLDS PLACEMENT: CPT | Mod: TC,HCNC | Performed by: RADIOLOGY

## 2020-07-07 PROCEDURE — 77014 PR  CT GUIDANCE PLACEMENT RAD THERAPY FIELDS: CPT | Mod: 26,HCNC,, | Performed by: RADIOLOGY

## 2020-07-08 PROCEDURE — 77014 HC CT GUIDANCE RADIATION THERAPY FLDS PLACEMENT: CPT | Mod: TC,HCNC | Performed by: RADIOLOGY

## 2020-07-08 PROCEDURE — 77386 HC IMRT, COMPLEX: CPT | Mod: HCNC | Performed by: RADIOLOGY

## 2020-07-09 PROCEDURE — 77014 HC CT GUIDANCE RADIATION THERAPY FLDS PLACEMENT: CPT | Mod: TC,HCNC | Performed by: RADIOLOGY

## 2020-07-09 PROCEDURE — 77014 PR  CT GUIDANCE PLACEMENT RAD THERAPY FIELDS: CPT | Mod: 26,HCNC,, | Performed by: RADIOLOGY

## 2020-07-09 PROCEDURE — 77014 PR  CT GUIDANCE PLACEMENT RAD THERAPY FIELDS: ICD-10-PCS | Mod: 26,HCNC,, | Performed by: RADIOLOGY

## 2020-07-09 PROCEDURE — 77386 HC IMRT, COMPLEX: CPT | Mod: HCNC | Performed by: RADIOLOGY

## 2020-07-10 ENCOUNTER — LAB VISIT (OUTPATIENT)
Dept: LAB | Facility: HOSPITAL | Age: 70
End: 2020-07-10
Attending: INTERNAL MEDICINE
Payer: MEDICARE

## 2020-07-10 DIAGNOSIS — C32.9 SQUAMOUS CELL CARCINOMA OF LARYNX: ICD-10-CM

## 2020-07-10 LAB
ALBUMIN SERPL BCP-MCNC: 3.4 G/DL (ref 3.5–5.2)
ALP SERPL-CCNC: 100 U/L (ref 55–135)
ALT SERPL W/O P-5'-P-CCNC: 29 U/L (ref 10–44)
ANION GAP SERPL CALC-SCNC: 5 MMOL/L (ref 8–16)
AST SERPL-CCNC: 28 U/L (ref 10–40)
BILIRUB SERPL-MCNC: 0.2 MG/DL (ref 0.1–1)
BUN SERPL-MCNC: 19 MG/DL (ref 8–23)
CALCIUM SERPL-MCNC: 8.6 MG/DL (ref 8.7–10.5)
CHLORIDE SERPL-SCNC: 103 MMOL/L (ref 95–110)
CO2 SERPL-SCNC: 27 MMOL/L (ref 23–29)
CREAT SERPL-MCNC: 1 MG/DL (ref 0.5–1.4)
ERYTHROCYTE [DISTWIDTH] IN BLOOD BY AUTOMATED COUNT: 15.5 % (ref 11.5–14.5)
EST. GFR  (AFRICAN AMERICAN): >60 ML/MIN/1.73 M^2
EST. GFR  (NON AFRICAN AMERICAN): >60 ML/MIN/1.73 M^2
GLUCOSE SERPL-MCNC: 103 MG/DL (ref 70–110)
HCT VFR BLD AUTO: 34.3 % (ref 40–54)
HGB BLD-MCNC: 11.3 G/DL (ref 14–18)
IMM GRANULOCYTES # BLD AUTO: 0.01 K/UL (ref 0–0.04)
MAGNESIUM SERPL-MCNC: 2 MG/DL (ref 1.6–2.6)
MCH RBC QN AUTO: 30.1 PG (ref 27–31)
MCHC RBC AUTO-ENTMCNC: 32.9 G/DL (ref 32–36)
MCV RBC AUTO: 91 FL (ref 82–98)
NEUTROPHILS # BLD AUTO: 5.9 K/UL (ref 1.8–7.7)
PHOSPHATE SERPL-MCNC: 3.6 MG/DL (ref 2.7–4.5)
PLATELET # BLD AUTO: 213 K/UL (ref 150–350)
PMV BLD AUTO: 11 FL (ref 9.2–12.9)
POTASSIUM SERPL-SCNC: 4.5 MMOL/L (ref 3.5–5.1)
PROT SERPL-MCNC: 6.5 G/DL (ref 6–8.4)
RBC # BLD AUTO: 3.76 M/UL (ref 4.6–6.2)
SODIUM SERPL-SCNC: 135 MMOL/L (ref 136–145)
WBC # BLD AUTO: 8.8 K/UL (ref 3.9–12.7)

## 2020-07-10 PROCEDURE — 77386 HC IMRT, COMPLEX: CPT | Mod: HCNC | Performed by: RADIOLOGY

## 2020-07-10 PROCEDURE — 36415 COLL VENOUS BLD VENIPUNCTURE: CPT | Mod: HCNC

## 2020-07-10 PROCEDURE — 85027 COMPLETE CBC AUTOMATED: CPT | Mod: HCNC

## 2020-07-10 PROCEDURE — 84100 ASSAY OF PHOSPHORUS: CPT | Mod: HCNC

## 2020-07-10 PROCEDURE — 83735 ASSAY OF MAGNESIUM: CPT | Mod: HCNC

## 2020-07-10 PROCEDURE — 77014 PR  CT GUIDANCE PLACEMENT RAD THERAPY FIELDS: ICD-10-PCS | Mod: 26,HCNC,, | Performed by: RADIOLOGY

## 2020-07-10 PROCEDURE — 77014 HC CT GUIDANCE RADIATION THERAPY FLDS PLACEMENT: CPT | Mod: TC,HCNC | Performed by: RADIOLOGY

## 2020-07-10 PROCEDURE — 77336 RADIATION PHYSICS CONSULT: CPT | Mod: HCNC | Performed by: RADIOLOGY

## 2020-07-10 PROCEDURE — 77014 PR  CT GUIDANCE PLACEMENT RAD THERAPY FIELDS: CPT | Mod: 26,HCNC,, | Performed by: RADIOLOGY

## 2020-07-10 PROCEDURE — 80053 COMPREHEN METABOLIC PANEL: CPT | Mod: HCNC

## 2020-07-13 ENCOUNTER — INFUSION (OUTPATIENT)
Dept: INFUSION THERAPY | Facility: HOSPITAL | Age: 70
End: 2020-07-13
Attending: INTERNAL MEDICINE
Payer: MEDICARE

## 2020-07-13 ENCOUNTER — DOCUMENTATION ONLY (OUTPATIENT)
Dept: RADIATION ONCOLOGY | Facility: CLINIC | Age: 70
End: 2020-07-13

## 2020-07-13 ENCOUNTER — OFFICE VISIT (OUTPATIENT)
Dept: HEMATOLOGY/ONCOLOGY | Facility: CLINIC | Age: 70
End: 2020-07-13
Payer: MEDICARE

## 2020-07-13 ENCOUNTER — DOCUMENTATION ONLY (OUTPATIENT)
Dept: HEMATOLOGY/ONCOLOGY | Facility: CLINIC | Age: 70
End: 2020-07-13

## 2020-07-13 VITALS
SYSTOLIC BLOOD PRESSURE: 144 MMHG | BODY MASS INDEX: 20.8 KG/M2 | HEIGHT: 66 IN | HEART RATE: 75 BPM | TEMPERATURE: 98 F | WEIGHT: 129.44 LBS | RESPIRATION RATE: 18 BRPM | DIASTOLIC BLOOD PRESSURE: 71 MMHG

## 2020-07-13 VITALS
SYSTOLIC BLOOD PRESSURE: 136 MMHG | HEIGHT: 66 IN | HEART RATE: 93 BPM | TEMPERATURE: 97 F | WEIGHT: 129.44 LBS | DIASTOLIC BLOOD PRESSURE: 83 MMHG | BODY MASS INDEX: 20.8 KG/M2

## 2020-07-13 DIAGNOSIS — K59.00 CONSTIPATION, UNSPECIFIED CONSTIPATION TYPE: ICD-10-CM

## 2020-07-13 DIAGNOSIS — C32.9 LARYNX CANCER: Primary | ICD-10-CM

## 2020-07-13 PROBLEM — R04.2 HEMOPTYSIS: Status: RESOLVED | Noted: 2020-03-06 | Resolved: 2020-07-13

## 2020-07-13 PROCEDURE — 96375 TX/PRO/DX INJ NEW DRUG ADDON: CPT | Mod: HCNC

## 2020-07-13 PROCEDURE — 96413 CHEMO IV INFUSION 1 HR: CPT | Mod: HCNC

## 2020-07-13 PROCEDURE — 3075F PR MOST RECENT SYSTOLIC BLOOD PRESS GE 130-139MM HG: ICD-10-PCS | Mod: HCNC,CPTII,S$GLB, | Performed by: INTERNAL MEDICINE

## 2020-07-13 PROCEDURE — 77014 HC CT GUIDANCE RADIATION THERAPY FLDS PLACEMENT: CPT | Mod: TC,HCNC | Performed by: RADIOLOGY

## 2020-07-13 PROCEDURE — 77014 PR  CT GUIDANCE PLACEMENT RAD THERAPY FIELDS: ICD-10-PCS | Mod: 26,HCNC,, | Performed by: RADIOLOGY

## 2020-07-13 PROCEDURE — 1101F PT FALLS ASSESS-DOCD LE1/YR: CPT | Mod: HCNC,CPTII,S$GLB, | Performed by: INTERNAL MEDICINE

## 2020-07-13 PROCEDURE — 1159F PR MEDICATION LIST DOCUMENTED IN MEDICAL RECORD: ICD-10-PCS | Mod: HCNC,S$GLB,, | Performed by: INTERNAL MEDICINE

## 2020-07-13 PROCEDURE — 1159F MED LIST DOCD IN RCRD: CPT | Mod: HCNC,S$GLB,, | Performed by: INTERNAL MEDICINE

## 2020-07-13 PROCEDURE — 3079F DIAST BP 80-89 MM HG: CPT | Mod: HCNC,CPTII,S$GLB, | Performed by: INTERNAL MEDICINE

## 2020-07-13 PROCEDURE — 77386 HC IMRT, COMPLEX: CPT | Mod: HCNC | Performed by: RADIOLOGY

## 2020-07-13 PROCEDURE — 96360 HYDRATION IV INFUSION INIT: CPT | Mod: HCNC

## 2020-07-13 PROCEDURE — 96366 THER/PROPH/DIAG IV INF ADDON: CPT | Mod: HCNC

## 2020-07-13 PROCEDURE — 63600175 PHARM REV CODE 636 W HCPCS: Mod: HCNC | Performed by: INTERNAL MEDICINE

## 2020-07-13 PROCEDURE — 96367 TX/PROPH/DG ADDL SEQ IV INF: CPT | Mod: HCNC

## 2020-07-13 PROCEDURE — 3079F PR MOST RECENT DIASTOLIC BLOOD PRESSURE 80-89 MM HG: ICD-10-PCS | Mod: HCNC,CPTII,S$GLB, | Performed by: INTERNAL MEDICINE

## 2020-07-13 PROCEDURE — 96365 THER/PROPH/DIAG IV INF INIT: CPT | Mod: HCNC

## 2020-07-13 PROCEDURE — 96361 HYDRATE IV INFUSION ADD-ON: CPT | Mod: HCNC

## 2020-07-13 PROCEDURE — 99215 PR OFFICE/OUTPT VISIT, EST, LEVL V, 40-54 MIN: ICD-10-PCS | Mod: HCNC,S$GLB,, | Performed by: INTERNAL MEDICINE

## 2020-07-13 PROCEDURE — 25000003 PHARM REV CODE 250: Mod: HCNC | Performed by: INTERNAL MEDICINE

## 2020-07-13 PROCEDURE — 3075F SYST BP GE 130 - 139MM HG: CPT | Mod: HCNC,CPTII,S$GLB, | Performed by: INTERNAL MEDICINE

## 2020-07-13 PROCEDURE — 99215 OFFICE O/P EST HI 40 MIN: CPT | Mod: HCNC,S$GLB,, | Performed by: INTERNAL MEDICINE

## 2020-07-13 PROCEDURE — 1126F AMNT PAIN NOTED NONE PRSNT: CPT | Mod: HCNC,S$GLB,, | Performed by: INTERNAL MEDICINE

## 2020-07-13 PROCEDURE — 1126F PR PAIN SEVERITY QUANTIFIED, NO PAIN PRESENT: ICD-10-PCS | Mod: HCNC,S$GLB,, | Performed by: INTERNAL MEDICINE

## 2020-07-13 PROCEDURE — 99999 PR PBB SHADOW E&M-EST. PATIENT-LVL III: CPT | Mod: PBBFAC,HCNC,, | Performed by: INTERNAL MEDICINE

## 2020-07-13 PROCEDURE — 3008F PR BODY MASS INDEX (BMI) DOCUMENTED: ICD-10-PCS | Mod: HCNC,CPTII,S$GLB, | Performed by: INTERNAL MEDICINE

## 2020-07-13 PROCEDURE — 1101F PR PT FALLS ASSESS DOC 0-1 FALLS W/OUT INJ PAST YR: ICD-10-PCS | Mod: HCNC,CPTII,S$GLB, | Performed by: INTERNAL MEDICINE

## 2020-07-13 PROCEDURE — 77014 PR  CT GUIDANCE PLACEMENT RAD THERAPY FIELDS: CPT | Mod: 26,HCNC,, | Performed by: RADIOLOGY

## 2020-07-13 PROCEDURE — 99999 PR PBB SHADOW E&M-EST. PATIENT-LVL III: ICD-10-PCS | Mod: PBBFAC,HCNC,, | Performed by: INTERNAL MEDICINE

## 2020-07-13 PROCEDURE — 3008F BODY MASS INDEX DOCD: CPT | Mod: HCNC,CPTII,S$GLB, | Performed by: INTERNAL MEDICINE

## 2020-07-13 RX ORDER — HEPARIN 100 UNIT/ML
500 SYRINGE INTRAVENOUS
Status: CANCELLED | OUTPATIENT
Start: 2020-07-13

## 2020-07-13 RX ORDER — HEPARIN 100 UNIT/ML
500 SYRINGE INTRAVENOUS
Status: DISCONTINUED | OUTPATIENT
Start: 2020-07-13 | End: 2020-07-13 | Stop reason: HOSPADM

## 2020-07-13 RX ORDER — DOCUSATE SODIUM 100 MG/1
100 CAPSULE, LIQUID FILLED ORAL 2 TIMES DAILY PRN
Qty: 60 CAPSULE | Refills: 3 | Status: SHIPPED | OUTPATIENT
Start: 2020-07-13 | End: 2021-02-12 | Stop reason: CLARIF

## 2020-07-13 RX ORDER — SODIUM CHLORIDE 0.9 % (FLUSH) 0.9 %
10 SYRINGE (ML) INJECTION
Status: DISCONTINUED | OUTPATIENT
Start: 2020-07-13 | End: 2020-07-13 | Stop reason: HOSPADM

## 2020-07-13 RX ORDER — SODIUM CHLORIDE 0.9 % (FLUSH) 0.9 %
10 SYRINGE (ML) INJECTION
Status: CANCELLED | OUTPATIENT
Start: 2020-07-13

## 2020-07-13 RX ADMIN — MAGNESIUM SULFATE: 500 INJECTION, SOLUTION INTRAMUSCULAR; INTRAVENOUS at 09:07

## 2020-07-13 RX ADMIN — CISPLATIN 49 MG: 1 INJECTION INTRAVENOUS at 11:07

## 2020-07-13 RX ADMIN — SODIUM CHLORIDE 500 ML: 0.9 INJECTION, SOLUTION INTRAVENOUS at 12:07

## 2020-07-13 RX ADMIN — APREPITANT 130 MG: 130 INJECTION, EMULSION INTRAVENOUS at 11:07

## 2020-07-13 RX ADMIN — SODIUM CHLORIDE: 0.9 INJECTION, SOLUTION INTRAVENOUS at 09:07

## 2020-07-13 RX ADMIN — DEXAMETHASONE SODIUM PHOSPHATE: 4 INJECTION, SOLUTION INTRA-ARTICULAR; INTRALESIONAL; INTRAMUSCULAR; INTRAVENOUS; SOFT TISSUE at 11:07

## 2020-07-13 NOTE — PROGRESS NOTES
Oncology Nutrition  Radiation Oncology Weekly Check     Treatment Week: 2 (Day 6)    Sharath Huizar   1950    Reason for Visit: Pt here for weekly visit due to high nutritional risk radiation therapy treatment (laryngeal cancer)    Nutrition Assessment    Patient reports he tolerated his first week of chemoradiation well. Only complaint is constipation. He had to use magnesium citrate to have a bowel movement this weekend. He now has a prescription from Dr. Aguilar for constipation. Also  Increasing his fluid intake.   His PEG tube is very clean and patent. He is tolerating a soft diet and drinking two Boost daily PO. Weight has increased slightly.     Weight:  131.9lb   Height: 66in    Usual BW: 140-145lb  Weight Change: up 2lb from last week    Vitamins/Supplements: Boost     Nutrition Impact Symptoms    None at present     Activities/Functional Status: Little change from baseline     Nutrition focused physical findings: thin, mild to moderate muscle losses     Nutrition Recommendations    Continue soft diet with Boost BID  Flush PEG daily with 50mL water  Take antiemetics as prescribed   Drink at least 2000mL fluids daily     Follow up weekly       Lashay Alanis, MPH, RD, , LDN, FAND   [phone]

## 2020-07-13 NOTE — PROGRESS NOTES
"Subjective:       Patient ID: Sharath Huizar is a 69 y.o. male.    Chief Complaint: Larynx, cancer  Sharath Huizar is a 69 y.o. male  presenting today for a new diagnosis of Larynx cancer. Patient was seen in ER 2-27-20 with hemoptysis and hoarse voice for a couple of months along with left temporal headaches.   He underwent chest xray on 2/28/2020 which revealed "Small focal opacity within the right lower lung zone with possible cavitation.  This may reflect focal infectious or inflammatory process"  He was referred to pulmonary and underwent CT chest, abdomen/pelvis on 3/18/2020 which revealed "a 2.2 cm noncalcified nodule seen at the right lung base.  May represent consolidation however malignancy can not be excluded.  PET-CT is recommended for further evaluation"  PET scan on 3/27/2020 revealed "Hypermetabolic left supraglottic soft tissue mass concerning for primary laryngeal cancer.  Two hypermetabolic bilateral cervical lymph nodes as well as a hypermetabolic right lower lobe partially cavitary pulmonary mass concerning for metastatic disease.  Recommend ENT consultation and dedicated contrast enhance neck CT"  He was referred to ENT and underwent CT neck on 4/16/2020 which revealed Stable left supraglottic laryngeal mass and bilateral cervical metastatic adenopathy.     He underwent direct laryngoscopy on 4/20/2020 and that revealed "bulky and friable supraglottic mass on left side of glottic surface of epiglottis extending over slightly on to the right. Extends down along left aryepiglottic fold and true vocal fold on left. The right vocal fold did not appear to be involved. No lesion of pyriform sinus. Palpation of vallecula and base of tongue was soft. No additional lesions were noted in the oral cavity or oropharynx"  Pathology revealed squamous cell cancer  His case was discussed in head and Neck tumor board and he underwent left lung biopsy on 4/27/2020 and pathology reveals squamous/adenosquamous cell " cancer     He has completed RT to his lung and is on chemo RT for his larynx cancer    HPIHe is on chemo/RT with weekly Cisplatin.  He notes nausea which resolves with antiemetics. He notes constipation    He denies any nausea, vomiting, diarrhea, abdominal pain, weight loss or loss of appetite, chest pain, shortness of breath, leg swelling, fatigue, pain, headache, dizziness, or mood changes. His ECOG PS is zero       Review of Systems   Constitutional: Negative for appetite change, fatigue and unexpected weight change.   HENT: Negative for mouth sores.    Eyes: Negative for visual disturbance.   Respiratory: Negative for cough and shortness of breath.    Cardiovascular: Negative for chest pain.   Gastrointestinal: Positive for constipation. Negative for abdominal pain and diarrhea.   Genitourinary: Negative for frequency.   Musculoskeletal: Negative for back pain.   Integumentary:  Negative for rash.   Neurological: Negative for headaches.   Hematological: Negative for adenopathy.   Psychiatric/Behavioral: The patient is not nervous/anxious.    All other systems reviewed and are negative.        Objective:      Physical Exam  Vitals signs reviewed.   Constitutional:       Appearance: He is well-developed.   HENT:      Mouth/Throat:      Pharynx: No oropharyngeal exudate.   Cardiovascular:      Rate and Rhythm: Normal rate.      Heart sounds: Normal heart sounds.   Pulmonary:      Effort: Pulmonary effort is normal.      Breath sounds: Normal breath sounds. No wheezing.   Abdominal:      General: Bowel sounds are normal.      Palpations: Abdomen is soft.      Tenderness: There is no abdominal tenderness.   Musculoskeletal:         General: No tenderness.   Lymphadenopathy:      Cervical: No cervical adenopathy.   Skin:     General: Skin is warm and dry.      Findings: No rash.   Neurological:      Mental Status: He is alert and oriented to person, place, and time.      Coordination: Coordination normal.    Psychiatric:         Thought Content: Thought content normal.         Judgment: Judgment normal.           LABS:  WBC   Date Value Ref Range Status   07/13/2020 9.01 3.90 - 12.70 K/uL Final     Hemoglobin   Date Value Ref Range Status   07/13/2020 11.6 (L) 14.0 - 18.0 g/dL Final     Hematocrit   Date Value Ref Range Status   07/13/2020 36.3 (L) 40.0 - 54.0 % Final     Platelets   Date Value Ref Range Status   07/13/2020 240 150 - 350 K/uL Final     Gran # (ANC)   Date Value Ref Range Status   07/13/2020 6.2 1.8 - 7.7 K/uL Final     Comment:     The ANC is based on a white cell differential from an   automated cell counter. It has not been microscopically   reviewed for the presence of abnormal cells. Clinical   correlation is required.         Chemistry        Component Value Date/Time     (L) 07/10/2020 0703    K 4.5 07/10/2020 0703     07/10/2020 0703    CO2 27 07/10/2020 0703    BUN 19 07/10/2020 0703    CREATININE 1.0 07/10/2020 0703     07/10/2020 0703        Component Value Date/Time    CALCIUM 8.6 (L) 07/10/2020 0703    ALKPHOS 100 07/10/2020 0703    AST 28 07/10/2020 0703    ALT 29 07/10/2020 0703    BILITOT 0.2 07/10/2020 0703    ESTGFRAFRICA >60.0 07/10/2020 0703    EGFRNONAA >60.0 07/10/2020 0703           Assessment:       1. Larynx cancer    2. Constipation, unspecified constipation type        Plan:        1. He will proceed with weekly Cisplatin with concurrent RT and will return in 1 week for next cycle.  2. Escribed colace.     Above care plan was discussed with patient and all questions were addressed to his satisfaction

## 2020-07-13 NOTE — NURSING
0826  (appt scheduled for 0800)  Pt here for Cisplatin, IVFs, reports tolerating treatment and XRT thusfar, no complaints or concerns at present; discussed treatment plan for today, all questions answered and pt agrees to proceed

## 2020-07-13 NOTE — PLAN OF CARE
1421  Infusion completed, pt tolerated well; pt instructed to increase hydration of water-based fluids x 3 days r/t Cisplatin; reviewed hand hygiene, infection prevention and reason for same; discussed when to contact MD, when to report to ER; AVS given to and reviewed with pt, pt verbalized understanding of all discussed and when to report next

## 2020-07-14 PROCEDURE — 77014 PR  CT GUIDANCE PLACEMENT RAD THERAPY FIELDS: CPT | Mod: 26,HCNC,, | Performed by: RADIOLOGY

## 2020-07-14 PROCEDURE — 77014 PR  CT GUIDANCE PLACEMENT RAD THERAPY FIELDS: ICD-10-PCS | Mod: 26,HCNC,, | Performed by: RADIOLOGY

## 2020-07-14 PROCEDURE — 77386 HC IMRT, COMPLEX: CPT | Mod: HCNC | Performed by: RADIOLOGY

## 2020-07-14 PROCEDURE — 77014 HC CT GUIDANCE RADIATION THERAPY FLDS PLACEMENT: CPT | Mod: TC,HCNC | Performed by: RADIOLOGY

## 2020-07-15 PROCEDURE — 77014 PR  CT GUIDANCE PLACEMENT RAD THERAPY FIELDS: ICD-10-PCS | Mod: 26,HCNC,, | Performed by: RADIOLOGY

## 2020-07-15 PROCEDURE — 77386 HC IMRT, COMPLEX: CPT | Mod: HCNC | Performed by: RADIOLOGY

## 2020-07-15 PROCEDURE — 77014 HC CT GUIDANCE RADIATION THERAPY FLDS PLACEMENT: CPT | Mod: TC,HCNC | Performed by: RADIOLOGY

## 2020-07-15 PROCEDURE — 77014 PR  CT GUIDANCE PLACEMENT RAD THERAPY FIELDS: CPT | Mod: 26,HCNC,, | Performed by: RADIOLOGY

## 2020-07-16 PROCEDURE — 77014 PR  CT GUIDANCE PLACEMENT RAD THERAPY FIELDS: CPT | Mod: 26,HCNC,, | Performed by: RADIOLOGY

## 2020-07-16 PROCEDURE — 77386 HC IMRT, COMPLEX: CPT | Mod: HCNC | Performed by: RADIOLOGY

## 2020-07-16 PROCEDURE — 77014 PR  CT GUIDANCE PLACEMENT RAD THERAPY FIELDS: ICD-10-PCS | Mod: 26,HCNC,, | Performed by: RADIOLOGY

## 2020-07-16 PROCEDURE — 77014 HC CT GUIDANCE RADIATION THERAPY FLDS PLACEMENT: CPT | Mod: TC,HCNC | Performed by: RADIOLOGY

## 2020-07-17 PROCEDURE — 77014 PR  CT GUIDANCE PLACEMENT RAD THERAPY FIELDS: CPT | Mod: 26,HCNC,, | Performed by: RADIOLOGY

## 2020-07-17 PROCEDURE — 77014 HC CT GUIDANCE RADIATION THERAPY FLDS PLACEMENT: CPT | Mod: TC,HCNC | Performed by: RADIOLOGY

## 2020-07-17 PROCEDURE — 77014 PR  CT GUIDANCE PLACEMENT RAD THERAPY FIELDS: ICD-10-PCS | Mod: 26,HCNC,, | Performed by: RADIOLOGY

## 2020-07-17 PROCEDURE — 77336 RADIATION PHYSICS CONSULT: CPT | Mod: HCNC | Performed by: RADIOLOGY

## 2020-07-17 PROCEDURE — 77386 HC IMRT, COMPLEX: CPT | Mod: HCNC | Performed by: RADIOLOGY

## 2020-07-20 ENCOUNTER — DOCUMENTATION ONLY (OUTPATIENT)
Dept: RADIATION ONCOLOGY | Facility: CLINIC | Age: 70
End: 2020-07-20

## 2020-07-20 ENCOUNTER — OFFICE VISIT (OUTPATIENT)
Dept: HEMATOLOGY/ONCOLOGY | Facility: CLINIC | Age: 70
End: 2020-07-20
Payer: MEDICARE

## 2020-07-20 ENCOUNTER — INFUSION (OUTPATIENT)
Dept: INFUSION THERAPY | Facility: HOSPITAL | Age: 70
End: 2020-07-20
Attending: INTERNAL MEDICINE
Payer: MEDICARE

## 2020-07-20 VITALS
RESPIRATION RATE: 18 BRPM | SYSTOLIC BLOOD PRESSURE: 131 MMHG | TEMPERATURE: 99 F | HEART RATE: 72 BPM | DIASTOLIC BLOOD PRESSURE: 71 MMHG

## 2020-07-20 VITALS
DIASTOLIC BLOOD PRESSURE: 69 MMHG | TEMPERATURE: 97 F | WEIGHT: 132.5 LBS | HEIGHT: 66 IN | BODY MASS INDEX: 21.29 KG/M2 | SYSTOLIC BLOOD PRESSURE: 151 MMHG | HEART RATE: 81 BPM

## 2020-07-20 DIAGNOSIS — K59.00 CONSTIPATION, UNSPECIFIED CONSTIPATION TYPE: ICD-10-CM

## 2020-07-20 DIAGNOSIS — R13.10 ODYNOPHAGIA: ICD-10-CM

## 2020-07-20 DIAGNOSIS — C32.9 LARYNX CANCER: Primary | ICD-10-CM

## 2020-07-20 PROCEDURE — 3078F DIAST BP <80 MM HG: CPT | Mod: HCNC,CPTII,S$GLB, | Performed by: PHYSICIAN ASSISTANT

## 2020-07-20 PROCEDURE — 96413 CHEMO IV INFUSION 1 HR: CPT | Mod: HCNC

## 2020-07-20 PROCEDURE — 99214 PR OFFICE/OUTPT VISIT, EST, LEVL IV, 30-39 MIN: ICD-10-PCS | Mod: HCNC,S$GLB,, | Performed by: PHYSICIAN ASSISTANT

## 2020-07-20 PROCEDURE — 3008F PR BODY MASS INDEX (BMI) DOCUMENTED: ICD-10-PCS | Mod: HCNC,CPTII,S$GLB, | Performed by: PHYSICIAN ASSISTANT

## 2020-07-20 PROCEDURE — 1101F PR PT FALLS ASSESS DOC 0-1 FALLS W/OUT INJ PAST YR: ICD-10-PCS | Mod: HCNC,CPTII,S$GLB, | Performed by: PHYSICIAN ASSISTANT

## 2020-07-20 PROCEDURE — 3078F PR MOST RECENT DIASTOLIC BLOOD PRESSURE < 80 MM HG: ICD-10-PCS | Mod: HCNC,CPTII,S$GLB, | Performed by: PHYSICIAN ASSISTANT

## 2020-07-20 PROCEDURE — 77014 PR  CT GUIDANCE PLACEMENT RAD THERAPY FIELDS: ICD-10-PCS | Mod: 26,HCNC,, | Performed by: RADIOLOGY

## 2020-07-20 PROCEDURE — 77386 HC IMRT, COMPLEX: CPT | Mod: HCNC | Performed by: RADIOLOGY

## 2020-07-20 PROCEDURE — 77014 PR  CT GUIDANCE PLACEMENT RAD THERAPY FIELDS: CPT | Mod: 26,HCNC,, | Performed by: RADIOLOGY

## 2020-07-20 PROCEDURE — 1159F MED LIST DOCD IN RCRD: CPT | Mod: HCNC,S$GLB,, | Performed by: PHYSICIAN ASSISTANT

## 2020-07-20 PROCEDURE — 25000003 PHARM REV CODE 250: Mod: HCNC | Performed by: INTERNAL MEDICINE

## 2020-07-20 PROCEDURE — 3008F BODY MASS INDEX DOCD: CPT | Mod: HCNC,CPTII,S$GLB, | Performed by: PHYSICIAN ASSISTANT

## 2020-07-20 PROCEDURE — 99999 PR PBB SHADOW E&M-EST. PATIENT-LVL IV: ICD-10-PCS | Mod: PBBFAC,HCNC,, | Performed by: PHYSICIAN ASSISTANT

## 2020-07-20 PROCEDURE — 99214 OFFICE O/P EST MOD 30 MIN: CPT | Mod: HCNC,S$GLB,, | Performed by: PHYSICIAN ASSISTANT

## 2020-07-20 PROCEDURE — 1159F PR MEDICATION LIST DOCUMENTED IN MEDICAL RECORD: ICD-10-PCS | Mod: HCNC,S$GLB,, | Performed by: PHYSICIAN ASSISTANT

## 2020-07-20 PROCEDURE — 96361 HYDRATE IV INFUSION ADD-ON: CPT | Mod: HCNC

## 2020-07-20 PROCEDURE — 99999 PR PBB SHADOW E&M-EST. PATIENT-LVL IV: CPT | Mod: PBBFAC,HCNC,, | Performed by: PHYSICIAN ASSISTANT

## 2020-07-20 PROCEDURE — 3074F SYST BP LT 130 MM HG: CPT | Mod: HCNC,CPTII,S$GLB, | Performed by: PHYSICIAN ASSISTANT

## 2020-07-20 PROCEDURE — 1125F PR PAIN SEVERITY QUANTIFIED, PAIN PRESENT: ICD-10-PCS | Mod: HCNC,S$GLB,, | Performed by: PHYSICIAN ASSISTANT

## 2020-07-20 PROCEDURE — 63600175 PHARM REV CODE 636 W HCPCS: Mod: HCNC | Performed by: INTERNAL MEDICINE

## 2020-07-20 PROCEDURE — 96366 THER/PROPH/DIAG IV INF ADDON: CPT | Mod: HCNC

## 2020-07-20 PROCEDURE — 1101F PT FALLS ASSESS-DOCD LE1/YR: CPT | Mod: HCNC,CPTII,S$GLB, | Performed by: PHYSICIAN ASSISTANT

## 2020-07-20 PROCEDURE — 96375 TX/PRO/DX INJ NEW DRUG ADDON: CPT | Mod: HCNC

## 2020-07-20 PROCEDURE — 96367 TX/PROPH/DG ADDL SEQ IV INF: CPT | Mod: HCNC

## 2020-07-20 PROCEDURE — 3074F PR MOST RECENT SYSTOLIC BLOOD PRESSURE < 130 MM HG: ICD-10-PCS | Mod: HCNC,CPTII,S$GLB, | Performed by: PHYSICIAN ASSISTANT

## 2020-07-20 PROCEDURE — 77014 HC CT GUIDANCE RADIATION THERAPY FLDS PLACEMENT: CPT | Mod: TC,HCNC | Performed by: RADIOLOGY

## 2020-07-20 PROCEDURE — 1125F AMNT PAIN NOTED PAIN PRSNT: CPT | Mod: HCNC,S$GLB,, | Performed by: PHYSICIAN ASSISTANT

## 2020-07-20 RX ORDER — HEPARIN 100 UNIT/ML
500 SYRINGE INTRAVENOUS
Status: DISCONTINUED | OUTPATIENT
Start: 2020-07-20 | End: 2020-07-20 | Stop reason: HOSPADM

## 2020-07-20 RX ORDER — HEPARIN 100 UNIT/ML
500 SYRINGE INTRAVENOUS
Status: CANCELLED | OUTPATIENT
Start: 2020-07-20

## 2020-07-20 RX ORDER — SODIUM CHLORIDE 0.9 % (FLUSH) 0.9 %
10 SYRINGE (ML) INJECTION
Status: CANCELLED | OUTPATIENT
Start: 2020-07-20

## 2020-07-20 RX ORDER — HYDROCODONE BITARTRATE AND ACETAMINOPHEN 7.5; 325 MG/15ML; MG/15ML
15 SOLUTION ORAL EVERY 8 HOURS PRN
Qty: 473 ML | Refills: 0 | Status: SHIPPED | OUTPATIENT
Start: 2020-07-20 | End: 2020-08-24 | Stop reason: SDUPTHER

## 2020-07-20 RX ORDER — SODIUM CHLORIDE 0.9 % (FLUSH) 0.9 %
10 SYRINGE (ML) INJECTION
Status: DISCONTINUED | OUTPATIENT
Start: 2020-07-20 | End: 2020-07-20 | Stop reason: HOSPADM

## 2020-07-20 RX ADMIN — APREPITANT 130 MG: 130 INJECTION, EMULSION INTRAVENOUS at 01:07

## 2020-07-20 RX ADMIN — SODIUM CHLORIDE 500 ML: 0.9 INJECTION, SOLUTION INTRAVENOUS at 02:07

## 2020-07-20 RX ADMIN — POTASSIUM CHLORIDE: 2 INJECTION, SOLUTION, CONCENTRATE INTRAVENOUS at 10:07

## 2020-07-20 RX ADMIN — DEXAMETHASONE SODIUM PHOSPHATE: 4 INJECTION, SOLUTION INTRA-ARTICULAR; INTRALESIONAL; INTRAMUSCULAR; INTRAVENOUS; SOFT TISSUE at 12:07

## 2020-07-20 RX ADMIN — CISPLATIN 65 MG: 1 INJECTION INTRAVENOUS at 01:07

## 2020-07-20 NOTE — PLAN OF CARE
Patient tolerated Cisplatin/ivfs well today. Patient met with RD today. Plan to rtc in 1 week. Patient states typically goes to lab, goes to radiation appt, then to see Dr Aguilar, then up here for infusion. PIV removed, catheter tip intact. AVS given. Reviewed upcoming appts. Discharged home, ambulated independently.

## 2020-07-20 NOTE — PLAN OF CARE
Problem: Adult Inpatient Plan of Care  Goal: Optimal Comfort and Wellbeing  Intervention: Provide Person-Centered Care  Flowsheets (Taken 7/20/2020 1100)  Trust Relationship/Rapport:   questions encouraged   choices provided   care explained   emotional support provided   reassurance provided   thoughts/feelings acknowledged   empathic listening provided   questions answered

## 2020-07-20 NOTE — PROGRESS NOTES
Subjective:       Patient ID: Sharath Huizar is a 69 y.o. male.    Chief Complaint: Larynx cancer      History:  Past Medical History:   Diagnosis Date    Coronary artery disease     Hypertension     Myocardial infarction     NSTEMI (non-ST elevated myocardial infarction) 3/23/2018    Nuclear sclerosis of both eyes 6/13/2019     Past Surgical History:   Procedure Laterality Date    CARDIAC CATHETERIZATION      with 4 stents    DIRECT LARYNGOSCOPY N/A 4/20/2020    Procedure: LARYNGOSCOPY, DIRECT;  Surgeon: Genoveva Vallejo MD;  Location: [facility] OR;  Service: ENT;  Laterality: N/A;    LEFT HEART CATHETERIZATION Left 5/29/2018    Procedure: Left heart cath;  Surgeon: Viral Lombardi MD;  Location: [facility] CATH LAB;  Service: Cardiology;  Laterality: Left;  RN PREOP 5/28/18    LUNG BIOPSY N/A 4/27/2020    Procedure: BIOPSY, LUNG;  Surgeon: Dosc Diagnostic Provider;  Location: [facility] OR;  Service: Radiology;  Laterality: N/A;  9am start--RN PHONE PREOP 4/24----COVID NEGATIVE--pt      Oncology History   Larynx cancer   5/5/2020 Initial Diagnosis    Larynx cancer     7/6/2020 -  Chemotherapy    Treatment Summary   Plan Name: OP HEAD NECK CISPLATIN WEEKLY + RADIOTHERAPY  Treatment Goal: Curative  Status: Active  Start Date: 7/6/2020  End Date: 8/10/2020 (Planned)  Provider: Pilar Aguilar MD  Chemotherapy: CISplatin (PLATINOL) 40 mg/m2 = 65 mg in sodium chloride 0.9% 500 mL chemo infusion, 40 mg/m2 = 65 mg, Intravenous, Clinic/HOD 1 time, 2 of 6 cycles  Dose modification: 30 mg/m2 (original dose 30 mg/m2, Cycle 2)  Administration: 65 mg (7/6/2020)     Lung cancer   5/8/2020 Initial Diagnosis    Lung cancer     5/28/2020 - 6/8/2020 Radiation Therapy    Treating physician: Fernando Mosher MD  Total Dose: 50 Gy  Fractions: 4    Treatment Summary  Course: C1 RLL lung 2020    Treatment Site Ref. ID Energy Dose/Fx (Gy) #Fx Dose Correction (Gy) Total Dose (Gy) Start Date End Date Elapsed Days   SB Lung_R PTV_RLL allie 6X 12.5 4 /  "4 0 50 5/28/2020 6/8/2020 11                   Allergies:  Review of patient's allergies indicates:  No Known Allergies     HPI Sharath Huizar is a 69 y.o. male  presenting today for a new diagnosis of Larynx cancer. Patient was seen in ER 2-27-20 with hemoptysis and hoarse voice for a couple of months along with left temporal headaches.   He underwent chest xray on 2/28/2020 which revealed "Small focal opacity within the right lower lung zone with possible cavitation.  This may reflect focal infectious or inflammatory process"  He was referred to pulmonary and underwent CT chest, abdomen/pelvis on 3/18/2020 which revealed "a 2.2 cm noncalcified nodule seen at the right lung base.  May represent consolidation however malignancy can not be excluded.  PET-CT is recommended for further evaluation"  PET scan on 3/27/2020 revealed "Hypermetabolic left supraglottic soft tissue mass concerning for primary laryngeal cancer.  Two hypermetabolic bilateral cervical lymph nodes as well as a hypermetabolic right lower lobe partially cavitary pulmonary mass concerning for metastatic disease.  Recommend ENT consultation and dedicated contrast enhance neck CT"  He was referred to ENT and underwent CT neck on 4/16/2020 which revealed Stable left supraglottic laryngeal mass and bilateral cervical metastatic adenopathy.     He underwent direct laryngoscopy on 4/20/2020 and that revealed "bulky and friable supraglottic mass on left side of glottic surface of epiglottis extending over slightly on to the right. Extends down along left aryepiglottic fold and true vocal fold on left. The right vocal fold did not appear to be involved. No lesion of pyriform sinus. Palpation of vallecula and base of tongue was soft. No additional lesions were noted in the oral cavity or oropharynx"  Pathology revealed squamous cell cancer  His case was discussed in head and Neck tumor board and he underwent left lung biopsy on 4/27/2020 and pathology reveals " squamous/adenosquamous cell cancer     He has completed RT to his lung and is on chemo RT for his larynx cancer     He is on chemo/RT with weekly Cisplatin. This will be week #3.   He notes nausea which resolves with antiemetics. He notes constipation but has improved with the Colace. He is swallowing but does report some mild irritation with swallowing. He has been given a spray to help with the pain. He has not started the medication yet. Overall, he is doing well and will continue today.    He denies any nausea, vomiting, diarrhea, abdominal pain, weight loss or loss of appetite, chest pain, shortness of breath, leg swelling, fatigue, pain, headache, dizziness, or mood changes. Lab work has been reviewed.     His ECOG PS is zero     ECOG:  ECOG SCORE            Review of Systems   Constitutional: Negative for activity change, appetite change, chills, fatigue, fever and unexpected weight change.   HENT: Positive for sore throat. Negative for congestion, mouth sores, nosebleeds, sinus pressure, sinus pain and trouble swallowing.    Eyes: Negative for photophobia, pain and visual disturbance.   Respiratory: Negative for apnea, cough, chest tightness, shortness of breath and wheezing.    Cardiovascular: Negative for chest pain and leg swelling.   Gastrointestinal: Positive for constipation. Negative for abdominal distention, abdominal pain, anal bleeding, blood in stool, diarrhea, nausea, rectal pain and vomiting.   Genitourinary: Negative for dysuria, flank pain and hematuria.   Musculoskeletal: Negative for back pain and gait problem.   Skin: Negative for color change and rash.   Neurological: Negative for dizziness, syncope, weakness, light-headedness, numbness and headaches.   Hematological: Negative for adenopathy.   Psychiatric/Behavioral: Negative for behavioral problems, confusion, sleep disturbance and suicidal ideas. The patient is not nervous/anxious.        Objective:      Physical Exam  Vitals signs and  nursing note reviewed.   Constitutional:       General: He is not in acute distress.     Appearance: He is well-developed. He is not ill-appearing, toxic-appearing or diaphoretic.   HENT:      Head: Normocephalic and atraumatic.      Right Ear: Hearing normal. No tenderness.      Left Ear: Hearing normal. No tenderness.      Nose: No nasal deformity or rhinorrhea.      Mouth/Throat:      Dentition: Normal dentition. No dental abscesses.      Pharynx: Uvula midline. No oropharyngeal exudate.      Tonsils: No tonsillar abscesses.   Eyes:      General: Lids are normal. No scleral icterus.        Right eye: No foreign body.         Left eye: No foreign body.      Conjunctiva/sclera: Conjunctivae normal.      Pupils: Pupils are equal, round, and reactive to light.   Neck:      Musculoskeletal: Normal range of motion and neck supple. Normal range of motion. No muscular tenderness.      Trachea: Trachea normal. No tracheal deviation.   Cardiovascular:      Rate and Rhythm: Normal rate and regular rhythm.      Heart sounds: Normal heart sounds. No murmur. No friction rub. No gallop.    Pulmonary:      Effort: Pulmonary effort is normal. No accessory muscle usage or respiratory distress.      Breath sounds: Normal breath sounds. No stridor. No decreased breath sounds, wheezing, rhonchi or rales.   Chest:      Chest wall: No tenderness.   Abdominal:      General: Bowel sounds are normal. There is no distension.      Palpations: Abdomen is soft. Abdomen is not rigid. There is no mass.      Tenderness: There is no abdominal tenderness. There is no guarding or rebound.      Hernia: No hernia is present.   Musculoskeletal: Normal range of motion.         General: No tenderness.      Right shoulder: He exhibits normal range of motion, no tenderness, no swelling, no deformity, no pain and no spasm.   Lymphadenopathy:      Head:      Right side of head: No submental or submandibular adenopathy.      Left side of head: No submental  or submandibular adenopathy.      Cervical: No cervical adenopathy.   Skin:     General: Skin is warm and dry.      Capillary Refill: Capillary refill takes less than 2 seconds.      Findings: No ecchymosis, erythema, petechiae or rash.   Neurological:      Mental Status: He is alert and oriented to person, place, and time.      Cranial Nerves: No cranial nerve deficit.      Sensory: No sensory deficit.      Coordination: Coordination normal.      Gait: Gait normal.      Deep Tendon Reflexes: Reflexes normal.   Psychiatric:         Speech: Speech normal.         Behavior: Behavior normal. Behavior is cooperative.         Thought Content: Thought content normal.         Judgment: Judgment normal.         Results:   CBC Oncology  Order: [id number]  Status:  Final result   Visible to patient:  No (not released)   Next appt:  Today at 09:00 AM in Chemotherapy (NURSE 4, [facility] CHEMO)   Dx:  Larynx cancer   Ref Range & Units 07:07   WBC 3.90 - 12.70 K/uL 7.48    RBC 4.60 - 6.20 M/uL 3.50Low     Hemoglobin 14.0 - 18.0 g/dL 10.4Low     Hematocrit 40.0 - 54.0 % 32.8Low     Mean Corpuscular Volume 82 - 98 fL 94    Mean Corpuscular Hemoglobin 27.0 - 31.0 pg 29.7    Mean Corpuscular Hemoglobin Conc 32.0 - 36.0 g/dL 31.7Low     RDW 11.5 - 14.5 % 15.9High     Platelets 150 - 350 K/uL 203    MPV 9.2 - 12.9 fL 10.1    Gran # (ANC) 1.8 - 7.7 K/uL 5.6    Comment: The ANC is based on a white cell differential from an   automated cell counter. It has not been microscopically   reviewed for the presence of abnormal cells. Clinical   correlation is required.    Immature Grans (Abs) 0.00 - 0.04 K/uL 0.02    Comment: Mild elevation in immature granulocytes is non specific and   can be seen in a variety of conditions including stress response,   acute inflammation, trauma and pregnancy. Correlation with other   laboratory and clinical findings is essential.            Comprehensive metabolic panel  Order: [id number]  Status:  Final result    Visible to patient:  No (not released)   Next appt:  Today at 09:00 AM in Chemotherapy (NURSE 4, [facility] CHEMO)   Dx:  Larynx cancer   Ref Range & Units 07:07   Sodium 136 - 145 mmol/L 135Low     Potassium 3.5 - 5.1 mmol/L 4.0    Chloride 95 - 110 mmol/L 106    CO2 23 - 29 mmol/L 22Low     Glucose 70 - 110 mg/dL 121High     BUN, Bld 8 - 23 mg/dL 16    Creatinine 0.5 - 1.4 mg/dL 1.0    Calcium 8.7 - 10.5 mg/dL 8.4Low     Total Protein 6.0 - 8.4 g/dL 6.4    Albumin 3.5 - 5.2 g/dL 3.4Low     Total Bilirubin 0.1 - 1.0 mg/dL 0.3    Comment: For infants and newborns, interpretation of results should be based   on gestational age, weight and in agreement with clinical   observations.   Premature Infant recommended reference ranges:   Up to 24 hours.............<8.0 mg/dL   Up to 48 hours............<12.0 mg/dL   3-5 days..................<15.0 mg/dL   6-29 days.................<15.0 mg/dL    Alkaline Phosphatase 55 - 135 U/L 86    AST 10 - 40 U/L 20    ALT 10 - 44 U/L 22    Anion Gap 8 - 16 mmol/L 7Low     eGFR if African American >60 mL/min/1.73 m^2 >60.0    eGFR if non African American >60 mL/min/1.73 m^2 >60.0    Comment: Calculation used to obtain the estimated glomerular filtration   rate (eGFR) is the CKD-EPI equation.            Magnesium  Order: [id number]  Status:  Final result   Visible to patient:  No (not released)   Next appt:  Today at 09:00 AM in Chemotherapy (NURSE 4, [facility] CHEMO)   Dx:  Larynx cancer   Ref Range & Units 07:07   Magnesium 1.6 - 2.6 mg/dL 1.8            Phosphorus  Order: [id number]  Status:  Final result   Visible to patient:  No (not released)   Next appt:  Today at 09:00 AM in Chemotherapy (NURSE 4, [facility] CHEMO)   Dx:  Larynx cancer   Ref Range & Units 07:07   Phosphorus 2.7 - 4.5 mg/dL 3.3            Assessment:     1. Larynx cancer    2. Constipation, unspecified constipation type    3.     Odynophagia    Plan:   1. He will proceed with weekly Cisplatin with concurrent RT and will return in  1 week for next cycle.  2. Escribed colace and doing better. Will continue.   3. Mild at this time. Will  medication as directed. Will continue to monitor    Pte displayed understanding of the above encounter and treatment plan. All thoughtful questions were answered to their satisfaction. Pte was advised to notify the care team or proceed to the ER if signs and symptoms worsen.

## 2020-07-20 NOTE — PLAN OF CARE
Pt. On day 6 of outpt. Xrt to larynx.  Mild dysphagia and odynophagia.  Tolerating soft foods.  Not using Peg tube.  Peg intact.

## 2020-07-20 NOTE — PROGRESS NOTES
Oncology Nutrition  Radiation Oncology Weekly Check     Treatment Week: 3 (Day 11)    Sharath Huizar   1950    Reason for Visit: Pt here for weekly visit due to high nutritional risk radiation therapy treatment (laryngeal cancer)    Nutrition Assessment    Patient reports moderate odynophagia. He is tolerating soft foods and liquids. Drinks Boost Plus x 4 daily. Has not needed to use PEG tube but is flushing every day. Continues to drink a lot of water. Denies any further issues with constipation.     Weight:  134.9lb   Height: 66in    Usual BW: 140-145lb  Weight Change: up 3lb from last week    Vitamins/Supplements: Boost     Nutrition Impact Symptoms    Odynophagia -grade 2    Activities/Functional Status: Little change from baseline     Nutrition focused physical findings: thin, mild to moderate muscle losses     Nutrition Recommendations    Continue soft diet with Boost Plus 3-4 times daily   Flush PEG daily with 50mL water  Continue to drink at least 2000mL fluids daily   Magic Mouthwash about 20 minutes prior to meals     Follow up weekly       Lashay Alanis, MPH, RD, , LDN, FAND   [phone]

## 2020-07-21 PROCEDURE — 77014 PR  CT GUIDANCE PLACEMENT RAD THERAPY FIELDS: ICD-10-PCS | Mod: 26,HCNC,, | Performed by: RADIOLOGY

## 2020-07-21 PROCEDURE — 77014 PR  CT GUIDANCE PLACEMENT RAD THERAPY FIELDS: CPT | Mod: 26,HCNC,, | Performed by: RADIOLOGY

## 2020-07-21 PROCEDURE — 77386 HC IMRT, COMPLEX: CPT | Mod: HCNC | Performed by: RADIOLOGY

## 2020-07-21 PROCEDURE — 77014 HC CT GUIDANCE RADIATION THERAPY FLDS PLACEMENT: CPT | Mod: TC,HCNC | Performed by: RADIOLOGY

## 2020-07-22 PROCEDURE — 77014 PR  CT GUIDANCE PLACEMENT RAD THERAPY FIELDS: ICD-10-PCS | Mod: 26,HCNC,, | Performed by: RADIOLOGY

## 2020-07-22 PROCEDURE — 77386 HC IMRT, COMPLEX: CPT | Mod: HCNC | Performed by: RADIOLOGY

## 2020-07-22 PROCEDURE — 77014 PR  CT GUIDANCE PLACEMENT RAD THERAPY FIELDS: CPT | Mod: 26,HCNC,, | Performed by: RADIOLOGY

## 2020-07-22 PROCEDURE — 77014 HC CT GUIDANCE RADIATION THERAPY FLDS PLACEMENT: CPT | Mod: TC,HCNC | Performed by: RADIOLOGY

## 2020-07-23 PROCEDURE — 77014 PR  CT GUIDANCE PLACEMENT RAD THERAPY FIELDS: CPT | Mod: 26,HCNC,, | Performed by: RADIOLOGY

## 2020-07-23 PROCEDURE — 77014 PR  CT GUIDANCE PLACEMENT RAD THERAPY FIELDS: ICD-10-PCS | Mod: 26,HCNC,, | Performed by: RADIOLOGY

## 2020-07-23 PROCEDURE — 77386 HC IMRT, COMPLEX: CPT | Mod: HCNC | Performed by: RADIOLOGY

## 2020-07-23 PROCEDURE — 77014 HC CT GUIDANCE RADIATION THERAPY FLDS PLACEMENT: CPT | Mod: TC,HCNC | Performed by: RADIOLOGY

## 2020-07-24 PROCEDURE — 77014 HC CT GUIDANCE RADIATION THERAPY FLDS PLACEMENT: CPT | Mod: TC,HCNC | Performed by: RADIOLOGY

## 2020-07-24 PROCEDURE — 77014 PR  CT GUIDANCE PLACEMENT RAD THERAPY FIELDS: ICD-10-PCS | Mod: 26,HCNC,, | Performed by: RADIOLOGY

## 2020-07-24 PROCEDURE — 77386 HC IMRT, COMPLEX: CPT | Mod: HCNC | Performed by: RADIOLOGY

## 2020-07-24 PROCEDURE — 77336 RADIATION PHYSICS CONSULT: CPT | Mod: HCNC | Performed by: RADIOLOGY

## 2020-07-24 PROCEDURE — 77014 PR  CT GUIDANCE PLACEMENT RAD THERAPY FIELDS: CPT | Mod: 26,HCNC,, | Performed by: RADIOLOGY

## 2020-07-27 ENCOUNTER — OFFICE VISIT (OUTPATIENT)
Dept: HEMATOLOGY/ONCOLOGY | Facility: CLINIC | Age: 70
End: 2020-07-27
Payer: MEDICARE

## 2020-07-27 ENCOUNTER — DOCUMENTATION ONLY (OUTPATIENT)
Dept: RADIATION ONCOLOGY | Facility: CLINIC | Age: 70
End: 2020-07-27

## 2020-07-27 ENCOUNTER — INFUSION (OUTPATIENT)
Dept: INFUSION THERAPY | Facility: HOSPITAL | Age: 70
End: 2020-07-27
Attending: INTERNAL MEDICINE
Payer: MEDICARE

## 2020-07-27 VITALS
BODY MASS INDEX: 21.18 KG/M2 | HEIGHT: 66 IN | TEMPERATURE: 99 F | SYSTOLIC BLOOD PRESSURE: 116 MMHG | DIASTOLIC BLOOD PRESSURE: 73 MMHG | HEART RATE: 75 BPM | WEIGHT: 131.81 LBS | RESPIRATION RATE: 17 BRPM | OXYGEN SATURATION: 100 %

## 2020-07-27 VITALS
DIASTOLIC BLOOD PRESSURE: 76 MMHG | HEART RATE: 75 BPM | TEMPERATURE: 99 F | RESPIRATION RATE: 18 BRPM | SYSTOLIC BLOOD PRESSURE: 129 MMHG

## 2020-07-27 DIAGNOSIS — Z93.1 GASTROSTOMY IN PLACE: ICD-10-CM

## 2020-07-27 DIAGNOSIS — C32.9 LARYNX CANCER: Primary | ICD-10-CM

## 2020-07-27 PROCEDURE — 77014 PR  CT GUIDANCE PLACEMENT RAD THERAPY FIELDS: ICD-10-PCS | Mod: 26,HCNC,, | Performed by: RADIOLOGY

## 2020-07-27 PROCEDURE — 3008F BODY MASS INDEX DOCD: CPT | Mod: HCNC,CPTII,S$GLB, | Performed by: INTERNAL MEDICINE

## 2020-07-27 PROCEDURE — 1159F PR MEDICATION LIST DOCUMENTED IN MEDICAL RECORD: ICD-10-PCS | Mod: HCNC,S$GLB,, | Performed by: INTERNAL MEDICINE

## 2020-07-27 PROCEDURE — 3074F PR MOST RECENT SYSTOLIC BLOOD PRESSURE < 130 MM HG: ICD-10-PCS | Mod: HCNC,CPTII,S$GLB, | Performed by: INTERNAL MEDICINE

## 2020-07-27 PROCEDURE — 1126F PR PAIN SEVERITY QUANTIFIED, NO PAIN PRESENT: ICD-10-PCS | Mod: HCNC,S$GLB,, | Performed by: INTERNAL MEDICINE

## 2020-07-27 PROCEDURE — 96366 THER/PROPH/DIAG IV INF ADDON: CPT | Mod: HCNC

## 2020-07-27 PROCEDURE — 3008F PR BODY MASS INDEX (BMI) DOCUMENTED: ICD-10-PCS | Mod: HCNC,CPTII,S$GLB, | Performed by: INTERNAL MEDICINE

## 2020-07-27 PROCEDURE — 3074F SYST BP LT 130 MM HG: CPT | Mod: HCNC,CPTII,S$GLB, | Performed by: INTERNAL MEDICINE

## 2020-07-27 PROCEDURE — 99999 PR PBB SHADOW E&M-EST. PATIENT-LVL IV: ICD-10-PCS | Mod: PBBFAC,HCNC,, | Performed by: INTERNAL MEDICINE

## 2020-07-27 PROCEDURE — 99214 PR OFFICE/OUTPT VISIT, EST, LEVL IV, 30-39 MIN: ICD-10-PCS | Mod: HCNC,S$GLB,, | Performed by: INTERNAL MEDICINE

## 2020-07-27 PROCEDURE — 63600175 PHARM REV CODE 636 W HCPCS: Mod: HCNC | Performed by: INTERNAL MEDICINE

## 2020-07-27 PROCEDURE — 1159F MED LIST DOCD IN RCRD: CPT | Mod: HCNC,S$GLB,, | Performed by: INTERNAL MEDICINE

## 2020-07-27 PROCEDURE — 96413 CHEMO IV INFUSION 1 HR: CPT | Mod: HCNC

## 2020-07-27 PROCEDURE — 77014 PR  CT GUIDANCE PLACEMENT RAD THERAPY FIELDS: CPT | Mod: 26,HCNC,, | Performed by: RADIOLOGY

## 2020-07-27 PROCEDURE — 3078F DIAST BP <80 MM HG: CPT | Mod: HCNC,CPTII,S$GLB, | Performed by: INTERNAL MEDICINE

## 2020-07-27 PROCEDURE — 96367 TX/PROPH/DG ADDL SEQ IV INF: CPT | Mod: HCNC

## 2020-07-27 PROCEDURE — 77386 HC IMRT, COMPLEX: CPT | Mod: HCNC | Performed by: RADIOLOGY

## 2020-07-27 PROCEDURE — 3078F PR MOST RECENT DIASTOLIC BLOOD PRESSURE < 80 MM HG: ICD-10-PCS | Mod: HCNC,CPTII,S$GLB, | Performed by: INTERNAL MEDICINE

## 2020-07-27 PROCEDURE — 1126F AMNT PAIN NOTED NONE PRSNT: CPT | Mod: HCNC,S$GLB,, | Performed by: INTERNAL MEDICINE

## 2020-07-27 PROCEDURE — 1101F PT FALLS ASSESS-DOCD LE1/YR: CPT | Mod: HCNC,CPTII,S$GLB, | Performed by: INTERNAL MEDICINE

## 2020-07-27 PROCEDURE — 99214 OFFICE O/P EST MOD 30 MIN: CPT | Mod: HCNC,S$GLB,, | Performed by: INTERNAL MEDICINE

## 2020-07-27 PROCEDURE — 25000003 PHARM REV CODE 250: Mod: HCNC | Performed by: INTERNAL MEDICINE

## 2020-07-27 PROCEDURE — 96375 TX/PRO/DX INJ NEW DRUG ADDON: CPT | Mod: HCNC

## 2020-07-27 PROCEDURE — 99999 PR PBB SHADOW E&M-EST. PATIENT-LVL IV: CPT | Mod: PBBFAC,HCNC,, | Performed by: INTERNAL MEDICINE

## 2020-07-27 PROCEDURE — 77014 HC CT GUIDANCE RADIATION THERAPY FLDS PLACEMENT: CPT | Mod: TC,HCNC | Performed by: RADIOLOGY

## 2020-07-27 PROCEDURE — 96361 HYDRATE IV INFUSION ADD-ON: CPT | Mod: HCNC

## 2020-07-27 PROCEDURE — 1101F PR PT FALLS ASSESS DOC 0-1 FALLS W/OUT INJ PAST YR: ICD-10-PCS | Mod: HCNC,CPTII,S$GLB, | Performed by: INTERNAL MEDICINE

## 2020-07-27 RX ORDER — HEPARIN 100 UNIT/ML
500 SYRINGE INTRAVENOUS
Status: DISCONTINUED | OUTPATIENT
Start: 2020-07-27 | End: 2020-07-27 | Stop reason: HOSPADM

## 2020-07-27 RX ORDER — SODIUM CHLORIDE 0.9 % (FLUSH) 0.9 %
10 SYRINGE (ML) INJECTION
Status: CANCELLED | OUTPATIENT
Start: 2020-07-27

## 2020-07-27 RX ORDER — SODIUM CHLORIDE 0.9 % (FLUSH) 0.9 %
10 SYRINGE (ML) INJECTION
Status: DISCONTINUED | OUTPATIENT
Start: 2020-07-27 | End: 2020-07-27 | Stop reason: HOSPADM

## 2020-07-27 RX ORDER — HEPARIN 100 UNIT/ML
500 SYRINGE INTRAVENOUS
Status: CANCELLED | OUTPATIENT
Start: 2020-07-27

## 2020-07-27 RX ADMIN — SODIUM CHLORIDE 500 ML: 0.9 INJECTION, SOLUTION INTRAVENOUS at 12:07

## 2020-07-27 RX ADMIN — APREPITANT 130 MG: 130 INJECTION, EMULSION INTRAVENOUS at 11:07

## 2020-07-27 RX ADMIN — CISPLATIN 67 MG: 1 INJECTION INTRAVENOUS at 11:07

## 2020-07-27 RX ADMIN — DEXAMETHASONE SODIUM PHOSPHATE: 4 INJECTION, SOLUTION INTRA-ARTICULAR; INTRALESIONAL; INTRAMUSCULAR; INTRAVENOUS; SOFT TISSUE at 11:07

## 2020-07-27 RX ADMIN — POTASSIUM CHLORIDE: 2 INJECTION, SOLUTION, CONCENTRATE INTRAVENOUS at 09:07

## 2020-07-27 NOTE — PROGRESS NOTES
"Subjective:       Patient ID: Sharath Huizar is a 69 y.o. male.    Chief Complaint: larynx cancer  Sharath Huizar is a 69 y.o. male  presenting today for a new diagnosis of Larynx cancer. Patient was seen in ER 2-27-20 with hemoptysis and hoarse voice for a couple of months along with left temporal headaches.   He underwent chest xray on 2/28/2020 which revealed "Small focal opacity within the right lower lung zone with possible cavitation.  This may reflect focal infectious or inflammatory process"  He was referred to pulmonary and underwent CT chest, abdomen/pelvis on 3/18/2020 which revealed "a 2.2 cm noncalcified nodule seen at the right lung base.  May represent consolidation however malignancy can not be excluded.  PET-CT is recommended for further evaluation"  PET scan on 3/27/2020 revealed "Hypermetabolic left supraglottic soft tissue mass concerning for primary laryngeal cancer.  Two hypermetabolic bilateral cervical lymph nodes as well as a hypermetabolic right lower lobe partially cavitary pulmonary mass concerning for metastatic disease.  Recommend ENT consultation and dedicated contrast enhance neck CT"  He was referred to ENT and underwent CT neck on 4/16/2020 which revealed Stable left supraglottic laryngeal mass and bilateral cervical metastatic adenopathy.     He underwent direct laryngoscopy on 4/20/2020 and that revealed "bulky and friable supraglottic mass on left side of glottic surface of epiglottis extending over slightly on to the right. Extends down along left aryepiglottic fold and true vocal fold on left. The right vocal fold did not appear to be involved. No lesion of pyriform sinus. Palpation of vallecula and base of tongue was soft. No additional lesions were noted in the oral cavity or oropharynx"  Pathology revealed squamous cell cancer  His case was discussed in head and Neck tumor board and he underwent left lung biopsy on 4/27/2020 and pathology reveals squamous/adenosquamous cell " cancer     He has completed RT to his lung and is on chemo RT for his larynx cancer     HPI He is on chemo/RT with weekly Cisplatin, week 4 today. He notes lack of taste.,\  He is able to eat and swallow. He is not using his PEG tube  He denies any nausea, vomiting, diarrhea, constipation, abdominal pain, weight loss or loss of appetite, chest pain, shortness of breath, leg swelling, fatigue, pain, headache, dizziness, or mood changes. His ECOG PS is zero. He is by himself/    Review of Systems   Constitutional: Negative for appetite change, fatigue and unexpected weight change.        Lack of taste   HENT: Negative for mouth sores.    Eyes: Negative for visual disturbance.   Respiratory: Negative for cough and shortness of breath.    Cardiovascular: Negative for chest pain.   Gastrointestinal: Negative for abdominal pain and diarrhea.   Genitourinary: Negative for frequency.   Musculoskeletal: Negative for back pain.   Integumentary:  Negative for rash.   Neurological: Negative for headaches.   Hematological: Negative for adenopathy.   Psychiatric/Behavioral: The patient is not nervous/anxious.    All other systems reviewed and are negative.        Objective:      Physical Exam  Vitals signs reviewed.   Constitutional:       Appearance: He is well-developed.   HENT:      Mouth/Throat:      Pharynx: No oropharyngeal exudate.   Cardiovascular:      Rate and Rhythm: Normal rate.      Heart sounds: Normal heart sounds.   Pulmonary:      Effort: Pulmonary effort is normal.      Breath sounds: Normal breath sounds. No wheezing.   Abdominal:      General: Bowel sounds are normal.      Palpations: Abdomen is soft.      Tenderness: There is no abdominal tenderness.      Comments: PEG tube present   Musculoskeletal:         General: No tenderness.   Lymphadenopathy:      Cervical: No cervical adenopathy.   Skin:     General: Skin is warm and dry.      Findings: No rash.   Neurological:      Mental Status: He is alert and  oriented to person, place, and time.      Coordination: Coordination normal.   Psychiatric:         Thought Content: Thought content normal.         Judgment: Judgment normal.           LABS:  WBC   Date Value Ref Range Status   07/20/2020 7.48 3.90 - 12.70 K/uL Final     Hemoglobin   Date Value Ref Range Status   07/20/2020 10.4 (L) 14.0 - 18.0 g/dL Final     Hematocrit   Date Value Ref Range Status   07/20/2020 32.8 (L) 40.0 - 54.0 % Final     Platelets   Date Value Ref Range Status   07/20/2020 203 150 - 350 K/uL Final     Gran # (ANC)   Date Value Ref Range Status   07/20/2020 5.6 1.8 - 7.7 K/uL Final     Comment:     The ANC is based on a white cell differential from an   automated cell counter. It has not been microscopically   reviewed for the presence of abnormal cells. Clinical   correlation is required.         Chemistry        Component Value Date/Time     07/27/2020 0717    K 4.0 07/27/2020 0717     07/27/2020 0717    CO2 24 07/27/2020 0717    BUN 13 07/27/2020 0717    CREATININE 0.8 07/27/2020 0717    GLU 99 07/27/2020 0717        Component Value Date/Time    CALCIUM 8.2 (L) 07/27/2020 0717    ALKPHOS 76 07/27/2020 0717    AST 18 07/27/2020 0717    ALT 16 07/27/2020 0717    BILITOT 0.2 07/27/2020 0717    ESTGFRAFRICA >60.0 07/27/2020 0717    EGFRNONAA >60.0 07/27/2020 0717          Assessment:       1. Larynx cancer    2. Gastrostomy in place        Plan:         1,2. He is doing well overall and will proceed with week 4 of Cisplatin with concurrent RT and will return in 1 week for next cycle  2. He is currently able to swallow and is not using his PEG tube    Above care plan was discussed with patient and all questions were addressed to his satisfaction

## 2020-07-27 NOTE — PLAN OF CARE
Pt received IVFs and cisplatin; tolerated well. VSS and NAD. Pt instructed to call MD with any concerns. Pt discharged home independently.       Problem: Nausea and Vomiting (Chemotherapy Effects)  Goal: Fluid and Electrolyte Balance  Outcome: Ongoing, Progressing     Problem: Neurotoxicity (Chemotherapy Effects)  Goal: Neurotoxicity Symptom Control  Outcome: Ongoing, Progressing     Problem: Neutropenia (Chemotherapy Effects)  Goal: Absence of Infection  Outcome: Ongoing, Progressing

## 2020-07-27 NOTE — PROGRESS NOTES
Oncology Nutrition  Radiation Oncology Weekly Check     Treatment Week: 4(Day 16)    Sharath Huizar   1950    Reason for Visit: Pt here for weekly visit due to high nutritional risk radiation therapy treatment (laryngeal cancer)    Nutrition Assessment    Patient reports moderate odynophagia. He is still tolerating soft foods and liquids. Drinks Boost Plus x 3-4 daily. Has not needed to use PEG tube but is flushing every day. Continues to drink 3-4 bottles of water daily. Bowel movements are regular. Only new concern is dysgeusia.     Weight:  133.8lb   Height: 66in    Usual BW: 140-145lb  Weight Change: down 1lb from last week    Vitamins/Supplements: Boost     Nutrition Impact Symptoms    Odynophagia -grade 2    Activities/Functional Status: Little change from baseline     Nutrition focused physical findings: thin, mild to moderate muscle losses     Nutrition Recommendations    Continue soft diet  Increase Boost Plus to 4-5 times daily   Flush PEG daily with 50mL water  Continue to drink at least 1500-2000mL fluids daily   Magic Mouthwash about 20 minutes prior to meals     Follow up weekly       Lashay Alanis, MPH, RD, , LDN, FAND   [phone]

## 2020-07-28 PROCEDURE — 77386 HC IMRT, COMPLEX: CPT | Mod: HCNC | Performed by: RADIOLOGY

## 2020-07-28 PROCEDURE — 77014 PR  CT GUIDANCE PLACEMENT RAD THERAPY FIELDS: CPT | Mod: 26,HCNC,, | Performed by: RADIOLOGY

## 2020-07-28 PROCEDURE — 77014 PR  CT GUIDANCE PLACEMENT RAD THERAPY FIELDS: ICD-10-PCS | Mod: 26,HCNC,, | Performed by: RADIOLOGY

## 2020-07-28 PROCEDURE — 77014 HC CT GUIDANCE RADIATION THERAPY FLDS PLACEMENT: CPT | Mod: TC,HCNC | Performed by: RADIOLOGY

## 2020-07-29 PROCEDURE — 77014 PR  CT GUIDANCE PLACEMENT RAD THERAPY FIELDS: ICD-10-PCS | Mod: 26,HCNC,, | Performed by: RADIOLOGY

## 2020-07-29 PROCEDURE — 77386 HC IMRT, COMPLEX: CPT | Mod: HCNC | Performed by: RADIOLOGY

## 2020-07-29 PROCEDURE — 77014 HC CT GUIDANCE RADIATION THERAPY FLDS PLACEMENT: CPT | Mod: TC,HCNC | Performed by: RADIOLOGY

## 2020-07-29 PROCEDURE — 77014 PR  CT GUIDANCE PLACEMENT RAD THERAPY FIELDS: CPT | Mod: 26,HCNC,, | Performed by: RADIOLOGY

## 2020-07-30 PROCEDURE — 77386 HC IMRT, COMPLEX: CPT | Mod: HCNC | Performed by: RADIOLOGY

## 2020-07-30 PROCEDURE — 77014 HC CT GUIDANCE RADIATION THERAPY FLDS PLACEMENT: CPT | Mod: TC,HCNC | Performed by: RADIOLOGY

## 2020-07-30 PROCEDURE — 77014 PR  CT GUIDANCE PLACEMENT RAD THERAPY FIELDS: CPT | Mod: 26,HCNC,, | Performed by: RADIOLOGY

## 2020-07-30 PROCEDURE — 77014 PR  CT GUIDANCE PLACEMENT RAD THERAPY FIELDS: ICD-10-PCS | Mod: 26,HCNC,, | Performed by: RADIOLOGY

## 2020-07-31 PROCEDURE — 77386 HC IMRT, COMPLEX: CPT | Mod: HCNC | Performed by: RADIOLOGY

## 2020-07-31 PROCEDURE — 77336 RADIATION PHYSICS CONSULT: CPT | Mod: HCNC | Performed by: RADIOLOGY

## 2020-07-31 PROCEDURE — 77014 PR  CT GUIDANCE PLACEMENT RAD THERAPY FIELDS: CPT | Mod: 26,HCNC,, | Performed by: RADIOLOGY

## 2020-07-31 PROCEDURE — 77014 HC CT GUIDANCE RADIATION THERAPY FLDS PLACEMENT: CPT | Mod: TC,HCNC | Performed by: RADIOLOGY

## 2020-07-31 PROCEDURE — 77014 PR  CT GUIDANCE PLACEMENT RAD THERAPY FIELDS: ICD-10-PCS | Mod: 26,HCNC,, | Performed by: RADIOLOGY

## 2020-08-03 ENCOUNTER — INFUSION (OUTPATIENT)
Dept: INFUSION THERAPY | Facility: HOSPITAL | Age: 70
End: 2020-08-03
Attending: INTERNAL MEDICINE
Payer: MEDICARE

## 2020-08-03 ENCOUNTER — OFFICE VISIT (OUTPATIENT)
Dept: HEMATOLOGY/ONCOLOGY | Facility: CLINIC | Age: 70
End: 2020-08-03
Payer: MEDICARE

## 2020-08-03 ENCOUNTER — HOSPITAL ENCOUNTER (OUTPATIENT)
Dept: RADIATION THERAPY | Facility: HOSPITAL | Age: 70
Discharge: HOME | End: 2020-08-03
Attending: RADIOLOGY
Payer: MEDICARE

## 2020-08-03 ENCOUNTER — DOCUMENTATION ONLY (OUTPATIENT)
Dept: RADIATION ONCOLOGY | Facility: CLINIC | Age: 70
End: 2020-08-03

## 2020-08-03 VITALS
HEART RATE: 74 BPM | HEIGHT: 66 IN | WEIGHT: 129.88 LBS | TEMPERATURE: 98 F | DIASTOLIC BLOOD PRESSURE: 72 MMHG | BODY MASS INDEX: 20.87 KG/M2 | RESPIRATION RATE: 18 BRPM | SYSTOLIC BLOOD PRESSURE: 155 MMHG

## 2020-08-03 VITALS
SYSTOLIC BLOOD PRESSURE: 125 MMHG | WEIGHT: 129.88 LBS | HEART RATE: 79 BPM | BODY MASS INDEX: 20.87 KG/M2 | OXYGEN SATURATION: 100 % | TEMPERATURE: 99 F | DIASTOLIC BLOOD PRESSURE: 80 MMHG | RESPIRATION RATE: 20 BRPM | HEIGHT: 66 IN

## 2020-08-03 DIAGNOSIS — Z93.1 GASTROSTOMY IN PLACE: ICD-10-CM

## 2020-08-03 DIAGNOSIS — C77.0 SECONDARY MALIGNANT NEOPLASM OF CERVICAL LYMPH NODE: ICD-10-CM

## 2020-08-03 DIAGNOSIS — C32.9 LARYNX CANCER: Primary | ICD-10-CM

## 2020-08-03 PROCEDURE — 1126F AMNT PAIN NOTED NONE PRSNT: CPT | Mod: HCNC,S$GLB,, | Performed by: INTERNAL MEDICINE

## 2020-08-03 PROCEDURE — 99999 PR PBB SHADOW E&M-EST. PATIENT-LVL IV: CPT | Mod: PBBFAC,HCNC,, | Performed by: INTERNAL MEDICINE

## 2020-08-03 PROCEDURE — 77014 PR  CT GUIDANCE PLACEMENT RAD THERAPY FIELDS: ICD-10-PCS | Mod: 26,HCNC,, | Performed by: RADIOLOGY

## 2020-08-03 PROCEDURE — 77386 HC IMRT, COMPLEX: CPT | Mod: HCNC | Performed by: RADIOLOGY

## 2020-08-03 PROCEDURE — 99999 PR PBB SHADOW E&M-EST. PATIENT-LVL IV: ICD-10-PCS | Mod: PBBFAC,HCNC,, | Performed by: INTERNAL MEDICINE

## 2020-08-03 PROCEDURE — 1101F PR PT FALLS ASSESS DOC 0-1 FALLS W/OUT INJ PAST YR: ICD-10-PCS | Mod: HCNC,CPTII,S$GLB, | Performed by: INTERNAL MEDICINE

## 2020-08-03 PROCEDURE — 77014 HC CT GUIDANCE RADIATION THERAPY FLDS PLACEMENT: CPT | Mod: TC,HCNC | Performed by: RADIOLOGY

## 2020-08-03 PROCEDURE — 3079F PR MOST RECENT DIASTOLIC BLOOD PRESSURE 80-89 MM HG: ICD-10-PCS | Mod: HCNC,CPTII,S$GLB, | Performed by: INTERNAL MEDICINE

## 2020-08-03 PROCEDURE — 77014 PR  CT GUIDANCE PLACEMENT RAD THERAPY FIELDS: CPT | Mod: 26,HCNC,, | Performed by: RADIOLOGY

## 2020-08-03 PROCEDURE — 96413 CHEMO IV INFUSION 1 HR: CPT | Mod: HCNC

## 2020-08-03 PROCEDURE — 96360 HYDRATION IV INFUSION INIT: CPT | Mod: HCNC,59

## 2020-08-03 PROCEDURE — 3008F BODY MASS INDEX DOCD: CPT | Mod: HCNC,CPTII,S$GLB, | Performed by: INTERNAL MEDICINE

## 2020-08-03 PROCEDURE — 1159F MED LIST DOCD IN RCRD: CPT | Mod: HCNC,S$GLB,, | Performed by: INTERNAL MEDICINE

## 2020-08-03 PROCEDURE — 96366 THER/PROPH/DIAG IV INF ADDON: CPT | Mod: HCNC

## 2020-08-03 PROCEDURE — 3079F DIAST BP 80-89 MM HG: CPT | Mod: HCNC,CPTII,S$GLB, | Performed by: INTERNAL MEDICINE

## 2020-08-03 PROCEDURE — 25000003 PHARM REV CODE 250: Mod: HCNC | Performed by: INTERNAL MEDICINE

## 2020-08-03 PROCEDURE — 96367 TX/PROPH/DG ADDL SEQ IV INF: CPT | Mod: HCNC

## 2020-08-03 PROCEDURE — 1126F PR PAIN SEVERITY QUANTIFIED, NO PAIN PRESENT: ICD-10-PCS | Mod: HCNC,S$GLB,, | Performed by: INTERNAL MEDICINE

## 2020-08-03 PROCEDURE — 3074F PR MOST RECENT SYSTOLIC BLOOD PRESSURE < 130 MM HG: ICD-10-PCS | Mod: HCNC,CPTII,S$GLB, | Performed by: INTERNAL MEDICINE

## 2020-08-03 PROCEDURE — 99215 PR OFFICE/OUTPT VISIT, EST, LEVL V, 40-54 MIN: ICD-10-PCS | Mod: HCNC,S$GLB,, | Performed by: INTERNAL MEDICINE

## 2020-08-03 PROCEDURE — 3008F PR BODY MASS INDEX (BMI) DOCUMENTED: ICD-10-PCS | Mod: HCNC,CPTII,S$GLB, | Performed by: INTERNAL MEDICINE

## 2020-08-03 PROCEDURE — 99215 OFFICE O/P EST HI 40 MIN: CPT | Mod: HCNC,S$GLB,, | Performed by: INTERNAL MEDICINE

## 2020-08-03 PROCEDURE — 1101F PT FALLS ASSESS-DOCD LE1/YR: CPT | Mod: HCNC,CPTII,S$GLB, | Performed by: INTERNAL MEDICINE

## 2020-08-03 PROCEDURE — 96375 TX/PRO/DX INJ NEW DRUG ADDON: CPT | Mod: HCNC

## 2020-08-03 PROCEDURE — 63600175 PHARM REV CODE 636 W HCPCS: Mod: HCNC | Performed by: INTERNAL MEDICINE

## 2020-08-03 PROCEDURE — 96365 THER/PROPH/DIAG IV INF INIT: CPT | Mod: HCNC,59

## 2020-08-03 PROCEDURE — 1159F PR MEDICATION LIST DOCUMENTED IN MEDICAL RECORD: ICD-10-PCS | Mod: HCNC,S$GLB,, | Performed by: INTERNAL MEDICINE

## 2020-08-03 PROCEDURE — 3074F SYST BP LT 130 MM HG: CPT | Mod: HCNC,CPTII,S$GLB, | Performed by: INTERNAL MEDICINE

## 2020-08-03 RX ORDER — HEPARIN 100 UNIT/ML
500 SYRINGE INTRAVENOUS
Status: CANCELLED | OUTPATIENT
Start: 2020-08-03

## 2020-08-03 RX ORDER — SODIUM CHLORIDE 0.9 % (FLUSH) 0.9 %
10 SYRINGE (ML) INJECTION
Status: CANCELLED | OUTPATIENT
Start: 2020-08-03

## 2020-08-03 RX ORDER — HEPARIN 100 UNIT/ML
500 SYRINGE INTRAVENOUS
Status: DISCONTINUED | OUTPATIENT
Start: 2020-08-03 | End: 2020-08-03 | Stop reason: HOSPADM

## 2020-08-03 RX ORDER — SODIUM CHLORIDE 0.9 % (FLUSH) 0.9 %
10 SYRINGE (ML) INJECTION
Status: DISCONTINUED | OUTPATIENT
Start: 2020-08-03 | End: 2020-08-03 | Stop reason: HOSPADM

## 2020-08-03 RX ADMIN — DEXAMETHASONE SODIUM PHOSPHATE: 4 INJECTION, SOLUTION INTRA-ARTICULAR; INTRALESIONAL; INTRAMUSCULAR; INTRAVENOUS; SOFT TISSUE at 11:08

## 2020-08-03 RX ADMIN — APREPITANT 130 MG: 130 INJECTION, EMULSION INTRAVENOUS at 11:08

## 2020-08-03 RX ADMIN — MAGNESIUM SULFATE: 500 INJECTION, SOLUTION INTRAMUSCULAR; INTRAVENOUS at 09:08

## 2020-08-03 RX ADMIN — SODIUM CHLORIDE: 0.9 INJECTION, SOLUTION INTRAVENOUS at 09:08

## 2020-08-03 RX ADMIN — CISPLATIN 67 MG: 1 INJECTION INTRAVENOUS at 12:08

## 2020-08-03 RX ADMIN — SODIUM CHLORIDE 500 ML: 0.9 INJECTION, SOLUTION INTRAVENOUS at 01:08

## 2020-08-03 NOTE — PLAN OF CARE
1518  Infusion completed, pt tolerated well; pt instructed to remain well hydrated; discussed hand hygiene, infection prevention, when to contact MD, when to report to ER; reviewed Advanced Directive/Living Will instructions to complete; AVS given to and reviewed with pt, pt verbalized understanding of all discussed and when to report next

## 2020-08-03 NOTE — PROGRESS NOTES
"Subjective:       Patient ID: Sharath Huizar is a 69 y.o. male.    Chief Complaint: Larynx cancer  Sharath Huizar is a 69 y.o. male  presenting today for a new diagnosis of Larynx cancer. Patient was seen in ER 2-27-20 with hemoptysis and hoarse voice for a couple of months along with left temporal headaches.   He underwent chest xray on 2/28/2020 which revealed "Small focal opacity within the right lower lung zone with possible cavitation.  This may reflect focal infectious or inflammatory process"  He was referred to pulmonary and underwent CT chest, abdomen/pelvis on 3/18/2020 which revealed "a 2.2 cm noncalcified nodule seen at the right lung base.  May represent consolidation however malignancy can not be excluded.  PET-CT is recommended for further evaluation"  PET scan on 3/27/2020 revealed "Hypermetabolic left supraglottic soft tissue mass concerning for primary laryngeal cancer.  Two hypermetabolic bilateral cervical lymph nodes as well as a hypermetabolic right lower lobe partially cavitary pulmonary mass concerning for metastatic disease.  Recommend ENT consultation and dedicated contrast enhance neck CT"  He was referred to ENT and underwent CT neck on 4/16/2020 which revealed Stable left supraglottic laryngeal mass and bilateral cervical metastatic adenopathy.     He underwent direct laryngoscopy on 4/20/2020 and that revealed "bulky and friable supraglottic mass on left side of glottic surface of epiglottis extending over slightly on to the right. Extends down along left aryepiglottic fold and true vocal fold on left. The right vocal fold did not appear to be involved. No lesion of pyriform sinus. Palpation of vallecula and base of tongue was soft. No additional lesions were noted in the oral cavity or oropharynx"  Pathology revealed squamous cell cancer  His case was discussed in head and Neck tumor board and he underwent left lung biopsy on 4/27/2020 and pathology reveals squamous/adenosquamous cell " cancer     He has completed RT to his lung and is on chemo RT for his larynx cancer       HPI He is on chemo/RT with weekly Cisplatin, week 5 today.   He is able to swallow but he has lost of taste.  He denies any nausea, vomiting, diarrhea, constipation, abdominal pain, weight loss or loss of appetite, chest pain, shortness of breath, leg swelling, fatigue, pain, headache, dizziness, or mood changes. His ECOG PS is zero               Review of Systems   Constitutional: Negative for appetite change, fatigue and unexpected weight change.        Loss of taste+   HENT: Negative for mouth sores.    Eyes: Negative for visual disturbance.   Respiratory: Negative for cough and shortness of breath.    Cardiovascular: Negative for chest pain.   Gastrointestinal: Negative for abdominal pain and diarrhea.   Genitourinary: Negative for frequency.   Musculoskeletal: Negative for back pain.   Integumentary:  Negative for rash.   Neurological: Negative for headaches.   Hematological: Negative for adenopathy.   Psychiatric/Behavioral: The patient is not nervous/anxious.    All other systems reviewed and are negative.        Objective:      Physical Exam  Vitals signs reviewed.   Constitutional:       Appearance: He is well-developed.   HENT:      Mouth/Throat:      Pharynx: No oropharyngeal exudate.   Cardiovascular:      Rate and Rhythm: Normal rate.      Heart sounds: Normal heart sounds.   Pulmonary:      Effort: Pulmonary effort is normal.      Breath sounds: Normal breath sounds. No wheezing.   Abdominal:      General: Bowel sounds are normal.      Palpations: Abdomen is soft.      Tenderness: There is no abdominal tenderness.      Comments: PEG tube present   Musculoskeletal:         General: No tenderness.   Lymphadenopathy:      Cervical: No cervical adenopathy.   Skin:     General: Skin is warm and dry.      Findings: No rash.   Neurological:      Mental Status: He is alert and oriented to person, place, and time.       Coordination: Coordination normal.   Psychiatric:         Thought Content: Thought content normal.         Judgment: Judgment normal.           LABS:  WBC   Date Value Ref Range Status   08/03/2020 3.06 (L) 3.90 - 12.70 K/uL Final     Hemoglobin   Date Value Ref Range Status   08/03/2020 9.1 (L) 14.0 - 18.0 g/dL Final     Hematocrit   Date Value Ref Range Status   08/03/2020 29.1 (L) 40.0 - 54.0 % Final     Platelets   Date Value Ref Range Status   08/03/2020 102 (L) 150 - 350 K/uL Final     Gran # (ANC)   Date Value Ref Range Status   08/03/2020 2.2 1.8 - 7.7 K/uL Final     Comment:     The ANC is based on a white cell differential from an   automated cell counter. It has not been microscopically   reviewed for the presence of abnormal cells. Clinical   correlation is required.         Chemistry        Component Value Date/Time     08/03/2020 0737    K 4.1 08/03/2020 0737     08/03/2020 0737    CO2 23 08/03/2020 0737    BUN 17 08/03/2020 0737    CREATININE 1.0 08/03/2020 0737     (H) 08/03/2020 0737        Component Value Date/Time    CALCIUM 8.6 (L) 08/03/2020 0737    ALKPHOS 78 08/03/2020 0737    AST 19 08/03/2020 0737    ALT 16 08/03/2020 0737    BILITOT 0.3 08/03/2020 0737    ESTGFRAFRICA >60.0 08/03/2020 0737    EGFRNONAA >60.0 08/03/2020 0737          Assessment:       1. 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 Gastrostomy in place        Plan:        1,2,3. He will proceed with weekly Cisplatin with concurrent RT. He is tolerating his chemo/RT reasonable well. He will return in 1 week for next cycle. He is able to eat by mouth and maintain his weight and is not using his PEG tube.    Above care plan was discussed with patient and all questions were addressed to his satisfaction

## 2020-08-03 NOTE — PROGRESS NOTES
Oncology Nutrition  Radiation Oncology Weekly Check     Treatment Week: 5(Day 21)    Sharath Huizar   1950    Reason for Visit: Pt here for weekly visit due to high nutritional risk radiation therapy treatment (laryngeal cancer)    Nutrition Assessment    Patient reports little change from last week. Continues to have moderate odynophagia. He is still tolerating soft foods and liquids. Drinks Boost Plus x 4 daily. Has not needed to use PEG tube but is flushing every day. Continues to drink 3-4 bottles of water daily. Bowel movements are regular. Main concern is dysgeusia.     Weight:  132.1lb   Height: 66in    Usual BW: 140-145lb  Weight Change: down 1lb from last week    Vitamins/Supplements: Boost     Nutrition Impact Symptoms    Odynophagia -grade 2    Activities/Functional Status: Little change from baseline     Nutrition focused physical findings: thin, mild to moderate muscle losses     Nutrition Recommendations    Continue soft diet  Continue Boost Plus to 4-5 times daily   Flush PEG daily with 50mL water  Continue to drink at least 1500-2000mL fluids daily   Magic Mouthwash about 20 minutes prior to meals     Follow up weekly       Lashay Alanis, MPH, RD, , LDN, FAND   [phone]

## 2020-08-03 NOTE — PROGRESS NOTES
Routine Office Visit    Patient Name: Sharath Huizar    : 1950  MRN: [id number]    Subjective:  Sharath is a 67 y.o. male who presents today for:   Chief Complaint   Patient presents with    Shoulder Pain     b/l no better after therapy(3)     67-year-old male comes in with continue bilateral shoulder pain. He states that he only completed 3 sessions of a physical therapy because the he could no longer afford to keep pain 40 dollars of visit for the physical therapy sessions.  He states he wants something else done for his bilateral shoulder pain. He states that the pain is a daily basis and waxes and wanes throughout the day.  He states that lifting motions a and carrying things worsen the pain. Previous images done of the shoulders show that there is mild degenerative changes of the AC joints right more than left.  There was also advanced degenerative change at the glenohumeral joints this x-ray was done on 2018.  The patient reports that he is taking his blood pressure medicine without any side effects.  Today, he reports that on his last visit with doctor Lombardi he had been informed that Dr. Lombardi had recommended that he have an AICD placed.  The this was because his last ejection fraction was less than 35%.  However the patient declined this.      Past Medical History  Past Medical History:   Diagnosis Date    Coronary artery disease     Hypertension     Myocardial infarction        Past Surgical History  Past Surgical History:   Procedure Laterality Date    CARDIAC CATHETERIZATION      with 4 stents    Left heart cath Left 2018    Performed by Viral Lombardi MD at [facility] CATH LAB    LEFT HEART CATHETERIZATION Left 2018    Procedure: Left heart cath;  Surgeon: Viral Lombardi MD;  Location: [facility] CATH LAB;  Service: Cardiology;  Laterality: Left;  RN PREOP 18        Family History  Family History   Problem Relation Age of Onset    No Known Problems Mother     No Known  Talked to patient         He states he was seen in UC for flu like symptoms on 7/31/20.     He reports having elevated blood sugars, runny nose, diarrhea.  He reports being tired 2 weeks prior.    COVID test negative.     Since Friday am having 2 scramble eggs, 1/2 grilled cheese sandwich,  1 scoop of ice cream and hot dog that did not taste well.    He is coffee drinker, now unable to drink coffee due to the taste.     He is still having the diarrhea, very liquidly having no form.       Today he denies having any fevers, cough, HA.       Wife s/s at this time- has no appetite.     Oliver is wondering what he should take for the diarrhea.    Please advise      Problems Father     No Known Problems Sister     No Known Problems Brother     No Known Problems Maternal Aunt     No Known Problems Maternal Uncle     No Known Problems Paternal Aunt     No Known Problems Paternal Uncle     No Known Problems Maternal Grandmother     No Known Problems Maternal Grandfather     No Known Problems Paternal Grandmother     No Known Problems Paternal Grandfather     Amblyopia Neg Hx     Blindness Neg Hx     Cancer Neg Hx     Cataracts Neg Hx     Diabetes Neg Hx     Glaucoma Neg Hx     Hypertension Neg Hx     Macular degeneration Neg Hx     Retinal detachment Neg Hx     Strabismus Neg Hx     Stroke Neg Hx     Thyroid disease Neg Hx        Social History  Social History     Socioeconomic History    Marital status:      Spouse name: Not on file    Number of children: Not on file    Years of education: Not on file    Highest education level: Not on file   Social Needs    Financial resource strain: Not on file    Food insecurity - worry: Not on file    Food insecurity - inability: Not on file    Transportation needs - medical: Not on file    Transportation needs - non-medical: Not on file   Occupational History    Not on file   Tobacco Use    Smoking status: Former Smoker     Types: Cigarettes     Last attempt to quit: 3/23/2018     Years since quittin.4    Smokeless tobacco: Never Used   Substance and Sexual Activity    Alcohol use: No    Drug use: No    Sexual activity: Yes     Partners: Female   Other Topics Concern    Not on file   Social History Narrative    Not on file       Current Medications  Current Outpatient Medications on File Prior to Visit   Medication Sig Dispense Refill    aspirin (ECOTRIN) 81 MG EC tablet Take 1 tablet (81 mg total) by mouth once daily.  0    nicotine (NICODERM CQ) 7 mg/24 hr Place 1 patch onto the skin once daily. for 14 days 14 patch 0    nicotine, polacrilex, (NICORETTE) 2 mg Gum 1-2 per hour in place of  "cigarettes maximum of 15 per day. 220 each 0    famotidine (PEPCID) 20 MG tablet Take 1 tablet (20 mg total) by mouth once daily. 30 tablet 1    isosorbide mononitrate (IMDUR) 30 MG 24 hr tablet Take 1 tablet (30 mg total) by mouth once daily. 30 tablet 11    nitroGLYCERIN (NITROSTAT) 0.4 MG SL tablet Place 1 tablet (0.4 mg total) under the tongue every 5 (five) minutes as needed. 25 tablet 11     No current facility-administered medications on file prior to visit.        Allergies   Review of patient's allergies indicates:  No Known Allergies    Review of Systems   Constitutional: Negative for chills and fever.   Respiratory: Negative for shortness of breath.    Cardiovascular: Negative for chest pain, palpitations and leg swelling.   Gastrointestinal: Negative for abdominal pain, blood in stool, constipation, diarrhea and nausea.   Genitourinary: Negative for dysuria.   Musculoskeletal: Positive for arthralgias.   Neurological: Negative for dizziness, light-headedness and headaches.         /78 (BP Location: Right arm, Patient Position: Sitting, BP Method: Medium (Manual))   Pulse 72   Temp 97.7 °F (36.5 °C) (Oral)   Resp 16   Ht 5' 6" (1.676 m)   Wt 63.6 kg (140 lb 3.4 oz)   SpO2 96%   BMI 22.63 kg/m²     Physical Exam   Constitutional: He appears well-developed and well-nourished.   HENT:   Head: Normocephalic.   Right Ear: External ear normal.   Left Ear: External ear normal.   Nose: Nose normal.   Mouth/Throat: No oropharyngeal exudate.   Neck: Normal range of motion. Neck supple. No tracheal deviation present.   Cardiovascular: Normal rate, regular rhythm, normal heart sounds and intact distal pulses.   No murmur heard.  Pulmonary/Chest: Effort normal and breath sounds normal. He has no wheezes. He has no rales.   Abdominal: Soft. Bowel sounds are normal. He exhibits no mass. There is no tenderness.   Musculoskeletal: He exhibits no edema.        Right shoulder: He exhibits tenderness. He " exhibits normal range of motion, no swelling and no deformity.        Left shoulder: He exhibits tenderness. He exhibits normal range of motion, no swelling and no deformity.   Lymphadenopathy:     He has no cervical adenopathy.   Skin: He is not diaphoretic.   Vitals reviewed.      Assessment/Plan:    Primary osteoarthritis of both shoulders  -     Ambulatory referral to Orthopedics  The patient was referred to orthopedics for an evaluation.    Hypertension, essential  -     lisinopril (PRINIVIL,ZESTRIL) 2.5 MG tablet; Take 1 tablet (2.5 mg total) by mouth once daily.  -     metoprolol succinate (TOPROL-XL) 50 MG 24 hr tablet; Take 1 tablet (50 mg total) by mouth once daily.  -     Comprehensive metabolic panel; Future  -     CBC auto differential; Future  Patient's continues current regimen.    Coronary artery disease involving native coronary artery of native heart without angina pectoris  -     atorvastatin (LIPITOR) 80 MG tablet; Take 1 tablet (80 mg total) by mouth once daily.  -     clopidogrel (PLAVIX) 75 mg tablet; Take 1 tablet (75 mg total) by mouth once daily.  -     Comprehensive metabolic panel; Future  -     CBC auto differential; Future  Continue Lipitor and Plavix.    Chronic systolic heart failure  -     Comprehensive metabolic panel; Future  -     CBC auto differential; Future  Risks and the benefits of an automatic implantable cardioverter-defibrillator (AICD), were explained to the patient.  He was advised to discuss this with doctor Lombardi again as his ejection fraction was less than 35%, and an AICD can give him some mortality benefits        This office note has been dictated.  This dictation has been generated using M-Modal Fluency Direct dictation; some phonetic errors may occur.

## 2020-08-03 NOTE — NURSING
0905  Pt here for Cisplatin, IVFs, no new complaints or concerns at present, reports tolerating treatment; discussed treatment plan for today, all questions answered and pt agrees to proceed

## 2020-08-04 PROCEDURE — 77014 PR  CT GUIDANCE PLACEMENT RAD THERAPY FIELDS: ICD-10-PCS | Mod: 26,HCNC,, | Performed by: RADIOLOGY

## 2020-08-04 PROCEDURE — 77014 HC CT GUIDANCE RADIATION THERAPY FLDS PLACEMENT: CPT | Mod: TC,HCNC | Performed by: RADIOLOGY

## 2020-08-04 PROCEDURE — 77014 PR  CT GUIDANCE PLACEMENT RAD THERAPY FIELDS: CPT | Mod: 26,HCNC,, | Performed by: RADIOLOGY

## 2020-08-04 PROCEDURE — 77386 HC IMRT, COMPLEX: CPT | Mod: HCNC | Performed by: RADIOLOGY

## 2020-08-05 PROCEDURE — 77014 HC CT GUIDANCE RADIATION THERAPY FLDS PLACEMENT: CPT | Mod: TC,HCNC | Performed by: RADIOLOGY

## 2020-08-05 PROCEDURE — 77386 HC IMRT, COMPLEX: CPT | Mod: HCNC | Performed by: RADIOLOGY

## 2020-08-05 PROCEDURE — 77014 PR  CT GUIDANCE PLACEMENT RAD THERAPY FIELDS: ICD-10-PCS | Mod: 26,HCNC,, | Performed by: RADIOLOGY

## 2020-08-05 PROCEDURE — 77014 PR  CT GUIDANCE PLACEMENT RAD THERAPY FIELDS: CPT | Mod: 26,HCNC,, | Performed by: RADIOLOGY

## 2020-08-06 PROCEDURE — 77014 HC CT GUIDANCE RADIATION THERAPY FLDS PLACEMENT: CPT | Mod: TC,HCNC | Performed by: RADIOLOGY

## 2020-08-06 PROCEDURE — 77014 PR  CT GUIDANCE PLACEMENT RAD THERAPY FIELDS: ICD-10-PCS | Mod: 26,HCNC,, | Performed by: RADIOLOGY

## 2020-08-06 PROCEDURE — 77386 HC IMRT, COMPLEX: CPT | Mod: HCNC | Performed by: RADIOLOGY

## 2020-08-06 PROCEDURE — 77014 PR  CT GUIDANCE PLACEMENT RAD THERAPY FIELDS: CPT | Mod: 26,HCNC,, | Performed by: RADIOLOGY

## 2020-08-07 PROCEDURE — 77336 RADIATION PHYSICS CONSULT: CPT | Mod: HCNC | Performed by: RADIOLOGY

## 2020-08-07 PROCEDURE — 77014 PR  CT GUIDANCE PLACEMENT RAD THERAPY FIELDS: ICD-10-PCS | Mod: 26,HCNC,, | Performed by: RADIOLOGY

## 2020-08-07 PROCEDURE — 77014 PR  CT GUIDANCE PLACEMENT RAD THERAPY FIELDS: CPT | Mod: 26,HCNC,, | Performed by: RADIOLOGY

## 2020-08-07 PROCEDURE — 77014 HC CT GUIDANCE RADIATION THERAPY FLDS PLACEMENT: CPT | Mod: TC,HCNC | Performed by: RADIOLOGY

## 2020-08-07 PROCEDURE — 77386 HC IMRT, COMPLEX: CPT | Mod: HCNC | Performed by: RADIOLOGY

## 2020-08-10 ENCOUNTER — OFFICE VISIT (OUTPATIENT)
Dept: HEMATOLOGY/ONCOLOGY | Facility: CLINIC | Age: 70
End: 2020-08-10
Payer: MEDICARE

## 2020-08-10 ENCOUNTER — DOCUMENTATION ONLY (OUTPATIENT)
Dept: RADIATION ONCOLOGY | Facility: CLINIC | Age: 70
End: 2020-08-10

## 2020-08-10 ENCOUNTER — INFUSION (OUTPATIENT)
Dept: INFUSION THERAPY | Facility: HOSPITAL | Age: 70
End: 2020-08-10
Attending: INTERNAL MEDICINE
Payer: MEDICARE

## 2020-08-10 VITALS
HEIGHT: 66 IN | WEIGHT: 128.75 LBS | SYSTOLIC BLOOD PRESSURE: 118 MMHG | TEMPERATURE: 99 F | BODY MASS INDEX: 20.69 KG/M2 | RESPIRATION RATE: 18 BRPM | DIASTOLIC BLOOD PRESSURE: 60 MMHG | HEART RATE: 85 BPM

## 2020-08-10 VITALS
HEART RATE: 73 BPM | DIASTOLIC BLOOD PRESSURE: 66 MMHG | WEIGHT: 128.75 LBS | BODY MASS INDEX: 20.69 KG/M2 | RESPIRATION RATE: 18 BRPM | TEMPERATURE: 99 F | SYSTOLIC BLOOD PRESSURE: 121 MMHG | HEIGHT: 66 IN

## 2020-08-10 DIAGNOSIS — Z93.1 GASTROSTOMY IN PLACE: ICD-10-CM

## 2020-08-10 DIAGNOSIS — C32.9 LARYNX CANCER: Primary | ICD-10-CM

## 2020-08-10 DIAGNOSIS — D61.810 ANTINEOPLASTIC CHEMOTHERAPY INDUCED PANCYTOPENIA: ICD-10-CM

## 2020-08-10 DIAGNOSIS — T45.1X5A ANTINEOPLASTIC CHEMOTHERAPY INDUCED PANCYTOPENIA: ICD-10-CM

## 2020-08-10 PROCEDURE — 3008F PR BODY MASS INDEX (BMI) DOCUMENTED: ICD-10-PCS | Mod: HCNC,CPTII,S$GLB, | Performed by: INTERNAL MEDICINE

## 2020-08-10 PROCEDURE — 1101F PR PT FALLS ASSESS DOC 0-1 FALLS W/OUT INJ PAST YR: ICD-10-PCS | Mod: HCNC,CPTII,S$GLB, | Performed by: INTERNAL MEDICINE

## 2020-08-10 PROCEDURE — 3008F BODY MASS INDEX DOCD: CPT | Mod: HCNC,CPTII,S$GLB, | Performed by: INTERNAL MEDICINE

## 2020-08-10 PROCEDURE — 99215 OFFICE O/P EST HI 40 MIN: CPT | Mod: HCNC,S$GLB,, | Performed by: INTERNAL MEDICINE

## 2020-08-10 PROCEDURE — 25000003 PHARM REV CODE 250: Mod: HCNC | Performed by: INTERNAL MEDICINE

## 2020-08-10 PROCEDURE — 3078F DIAST BP <80 MM HG: CPT | Mod: HCNC,CPTII,S$GLB, | Performed by: INTERNAL MEDICINE

## 2020-08-10 PROCEDURE — 1159F PR MEDICATION LIST DOCUMENTED IN MEDICAL RECORD: ICD-10-PCS | Mod: HCNC,S$GLB,, | Performed by: INTERNAL MEDICINE

## 2020-08-10 PROCEDURE — 96375 TX/PRO/DX INJ NEW DRUG ADDON: CPT | Mod: HCNC

## 2020-08-10 PROCEDURE — 1159F MED LIST DOCD IN RCRD: CPT | Mod: HCNC,S$GLB,, | Performed by: INTERNAL MEDICINE

## 2020-08-10 PROCEDURE — 96361 HYDRATE IV INFUSION ADD-ON: CPT | Mod: HCNC

## 2020-08-10 PROCEDURE — 99499 UNLISTED E&M SERVICE: CPT | Mod: S$GLB,,, | Performed by: INTERNAL MEDICINE

## 2020-08-10 PROCEDURE — 99999 PR PBB SHADOW E&M-EST. PATIENT-LVL IV: ICD-10-PCS | Mod: PBBFAC,HCNC,, | Performed by: INTERNAL MEDICINE

## 2020-08-10 PROCEDURE — 77014 PR  CT GUIDANCE PLACEMENT RAD THERAPY FIELDS: CPT | Mod: 26,HCNC,, | Performed by: RADIOLOGY

## 2020-08-10 PROCEDURE — 96413 CHEMO IV INFUSION 1 HR: CPT | Mod: HCNC

## 2020-08-10 PROCEDURE — 77014 HC CT GUIDANCE RADIATION THERAPY FLDS PLACEMENT: CPT | Mod: TC,HCNC | Performed by: RADIOLOGY

## 2020-08-10 PROCEDURE — 99499 RISK ADDL DX/OHS AUDIT: ICD-10-PCS | Mod: S$GLB,,, | Performed by: INTERNAL MEDICINE

## 2020-08-10 PROCEDURE — 3074F PR MOST RECENT SYSTOLIC BLOOD PRESSURE < 130 MM HG: ICD-10-PCS | Mod: HCNC,CPTII,S$GLB, | Performed by: INTERNAL MEDICINE

## 2020-08-10 PROCEDURE — 1126F PR PAIN SEVERITY QUANTIFIED, NO PAIN PRESENT: ICD-10-PCS | Mod: HCNC,S$GLB,, | Performed by: INTERNAL MEDICINE

## 2020-08-10 PROCEDURE — 3078F PR MOST RECENT DIASTOLIC BLOOD PRESSURE < 80 MM HG: ICD-10-PCS | Mod: HCNC,CPTII,S$GLB, | Performed by: INTERNAL MEDICINE

## 2020-08-10 PROCEDURE — 1126F AMNT PAIN NOTED NONE PRSNT: CPT | Mod: HCNC,S$GLB,, | Performed by: INTERNAL MEDICINE

## 2020-08-10 PROCEDURE — 99999 PR PBB SHADOW E&M-EST. PATIENT-LVL IV: CPT | Mod: PBBFAC,HCNC,, | Performed by: INTERNAL MEDICINE

## 2020-08-10 PROCEDURE — 1101F PT FALLS ASSESS-DOCD LE1/YR: CPT | Mod: HCNC,CPTII,S$GLB, | Performed by: INTERNAL MEDICINE

## 2020-08-10 PROCEDURE — 96367 TX/PROPH/DG ADDL SEQ IV INF: CPT | Mod: HCNC

## 2020-08-10 PROCEDURE — 77386 HC IMRT, COMPLEX: CPT | Mod: HCNC | Performed by: RADIOLOGY

## 2020-08-10 PROCEDURE — 96366 THER/PROPH/DIAG IV INF ADDON: CPT | Mod: HCNC

## 2020-08-10 PROCEDURE — 63600175 PHARM REV CODE 636 W HCPCS: Mod: TB,HCNC | Performed by: INTERNAL MEDICINE

## 2020-08-10 PROCEDURE — 99215 PR OFFICE/OUTPT VISIT, EST, LEVL V, 40-54 MIN: ICD-10-PCS | Mod: HCNC,S$GLB,, | Performed by: INTERNAL MEDICINE

## 2020-08-10 PROCEDURE — 3074F SYST BP LT 130 MM HG: CPT | Mod: HCNC,CPTII,S$GLB, | Performed by: INTERNAL MEDICINE

## 2020-08-10 PROCEDURE — 77014 PR  CT GUIDANCE PLACEMENT RAD THERAPY FIELDS: ICD-10-PCS | Mod: 26,HCNC,, | Performed by: RADIOLOGY

## 2020-08-10 RX ORDER — HEPARIN 100 UNIT/ML
500 SYRINGE INTRAVENOUS
Status: CANCELLED | OUTPATIENT
Start: 2020-08-10

## 2020-08-10 RX ORDER — HEPARIN 100 UNIT/ML
500 SYRINGE INTRAVENOUS
Status: DISCONTINUED | OUTPATIENT
Start: 2020-08-10 | End: 2020-08-10 | Stop reason: HOSPADM

## 2020-08-10 RX ORDER — SODIUM CHLORIDE 0.9 % (FLUSH) 0.9 %
10 SYRINGE (ML) INJECTION
Status: CANCELLED | OUTPATIENT
Start: 2020-08-10

## 2020-08-10 RX ORDER — SODIUM CHLORIDE 0.9 % (FLUSH) 0.9 %
10 SYRINGE (ML) INJECTION
Status: DISCONTINUED | OUTPATIENT
Start: 2020-08-10 | End: 2020-08-10 | Stop reason: HOSPADM

## 2020-08-10 RX ADMIN — POTASSIUM CHLORIDE: 2 INJECTION, SOLUTION, CONCENTRATE INTRAVENOUS at 09:08

## 2020-08-10 RX ADMIN — CISPLATIN 50 MG: 1 INJECTION INTRAVENOUS at 11:08

## 2020-08-10 RX ADMIN — SODIUM CHLORIDE 500 ML: 0.9 INJECTION, SOLUTION INTRAVENOUS at 12:08

## 2020-08-10 RX ADMIN — APREPITANT 130 MG: 130 INJECTION, EMULSION INTRAVENOUS at 11:08

## 2020-08-10 RX ADMIN — DEXAMETHASONE SODIUM PHOSPHATE: 4 INJECTION, SOLUTION INTRA-ARTICULAR; INTRALESIONAL; INTRAMUSCULAR; INTRAVENOUS; SOFT TISSUE at 11:08

## 2020-08-10 NOTE — PROGRESS NOTES
"Subjective:       Patient ID: Sharath Huizar is a 69 y.o. male.    Chief Complaint: Larynx cancer  Sharath Huizar is a 69 y.o. male  presenting today for a new diagnosis of Larynx cancer. Patient was seen in ER 2-27-20 with hemoptysis and hoarse voice for a couple of months along with left temporal headaches.   He underwent chest xray on 2/28/2020 which revealed "Small focal opacity within the right lower lung zone with possible cavitation.  This may reflect focal infectious or inflammatory process"  He was referred to pulmonary and underwent CT chest, abdomen/pelvis on 3/18/2020 which revealed "a 2.2 cm noncalcified nodule seen at the right lung base.  May represent consolidation however malignancy can not be excluded.  PET-CT is recommended for further evaluation"  PET scan on 3/27/2020 revealed "Hypermetabolic left supraglottic soft tissue mass concerning for primary laryngeal cancer.  Two hypermetabolic bilateral cervical lymph nodes as well as a hypermetabolic right lower lobe partially cavitary pulmonary mass concerning for metastatic disease.  Recommend ENT consultation and dedicated contrast enhance neck CT"  He was referred to ENT and underwent CT neck on 4/16/2020 which revealed Stable left supraglottic laryngeal mass and bilateral cervical metastatic adenopathy.     He underwent direct laryngoscopy on 4/20/2020 and that revealed "bulky and friable supraglottic mass on left side of glottic surface of epiglottis extending over slightly on to the right. Extends down along left aryepiglottic fold and true vocal fold on left. The right vocal fold did not appear to be involved. No lesion of pyriform sinus. Palpation of vallecula and base of tongue was soft. No additional lesions were noted in the oral cavity or oropharynx"  Pathology revealed squamous cell cancer  His case was discussed in head and Neck tumor board and he underwent left lung biopsy on 4/27/2020 and pathology reveals squamous/adenosquamous cell " cancer     He has completed RT to his lung and is on chemo RT for his larynx cancer         HPI He is on chemo/RT with weekly Cisplatin, week 6 today.   He notes that he has skin burning sensation on his neck. He has no trouble swallowing but he has no taste.    He denies any nausea, vomiting, diarrhea, constipation, abdominal pain, weight loss or loss of appetite, chest pain, shortness of breath, leg swelling, fatigue, pain, headache, dizziness, or mood changes. His ECOG PS is zero.        Review of Systems   Constitutional: Negative for appetite change, fatigue and unexpected weight change.   HENT: Negative for mouth sores.    Eyes: Negative for visual disturbance.   Respiratory: Negative for cough and shortness of breath.    Cardiovascular: Negative for chest pain.   Gastrointestinal: Negative for abdominal pain and diarrhea.   Genitourinary: Negative for frequency.   Musculoskeletal: Negative for back pain.   Integumentary:  Negative for rash.   Neurological: Negative for headaches.   Hematological: Negative for adenopathy.   Psychiatric/Behavioral: The patient is not nervous/anxious.    All other systems reviewed and are negative.        Objective:      Physical Exam  Vitals signs reviewed.   Constitutional:       Appearance: He is well-developed.   HENT:      Mouth/Throat:      Pharynx: No oropharyngeal exudate.   Cardiovascular:      Rate and Rhythm: Normal rate.      Heart sounds: Normal heart sounds.   Pulmonary:      Effort: Pulmonary effort is normal.      Breath sounds: Normal breath sounds. No wheezing.   Abdominal:      General: Bowel sounds are normal.      Palpations: Abdomen is soft.      Tenderness: There is no abdominal tenderness.      Comments: Gastrostomy +   Musculoskeletal:         General: No tenderness.   Lymphadenopathy:      Cervical: No cervical adenopathy.   Skin:     General: Skin is warm and dry.      Findings: No rash.   Neurological:      Mental Status: He is alert and oriented to  person, place, and time.      Coordination: Coordination normal.   Psychiatric:         Thought Content: Thought content normal.         Judgment: Judgment normal.           LABS:  WBC   Date Value Ref Range Status   08/10/2020 1.50 (LL) 3.90 - 12.70 K/uL Final     Comment:     WBC critical result(s) called and verbal readback obtained from   Phoebe Polanco RN by NEREYDA 08/10/2020 07:56       Hemoglobin   Date Value Ref Range Status   08/10/2020 9.3 (L) 14.0 - 18.0 g/dL Final     Hematocrit   Date Value Ref Range Status   08/10/2020 29.0 (L) 40.0 - 54.0 % Final     Platelets   Date Value Ref Range Status   08/10/2020 82 (L) 150 - 350 K/uL Final     Gran # (ANC)   Date Value Ref Range Status   08/10/2020 1.0 (L) 1.8 - 7.7 K/uL Final     Comment:     The ANC is based on a white cell differential from an   automated cell counter. It has not been microscopically   reviewed for the presence of abnormal cells. Clinical   correlation is required.         Chemistry        Component Value Date/Time     08/03/2020 0737    K 4.1 08/03/2020 0737     08/03/2020 0737    CO2 23 08/03/2020 0737    BUN 17 08/03/2020 0737    CREATININE 1.0 08/03/2020 0737     (H) 08/03/2020 0737        Component Value Date/Time    CALCIUM 8.6 (L) 08/03/2020 0737    ALKPHOS 78 08/03/2020 0737    AST 19 08/03/2020 0737    ALT 16 08/03/2020 0737    BILITOT 0.3 08/03/2020 0737    ESTGFRAFRICA >60.0 08/03/2020 0737    EGFRNONAA >60.0 08/03/2020 0737           Assessment:       1. Larynx cancer    2. Antineoplastic chemotherapy induced pancytopenia    3. Gastrostomy in place        Plan:     1,2. He will proceed with Cisplatin with concurrent RT and will return in 1 week for last dose. His ANC and platelets are low but adequate to proceed. Will recheck labs in 1 week to decide on last cycle  3. Doing well    Above care plan was discussed with patient and all questions were addressed to his satisfaction

## 2020-08-10 NOTE — PROGRESS NOTES
Oncology Nutrition  Radiation Oncology Weekly Check     Treatment Week: week 6 (Day 26)    Sharath Huizar   1950    Reason for Visit: Pt here for weekly visit due to high nutritional risk radiation therapy treatment (laryngeal cancer)    Nutrition Assessment    Patient reports increased odynophagia. He is still tolerating soft foods and liquids. Drinks Boost Plus x 4 daily. Has not needed to use PEG tube but is flushing every day. Continues to drink 3-4 bottles of water daily. Bowel movements are regular. Main concern is dysgeusia.     Weight:  130.9lb   Height: 66in    Usual BW: 140-145lb  Weight Change: down 1lb from last week    Vitamins/Supplements: Boost     Nutrition Impact Symptoms    Odynophagia -grade 2    Activities/Functional Status: Little change from baseline     Nutrition focused physical findings: thin, mild to moderate muscle losses     Nutrition Recommendations    Continue soft diet  Continue Boost Plus to 4-5 times daily   Flush PEG daily with 50mL water  Continue to drink at least 1500-2000mL fluids daily   Magic Mouthwash about 20 minutes prior to meals     Follow up weekly       Lashay Alanis, MPH, RD, , LDN, FAND   [phone]

## 2020-08-10 NOTE — PLAN OF CARE
Day 26 of outpatient radiation to the h/n  skin erythematous  mild dysphagia drinks 3-4 boost a day    Helious ordered

## 2020-08-10 NOTE — PLAN OF CARE
Pt tolerated Cisplatin/IVF without complications. VSS. No s/s of reaction. Instructed to contact MD with any questions. PIV removed and AVS given to patient.

## 2020-08-11 PROCEDURE — 77014 PR  CT GUIDANCE PLACEMENT RAD THERAPY FIELDS: ICD-10-PCS | Mod: 26,HCNC,, | Performed by: RADIOLOGY

## 2020-08-11 PROCEDURE — 77014 HC CT GUIDANCE RADIATION THERAPY FLDS PLACEMENT: CPT | Mod: TC,HCNC | Performed by: RADIOLOGY

## 2020-08-11 PROCEDURE — 77014 PR  CT GUIDANCE PLACEMENT RAD THERAPY FIELDS: CPT | Mod: 26,HCNC,, | Performed by: RADIOLOGY

## 2020-08-11 PROCEDURE — 77386 HC IMRT, COMPLEX: CPT | Mod: HCNC | Performed by: RADIOLOGY

## 2020-08-12 ENCOUNTER — DOCUMENTATION ONLY (OUTPATIENT)
Dept: RADIATION ONCOLOGY | Facility: CLINIC | Age: 70
End: 2020-08-12

## 2020-08-12 PROCEDURE — 77014 PR  CT GUIDANCE PLACEMENT RAD THERAPY FIELDS: ICD-10-PCS | Mod: 26,HCNC,, | Performed by: RADIOLOGY

## 2020-08-12 PROCEDURE — 77014 HC CT GUIDANCE RADIATION THERAPY FLDS PLACEMENT: CPT | Mod: TC,HCNC | Performed by: RADIOLOGY

## 2020-08-12 PROCEDURE — 77386 HC IMRT, COMPLEX: CPT | Mod: HCNC | Performed by: RADIOLOGY

## 2020-08-12 PROCEDURE — 77014 PR  CT GUIDANCE PLACEMENT RAD THERAPY FIELDS: CPT | Mod: 26,HCNC,, | Performed by: RADIOLOGY

## 2020-08-12 NOTE — PROGRESS NOTES
Concsult received from Dietician (Lashay) requesting assistance with Healois. Cost transfer provided to [facility] thru Oncology patient assistance fund ($60.01). Healois given to Shabnam (RN radiation oncology) to provide to patient at next appt. No other needs identified.

## 2020-08-13 PROCEDURE — 77014 PR  CT GUIDANCE PLACEMENT RAD THERAPY FIELDS: CPT | Mod: 26,HCNC,, | Performed by: RADIOLOGY

## 2020-08-13 PROCEDURE — 77014 HC CT GUIDANCE RADIATION THERAPY FLDS PLACEMENT: CPT | Mod: TC,HCNC | Performed by: RADIOLOGY

## 2020-08-13 PROCEDURE — 77386 HC IMRT, COMPLEX: CPT | Mod: HCNC | Performed by: RADIOLOGY

## 2020-08-13 PROCEDURE — 77014 PR  CT GUIDANCE PLACEMENT RAD THERAPY FIELDS: ICD-10-PCS | Mod: 26,HCNC,, | Performed by: RADIOLOGY

## 2020-08-14 PROCEDURE — 77336 RADIATION PHYSICS CONSULT: CPT | Mod: HCNC | Performed by: RADIOLOGY

## 2020-08-14 PROCEDURE — 77014 PR  CT GUIDANCE PLACEMENT RAD THERAPY FIELDS: ICD-10-PCS | Mod: 26,HCNC,, | Performed by: RADIOLOGY

## 2020-08-14 PROCEDURE — 77014 HC CT GUIDANCE RADIATION THERAPY FLDS PLACEMENT: CPT | Mod: TC,HCNC | Performed by: RADIOLOGY

## 2020-08-14 PROCEDURE — 77386 HC IMRT, COMPLEX: CPT | Mod: HCNC | Performed by: RADIOLOGY

## 2020-08-14 PROCEDURE — 77014 PR  CT GUIDANCE PLACEMENT RAD THERAPY FIELDS: CPT | Mod: 26,HCNC,, | Performed by: RADIOLOGY

## 2020-08-17 ENCOUNTER — DOCUMENTATION ONLY (OUTPATIENT)
Dept: HEMATOLOGY/ONCOLOGY | Facility: CLINIC | Age: 70
End: 2020-08-17

## 2020-08-17 ENCOUNTER — OFFICE VISIT (OUTPATIENT)
Dept: HEMATOLOGY/ONCOLOGY | Facility: CLINIC | Age: 70
End: 2020-08-17
Payer: MEDICARE

## 2020-08-17 ENCOUNTER — DOCUMENTATION ONLY (OUTPATIENT)
Dept: RADIATION ONCOLOGY | Facility: CLINIC | Age: 70
End: 2020-08-17

## 2020-08-17 ENCOUNTER — LAB VISIT (OUTPATIENT)
Dept: LAB | Facility: HOSPITAL | Age: 70
End: 2020-08-17
Attending: INTERNAL MEDICINE
Payer: MEDICARE

## 2020-08-17 VITALS
HEART RATE: 81 BPM | DIASTOLIC BLOOD PRESSURE: 62 MMHG | WEIGHT: 128.5 LBS | TEMPERATURE: 98 F | HEIGHT: 66 IN | RESPIRATION RATE: 18 BRPM | SYSTOLIC BLOOD PRESSURE: 109 MMHG | OXYGEN SATURATION: 99 % | BODY MASS INDEX: 20.65 KG/M2

## 2020-08-17 DIAGNOSIS — Z93.1 GASTROSTOMY IN PLACE: ICD-10-CM

## 2020-08-17 DIAGNOSIS — C32.9 LARYNX CANCER: Primary | ICD-10-CM

## 2020-08-17 DIAGNOSIS — D70.1 CHEMOTHERAPY INDUCED NEUTROPENIA: ICD-10-CM

## 2020-08-17 DIAGNOSIS — C32.9 LARYNX CANCER: ICD-10-CM

## 2020-08-17 DIAGNOSIS — T45.1X5A CHEMOTHERAPY INDUCED NEUTROPENIA: ICD-10-CM

## 2020-08-17 LAB
ALBUMIN SERPL BCP-MCNC: 3.6 G/DL (ref 3.5–5.2)
ALP SERPL-CCNC: 71 U/L (ref 55–135)
ALT SERPL W/O P-5'-P-CCNC: 15 U/L (ref 10–44)
ANION GAP SERPL CALC-SCNC: 10 MMOL/L (ref 8–16)
AST SERPL-CCNC: 19 U/L (ref 10–40)
BILIRUB SERPL-MCNC: 0.2 MG/DL (ref 0.1–1)
BUN SERPL-MCNC: 26 MG/DL (ref 8–23)
CALCIUM SERPL-MCNC: 8.7 MG/DL (ref 8.7–10.5)
CHLORIDE SERPL-SCNC: 104 MMOL/L (ref 95–110)
CO2 SERPL-SCNC: 23 MMOL/L (ref 23–29)
CREAT SERPL-MCNC: 1.4 MG/DL (ref 0.5–1.4)
ERYTHROCYTE [DISTWIDTH] IN BLOOD BY AUTOMATED COUNT: 17 % (ref 11.5–14.5)
EST. GFR  (AFRICAN AMERICAN): 58.8 ML/MIN/1.73 M^2
EST. GFR  (NON AFRICAN AMERICAN): 50.9 ML/MIN/1.73 M^2
GLUCOSE SERPL-MCNC: 106 MG/DL (ref 70–110)
HCT VFR BLD AUTO: 25.5 % (ref 40–54)
HGB BLD-MCNC: 8.3 G/DL (ref 14–18)
IMM GRANULOCYTES # BLD AUTO: 0 K/UL (ref 0–0.04)
MAGNESIUM SERPL-MCNC: 1.5 MG/DL (ref 1.6–2.6)
MCH RBC QN AUTO: 31.1 PG (ref 27–31)
MCHC RBC AUTO-ENTMCNC: 32.5 G/DL (ref 32–36)
MCV RBC AUTO: 96 FL (ref 82–98)
NEUTROPHILS # BLD AUTO: 0.7 K/UL (ref 1.8–7.7)
PHOSPHATE SERPL-MCNC: 3.8 MG/DL (ref 2.7–4.5)
PLATELET # BLD AUTO: 90 K/UL (ref 150–350)
PMV BLD AUTO: 11.4 FL (ref 9.2–12.9)
POTASSIUM SERPL-SCNC: 3.8 MMOL/L (ref 3.5–5.1)
PROT SERPL-MCNC: 6.5 G/DL (ref 6–8.4)
RBC # BLD AUTO: 2.67 M/UL (ref 4.6–6.2)
SODIUM SERPL-SCNC: 137 MMOL/L (ref 136–145)
WBC # BLD AUTO: 1.43 K/UL (ref 3.9–12.7)

## 2020-08-17 PROCEDURE — 99213 OFFICE O/P EST LOW 20 MIN: CPT | Mod: HCNC,S$GLB,, | Performed by: INTERNAL MEDICINE

## 2020-08-17 PROCEDURE — 3074F PR MOST RECENT SYSTOLIC BLOOD PRESSURE < 130 MM HG: ICD-10-PCS | Mod: HCNC,CPTII,S$GLB, | Performed by: INTERNAL MEDICINE

## 2020-08-17 PROCEDURE — 99213 PR OFFICE/OUTPT VISIT, EST, LEVL III, 20-29 MIN: ICD-10-PCS | Mod: HCNC,S$GLB,, | Performed by: INTERNAL MEDICINE

## 2020-08-17 PROCEDURE — 1159F MED LIST DOCD IN RCRD: CPT | Mod: HCNC,S$GLB,, | Performed by: INTERNAL MEDICINE

## 2020-08-17 PROCEDURE — 1125F PR PAIN SEVERITY QUANTIFIED, PAIN PRESENT: ICD-10-PCS | Mod: HCNC,S$GLB,, | Performed by: INTERNAL MEDICINE

## 2020-08-17 PROCEDURE — 3008F BODY MASS INDEX DOCD: CPT | Mod: HCNC,CPTII,S$GLB, | Performed by: INTERNAL MEDICINE

## 2020-08-17 PROCEDURE — 1101F PT FALLS ASSESS-DOCD LE1/YR: CPT | Mod: HCNC,CPTII,S$GLB, | Performed by: INTERNAL MEDICINE

## 2020-08-17 PROCEDURE — 80053 COMPREHEN METABOLIC PANEL: CPT | Mod: HCNC

## 2020-08-17 PROCEDURE — 99999 PR PBB SHADOW E&M-EST. PATIENT-LVL V: ICD-10-PCS | Mod: PBBFAC,HCNC,, | Performed by: INTERNAL MEDICINE

## 2020-08-17 PROCEDURE — 77014 PR  CT GUIDANCE PLACEMENT RAD THERAPY FIELDS: ICD-10-PCS | Mod: 26,HCNC,, | Performed by: RADIOLOGY

## 2020-08-17 PROCEDURE — 3078F PR MOST RECENT DIASTOLIC BLOOD PRESSURE < 80 MM HG: ICD-10-PCS | Mod: HCNC,CPTII,S$GLB, | Performed by: INTERNAL MEDICINE

## 2020-08-17 PROCEDURE — 3074F SYST BP LT 130 MM HG: CPT | Mod: HCNC,CPTII,S$GLB, | Performed by: INTERNAL MEDICINE

## 2020-08-17 PROCEDURE — 3078F DIAST BP <80 MM HG: CPT | Mod: HCNC,CPTII,S$GLB, | Performed by: INTERNAL MEDICINE

## 2020-08-17 PROCEDURE — 77014 PR  CT GUIDANCE PLACEMENT RAD THERAPY FIELDS: CPT | Mod: 26,HCNC,, | Performed by: RADIOLOGY

## 2020-08-17 PROCEDURE — 36415 COLL VENOUS BLD VENIPUNCTURE: CPT | Mod: HCNC

## 2020-08-17 PROCEDURE — 1101F PR PT FALLS ASSESS DOC 0-1 FALLS W/OUT INJ PAST YR: ICD-10-PCS | Mod: HCNC,CPTII,S$GLB, | Performed by: INTERNAL MEDICINE

## 2020-08-17 PROCEDURE — 77386 HC IMRT, COMPLEX: CPT | Mod: HCNC | Performed by: RADIOLOGY

## 2020-08-17 PROCEDURE — 77014 HC CT GUIDANCE RADIATION THERAPY FLDS PLACEMENT: CPT | Mod: TC,HCNC | Performed by: RADIOLOGY

## 2020-08-17 PROCEDURE — 85027 COMPLETE CBC AUTOMATED: CPT | Mod: HCNC

## 2020-08-17 PROCEDURE — 99999 PR PBB SHADOW E&M-EST. PATIENT-LVL V: CPT | Mod: PBBFAC,HCNC,, | Performed by: INTERNAL MEDICINE

## 2020-08-17 PROCEDURE — 3008F PR BODY MASS INDEX (BMI) DOCUMENTED: ICD-10-PCS | Mod: HCNC,CPTII,S$GLB, | Performed by: INTERNAL MEDICINE

## 2020-08-17 PROCEDURE — 83735 ASSAY OF MAGNESIUM: CPT | Mod: HCNC

## 2020-08-17 PROCEDURE — 1125F AMNT PAIN NOTED PAIN PRSNT: CPT | Mod: HCNC,S$GLB,, | Performed by: INTERNAL MEDICINE

## 2020-08-17 PROCEDURE — 1159F PR MEDICATION LIST DOCUMENTED IN MEDICAL RECORD: ICD-10-PCS | Mod: HCNC,S$GLB,, | Performed by: INTERNAL MEDICINE

## 2020-08-17 PROCEDURE — 84100 ASSAY OF PHOSPHORUS: CPT | Mod: HCNC

## 2020-08-17 NOTE — PROGRESS NOTES
Oncology Nutrition  Radiation Oncology Weekly Check     Treatment Week: week 7 (Day 31)    Sharath Huizar   1950    Reason for Visit: Pt here for weekly visit due to high nutritional risk radiation therapy treatment (laryngeal cancer)    Nutrition Assessment    Patient reports increased odynophagia. He is still tolerating soft foods and liquids. Now using Healios BID which he thinks is helping some. He drinks Boost Plus x 5 daily. Has not needed to use PEG tube but is flushing every day. Continues to drink 3-4 bottles of water daily. Bowel movements are regular.     Weight:  130.6lb   Height: 66in    Usual BW: 140-145lb  Weight Change: stable from last week    Vitamins/Supplements: Boost     Nutrition Impact Symptoms    Odynophagia -grade 2    Activities/Functional Status: Little change from baseline     Nutrition focused physical findings: thin, mild to moderate muscle losses     Nutrition Recommendations    Continue Boost Plus- increase to 5-6 times daily   Flush PEG daily with 50mL water  Continue to drink at least 1500-2000mL fluids daily   Magic Mouthwash about 20 minutes prior to meals   Continue Healios BID    Follow up weekly       Lashay Alanis, MPH, RD, , LDN, FAND   [phone]

## 2020-08-17 NOTE — PROGRESS NOTES
"Subjective:       Patient ID: Sharath Huizar is a 69 y.o. male.    Chief Complaint: Larynx cancer  Sharath Huizar is a 69 y.o. male  presenting today for a new diagnosis of Larynx cancer. Patient was seen in ER 2-27-20 with hemoptysis and hoarse voice for a couple of months along with left temporal headaches.   He underwent chest xray on 2/28/2020 which revealed "Small focal opacity within the right lower lung zone with possible cavitation.  This may reflect focal infectious or inflammatory process"  He was referred to pulmonary and underwent CT chest, abdomen/pelvis on 3/18/2020 which revealed "a 2.2 cm noncalcified nodule seen at the right lung base.  May represent consolidation however malignancy can not be excluded.  PET-CT is recommended for further evaluation"  PET scan on 3/27/2020 revealed "Hypermetabolic left supraglottic soft tissue mass concerning for primary laryngeal cancer.  Two hypermetabolic bilateral cervical lymph nodes as well as a hypermetabolic right lower lobe partially cavitary pulmonary mass concerning for metastatic disease.  Recommend ENT consultation and dedicated contrast enhance neck CT"  He was referred to ENT and underwent CT neck on 4/16/2020 which revealed Stable left supraglottic laryngeal mass and bilateral cervical metastatic adenopathy.     He underwent direct laryngoscopy on 4/20/2020 and that revealed "bulky and friable supraglottic mass on left side of glottic surface of epiglottis extending over slightly on to the right. Extends down along left aryepiglottic fold and true vocal fold on left. The right vocal fold did not appear to be involved. No lesion of pyriform sinus. Palpation of vallecula and base of tongue was soft. No additional lesions were noted in the oral cavity or oropharynx"  Pathology revealed squamous cell cancer  His case was discussed in head and Neck tumor board and he underwent left lung biopsy on 4/27/2020 and pathology reveals squamous/adenosquamous cell " cancer     He has completed RT to his lung and is on chemo RT for his larynx cancer        HPI He is on chemo/RT with weekly Cisplatin, week 7 today. He notes difficulty eating solids, he has been able to drink water and about 3-4 boost/day  He denies any nausea, vomiting, diarrhea, constipation, abdominal pain, chest pain, shortness of breath, leg swelling, fatigue, pain, headache, dizziness, or mood changes. His ECOG PS is zero. He is by himself.      Review of Systems   Constitutional: Negative for appetite change, fatigue and unexpected weight change.   HENT: Positive for trouble swallowing. Negative for mouth sores.    Eyes: Negative for visual disturbance.   Respiratory: Negative for cough and shortness of breath.    Cardiovascular: Negative for chest pain.   Gastrointestinal: Negative for abdominal pain and diarrhea.   Genitourinary: Negative for frequency.   Musculoskeletal: Negative for back pain.   Integumentary:  Negative for rash.   Neurological: Negative for headaches.   Hematological: Negative for adenopathy.   Psychiatric/Behavioral: The patient is not nervous/anxious.    All other systems reviewed and are negative.        Objective:      Physical Exam  Vitals signs reviewed.   Constitutional:       Appearance: He is well-developed.   HENT:      Mouth/Throat:      Pharynx: No oropharyngeal exudate.   Cardiovascular:      Rate and Rhythm: Normal rate.      Heart sounds: Normal heart sounds.   Pulmonary:      Effort: Pulmonary effort is normal.      Breath sounds: Normal breath sounds. No wheezing.   Abdominal:      General: Bowel sounds are normal.      Palpations: Abdomen is soft.      Tenderness: There is no abdominal tenderness.   Musculoskeletal:         General: No tenderness.   Lymphadenopathy:      Cervical: No cervical adenopathy.   Skin:     General: Skin is warm and dry.      Findings: No rash.   Neurological:      Mental Status: He is alert and oriented to person, place, and time.       Coordination: Coordination normal.   Psychiatric:         Thought Content: Thought content normal.         Judgment: Judgment normal.           LABS:  WBC   Date Value Ref Range Status   08/17/2020 1.43 (LL) 3.90 - 12.70 K/uL Final     Comment:     WBC critical result(s) called and verbal readback obtained from   Phoebe Polanco RN. by NKY2 08/17/2020 08:17       Hemoglobin   Date Value Ref Range Status   08/17/2020 8.3 (L) 14.0 - 18.0 g/dL Final     Hematocrit   Date Value Ref Range Status   08/17/2020 25.5 (L) 40.0 - 54.0 % Final     Platelets   Date Value Ref Range Status   08/17/2020 90 (L) 150 - 350 K/uL Final     Gran # (ANC)   Date Value Ref Range Status   08/17/2020 0.7 (L) 1.8 - 7.7 K/uL Final     Comment:     The ANC is based on a white cell differential from an   automated cell counter. It has not been microscopically   reviewed for the presence of abnormal cells. Clinical   correlation is required.         Chemistry        Component Value Date/Time     08/17/2020 0710    K 3.8 08/17/2020 0710     08/17/2020 0710    CO2 23 08/17/2020 0710    BUN 26 (H) 08/17/2020 0710    CREATININE 1.4 08/17/2020 0710     08/17/2020 0710        Component Value Date/Time    CALCIUM 8.7 08/17/2020 0710    ALKPHOS 71 08/17/2020 0710    AST 19 08/17/2020 0710    ALT 15 08/17/2020 0710    BILITOT 0.2 08/17/2020 0710    ESTGFRAFRICA 58.8 (A) 08/17/2020 0710    EGFRNONAA 50.9 (A) 08/17/2020 0710      ,  Assessment:       1. Larynx cancer    2. Chemotherapy induced neutropenia    3. Gastrostomy in place        Plan:        1,2. Hold chemo due to chemo induced neutropenia. He has completed 6 weekly doses of Cisplatin,. He will complete RT this week.  Will plan on seeing him in 2 weeks with labs    Above care plan was discussed with patient and all questions were addressed to his satisfaction

## 2020-08-17 NOTE — PROGRESS NOTES
Received the invoice for patient's dental work. Have sent the request to accounts payable. Will use $987.50. Of patient assistance. Will order 2 cases of Vanilla Boost for the patient as well.

## 2020-08-18 DIAGNOSIS — C32.9 LARYNX CANCER: Primary | ICD-10-CM

## 2020-08-18 PROCEDURE — 77014 PR  CT GUIDANCE PLACEMENT RAD THERAPY FIELDS: ICD-10-PCS | Mod: 26,HCNC,, | Performed by: RADIOLOGY

## 2020-08-18 PROCEDURE — 77014 PR  CT GUIDANCE PLACEMENT RAD THERAPY FIELDS: CPT | Mod: 26,HCNC,, | Performed by: RADIOLOGY

## 2020-08-18 PROCEDURE — 77014 HC CT GUIDANCE RADIATION THERAPY FLDS PLACEMENT: CPT | Mod: TC,HCNC | Performed by: RADIOLOGY

## 2020-08-18 PROCEDURE — 77386 HC IMRT, COMPLEX: CPT | Mod: HCNC | Performed by: RADIOLOGY

## 2020-08-19 PROCEDURE — 77386 HC IMRT, COMPLEX: CPT | Mod: HCNC | Performed by: RADIOLOGY

## 2020-08-19 PROCEDURE — 77014 PR  CT GUIDANCE PLACEMENT RAD THERAPY FIELDS: CPT | Mod: 26,HCNC,, | Performed by: RADIOLOGY

## 2020-08-19 PROCEDURE — 77014 HC CT GUIDANCE RADIATION THERAPY FLDS PLACEMENT: CPT | Mod: TC,HCNC | Performed by: RADIOLOGY

## 2020-08-19 PROCEDURE — 77014 PR  CT GUIDANCE PLACEMENT RAD THERAPY FIELDS: ICD-10-PCS | Mod: 26,HCNC,, | Performed by: RADIOLOGY

## 2020-08-20 ENCOUNTER — TELEPHONE (OUTPATIENT)
Dept: HEMATOLOGY/ONCOLOGY | Facility: CLINIC | Age: 70
End: 2020-08-20

## 2020-08-20 DIAGNOSIS — C32.9 LARYNX CANCER: Primary | ICD-10-CM

## 2020-08-20 PROCEDURE — 77014 PR  CT GUIDANCE PLACEMENT RAD THERAPY FIELDS: CPT | Mod: 26,HCNC,, | Performed by: RADIOLOGY

## 2020-08-20 PROCEDURE — 77386 HC IMRT, COMPLEX: CPT | Mod: HCNC | Performed by: RADIOLOGY

## 2020-08-20 PROCEDURE — 77014 HC CT GUIDANCE RADIATION THERAPY FLDS PLACEMENT: CPT | Mod: TC,HCNC | Performed by: RADIOLOGY

## 2020-08-20 PROCEDURE — 77014 PR  CT GUIDANCE PLACEMENT RAD THERAPY FIELDS: ICD-10-PCS | Mod: 26,HCNC,, | Performed by: RADIOLOGY

## 2020-08-21 ENCOUNTER — DOCUMENTATION ONLY (OUTPATIENT)
Dept: RADIATION ONCOLOGY | Facility: CLINIC | Age: 70
End: 2020-08-21

## 2020-08-21 PROCEDURE — 77386 HC IMRT, COMPLEX: CPT | Mod: HCNC | Performed by: RADIOLOGY

## 2020-08-21 PROCEDURE — 77336 RADIATION PHYSICS CONSULT: CPT | Mod: HCNC | Performed by: RADIOLOGY

## 2020-08-21 PROCEDURE — 77014 HC CT GUIDANCE RADIATION THERAPY FLDS PLACEMENT: CPT | Mod: TC,HCNC | Performed by: RADIOLOGY

## 2020-08-21 PROCEDURE — 77014 PR  CT GUIDANCE PLACEMENT RAD THERAPY FIELDS: CPT | Mod: 26,HCNC,, | Performed by: RADIOLOGY

## 2020-08-21 PROCEDURE — 77014 PR  CT GUIDANCE PLACEMENT RAD THERAPY FIELDS: ICD-10-PCS | Mod: 26,HCNC,, | Performed by: RADIOLOGY

## 2020-08-24 ENCOUNTER — OFFICE VISIT (OUTPATIENT)
Dept: FAMILY MEDICINE | Facility: CLINIC | Age: 70
End: 2020-08-24
Payer: MEDICARE

## 2020-08-24 ENCOUNTER — TELEPHONE (OUTPATIENT)
Dept: FAMILY MEDICINE | Facility: CLINIC | Age: 70
End: 2020-08-24

## 2020-08-24 VITALS
BODY MASS INDEX: 20.37 KG/M2 | HEART RATE: 85 BPM | HEIGHT: 66 IN | TEMPERATURE: 99 F | SYSTOLIC BLOOD PRESSURE: 101 MMHG | OXYGEN SATURATION: 98 % | WEIGHT: 126.75 LBS | DIASTOLIC BLOOD PRESSURE: 58 MMHG | RESPIRATION RATE: 18 BRPM

## 2020-08-24 DIAGNOSIS — I10 HYPERTENSION, ESSENTIAL: ICD-10-CM

## 2020-08-24 DIAGNOSIS — C32.9 LARYNX CANCER: Primary | ICD-10-CM

## 2020-08-24 DIAGNOSIS — G89.3 CANCER ASSOCIATED PAIN: ICD-10-CM

## 2020-08-24 DIAGNOSIS — I25.118 CORONARY ARTERY DISEASE WITH EXERTIONAL ANGINA: ICD-10-CM

## 2020-08-24 PROCEDURE — 1101F PR PT FALLS ASSESS DOC 0-1 FALLS W/OUT INJ PAST YR: ICD-10-PCS | Mod: HCNC,CPTII,S$GLB, | Performed by: FAMILY MEDICINE

## 2020-08-24 PROCEDURE — 1101F PT FALLS ASSESS-DOCD LE1/YR: CPT | Mod: HCNC,CPTII,S$GLB, | Performed by: FAMILY MEDICINE

## 2020-08-24 PROCEDURE — 99214 OFFICE O/P EST MOD 30 MIN: CPT | Mod: HCNC,S$GLB,, | Performed by: FAMILY MEDICINE

## 2020-08-24 PROCEDURE — 99499 UNLISTED E&M SERVICE: CPT | Mod: HCNC,S$GLB,, | Performed by: FAMILY MEDICINE

## 2020-08-24 PROCEDURE — 1125F AMNT PAIN NOTED PAIN PRSNT: CPT | Mod: HCNC,S$GLB,, | Performed by: FAMILY MEDICINE

## 2020-08-24 PROCEDURE — 3078F DIAST BP <80 MM HG: CPT | Mod: HCNC,CPTII,S$GLB, | Performed by: FAMILY MEDICINE

## 2020-08-24 PROCEDURE — 3078F PR MOST RECENT DIASTOLIC BLOOD PRESSURE < 80 MM HG: ICD-10-PCS | Mod: HCNC,CPTII,S$GLB, | Performed by: FAMILY MEDICINE

## 2020-08-24 PROCEDURE — 99214 PR OFFICE/OUTPT VISIT, EST, LEVL IV, 30-39 MIN: ICD-10-PCS | Mod: HCNC,S$GLB,, | Performed by: FAMILY MEDICINE

## 2020-08-24 PROCEDURE — 99499 RISK ADDL DX/OHS AUDIT: ICD-10-PCS | Mod: HCNC,S$GLB,, | Performed by: FAMILY MEDICINE

## 2020-08-24 PROCEDURE — 3008F PR BODY MASS INDEX (BMI) DOCUMENTED: ICD-10-PCS | Mod: HCNC,CPTII,S$GLB, | Performed by: FAMILY MEDICINE

## 2020-08-24 PROCEDURE — 1159F MED LIST DOCD IN RCRD: CPT | Mod: HCNC,S$GLB,, | Performed by: FAMILY MEDICINE

## 2020-08-24 PROCEDURE — 3074F SYST BP LT 130 MM HG: CPT | Mod: HCNC,CPTII,S$GLB, | Performed by: FAMILY MEDICINE

## 2020-08-24 PROCEDURE — 3074F PR MOST RECENT SYSTOLIC BLOOD PRESSURE < 130 MM HG: ICD-10-PCS | Mod: HCNC,CPTII,S$GLB, | Performed by: FAMILY MEDICINE

## 2020-08-24 PROCEDURE — 1159F PR MEDICATION LIST DOCUMENTED IN MEDICAL RECORD: ICD-10-PCS | Mod: HCNC,S$GLB,, | Performed by: FAMILY MEDICINE

## 2020-08-24 PROCEDURE — 3008F BODY MASS INDEX DOCD: CPT | Mod: HCNC,CPTII,S$GLB, | Performed by: FAMILY MEDICINE

## 2020-08-24 PROCEDURE — 99999 PR PBB SHADOW E&M-EST. PATIENT-LVL IV: ICD-10-PCS | Mod: PBBFAC,HCNC,, | Performed by: FAMILY MEDICINE

## 2020-08-24 PROCEDURE — 1125F PR PAIN SEVERITY QUANTIFIED, PAIN PRESENT: ICD-10-PCS | Mod: HCNC,S$GLB,, | Performed by: FAMILY MEDICINE

## 2020-08-24 PROCEDURE — 99999 PR PBB SHADOW E&M-EST. PATIENT-LVL IV: CPT | Mod: PBBFAC,HCNC,, | Performed by: FAMILY MEDICINE

## 2020-08-24 RX ORDER — HYDROCODONE BITARTRATE AND ACETAMINOPHEN 7.5; 325 MG/15ML; MG/15ML
15 SOLUTION ORAL EVERY 8 HOURS PRN
Qty: 473 ML | Refills: 0 | Status: SHIPPED | OUTPATIENT
Start: 2020-08-24 | End: 2021-02-12 | Stop reason: CLARIF

## 2020-08-24 NOTE — TELEPHONE ENCOUNTER
----- Message from Miguel Dickey sent at 8/24/2020  2:32 PM CDT -----  Regarding: Slim Gray- Spouse  Type: Patient Call Back    Who called:  Slim Gray-Spouse    What is the request in detail: pt has been having sore throat and hydrocodone-acetaminophen (HYCET) solution 7.5-325 mg/15mL was given. Please consider refilling    Can the clinic reply by JARADSNER? No     Would the patient rather a call back or a response via [facility]? Call     Best call back number: [phone]      [id number] - [place], [place] - [address] AT [facility] [address] [facility] [address] [phone] (Phone)  [phone] (Fax)

## 2020-08-25 NOTE — PROGRESS NOTES
Subjective:       Patient ID: Sharath Huizar is a 69 y.o. male.    Chief Complaint: Follow-up and Sore Throat (Pt states he just finish chemo )    HPI   69 year old male with hypertension, coronary artery disease, heart failure, and earlier this cecile diagnosed with lung cancer and larynx cancer. He was treated with radiatio for the lung cancer and states he recently completed chemo and radiation for the larynx cancer. He reports pain with swallowing, which limits eating and has started to lose weight again. He reports he was being given hydrocodone-acetaminophen for the pain which worked well enough to eat, but is out and would like a refill. He states he keeps it in a secure area, does not share with anyone, and does not use alcohol or illicit substances.     Review of Systems   Constitutional: Negative for unexpected weight change.   HENT: Positive for sore throat.    Respiratory: Negative for shortness of breath and wheezing.    Cardiovascular: Negative for chest pain, palpitations and leg swelling.   Gastrointestinal: Negative for abdominal pain and blood in stool.   Genitourinary: Negative for dysuria and genital sores.         Objective:      Physical Exam  Vitals signs reviewed.   Constitutional:       Appearance: He is well-developed. He is not diaphoretic.   HENT:      Head: Normocephalic.      Right Ear: External ear normal.      Left Ear: External ear normal.      Nose: Nose normal.      Mouth/Throat:      Pharynx: No oropharyngeal exudate.   Neck:      Musculoskeletal: Normal range of motion and neck supple.      Trachea: No tracheal deviation.   Cardiovascular:      Rate and Rhythm: Normal rate and regular rhythm.      Heart sounds: Normal heart sounds. No murmur.   Pulmonary:      Effort: Pulmonary effort is normal.      Breath sounds: Normal breath sounds. No wheezing or rales.   Abdominal:      General: Bowel sounds are normal.      Palpations: Abdomen is soft. Abdomen is not rigid. There is no mass.       Tenderness: There is no abdominal tenderness. There is no guarding or rebound.   Lymphadenopathy:      Cervical: No cervical adenopathy.   Neurological:      Mental Status: He is oriented to person, place, and time.      Sensory: No sensory deficit.      Motor: No atrophy.      Gait: Gait normal.      Deep Tendon Reflexes:      Reflex Scores:       Patellar reflexes are 2+ on the right side and 2+ on the left side.        Assessment:       1. Larynx cancer    2. Cancer associated pain    3. Hypertension, essential    4. Coronary artery disease with exertional angina        Plan:       Sharath was seen today for follow-up and sore throat.    Diagnoses and all orders for this visit:    Larynx cancer  -     hydrocodone-acetaminophen (HYCET) solution 7.5-325 mg/15mL; Take 15 mLs by mouth every 8 (eight) hours as needed for Pain.  LA  reviewed.  No signs of misuse noted.  Addiction potential of medication reviewed.  Correct way of taking medication reviewed.  Medication renewed.    Patient reports good support from spouse and children.  Reports no depression.    Cancer associated pain  -     hydrocodone-acetaminophen (HYCET) solution 7.5-325 mg/15mL; Take 15 mLs by mouth every 8 (eight) hours as needed for Pain.  As above    Hypertension, essential  Continue current regimen    Coronary artery disease with exertional angina  Continue statin and Plavix.

## 2020-08-25 NOTE — PROGRESS NOTES
Patient, Sharath Huizar (MRN #[id number]), presented with a recent Platelet count less than 150 K/uL consistent with the definition of thrombocytopenia (ICD10 - D69.6).    Platelets   Date Value Ref Range Status   08/17/2020 90 (L) 150 - 350 K/uL Final     The patient's thrombocytopenia was monitored, evaluated, addressed and/or treated. This addendum to the medical record is made on 08/25/2020.

## 2020-08-27 ENCOUNTER — TELEPHONE (OUTPATIENT)
Dept: RADIATION ONCOLOGY | Facility: CLINIC | Age: 70
End: 2020-08-27

## 2020-08-27 NOTE — TELEPHONE ENCOUNTER
F/u after completing radiation to the larynx  Wife states he continues with pain and is taking pain  States he is having trouble eating but is not using peg  Instructed to use peg appt made and confirmed for next week

## 2020-08-28 NOTE — PROGRESS NOTES
08/31/2020    Radiation Oncology Follow-Up Visit    Prior Radiation History:  Treatment Summary  Course: C1 RLL lung 2020    Treatment Site Ref. ID Energy Dose/Fx (Gy) #Fx Dose Correction (Gy) Total Dose (Gy) Start Date End Date Elapsed Days   SB Lung_R PTV_RLL allie 6X 12.5 4 / 4 0 50 5/28/2020 6/8/2020 11     Treatment Summary  Course: C2 HN 2020    Treatment Site Ref. ID Energy Dose/Fx (Gy) #Fx Dose Correction (Gy) Total Dose (Gy) Start Date End Date Elapsed Days   H&N PTV 70 allie 6X 2 35 / 35 0 70 7/6/2020 8/21/2020 46       HPI: Sharath Huizar is a 69 y.o. male with recently diagnosed synchronous lung and supraglottic larynx cancers, diagnosed in 2/20 after presenting to the ED with worsening vocal hoarseness and hemoptysis over the preceding month. He underwent chest x-ray which demonstrated a suspicious RLL lung opacity.     He underwent CT C/A/P imaging on 3/18/20 which demonstrated a 2.2 cm RLL lung nodule. PET/CT on 3/27/20 demonstrated an FDG avid L supraglottic mass in the pre-epiglottic space, SUV max 15.0, involving the L epiglottis extending into the L pre-epiglottic and paraglottic space to the level of the TVCs, as well as hypermetabolic bilateral level III LNs. Also noted was the previously known RLL lung mass, 3.0 cm in size, SUV max 6.6.    He was referred to HNS and seen by Dr. Vallejo. CT neck imaging on 4/16/20 demonstrated known 4 cm supraglottic mass extending to the TVCs with 2 enlarged R level 3 LNs and 1 enlarged L level III LN. DL and biopsy of the supraglottic mass was performed on 4/20/20, with pathology demonstrating SCC (p16 pending).     His case was discussed at head and neck tumor board with recommendations for biopsy of the RLL lung mass. This was performed on 4/27/20 and demonstrated squamous/adenosquamous carcinoma. He was seen by Dr. Aguilar on 5/5/20 with recommendations for chemoradiation for his locally advanced supraglottic larynx cancer. His case was further discussed at lung  tumor board on 5/6/20, with consensus recommendations for upfront SBRT to the RLL lung lesion, to be followed by chemoradiation for his larynx cancer.    He completed 50 Gy in 4 fractions to the RLL lung lesion via SBRT on 6/8/20, tolerating therapy well with no side effects. Following dental extractions he then underwent chemoradiation with weekly cisplatin and 70 Gy in 35 fraction IMRT SIB plan to laryngeal disease, involved bilateral LN, and bilateral neck elective at-risk LN, completing therapy on 8/21/20. He had a PEG tube placed prior to treatment and had an uneventful treatment course, with mild dysphagia, dysphonia, and skin irritation. He returns today for post-treatment follow up appointment.    Today, he is overall doing well, noting improvements in fatigue, dysphagia, vocal dysphonia, and skin hyperpigmentation. He continues to use PEG tube and was able to tolerate two small soft meals yesterday by mouth as his taste is improving.    Past Medical History:   Diagnosis Date    Coronary artery disease     Hypertension     Myocardial infarction     NSTEMI (non-ST elevated myocardial infarction) 3/23/2018    Nuclear sclerosis of both eyes 6/13/2019       Past Surgical History:   Procedure Laterality Date    CARDIAC CATHETERIZATION      with 4 stents    DIRECT LARYNGOSCOPY N/A 4/20/2020    Procedure: LARYNGOSCOPY, DIRECT;  Surgeon: Genoveva Vallejo MD;  Location: [facility] OR;  Service: ENT;  Laterality: N/A;    LEFT HEART CATHETERIZATION Left 5/29/2018    Procedure: Left heart cath;  Surgeon: Viral Lombardi MD;  Location: [facility] CATH LAB;  Service: Cardiology;  Laterality: Left;  RN PREOP 5/28/18    LUNG BIOPSY N/A 4/27/2020    Procedure: BIOPSY, LUNG;  Surgeon: Jones Diagnostic Provider;  Location: [facility] OR;  Service: Radiology;  Laterality: N/A;  9am start--RN PHONE PREOP 4/24----COVID NEGATIVE--pt       Social History     Tobacco Use    Smoking status: Former Smoker     Packs/day: 0.25     Years:  30.00     Pack years: 7.50     Types: Cigarettes     Quit date: 3/23/2018     Years since quittin.4    Smokeless tobacco: Never Used   Substance Use Topics    Alcohol use: No    Drug use: No       Cancer-related family history is negative for Cancer.    Current Outpatient Medications on File Prior to Visit   Medication Sig Dispense Refill    albuterol (PROVENTIL/VENTOLIN HFA) 90 mcg/actuation inhaler Inhale 1-2 puffs into the lungs every 6 (six) hours as needed for Wheezing. Rescue 18 g 0    aspirin (ECOTRIN) 81 MG EC tablet Take 1 tablet (81 mg total) by mouth once daily. (Patient taking differently: Take 81 mg by mouth once daily. )  0    atorvastatin (LIPITOR) 80 MG tablet Take 1 tablet (80 mg total) by mouth once daily. 90 tablet 3    clopidogrel (PLAVIX) 75 mg tablet Take 1 tablet (75 mg total) by mouth once daily. (Patient taking differently: Take 75 mg by mouth once daily. ) 90 tablet 3    docusate sodium (COLACE) 100 MG capsule Take 1 capsule (100 mg total) by mouth 2 (two) times daily as needed for Constipation. 60 capsule 3    hydrocodone-acetaminophen (HYCET) solution 7.5-325 mg/15mL Take 15 mLs by mouth every 8 (eight) hours as needed for Pain. 473 mL 0    magic mouthwash diphen/antac/lidoc Swish and spit 10 mLs every 4 (four) hours as needed. 450 mL 3    metoprolol succinate (TOPROL-XL) 50 MG 24 hr tablet Take 1 tablet (50 mg total) by mouth once daily. 90 tablet 3    nitroGLYCERIN (NITROSTAT) 0.4 MG SL tablet Place 1 tablet (0.4 mg total) under the tongue every 5 (five) minutes as needed. 25 tablet 3    ofloxacin (OCUFLOX) 0.3 % ophthalmic solution 5 drops in left ear BID x 10 days 10 mL 0    omeprazole (PRILOSEC) 20 MG capsule Take 1 capsule (20 mg total) by mouth 2 (two) times daily. 28 capsule 0    ondansetron (ZOFRAN) 8 MG tablet Take 1 tablet (8 mg total) by mouth 4 (four) times daily as needed for Nausea. 60 tablet 2    promethazine (PHENERGAN) 6.25 mg/5 mL syrup Take 20 mLs  (25 mg total) by mouth every 6 (six) hours as needed for Nausea. 473 mL 6    promethazine-dextromethorphan (PROMETHAZINE-DM) 6.25-15 mg/5 mL Syrp Take 5 mLs by mouth every 4 to 6 hours as needed. 240 mL 0    protein (ENSURE HIGH PROTEIN) Powd Take 1 Can by mouth 3 (three) times daily. 90 Can 3     Current Facility-Administered Medications on File Prior to Visit   Medication Dose Route Frequency Provider Last Rate Last Dose    0.9%  NaCl infusion   Intravenous Continuous Cindy Zhao MD 10 mL/hr at 04/20/20 0904      lidocaine (PF) 10 mg/ml (1%) injection 10 mg  1 mL Intradermal Once Cindy Zhao MD           Review of patient's allergies indicates:  No Known Allergies    Review of Systems   Constitutional: Negative for chills, fever and malaise/fatigue.   HENT: Positive for sore throat. Negative for hearing loss.    Eyes: Negative for pain.   Respiratory: Negative for cough, hemoptysis, shortness of breath and wheezing.    Cardiovascular: Negative for chest pain.   Gastrointestinal: Negative for abdominal pain, diarrhea, heartburn, nausea and vomiting.   Genitourinary: Negative for dysuria and hematuria.   Musculoskeletal: Negative for myalgias.   Skin: Negative for rash.   Neurological: Negative for dizziness and weakness.   Psychiatric/Behavioral: Negative for depression. The patient is not nervous/anxious.         Vital Signs:   Vitals:    08/31/20 1228   BP: 126/61   Pulse: 73   Resp: 18   Temp: 98 °F (36.7 °C)       ECOG Performance Status: 1 - Ambulates, capable of light work    Physical exam:  Constitutional:  Well-developed, well-nourished and in no acute distress.  Head: Normocephalic, atraumatic.    Eyes: Sclerae are non-icteric.  Pupils are equal and round.  Extraocular movements are intact.  ENMT: Oral cavity and oropharynx are without lesions. Mucous membranes are moist.  Neck: Supple.  No palpable cervical or supraclavicular adenopathy. Mild skin hyperpigmentation.   Pulmonary: Clear to  auscultation, bilaterally.  No rhonchi, rales or wheezes.  Cardiovascular: Regular rate and rhythm.   No murmurs heard.  No edema.  GI: Soft, nontender and nondistended.  No palpable masses or hepatosplenomegaly.  Musculoskeletal: No palpable spinous or paraspinous tenderness.  Range of motion appears normal in all 4 extremities.  Lymphatics: No palpable cervical, supraclavicular, axillary adenopathy.  Extremities: No clubbing, cyanosis, or edema.    Skin: No visible lesions.  Neurologic: CN II-XII are grossly intact.  Motor strength is 5/5 in bilateral upper and lower extremities and symmetric.    Sensory exam is grossly intact to touch.  Gait is normal.    Psychiatric: Patient is alert and oriented x 3.  Normal mood and affect.     Labs: I have personally reviewed the patient's available labs and reports and summarized pertinent findings above in HPI.    Imaging: I have personally reviewed the patient's available images and reports and summarized pertinent findings above in HPI.     Pathology: I have personally reviewed the patient's available pathology and summarized pertinent findings above in HPI.     Assessment:  Sharath Huizar is a 69 y.o. male with recently diagnosed synchronous stage I [id number] lung NSCLC and supraglottic stage RASHEEDA [id number] larynx SCC, s/p SBRT for the lung lesion and definitive chemoradiation for his larynx cancer, presenting today for follow up appointment.    Plan:  He is improving following chemoradiation completion. I plan to follow up with him in approximately 3 months, with preceding surveillance PET/CT, which I ordered today. He has my contact information should any additional questions or concerns arise.      Fernando Mosher MD  Radiation Oncology  [facility]

## 2020-08-31 ENCOUNTER — OFFICE VISIT (OUTPATIENT)
Dept: HEMATOLOGY/ONCOLOGY | Facility: CLINIC | Age: 70
End: 2020-08-31
Payer: MEDICARE

## 2020-08-31 ENCOUNTER — LAB VISIT (OUTPATIENT)
Dept: LAB | Facility: HOSPITAL | Age: 70
End: 2020-08-31
Attending: INTERNAL MEDICINE
Payer: MEDICARE

## 2020-08-31 ENCOUNTER — DOCUMENTATION ONLY (OUTPATIENT)
Dept: RADIATION ONCOLOGY | Facility: CLINIC | Age: 70
End: 2020-08-31

## 2020-08-31 ENCOUNTER — OFFICE VISIT (OUTPATIENT)
Dept: RADIATION ONCOLOGY | Facility: CLINIC | Age: 70
End: 2020-08-31
Payer: MEDICARE

## 2020-08-31 VITALS
SYSTOLIC BLOOD PRESSURE: 140 MMHG | HEART RATE: 74 BPM | WEIGHT: 125.69 LBS | HEIGHT: 66 IN | DIASTOLIC BLOOD PRESSURE: 83 MMHG | TEMPERATURE: 98 F | BODY MASS INDEX: 20.2 KG/M2

## 2020-08-31 VITALS
RESPIRATION RATE: 18 BRPM | SYSTOLIC BLOOD PRESSURE: 126 MMHG | DIASTOLIC BLOOD PRESSURE: 61 MMHG | WEIGHT: 127.69 LBS | BODY MASS INDEX: 20.61 KG/M2 | HEART RATE: 73 BPM | TEMPERATURE: 98 F

## 2020-08-31 DIAGNOSIS — C34.31 MALIGNANT NEOPLASM OF LOWER LOBE OF RIGHT LUNG: ICD-10-CM

## 2020-08-31 DIAGNOSIS — C32.9 LARYNX CANCER: Primary | ICD-10-CM

## 2020-08-31 DIAGNOSIS — C32.9 LARYNX CANCER: ICD-10-CM

## 2020-08-31 LAB
ALBUMIN SERPL BCP-MCNC: 3.5 G/DL (ref 3.5–5.2)
ALP SERPL-CCNC: 84 U/L (ref 55–135)
ALT SERPL W/O P-5'-P-CCNC: 12 U/L (ref 10–44)
ANION GAP SERPL CALC-SCNC: 8 MMOL/L (ref 8–16)
AST SERPL-CCNC: 21 U/L (ref 10–40)
BILIRUB SERPL-MCNC: 0.2 MG/DL (ref 0.1–1)
BUN SERPL-MCNC: 13 MG/DL (ref 8–23)
CALCIUM SERPL-MCNC: 8.7 MG/DL (ref 8.7–10.5)
CHLORIDE SERPL-SCNC: 107 MMOL/L (ref 95–110)
CO2 SERPL-SCNC: 23 MMOL/L (ref 23–29)
CREAT SERPL-MCNC: 1.1 MG/DL (ref 0.5–1.4)
ERYTHROCYTE [DISTWIDTH] IN BLOOD BY AUTOMATED COUNT: 21 % (ref 11.5–14.5)
EST. GFR  (AFRICAN AMERICAN): >60 ML/MIN/1.73 M^2
EST. GFR  (NON AFRICAN AMERICAN): >60 ML/MIN/1.73 M^2
GLUCOSE SERPL-MCNC: 93 MG/DL (ref 70–110)
HCT VFR BLD AUTO: 27.4 % (ref 40–54)
HGB BLD-MCNC: 8.6 G/DL (ref 14–18)
IMM GRANULOCYTES # BLD AUTO: 0.01 K/UL (ref 0–0.04)
MCH RBC QN AUTO: 31.5 PG (ref 27–31)
MCHC RBC AUTO-ENTMCNC: 31.4 G/DL (ref 32–36)
MCV RBC AUTO: 100 FL (ref 82–98)
NEUTROPHILS # BLD AUTO: 2.3 K/UL (ref 1.8–7.7)
PLATELET # BLD AUTO: 205 K/UL (ref 150–350)
PMV BLD AUTO: 9.8 FL (ref 9.2–12.9)
POTASSIUM SERPL-SCNC: 4.4 MMOL/L (ref 3.5–5.1)
PROT SERPL-MCNC: 6.3 G/DL (ref 6–8.4)
RBC # BLD AUTO: 2.73 M/UL (ref 4.6–6.2)
SODIUM SERPL-SCNC: 138 MMOL/L (ref 136–145)
WBC # BLD AUTO: 3.47 K/UL (ref 3.9–12.7)

## 2020-08-31 PROCEDURE — 1126F AMNT PAIN NOTED NONE PRSNT: CPT | Mod: HCNC,S$GLB,, | Performed by: INTERNAL MEDICINE

## 2020-08-31 PROCEDURE — 1101F PT FALLS ASSESS-DOCD LE1/YR: CPT | Mod: HCNC,CPTII,S$GLB, | Performed by: RADIOLOGY

## 2020-08-31 PROCEDURE — 1159F PR MEDICATION LIST DOCUMENTED IN MEDICAL RECORD: ICD-10-PCS | Mod: HCNC,S$GLB,, | Performed by: RADIOLOGY

## 2020-08-31 PROCEDURE — 1101F PT FALLS ASSESS-DOCD LE1/YR: CPT | Mod: HCNC,CPTII,S$GLB, | Performed by: INTERNAL MEDICINE

## 2020-08-31 PROCEDURE — 3008F BODY MASS INDEX DOCD: CPT | Mod: HCNC,CPTII,S$GLB, | Performed by: RADIOLOGY

## 2020-08-31 PROCEDURE — 3074F PR MOST RECENT SYSTOLIC BLOOD PRESSURE < 130 MM HG: ICD-10-PCS | Mod: HCNC,CPTII,S$GLB, | Performed by: INTERNAL MEDICINE

## 2020-08-31 PROCEDURE — 99999 PR PBB SHADOW E&M-EST. PATIENT-LVL IV: ICD-10-PCS | Mod: PBBFAC,HCNC,, | Performed by: RADIOLOGY

## 2020-08-31 PROCEDURE — 99999 PR PBB SHADOW E&M-EST. PATIENT-LVL IV: CPT | Mod: PBBFAC,HCNC,, | Performed by: RADIOLOGY

## 2020-08-31 PROCEDURE — 3074F PR MOST RECENT SYSTOLIC BLOOD PRESSURE < 130 MM HG: ICD-10-PCS | Mod: HCNC,CPTII,S$GLB, | Performed by: RADIOLOGY

## 2020-08-31 PROCEDURE — 1126F AMNT PAIN NOTED NONE PRSNT: CPT | Mod: HCNC,S$GLB,, | Performed by: RADIOLOGY

## 2020-08-31 PROCEDURE — 80053 COMPREHEN METABOLIC PANEL: CPT | Mod: HCNC

## 2020-08-31 PROCEDURE — 3078F PR MOST RECENT DIASTOLIC BLOOD PRESSURE < 80 MM HG: ICD-10-PCS | Mod: HCNC,CPTII,S$GLB, | Performed by: RADIOLOGY

## 2020-08-31 PROCEDURE — 99213 PR OFFICE/OUTPT VISIT, EST, LEVL III, 20-29 MIN: ICD-10-PCS | Mod: HCNC,S$GLB,, | Performed by: RADIOLOGY

## 2020-08-31 PROCEDURE — 1101F PR PT FALLS ASSESS DOC 0-1 FALLS W/OUT INJ PAST YR: ICD-10-PCS | Mod: HCNC,CPTII,S$GLB, | Performed by: RADIOLOGY

## 2020-08-31 PROCEDURE — 99213 OFFICE O/P EST LOW 20 MIN: CPT | Mod: HCNC,S$GLB,, | Performed by: RADIOLOGY

## 2020-08-31 PROCEDURE — 36415 COLL VENOUS BLD VENIPUNCTURE: CPT | Mod: HCNC

## 2020-08-31 PROCEDURE — 3008F PR BODY MASS INDEX (BMI) DOCUMENTED: ICD-10-PCS | Mod: HCNC,CPTII,S$GLB, | Performed by: RADIOLOGY

## 2020-08-31 PROCEDURE — 3008F PR BODY MASS INDEX (BMI) DOCUMENTED: ICD-10-PCS | Mod: HCNC,CPTII,S$GLB, | Performed by: INTERNAL MEDICINE

## 2020-08-31 PROCEDURE — 3079F PR MOST RECENT DIASTOLIC BLOOD PRESSURE 80-89 MM HG: ICD-10-PCS | Mod: HCNC,CPTII,S$GLB, | Performed by: INTERNAL MEDICINE

## 2020-08-31 PROCEDURE — 1159F PR MEDICATION LIST DOCUMENTED IN MEDICAL RECORD: ICD-10-PCS | Mod: HCNC,S$GLB,, | Performed by: INTERNAL MEDICINE

## 2020-08-31 PROCEDURE — 1126F PR PAIN SEVERITY QUANTIFIED, NO PAIN PRESENT: ICD-10-PCS | Mod: HCNC,S$GLB,, | Performed by: INTERNAL MEDICINE

## 2020-08-31 PROCEDURE — 99999 PR PBB SHADOW E&M-EST. PATIENT-LVL IV: CPT | Mod: PBBFAC,HCNC,, | Performed by: INTERNAL MEDICINE

## 2020-08-31 PROCEDURE — 85027 COMPLETE CBC AUTOMATED: CPT | Mod: HCNC

## 2020-08-31 PROCEDURE — 1101F PR PT FALLS ASSESS DOC 0-1 FALLS W/OUT INJ PAST YR: ICD-10-PCS | Mod: HCNC,CPTII,S$GLB, | Performed by: INTERNAL MEDICINE

## 2020-08-31 PROCEDURE — 3008F BODY MASS INDEX DOCD: CPT | Mod: HCNC,CPTII,S$GLB, | Performed by: INTERNAL MEDICINE

## 2020-08-31 PROCEDURE — 99213 OFFICE O/P EST LOW 20 MIN: CPT | Mod: HCNC,S$GLB,, | Performed by: INTERNAL MEDICINE

## 2020-08-31 PROCEDURE — 3078F DIAST BP <80 MM HG: CPT | Mod: HCNC,CPTII,S$GLB, | Performed by: RADIOLOGY

## 2020-08-31 PROCEDURE — 3074F SYST BP LT 130 MM HG: CPT | Mod: HCNC,CPTII,S$GLB, | Performed by: INTERNAL MEDICINE

## 2020-08-31 PROCEDURE — 3079F DIAST BP 80-89 MM HG: CPT | Mod: HCNC,CPTII,S$GLB, | Performed by: INTERNAL MEDICINE

## 2020-08-31 PROCEDURE — 3074F SYST BP LT 130 MM HG: CPT | Mod: HCNC,CPTII,S$GLB, | Performed by: RADIOLOGY

## 2020-08-31 PROCEDURE — 99999 PR PBB SHADOW E&M-EST. PATIENT-LVL IV: ICD-10-PCS | Mod: PBBFAC,HCNC,, | Performed by: INTERNAL MEDICINE

## 2020-08-31 PROCEDURE — 99213 PR OFFICE/OUTPT VISIT, EST, LEVL III, 20-29 MIN: ICD-10-PCS | Mod: HCNC,S$GLB,, | Performed by: INTERNAL MEDICINE

## 2020-08-31 PROCEDURE — 1126F PR PAIN SEVERITY QUANTIFIED, NO PAIN PRESENT: ICD-10-PCS | Mod: HCNC,S$GLB,, | Performed by: RADIOLOGY

## 2020-08-31 PROCEDURE — 1159F MED LIST DOCD IN RCRD: CPT | Mod: HCNC,S$GLB,, | Performed by: RADIOLOGY

## 2020-08-31 PROCEDURE — 1159F MED LIST DOCD IN RCRD: CPT | Mod: HCNC,S$GLB,, | Performed by: INTERNAL MEDICINE

## 2020-08-31 NOTE — Clinical Note
Schedule CBC,CMP, CT neck and chest and see me and Dr. Vallejo in ENT on [facility] in 2 -3 weeks

## 2020-08-31 NOTE — PROGRESS NOTES
Oncology Nutrition  Radiation Oncology Follow up     Post treatment follow up     Sharath Huizar   1950    Reason for Visit: s/p XRT for laryngeal cancer    Nutrition Assessment    Patient reports odynophagia is well controlled with current pain medications. He is able to tolerate soft solids like eggs and grits. Also reports being able to taste his dinner for the first time last night.   He has cut down Boost Plus to TID.      Weight:  127lb   Height: 66in    Usual BW: 140-145lb  Weight Change: down 3lb in about 10 days     Vitamins/Supplements: Boost     Nutrition Impact Symptoms    Odynophagia -grade 2    Activities/Functional Status: Little change from baseline     Nutrition focused physical findings: thin, mild to moderate muscle losses     Nutrition Recommendations    Continue Boost Plus- increase to 4 times daily until PO intake significantly improves  Discussed how to slowly wean off nutritional supplements based on weight and diet tolerance     Flush PEG daily with 50mL water  Educated on high protein, high calorie soft solids     If patient can maintain weight or gain over the next 2-4 weeks, ok to remove PEG from a nutrition perspective.       Lashay Alanis, MPH, RD, , LDN, FAND   [phone]

## 2020-08-31 NOTE — PROGRESS NOTES
"Subjective:       Patient ID: Sharath Huizar is a 69 y.o. male.    Chief Complaint: Larynx cancer  Sharath Huizar is a 69 y.o. male  presenting today for a new diagnosis of Larynx cancer. Patient was seen in ER 2-27-20 with hemoptysis and hoarse voice for a couple of months along with left temporal headaches.   He underwent chest xray on 2/28/2020 which revealed "Small focal opacity within the right lower lung zone with possible cavitation.  This may reflect focal infectious or inflammatory process"  He was referred to pulmonary and underwent CT chest, abdomen/pelvis on 3/18/2020 which revealed "a 2.2 cm noncalcified nodule seen at the right lung base.  May represent consolidation however malignancy can not be excluded.  PET-CT is recommended for further evaluation"  PET scan on 3/27/2020 revealed "Hypermetabolic left supraglottic soft tissue mass concerning for primary laryngeal cancer.  Two hypermetabolic bilateral cervical lymph nodes as well as a hypermetabolic right lower lobe partially cavitary pulmonary mass concerning for metastatic disease.  Recommend ENT consultation and dedicated contrast enhance neck CT"  He was referred to ENT and underwent CT neck on 4/16/2020 which revealed Stable left supraglottic laryngeal mass and bilateral cervical metastatic adenopathy.     He underwent direct laryngoscopy on 4/20/2020 and that revealed "bulky and friable supraglottic mass on left side of glottic surface of epiglottis extending over slightly on to the right. Extends down along left aryepiglottic fold and true vocal fold on left. The right vocal fold did not appear to be involved. No lesion of pyriform sinus. Palpation of vallecula and base of tongue was soft. No additional lesions were noted in the oral cavity or oropharynx"  Pathology revealed squamous cell cancer  His case was discussed in head and Neck tumor board and he underwent left lung biopsy on 4/27/2020 and pathology reveals squamous/adenosquamous cell " cancer     He has completed RT to his lung and is on chemo RT for his larynx cancer         HPI He completed chemo/RT with weekly Cisplatin, week 7, completed on 8/21/2020  He is able to swallow solids without any issues    Review of Systems   Constitutional: Negative for appetite change, fatigue and unexpected weight change.   HENT: Negative for mouth sores.    Eyes: Negative for visual disturbance.   Respiratory: Negative for cough and shortness of breath.    Cardiovascular: Negative for chest pain.   Gastrointestinal: Negative for abdominal pain and diarrhea.   Genitourinary: Negative for frequency.   Musculoskeletal: Negative for back pain.   Integumentary:  Negative for rash.   Neurological: Negative for headaches.   Hematological: Negative for adenopathy.   Psychiatric/Behavioral: The patient is not nervous/anxious.    All other systems reviewed and are negative.        Objective:      Physical Exam  Vitals signs reviewed.   Constitutional:       Appearance: He is well-developed.   HENT:      Mouth/Throat:      Pharynx: No oropharyngeal exudate.   Cardiovascular:      Rate and Rhythm: Normal rate.      Heart sounds: Normal heart sounds.   Pulmonary:      Effort: Pulmonary effort is normal.      Breath sounds: Normal breath sounds. No wheezing.   Abdominal:      General: Bowel sounds are normal.      Palpations: Abdomen is soft.      Tenderness: There is no abdominal tenderness.   Musculoskeletal:         General: No tenderness.   Lymphadenopathy:      Cervical: No cervical adenopathy.   Skin:     General: Skin is warm and dry.      Findings: No rash.   Neurological:      Mental Status: He is alert and oriented to person, place, and time.      Coordination: Coordination normal.   Psychiatric:         Thought Content: Thought content normal.         Judgment: Judgment normal.         LABS:  WBC   Date Value Ref Range Status   08/31/2020 3.47 (L) 3.90 - 12.70 K/uL Final     Hemoglobin   Date Value Ref Range  Status   08/31/2020 8.6 (L) 14.0 - 18.0 g/dL Final     Hematocrit   Date Value Ref Range Status   08/31/2020 27.4 (L) 40.0 - 54.0 % Final     Platelets   Date Value Ref Range Status   08/31/2020 205 150 - 350 K/uL Final     Gran # (ANC)   Date Value Ref Range Status   08/31/2020 2.3 1.8 - 7.7 K/uL Final     Comment:     The ANC is based on a white cell differential from an   automated cell counter. It has not been microscopically   reviewed for the presence of abnormal cells. Clinical   correlation is required.         Chemistry        Component Value Date/Time     08/31/2020 1149    K 4.4 08/31/2020 1149     08/31/2020 1149    CO2 23 08/31/2020 1149    BUN 13 08/31/2020 1149    CREATININE 1.1 08/31/2020 1149    GLU 93 08/31/2020 1149        Component Value Date/Time    CALCIUM 8.7 08/31/2020 1149    ALKPHOS 84 08/31/2020 1149    AST 21 08/31/2020 1149    ALT 12 08/31/2020 1149    BILITOT 0.2 08/31/2020 1149    ESTGFRAFRICA >60.0 08/31/2020 1149    EGFRNONAA >60.0 08/31/2020 1149           Assessment:       1. Larynx cancer        Plan:        1. He completed chemo/RT. He will return in 2 weeks with CT neck and chest.    Above care plan was discussed with patient and all questions were addressed to his satisfaction

## 2020-09-01 ENCOUNTER — TELEPHONE (OUTPATIENT)
Dept: OTOLARYNGOLOGY | Facility: CLINIC | Age: 70
End: 2020-09-01

## 2020-09-01 DIAGNOSIS — R49.0 HOARSENESS, CHRONIC: Primary | ICD-10-CM

## 2020-09-01 NOTE — TELEPHONE ENCOUNTER
----- Message from Ana Hodge sent at 9/1/2020  8:28 AM CDT -----  Dr. Lauren Mora would like PT to see Dr. Vallejo within 2-3 weeks. Can you please assist with scheduling an appointment.     Thank you,     Ana

## 2020-09-01 NOTE — TELEPHONE ENCOUNTER
Called Patient's wife back and call will not go through.  Saying its out of service.   31-Aug-2020 31-Aug-2020 00:00 31-Aug-2020 22:30

## 2020-09-01 NOTE — TELEPHONE ENCOUNTER
9/15/20 is scheduled with Dr. Vallejo as per Dr. Aguilar. Notified Patients wife of return appointment and also Covid Test.

## 2020-09-11 ENCOUNTER — LAB VISIT (OUTPATIENT)
Dept: FAMILY MEDICINE | Facility: CLINIC | Age: 70
End: 2020-09-11
Payer: MEDICARE

## 2020-09-11 DIAGNOSIS — R49.0 HOARSENESS, CHRONIC: ICD-10-CM

## 2020-09-11 PROCEDURE — U0003 INFECTIOUS AGENT DETECTION BY NUCLEIC ACID (DNA OR RNA); SEVERE ACUTE RESPIRATORY SYNDROME CORONAVIRUS 2 (SARS-COV-2) (CORONAVIRUS DISEASE [COVID-19]), AMPLIFIED PROBE TECHNIQUE, MAKING USE OF HIGH THROUGHPUT TECHNOLOGIES AS DESCRIBED BY CMS-2020-01-R: HCPCS | Mod: HCNC

## 2020-09-12 LAB — SARS-COV-2 RNA RESP QL NAA+PROBE: NOT DETECTED

## 2020-09-15 ENCOUNTER — TELEPHONE (OUTPATIENT)
Dept: FAMILY MEDICINE | Facility: CLINIC | Age: 70
End: 2020-09-15

## 2020-09-15 NOTE — TELEPHONE ENCOUNTER
----- Message from Nelia De La Torre sent at 9/15/2020  7:55 AM CDT -----  Type: Patient Call Back       What is the request in detail:  Pt calling nurse regarding regarding ordering a lab     Can the clinic reply by [facility]? No       Would the patient rather a call back or a response via [facility]? Call back       Best call back number: [phone]

## 2020-09-15 NOTE — TELEPHONE ENCOUNTER
I spoke to the pt wife and advised there is no blood test for Asbestos. She is asking for all patients pathology reports from his biopsies. I advised she will need to contact medical records for a copy of his records and they can speak to his Oncologist regarding if further testing is needed. She verbalizes understanding.

## 2020-09-15 NOTE — TELEPHONE ENCOUNTER
----- Message from Alice Patel MA sent at 9/15/2020  8:41 AM CDT -----  Pt would like to have ABSTO done on his  lungs. Pt has cancer and wife want to know where it came from. Please advise   ----- Message -----  From: Nelia De La Torre  Sent: 9/15/2020   7:55 AM CDT  To: Willem Guillen Staff    Type: Patient Call Back       What is the request in detail:  Pt calling nurse regarding regarding ordering a lab     Can the clinic reply by [facility]? No       Would the patient rather a call back or a response via [facility]? Call back       Best call back number: [phone]

## 2020-09-15 NOTE — TELEPHONE ENCOUNTER
If they mean asbestos testing, usually that causes a different type of cancer called Mesothelioma. That would have been seen on the pathology/biopsy testing, however it also presents different.     There is no blood testing for asbestos.     The types of cancers he did have a more common in patients with smoking history, which patient did have.

## 2020-09-16 ENCOUNTER — PATIENT OUTREACH (OUTPATIENT)
Dept: ADMINISTRATIVE | Facility: OTHER | Age: 70
End: 2020-09-16

## 2020-09-17 ENCOUNTER — OFFICE VISIT (OUTPATIENT)
Dept: OTOLARYNGOLOGY | Facility: CLINIC | Age: 70
End: 2020-09-17
Payer: MEDICARE

## 2020-09-17 VITALS
DIASTOLIC BLOOD PRESSURE: 60 MMHG | HEIGHT: 66 IN | BODY MASS INDEX: 20.64 KG/M2 | TEMPERATURE: 99 F | SYSTOLIC BLOOD PRESSURE: 120 MMHG | WEIGHT: 128.44 LBS

## 2020-09-17 DIAGNOSIS — R49.0 HOARSENESS, CHRONIC: Primary | ICD-10-CM

## 2020-09-17 DIAGNOSIS — H90.71 MIXED CONDUCTIVE AND SENSORINEURAL HEARING LOSS OF RIGHT EAR WITH UNRESTRICTED HEARING OF LEFT EAR: ICD-10-CM

## 2020-09-17 DIAGNOSIS — C32.9 SQUAMOUS CELL CARCINOMA OF LARYNX: ICD-10-CM

## 2020-09-17 PROCEDURE — 1101F PT FALLS ASSESS-DOCD LE1/YR: CPT | Mod: CPTII,S$GLB,, | Performed by: OTOLARYNGOLOGY

## 2020-09-17 PROCEDURE — 31575 DIAGNOSTIC LARYNGOSCOPY: CPT | Mod: S$GLB,,, | Performed by: OTOLARYNGOLOGY

## 2020-09-17 PROCEDURE — 3008F BODY MASS INDEX DOCD: CPT | Mod: CPTII,S$GLB,, | Performed by: OTOLARYNGOLOGY

## 2020-09-17 PROCEDURE — 1159F PR MEDICATION LIST DOCUMENTED IN MEDICAL RECORD: ICD-10-PCS | Mod: S$GLB,,, | Performed by: OTOLARYNGOLOGY

## 2020-09-17 PROCEDURE — 99214 OFFICE O/P EST MOD 30 MIN: CPT | Mod: 25,S$GLB,, | Performed by: OTOLARYNGOLOGY

## 2020-09-17 PROCEDURE — 3078F DIAST BP <80 MM HG: CPT | Mod: CPTII,S$GLB,, | Performed by: OTOLARYNGOLOGY

## 2020-09-17 PROCEDURE — 3074F SYST BP LT 130 MM HG: CPT | Mod: CPTII,S$GLB,, | Performed by: OTOLARYNGOLOGY

## 2020-09-17 PROCEDURE — 3078F PR MOST RECENT DIASTOLIC BLOOD PRESSURE < 80 MM HG: ICD-10-PCS | Mod: CPTII,S$GLB,, | Performed by: OTOLARYNGOLOGY

## 2020-09-17 PROCEDURE — 3074F PR MOST RECENT SYSTOLIC BLOOD PRESSURE < 130 MM HG: ICD-10-PCS | Mod: CPTII,S$GLB,, | Performed by: OTOLARYNGOLOGY

## 2020-09-17 PROCEDURE — 1159F MED LIST DOCD IN RCRD: CPT | Mod: S$GLB,,, | Performed by: OTOLARYNGOLOGY

## 2020-09-17 PROCEDURE — 31575 PR LARYNGOSCOPY, FLEXIBLE; DIAGNOSTIC: ICD-10-PCS | Mod: S$GLB,,, | Performed by: OTOLARYNGOLOGY

## 2020-09-17 PROCEDURE — 99214 PR OFFICE/OUTPT VISIT, EST, LEVL IV, 30-39 MIN: ICD-10-PCS | Mod: 25,S$GLB,, | Performed by: OTOLARYNGOLOGY

## 2020-09-17 PROCEDURE — 3008F PR BODY MASS INDEX (BMI) DOCUMENTED: ICD-10-PCS | Mod: CPTII,S$GLB,, | Performed by: OTOLARYNGOLOGY

## 2020-09-17 PROCEDURE — 1126F AMNT PAIN NOTED NONE PRSNT: CPT | Mod: S$GLB,,, | Performed by: OTOLARYNGOLOGY

## 2020-09-17 PROCEDURE — 1101F PR PT FALLS ASSESS DOC 0-1 FALLS W/OUT INJ PAST YR: ICD-10-PCS | Mod: CPTII,S$GLB,, | Performed by: OTOLARYNGOLOGY

## 2020-09-17 PROCEDURE — 1126F PR PAIN SEVERITY QUANTIFIED, NO PAIN PRESENT: ICD-10-PCS | Mod: S$GLB,,, | Performed by: OTOLARYNGOLOGY

## 2020-09-17 NOTE — PROGRESS NOTES
OTOLARYNGOLOGY CLINIC NOTE  Date:  09/17/2020     Chief complaint:  Chief Complaint   Patient presents with    Follow-up     Throat Cancer/ Scope       History of Present Illness  Sharath Huizar is a 69 y.o. male  presenting today for a follow up of supraglottic scca. He initially was referred to me for hoarseness and hemoptysis. He underwent DL with biopsy of left supraglottic mass ( glottic surface of epiglottis and left false fold with some extension to right epiglottis) on 4-20-20 that showed scca. He had 2 right sided hypermetabolic nodes ( 10-15 mm ) and 11 mm hypermetabolic node on left side of neck ( in addition to hypermetabolic supraglottic primary). He was also noted on workup of this to have a solitary lung nodule in the right lower lobe on chest ct. IR biopsy performed 4-27-20 which was positive for malignancy ( squamous cell ca vs adenosquamous). This was considered to be synchronous primary . His case was presented at the head and neck tumor board and decision was made for CRT ( pt did not want laryngectomy) for his [id number] laryngeal cancer . His case was also presented at lung tumor board and recommended for upfront SBRT for his cT1N0 lung cancer. He also had a positive AFB however not felt to be TB by pulm.   He was treated by Dr. Aguilar ( heme onc) and Dr. 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 PEG tube insertion was done 5-21-20. He also had dental clearance prior to treatment.  He completed SBRT in early July. Started on concurrent CRT ( cisplatin x6 doses)  7-6-20, completed RT 8-21-20.    He is here today for first post treatment visit.   Patient reports that he is not using a feeding tube and taking in all nutrition by mouth. He has gained a little bit of weight back.  Skin dermatitis and taste has been improving. He was also treated by speech therapist ambreen wright before and during his tx. He has discomfort in left ear today .  No issue with right ear .       Past Medical History  Past Medical History:   Diagnosis Date    Coronary artery disease     Hypertension     Myocardial infarction     NSTEMI (non-ST elevated myocardial infarction) 3/23/2018    Nuclear sclerosis of both eyes 6/13/2019        Past Surgical History  Past Surgical History:   Procedure Laterality Date    CARDIAC CATHETERIZATION      with 4 stents    DIRECT LARYNGOSCOPY N/A 4/20/2020    Procedure: LARYNGOSCOPY, DIRECT;  Surgeon: Genoveva Vallejo MD;  Location: [facility] OR;  Service: ENT;  Laterality: N/A;    LEFT HEART CATHETERIZATION Left 5/29/2018    Procedure: Left heart cath;  Surgeon: Viral Lombardi MD;  Location: [facility] CATH LAB;  Service: Cardiology;  Laterality: Left;  RN PREOP 5/28/18    LUNG BIOPSY N/A 4/27/2020    Procedure: BIOPSY, LUNG;  Surgeon: Dosc Diagnostic Provider;  Location: [facility] OR;  Service: Radiology;  Laterality: N/A;  9am start--RN PHONE PREOP 4/24----COVID NEGATIVE--pt        Medications  Current Outpatient Medications on File Prior to Visit   Medication Sig Dispense Refill    albuterol (PROVENTIL/VENTOLIN HFA) 90 mcg/actuation inhaler Inhale 1-2 puffs into the lungs every 6 (six) hours as needed for Wheezing. Rescue 18 g 0    aspirin (ECOTRIN) 81 MG EC tablet Take 1 tablet (81 mg total) by mouth once daily. (Patient taking differently: Take 81 mg by mouth once daily. )  0    atorvastatin (LIPITOR) 80 MG tablet Take 1 tablet (80 mg total) by mouth once daily. 90 tablet 3    clopidogrel (PLAVIX) 75 mg tablet Take 1 tablet (75 mg total) by mouth once daily. (Patient taking differently: Take 75 mg by mouth once daily. ) 90 tablet 3    docusate sodium (COLACE) 100 MG capsule Take 1 capsule (100 mg total) by mouth 2 (two) times daily as needed for Constipation. 60 capsule 3    hydrocodone-acetaminophen (HYCET) solution 7.5-325 mg/15mL Take 15 mLs by mouth every 8 (eight) hours as needed for Pain. 473 mL 0    magic mouthwash  diphen/antac/lidoc Swish and spit 10 mLs every 4 (four) hours as needed. 450 mL 3    metoprolol succinate (TOPROL-XL) 50 MG 24 hr tablet Take 1 tablet (50 mg total) by mouth once daily. 90 tablet 3    nitroGLYCERIN (NITROSTAT) 0.4 MG SL tablet Place 1 tablet (0.4 mg total) under the tongue every 5 (five) minutes as needed. (Patient not taking: Reported on 8/31/2020) 25 tablet 3    ofloxacin (OCUFLOX) 0.3 % ophthalmic solution 5 drops in left ear BID x 10 days (Patient not taking: Reported on 8/31/2020) 10 mL 0    omeprazole (PRILOSEC) 20 MG capsule Take 1 capsule (20 mg total) by mouth 2 (two) times daily. (Patient not taking: Reported on 8/31/2020) 28 capsule 0    ondansetron (ZOFRAN) 8 MG tablet Take 1 tablet (8 mg total) by mouth 4 (four) times daily as needed for Nausea. (Patient not taking: Reported on 8/31/2020) 60 tablet 2    promethazine (PHENERGAN) 6.25 mg/5 mL syrup Take 20 mLs (25 mg total) by mouth every 6 (six) hours as needed for Nausea. (Patient not taking: Reported on 8/31/2020) 473 mL 6    promethazine-dextromethorphan (PROMETHAZINE-DM) 6.25-15 mg/5 mL Syrp Take 5 mLs by mouth every 4 to 6 hours as needed. (Patient not taking: Reported on 8/31/2020) 240 mL 0    protein (ENSURE HIGH PROTEIN) Powd Take 1 Can by mouth 3 (three) times daily. 90 Can 3     Current Facility-Administered Medications on File Prior to Visit   Medication Dose Route Frequency Provider Last Rate Last Dose    0.9%  NaCl infusion   Intravenous Continuous Cindy Zhao MD 10 mL/hr at 04/20/20 0904      lidocaine (PF) 10 mg/ml (1%) injection 10 mg  1 mL Intradermal Once Cindy Zhao MD           Review of Systems  Review of Systems   Constitutional: Negative for fever.   HENT: Negative for ear discharge.    Respiratory: Positive for cough (very mild- improving).    Musculoskeletal: Negative for neck pain.   Neurological: Negative for headaches.        Social History   reports that he quit smoking about 2 years  "ago. His smoking use included cigarettes. He has a 7.50 pack-year smoking history. He has never used smokeless tobacco. He reports that he does not drink alcohol or use drugs.     Family History  Family History   Problem Relation Age of Onset    Blindness Paternal Aunt         Physical Exam   Vitals:    09/17/20 1019   BP: 120/60   Temp: 98.6 °F (37 °C)    Body mass index is 20.73 kg/m².  Weight: 58.3 kg (128 lb 6.7 oz)   Height: 5' 6" (167.6 cm)     GENERAL: no acute distress. thin  HEAD: normocephalic.   SKIN: no lesions of skin of head and neck area.  EYES: lids and lashes normal. Pupils equal  No proptosis  EARS: external ear without lesion, normal pinna shape and position.  External auditory canal with normal cerumen, tympanic membrane fully visible, no perforation , mild retraction bilaterally. No overt middle ear effusion on left but possible serous effusion. No TM perf or effusion on right side.  Ossicles intact bilaterally.   NOSE: external nose without abnormality  ORAL CAVITY/OROPHARYNX:   tongue midline and mobile.  Symmetric palate rise. Uvula midline.   NECK: trachea midline. No significant fibrotic changes or skin changes   LYMPH NODES:No cervical lymphadenopathy.  RESPIRATORY: no stridor, no stertor. Voice relatively normal - slight raspy quality intermittently Respirations nonlabored.  NEURO: alert, responds to questions appropriately.   Facial movement symmetric with good eye closure and symmetric smile.   PSYCH:mood appropriate    PROCEDURE NOTE  NAME OF PROCEDURE: Flexible Laryngoscopy, diagnostic  INDICATIONS: history of laryngeal cancer , f/u exam s/p crt  FINDINGS: post rt changes, no definitive evidence of persistent disease    Consent: After procedure was explained in detail and all questions answered, verbal consent was obtained for performing flexible laryngoscopy.  Anesthesia: topical 4% lidocaine and neosynephrine  Procedure: With patient in seated position, the scope was inserted into " the  nasal passageway and advanced atraumatically into the nasopharynx to examine the following structures  Nasopharynx: no mass or lesion noted in nasopharynx.   Oropharynx: base of tongue without  mass or ulceration. Lingual tonsils normal in appearance  Hypopharynx: posterior pharyngeal wall without mass or lesion. No pooling of secretions but secretions slightly thick. Pyriform sinuses visible without mass or lesion  Larynx: epiglottis normal without lesion. swelling of right arytenoid and left false fold more than right false fold  but no overt lesion or ulceration.True vocal folds mobile and without lesion.  Mild interarytenoid edema and erythema Postcricoid region with mild edema, no lesion  Subglottis: visualized portion of subglottis normal in appearance    After examination performed, the scope was removed atraumatically . The patient tolerated the procedure well.          AUDIOLOGY RESULTS  Audiometric evaluation including audiogram, tympanometry, acoustic reflexes, and speech discrimination which was performed 5-11-20 was personally reviewed and interpreted.  Notable findings on the audiogram were mild mixed hearing loss on the right ( mostly at low frequencies) and no evidence of  sensorineural hearing loss (SNHL) on the left.  Tympanometry revealed Type A tympanogram on the left and Type B tympanogram with large ECV on the right. Speech discrimination was 88%  on the left, and 92% on the right.         Report of the audiologist performing this audiometric testing was also reviewed     Imaging:  The patient does not have any new imaging since last visit with me.     Labs:  CBC  Recent Labs   Lab 08/10/20  0725 08/17/20  0710 08/31/20  1149   WBC 1.50 LL 1.43 LL 3.47 L   Hemoglobin 9.3 L 8.3 L 8.6 L   Hematocrit 29.0 L 25.5 L 27.4 L   Mean Corpuscular Volume 96 96 100 H   Platelets 82 L 90 L 205     BMP  Recent Labs   Lab 08/03/20  0737 08/10/20  0725 08/17/20  0710 08/31/20  1149   Glucose 111 H 105 106  93   Sodium 139 140 137 138   Potassium 4.1 4.7 3.8 4.4   Chloride 110 108 104 107   CO2 23 26 23 23   BUN, Bld 17 17 26 H 13   Creatinine 1.0 1.2 1.4 1.1   Calcium 8.6 L 9.0 8.7 8.7   Phosphorus 3.3 3.3 3.8  --    Magnesium 1.7 1.7 1.5 L  --      COAGS  Recent Labs   Lab 05/28/18  1010 02/27/20  2300 04/25/20  1025   INR 1.1 1.0 1.0       Assessment  1. Hoarseness, chronic  - COVID-19 Routine Screening; Future    2. Squamous cell carcinoma of larynx    3. Mixed conductive and sensorineural hearing loss of right ear with unrestricted hearing of left ear       Plan:  Discussed plan of care with patient in detail and all questions answered. Patient reported understanding of plan of care.   [id number] Scca supraglottic larynx s/p cisplatin based CRT, completed 8-21-20: improving since finished treatment. Mild edema on scope exam today which is expected - I do not think this is persistent disease. He has an upcoming ct scan ordered by dr. esteves- this may show some post treatment related changes that could be inconclusive but also a pet/ct has been scheduled for 3 months post tx. I will see him back in between for repeat scope exam to ensure that findings stay consistent with what would be expected for post rt changes and not persistent disease.     Continue hydration and boost shakes.     Mixed hearing loss : mostly at low frequencies- may have had perf that healed. Will likely repeat tymps at next visit - I did not see perf on right side at today's visit and he is having symptoms on left side today. No overt EDGAR or middle ear pathology today     Follow up 5 weeks, sooner if issue

## 2020-09-22 ENCOUNTER — HOSPITAL ENCOUNTER (OUTPATIENT)
Dept: RADIOLOGY | Facility: HOSPITAL | Age: 70
Discharge: HOME | End: 2020-09-22
Attending: INTERNAL MEDICINE
Payer: MEDICARE

## 2020-09-22 DIAGNOSIS — C32.9 LARYNX CANCER: ICD-10-CM

## 2020-09-22 PROCEDURE — 71260 CT NECK CHEST WITH CONTRAST (XPD): ICD-10-PCS | Mod: 26,HCNC,, | Performed by: RADIOLOGY

## 2020-09-22 PROCEDURE — 70491 CT NECK CHEST WITH CONTRAST (XPD): ICD-10-PCS | Mod: 26,HCNC,, | Performed by: RADIOLOGY

## 2020-09-22 PROCEDURE — 25500020 PHARM REV CODE 255: Mod: HCNC | Performed by: INTERNAL MEDICINE

## 2020-09-22 PROCEDURE — 71260 CT THORAX DX C+: CPT | Mod: 26,HCNC,, | Performed by: RADIOLOGY

## 2020-09-22 PROCEDURE — 70491 CT SOFT TISSUE NECK W/DYE: CPT | Mod: 26,HCNC,, | Performed by: RADIOLOGY

## 2020-09-22 PROCEDURE — 70491 CT SOFT TISSUE NECK W/DYE: CPT | Mod: TC,HCNC

## 2020-09-22 RX ADMIN — IOHEXOL 80 ML: 350 INJECTION, SOLUTION INTRAVENOUS at 11:09

## 2020-09-24 ENCOUNTER — OFFICE VISIT (OUTPATIENT)
Dept: HEMATOLOGY/ONCOLOGY | Facility: CLINIC | Age: 70
End: 2020-09-24
Payer: MEDICARE

## 2020-09-24 ENCOUNTER — TELEPHONE (OUTPATIENT)
Dept: RADIATION ONCOLOGY | Facility: CLINIC | Age: 70
End: 2020-09-24

## 2020-09-24 VITALS
RESPIRATION RATE: 18 BRPM | HEIGHT: 66 IN | OXYGEN SATURATION: 98 % | SYSTOLIC BLOOD PRESSURE: 119 MMHG | BODY MASS INDEX: 20.48 KG/M2 | HEART RATE: 81 BPM | TEMPERATURE: 98 F | DIASTOLIC BLOOD PRESSURE: 64 MMHG | WEIGHT: 127.44 LBS

## 2020-09-24 DIAGNOSIS — C32.9 LARYNX CANCER: Primary | ICD-10-CM

## 2020-09-24 DIAGNOSIS — C34.31 MALIGNANT NEOPLASM OF LOWER LOBE OF RIGHT LUNG: ICD-10-CM

## 2020-09-24 PROCEDURE — 99499 RISK ADDL DX/OHS AUDIT: ICD-10-PCS | Mod: HCNC,S$GLB,, | Performed by: INTERNAL MEDICINE

## 2020-09-24 PROCEDURE — 99213 OFFICE O/P EST LOW 20 MIN: CPT | Mod: HCNC,S$GLB,, | Performed by: INTERNAL MEDICINE

## 2020-09-24 PROCEDURE — 99213 PR OFFICE/OUTPT VISIT, EST, LEVL III, 20-29 MIN: ICD-10-PCS | Mod: HCNC,S$GLB,, | Performed by: INTERNAL MEDICINE

## 2020-09-24 PROCEDURE — 3008F BODY MASS INDEX DOCD: CPT | Mod: HCNC,CPTII,S$GLB, | Performed by: INTERNAL MEDICINE

## 2020-09-24 PROCEDURE — 3074F SYST BP LT 130 MM HG: CPT | Mod: HCNC,CPTII,S$GLB, | Performed by: INTERNAL MEDICINE

## 2020-09-24 PROCEDURE — 99999 PR PBB SHADOW E&M-EST. PATIENT-LVL IV: ICD-10-PCS | Mod: PBBFAC,HCNC,, | Performed by: INTERNAL MEDICINE

## 2020-09-24 PROCEDURE — 1126F PR PAIN SEVERITY QUANTIFIED, NO PAIN PRESENT: ICD-10-PCS | Mod: HCNC,S$GLB,, | Performed by: INTERNAL MEDICINE

## 2020-09-24 PROCEDURE — 1159F PR MEDICATION LIST DOCUMENTED IN MEDICAL RECORD: ICD-10-PCS | Mod: HCNC,S$GLB,, | Performed by: INTERNAL MEDICINE

## 2020-09-24 PROCEDURE — 1126F AMNT PAIN NOTED NONE PRSNT: CPT | Mod: HCNC,S$GLB,, | Performed by: INTERNAL MEDICINE

## 2020-09-24 PROCEDURE — 1101F PR PT FALLS ASSESS DOC 0-1 FALLS W/OUT INJ PAST YR: ICD-10-PCS | Mod: HCNC,CPTII,S$GLB, | Performed by: INTERNAL MEDICINE

## 2020-09-24 PROCEDURE — 99499 UNLISTED E&M SERVICE: CPT | Mod: HCNC,S$GLB,, | Performed by: INTERNAL MEDICINE

## 2020-09-24 PROCEDURE — 3008F PR BODY MASS INDEX (BMI) DOCUMENTED: ICD-10-PCS | Mod: HCNC,CPTII,S$GLB, | Performed by: INTERNAL MEDICINE

## 2020-09-24 PROCEDURE — 3074F PR MOST RECENT SYSTOLIC BLOOD PRESSURE < 130 MM HG: ICD-10-PCS | Mod: HCNC,CPTII,S$GLB, | Performed by: INTERNAL MEDICINE

## 2020-09-24 PROCEDURE — 1159F MED LIST DOCD IN RCRD: CPT | Mod: HCNC,S$GLB,, | Performed by: INTERNAL MEDICINE

## 2020-09-24 PROCEDURE — 1101F PT FALLS ASSESS-DOCD LE1/YR: CPT | Mod: HCNC,CPTII,S$GLB, | Performed by: INTERNAL MEDICINE

## 2020-09-24 PROCEDURE — 3078F PR MOST RECENT DIASTOLIC BLOOD PRESSURE < 80 MM HG: ICD-10-PCS | Mod: HCNC,CPTII,S$GLB, | Performed by: INTERNAL MEDICINE

## 2020-09-24 PROCEDURE — 99999 PR PBB SHADOW E&M-EST. PATIENT-LVL IV: CPT | Mod: PBBFAC,HCNC,, | Performed by: INTERNAL MEDICINE

## 2020-09-24 PROCEDURE — 3078F DIAST BP <80 MM HG: CPT | Mod: HCNC,CPTII,S$GLB, | Performed by: INTERNAL MEDICINE

## 2020-09-24 NOTE — TELEPHONE ENCOUNTER
Return call to wife who is asking if Mr Huizar's lung cancer could be from asbestos exposure He has an appt today with Dr Aguilar and will discuss it with her

## 2020-09-24 NOTE — TELEPHONE ENCOUNTER
----- Message from Janice Graf sent at 9/24/2020 12:49 PM CDT -----  Regarding: pt's wife lorena jama  Type: Patient Call Back    Who called:pt    What is the request in detail pt has questions in regards to an appt. Requesting to speak to doctor.  Call pt    Can the clinic reply by [facility]?    Would the patient rather a call back or a response via [facility]?call    Best call back number:[phone] (home)       Additional Information:

## 2020-09-24 NOTE — PROGRESS NOTES
"Subjective:       Patient ID: Sharath Huizar is a 69 y.o. male.    Chief Complaint: Larynx cancer  Sharath Huizar is a 69 y.o. male  presenting today for a new diagnosis of Larynx cancer. Patient was seen in ER 2-27-20 with hemoptysis and hoarse voice for a couple of months along with left temporal headaches.   He underwent chest xray on 2/28/2020 which revealed "Small focal opacity within the right lower lung zone with possible cavitation.  This may reflect focal infectious or inflammatory process"  He was referred to pulmonary and underwent CT chest, abdomen/pelvis on 3/18/2020 which revealed "a 2.2 cm noncalcified nodule seen at the right lung base.  May represent consolidation however malignancy can not be excluded.  PET-CT is recommended for further evaluation"  PET scan on 3/27/2020 revealed "Hypermetabolic left supraglottic soft tissue mass concerning for primary laryngeal cancer.  Two hypermetabolic bilateral cervical lymph nodes as well as a hypermetabolic right lower lobe partially cavitary pulmonary mass concerning for metastatic disease.  Recommend ENT consultation and dedicated contrast enhance neck CT"  He was referred to ENT and underwent CT neck on 4/16/2020 which revealed Stable left supraglottic laryngeal mass and bilateral cervical metastatic adenopathy.     He underwent direct laryngoscopy on 4/20/2020 and that revealed "bulky and friable supraglottic mass on left side of glottic surface of epiglottis extending over slightly on to the right. Extends down along left aryepiglottic fold and true vocal fold on left. The right vocal fold did not appear to be involved. No lesion of pyriform sinus. Palpation of vallecula and base of tongue was soft. No additional lesions were noted in the oral cavity or oropharynx"  Pathology revealed squamous cell cancer  His case was discussed in head and Neck tumor board and he underwent left lung biopsy on 4/27/2020 and pathology reveals squamous/adenosquamous cell " "cancer     He has completed RT to his lung as well as 7 weeks of chemo/RT with weekly Cisplatin, as of 8/27/2020            I HPIHe comes in to review his CT neck and chest reveals. "Left supraglottic laryngeal mass, known to be a squamous cell carcinoma, without any extension outside of the laryngeal wall.  No definite signs for vocal cord paralysis. Significant decrease in size of the cavitary lung mass in the superior segment of the right lower lobe when compared to the study of June 2020. The pleural based focal 5 mm nodules in the left lower lobe remain without significant change from the previous study. Mild subpleural emphysematous changes in the upper lobes bilaterally stable and unchanged when compared to the previous study"  He saw Dr. Langford and noted to have mild laryngeal edema.    Review of Systems   Constitutional: Negative for appetite change, fatigue and unexpected weight change.   HENT: Negative for mouth sores.    Eyes: Negative for visual disturbance.   Respiratory: Negative for cough and shortness of breath.    Cardiovascular: Negative for chest pain.   Gastrointestinal: Negative for abdominal pain and diarrhea.   Genitourinary: Negative for frequency.   Musculoskeletal: Negative for back pain.   Integumentary:  Negative for rash.   Neurological: Negative for headaches.   Hematological: Negative for adenopathy.   Psychiatric/Behavioral: The patient is not nervous/anxious.    All other systems reviewed and are negative.        Objective:      Physical Exam  Vitals signs reviewed.   Constitutional:       Appearance: He is well-developed.   HENT:      Mouth/Throat:      Pharynx: No oropharyngeal exudate.   Cardiovascular:      Rate and Rhythm: Normal rate.      Heart sounds: Normal heart sounds.   Pulmonary:      Effort: Pulmonary effort is normal.      Breath sounds: Normal breath sounds. No wheezing.   Abdominal:      General: Bowel sounds are normal.      Palpations: Abdomen is soft.      " Tenderness: There is no abdominal tenderness.   Musculoskeletal:         General: No tenderness.   Lymphadenopathy:      Cervical: No cervical adenopathy.   Skin:     General: Skin is warm and dry.      Findings: No rash.   Neurological:      Mental Status: He is alert and oriented to person, place, and time.      Coordination: Coordination normal.   Psychiatric:         Thought Content: Thought content normal.         Judgment: Judgment normal.           LABS:  WBC   Date Value Ref Range Status   09/22/2020 6.76 3.90 - 12.70 K/uL Final     Hemoglobin   Date Value Ref Range Status   09/22/2020 10.2 (L) 14.0 - 18.0 g/dL Final     Hematocrit   Date Value Ref Range Status   09/22/2020 31.8 (L) 40.0 - 54.0 % Final     Platelets   Date Value Ref Range Status   09/22/2020 214 150 - 350 K/uL Final     Gran # (ANC)   Date Value Ref Range Status   09/22/2020 5.2 1.8 - 7.7 K/uL Final     Comment:     The ANC is based on a white cell differential from an   automated cell counter. It has not been microscopically   reviewed for the presence of abnormal cells. Clinical   correlation is required.         Chemistry        Component Value Date/Time     09/22/2020 1049    K 4.4 09/22/2020 1049     09/22/2020 1049    CO2 26 09/22/2020 1049    BUN 17 09/22/2020 1049    CREATININE 1.1 09/22/2020 1049    GLU 96 09/22/2020 1049        Component Value Date/Time    CALCIUM 9.2 09/22/2020 1049    ALKPHOS 74 09/22/2020 1049    AST 13 09/22/2020 1049    ALT 9 (L) 09/22/2020 1049    BILITOT 0.3 09/22/2020 1049    ESTGFRAFRICA >60 09/22/2020 1049    EGFRNONAA >60 09/22/2020 1049          Assessment:       1. Larynx cancer    2. Malignant neoplasm of lower lobe of right lung        Plan:        1,2. Reviewed CT neck, chest,. Most likely treatment changes,. He will return in mid December 2020 with restaging PET scan    Above care plan was discussed with patient and all questions were addressed to his satisfaction

## 2020-09-29 ENCOUNTER — PATIENT MESSAGE (OUTPATIENT)
Dept: OTHER | Facility: OTHER | Age: 70
End: 2020-09-29

## 2020-09-29 ENCOUNTER — DOCUMENTATION ONLY (OUTPATIENT)
Dept: HEMATOLOGY/ONCOLOGY | Facility: CLINIC | Age: 70
End: 2020-09-29

## 2020-09-29 NOTE — PROGRESS NOTES
Received call from wife on 9/24 to let me know that patient had appointment in the clinic on 9/24. Went to clinic to meet with patient to get him to sign form for [employer]. They are requesting assistance with [employer] cards and gas cards. They needed new application completed. Patient signed form and then found provider and had Dr. Aguilar sign as well. Completed the rest of the application and faxed into [employer].

## 2020-10-19 DIAGNOSIS — I10 HYPERTENSION, ESSENTIAL: ICD-10-CM

## 2020-10-19 DIAGNOSIS — I25.10 CORONARY ARTERY DISEASE INVOLVING NATIVE CORONARY ARTERY OF NATIVE HEART WITHOUT ANGINA PECTORIS: ICD-10-CM

## 2020-10-21 RX ORDER — CLOPIDOGREL BISULFATE 75 MG/1
75 TABLET ORAL DAILY
Qty: 90 TABLET | Refills: 3 | Status: SHIPPED | OUTPATIENT
Start: 2020-10-21 | End: 2021-11-08 | Stop reason: SDUPTHER

## 2020-10-21 RX ORDER — METOPROLOL SUCCINATE 50 MG/1
50 TABLET, EXTENDED RELEASE ORAL DAILY
Qty: 90 TABLET | Refills: 3 | Status: SHIPPED | OUTPATIENT
Start: 2020-10-21 | End: 2021-11-08 | Stop reason: SDUPTHER

## 2020-10-21 RX ORDER — ATORVASTATIN CALCIUM 80 MG/1
80 TABLET, FILM COATED ORAL DAILY
Qty: 90 TABLET | Refills: 3 | Status: SHIPPED | OUTPATIENT
Start: 2020-10-21 | End: 2021-10-11

## 2020-10-28 ENCOUNTER — LAB VISIT (OUTPATIENT)
Dept: FAMILY MEDICINE | Facility: CLINIC | Age: 70
End: 2020-10-28
Payer: MEDICARE

## 2020-10-28 DIAGNOSIS — R49.0 HOARSENESS, CHRONIC: ICD-10-CM

## 2020-10-28 PROCEDURE — U0003 INFECTIOUS AGENT DETECTION BY NUCLEIC ACID (DNA OR RNA); SEVERE ACUTE RESPIRATORY SYNDROME CORONAVIRUS 2 (SARS-COV-2) (CORONAVIRUS DISEASE [COVID-19]), AMPLIFIED PROBE TECHNIQUE, MAKING USE OF HIGH THROUGHPUT TECHNOLOGIES AS DESCRIBED BY CMS-2020-01-R: HCPCS | Mod: HCNC

## 2020-10-29 LAB — SARS-COV-2 RNA RESP QL NAA+PROBE: NOT DETECTED

## 2020-10-30 ENCOUNTER — PATIENT MESSAGE (OUTPATIENT)
Dept: ADMINISTRATIVE | Facility: HOSPITAL | Age: 70
End: 2020-10-30

## 2020-11-03 ENCOUNTER — OFFICE VISIT (OUTPATIENT)
Dept: OTOLARYNGOLOGY | Facility: CLINIC | Age: 70
End: 2020-11-03
Payer: MEDICARE

## 2020-11-03 VITALS
BODY MASS INDEX: 20.83 KG/M2 | DIASTOLIC BLOOD PRESSURE: 82 MMHG | TEMPERATURE: 99 F | HEIGHT: 66 IN | SYSTOLIC BLOOD PRESSURE: 122 MMHG | WEIGHT: 129.63 LBS

## 2020-11-03 DIAGNOSIS — C32.9 SQUAMOUS CELL CARCINOMA OF LARYNX: Primary | ICD-10-CM

## 2020-11-03 DIAGNOSIS — J30.2 SEASONAL ALLERGIC RHINITIS, UNSPECIFIED TRIGGER: ICD-10-CM

## 2020-11-03 PROCEDURE — 1159F PR MEDICATION LIST DOCUMENTED IN MEDICAL RECORD: ICD-10-PCS | Mod: S$GLB,,, | Performed by: OTOLARYNGOLOGY

## 2020-11-03 PROCEDURE — 1126F PR PAIN SEVERITY QUANTIFIED, NO PAIN PRESENT: ICD-10-PCS | Mod: S$GLB,,, | Performed by: OTOLARYNGOLOGY

## 2020-11-03 PROCEDURE — 1101F PT FALLS ASSESS-DOCD LE1/YR: CPT | Mod: CPTII,S$GLB,, | Performed by: OTOLARYNGOLOGY

## 2020-11-03 PROCEDURE — 99214 PR OFFICE/OUTPT VISIT, EST, LEVL IV, 30-39 MIN: ICD-10-PCS | Mod: 25,S$GLB,, | Performed by: OTOLARYNGOLOGY

## 2020-11-03 PROCEDURE — 99214 OFFICE O/P EST MOD 30 MIN: CPT | Mod: 25,S$GLB,, | Performed by: OTOLARYNGOLOGY

## 2020-11-03 PROCEDURE — 3079F PR MOST RECENT DIASTOLIC BLOOD PRESSURE 80-89 MM HG: ICD-10-PCS | Mod: CPTII,S$GLB,, | Performed by: OTOLARYNGOLOGY

## 2020-11-03 PROCEDURE — 1101F PR PT FALLS ASSESS DOC 0-1 FALLS W/OUT INJ PAST YR: ICD-10-PCS | Mod: CPTII,S$GLB,, | Performed by: OTOLARYNGOLOGY

## 2020-11-03 PROCEDURE — 31575 PR LARYNGOSCOPY, FLEXIBLE; DIAGNOSTIC: ICD-10-PCS | Mod: S$GLB,,, | Performed by: OTOLARYNGOLOGY

## 2020-11-03 PROCEDURE — 3008F PR BODY MASS INDEX (BMI) DOCUMENTED: ICD-10-PCS | Mod: CPTII,S$GLB,, | Performed by: OTOLARYNGOLOGY

## 2020-11-03 PROCEDURE — 1159F MED LIST DOCD IN RCRD: CPT | Mod: S$GLB,,, | Performed by: OTOLARYNGOLOGY

## 2020-11-03 PROCEDURE — 99499 UNLISTED E&M SERVICE: CPT | Mod: S$GLB,,, | Performed by: OTOLARYNGOLOGY

## 2020-11-03 PROCEDURE — 3288F FALL RISK ASSESSMENT DOCD: CPT | Mod: CPTII,S$GLB,, | Performed by: OTOLARYNGOLOGY

## 2020-11-03 PROCEDURE — 3074F SYST BP LT 130 MM HG: CPT | Mod: CPTII,S$GLB,, | Performed by: OTOLARYNGOLOGY

## 2020-11-03 PROCEDURE — 31575 DIAGNOSTIC LARYNGOSCOPY: CPT | Mod: S$GLB,,, | Performed by: OTOLARYNGOLOGY

## 2020-11-03 PROCEDURE — 3079F DIAST BP 80-89 MM HG: CPT | Mod: CPTII,S$GLB,, | Performed by: OTOLARYNGOLOGY

## 2020-11-03 PROCEDURE — 99499 RISK ADDL DX/OHS AUDIT: ICD-10-PCS | Mod: S$GLB,,, | Performed by: OTOLARYNGOLOGY

## 2020-11-03 PROCEDURE — 3288F PR FALLS RISK ASSESSMENT DOCUMENTED: ICD-10-PCS | Mod: CPTII,S$GLB,, | Performed by: OTOLARYNGOLOGY

## 2020-11-03 PROCEDURE — 3008F BODY MASS INDEX DOCD: CPT | Mod: CPTII,S$GLB,, | Performed by: OTOLARYNGOLOGY

## 2020-11-03 PROCEDURE — 1126F AMNT PAIN NOTED NONE PRSNT: CPT | Mod: S$GLB,,, | Performed by: OTOLARYNGOLOGY

## 2020-11-03 PROCEDURE — 3074F PR MOST RECENT SYSTOLIC BLOOD PRESSURE < 130 MM HG: ICD-10-PCS | Mod: CPTII,S$GLB,, | Performed by: OTOLARYNGOLOGY

## 2020-11-03 RX ORDER — FLUTICASONE PROPIONATE 50 MCG
1 SPRAY, SUSPENSION (ML) NASAL 2 TIMES DAILY
Qty: 18.2 ML | Refills: 3 | Status: SHIPPED | OUTPATIENT
Start: 2020-11-03 | End: 2021-02-12 | Stop reason: CLARIF

## 2020-11-03 NOTE — PROGRESS NOTES
OTOLARYNGOLOGY CLINIC NOTE  Date:  11/03/2020     Chief complaint:  Chief Complaint   Patient presents with    Follow-up       History of Present Illness  Sharath Huizar is a 69 y.o. male  presenting today for a follow up of supraglottic scca. He initially was referred to me for hoarseness and hemoptysis. He underwent DL with biopsy of left supraglottic mass ( glottic surface of epiglottis and left false fold with some extension to right epiglottis) on 4-20-20 that showed scca. He had 2 right sided hypermetabolic nodes ( 10-15 mm ) and 11 mm hypermetabolic node on left side of neck ( in addition to hypermetabolic supraglottic primary). He was also noted on workup of this to have a solitary lung nodule in the right lower lobe on chest ct. IR biopsy performed 4-27-20 which was positive for malignancy ( squamous cell ca vs adenosquamous). This was considered to be synchronous primary . His case was presented at the head and neck tumor board and decision was made for CRT ( pt did not want laryngectomy) for his [id number] laryngeal cancer . His case was also presented at lung tumor board and recommended for upfront SBRT for his cT1N0 lung cancer. He also had a positive AFB however not felt to be TB by pulm.   He was treated by Dr. Aguilar ( heme onc) and Dr. 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 PEG tube insertion was done 5-21-20. He also had dental clearance prior to treatment.  He completed SBRT in early July. Started on concurrent CRT ( cisplatin x6 doses)  7-6-20, completed RT 8-21-20.    Today he reports that he has been having issues with postnasal drip and sneezing . When sneezes, this then worsens the drip and then makes it feel like  throat feels like something is stuck in his throat  Does not have pain. No issues otherwise- no worsening of voice. No dysphagia. No ear pain . No hemoptysis.  Weight has been stable used to weigh 140 , now 127 from  119.      Past Medical History  Past Medical History:   Diagnosis Date    Coronary artery disease     Hypertension     Myocardial infarction     NSTEMI (non-ST elevated myocardial infarction) 3/23/2018    Nuclear sclerosis of both eyes 6/13/2019        Past Surgical History  Past Surgical History:   Procedure Laterality Date    CARDIAC CATHETERIZATION      with 4 stents    DIRECT LARYNGOSCOPY N/A 4/20/2020    Procedure: LARYNGOSCOPY, DIRECT;  Surgeon: Genoveva Vallejo MD;  Location: [facility] OR;  Service: ENT;  Laterality: N/A;    LEFT HEART CATHETERIZATION Left 5/29/2018    Procedure: Left heart cath;  Surgeon: Viral Lombardi MD;  Location: [facility] CATH LAB;  Service: Cardiology;  Laterality: Left;  RN PREOP 5/28/18    LUNG BIOPSY N/A 4/27/2020    Procedure: BIOPSY, LUNG;  Surgeon: Jones Diagnostic Provider;  Location: [facility] OR;  Service: Radiology;  Laterality: N/A;  9am start--RN PHONE PREOP 4/24----COVID NEGATIVE--pt        Medications  Current Outpatient Medications on File Prior to Visit   Medication Sig Dispense Refill    albuterol (PROVENTIL/VENTOLIN HFA) 90 mcg/actuation inhaler Inhale 1-2 puffs into the lungs every 6 (six) hours as needed for Wheezing. Rescue 18 g 0    atorvastatin (LIPITOR) 80 MG tablet Take 1 tablet (80 mg total) by mouth once daily. 90 tablet 3    clopidogreL (PLAVIX) 75 mg tablet Take 1 tablet (75 mg total) by mouth once daily. 90 tablet 3    docusate sodium (COLACE) 100 MG capsule Take 1 capsule (100 mg total) by mouth 2 (two) times daily as needed for Constipation. 60 capsule 3    hydrocodone-acetaminophen (HYCET) solution 7.5-325 mg/15mL Take 15 mLs by mouth every 8 (eight) hours as needed for Pain. 473 mL 0    magic mouthwash diphen/antac/lidoc Swish and spit 10 mLs every 4 (four) hours as needed. 450 mL 3    metoprolol succinate (TOPROL-XL) 50 MG 24 hr tablet Take 1 tablet (50 mg total) by mouth once daily. 90 tablet 3    ofloxacin (OCUFLOX) 0.3 % ophthalmic solution 5  drops in left ear BID x 10 days 10 mL 0    omeprazole (PRILOSEC) 20 MG capsule Take 1 capsule (20 mg total) by mouth 2 (two) times daily. 28 capsule 0    ondansetron (ZOFRAN) 8 MG tablet Take 1 tablet (8 mg total) by mouth 4 (four) times daily as needed for Nausea. 60 tablet 2    promethazine (PHENERGAN) 6.25 mg/5 mL syrup Take 20 mLs (25 mg total) by mouth every 6 (six) hours as needed for Nausea. 473 mL 6    promethazine-dextromethorphan (PROMETHAZINE-DM) 6.25-15 mg/5 mL Syrp Take 5 mLs by mouth every 4 to 6 hours as needed. 240 mL 0    protein (ENSURE HIGH PROTEIN) Powd Take 1 Can by mouth 3 (three) times daily. 90 Can 3    aspirin (ECOTRIN) 81 MG EC tablet Take 1 tablet (81 mg total) by mouth once daily. (Patient taking differently: Take 81 mg by mouth once daily. )  0    nitroGLYCERIN (NITROSTAT) 0.4 MG SL tablet Place 1 tablet (0.4 mg total) under the tongue every 5 (five) minutes as needed. (Patient not taking: Reported on 8/31/2020) 25 tablet 3     Current Facility-Administered Medications on File Prior to Visit   Medication Dose Route Frequency Provider Last Rate Last Dose    0.9%  NaCl infusion   Intravenous Continuous Cindy Zhao MD 10 mL/hr at 04/20/20 0904      lidocaine (PF) 10 mg/ml (1%) injection 10 mg  1 mL Intradermal Once Cindy Zhao MD           Review of Systems  Review of Systems   Constitutional: Negative for fever and weight loss.   HENT: Positive for congestion. Negative for sore throat.         Postnasal drip   Respiratory: Negative for hemoptysis.    Cardiovascular: Negative for chest pain.   Musculoskeletal: Negative for neck pain.   Neurological: Negative for headaches.   Endo/Heme/Allergies: Positive for environmental allergies.        Social History   reports that he quit smoking about 2 years ago. His smoking use included cigarettes. He has a 7.50 pack-year smoking history. He has never used smokeless tobacco. He reports that he does not drink alcohol or use  "drugs.     Family History  Family History   Problem Relation Age of Onset    Blindness Paternal Aunt         Physical Exam   Vitals:    11/03/20 1055   BP: 122/82   Temp: 98.6 °F (37 °C)    Body mass index is 20.92 kg/m².  Weight: 58.8 kg (129 lb 10.1 oz)   Height: 5' 6" (167.6 cm)     GENERAL: no acute distress.  HEAD: normocephalic.   SKIN: no lesions of skin of head and neck area.  EYES: lids and lashes normal. Pupils equal and reactive to light. Extraocular motions intact. No proptosis  EARS: external ear without lesion, normal pinna shape and position.  External auditory canal with normal cerumen, tympanic membrane fully visible, no perforation , no retraction. No middle ear effusion. Ossicles intact.   NOSE: external nose without abnormality, septum grossly midline on anterior rhinoscopy  ORAL CAVITY/OROPHARYNX: dentition fair, no mass or lesion of gingiva/buccal mucosa/floor of mouth/tongue. tongue midline and mobile. No fasciculations. Floor of mouth and base of tongue soft to palpation. Symmetric palate rise. Uvula midline.   NECK: trachea midline. No discrete palpable thyroid nodule. No parotid mass nor tenderness. No submandibular gland mass nor tenderness. No scars.  LYMPH NODES:No cervical lymphadenopathy.  RESPIRATORY: no stridor, no stertor. Voice normal. Respirations nonlabored.  NEURO: alert, responds to questions appropriately.   Cranial nerve exam as indicated in above sections and additionally showed intact facial sensation to light touch bilaterally in V1,V2 and V3. Facial movement symmetric with good eye closure and symmetric smile.   PSYCH:mood appropriate    PROCEDURE NOTE  NAME OF PROCEDURE: Flexible Laryngoscopy, diagnostic  INDICATIONS: follow up exam for h/o laryngeal scca  FINDINGS: post treatment changes of larynx    Consent: After procedure was explained in detail and all questions answered, verbal consent was obtained for performing flexible laryngoscopy.  Anesthesia: topical 4% " "lidocaine and neosynephrine  Procedure: With patient in seated position, the scope was inserted into the bilateral nasal passageway and advanced atraumatically on the right into the nasopharynx to examine the following structures:  Nasal cavity: no septal spur. Turbinates with mild hypertrophy but good response to decongestant. No significant middle meatal edema. No purulent drainage. Mucoid drainage present  Nasopharynx: no mass or lesion noted in nasopharynx.   Oropharynx: base of tongue without  mass or ulceration. Lingual tonsils normal in appearance  Hypopharynx: posterior pharyngeal wall without mass or lesion. No pooling of secretions. Pyriform sinuses visible without mass or lesion  Larynx: epiglottis normal without lesion. Right false vocal fold without edema/erythema/lesion. Left false fold with mild-moderate edema but no overt lesion True vocal folds mobile and without lesion. moderate interarytenoid edema and erythema mor on the right . Postcricoid region without edema or lesion  Subglottis: visualized portion of subglottis normal in appearance    After examination performed, the scope was removed atraumatically . The patient tolerated the procedure well. Photodocumentation obtained with representative images below, all images and/or videos uploaded in media section of epic.  Right nasal cavity      Right middle turb/middle meatus      Left  nasal cavity      Left middle turb/middle meatus      nasopharynx    vallecula      Larynx cords abducted              Larynx cords adducted          Imaging:  The patient does have any pertinent and/or recent imaging of the head and neck. Ct neck/chest/abdomen/pelvis 9-22-20 ( 1 month post tx) images and report reviewed personally and with patient.   Slight loss of fat plane on left with edema most suggestive of early post treatment changes. No cervical Lad noted        Report "The cavitary lung mass in the superior segment of the right lower lobe measures " "approximately 1.3 x 1.4 x 1.1 cm (previously 2.8 x 2 x 1.8 cm).  There is associated linear scarring with spiculation associated with this mass.  The subpleural ground-glass type opacity seen just caudal to this mass (image 388 series 4 remains stable and unchanged when compared to the previous study.  The focal small nodule in the posterior basilar segment of the right lower lobe (image 461 series 4 also is stable and unchanged when compared to the previous study. Small focal pleural based nodule in the superior segment of the left lower lobe (image 326 series 4 is also stable and unchanged when compared to the previous study.  "    Linear scar-like density noted in the posterior basilar segment of the left lower lobe.  In the medial basilar segment of the left lower lobe ill-defined scar-like density "    Labs:  CBC  Recent Labs   Lab 08/17/20  0710 08/31/20  1149 09/22/20  1049   WBC 1.43 LL 3.47 L 6.76   Hemoglobin 8.3 L 8.6 L 10.2 L   Hematocrit 25.5 L 27.4 L 31.8 L   MCV 96 100 H 99 H   Platelets 90 L 205 214     BMP  Recent Labs   Lab 08/03/20  0737 08/10/20  0725 08/17/20  0710 08/31/20  1149 09/22/20  1049   Glucose 111 H 105 106 93 96   Sodium 139 140 137 138 143   Potassium 4.1 4.7 3.8 4.4 4.4   Chloride 110 108 104 107 109   CO2 23 26 23 23 26   BUN 17 17 26 H 13 17   Creatinine 1.0 1.2 1.4 1.1 1.1   Calcium 8.6 L 9.0 8.7 8.7 9.2   Phosphorus 3.3 3.3 3.8  --   --    Magnesium 1.7 1.7 1.5 L  --   --      COAGS  Recent Labs   Lab 05/28/18  1010 02/27/20  2300 04/25/20  1025   INR 1.1 1.0 1.0       Assessment  1. Squamous cell carcinoma of larynx  - COVID-19 Routine Screening; Future    2. Seasonal allergic rhinitis, unspecified trigger       Plan:  Discussed plan of care with patient in detail and all questions answered. Patient reported understanding of plan of care.   Allergic rhinitis(AR): Will start with flonase and saline and if symptoms persist, will start dual nasal spray therapy as combo of steroid " and antihistamine nasal spray has been shown in evidence based studies to be better than either alone. Discussed medication administration technique ( point toward outer corner of eye and not towards nasal septum) and use nasal saline prior to medication sprays. Counseled on risk of bleeding, risk of increased intraocular pressure/cataract with long term use.   I think this is the cause of his postnasal drip that is leading to discomfort sensation/globus intermittently ( on top of chronic post RT edema)     H/o laryngeal SCCa: I think that findings on imaging and scope exam are most suggestive of post treatment changes and not suggestive of persistent disease. Discussed with pt that persistent disease ( and recurrent disease for that matter) can be submucosal. His exam does seem better slightly compared to last scope. We discussed that prolonged and/or lifelong edema is not unusual after CRT. He has planned PET/CT in December which I think will be more informative - if any concern will get a dedicated neck ct with contrast as pet/ct with contrasted ct not available at [facility] that I am aware of. I will see him back after this at the end of December for repeat scope. If any concern, will perform dl biopsy     Follow up 6-7 weeks for scope exam and review of pet

## 2020-11-03 NOTE — PATIENT INSTRUCTIONS
"Information and instructions from your visit with me today:    · Start using the following medication nasal sprays:   · Fluticasone spray:    This medication is a steroid spray. It stays within the nose and does not have absorption into the body that leads to side effects that one has with oral steroid medication. Fluticasone nasal spray is the same as the Flonase brand nasal spray. Discuss with your pharmacist if the price is lower over the counter or with a prescription ( this varies depending on insurance). The medication that is over the counter is the same as the prescription medication. Use this medication as instructed on the prescription, 1 spray on each side of your nose twice daily.       Additional instructions for medication sprays  Place the tip of the medication bottle in your nose and aim slightly up and out on each side to get medication high and deep into your nose and sinuses, and not have it all deposit in the very front of your nose. Aim the tip of the nozzle towards the outer corner of your eye . You can imagine aiming towards the back of your eyeball on each side for this, as opposed to straight back to the center of your nose and head.     You need to use this medication every day regardless of symptoms, as it takes time ( a few weeks) to work and get the benefits. It does not work on an "as needed" basis like taking a decongestant. If your symptoms only occur in a particular season, then the medication can be used seasonally instead of year long. For seasonal symptoms, you should start using the spray twice daily a month before when you normally have symptoms ( for example, if symptoms start in August, should start at the end of June).     · Start nasal irrigations with saline solution- you can either use a rinse or a mist spray:    SALINE SINUS RINSE ([employer] brand): You should do a full bottle, half on one side of your nose and half on the other, 1-2 times per day (or more if able to, you " cannot do this too much). Follow the instructions on the box: mix the salt packet with clean water (bottle, previously boiled, distilled, etc -- not tap water) to the line on the bottle to make the irrigation.         NASAL SALINE SPRAY ( simply saline and arm and hammer are examples) There are several different brands found in the cold and flu aisle of the pharmacy. You can use any brand of saline spray - this will deliver the saline by a gentle mist ( if you have difficulty or discomfort with nasal rinse/ a lot of fluid in the nose, this will be more comfortable).     Always rinse your nose with saline prior to using medication sprays and wait a couple of hours before using again. You can use the saline throughout the day to help with stuffy nose or dry nose.    · Do not use nasal decongestant sprays such as Afrin or similar products long term ( over 3 days) .  This can cause long term physical nasal addiction. Afrin should only be used if having nose bleeds, severe nasal congestion , or severe ear pain/fullness and should not be used for more than 2-3 days in a row . It is a not a medication that should be used for a long period of time.     It was nice meeting you today, and I look forward to helping you feel better soon. Please don't hesitate to call if you have any other questions or concerns, or if I can be of any assistance in the meantime.      Genoveva Vallejo MD    [facility]     Phone  [phone]    Fax      [phone]        Genoveva Vallejo MD  Otorhinolaryngology

## 2020-11-12 ENCOUNTER — PES CALL (OUTPATIENT)
Dept: ADMINISTRATIVE | Facility: CLINIC | Age: 70
End: 2020-11-12

## 2020-11-13 ENCOUNTER — OFFICE VISIT (OUTPATIENT)
Dept: HOME HEALTH SERVICES | Facility: CLINIC | Age: 70
End: 2020-11-13
Payer: MEDICARE

## 2020-11-13 VITALS
HEART RATE: 71 BPM | WEIGHT: 128 LBS | HEIGHT: 66 IN | BODY MASS INDEX: 20.57 KG/M2 | DIASTOLIC BLOOD PRESSURE: 77 MMHG | SYSTOLIC BLOOD PRESSURE: 106 MMHG

## 2020-11-13 DIAGNOSIS — I10 HYPERTENSION, ESSENTIAL: ICD-10-CM

## 2020-11-13 DIAGNOSIS — C34.31 MALIGNANT NEOPLASM OF LOWER LOBE OF RIGHT LUNG: ICD-10-CM

## 2020-11-13 DIAGNOSIS — I70.0 ATHEROSCLEROSIS OF AORTA: ICD-10-CM

## 2020-11-13 DIAGNOSIS — H25.13 NUCLEAR SCLEROSIS OF BOTH EYES: ICD-10-CM

## 2020-11-13 DIAGNOSIS — I50.22 CHRONIC SYSTOLIC HEART FAILURE: ICD-10-CM

## 2020-11-13 DIAGNOSIS — R91.8 LUNG MASS: ICD-10-CM

## 2020-11-13 DIAGNOSIS — I25.10 CORONARY ARTERY DISEASE INVOLVING NATIVE CORONARY ARTERY OF NATIVE HEART WITHOUT ANGINA PECTORIS: ICD-10-CM

## 2020-11-13 DIAGNOSIS — Z00.00 ENCOUNTER FOR PREVENTIVE HEALTH EXAMINATION: Primary | ICD-10-CM

## 2020-11-13 DIAGNOSIS — Z12.11 COLON CANCER SCREENING: ICD-10-CM

## 2020-11-13 DIAGNOSIS — G47.33 OSA (OBSTRUCTIVE SLEEP APNEA): ICD-10-CM

## 2020-11-13 DIAGNOSIS — C32.9 LARYNX CANCER: ICD-10-CM

## 2020-11-13 DIAGNOSIS — Z93.1 GASTROSTOMY IN PLACE: ICD-10-CM

## 2020-11-13 PROCEDURE — 3288F FALL RISK ASSESSMENT DOCD: CPT | Mod: CPTII,S$GLB,, | Performed by: NURSE PRACTITIONER

## 2020-11-13 PROCEDURE — 3008F PR BODY MASS INDEX (BMI) DOCUMENTED: ICD-10-PCS | Mod: CPTII,S$GLB,, | Performed by: NURSE PRACTITIONER

## 2020-11-13 PROCEDURE — 3074F SYST BP LT 130 MM HG: CPT | Mod: CPTII,S$GLB,, | Performed by: NURSE PRACTITIONER

## 2020-11-13 PROCEDURE — 3074F PR MOST RECENT SYSTOLIC BLOOD PRESSURE < 130 MM HG: ICD-10-PCS | Mod: CPTII,S$GLB,, | Performed by: NURSE PRACTITIONER

## 2020-11-13 PROCEDURE — 99499 RISK ADDL DX/OHS AUDIT: ICD-10-PCS | Mod: S$GLB,,, | Performed by: NURSE PRACTITIONER

## 2020-11-13 PROCEDURE — 1158F ADVNC CARE PLAN TLK DOCD: CPT | Mod: S$GLB,,, | Performed by: NURSE PRACTITIONER

## 2020-11-13 PROCEDURE — G0439 PPPS, SUBSEQ VISIT: HCPCS | Mod: S$GLB,,, | Performed by: NURSE PRACTITIONER

## 2020-11-13 PROCEDURE — 3078F PR MOST RECENT DIASTOLIC BLOOD PRESSURE < 80 MM HG: ICD-10-PCS | Mod: CPTII,S$GLB,, | Performed by: NURSE PRACTITIONER

## 2020-11-13 PROCEDURE — 1101F PT FALLS ASSESS-DOCD LE1/YR: CPT | Mod: CPTII,S$GLB,, | Performed by: NURSE PRACTITIONER

## 2020-11-13 PROCEDURE — G0439 PR MEDICARE ANNUAL WELLNESS SUBSEQUENT VISIT: ICD-10-PCS | Mod: S$GLB,,, | Performed by: NURSE PRACTITIONER

## 2020-11-13 PROCEDURE — 1158F PR ADVANCE CARE PLANNING DISCUSS DOCUMENTED IN MEDICAL RECORD: ICD-10-PCS | Mod: S$GLB,,, | Performed by: NURSE PRACTITIONER

## 2020-11-13 PROCEDURE — 99499 UNLISTED E&M SERVICE: CPT | Mod: S$GLB,,, | Performed by: NURSE PRACTITIONER

## 2020-11-13 PROCEDURE — 3288F PR FALLS RISK ASSESSMENT DOCUMENTED: ICD-10-PCS | Mod: CPTII,S$GLB,, | Performed by: NURSE PRACTITIONER

## 2020-11-13 PROCEDURE — 1126F PR PAIN SEVERITY QUANTIFIED, NO PAIN PRESENT: ICD-10-PCS | Mod: S$GLB,,, | Performed by: NURSE PRACTITIONER

## 2020-11-13 PROCEDURE — 1101F PR PT FALLS ASSESS DOC 0-1 FALLS W/OUT INJ PAST YR: ICD-10-PCS | Mod: CPTII,S$GLB,, | Performed by: NURSE PRACTITIONER

## 2020-11-13 PROCEDURE — 1126F AMNT PAIN NOTED NONE PRSNT: CPT | Mod: S$GLB,,, | Performed by: NURSE PRACTITIONER

## 2020-11-13 PROCEDURE — 3078F DIAST BP <80 MM HG: CPT | Mod: CPTII,S$GLB,, | Performed by: NURSE PRACTITIONER

## 2020-11-13 PROCEDURE — 3008F BODY MASS INDEX DOCD: CPT | Mod: CPTII,S$GLB,, | Performed by: NURSE PRACTITIONER

## 2020-11-13 NOTE — PATIENT INSTRUCTIONS
Counseling and Referral of Other Preventative  (Italic type indicates deductible and co-insurance are waived)    Patient Name: Sharath Huizar  Today's Date: 11/13/2020    Health Maintenance       Date Due Completion Date    Shingles Vaccine (1 of 2) 12/24/2000 ---    Influenza Vaccine (1) 08/01/2020 10/24/2019    Colorectal Cancer Screening 11/01/2020 11/1/2019    PROSTATE-SPECIFIC ANTIGEN 03/18/2021 3/18/2020    High Dose Statin 11/03/2021 11/3/2020    Lipid Panel 10/29/2024 10/29/2019    TETANUS VACCINE 07/03/2028 7/3/2018        No orders of the defined types were placed in this encounter.    The following information is provided to all patients.  This information is to help you find resources for any of the problems found today that may be affecting your health:                Living healthy guide: www.[facility].[place].gov      Understanding Diabetes: www.diabetes.org      Eating healthy: www.cdc.gov/healthyweight      CDC home safety checklist: www.cdc.gov/steadi/patient.html      Agency on Aging: www.goea.[place].[facility]      Alcoholics anonymous (AA): www.aa.org      Physical Activity: www.lenny.nih.gov/oj2mjci      Tobacco use: www.quitwithusla.org

## 2020-11-13 NOTE — PROGRESS NOTES
"  Sharath Huizar presented for a  Medicare AWV and comprehensive Health Risk Assessment today. The following components were reviewed and updated:    · Medical history  · Family History  · Social history  · Allergies and Current Medications  · Health Risk Assessment  · Health Maintenance  · Care Team     ** See Completed Assessments for Annual Wellness Visit within the encounter summary.**         The following assessments were completed:  · Living Situation  · CAGE  · Depression Screening  · Timed Get Up and Go  · Whisper Test  · Cognitive Function Screening  ·   ·   ·   · Nutrition Screening  · ADL Screening  · PAQ Screening        Vitals:    11/13/20 1008   BP: 106/77   Pulse: 71   Weight: 58.1 kg (128 lb)   Height: 5' 6" (1.676 m)     Body mass index is 20.66 kg/m².  Physical Exam  Constitutional:       Appearance: Normal appearance.   HENT:      Head: Normocephalic and atraumatic.      Nose: Nose normal.      Mouth/Throat:      Mouth: Mucous membranes are moist.   Eyes:      Extraocular Movements: Extraocular movements intact.   Neck:      Musculoskeletal: Normal range of motion.   Cardiovascular:      Rate and Rhythm: Normal rate and regular rhythm.      Heart sounds: Normal heart sounds.   Pulmonary:      Effort: Pulmonary effort is normal. No respiratory distress.      Breath sounds: Normal breath sounds.   Abdominal:      General: Bowel sounds are normal. There is no distension.      Palpations: Abdomen is soft.   Musculoskeletal: Normal range of motion.   Skin:     General: Skin is warm and dry.   Neurological:      General: No focal deficit present.      Mental Status: He is alert and oriented to person, place, and time.   Psychiatric:         Mood and Affect: Mood normal.         Behavior: Behavior normal.               Diagnoses and health risks identified today and associated recommendations/orders:    1. Encounter for preventive health examination  Assessment completed. Preventive measures reviewed " with patient.    2. Nuclear sclerosis of both eyes  Stable, followed by Optometry.    3. Lung mass  Stable, followed Pulmonary and Hematology/Oncology.    4. Atherosclerosis of aorta  Stable, followed by PCP.    5. Chronic systolic heart failure  Stable, followed by PCP.    6. Coronary artery disease involving native coronary artery of native heart without angina pectoris  Stable, followed by PCP.    7. Hypertension, essential  Stable, followed by PCP.    8. Larynx cancer  Stable, followed by Hematology/Oncology and Otolaryngology.     9. Malignant neoplasm of lower lobe of right lung  Stable, followed by Hematology/Oncology.    10. Gastrostomy in place  Stable, followed by PCP and General Surgery.    11. DANYELLE (obstructive sleep apnea)  Stable, followed by Pulmonary.    Provided Sharath with a 5-10 year written screening schedule and personal prevention plan. Recommendations were developed using the USPSTF age appropriate recommendations. Education, counseling, and referrals were provided as needed. After Visit Summary printed and given to patient which includes a list of additional screenings\tests needed.    No follow-ups on file.    Chrissy Carson NP  I offered to discuss end of life issues, including information on how to make advance directives that the patient could use to name someone who would make medical decisions on their behalf if they became too ill to make themselves.    ___Patient declined  _X_Patient is interested, I provided paper work and offered to discuss.

## 2020-11-16 PROBLEM — Z72.0 TOBACCO ABUSE: Chronic | Status: RESOLVED | Noted: 2018-03-23 | Resolved: 2020-11-16

## 2020-12-11 ENCOUNTER — PATIENT MESSAGE (OUTPATIENT)
Dept: OTHER | Facility: OTHER | Age: 70
End: 2020-12-11

## 2020-12-14 NOTE — PROGRESS NOTES
12/17/2020    Radiation Oncology Follow-Up Visit    Prior Radiation History:   reatment Summary  Course: C1 RLL lung 2020     Treatment Site Ref. ID Energy Dose/Fx (Gy) #Fx Dose Correction (Gy) Total Dose (Gy) Start Date End Date Elapsed Days   SB Lung_R PTV_RLL allie 6X 12.5 4 / 4 0 50 5/28/2020 6/8/2020 11      Treatment Summary  Course: C2 HN 2020     Treatment Site Ref. ID Energy Dose/Fx (Gy) #Fx Dose Correction (Gy) Total Dose (Gy) Start Date End Date Elapsed Days   H&N PTV 70 allie 6X 2 35 / 35 0 70 7/6/2020 8/21/2020 46         HPI: Mr. Huizar is a 69 year old man with history of stage I [id number] lung NSCLC and supraglottic stage RASHEEDA [id number] larynx SCC, s/p SBRT for the lung lesion and definitive chemoradiation for his larynx cancer, presenting today for ~three month follow up appointment.    As per my original consultation note, he was diagnosed in 2/20 after presenting to the ED with worsening vocal hoarseness and hemoptysis over the preceding month. He underwent chest x-ray which demonstrated a suspicious RLL lung opacity.      He underwent CT C/A/P imaging on 3/18/20 which demonstrated a 2.2 cm RLL lung nodule. PET/CT on 3/27/20 demonstrated an FDG avid L supraglottic mass in the pre-epiglottic space, SUV max 15.0, involving the L epiglottis extending into the L pre-epiglottic and paraglottic space to the level of the TVCs, as well as hypermetabolic bilateral level III LNs. Also noted was the previously known RLL lung mass, 3.0 cm in size, SUV max 6.6.     He was referred to HNS and seen by Dr. Vallejo. CT neck imaging on 4/16/20 demonstrated known 4 cm supraglottic mass extending to the TVCs with 2 enlarged R level 3 LNs and 1 enlarged L level III LN. DL and biopsy of the supraglottic mass was performed on 4/20/20, with pathology demonstrating SCC (p16 pending).      His case was discussed at head and neck tumor board with recommendations for biopsy of the RLL lung mass. This was performed on 4/27/20 and  demonstrated squamous/adenosquamous carcinoma. He was seen by Dr. Aguilar on 5/5/20 with recommendations for chemoradiation for his locally advanced supraglottic larynx cancer. His case was further discussed at lung tumor board on 5/6/20, with consensus recommendations for upfront SBRT to the RLL lung lesion, to be followed by chemoradiation for his larynx cancer.     He completed 50 Gy in 4 fractions to the RLL lung lesion via SBRT on 6/8/20, tolerating therapy well with no side effects. Following dental extractions he then underwent chemoradiation with weekly cisplatin and 70 Gy in 35 fraction IMRT SIB plan to laryngeal disease, involved bilateral LN, and bilateral neck elective at-risk LN, completing therapy on 8/21/20. He had a PEG tube placed prior to treatment and had an uneventful treatment course, with mild dysphagia, dysphonia, and skin irritation. He returns today for ~three month post-treatment follow up appointment.     He was seen by Dr. Vallejo on 11/3/20, with moderate laryngeal edema on endoscopy but no evidence of recurrent disease. He underwent PET/CT on 12/16/20, which demonstrated no uptake in the head and neck with residual soft tissue fullness in the L supraglottic region, attributable to post treatment changes as well as decreased size and uptake of R LL lung mass, consistent overall with good treatment response. Today, he is doing very well, tolerating all food by mouth and denying pain, dysphagia, taste changes, or xerostomia. He desires to have his PEG tube removed.      Past Medical History:   Diagnosis Date    Coronary artery disease     Hypertension     Myocardial infarction     NSTEMI (non-ST elevated myocardial infarction) 3/23/2018    Nuclear sclerosis of both eyes 6/13/2019       Past Surgical History:   Procedure Laterality Date    CARDIAC CATHETERIZATION      with 4 stents    DIRECT LARYNGOSCOPY N/A 4/20/2020    Procedure: LARYNGOSCOPY, DIRECT;  Surgeon: Genoveva Vallejo,  MD;  Location: [facility] OR;  Service: ENT;  Laterality: N/A;    LEFT HEART CATHETERIZATION Left 2018    Procedure: Left heart cath;  Surgeon: Viral Lombardi MD;  Location: [facility] CATH LAB;  Service: Cardiology;  Laterality: Left;  RN PREOP 18    LUNG BIOPSY N/A 2020    Procedure: BIOPSY, LUNG;  Surgeon: Dosc Diagnostic Provider;  Location: [facility] OR;  Service: Radiology;  Laterality: N/A;  9am start--RN PHONE PREOP ----COVID NEGATIVE--pt       Social History     Tobacco Use    Smoking status: Former Smoker     Packs/day: 0.25     Years: 30.00     Pack years: 7.50     Types: Cigarettes     Quit date: 3/23/2018     Years since quittin.7    Smokeless tobacco: Never Used   Substance Use Topics    Alcohol use: No     Frequency: Never     Binge frequency: Never    Drug use: No       Cancer-related family history is not on file.    Current Outpatient Medications on File Prior to Visit   Medication Sig Dispense Refill    albuterol (PROVENTIL/VENTOLIN HFA) 90 mcg/actuation inhaler Inhale 1-2 puffs into the lungs every 6 (six) hours as needed for Wheezing. Rescue (Patient not taking: Reported on 2020) 18 g 0    aspirin (ECOTRIN) 81 MG EC tablet Take 1 tablet (81 mg total) by mouth once daily. (Patient taking differently: Take 81 mg by mouth once daily. )  0    atorvastatin (LIPITOR) 80 MG tablet Take 1 tablet (80 mg total) by mouth once daily. 90 tablet 3    clopidogreL (PLAVIX) 75 mg tablet Take 1 tablet (75 mg total) by mouth once daily. 90 tablet 3    docusate sodium (COLACE) 100 MG capsule Take 1 capsule (100 mg total) by mouth 2 (two) times daily as needed for Constipation. (Patient not taking: Reported on 2020) 60 capsule 3    fluticasone propionate (FLONASE) 50 mcg/actuation nasal spray 1 spray (50 mcg total) by Each Nostril route 2 (two) times daily. 18.2 mL 3    hydrocodone-acetaminophen (HYCET) solution 7.5-325 mg/15mL Take 15 mLs by mouth every 8 (eight) hours as needed for  Pain. (Patient not taking: Reported on 11/13/2020) 473 mL 0    magic mouthwash diphen/antac/lidoc Swish and spit 10 mLs every 4 (four) hours as needed. (Patient not taking: Reported on 11/13/2020) 450 mL 3    metoprolol succinate (TOPROL-XL) 50 MG 24 hr tablet Take 1 tablet (50 mg total) by mouth once daily. 90 tablet 3    nitroGLYCERIN (NITROSTAT) 0.4 MG SL tablet Place 1 tablet (0.4 mg total) under the tongue every 5 (five) minutes as needed. 25 tablet 3    ofloxacin (OCUFLOX) 0.3 % ophthalmic solution 5 drops in left ear BID x 10 days (Patient not taking: Reported on 11/13/2020) 10 mL 0    omeprazole (PRILOSEC) 20 MG capsule Take 1 capsule (20 mg total) by mouth 2 (two) times daily. (Patient not taking: Reported on 11/13/2020) 28 capsule 0    ondansetron (ZOFRAN) 8 MG tablet Take 1 tablet (8 mg total) by mouth 4 (four) times daily as needed for Nausea. (Patient not taking: Reported on 11/13/2020) 60 tablet 2    promethazine (PHENERGAN) 6.25 mg/5 mL syrup Take 20 mLs (25 mg total) by mouth every 6 (six) hours as needed for Nausea. (Patient not taking: Reported on 11/13/2020) 473 mL 6    promethazine-dextromethorphan (PROMETHAZINE-DM) 6.25-15 mg/5 mL Syrp Take 5 mLs by mouth every 4 to 6 hours as needed. (Patient not taking: Reported on 11/13/2020) 240 mL 0    protein (ENSURE HIGH PROTEIN) Powd Take 1 Can by mouth 3 (three) times daily. 90 Can 3     Current Facility-Administered Medications on File Prior to Visit   Medication Dose Route Frequency Provider Last Rate Last Dose    0.9%  NaCl infusion   Intravenous Continuous Cindy Zhao MD 10 mL/hr at 04/20/20 0904      lidocaine (PF) 10 mg/ml (1%) injection 10 mg  1 mL Intradermal Once Cindy Zhao MD           Review of patient's allergies indicates:  No Known Allergies    Review of Systems   Constitutional: Negative for chills, fever and weight loss.   HENT: Negative for congestion, hearing loss, sinus pain and sore throat.    Eyes: Negative  for double vision.   Respiratory: Negative for cough, sputum production and shortness of breath.    Cardiovascular: Negative for chest pain.   Gastrointestinal: Negative for abdominal pain, diarrhea, heartburn, nausea and vomiting.   Genitourinary: Negative for dysuria and frequency.   Musculoskeletal: Negative for myalgias and neck pain.   Skin: Negative for rash.   Neurological: Negative for dizziness, seizures and weakness.   Psychiatric/Behavioral: Negative for depression. The patient is not nervous/anxious.         Vital Signs:   Vitals:    12/17/20 1414   BP: (!) 140/79   Pulse: 88   Resp: 19   Temp: 97.3 °F (36.3 °C)     ECOG Performance Status: 1 - Ambulates, capable of light work    Physical exam:  Constitutional:  Well-developed, well-nourished and in no acute distress.  Head: Normocephalic, atraumatic.    Eyes: Sclerae are non-icteric.  Pupils are equal and round.  Extraocular movements are intact.  ENMT: Oral cavity and oropharynx are without lesions. Mucous membranes are moist.  Neck: Supple.  No palpable cervical or supraclavicular adenopathy.   Pulmonary: Clear to auscultation, bilaterally.  No rhonchi, rales or wheezes.  Cardiovascular: Regular rate and rhythm.   No murmurs heard.  No edema.  GI: Soft, nontender and nondistended.  No palpable masses or hepatosplenomegaly. PEG in place.  Musculoskeletal: No palpable spinous or paraspinous tenderness.  Range of motion appears normal in all 4 extremities.  Lymphatics: No palpable cervical, supraclavicular, axillary adenopathy.  Extremities: No clubbing, cyanosis, or edema.    Skin: No visible lesions.  Neurologic: CN II-XII are grossly intact.  Motor strength is 5/5 in bilateral upper and lower extremities and symmetric.    Sensory exam is grossly intact to touch.  Gait is normal.    Psychiatric: Patient is alert and oriented x 3.  Normal mood and affect.     Labs: I have personally reviewed the patient's available labs and reports and summarized  pertinent findings above in HPI.    Imaging: I have personally reviewed the patient's available images and reports and summarized pertinent findings above in HPI.     Pathology: I have personally reviewed the patient's available pathology and summarized pertinent findings above in HPI.     Assessment:  Mr. Huizar is a 69 year old man with history of stage I [id number] lung NSCLC and supraglottic stage RASHEEDA [id number] larynx SCC, s/p SBRT for the lung lesion and definitive chemoradiation for his larynx cancer, presenting today for ~three month follow up appointment. He is overall doing well without evidence of metastatic or recurrent disease on PET/CT imaging.    Plan:  He will continue to follow with me, with next visit in ~3 months. I will order CT neck and chest surveillance imaging as well, to continue to follow hopeful resolution of his soft tissue masses in the larynx and lung. He will continue to follow with med onc and HNS as well. I will also contact nutrition to facilitate PEG removal. He has my contact information should further questions or concerns arise.      Fernando Mosher MD  Radiation Oncology  [facility]

## 2020-12-16 ENCOUNTER — HOSPITAL ENCOUNTER (OUTPATIENT)
Dept: RADIOLOGY | Facility: HOSPITAL | Age: 70
Discharge: HOME | End: 2020-12-16
Attending: RADIOLOGY
Payer: MEDICARE

## 2020-12-16 DIAGNOSIS — C32.9 LARYNX CANCER: ICD-10-CM

## 2020-12-16 DIAGNOSIS — C34.31 MALIGNANT NEOPLASM OF LOWER LOBE OF RIGHT LUNG: ICD-10-CM

## 2020-12-16 LAB — POCT GLUCOSE: 109 MG/DL (ref 70–110)

## 2020-12-16 PROCEDURE — 78815 PET IMAGE W/CT SKULL-THIGH: CPT | Mod: 26,HCNC,PS, | Performed by: RADIOLOGY

## 2020-12-16 PROCEDURE — 78815 NM PET CT ROUTINE: ICD-10-PCS | Mod: 26,HCNC,PS, | Performed by: RADIOLOGY

## 2020-12-16 PROCEDURE — A9552 F18 FDG: HCPCS | Mod: HCNC

## 2020-12-16 PROCEDURE — 78815 PET IMAGE W/CT SKULL-THIGH: CPT | Mod: TC,HCNC

## 2020-12-17 ENCOUNTER — OFFICE VISIT (OUTPATIENT)
Dept: RADIATION ONCOLOGY | Facility: CLINIC | Age: 70
End: 2020-12-17
Payer: MEDICARE

## 2020-12-17 ENCOUNTER — OFFICE VISIT (OUTPATIENT)
Dept: HEMATOLOGY/ONCOLOGY | Facility: CLINIC | Age: 70
End: 2020-12-17
Payer: MEDICARE

## 2020-12-17 VITALS
WEIGHT: 131.5 LBS | BODY MASS INDEX: 21.13 KG/M2 | TEMPERATURE: 97 F | DIASTOLIC BLOOD PRESSURE: 79 MMHG | HEART RATE: 88 BPM | OXYGEN SATURATION: 98 % | HEIGHT: 66 IN | RESPIRATION RATE: 19 BRPM | SYSTOLIC BLOOD PRESSURE: 140 MMHG

## 2020-12-17 VITALS
WEIGHT: 131.38 LBS | DIASTOLIC BLOOD PRESSURE: 77 MMHG | HEART RATE: 77 BPM | HEIGHT: 66 IN | OXYGEN SATURATION: 97 % | BODY MASS INDEX: 21.11 KG/M2 | TEMPERATURE: 99 F | SYSTOLIC BLOOD PRESSURE: 135 MMHG | RESPIRATION RATE: 18 BRPM

## 2020-12-17 DIAGNOSIS — C32.9 LARYNX CANCER: Primary | ICD-10-CM

## 2020-12-17 DIAGNOSIS — Z85.118 HISTORY OF LUNG CANCER: ICD-10-CM

## 2020-12-17 DIAGNOSIS — Z85.21 HISTORY OF CANCER OF LARYNX: Primary | ICD-10-CM

## 2020-12-17 DIAGNOSIS — E03.9 HYPOTHYROIDISM, UNSPECIFIED TYPE: ICD-10-CM

## 2020-12-17 DIAGNOSIS — C34.31 MALIGNANT NEOPLASM OF LOWER LOBE OF RIGHT LUNG: ICD-10-CM

## 2020-12-17 PROCEDURE — 3075F PR MOST RECENT SYSTOLIC BLOOD PRESS GE 130-139MM HG: ICD-10-PCS | Mod: HCNC,CPTII,S$GLB, | Performed by: INTERNAL MEDICINE

## 2020-12-17 PROCEDURE — 1101F PT FALLS ASSESS-DOCD LE1/YR: CPT | Mod: HCNC,CPTII,S$GLB, | Performed by: INTERNAL MEDICINE

## 2020-12-17 PROCEDURE — 3008F PR BODY MASS INDEX (BMI) DOCUMENTED: ICD-10-PCS | Mod: HCNC,CPTII,S$GLB, | Performed by: INTERNAL MEDICINE

## 2020-12-17 PROCEDURE — 3288F FALL RISK ASSESSMENT DOCD: CPT | Mod: HCNC,CPTII,S$GLB, | Performed by: INTERNAL MEDICINE

## 2020-12-17 PROCEDURE — 3288F PR FALLS RISK ASSESSMENT DOCUMENTED: ICD-10-PCS | Mod: HCNC,CPTII,S$GLB, | Performed by: RADIOLOGY

## 2020-12-17 PROCEDURE — 1126F AMNT PAIN NOTED NONE PRSNT: CPT | Mod: HCNC,S$GLB,, | Performed by: INTERNAL MEDICINE

## 2020-12-17 PROCEDURE — 99999 PR PBB SHADOW E&M-EST. PATIENT-LVL IV: ICD-10-PCS | Mod: PBBFAC,HCNC,, | Performed by: RADIOLOGY

## 2020-12-17 PROCEDURE — 99213 PR OFFICE/OUTPT VISIT, EST, LEVL III, 20-29 MIN: ICD-10-PCS | Mod: HCNC,S$GLB,, | Performed by: RADIOLOGY

## 2020-12-17 PROCEDURE — 3078F DIAST BP <80 MM HG: CPT | Mod: HCNC,CPTII,S$GLB, | Performed by: RADIOLOGY

## 2020-12-17 PROCEDURE — 1101F PT FALLS ASSESS-DOCD LE1/YR: CPT | Mod: HCNC,CPTII,S$GLB, | Performed by: RADIOLOGY

## 2020-12-17 PROCEDURE — 3077F PR MOST RECENT SYSTOLIC BLOOD PRESSURE >= 140 MM HG: ICD-10-PCS | Mod: HCNC,CPTII,S$GLB, | Performed by: RADIOLOGY

## 2020-12-17 PROCEDURE — 99999 PR PBB SHADOW E&M-EST. PATIENT-LVL IV: CPT | Mod: PBBFAC,HCNC,, | Performed by: INTERNAL MEDICINE

## 2020-12-17 PROCEDURE — 99213 PR OFFICE/OUTPT VISIT, EST, LEVL III, 20-29 MIN: ICD-10-PCS | Mod: HCNC,S$GLB,, | Performed by: INTERNAL MEDICINE

## 2020-12-17 PROCEDURE — 3078F DIAST BP <80 MM HG: CPT | Mod: HCNC,CPTII,S$GLB, | Performed by: INTERNAL MEDICINE

## 2020-12-17 PROCEDURE — 99999 PR PBB SHADOW E&M-EST. PATIENT-LVL IV: ICD-10-PCS | Mod: PBBFAC,HCNC,, | Performed by: INTERNAL MEDICINE

## 2020-12-17 PROCEDURE — 99213 OFFICE O/P EST LOW 20 MIN: CPT | Mod: HCNC,S$GLB,, | Performed by: RADIOLOGY

## 2020-12-17 PROCEDURE — 3008F BODY MASS INDEX DOCD: CPT | Mod: HCNC,CPTII,S$GLB, | Performed by: RADIOLOGY

## 2020-12-17 PROCEDURE — 3288F PR FALLS RISK ASSESSMENT DOCUMENTED: ICD-10-PCS | Mod: HCNC,CPTII,S$GLB, | Performed by: INTERNAL MEDICINE

## 2020-12-17 PROCEDURE — 3288F FALL RISK ASSESSMENT DOCD: CPT | Mod: HCNC,CPTII,S$GLB, | Performed by: RADIOLOGY

## 2020-12-17 PROCEDURE — 1101F PR PT FALLS ASSESS DOC 0-1 FALLS W/OUT INJ PAST YR: ICD-10-PCS | Mod: HCNC,CPTII,S$GLB, | Performed by: INTERNAL MEDICINE

## 2020-12-17 PROCEDURE — 1126F PR PAIN SEVERITY QUANTIFIED, NO PAIN PRESENT: ICD-10-PCS | Mod: HCNC,S$GLB,, | Performed by: RADIOLOGY

## 2020-12-17 PROCEDURE — 3008F BODY MASS INDEX DOCD: CPT | Mod: HCNC,CPTII,S$GLB, | Performed by: INTERNAL MEDICINE

## 2020-12-17 PROCEDURE — 1159F MED LIST DOCD IN RCRD: CPT | Mod: HCNC,S$GLB,, | Performed by: INTERNAL MEDICINE

## 2020-12-17 PROCEDURE — 99213 OFFICE O/P EST LOW 20 MIN: CPT | Mod: HCNC,S$GLB,, | Performed by: INTERNAL MEDICINE

## 2020-12-17 PROCEDURE — 3008F PR BODY MASS INDEX (BMI) DOCUMENTED: ICD-10-PCS | Mod: HCNC,CPTII,S$GLB, | Performed by: RADIOLOGY

## 2020-12-17 PROCEDURE — 3075F SYST BP GE 130 - 139MM HG: CPT | Mod: HCNC,CPTII,S$GLB, | Performed by: INTERNAL MEDICINE

## 2020-12-17 PROCEDURE — 1126F AMNT PAIN NOTED NONE PRSNT: CPT | Mod: HCNC,S$GLB,, | Performed by: RADIOLOGY

## 2020-12-17 PROCEDURE — 1159F PR MEDICATION LIST DOCUMENTED IN MEDICAL RECORD: ICD-10-PCS | Mod: HCNC,S$GLB,, | Performed by: INTERNAL MEDICINE

## 2020-12-17 PROCEDURE — 99999 PR PBB SHADOW E&M-EST. PATIENT-LVL IV: CPT | Mod: PBBFAC,HCNC,, | Performed by: RADIOLOGY

## 2020-12-17 PROCEDURE — 3078F PR MOST RECENT DIASTOLIC BLOOD PRESSURE < 80 MM HG: ICD-10-PCS | Mod: HCNC,CPTII,S$GLB, | Performed by: RADIOLOGY

## 2020-12-17 PROCEDURE — 1159F MED LIST DOCD IN RCRD: CPT | Mod: HCNC,S$GLB,, | Performed by: RADIOLOGY

## 2020-12-17 PROCEDURE — 1101F PR PT FALLS ASSESS DOC 0-1 FALLS W/OUT INJ PAST YR: ICD-10-PCS | Mod: HCNC,CPTII,S$GLB, | Performed by: RADIOLOGY

## 2020-12-17 PROCEDURE — 1159F PR MEDICATION LIST DOCUMENTED IN MEDICAL RECORD: ICD-10-PCS | Mod: HCNC,S$GLB,, | Performed by: RADIOLOGY

## 2020-12-17 PROCEDURE — 1126F PR PAIN SEVERITY QUANTIFIED, NO PAIN PRESENT: ICD-10-PCS | Mod: HCNC,S$GLB,, | Performed by: INTERNAL MEDICINE

## 2020-12-17 PROCEDURE — 3077F SYST BP >= 140 MM HG: CPT | Mod: HCNC,CPTII,S$GLB, | Performed by: RADIOLOGY

## 2020-12-17 PROCEDURE — 3078F PR MOST RECENT DIASTOLIC BLOOD PRESSURE < 80 MM HG: ICD-10-PCS | Mod: HCNC,CPTII,S$GLB, | Performed by: INTERNAL MEDICINE

## 2020-12-17 NOTE — PROGRESS NOTES
"Subjective:       Patient ID: Sharath Huizar is a 69 y.o. male.    Chief Complaint: Larynx cancer  Sharath Huizar is a 69 y.o. male  presenting today for a new diagnosis of Larynx cancer. Patient was seen in ER 2-27-20 with hemoptysis and hoarse voice for a couple of months along with left temporal headaches.   He underwent chest xray on 2/28/2020 which revealed "Small focal opacity within the right lower lung zone with possible cavitation.  This may reflect focal infectious or inflammatory process"  He was referred to pulmonary and underwent CT chest, abdomen/pelvis on 3/18/2020 which revealed "a 2.2 cm noncalcified nodule seen at the right lung base.  May represent consolidation however malignancy can not be excluded.  PET-CT is recommended for further evaluation"  PET scan on 3/27/2020 revealed "Hypermetabolic left supraglottic soft tissue mass concerning for primary laryngeal cancer.  Two hypermetabolic bilateral cervical lymph nodes as well as a hypermetabolic right lower lobe partially cavitary pulmonary mass concerning for metastatic disease.  Recommend ENT consultation and dedicated contrast enhance neck CT"  He was referred to ENT and underwent CT neck on 4/16/2020 which revealed Stable left supraglottic laryngeal mass and bilateral cervical metastatic adenopathy.     He underwent direct laryngoscopy on 4/20/2020 and that revealed "bulky and friable supraglottic mass on left side of glottic surface of epiglottis extending over slightly on to the right. Extends down along left aryepiglottic fold and true vocal fold on left. The right vocal fold did not appear to be involved. No lesion of pyriform sinus. Palpation of vallecula and base of tongue was soft. No additional lesions were noted in the oral cavity or oropharynx"  Pathology revealed squamous cell cancer  His case was discussed in head and Neck tumor board and he underwent left lung biopsy on 4/27/2020 and pathology reveals squamous/adenosquamous cell " "cancer     He has completed RT to his lung as well as 7 weeks of chemo/RT with weekly Cisplatin, as of 8/27/2020    HPIHe comes in today to review his PET scan which reveals "Decreased size and uptake of right lower lobe mass consistent with significant, if incomplete, response to therapy.  Stable bilateral lower lobe subcentimeter nodule/irregular opacities which are too small fully characterize with PET-CT.  No new suspicious lung nodules or masses. No focal abnormal radiotracer uptake in the head or neck in this patient with history of left supraglottic laryngeal cancer status post chemotherapy/radiation.  There is residual soft tissue fullness within the left supraglottic region at the site of previous hypermetabolic mass which likely represents post treatment changes versus non radiotracer avid residual disease.  Overall similar as compared to most recent CT.  No pathologic hypermetabolic lymphadenopathy"      Review of Systems   Constitutional: Positive for activity change. Negative for appetite change, fatigue and unexpected weight change.   HENT: Negative for mouth sores.    Eyes: Negative for visual disturbance.   Respiratory: Negative for cough and shortness of breath.    Cardiovascular: Negative for chest pain.   Gastrointestinal: Negative for abdominal pain and diarrhea.   Genitourinary: Negative for frequency.   Musculoskeletal: Negative for back pain.   Integumentary:  Negative for rash.   Neurological: Negative for headaches.   Hematological: Negative for adenopathy.   Psychiatric/Behavioral: The patient is not nervous/anxious.    All other systems reviewed and are negative.        Objective:      Physical Exam  Vitals signs reviewed.   Constitutional:       Appearance: He is well-developed.   HENT:      Mouth/Throat:      Pharynx: No oropharyngeal exudate.   Cardiovascular:      Rate and Rhythm: Normal rate.      Heart sounds: Normal heart sounds.   Pulmonary:      Effort: Pulmonary effort is normal. "      Breath sounds: Normal breath sounds. No wheezing.   Abdominal:      General: Bowel sounds are normal.      Palpations: Abdomen is soft.      Tenderness: There is no abdominal tenderness.   Musculoskeletal:         General: No tenderness.   Lymphadenopathy:      Cervical: No cervical adenopathy.   Skin:     General: Skin is warm and dry.      Findings: No rash.   Neurological:      Mental Status: He is alert and oriented to person, place, and time.      Coordination: Coordination normal.   Psychiatric:         Thought Content: Thought content normal.         Judgment: Judgment normal.           LABS:  WBC   Date Value Ref Range Status   12/16/2020 7.98 3.90 - 12.70 K/uL Final     Hemoglobin   Date Value Ref Range Status   12/16/2020 12.0 (L) 14.0 - 18.0 g/dL Final     Hematocrit   Date Value Ref Range Status   12/16/2020 37.6 (L) 40.0 - 54.0 % Final     Platelets   Date Value Ref Range Status   12/16/2020 164 150 - 350 K/uL Final     Gran # (ANC)   Date Value Ref Range Status   12/16/2020 5.9 1.8 - 7.7 K/uL Final     Comment:     The ANC is based on a white cell differential from an   automated cell counter. It has not been microscopically   reviewed for the presence of abnormal cells. Clinical   correlation is required.         Chemistry        Component Value Date/Time     12/16/2020 0917    K 4.0 12/16/2020 0917     12/16/2020 0917    CO2 23 12/16/2020 0917    BUN 13 12/16/2020 0917    CREATININE 1.2 12/16/2020 0917     12/16/2020 0917        Component Value Date/Time    CALCIUM 9.0 12/16/2020 0917    ALKPHOS 100 12/16/2020 0917    AST 20 12/16/2020 0917    ALT 12 12/16/2020 0917    BILITOT 0.4 12/16/2020 0917    ESTGFRAFRICA >60.0 12/16/2020 0917    EGFRNONAA >60.0 12/16/2020 0917        TSH   Date Value Ref Range Status   03/18/2020 1.131 0.400 - 4.000 uIU/mL Final       Assessment:       1. History of cancer of larynx    2. Hypothyroidism, unspecified type    3. History of lung cancer         Plan:        1.reviewed scans, Appear to be treatment related changes. Will have him see ENT as well. Remove PEG tube since he is eating well by mouth   2. Check in 3 months  3. He will return in 3 months with labs and CT scans    Above care plan was discussed with patient and all questions were addressed to his satisfaction

## 2020-12-21 ENCOUNTER — TELEPHONE (OUTPATIENT)
Dept: OTOLARYNGOLOGY | Facility: CLINIC | Age: 70
End: 2020-12-21

## 2020-12-21 DIAGNOSIS — J02.9 SORE THROAT: Primary | ICD-10-CM

## 2020-12-21 NOTE — TELEPHONE ENCOUNTER
----- Message from Mary Ponceauroranubia sent at 12/21/2020  8:33 AM CST -----  Type: Patient Call Back    Who called: Spouse / Slim    What is the request in detail: spouse asking for a call back from staff regarding pt's care. Spouse declined to leave additional details for the call.     Can the clinic reply by [facility]? No    Would the patient rather a call back or a response via [facility]? Call back     Best call back number: [phone] (mobile)      Additional Information:       Thank You

## 2020-12-21 NOTE — TELEPHONE ENCOUNTER
Called Patient and spoke with wife Ms. Smalls.  She wanted to schedule an appointment to see Dr. Vallejo for recurring Sore Throat.

## 2020-12-24 ENCOUNTER — LAB VISIT (OUTPATIENT)
Dept: FAMILY MEDICINE | Facility: CLINIC | Age: 70
End: 2020-12-24
Payer: MEDICARE

## 2020-12-24 DIAGNOSIS — C32.9 SQUAMOUS CELL CARCINOMA OF LARYNX: ICD-10-CM

## 2020-12-24 LAB — SARS-COV-2 RNA RESP QL NAA+PROBE: NOT DETECTED

## 2020-12-24 PROCEDURE — U0003 INFECTIOUS AGENT DETECTION BY NUCLEIC ACID (DNA OR RNA); SEVERE ACUTE RESPIRATORY SYNDROME CORONAVIRUS 2 (SARS-COV-2) (CORONAVIRUS DISEASE [COVID-19]), AMPLIFIED PROBE TECHNIQUE, MAKING USE OF HIGH THROUGHPUT TECHNOLOGIES AS DESCRIBED BY CMS-2020-01-R: HCPCS | Mod: HCNC

## 2020-12-28 ENCOUNTER — OFFICE VISIT (OUTPATIENT)
Dept: OTOLARYNGOLOGY | Facility: CLINIC | Age: 70
End: 2020-12-28
Payer: MEDICARE

## 2020-12-28 VITALS
SYSTOLIC BLOOD PRESSURE: 138 MMHG | DIASTOLIC BLOOD PRESSURE: 82 MMHG | WEIGHT: 129.88 LBS | TEMPERATURE: 98 F | BODY MASS INDEX: 20.87 KG/M2 | HEIGHT: 66 IN

## 2020-12-28 DIAGNOSIS — C32.9 SQUAMOUS CELL CARCINOMA OF LARYNX: Primary | ICD-10-CM

## 2020-12-28 DIAGNOSIS — C34.90 MALIGNANT NEOPLASM OF LUNG, UNSPECIFIED LATERALITY, UNSPECIFIED PART OF LUNG: ICD-10-CM

## 2020-12-28 DIAGNOSIS — J30.2 SEASONAL ALLERGIC RHINITIS, UNSPECIFIED TRIGGER: ICD-10-CM

## 2020-12-28 PROCEDURE — 3079F PR MOST RECENT DIASTOLIC BLOOD PRESSURE 80-89 MM HG: ICD-10-PCS | Mod: CPTII,S$GLB,, | Performed by: OTOLARYNGOLOGY

## 2020-12-28 PROCEDURE — 31575 DIAGNOSTIC LARYNGOSCOPY: CPT | Mod: S$GLB,,, | Performed by: OTOLARYNGOLOGY

## 2020-12-28 PROCEDURE — 99214 PR OFFICE/OUTPT VISIT, EST, LEVL IV, 30-39 MIN: ICD-10-PCS | Mod: 25,S$GLB,, | Performed by: OTOLARYNGOLOGY

## 2020-12-28 PROCEDURE — 3075F SYST BP GE 130 - 139MM HG: CPT | Mod: CPTII,S$GLB,, | Performed by: OTOLARYNGOLOGY

## 2020-12-28 PROCEDURE — 3008F PR BODY MASS INDEX (BMI) DOCUMENTED: ICD-10-PCS | Mod: CPTII,S$GLB,, | Performed by: OTOLARYNGOLOGY

## 2020-12-28 PROCEDURE — 1159F PR MEDICATION LIST DOCUMENTED IN MEDICAL RECORD: ICD-10-PCS | Mod: S$GLB,,, | Performed by: OTOLARYNGOLOGY

## 2020-12-28 PROCEDURE — 1126F AMNT PAIN NOTED NONE PRSNT: CPT | Mod: S$GLB,,, | Performed by: OTOLARYNGOLOGY

## 2020-12-28 PROCEDURE — 1126F PR PAIN SEVERITY QUANTIFIED, NO PAIN PRESENT: ICD-10-PCS | Mod: S$GLB,,, | Performed by: OTOLARYNGOLOGY

## 2020-12-28 PROCEDURE — 3288F PR FALLS RISK ASSESSMENT DOCUMENTED: ICD-10-PCS | Mod: CPTII,S$GLB,, | Performed by: OTOLARYNGOLOGY

## 2020-12-28 PROCEDURE — 1101F PR PT FALLS ASSESS DOC 0-1 FALLS W/OUT INJ PAST YR: ICD-10-PCS | Mod: CPTII,S$GLB,, | Performed by: OTOLARYNGOLOGY

## 2020-12-28 PROCEDURE — 3079F DIAST BP 80-89 MM HG: CPT | Mod: CPTII,S$GLB,, | Performed by: OTOLARYNGOLOGY

## 2020-12-28 PROCEDURE — 1159F MED LIST DOCD IN RCRD: CPT | Mod: S$GLB,,, | Performed by: OTOLARYNGOLOGY

## 2020-12-28 PROCEDURE — 1101F PT FALLS ASSESS-DOCD LE1/YR: CPT | Mod: CPTII,S$GLB,, | Performed by: OTOLARYNGOLOGY

## 2020-12-28 PROCEDURE — 3075F PR MOST RECENT SYSTOLIC BLOOD PRESS GE 130-139MM HG: ICD-10-PCS | Mod: CPTII,S$GLB,, | Performed by: OTOLARYNGOLOGY

## 2020-12-28 PROCEDURE — 31575 PR LARYNGOSCOPY, FLEXIBLE; DIAGNOSTIC: ICD-10-PCS | Mod: S$GLB,,, | Performed by: OTOLARYNGOLOGY

## 2020-12-28 PROCEDURE — 99214 OFFICE O/P EST MOD 30 MIN: CPT | Mod: 25,S$GLB,, | Performed by: OTOLARYNGOLOGY

## 2020-12-28 PROCEDURE — 3288F FALL RISK ASSESSMENT DOCD: CPT | Mod: CPTII,S$GLB,, | Performed by: OTOLARYNGOLOGY

## 2020-12-28 PROCEDURE — 3008F BODY MASS INDEX DOCD: CPT | Mod: CPTII,S$GLB,, | Performed by: OTOLARYNGOLOGY

## 2020-12-28 NOTE — PROGRESS NOTES
OTOLARYNGOLOGY CLINIC NOTE  Date:  12/28/2020     Chief complaint:  Chief Complaint   Patient presents with    Hoarse    Other     Larynx cancer       History of Present Illness  Sharath Huizar is a 70 y.o. male  presenting today for a followup of laryngeal cancer status post chemotherapy radiation   he is eating everything  by mouth .  But still has his G-tube.  He has appointment upcoming to have this removed.  Skin has been itching- has lotion  Taste is better still has some times where things taste like cardboard  Weight has been stable  No hemoptysis  No voice changes  No ear pain.      Has had some cold symptoms recently ( past week or so). Some nasal stuffiness.  At last visit he had some allergy types symptoms and postnasal drip and I recommended saline and Flonase however he forgot to start the saline.    Regarding his oncologic history:  He initially was referred to me for hoarseness and hemoptysis. He underwent DL with biopsy of left supraglottic mass ( glottic surface of epiglottis and left false fold with some extension to right epiglottis) on 4-20-20 that showed scca. He had 2 right sided hypermetabolic nodes ( 10-15 mm ) and 11 mm hypermetabolic node on left side of neck ( in addition to hypermetabolic supraglottic primary). He was also noted on workup of this to have a solitary lung nodule in the right lower lobe on chest ct. IR biopsy performed 4-27-20 which was positive for malignancy ( squamous cell ca vs adenosquamous). This was considered to be synchronous primary . His case was presented at the head and neck tumor board and decision was made for CRT ( pt did not want laryngectomy) for his [id number] laryngeal cancer . His case was also presented at lung tumor board and recommended for upfront SBRT for his cT1N0 lung cancer. He also had a positive AFB however not felt to be TB by pulm.   He was treated by Dr. Aguilar ( heme onc) and Dr. 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 PEG tube insertion was done 5-21-20. He also had dental clearance prior to treatment.  He completed SBRT in early July. Started on concurrent CRT ( cisplatin x6 doses)  7-6-20, completed RT 8-21-20.    Past Medical History  Past Medical History:   Diagnosis Date    Coronary artery disease     Hypertension     Myocardial infarction     NSTEMI (non-ST elevated myocardial infarction) 3/23/2018    Nuclear sclerosis of both eyes 6/13/2019        Past Surgical History  Past Surgical History:   Procedure Laterality Date    CARDIAC CATHETERIZATION      with 4 stents    DIRECT LARYNGOSCOPY N/A 4/20/2020    Procedure: LARYNGOSCOPY, DIRECT;  Surgeon: Genoveva Vallejo MD;  Location: [facility] OR;  Service: ENT;  Laterality: N/A;    LEFT HEART CATHETERIZATION Left 5/29/2018    Procedure: Left heart cath;  Surgeon: Viral Lombardi MD;  Location: [facility] CATH LAB;  Service: Cardiology;  Laterality: Left;  RN PREOP 5/28/18    LUNG BIOPSY N/A 4/27/2020    Procedure: BIOPSY, LUNG;  Surgeon: Jones Diagnostic Provider;  Location: [facility] OR;  Service: Radiology;  Laterality: N/A;  9am start--RN PHONE PREOP 4/24----COVID NEGATIVE--pt        Medications  Current Outpatient Medications on File Prior to Visit   Medication Sig Dispense Refill    albuterol (PROVENTIL/VENTOLIN HFA) 90 mcg/actuation inhaler Inhale 1-2 puffs into the lungs every 6 (six) hours as needed for Wheezing. Rescue 18 g 0    atorvastatin (LIPITOR) 80 MG tablet Take 1 tablet (80 mg total) by mouth once daily. 90 tablet 3    clopidogreL (PLAVIX) 75 mg tablet Take 1 tablet (75 mg total) by mouth once daily. 90 tablet 3    docusate sodium (COLACE) 100 MG capsule Take 1 capsule (100 mg total) by mouth 2 (two) times daily as needed for Constipation. 60 capsule 3    fluticasone propionate (FLONASE) 50 mcg/actuation nasal spray 1 spray (50 mcg total) by Each Nostril route 2 (two) times daily. 18.2 mL 3     hydrocodone-acetaminophen (HYCET) solution 7.5-325 mg/15mL Take 15 mLs by mouth every 8 (eight) hours as needed for Pain. 473 mL 0    magic mouthwash diphen/antac/lidoc Swish and spit 10 mLs every 4 (four) hours as needed. 450 mL 3    metoprolol succinate (TOPROL-XL) 50 MG 24 hr tablet Take 1 tablet (50 mg total) by mouth once daily. 90 tablet 3    ofloxacin (OCUFLOX) 0.3 % ophthalmic solution 5 drops in left ear BID x 10 days 10 mL 0    omeprazole (PRILOSEC) 20 MG capsule Take 1 capsule (20 mg total) by mouth 2 (two) times daily. 28 capsule 0    ondansetron (ZOFRAN) 8 MG tablet Take 1 tablet (8 mg total) by mouth 4 (four) times daily as needed for Nausea. 60 tablet 2    promethazine (PHENERGAN) 6.25 mg/5 mL syrup Take 20 mLs (25 mg total) by mouth every 6 (six) hours as needed for Nausea. 473 mL 6    promethazine-dextromethorphan (PROMETHAZINE-DM) 6.25-15 mg/5 mL Syrp Take 5 mLs by mouth every 4 to 6 hours as needed. 240 mL 0    protein (ENSURE HIGH PROTEIN) Powd Take 1 Can by mouth 3 (three) times daily. 90 Can 3    aspirin (ECOTRIN) 81 MG EC tablet Take 1 tablet (81 mg total) by mouth once daily. (Patient taking differently: Take 81 mg by mouth once daily. )  0    nitroGLYCERIN (NITROSTAT) 0.4 MG SL tablet Place 1 tablet (0.4 mg total) under the tongue every 5 (five) minutes as needed. 25 tablet 3     Current Facility-Administered Medications on File Prior to Visit   Medication Dose Route Frequency Provider Last Rate Last Dose    0.9%  NaCl infusion   Intravenous Continuous Cindy Zhao MD 10 mL/hr at 04/20/20 0904      lidocaine (PF) 10 mg/ml (1%) injection 10 mg  1 mL Intradermal Once Cindy Zhao MD           Review of Systems  Review of Systems   Constitutional: Negative for fever.   HENT: Positive for congestion. Negative for sore throat.    Respiratory: Negative for hemoptysis, wheezing and stridor.    Musculoskeletal: Negative for neck pain.   Skin: Positive for itching.  "  Neurological: Negative for headaches.        Social History   reports that he quit smoking about 2 years ago. His smoking use included cigarettes. He has a 7.50 pack-year smoking history. He has never used smokeless tobacco. He reports that he does not drink alcohol or use drugs.     Family History  Family History   Problem Relation Age of Onset    Blindness Paternal Aunt         Physical Exam   Vitals:    12/28/20 1020   BP: 138/82   Temp: 98.4 °F (36.9 °C)    Body mass index is 20.96 kg/m².  Weight: 58.9 kg (129 lb 13.6 oz)   Height: 5' 6" (167.6 cm)     GENERAL: no acute distress.  Thin but not cachectic  HEAD: normocephalic.   EYES: lids and lashes normal. Pupils equal . No proptosis   EARS: external ear without lesion, normal pinna shape and position.  External auditory canal with normal cerumen, tympanic membrane fully visible, no perforation , no retraction. No middle ear effusion. Ossicles intact.   NOSE: external nose without abnormality  ORAL CAVITY/OROPHARYNX: tongue midline and mobile. Symmetric palate rise. Uvula midline.   NECK: trachea midline.   LYMPH NODES:No cervical lymphadenopathy. post radiation changes  RESPIRATORY: no stridor, no stertor. Voice normal. Respirations nonlabored.  NEURO: alert, responds to questions appropriately.  Facial movement symmetric at rest   PSYCH:mood appropriate    PROCEDURE NOTE  NAME OF PROCEDURE: Flexible Laryngoscopy, diagnostic  INDICATIONS:  Followup history of laryngeal cancer  FINDINGS:  Slight increase in amount of interarytenoid and arytenoid edema without overt mass however compared to last scope exam does seem to be more prominent which is concerning.  I am not able to see his vocal folds as well and there is some questionable restriction of movement although it is not overt.    Consent: After procedure was explained in detail and all questions answered, verbal consent was obtained for performing flexible laryngoscopy.  Anesthesia: topical 4% lidocaine " and neosynephrine  Procedure: With patient in seated position, the scope was inserted into the bilateral nasal passageway and advanced atraumatically on the left into the nasopharynx to examine the following structures:  Nasal cavity: no septal spur. Turbinates mild- moderate hypertrophy with good response.  No middle meatal edema. No purulent drainage.   Nasopharynx: no mass or lesion noted in nasopharynx.   Oropharynx: base of tongue without  mass or ulceration. Lingual tonsils normal in appearance  Hypopharynx: posterior pharyngeal wall without mass or lesion. No pooling of secretions. Pyriform sinuses visible without mass or lesion  Larynx: epiglottis normal without lesion. Left False vocal fold with mild edema but no erythema/lesion. True vocal folds with questionable restricted mobility- unable to see subglottis as well as prior scope exam however possibly due to edema and some retained secretions.  Mild to moderate interarytenoid edema no erythema . Postcricoid region with mild to moderate edema no lesion  Subglottis:  Unable to adequately visualize    After examination performed, the scope was removed atraumatically . The patient tolerated the procedure well. Photodocumentation obtained with representative images below, all images and/or videos uploaded in media section of epic.                      Imaging:  The patient does have any new imaging of the head and neck since last visit.  Pet CT images personally reviewed and reviewed with patient.  I think the area of uptake on the right side appears more diffuse and along the region of the sternocleidomastoid muscle there does appear to also be a decent amount of background uptake I think that this is a false-positive and I did not see an associated soft tissue mass on the CT portion of this exam however that is limited given the lack of IV contrast.  I do not see any significant avidity and his larynx.  There does appear to be a slight amount of fullness on  the left side on CT image compared to right however again without contrast difficult to tell if this is treatment change or residual cancer.    Labs:  CBC  Recent Labs   Lab 08/31/20  1149 09/22/20  1049 12/16/20  0917   WBC 3.47 L 6.76 7.98   Hemoglobin 8.6 L 10.2 L 12.0 L   Hematocrit 27.4 L 31.8 L 37.6 L    H 99 H 100 H   Platelets 205 214 164     BMP  Recent Labs   Lab 08/03/20  0737 08/10/20  0725 08/17/20  0710 08/31/20  1149 09/22/20  1049 12/16/20  0917   Glucose 111 H 105 106 93 96 101   Sodium 139 140 137 138 143 141   Potassium 4.1 4.7 3.8 4.4 4.4 4.0   Chloride 110 108 104 107 109 109   CO2 23 26 23 23 26 23   BUN 17 17 26 H 13 17 13   Creatinine 1.0 1.2 1.4 1.1 1.1 1.2   Calcium 8.6 L 9.0 8.7 8.7 9.2 9.0   Phosphorus 3.3 3.3 3.8  --   --   --    Magnesium 1.7 1.7 1.5 L  --   --   --      COAGS  Recent Labs   Lab 05/28/18  1010 02/27/20  2300 04/25/20  1025   INR 1.1 1.0 1.0       Assessment  1. Squamous cell carcinoma of larynx  - COVID-19 Routine Screening; Future    2. Seasonal allergic rhinitis, unspecified trigger    3. Malignant neoplasm of lung, unspecified laterality, unspecified part of lung       Plan:  Discussed plan of care with patient in detail and all questions answered. Patient reported understanding of plan of care.   He is status post treatment of advanced stage laryngeal cancer with synchronous primary lung tumor. findings on scope exam today were concerning with subtle worsening from previous scope a month ago. I do think it is a low chance that he has residual disease  however  because sometimes this appearance can be seen in patients with history of radiation who do have a upper respiratory illness. I am hoping that that is what is going on but I did recommend proceeding with direct laryngoscopy with biopsy immediately and regardless.  We discussed that the reason for this is that typically residual or recurrent disease after chemotherapy and radiation it is often submucosal  and present with edema only and not with superficial tissue changes. I did offer the option for repeat scope in 2 weeks and then decide about operative biopsy which he chose. I we will schedule a close follow-up with him to see if his cold symptoms improve with starting the saline and Flonase together and  to repeat a scope exam .      I think is probably okay for him to remove his G-tube but he could consider leaving it in until after I repeat scope exam ( +/- DL biopsy)  just to be sure that he does not have recurrent disease because if he does have recurrent disease the next step in treatment would be total laryngectomy and this would require free flap reconstruction and he would need a G-tube in this scenario.     We discussed expectations regarding acute side effects of chemotherapy radiation as well as long-term affects of chemotherapy report and radiation.  Patients often after chemotherapy radiation and 10-15 years can develop significant fibrosis and scarring of the esophagus leading to dysphagia.  Other symptoms related to acute changes such as neuropathy and taste dysfunction sometimes improve.  He what ever symptoms he has at around 6 months will probably be what will remain.    Add saline and increase flonase to bid.     I will see him back in 2 weeks for repeat scope.

## 2020-12-28 NOTE — PATIENT INSTRUCTIONS
"Information and instructions from your visit with me today:    · Start using the following medication nasal sprays:   · Fluticasone spray:    This medication is a steroid spray. It stays within the nose and does not have absorption into the body that leads to side effects that one has with oral steroid medication. Fluticasone nasal spray is the same as the Flonase brand nasal spray. Discuss with your pharmacist if the price is lower over the counter or with a prescription ( this varies depending on insurance). The medication that is over the counter is the same as the prescription medication. Use this medication as instructed on the prescription, 1-2 sprays on each side of your nose twice daily.         Additional instructions for medication sprays  Place the tip of the medication bottle in your nose and aim slightly up and out on each side to get medication high and deep into your nose and sinuses, and not have it all deposit in the very front of your nose. Aim the tip of the nozzle towards the outer corner of your eye . You can imagine aiming towards the back of your eyeball on each side for this, as opposed to straight back to the center of your nose and head.     You need to use this medication every day regardless of symptoms, as it takes time ( a few weeks) to work and get the benefits. It does not work on an "as needed" basis like taking a decongestant. If your symptoms only occur in a particular season, then the medication can be used seasonally instead of year long. For seasonal symptoms, you should start using the spray twice daily a month before when you normally have symptoms ( for example, if symptoms start in August, should start at the end of June).     · Start nasal irrigations with saline solution- you can either use a rinse or a mist spray:    SALINE SINUS RINSE ([employer] brand): You should do a full bottle, half on one side of your nose and half on the other, 1-2 times per day (or more if able to, " you cannot do this too much). Follow the instructions on the box: mix the salt packet with clean water (bottle, previously boiled, distilled, etc -- not tap water) to the line on the bottle to make the irrigation.         NASAL SALINE SPRAY ( simply saline and arm and hammer are examples) There are several different brands found in the cold and flu aisle of the pharmacy. You can use any brand of saline spray - this will deliver the saline by a gentle mist ( if you have difficulty or discomfort with nasal rinse/ a lot of fluid in the nose, this will be more comfortable).       Always rinse your nose with saline prior to using medication sprays and wait a couple of hours before using again. You can use the saline throughout the day to help with stuffy nose or dry nose.    · Do not use nasal decongestant sprays such as Afrin or similar products long term ( over 3 days) .  This can cause long term physical nasal addiction. Afrin should only be used if having nose bleeds, severe nasal congestion , or severe ear pain/fullness and should not be used for more than 2-3 days in a row . It is a not a medication that should be used for a long period of time.     It was nice meeting you today, and I look forward to helping you feel better soon. Please don't hesitate to call if you have any other questions or concerns, or if I can be of any assistance in the meantime.      Genoveva Vallejo MD    [facility]     Phone  [phone]    Fax      [phone]        Genoveva Vallejo MD  Otorhinolaryngology

## 2020-12-31 ENCOUNTER — OFFICE VISIT (OUTPATIENT)
Dept: SURGERY | Facility: CLINIC | Age: 70
End: 2020-12-31
Payer: MEDICARE

## 2020-12-31 VITALS
WEIGHT: 129.31 LBS | HEIGHT: 66 IN | HEART RATE: 78 BPM | TEMPERATURE: 97 F | RESPIRATION RATE: 18 BRPM | BODY MASS INDEX: 20.78 KG/M2 | SYSTOLIC BLOOD PRESSURE: 153 MMHG | DIASTOLIC BLOOD PRESSURE: 83 MMHG

## 2020-12-31 DIAGNOSIS — C32.9 LARYNX CANCER: Primary | ICD-10-CM

## 2020-12-31 PROCEDURE — 99999 PR PBB SHADOW E&M-EST. PATIENT-LVL IV: CPT | Mod: PBBFAC,HCNC,, | Performed by: SURGERY

## 2020-12-31 PROCEDURE — 99499 UNLISTED E&M SERVICE: CPT | Mod: HCNC,S$GLB,, | Performed by: SURGERY

## 2020-12-31 PROCEDURE — 1101F PT FALLS ASSESS-DOCD LE1/YR: CPT | Mod: HCNC,CPTII,S$GLB, | Performed by: SURGERY

## 2020-12-31 PROCEDURE — 3008F PR BODY MASS INDEX (BMI) DOCUMENTED: ICD-10-PCS | Mod: HCNC,CPTII,S$GLB, | Performed by: SURGERY

## 2020-12-31 PROCEDURE — 99499 NO LOS: ICD-10-PCS | Mod: HCNC,S$GLB,, | Performed by: SURGERY

## 2020-12-31 PROCEDURE — 3008F BODY MASS INDEX DOCD: CPT | Mod: HCNC,CPTII,S$GLB, | Performed by: SURGERY

## 2020-12-31 PROCEDURE — 1126F AMNT PAIN NOTED NONE PRSNT: CPT | Mod: HCNC,S$GLB,, | Performed by: SURGERY

## 2020-12-31 PROCEDURE — 1126F PR PAIN SEVERITY QUANTIFIED, NO PAIN PRESENT: ICD-10-PCS | Mod: HCNC,S$GLB,, | Performed by: SURGERY

## 2020-12-31 PROCEDURE — 3288F FALL RISK ASSESSMENT DOCD: CPT | Mod: HCNC,CPTII,S$GLB, | Performed by: SURGERY

## 2020-12-31 PROCEDURE — 1101F PR PT FALLS ASSESS DOC 0-1 FALLS W/OUT INJ PAST YR: ICD-10-PCS | Mod: HCNC,CPTII,S$GLB, | Performed by: SURGERY

## 2020-12-31 PROCEDURE — 3288F PR FALLS RISK ASSESSMENT DOCUMENTED: ICD-10-PCS | Mod: HCNC,CPTII,S$GLB, | Performed by: SURGERY

## 2020-12-31 PROCEDURE — 99999 PR PBB SHADOW E&M-EST. PATIENT-LVL IV: ICD-10-PCS | Mod: PBBFAC,HCNC,, | Performed by: SURGERY

## 2020-12-31 NOTE — PROGRESS NOTES
Mr. Huizar is doing great s/p ChemoRT for laryngeal cancer. He has not used his PEG tube in some time.  Removed in clinic without complication.  Can f/u prn.      Josh Cardenas MD  Upper GI / Hepatobiliary Surgical Oncology  [facility] [place], [place]  Office: [phone]  Fax: [phone]

## 2021-01-04 ENCOUNTER — PATIENT MESSAGE (OUTPATIENT)
Dept: ADMINISTRATIVE | Facility: HOSPITAL | Age: 71
End: 2021-01-04

## 2021-01-08 ENCOUNTER — LAB VISIT (OUTPATIENT)
Dept: FAMILY MEDICINE | Facility: CLINIC | Age: 71
End: 2021-01-08
Payer: MEDICARE

## 2021-01-08 DIAGNOSIS — C32.9 SQUAMOUS CELL CARCINOMA OF LARYNX: ICD-10-CM

## 2021-01-08 PROCEDURE — U0003 INFECTIOUS AGENT DETECTION BY NUCLEIC ACID (DNA OR RNA); SEVERE ACUTE RESPIRATORY SYNDROME CORONAVIRUS 2 (SARS-COV-2) (CORONAVIRUS DISEASE [COVID-19]), AMPLIFIED PROBE TECHNIQUE, MAKING USE OF HIGH THROUGHPUT TECHNOLOGIES AS DESCRIBED BY CMS-2020-01-R: HCPCS

## 2021-01-09 LAB — SARS-COV-2 RNA RESP QL NAA+PROBE: NOT DETECTED

## 2021-01-12 ENCOUNTER — OFFICE VISIT (OUTPATIENT)
Dept: OTOLARYNGOLOGY | Facility: CLINIC | Age: 71
End: 2021-01-12
Payer: MEDICARE

## 2021-01-12 VITALS
BODY MASS INDEX: 21.25 KG/M2 | SYSTOLIC BLOOD PRESSURE: 126 MMHG | DIASTOLIC BLOOD PRESSURE: 72 MMHG | HEIGHT: 66 IN | TEMPERATURE: 98 F | WEIGHT: 132.25 LBS

## 2021-01-12 DIAGNOSIS — J38.4 LARYNX EDEMA: ICD-10-CM

## 2021-01-12 DIAGNOSIS — J02.9 SORE THROAT: Primary | ICD-10-CM

## 2021-01-12 DIAGNOSIS — Z85.118 HISTORY OF LUNG CANCER: Primary | ICD-10-CM

## 2021-01-12 PROCEDURE — 99213 OFFICE O/P EST LOW 20 MIN: CPT | Mod: 25,S$GLB,, | Performed by: OTOLARYNGOLOGY

## 2021-01-12 PROCEDURE — 99213 PR OFFICE/OUTPT VISIT, EST, LEVL III, 20-29 MIN: ICD-10-PCS | Mod: 25,S$GLB,, | Performed by: OTOLARYNGOLOGY

## 2021-01-12 PROCEDURE — 3074F PR MOST RECENT SYSTOLIC BLOOD PRESSURE < 130 MM HG: ICD-10-PCS | Mod: CPTII,S$GLB,, | Performed by: OTOLARYNGOLOGY

## 2021-01-12 PROCEDURE — 1126F AMNT PAIN NOTED NONE PRSNT: CPT | Mod: S$GLB,,, | Performed by: OTOLARYNGOLOGY

## 2021-01-12 PROCEDURE — 3008F BODY MASS INDEX DOCD: CPT | Mod: CPTII,S$GLB,, | Performed by: OTOLARYNGOLOGY

## 2021-01-12 PROCEDURE — 3288F FALL RISK ASSESSMENT DOCD: CPT | Mod: CPTII,S$GLB,, | Performed by: OTOLARYNGOLOGY

## 2021-01-12 PROCEDURE — 99499 UNLISTED E&M SERVICE: CPT | Mod: S$GLB,,, | Performed by: OTOLARYNGOLOGY

## 2021-01-12 PROCEDURE — 3078F DIAST BP <80 MM HG: CPT | Mod: CPTII,S$GLB,, | Performed by: OTOLARYNGOLOGY

## 2021-01-12 PROCEDURE — 1159F PR MEDICATION LIST DOCUMENTED IN MEDICAL RECORD: ICD-10-PCS | Mod: S$GLB,,, | Performed by: OTOLARYNGOLOGY

## 2021-01-12 PROCEDURE — 1126F PR PAIN SEVERITY QUANTIFIED, NO PAIN PRESENT: ICD-10-PCS | Mod: S$GLB,,, | Performed by: OTOLARYNGOLOGY

## 2021-01-12 PROCEDURE — 99499 RISK ADDL DX/OHS AUDIT: ICD-10-PCS | Mod: S$GLB,,, | Performed by: OTOLARYNGOLOGY

## 2021-01-12 PROCEDURE — 31575 PR LARYNGOSCOPY, FLEXIBLE; DIAGNOSTIC: ICD-10-PCS | Mod: S$GLB,,, | Performed by: OTOLARYNGOLOGY

## 2021-01-12 PROCEDURE — 1101F PR PT FALLS ASSESS DOC 0-1 FALLS W/OUT INJ PAST YR: ICD-10-PCS | Mod: CPTII,S$GLB,, | Performed by: OTOLARYNGOLOGY

## 2021-01-12 PROCEDURE — 3078F PR MOST RECENT DIASTOLIC BLOOD PRESSURE < 80 MM HG: ICD-10-PCS | Mod: CPTII,S$GLB,, | Performed by: OTOLARYNGOLOGY

## 2021-01-12 PROCEDURE — 3008F PR BODY MASS INDEX (BMI) DOCUMENTED: ICD-10-PCS | Mod: CPTII,S$GLB,, | Performed by: OTOLARYNGOLOGY

## 2021-01-12 PROCEDURE — 31575 DIAGNOSTIC LARYNGOSCOPY: CPT | Mod: S$GLB,,, | Performed by: OTOLARYNGOLOGY

## 2021-01-12 PROCEDURE — 1159F MED LIST DOCD IN RCRD: CPT | Mod: S$GLB,,, | Performed by: OTOLARYNGOLOGY

## 2021-01-12 PROCEDURE — 3074F SYST BP LT 130 MM HG: CPT | Mod: CPTII,S$GLB,, | Performed by: OTOLARYNGOLOGY

## 2021-01-12 PROCEDURE — 1101F PT FALLS ASSESS-DOCD LE1/YR: CPT | Mod: CPTII,S$GLB,, | Performed by: OTOLARYNGOLOGY

## 2021-01-12 PROCEDURE — 3288F PR FALLS RISK ASSESSMENT DOCUMENTED: ICD-10-PCS | Mod: CPTII,S$GLB,, | Performed by: OTOLARYNGOLOGY

## 2021-02-08 ENCOUNTER — LAB VISIT (OUTPATIENT)
Dept: FAMILY MEDICINE | Facility: CLINIC | Age: 71
End: 2021-02-08
Payer: MEDICARE

## 2021-02-08 DIAGNOSIS — J02.9 SORE THROAT: ICD-10-CM

## 2021-02-08 PROCEDURE — U0003 INFECTIOUS AGENT DETECTION BY NUCLEIC ACID (DNA OR RNA); SEVERE ACUTE RESPIRATORY SYNDROME CORONAVIRUS 2 (SARS-COV-2) (CORONAVIRUS DISEASE [COVID-19]), AMPLIFIED PROBE TECHNIQUE, MAKING USE OF HIGH THROUGHPUT TECHNOLOGIES AS DESCRIBED BY CMS-2020-01-R: HCPCS

## 2021-02-08 PROCEDURE — U0005 INFEC AGEN DETEC AMPLI PROBE: HCPCS

## 2021-02-10 ENCOUNTER — LAB VISIT (OUTPATIENT)
Dept: LAB | Facility: HOSPITAL | Age: 71
End: 2021-02-10
Attending: OTOLARYNGOLOGY
Payer: MEDICARE

## 2021-02-10 ENCOUNTER — OFFICE VISIT (OUTPATIENT)
Dept: OTOLARYNGOLOGY | Facility: CLINIC | Age: 71
End: 2021-02-10
Payer: MEDICARE

## 2021-02-10 VITALS
HEIGHT: 66 IN | BODY MASS INDEX: 20.88 KG/M2 | DIASTOLIC BLOOD PRESSURE: 72 MMHG | TEMPERATURE: 98 F | WEIGHT: 129.94 LBS | SYSTOLIC BLOOD PRESSURE: 132 MMHG

## 2021-02-10 DIAGNOSIS — C32.9 LARYNGEAL CANCER: ICD-10-CM

## 2021-02-10 DIAGNOSIS — C32.9 LARYNGEAL CANCER: Primary | ICD-10-CM

## 2021-02-10 LAB
CREAT SERPL-MCNC: 1.2 MG/DL (ref 0.5–1.4)
EST. GFR  (AFRICAN AMERICAN): >60 ML/MIN/1.73 M^2
EST. GFR  (NON AFRICAN AMERICAN): >60 ML/MIN/1.73 M^2
SARS-COV-2 RNA RESP QL NAA+PROBE: NOT DETECTED

## 2021-02-10 PROCEDURE — 1159F MED LIST DOCD IN RCRD: CPT | Mod: S$GLB,,, | Performed by: OTOLARYNGOLOGY

## 2021-02-10 PROCEDURE — 99499 RISK ADDL DX/OHS AUDIT: ICD-10-PCS | Mod: S$GLB,,, | Performed by: OTOLARYNGOLOGY

## 2021-02-10 PROCEDURE — 1126F AMNT PAIN NOTED NONE PRSNT: CPT | Mod: S$GLB,,, | Performed by: OTOLARYNGOLOGY

## 2021-02-10 PROCEDURE — 1126F PR PAIN SEVERITY QUANTIFIED, NO PAIN PRESENT: ICD-10-PCS | Mod: S$GLB,,, | Performed by: OTOLARYNGOLOGY

## 2021-02-10 PROCEDURE — 1101F PR PT FALLS ASSESS DOC 0-1 FALLS W/OUT INJ PAST YR: ICD-10-PCS | Mod: CPTII,S$GLB,, | Performed by: OTOLARYNGOLOGY

## 2021-02-10 PROCEDURE — 3008F PR BODY MASS INDEX (BMI) DOCUMENTED: ICD-10-PCS | Mod: CPTII,S$GLB,, | Performed by: OTOLARYNGOLOGY

## 2021-02-10 PROCEDURE — 3078F DIAST BP <80 MM HG: CPT | Mod: CPTII,S$GLB,, | Performed by: OTOLARYNGOLOGY

## 2021-02-10 PROCEDURE — 99499 UNLISTED E&M SERVICE: CPT | Mod: S$GLB,,, | Performed by: OTOLARYNGOLOGY

## 2021-02-10 PROCEDURE — 1101F PT FALLS ASSESS-DOCD LE1/YR: CPT | Mod: CPTII,S$GLB,, | Performed by: OTOLARYNGOLOGY

## 2021-02-10 PROCEDURE — 82565 ASSAY OF CREATININE: CPT

## 2021-02-10 PROCEDURE — 1159F PR MEDICATION LIST DOCUMENTED IN MEDICAL RECORD: ICD-10-PCS | Mod: S$GLB,,, | Performed by: OTOLARYNGOLOGY

## 2021-02-10 PROCEDURE — 36415 COLL VENOUS BLD VENIPUNCTURE: CPT | Mod: PO

## 2021-02-10 PROCEDURE — 3008F BODY MASS INDEX DOCD: CPT | Mod: CPTII,S$GLB,, | Performed by: OTOLARYNGOLOGY

## 2021-02-10 PROCEDURE — 3075F SYST BP GE 130 - 139MM HG: CPT | Mod: CPTII,S$GLB,, | Performed by: OTOLARYNGOLOGY

## 2021-02-10 PROCEDURE — 3075F PR MOST RECENT SYSTOLIC BLOOD PRESS GE 130-139MM HG: ICD-10-PCS | Mod: CPTII,S$GLB,, | Performed by: OTOLARYNGOLOGY

## 2021-02-10 PROCEDURE — 99214 PR OFFICE/OUTPT VISIT, EST, LEVL IV, 30-39 MIN: ICD-10-PCS | Mod: 57,25,S$GLB, | Performed by: OTOLARYNGOLOGY

## 2021-02-10 PROCEDURE — 3288F FALL RISK ASSESSMENT DOCD: CPT | Mod: CPTII,S$GLB,, | Performed by: OTOLARYNGOLOGY

## 2021-02-10 PROCEDURE — 3078F PR MOST RECENT DIASTOLIC BLOOD PRESSURE < 80 MM HG: ICD-10-PCS | Mod: CPTII,S$GLB,, | Performed by: OTOLARYNGOLOGY

## 2021-02-10 PROCEDURE — 99214 OFFICE O/P EST MOD 30 MIN: CPT | Mod: 57,25,S$GLB, | Performed by: OTOLARYNGOLOGY

## 2021-02-10 PROCEDURE — 3288F PR FALLS RISK ASSESSMENT DOCUMENTED: ICD-10-PCS | Mod: CPTII,S$GLB,, | Performed by: OTOLARYNGOLOGY

## 2021-02-10 PROCEDURE — 31575 DIAGNOSTIC LARYNGOSCOPY: CPT | Mod: S$GLB,,, | Performed by: OTOLARYNGOLOGY

## 2021-02-10 PROCEDURE — 31575 PR LARYNGOSCOPY, FLEXIBLE; DIAGNOSTIC: ICD-10-PCS | Mod: S$GLB,,, | Performed by: OTOLARYNGOLOGY

## 2021-02-11 ENCOUNTER — HOSPITAL ENCOUNTER (OUTPATIENT)
Dept: RADIOLOGY | Facility: HOSPITAL | Age: 71
Discharge: HOME | End: 2021-02-11
Attending: OTOLARYNGOLOGY
Payer: MEDICARE

## 2021-02-11 DIAGNOSIS — C32.9 LARYNGEAL CANCER: ICD-10-CM

## 2021-02-11 PROCEDURE — 71045 XR CHEST 1 VIEW: ICD-10-PCS | Mod: 26,,, | Performed by: RADIOLOGY

## 2021-02-11 PROCEDURE — 71045 X-RAY EXAM CHEST 1 VIEW: CPT | Mod: TC,FY

## 2021-02-11 PROCEDURE — 25500020 PHARM REV CODE 255: Performed by: OTOLARYNGOLOGY

## 2021-02-11 PROCEDURE — 70491 CT SOFT TISSUE NECK WITH CONTRAST: ICD-10-PCS | Mod: 26,,, | Performed by: RADIOLOGY

## 2021-02-11 PROCEDURE — 70491 CT SOFT TISSUE NECK W/DYE: CPT | Mod: 26,,, | Performed by: RADIOLOGY

## 2021-02-11 PROCEDURE — 71045 X-RAY EXAM CHEST 1 VIEW: CPT | Mod: 26,,, | Performed by: RADIOLOGY

## 2021-02-11 PROCEDURE — 70491 CT SOFT TISSUE NECK W/DYE: CPT | Mod: TC

## 2021-02-11 RX ADMIN — IOHEXOL 75 ML: 350 INJECTION, SOLUTION INTRAVENOUS at 03:02

## 2021-02-12 ENCOUNTER — HOSPITAL ENCOUNTER (OUTPATIENT)
Dept: PREADMISSION TESTING | Facility: HOSPITAL | Age: 71
Discharge: HOME | End: 2021-02-12
Attending: OTOLARYNGOLOGY
Payer: MEDICARE

## 2021-02-12 ENCOUNTER — ANESTHESIA EVENT (OUTPATIENT)
Dept: SURGERY | Facility: HOSPITAL | Age: 71
End: 2021-02-12
Payer: MEDICARE

## 2021-02-12 ENCOUNTER — PATIENT OUTREACH (OUTPATIENT)
Dept: ADMINISTRATIVE | Facility: OTHER | Age: 71
End: 2021-02-12

## 2021-02-12 ENCOUNTER — OFFICE VISIT (OUTPATIENT)
Dept: CARDIOLOGY | Facility: CLINIC | Age: 71
End: 2021-02-12
Payer: MEDICARE

## 2021-02-12 VITALS
HEART RATE: 70 BPM | HEIGHT: 66 IN | TEMPERATURE: 98 F | DIASTOLIC BLOOD PRESSURE: 83 MMHG | RESPIRATION RATE: 18 BRPM | BODY MASS INDEX: 21.01 KG/M2 | SYSTOLIC BLOOD PRESSURE: 131 MMHG | OXYGEN SATURATION: 100 % | WEIGHT: 130.75 LBS

## 2021-02-12 VITALS
OXYGEN SATURATION: 100 % | DIASTOLIC BLOOD PRESSURE: 86 MMHG | WEIGHT: 130 LBS | HEIGHT: 66 IN | BODY MASS INDEX: 20.89 KG/M2 | RESPIRATION RATE: 17 BRPM | HEART RATE: 65 BPM | SYSTOLIC BLOOD PRESSURE: 140 MMHG

## 2021-02-12 DIAGNOSIS — I50.22 CHRONIC SYSTOLIC HEART FAILURE: ICD-10-CM

## 2021-02-12 DIAGNOSIS — I10 HYPERTENSION, ESSENTIAL: ICD-10-CM

## 2021-02-12 DIAGNOSIS — I25.10 CORONARY ARTERY DISEASE INVOLVING NATIVE CORONARY ARTERY OF NATIVE HEART WITHOUT ANGINA PECTORIS: Primary | ICD-10-CM

## 2021-02-12 DIAGNOSIS — C32.9 LARYNGEAL CANCER: ICD-10-CM

## 2021-02-12 DIAGNOSIS — R06.09 DOE (DYSPNEA ON EXERTION): ICD-10-CM

## 2021-02-12 DIAGNOSIS — Z01.818 PRE-OP TESTING: ICD-10-CM

## 2021-02-12 LAB
ANION GAP SERPL CALC-SCNC: 9 MMOL/L (ref 8–16)
BASOPHILS # BLD AUTO: 0.04 K/UL (ref 0–0.2)
BASOPHILS NFR BLD: 0.8 % (ref 0–1.9)
BUN SERPL-MCNC: 8 MG/DL (ref 8–23)
CALCIUM SERPL-MCNC: 8.6 MG/DL (ref 8.7–10.5)
CHLORIDE SERPL-SCNC: 110 MMOL/L (ref 95–110)
CO2 SERPL-SCNC: 23 MMOL/L (ref 23–29)
CREAT SERPL-MCNC: 1.2 MG/DL (ref 0.5–1.4)
DIFFERENTIAL METHOD: ABNORMAL
EOSINOPHIL # BLD AUTO: 0.1 K/UL (ref 0–0.5)
EOSINOPHIL NFR BLD: 1.8 % (ref 0–8)
ERYTHROCYTE [DISTWIDTH] IN BLOOD BY AUTOMATED COUNT: 15 % (ref 11.5–14.5)
EST. GFR  (AFRICAN AMERICAN): >60 ML/MIN/1.73 M^2
EST. GFR  (NON AFRICAN AMERICAN): >60 ML/MIN/1.73 M^2
GLUCOSE SERPL-MCNC: 100 MG/DL (ref 70–110)
HCT VFR BLD AUTO: 36.7 % (ref 40–54)
HGB BLD-MCNC: 12.2 G/DL (ref 14–18)
IMM GRANULOCYTES # BLD AUTO: 0.01 K/UL (ref 0–0.04)
IMM GRANULOCYTES NFR BLD AUTO: 0.2 % (ref 0–0.5)
LYMPHOCYTES # BLD AUTO: 1.1 K/UL (ref 1–4.8)
LYMPHOCYTES NFR BLD: 22.8 % (ref 18–48)
MCH RBC QN AUTO: 31.4 PG (ref 27–31)
MCHC RBC AUTO-ENTMCNC: 33.2 G/DL (ref 32–36)
MCV RBC AUTO: 95 FL (ref 82–98)
MONOCYTES # BLD AUTO: 0.4 K/UL (ref 0.3–1)
MONOCYTES NFR BLD: 8.2 % (ref 4–15)
NEUTROPHILS # BLD AUTO: 3.2 K/UL (ref 1.8–7.7)
NEUTROPHILS NFR BLD: 66.2 % (ref 38–73)
NRBC BLD-RTO: 0 /100 WBC
PLATELET # BLD AUTO: 166 K/UL (ref 150–350)
PMV BLD AUTO: 10 FL (ref 9.2–12.9)
POTASSIUM SERPL-SCNC: 4.5 MMOL/L (ref 3.5–5.1)
RBC # BLD AUTO: 3.88 M/UL (ref 4.6–6.2)
SODIUM SERPL-SCNC: 142 MMOL/L (ref 136–145)
WBC # BLD AUTO: 4.87 K/UL (ref 3.9–12.7)

## 2021-02-12 PROCEDURE — 3079F PR MOST RECENT DIASTOLIC BLOOD PRESSURE 80-89 MM HG: ICD-10-PCS | Mod: CPTII,S$GLB,, | Performed by: INTERNAL MEDICINE

## 2021-02-12 PROCEDURE — 85025 COMPLETE CBC W/AUTO DIFF WBC: CPT

## 2021-02-12 PROCEDURE — 1159F PR MEDICATION LIST DOCUMENTED IN MEDICAL RECORD: ICD-10-PCS | Mod: S$GLB,,, | Performed by: INTERNAL MEDICINE

## 2021-02-12 PROCEDURE — 99999 PR PBB SHADOW E&M-EST. PATIENT-LVL IV: ICD-10-PCS | Mod: PBBFAC,,, | Performed by: INTERNAL MEDICINE

## 2021-02-12 PROCEDURE — 3288F FALL RISK ASSESSMENT DOCD: CPT | Mod: CPTII,S$GLB,, | Performed by: INTERNAL MEDICINE

## 2021-02-12 PROCEDURE — 1159F MED LIST DOCD IN RCRD: CPT | Mod: S$GLB,,, | Performed by: INTERNAL MEDICINE

## 2021-02-12 PROCEDURE — 93010 EKG 12-LEAD: ICD-10-PCS | Mod: ,,, | Performed by: INTERNAL MEDICINE

## 2021-02-12 PROCEDURE — 36415 COLL VENOUS BLD VENIPUNCTURE: CPT

## 2021-02-12 PROCEDURE — 3074F SYST BP LT 130 MM HG: CPT | Mod: CPTII,S$GLB,, | Performed by: INTERNAL MEDICINE

## 2021-02-12 PROCEDURE — 99499 RISK ADDL DX/OHS AUDIT: ICD-10-PCS | Mod: S$GLB,,, | Performed by: INTERNAL MEDICINE

## 2021-02-12 PROCEDURE — 99999 PR PBB SHADOW E&M-EST. PATIENT-LVL IV: CPT | Mod: PBBFAC,,, | Performed by: INTERNAL MEDICINE

## 2021-02-12 PROCEDURE — 93010 ELECTROCARDIOGRAM REPORT: CPT | Mod: ,,, | Performed by: INTERNAL MEDICINE

## 2021-02-12 PROCEDURE — 3008F PR BODY MASS INDEX (BMI) DOCUMENTED: ICD-10-PCS | Mod: CPTII,S$GLB,, | Performed by: INTERNAL MEDICINE

## 2021-02-12 PROCEDURE — 99499 UNLISTED E&M SERVICE: CPT | Mod: S$GLB,,, | Performed by: INTERNAL MEDICINE

## 2021-02-12 PROCEDURE — 93005 ELECTROCARDIOGRAM TRACING: CPT

## 2021-02-12 PROCEDURE — 3288F PR FALLS RISK ASSESSMENT DOCUMENTED: ICD-10-PCS | Mod: CPTII,S$GLB,, | Performed by: INTERNAL MEDICINE

## 2021-02-12 PROCEDURE — 99214 OFFICE O/P EST MOD 30 MIN: CPT | Mod: S$GLB,,, | Performed by: INTERNAL MEDICINE

## 2021-02-12 PROCEDURE — 1126F PR PAIN SEVERITY QUANTIFIED, NO PAIN PRESENT: ICD-10-PCS | Mod: S$GLB,,, | Performed by: INTERNAL MEDICINE

## 2021-02-12 PROCEDURE — 3079F DIAST BP 80-89 MM HG: CPT | Mod: CPTII,S$GLB,, | Performed by: INTERNAL MEDICINE

## 2021-02-12 PROCEDURE — 3074F PR MOST RECENT SYSTOLIC BLOOD PRESSURE < 130 MM HG: ICD-10-PCS | Mod: CPTII,S$GLB,, | Performed by: INTERNAL MEDICINE

## 2021-02-12 PROCEDURE — 1101F PT FALLS ASSESS-DOCD LE1/YR: CPT | Mod: CPTII,S$GLB,, | Performed by: INTERNAL MEDICINE

## 2021-02-12 PROCEDURE — 3008F BODY MASS INDEX DOCD: CPT | Mod: CPTII,S$GLB,, | Performed by: INTERNAL MEDICINE

## 2021-02-12 PROCEDURE — 1126F AMNT PAIN NOTED NONE PRSNT: CPT | Mod: S$GLB,,, | Performed by: INTERNAL MEDICINE

## 2021-02-12 PROCEDURE — 1101F PR PT FALLS ASSESS DOC 0-1 FALLS W/OUT INJ PAST YR: ICD-10-PCS | Mod: CPTII,S$GLB,, | Performed by: INTERNAL MEDICINE

## 2021-02-12 PROCEDURE — 80048 BASIC METABOLIC PNL TOTAL CA: CPT

## 2021-02-12 PROCEDURE — 99214 PR OFFICE/OUTPT VISIT, EST, LEVL IV, 30-39 MIN: ICD-10-PCS | Mod: S$GLB,,, | Performed by: INTERNAL MEDICINE

## 2021-02-17 ENCOUNTER — HOSPITAL ENCOUNTER (OUTPATIENT)
Dept: PREADMISSION TESTING | Facility: HOSPITAL | Age: 71
Discharge: HOME | End: 2021-02-17
Attending: OTOLARYNGOLOGY
Payer: MEDICARE

## 2021-02-17 LAB — SARS-COV-2 RDRP RESP QL NAA+PROBE: NEGATIVE

## 2021-02-17 PROCEDURE — U0002 COVID-19 LAB TEST NON-CDC: HCPCS

## 2021-02-17 RX ORDER — SODIUM CHLORIDE 9 MG/ML
INJECTION, SOLUTION INTRAVENOUS CONTINUOUS
Status: CANCELLED | OUTPATIENT
Start: 2021-02-17

## 2021-02-17 RX ORDER — LIDOCAINE HYDROCHLORIDE 10 MG/ML
1 INJECTION, SOLUTION EPIDURAL; INFILTRATION; INTRACAUDAL; PERINEURAL ONCE
Status: CANCELLED | OUTPATIENT
Start: 2021-02-17 | End: 2021-02-17

## 2021-02-18 ENCOUNTER — ANESTHESIA (OUTPATIENT)
Dept: SURGERY | Facility: HOSPITAL | Age: 71
End: 2021-02-18
Payer: MEDICARE

## 2021-02-18 ENCOUNTER — HOSPITAL ENCOUNTER (OUTPATIENT)
Facility: HOSPITAL | Age: 71
Discharge: HOME | End: 2021-02-18
Attending: OTOLARYNGOLOGY | Admitting: OTOLARYNGOLOGY
Payer: MEDICARE

## 2021-02-18 ENCOUNTER — TELEPHONE (OUTPATIENT)
Dept: OTOLARYNGOLOGY | Facility: CLINIC | Age: 71
End: 2021-02-18

## 2021-02-18 VITALS
WEIGHT: 129.88 LBS | TEMPERATURE: 97 F | DIASTOLIC BLOOD PRESSURE: 74 MMHG | OXYGEN SATURATION: 97 % | SYSTOLIC BLOOD PRESSURE: 130 MMHG | RESPIRATION RATE: 18 BRPM | HEART RATE: 67 BPM | BODY MASS INDEX: 20.96 KG/M2

## 2021-02-18 DIAGNOSIS — Z85.21 HISTORY OF CANCER OF LARYNX: ICD-10-CM

## 2021-02-18 PROCEDURE — 88305 TISSUE EXAM BY PATHOLOGIST: CPT | Performed by: PATHOLOGY

## 2021-02-18 PROCEDURE — D9220A PRA ANESTHESIA: ICD-10-PCS | Mod: ,,, | Performed by: ANESTHESIOLOGY

## 2021-02-18 PROCEDURE — 37000008 HC ANESTHESIA 1ST 15 MINUTES: Performed by: OTOLARYNGOLOGY

## 2021-02-18 PROCEDURE — 63600175 PHARM REV CODE 636 W HCPCS: Performed by: OTOLARYNGOLOGY

## 2021-02-18 PROCEDURE — 31536 PR LARYNGOSCOPY,DIRCT,OP SCOPE,BIOPSY: ICD-10-PCS | Mod: ,,, | Performed by: OTOLARYNGOLOGY

## 2021-02-18 PROCEDURE — 71000015 HC POSTOP RECOV 1ST HR: Performed by: OTOLARYNGOLOGY

## 2021-02-18 PROCEDURE — 36000708 HC OR TIME LEV III 1ST 15 MIN: Performed by: OTOLARYNGOLOGY

## 2021-02-18 PROCEDURE — 71000033 HC RECOVERY, INTIAL HOUR: Performed by: OTOLARYNGOLOGY

## 2021-02-18 PROCEDURE — 88305 TISSUE EXAM BY PATHOLOGIST: CPT | Mod: 26,,, | Performed by: PATHOLOGY

## 2021-02-18 PROCEDURE — 71000016 HC POSTOP RECOV ADDL HR: Performed by: OTOLARYNGOLOGY

## 2021-02-18 PROCEDURE — 71000039 HC RECOVERY, EACH ADD'L HOUR: Performed by: OTOLARYNGOLOGY

## 2021-02-18 PROCEDURE — 25000242 PHARM REV CODE 250 ALT 637 W/ HCPCS: Performed by: REGISTERED NURSE

## 2021-02-18 PROCEDURE — 63600175 PHARM REV CODE 636 W HCPCS: Performed by: REGISTERED NURSE

## 2021-02-18 PROCEDURE — 25000003 PHARM REV CODE 250: Performed by: ANESTHESIOLOGY

## 2021-02-18 PROCEDURE — 63600175 PHARM REV CODE 636 W HCPCS: Performed by: ANESTHESIOLOGY

## 2021-02-18 PROCEDURE — 25000003 PHARM REV CODE 250: Performed by: OTOLARYNGOLOGY

## 2021-02-18 PROCEDURE — 25000003 PHARM REV CODE 250: Performed by: REGISTERED NURSE

## 2021-02-18 PROCEDURE — 37000009 HC ANESTHESIA EA ADD 15 MINS: Performed by: OTOLARYNGOLOGY

## 2021-02-18 PROCEDURE — D9220A PRA ANESTHESIA: Mod: ,,, | Performed by: ANESTHESIOLOGY

## 2021-02-18 PROCEDURE — 36000709 HC OR TIME LEV III EA ADD 15 MIN: Performed by: OTOLARYNGOLOGY

## 2021-02-18 PROCEDURE — 31536 LARYNGOSCOPY W/BX & OP SCOPE: CPT | Mod: ,,, | Performed by: OTOLARYNGOLOGY

## 2021-02-18 PROCEDURE — 88305 TISSUE EXAM BY PATHOLOGIST: ICD-10-PCS | Mod: 26,,, | Performed by: PATHOLOGY

## 2021-02-18 PROCEDURE — 25000242 PHARM REV CODE 250 ALT 637 W/ HCPCS: Performed by: ANESTHESIOLOGY

## 2021-02-18 RX ORDER — HYDROCODONE BITARTRATE AND ACETAMINOPHEN 5; 325 MG/1; MG/1
1 TABLET ORAL EVERY 6 HOURS PRN
Qty: 10 TABLET | Refills: 0 | OUTPATIENT
Start: 2021-02-18 | End: 2022-06-11

## 2021-02-18 RX ORDER — IPRATROPIUM BROMIDE AND ALBUTEROL SULFATE 2.5; .5 MG/3ML; MG/3ML
SOLUTION RESPIRATORY (INHALATION)
Status: DISCONTINUED
Start: 2021-02-18 | End: 2021-02-18 | Stop reason: WASHOUT

## 2021-02-18 RX ORDER — LABETALOL HYDROCHLORIDE 5 MG/ML
INJECTION, SOLUTION INTRAVENOUS
Status: DISCONTINUED | OUTPATIENT
Start: 2021-02-18 | End: 2021-02-18

## 2021-02-18 RX ORDER — ONDANSETRON 2 MG/ML
INJECTION INTRAMUSCULAR; INTRAVENOUS
Status: DISCONTINUED | OUTPATIENT
Start: 2021-02-18 | End: 2021-02-18

## 2021-02-18 RX ORDER — SODIUM CHLORIDE 0.9 % (FLUSH) 0.9 %
10 SYRINGE (ML) INJECTION
Status: DISCONTINUED | OUTPATIENT
Start: 2021-02-18 | End: 2021-02-18 | Stop reason: HOSPADM

## 2021-02-18 RX ORDER — SUCCINYLCHOLINE CHLORIDE 20 MG/ML
INJECTION INTRAMUSCULAR; INTRAVENOUS
Status: DISCONTINUED | OUTPATIENT
Start: 2021-02-18 | End: 2021-02-18

## 2021-02-18 RX ORDER — IPRATROPIUM BROMIDE AND ALBUTEROL SULFATE 2.5; .5 MG/3ML; MG/3ML
SOLUTION RESPIRATORY (INHALATION)
Status: DISCONTINUED
Start: 2021-02-18 | End: 2021-02-18 | Stop reason: HOSPADM

## 2021-02-18 RX ORDER — ALBUTEROL SULFATE 90 UG/1
AEROSOL, METERED RESPIRATORY (INHALATION)
Status: DISCONTINUED | OUTPATIENT
Start: 2021-02-18 | End: 2021-02-18

## 2021-02-18 RX ORDER — LABETALOL HYDROCHLORIDE 5 MG/ML
10 INJECTION, SOLUTION INTRAVENOUS ONCE
Status: COMPLETED | OUTPATIENT
Start: 2021-02-18 | End: 2021-02-18

## 2021-02-18 RX ORDER — ONDANSETRON 2 MG/ML
4 INJECTION INTRAMUSCULAR; INTRAVENOUS DAILY PRN
Status: DISCONTINUED | OUTPATIENT
Start: 2021-02-18 | End: 2021-02-18 | Stop reason: HOSPADM

## 2021-02-18 RX ORDER — DEXAMETHASONE SODIUM PHOSPHATE 4 MG/ML
INJECTION, SOLUTION INTRA-ARTICULAR; INTRALESIONAL; INTRAMUSCULAR; INTRAVENOUS; SOFT TISSUE
Status: DISCONTINUED | OUTPATIENT
Start: 2021-02-18 | End: 2021-02-18

## 2021-02-18 RX ORDER — NEOSTIGMINE METHYLSULFATE 1 MG/ML
INJECTION, SOLUTION INTRAVENOUS
Status: DISCONTINUED | OUTPATIENT
Start: 2021-02-18 | End: 2021-02-18

## 2021-02-18 RX ORDER — ROCURONIUM BROMIDE 10 MG/ML
INJECTION, SOLUTION INTRAVENOUS
Status: DISCONTINUED | OUTPATIENT
Start: 2021-02-18 | End: 2021-02-18

## 2021-02-18 RX ORDER — LIDOCAINE HYDROCHLORIDE 20 MG/ML
INJECTION INTRAVENOUS
Status: DISCONTINUED | OUTPATIENT
Start: 2021-02-18 | End: 2021-02-18

## 2021-02-18 RX ORDER — SODIUM CHLORIDE, SODIUM LACTATE, POTASSIUM CHLORIDE, CALCIUM CHLORIDE 600; 310; 30; 20 MG/100ML; MG/100ML; MG/100ML; MG/100ML
INJECTION, SOLUTION INTRAVENOUS CONTINUOUS
Status: DISCONTINUED | OUTPATIENT
Start: 2021-02-18 | End: 2021-02-18 | Stop reason: HOSPADM

## 2021-02-18 RX ORDER — AMOXICILLIN AND CLAVULANATE POTASSIUM 875; 125 MG/1; MG/1
1 TABLET, FILM COATED ORAL EVERY 12 HOURS
Qty: 10 TABLET | Refills: 0 | Status: SHIPPED | OUTPATIENT
Start: 2021-02-18 | End: 2021-02-23

## 2021-02-18 RX ORDER — LIDOCAINE HYDROCHLORIDE 40 MG/ML
SOLUTION TOPICAL
Status: DISCONTINUED | OUTPATIENT
Start: 2021-02-18 | End: 2021-02-18 | Stop reason: HOSPADM

## 2021-02-18 RX ORDER — PROPOFOL 10 MG/ML
VIAL (ML) INTRAVENOUS
Status: DISCONTINUED | OUTPATIENT
Start: 2021-02-18 | End: 2021-02-18

## 2021-02-18 RX ORDER — CETIRIZINE HYDROCHLORIDE 10 MG/1
10 TABLET ORAL DAILY
Qty: 30 TABLET | Refills: 3 | Status: SHIPPED | OUTPATIENT
Start: 2021-02-18 | End: 2021-03-20

## 2021-02-18 RX ORDER — ETOMIDATE 2 MG/ML
INJECTION INTRAVENOUS
Status: DISCONTINUED | OUTPATIENT
Start: 2021-02-18 | End: 2021-02-18

## 2021-02-18 RX ORDER — FENTANYL CITRATE 50 UG/ML
INJECTION, SOLUTION INTRAMUSCULAR; INTRAVENOUS
Status: DISCONTINUED | OUTPATIENT
Start: 2021-02-18 | End: 2021-02-18

## 2021-02-18 RX ORDER — ACETAMINOPHEN 500 MG
1000 TABLET ORAL
Status: COMPLETED | OUTPATIENT
Start: 2021-02-18 | End: 2021-02-18

## 2021-02-18 RX ORDER — OXYMETAZOLINE HCL 0.05 %
SPRAY, NON-AEROSOL (ML) NASAL
Status: DISCONTINUED | OUTPATIENT
Start: 2021-02-18 | End: 2021-02-18 | Stop reason: HOSPADM

## 2021-02-18 RX ORDER — IPRATROPIUM BROMIDE AND ALBUTEROL SULFATE 2.5; .5 MG/3ML; MG/3ML
3 SOLUTION RESPIRATORY (INHALATION) ONCE
Status: COMPLETED | OUTPATIENT
Start: 2021-02-18 | End: 2021-02-18

## 2021-02-18 RX ORDER — LIDOCAINE HYDROCHLORIDE 10 MG/ML
1 INJECTION, SOLUTION EPIDURAL; INFILTRATION; INTRACAUDAL; PERINEURAL ONCE
Status: DISCONTINUED | OUTPATIENT
Start: 2021-02-18 | End: 2021-02-18 | Stop reason: HOSPADM

## 2021-02-18 RX ORDER — HYDROMORPHONE HYDROCHLORIDE 2 MG/ML
0.2 INJECTION, SOLUTION INTRAMUSCULAR; INTRAVENOUS; SUBCUTANEOUS EVERY 5 MIN PRN
Status: DISCONTINUED | OUTPATIENT
Start: 2021-02-18 | End: 2021-02-18 | Stop reason: HOSPADM

## 2021-02-18 RX ORDER — HYDROCODONE BITARTRATE AND ACETAMINOPHEN 5; 325 MG/1; MG/1
1 TABLET ORAL EVERY 6 HOURS PRN
Status: DISCONTINUED | OUTPATIENT
Start: 2021-02-18 | End: 2021-02-18 | Stop reason: HOSPADM

## 2021-02-18 RX ADMIN — HYDROCODONE BITARTRATE AND ACETAMINOPHEN 1 TABLET: 5; 325 TABLET ORAL at 02:02

## 2021-02-18 RX ADMIN — PROPOFOL 30 MG: 10 INJECTION, EMULSION INTRAVENOUS at 11:02

## 2021-02-18 RX ADMIN — HYDROMORPHONE HYDROCHLORIDE 0.2 MG: 2 INJECTION INTRAMUSCULAR; INTRAVENOUS; SUBCUTANEOUS at 01:02

## 2021-02-18 RX ADMIN — SODIUM CHLORIDE, SODIUM LACTATE, POTASSIUM CHLORIDE, AND CALCIUM CHLORIDE: .6; .31; .03; .02 INJECTION, SOLUTION INTRAVENOUS at 10:02

## 2021-02-18 RX ADMIN — ONDANSETRON 4 MG: 2 INJECTION, SOLUTION INTRAMUSCULAR; INTRAVENOUS at 11:02

## 2021-02-18 RX ADMIN — Medication 60 MG: at 11:02

## 2021-02-18 RX ADMIN — IPRATROPIUM BROMIDE AND ALBUTEROL SULFATE 3 ML: .5; 3 SOLUTION RESPIRATORY (INHALATION) at 12:02

## 2021-02-18 RX ADMIN — AMPICILLIN SODIUM AND SULBACTAM SODIUM 3 G: 2; 1 INJECTION, POWDER, FOR SOLUTION INTRAMUSCULAR; INTRAVENOUS at 11:02

## 2021-02-18 RX ADMIN — LABETALOL HYDROCHLORIDE 10 MG: 5 INJECTION INTRAVENOUS at 01:02

## 2021-02-18 RX ADMIN — LABETALOL HYDROCHLORIDE 10 MG: 5 INJECTION, SOLUTION INTRAVENOUS at 11:02

## 2021-02-18 RX ADMIN — GLYCOPYRROLATE 0.6 MG: 0.2 INJECTION, SOLUTION INTRAMUSCULAR; INTRAVITREAL at 11:02

## 2021-02-18 RX ADMIN — ROCURONIUM BROMIDE 5 MG: 10 INJECTION, SOLUTION INTRAVENOUS at 11:02

## 2021-02-18 RX ADMIN — FENTANYL CITRATE 50 MCG: 50 INJECTION, SOLUTION INTRAMUSCULAR; INTRAVENOUS at 10:02

## 2021-02-18 RX ADMIN — ROCURONIUM BROMIDE 15 MG: 10 INJECTION, SOLUTION INTRAVENOUS at 11:02

## 2021-02-18 RX ADMIN — SUCCINYLCHOLINE CHLORIDE 100 MG: 20 INJECTION, SOLUTION INTRAMUSCULAR; INTRAVENOUS at 11:02

## 2021-02-18 RX ADMIN — FENTANYL CITRATE 50 MCG: 50 INJECTION, SOLUTION INTRAMUSCULAR; INTRAVENOUS at 11:02

## 2021-02-18 RX ADMIN — ETOMIDATE 12 MCG: 2 INJECTION, SOLUTION INTRAVENOUS at 11:02

## 2021-02-18 RX ADMIN — RACEPINEPHRINE HYDROCHLORIDE 0.5 ML: 11.25 SOLUTION RESPIRATORY (INHALATION) at 12:02

## 2021-02-18 RX ADMIN — DEXAMETHASONE SODIUM PHOSPHATE 8 MG: 4 INJECTION, SOLUTION INTRAMUSCULAR; INTRAVENOUS at 11:02

## 2021-02-18 RX ADMIN — ONDANSETRON 4 MG: 2 INJECTION INTRAMUSCULAR; INTRAVENOUS at 02:02

## 2021-02-18 RX ADMIN — ALBUTEROL SULFATE 2 PUFF: 90 AEROSOL, METERED RESPIRATORY (INHALATION) at 11:02

## 2021-02-18 RX ADMIN — NEOSTIGMINE METHYLSULFATE 4 MG: 1 INJECTION INTRAVENOUS at 11:02

## 2021-02-18 RX ADMIN — ACETAMINOPHEN 1000 MG: 500 TABLET ORAL at 10:02

## 2021-02-19 ENCOUNTER — TELEPHONE (OUTPATIENT)
Dept: OTOLARYNGOLOGY | Facility: CLINIC | Age: 71
End: 2021-02-19

## 2021-02-22 LAB
FINAL PATHOLOGIC DIAGNOSIS: NORMAL
GROSS: NORMAL
Lab: NORMAL

## 2021-02-24 ENCOUNTER — TELEPHONE (OUTPATIENT)
Dept: OTOLARYNGOLOGY | Facility: CLINIC | Age: 71
End: 2021-02-24

## 2021-02-24 ENCOUNTER — OFFICE VISIT (OUTPATIENT)
Dept: OTOLARYNGOLOGY | Facility: CLINIC | Age: 71
End: 2021-02-24
Payer: MEDICARE

## 2021-02-24 VITALS
SYSTOLIC BLOOD PRESSURE: 138 MMHG | TEMPERATURE: 98 F | DIASTOLIC BLOOD PRESSURE: 80 MMHG | WEIGHT: 131.63 LBS | HEIGHT: 66 IN | BODY MASS INDEX: 21.16 KG/M2

## 2021-02-24 DIAGNOSIS — J38.4 LARYNX EDEMA: Primary | ICD-10-CM

## 2021-02-24 DIAGNOSIS — Z85.21 HISTORY OF CANCER OF LARYNX: ICD-10-CM

## 2021-02-24 DIAGNOSIS — C32.9 LARYNGEAL CANCER: Primary | ICD-10-CM

## 2021-02-24 PROCEDURE — 1101F PT FALLS ASSESS-DOCD LE1/YR: CPT | Mod: CPTII,S$GLB,, | Performed by: OTOLARYNGOLOGY

## 2021-02-24 PROCEDURE — 3288F PR FALLS RISK ASSESSMENT DOCUMENTED: ICD-10-PCS | Mod: CPTII,S$GLB,, | Performed by: OTOLARYNGOLOGY

## 2021-02-24 PROCEDURE — 3008F BODY MASS INDEX DOCD: CPT | Mod: CPTII,S$GLB,, | Performed by: OTOLARYNGOLOGY

## 2021-02-24 PROCEDURE — 1101F PR PT FALLS ASSESS DOC 0-1 FALLS W/OUT INJ PAST YR: ICD-10-PCS | Mod: CPTII,S$GLB,, | Performed by: OTOLARYNGOLOGY

## 2021-02-24 PROCEDURE — 3288F FALL RISK ASSESSMENT DOCD: CPT | Mod: CPTII,S$GLB,, | Performed by: OTOLARYNGOLOGY

## 2021-02-24 PROCEDURE — 1126F PR PAIN SEVERITY QUANTIFIED, NO PAIN PRESENT: ICD-10-PCS | Mod: S$GLB,,, | Performed by: OTOLARYNGOLOGY

## 2021-02-24 PROCEDURE — 99024 POSTOP FOLLOW-UP VISIT: CPT | Mod: S$GLB,,, | Performed by: OTOLARYNGOLOGY

## 2021-02-24 PROCEDURE — 99024 PR POST-OP FOLLOW-UP VISIT: ICD-10-PCS | Mod: S$GLB,,, | Performed by: OTOLARYNGOLOGY

## 2021-02-24 PROCEDURE — 1126F AMNT PAIN NOTED NONE PRSNT: CPT | Mod: S$GLB,,, | Performed by: OTOLARYNGOLOGY

## 2021-02-24 PROCEDURE — 3008F PR BODY MASS INDEX (BMI) DOCUMENTED: ICD-10-PCS | Mod: CPTII,S$GLB,, | Performed by: OTOLARYNGOLOGY

## 2021-02-24 RX ORDER — ACETAMINOPHEN 325 MG/1
325 TABLET ORAL EVERY 6 HOURS PRN
Qty: 30 TABLET | Refills: 0 | Status: SHIPPED | OUTPATIENT
Start: 2021-02-24

## 2021-03-05 ENCOUNTER — DOCUMENTATION ONLY (OUTPATIENT)
Dept: HEMATOLOGY/ONCOLOGY | Facility: CLINIC | Age: 71
End: 2021-03-05

## 2021-03-08 ENCOUNTER — TELEPHONE (OUTPATIENT)
Dept: OTOLARYNGOLOGY | Facility: CLINIC | Age: 71
End: 2021-03-08

## 2021-03-08 ENCOUNTER — TELEPHONE (OUTPATIENT)
Dept: HEMATOLOGY/ONCOLOGY | Facility: CLINIC | Age: 71
End: 2021-03-08

## 2021-03-08 DIAGNOSIS — Z85.21 HISTORY OF CANCER OF LARYNX: Primary | ICD-10-CM

## 2021-03-15 ENCOUNTER — HOSPITAL ENCOUNTER (OUTPATIENT)
Dept: RADIOLOGY | Facility: HOSPITAL | Age: 71
Discharge: HOME | End: 2021-03-15
Attending: INTERNAL MEDICINE
Payer: MEDICARE

## 2021-03-15 DIAGNOSIS — Z85.21 HISTORY OF CANCER OF LARYNX: ICD-10-CM

## 2021-03-15 PROCEDURE — 70491 CT NECK CHEST WITH CONTRAST (XPD): ICD-10-PCS | Mod: 26,,, | Performed by: RADIOLOGY

## 2021-03-15 PROCEDURE — 71260 CT NECK CHEST WITH CONTRAST (XPD): ICD-10-PCS | Mod: 26,,, | Performed by: RADIOLOGY

## 2021-03-15 PROCEDURE — 25500020 PHARM REV CODE 255: Performed by: INTERNAL MEDICINE

## 2021-03-15 PROCEDURE — 71260 CT THORAX DX C+: CPT | Mod: TC

## 2021-03-15 PROCEDURE — 70491 CT SOFT TISSUE NECK W/DYE: CPT | Mod: 26,,, | Performed by: RADIOLOGY

## 2021-03-15 PROCEDURE — 71260 CT THORAX DX C+: CPT | Mod: 26,,, | Performed by: RADIOLOGY

## 2021-03-15 RX ADMIN — IOHEXOL 100 ML: 350 INJECTION, SOLUTION INTRAVENOUS at 11:03

## 2021-03-17 ENCOUNTER — CLINICAL SUPPORT (OUTPATIENT)
Dept: HEMATOLOGY/ONCOLOGY | Facility: CLINIC | Age: 71
End: 2021-03-17
Payer: MEDICARE

## 2021-03-17 ENCOUNTER — OFFICE VISIT (OUTPATIENT)
Dept: RADIATION ONCOLOGY | Facility: CLINIC | Age: 71
End: 2021-03-17
Payer: MEDICARE

## 2021-03-17 VITALS
HEART RATE: 79 BPM | RESPIRATION RATE: 19 BRPM | SYSTOLIC BLOOD PRESSURE: 128 MMHG | OXYGEN SATURATION: 97 % | WEIGHT: 128 LBS | HEIGHT: 66 IN | WEIGHT: 128 LBS | BODY MASS INDEX: 20.57 KG/M2 | BODY MASS INDEX: 20.57 KG/M2 | DIASTOLIC BLOOD PRESSURE: 77 MMHG | HEIGHT: 66 IN | TEMPERATURE: 97 F

## 2021-03-17 DIAGNOSIS — Z85.21 HISTORY OF CANCER OF LARYNX: ICD-10-CM

## 2021-03-17 DIAGNOSIS — Z71.3 NUTRITIONAL COUNSELING: Primary | ICD-10-CM

## 2021-03-17 DIAGNOSIS — R63.4 WEIGHT LOSS, UNINTENTIONAL: ICD-10-CM

## 2021-03-17 DIAGNOSIS — Z85.21 HISTORY OF CANCER OF LARYNX: Primary | ICD-10-CM

## 2021-03-17 PROCEDURE — 99213 OFFICE O/P EST LOW 20 MIN: CPT | Mod: S$GLB,,, | Performed by: RADIOLOGY

## 2021-03-17 PROCEDURE — 3078F PR MOST RECENT DIASTOLIC BLOOD PRESSURE < 80 MM HG: ICD-10-PCS | Mod: CPTII,S$GLB,, | Performed by: RADIOLOGY

## 2021-03-17 PROCEDURE — 99999 PR PBB SHADOW E&M-EST. PATIENT-LVL II: CPT | Mod: PBBFAC,,, | Performed by: DIETITIAN, REGISTERED

## 2021-03-17 PROCEDURE — 99499 RISK ADDL DX/OHS AUDIT: ICD-10-PCS | Mod: S$GLB,,, | Performed by: RADIOLOGY

## 2021-03-17 PROCEDURE — 99499 UNLISTED E&M SERVICE: CPT | Mod: S$GLB,,, | Performed by: RADIOLOGY

## 2021-03-17 PROCEDURE — 99213 PR OFFICE/OUTPT VISIT, EST, LEVL III, 20-29 MIN: ICD-10-PCS | Mod: S$GLB,,, | Performed by: RADIOLOGY

## 2021-03-17 PROCEDURE — 3008F BODY MASS INDEX DOCD: CPT | Mod: CPTII,S$GLB,, | Performed by: RADIOLOGY

## 2021-03-17 PROCEDURE — 99999 PR PBB SHADOW E&M-EST. PATIENT-LVL II: ICD-10-PCS | Mod: PBBFAC,,, | Performed by: DIETITIAN, REGISTERED

## 2021-03-17 PROCEDURE — 97803 MED NUTRITION INDIV SUBSEQ: CPT | Mod: S$GLB,,, | Performed by: DIETITIAN, REGISTERED

## 2021-03-17 PROCEDURE — 99999 PR PBB SHADOW E&M-EST. PATIENT-LVL III: ICD-10-PCS | Mod: PBBFAC,,, | Performed by: RADIOLOGY

## 2021-03-17 PROCEDURE — 3288F PR FALLS RISK ASSESSMENT DOCUMENTED: ICD-10-PCS | Mod: CPTII,S$GLB,, | Performed by: RADIOLOGY

## 2021-03-17 PROCEDURE — 3288F FALL RISK ASSESSMENT DOCD: CPT | Mod: CPTII,S$GLB,, | Performed by: RADIOLOGY

## 2021-03-17 PROCEDURE — 3074F PR MOST RECENT SYSTOLIC BLOOD PRESSURE < 130 MM HG: ICD-10-PCS | Mod: CPTII,S$GLB,, | Performed by: RADIOLOGY

## 2021-03-17 PROCEDURE — 3074F SYST BP LT 130 MM HG: CPT | Mod: CPTII,S$GLB,, | Performed by: RADIOLOGY

## 2021-03-17 PROCEDURE — 1101F PR PT FALLS ASSESS DOC 0-1 FALLS W/OUT INJ PAST YR: ICD-10-PCS | Mod: CPTII,S$GLB,, | Performed by: RADIOLOGY

## 2021-03-17 PROCEDURE — 99999 PR PBB SHADOW E&M-EST. PATIENT-LVL III: CPT | Mod: PBBFAC,,, | Performed by: RADIOLOGY

## 2021-03-17 PROCEDURE — 1159F PR MEDICATION LIST DOCUMENTED IN MEDICAL RECORD: ICD-10-PCS | Mod: S$GLB,,, | Performed by: RADIOLOGY

## 2021-03-17 PROCEDURE — 1126F PR PAIN SEVERITY QUANTIFIED, NO PAIN PRESENT: ICD-10-PCS | Mod: S$GLB,,, | Performed by: RADIOLOGY

## 2021-03-17 PROCEDURE — 3008F PR BODY MASS INDEX (BMI) DOCUMENTED: ICD-10-PCS | Mod: CPTII,S$GLB,, | Performed by: RADIOLOGY

## 2021-03-17 PROCEDURE — 97803 PR MED NUTR THER, SUBSQ, INDIV, EA 15 MIN: ICD-10-PCS | Mod: S$GLB,,, | Performed by: DIETITIAN, REGISTERED

## 2021-03-17 PROCEDURE — 1101F PT FALLS ASSESS-DOCD LE1/YR: CPT | Mod: CPTII,S$GLB,, | Performed by: RADIOLOGY

## 2021-03-17 PROCEDURE — 1126F AMNT PAIN NOTED NONE PRSNT: CPT | Mod: S$GLB,,, | Performed by: RADIOLOGY

## 2021-03-17 PROCEDURE — 1159F MED LIST DOCD IN RCRD: CPT | Mod: S$GLB,,, | Performed by: RADIOLOGY

## 2021-03-17 PROCEDURE — 3078F DIAST BP <80 MM HG: CPT | Mod: CPTII,S$GLB,, | Performed by: RADIOLOGY

## 2021-03-18 ENCOUNTER — OFFICE VISIT (OUTPATIENT)
Dept: HEMATOLOGY/ONCOLOGY | Facility: CLINIC | Age: 71
End: 2021-03-18
Payer: MEDICARE

## 2021-03-18 VITALS — WEIGHT: 128.75 LBS | HEIGHT: 66 IN | BODY MASS INDEX: 20.69 KG/M2

## 2021-03-18 DIAGNOSIS — Z85.118 HISTORY OF LUNG CANCER: ICD-10-CM

## 2021-03-18 DIAGNOSIS — Z85.21 HISTORY OF CANCER OF LARYNX: Primary | ICD-10-CM

## 2021-03-18 PROBLEM — Z93.1 GASTROSTOMY IN PLACE: Status: RESOLVED | Noted: 2020-07-27 | Resolved: 2021-03-18

## 2021-03-18 PROCEDURE — 1159F MED LIST DOCD IN RCRD: CPT | Mod: S$GLB,,, | Performed by: INTERNAL MEDICINE

## 2021-03-18 PROCEDURE — 1101F PT FALLS ASSESS-DOCD LE1/YR: CPT | Mod: CPTII,S$GLB,, | Performed by: INTERNAL MEDICINE

## 2021-03-18 PROCEDURE — 3288F PR FALLS RISK ASSESSMENT DOCUMENTED: ICD-10-PCS | Mod: CPTII,S$GLB,, | Performed by: INTERNAL MEDICINE

## 2021-03-18 PROCEDURE — 3008F BODY MASS INDEX DOCD: CPT | Mod: CPTII,S$GLB,, | Performed by: INTERNAL MEDICINE

## 2021-03-18 PROCEDURE — 1101F PR PT FALLS ASSESS DOC 0-1 FALLS W/OUT INJ PAST YR: ICD-10-PCS | Mod: CPTII,S$GLB,, | Performed by: INTERNAL MEDICINE

## 2021-03-18 PROCEDURE — 99499 UNLISTED E&M SERVICE: CPT | Mod: S$GLB,,, | Performed by: INTERNAL MEDICINE

## 2021-03-18 PROCEDURE — 99213 OFFICE O/P EST LOW 20 MIN: CPT | Mod: S$GLB,,, | Performed by: INTERNAL MEDICINE

## 2021-03-18 PROCEDURE — 99999 PR PBB SHADOW E&M-EST. PATIENT-LVL III: CPT | Mod: PBBFAC,,, | Performed by: INTERNAL MEDICINE

## 2021-03-18 PROCEDURE — 1126F PR PAIN SEVERITY QUANTIFIED, NO PAIN PRESENT: ICD-10-PCS | Mod: S$GLB,,, | Performed by: INTERNAL MEDICINE

## 2021-03-18 PROCEDURE — 3008F PR BODY MASS INDEX (BMI) DOCUMENTED: ICD-10-PCS | Mod: CPTII,S$GLB,, | Performed by: INTERNAL MEDICINE

## 2021-03-18 PROCEDURE — 1126F AMNT PAIN NOTED NONE PRSNT: CPT | Mod: S$GLB,,, | Performed by: INTERNAL MEDICINE

## 2021-03-18 PROCEDURE — 3288F FALL RISK ASSESSMENT DOCD: CPT | Mod: CPTII,S$GLB,, | Performed by: INTERNAL MEDICINE

## 2021-03-18 PROCEDURE — 1159F PR MEDICATION LIST DOCUMENTED IN MEDICAL RECORD: ICD-10-PCS | Mod: S$GLB,,, | Performed by: INTERNAL MEDICINE

## 2021-03-18 PROCEDURE — 99999 PR PBB SHADOW E&M-EST. PATIENT-LVL III: ICD-10-PCS | Mod: PBBFAC,,, | Performed by: INTERNAL MEDICINE

## 2021-03-18 PROCEDURE — 99499 RISK ADDL DX/OHS AUDIT: ICD-10-PCS | Mod: S$GLB,,, | Performed by: INTERNAL MEDICINE

## 2021-03-18 PROCEDURE — 99213 PR OFFICE/OUTPT VISIT, EST, LEVL III, 20-29 MIN: ICD-10-PCS | Mod: S$GLB,,, | Performed by: INTERNAL MEDICINE

## 2021-04-05 ENCOUNTER — TELEPHONE (OUTPATIENT)
Dept: HEMATOLOGY/ONCOLOGY | Facility: CLINIC | Age: 71
End: 2021-04-05

## 2021-04-06 ENCOUNTER — PATIENT MESSAGE (OUTPATIENT)
Dept: ADMINISTRATIVE | Facility: HOSPITAL | Age: 71
End: 2021-04-06

## 2021-04-08 ENCOUNTER — OFFICE VISIT (OUTPATIENT)
Dept: OTOLARYNGOLOGY | Facility: CLINIC | Age: 71
End: 2021-04-08
Payer: MEDICARE

## 2021-04-08 VITALS
DIASTOLIC BLOOD PRESSURE: 80 MMHG | HEIGHT: 66 IN | WEIGHT: 129.06 LBS | TEMPERATURE: 98 F | SYSTOLIC BLOOD PRESSURE: 120 MMHG | BODY MASS INDEX: 20.74 KG/M2

## 2021-04-08 DIAGNOSIS — J30.2 SEASONAL ALLERGIC RHINITIS, UNSPECIFIED TRIGGER: Primary | ICD-10-CM

## 2021-04-08 DIAGNOSIS — Z85.21 HISTORY OF CANCER OF LARYNX: ICD-10-CM

## 2021-04-08 DIAGNOSIS — Z85.118 HISTORY OF LUNG CANCER: ICD-10-CM

## 2021-04-08 DIAGNOSIS — J38.4 LARYNX EDEMA: ICD-10-CM

## 2021-04-08 PROCEDURE — 99214 OFFICE O/P EST MOD 30 MIN: CPT | Mod: 25,S$GLB,, | Performed by: OTOLARYNGOLOGY

## 2021-04-08 PROCEDURE — 3008F BODY MASS INDEX DOCD: CPT | Mod: CPTII,S$GLB,, | Performed by: OTOLARYNGOLOGY

## 2021-04-08 PROCEDURE — 1159F MED LIST DOCD IN RCRD: CPT | Mod: S$GLB,,, | Performed by: OTOLARYNGOLOGY

## 2021-04-08 PROCEDURE — 99214 PR OFFICE/OUTPT VISIT, EST, LEVL IV, 30-39 MIN: ICD-10-PCS | Mod: 25,S$GLB,, | Performed by: OTOLARYNGOLOGY

## 2021-04-08 PROCEDURE — 31575 PR LARYNGOSCOPY, FLEXIBLE; DIAGNOSTIC: ICD-10-PCS | Mod: S$GLB,,, | Performed by: OTOLARYNGOLOGY

## 2021-04-08 PROCEDURE — 31575 DIAGNOSTIC LARYNGOSCOPY: CPT | Mod: S$GLB,,, | Performed by: OTOLARYNGOLOGY

## 2021-04-08 PROCEDURE — 1126F AMNT PAIN NOTED NONE PRSNT: CPT | Mod: S$GLB,,, | Performed by: OTOLARYNGOLOGY

## 2021-04-08 PROCEDURE — 3008F PR BODY MASS INDEX (BMI) DOCUMENTED: ICD-10-PCS | Mod: CPTII,S$GLB,, | Performed by: OTOLARYNGOLOGY

## 2021-04-08 PROCEDURE — 1159F PR MEDICATION LIST DOCUMENTED IN MEDICAL RECORD: ICD-10-PCS | Mod: S$GLB,,, | Performed by: OTOLARYNGOLOGY

## 2021-04-08 PROCEDURE — 1126F PR PAIN SEVERITY QUANTIFIED, NO PAIN PRESENT: ICD-10-PCS | Mod: S$GLB,,, | Performed by: OTOLARYNGOLOGY

## 2021-04-08 RX ORDER — FLUTICASONE PROPIONATE 50 MCG
1 SPRAY, SUSPENSION (ML) NASAL 2 TIMES DAILY
Qty: 18.2 ML | Refills: 3 | Status: SHIPPED | OUTPATIENT
Start: 2021-04-08 | End: 2023-02-14

## 2021-04-08 RX ORDER — AZELASTINE 1 MG/ML
1 SPRAY, METERED NASAL 2 TIMES DAILY
Qty: 30 ML | Refills: 3 | Status: SHIPPED | OUTPATIENT
Start: 2021-04-08

## 2021-04-16 ENCOUNTER — PATIENT MESSAGE (OUTPATIENT)
Dept: RESEARCH | Facility: HOSPITAL | Age: 71
End: 2021-04-16

## 2021-05-05 ENCOUNTER — CLINICAL SUPPORT (OUTPATIENT)
Dept: HEMATOLOGY/ONCOLOGY | Facility: CLINIC | Age: 71
End: 2021-05-05
Payer: MEDICARE

## 2021-05-05 VITALS — WEIGHT: 133.19 LBS | BODY MASS INDEX: 21.4 KG/M2 | HEIGHT: 66 IN

## 2021-05-05 DIAGNOSIS — Z71.3 NUTRITIONAL COUNSELING: Primary | ICD-10-CM

## 2021-05-05 DIAGNOSIS — R63.4 WEIGHT LOSS, UNINTENTIONAL: ICD-10-CM

## 2021-05-05 DIAGNOSIS — Z85.21 HISTORY OF CANCER OF LARYNX: ICD-10-CM

## 2021-05-05 DIAGNOSIS — Z85.118 HISTORY OF LUNG CANCER: ICD-10-CM

## 2021-05-05 PROCEDURE — 99999 PR PBB SHADOW E&M-EST. PATIENT-LVL II: ICD-10-PCS | Mod: PBBFAC,,, | Performed by: DIETITIAN, REGISTERED

## 2021-05-05 PROCEDURE — 97803 MED NUTRITION INDIV SUBSEQ: CPT | Mod: S$GLB,,, | Performed by: DIETITIAN, REGISTERED

## 2021-05-05 PROCEDURE — 97803 PR MED NUTR THER, SUBSQ, INDIV, EA 15 MIN: ICD-10-PCS | Mod: S$GLB,,, | Performed by: DIETITIAN, REGISTERED

## 2021-05-05 PROCEDURE — 99999 PR PBB SHADOW E&M-EST. PATIENT-LVL II: CPT | Mod: PBBFAC,,, | Performed by: DIETITIAN, REGISTERED

## 2021-05-17 ENCOUNTER — OFFICE VISIT (OUTPATIENT)
Dept: OTOLARYNGOLOGY | Facility: CLINIC | Age: 71
End: 2021-05-17
Payer: MEDICARE

## 2021-05-17 ENCOUNTER — PATIENT OUTREACH (OUTPATIENT)
Dept: ADMINISTRATIVE | Facility: OTHER | Age: 71
End: 2021-05-17

## 2021-05-17 VITALS
SYSTOLIC BLOOD PRESSURE: 118 MMHG | BODY MASS INDEX: 21.11 KG/M2 | DIASTOLIC BLOOD PRESSURE: 70 MMHG | HEIGHT: 66 IN | WEIGHT: 131.38 LBS | TEMPERATURE: 98 F

## 2021-05-17 DIAGNOSIS — J02.9 SORE THROAT: ICD-10-CM

## 2021-05-17 DIAGNOSIS — Z85.21 HISTORY OF CANCER OF LARYNX: Primary | ICD-10-CM

## 2021-05-17 DIAGNOSIS — J30.2 SEASONAL ALLERGIC RHINITIS, UNSPECIFIED TRIGGER: ICD-10-CM

## 2021-05-17 DIAGNOSIS — J38.4 LARYNX EDEMA: ICD-10-CM

## 2021-05-17 PROCEDURE — 3008F BODY MASS INDEX DOCD: CPT | Mod: CPTII,S$GLB,, | Performed by: OTOLARYNGOLOGY

## 2021-05-17 PROCEDURE — 3008F PR BODY MASS INDEX (BMI) DOCUMENTED: ICD-10-PCS | Mod: CPTII,S$GLB,, | Performed by: OTOLARYNGOLOGY

## 2021-05-17 PROCEDURE — 1159F PR MEDICATION LIST DOCUMENTED IN MEDICAL RECORD: ICD-10-PCS | Mod: S$GLB,,, | Performed by: OTOLARYNGOLOGY

## 2021-05-17 PROCEDURE — 1101F PT FALLS ASSESS-DOCD LE1/YR: CPT | Mod: CPTII,S$GLB,, | Performed by: OTOLARYNGOLOGY

## 2021-05-17 PROCEDURE — 3288F FALL RISK ASSESSMENT DOCD: CPT | Mod: CPTII,S$GLB,, | Performed by: OTOLARYNGOLOGY

## 2021-05-17 PROCEDURE — 31575 PR LARYNGOSCOPY, FLEXIBLE; DIAGNOSTIC: ICD-10-PCS | Mod: S$GLB,,, | Performed by: OTOLARYNGOLOGY

## 2021-05-17 PROCEDURE — 3288F PR FALLS RISK ASSESSMENT DOCUMENTED: ICD-10-PCS | Mod: CPTII,S$GLB,, | Performed by: OTOLARYNGOLOGY

## 2021-05-17 PROCEDURE — 1159F MED LIST DOCD IN RCRD: CPT | Mod: S$GLB,,, | Performed by: OTOLARYNGOLOGY

## 2021-05-17 PROCEDURE — 1126F PR PAIN SEVERITY QUANTIFIED, NO PAIN PRESENT: ICD-10-PCS | Mod: S$GLB,,, | Performed by: OTOLARYNGOLOGY

## 2021-05-17 PROCEDURE — 99213 OFFICE O/P EST LOW 20 MIN: CPT | Mod: 25,S$GLB,, | Performed by: OTOLARYNGOLOGY

## 2021-05-17 PROCEDURE — 99499 RISK ADDL DX/OHS AUDIT: ICD-10-PCS | Mod: S$GLB,,, | Performed by: OTOLARYNGOLOGY

## 2021-05-17 PROCEDURE — 99499 UNLISTED E&M SERVICE: CPT | Mod: S$GLB,,, | Performed by: OTOLARYNGOLOGY

## 2021-05-17 PROCEDURE — 99213 PR OFFICE/OUTPT VISIT, EST, LEVL III, 20-29 MIN: ICD-10-PCS | Mod: 25,S$GLB,, | Performed by: OTOLARYNGOLOGY

## 2021-05-17 PROCEDURE — 1126F AMNT PAIN NOTED NONE PRSNT: CPT | Mod: S$GLB,,, | Performed by: OTOLARYNGOLOGY

## 2021-05-17 PROCEDURE — 31575 DIAGNOSTIC LARYNGOSCOPY: CPT | Mod: S$GLB,,, | Performed by: OTOLARYNGOLOGY

## 2021-05-17 PROCEDURE — 1101F PR PT FALLS ASSESS DOC 0-1 FALLS W/OUT INJ PAST YR: ICD-10-PCS | Mod: CPTII,S$GLB,, | Performed by: OTOLARYNGOLOGY

## 2021-06-16 ENCOUNTER — OFFICE VISIT (OUTPATIENT)
Dept: RADIATION ONCOLOGY | Facility: CLINIC | Age: 71
End: 2021-06-16
Payer: MEDICARE

## 2021-06-16 ENCOUNTER — CLINICAL SUPPORT (OUTPATIENT)
Dept: HEMATOLOGY/ONCOLOGY | Facility: CLINIC | Age: 71
End: 2021-06-16
Payer: MEDICARE

## 2021-06-16 VITALS — WEIGHT: 129 LBS | BODY MASS INDEX: 20.73 KG/M2 | HEIGHT: 66 IN

## 2021-06-16 VITALS
DIASTOLIC BLOOD PRESSURE: 70 MMHG | SYSTOLIC BLOOD PRESSURE: 116 MMHG | WEIGHT: 129.69 LBS | RESPIRATION RATE: 17 BRPM | HEART RATE: 70 BPM | BODY MASS INDEX: 20.93 KG/M2

## 2021-06-16 DIAGNOSIS — Z85.118 HISTORY OF LUNG CANCER: ICD-10-CM

## 2021-06-16 DIAGNOSIS — Z71.3 NUTRITIONAL COUNSELING: ICD-10-CM

## 2021-06-16 DIAGNOSIS — Z85.21 HISTORY OF CANCER OF LARYNX: ICD-10-CM

## 2021-06-16 DIAGNOSIS — Z85.21 HISTORY OF CANCER OF LARYNX: Primary | ICD-10-CM

## 2021-06-16 DIAGNOSIS — R63.4 WEIGHT LOSS, NON-INTENTIONAL: ICD-10-CM

## 2021-06-16 PROCEDURE — 97803 MED NUTRITION INDIV SUBSEQ: CPT | Mod: S$GLB,,, | Performed by: DIETITIAN, REGISTERED

## 2021-06-16 PROCEDURE — 1101F PR PT FALLS ASSESS DOC 0-1 FALLS W/OUT INJ PAST YR: ICD-10-PCS | Mod: CPTII,S$GLB,, | Performed by: RADIOLOGY

## 2021-06-16 PROCEDURE — 1159F MED LIST DOCD IN RCRD: CPT | Mod: S$GLB,,, | Performed by: RADIOLOGY

## 2021-06-16 PROCEDURE — 1101F PT FALLS ASSESS-DOCD LE1/YR: CPT | Mod: CPTII,S$GLB,, | Performed by: RADIOLOGY

## 2021-06-16 PROCEDURE — 99999 PR PBB SHADOW E&M-EST. PATIENT-LVL I: ICD-10-PCS | Mod: PBBFAC,,, | Performed by: DIETITIAN, REGISTERED

## 2021-06-16 PROCEDURE — 99499 UNLISTED E&M SERVICE: CPT | Mod: S$GLB,,, | Performed by: RADIOLOGY

## 2021-06-16 PROCEDURE — 3008F PR BODY MASS INDEX (BMI) DOCUMENTED: ICD-10-PCS | Mod: CPTII,S$GLB,, | Performed by: RADIOLOGY

## 2021-06-16 PROCEDURE — 99999 PR PBB SHADOW E&M-EST. PATIENT-LVL III: CPT | Mod: PBBFAC,,, | Performed by: RADIOLOGY

## 2021-06-16 PROCEDURE — 97803 PR MED NUTR THER, SUBSQ, INDIV, EA 15 MIN: ICD-10-PCS | Mod: S$GLB,,, | Performed by: DIETITIAN, REGISTERED

## 2021-06-16 PROCEDURE — 1159F PR MEDICATION LIST DOCUMENTED IN MEDICAL RECORD: ICD-10-PCS | Mod: S$GLB,,, | Performed by: RADIOLOGY

## 2021-06-16 PROCEDURE — 3288F FALL RISK ASSESSMENT DOCD: CPT | Mod: CPTII,S$GLB,, | Performed by: RADIOLOGY

## 2021-06-16 PROCEDURE — 3008F BODY MASS INDEX DOCD: CPT | Mod: CPTII,S$GLB,, | Performed by: RADIOLOGY

## 2021-06-16 PROCEDURE — 99213 PR OFFICE/OUTPT VISIT, EST, LEVL III, 20-29 MIN: ICD-10-PCS | Mod: S$GLB,,, | Performed by: RADIOLOGY

## 2021-06-16 PROCEDURE — 99213 OFFICE O/P EST LOW 20 MIN: CPT | Mod: S$GLB,,, | Performed by: RADIOLOGY

## 2021-06-16 PROCEDURE — 3288F PR FALLS RISK ASSESSMENT DOCUMENTED: ICD-10-PCS | Mod: CPTII,S$GLB,, | Performed by: RADIOLOGY

## 2021-06-16 PROCEDURE — 99999 PR PBB SHADOW E&M-EST. PATIENT-LVL I: CPT | Mod: PBBFAC,,, | Performed by: DIETITIAN, REGISTERED

## 2021-06-16 PROCEDURE — 99999 PR PBB SHADOW E&M-EST. PATIENT-LVL III: ICD-10-PCS | Mod: PBBFAC,,, | Performed by: RADIOLOGY

## 2021-06-16 PROCEDURE — 99499 RISK ADDL DX/OHS AUDIT: ICD-10-PCS | Mod: S$GLB,,, | Performed by: RADIOLOGY

## 2021-06-21 ENCOUNTER — HOSPITAL ENCOUNTER (OUTPATIENT)
Dept: RADIOLOGY | Facility: HOSPITAL | Age: 71
Discharge: HOME | End: 2021-06-21
Attending: INTERNAL MEDICINE
Payer: MEDICARE

## 2021-06-21 ENCOUNTER — OFFICE VISIT (OUTPATIENT)
Dept: OTOLARYNGOLOGY | Facility: CLINIC | Age: 71
End: 2021-06-21
Payer: MEDICARE

## 2021-06-21 VITALS
HEIGHT: 66 IN | WEIGHT: 130.88 LBS | DIASTOLIC BLOOD PRESSURE: 60 MMHG | SYSTOLIC BLOOD PRESSURE: 104 MMHG | BODY MASS INDEX: 21.04 KG/M2

## 2021-06-21 DIAGNOSIS — J30.2 SEASONAL ALLERGIC RHINITIS, UNSPECIFIED TRIGGER: ICD-10-CM

## 2021-06-21 DIAGNOSIS — Z85.21 HISTORY OF CANCER OF LARYNX: Primary | ICD-10-CM

## 2021-06-21 DIAGNOSIS — Z85.118 HISTORY OF LUNG CANCER: ICD-10-CM

## 2021-06-21 DIAGNOSIS — J38.4 LARYNX EDEMA: ICD-10-CM

## 2021-06-21 DIAGNOSIS — Z85.21 HISTORY OF CANCER OF LARYNX: ICD-10-CM

## 2021-06-21 DIAGNOSIS — Z12.11 SPECIAL SCREENING FOR MALIGNANT NEOPLASM OF COLON: Primary | ICD-10-CM

## 2021-06-21 PROCEDURE — 99213 PR OFFICE/OUTPT VISIT, EST, LEVL III, 20-29 MIN: ICD-10-PCS | Mod: 25,S$GLB,, | Performed by: OTOLARYNGOLOGY

## 2021-06-21 PROCEDURE — 71260 CT NECK CHEST WITH CONTRAST (XPD): ICD-10-PCS | Mod: 26,,, | Performed by: RADIOLOGY

## 2021-06-21 PROCEDURE — 99213 OFFICE O/P EST LOW 20 MIN: CPT | Mod: 25,S$GLB,, | Performed by: OTOLARYNGOLOGY

## 2021-06-21 PROCEDURE — 71260 CT THORAX DX C+: CPT | Mod: 26,,, | Performed by: RADIOLOGY

## 2021-06-21 PROCEDURE — 1101F PR PT FALLS ASSESS DOC 0-1 FALLS W/OUT INJ PAST YR: ICD-10-PCS | Mod: CPTII,S$GLB,, | Performed by: OTOLARYNGOLOGY

## 2021-06-21 PROCEDURE — 31575 PR LARYNGOSCOPY, FLEXIBLE; DIAGNOSTIC: ICD-10-PCS | Mod: S$GLB,,, | Performed by: OTOLARYNGOLOGY

## 2021-06-21 PROCEDURE — 3288F PR FALLS RISK ASSESSMENT DOCUMENTED: ICD-10-PCS | Mod: CPTII,S$GLB,, | Performed by: OTOLARYNGOLOGY

## 2021-06-21 PROCEDURE — 70491 CT NECK CHEST WITH CONTRAST (XPD): ICD-10-PCS | Mod: 26,,, | Performed by: RADIOLOGY

## 2021-06-21 PROCEDURE — 3008F BODY MASS INDEX DOCD: CPT | Mod: CPTII,S$GLB,, | Performed by: OTOLARYNGOLOGY

## 2021-06-21 PROCEDURE — 1159F MED LIST DOCD IN RCRD: CPT | Mod: S$GLB,,, | Performed by: OTOLARYNGOLOGY

## 2021-06-21 PROCEDURE — 1126F PR PAIN SEVERITY QUANTIFIED, NO PAIN PRESENT: ICD-10-PCS | Mod: S$GLB,,, | Performed by: OTOLARYNGOLOGY

## 2021-06-21 PROCEDURE — 31575 DIAGNOSTIC LARYNGOSCOPY: CPT | Mod: S$GLB,,, | Performed by: OTOLARYNGOLOGY

## 2021-06-21 PROCEDURE — 3008F PR BODY MASS INDEX (BMI) DOCUMENTED: ICD-10-PCS | Mod: CPTII,S$GLB,, | Performed by: OTOLARYNGOLOGY

## 2021-06-21 PROCEDURE — 71260 CT THORAX DX C+: CPT | Mod: TC

## 2021-06-21 PROCEDURE — 25500020 PHARM REV CODE 255: Performed by: INTERNAL MEDICINE

## 2021-06-21 PROCEDURE — 70491 CT SOFT TISSUE NECK W/DYE: CPT | Mod: 26,,, | Performed by: RADIOLOGY

## 2021-06-21 PROCEDURE — 1126F AMNT PAIN NOTED NONE PRSNT: CPT | Mod: S$GLB,,, | Performed by: OTOLARYNGOLOGY

## 2021-06-21 PROCEDURE — 1159F PR MEDICATION LIST DOCUMENTED IN MEDICAL RECORD: ICD-10-PCS | Mod: S$GLB,,, | Performed by: OTOLARYNGOLOGY

## 2021-06-21 PROCEDURE — 1101F PT FALLS ASSESS-DOCD LE1/YR: CPT | Mod: CPTII,S$GLB,, | Performed by: OTOLARYNGOLOGY

## 2021-06-21 PROCEDURE — 3288F FALL RISK ASSESSMENT DOCD: CPT | Mod: CPTII,S$GLB,, | Performed by: OTOLARYNGOLOGY

## 2021-06-21 RX ADMIN — IOHEXOL 75 ML: 350 INJECTION, SOLUTION INTRAVENOUS at 11:06

## 2021-06-22 ENCOUNTER — OFFICE VISIT (OUTPATIENT)
Dept: HEMATOLOGY/ONCOLOGY | Facility: CLINIC | Age: 71
End: 2021-06-22
Payer: MEDICARE

## 2021-06-22 VITALS
DIASTOLIC BLOOD PRESSURE: 77 MMHG | OXYGEN SATURATION: 99 % | SYSTOLIC BLOOD PRESSURE: 127 MMHG | WEIGHT: 128.5 LBS | BODY MASS INDEX: 20.65 KG/M2 | HEART RATE: 71 BPM | HEIGHT: 66 IN | TEMPERATURE: 98 F

## 2021-06-22 DIAGNOSIS — Z85.118 HISTORY OF LUNG CANCER: ICD-10-CM

## 2021-06-22 DIAGNOSIS — E03.9 HYPOTHYROIDISM, UNSPECIFIED TYPE: ICD-10-CM

## 2021-06-22 DIAGNOSIS — Z85.21 HISTORY OF CANCER OF LARYNX: Primary | ICD-10-CM

## 2021-06-22 DIAGNOSIS — I70.0 ATHEROSCLEROSIS OF AORTA: ICD-10-CM

## 2021-06-22 DIAGNOSIS — J43.8 OTHER EMPHYSEMA: ICD-10-CM

## 2021-06-22 PROCEDURE — 3288F FALL RISK ASSESSMENT DOCD: CPT | Mod: CPTII,S$GLB,, | Performed by: INTERNAL MEDICINE

## 2021-06-22 PROCEDURE — 3008F PR BODY MASS INDEX (BMI) DOCUMENTED: ICD-10-PCS | Mod: CPTII,S$GLB,, | Performed by: INTERNAL MEDICINE

## 2021-06-22 PROCEDURE — 1101F PR PT FALLS ASSESS DOC 0-1 FALLS W/OUT INJ PAST YR: ICD-10-PCS | Mod: CPTII,S$GLB,, | Performed by: INTERNAL MEDICINE

## 2021-06-22 PROCEDURE — 99499 RISK ADDL DX/OHS AUDIT: ICD-10-PCS | Mod: S$GLB,,, | Performed by: INTERNAL MEDICINE

## 2021-06-22 PROCEDURE — 99499 UNLISTED E&M SERVICE: CPT | Mod: S$GLB,,, | Performed by: INTERNAL MEDICINE

## 2021-06-22 PROCEDURE — 99214 OFFICE O/P EST MOD 30 MIN: CPT | Mod: S$GLB,,, | Performed by: INTERNAL MEDICINE

## 2021-06-22 PROCEDURE — 99999 PR PBB SHADOW E&M-EST. PATIENT-LVL III: CPT | Mod: PBBFAC,,, | Performed by: INTERNAL MEDICINE

## 2021-06-22 PROCEDURE — 1159F PR MEDICATION LIST DOCUMENTED IN MEDICAL RECORD: ICD-10-PCS | Mod: S$GLB,,, | Performed by: INTERNAL MEDICINE

## 2021-06-22 PROCEDURE — 99999 PR PBB SHADOW E&M-EST. PATIENT-LVL III: ICD-10-PCS | Mod: PBBFAC,,, | Performed by: INTERNAL MEDICINE

## 2021-06-22 PROCEDURE — 3008F BODY MASS INDEX DOCD: CPT | Mod: CPTII,S$GLB,, | Performed by: INTERNAL MEDICINE

## 2021-06-22 PROCEDURE — 1126F AMNT PAIN NOTED NONE PRSNT: CPT | Mod: S$GLB,,, | Performed by: INTERNAL MEDICINE

## 2021-06-22 PROCEDURE — 1101F PT FALLS ASSESS-DOCD LE1/YR: CPT | Mod: CPTII,S$GLB,, | Performed by: INTERNAL MEDICINE

## 2021-06-22 PROCEDURE — 1126F PR PAIN SEVERITY QUANTIFIED, NO PAIN PRESENT: ICD-10-PCS | Mod: S$GLB,,, | Performed by: INTERNAL MEDICINE

## 2021-06-22 PROCEDURE — 3288F PR FALLS RISK ASSESSMENT DOCUMENTED: ICD-10-PCS | Mod: CPTII,S$GLB,, | Performed by: INTERNAL MEDICINE

## 2021-06-22 PROCEDURE — 1159F MED LIST DOCD IN RCRD: CPT | Mod: S$GLB,,, | Performed by: INTERNAL MEDICINE

## 2021-06-22 PROCEDURE — 99214 PR OFFICE/OUTPT VISIT, EST, LEVL IV, 30-39 MIN: ICD-10-PCS | Mod: S$GLB,,, | Performed by: INTERNAL MEDICINE

## 2021-07-06 ENCOUNTER — PATIENT MESSAGE (OUTPATIENT)
Dept: ADMINISTRATIVE | Facility: HOSPITAL | Age: 71
End: 2021-07-06

## 2021-08-02 DIAGNOSIS — Z85.21 HISTORY OF CANCER OF LARYNX: Primary | ICD-10-CM

## 2021-08-09 ENCOUNTER — DOCUMENTATION ONLY (OUTPATIENT)
Dept: HEMATOLOGY/ONCOLOGY | Facility: CLINIC | Age: 71
End: 2021-08-09

## 2021-08-13 ENCOUNTER — LAB VISIT (OUTPATIENT)
Dept: FAMILY MEDICINE | Facility: CLINIC | Age: 71
End: 2021-08-13
Payer: MEDICARE

## 2021-08-13 DIAGNOSIS — Z85.21 HISTORY OF CANCER OF LARYNX: ICD-10-CM

## 2021-08-13 PROCEDURE — U0005 INFEC AGEN DETEC AMPLI PROBE: HCPCS | Performed by: OTOLARYNGOLOGY

## 2021-08-13 PROCEDURE — U0003 INFECTIOUS AGENT DETECTION BY NUCLEIC ACID (DNA OR RNA); SEVERE ACUTE RESPIRATORY SYNDROME CORONAVIRUS 2 (SARS-COV-2) (CORONAVIRUS DISEASE [COVID-19]), AMPLIFIED PROBE TECHNIQUE, MAKING USE OF HIGH THROUGHPUT TECHNOLOGIES AS DESCRIBED BY CMS-2020-01-R: HCPCS | Performed by: OTOLARYNGOLOGY

## 2021-08-14 LAB
SARS-COV-2 RNA RESP QL NAA+PROBE: NOT DETECTED
SARS-COV-2- CYCLE NUMBER: -1

## 2021-08-16 ENCOUNTER — OFFICE VISIT (OUTPATIENT)
Dept: OTOLARYNGOLOGY | Facility: CLINIC | Age: 71
End: 2021-08-16
Payer: MEDICARE

## 2021-08-16 VITALS
BODY MASS INDEX: 20.25 KG/M2 | HEIGHT: 66 IN | TEMPERATURE: 99 F | SYSTOLIC BLOOD PRESSURE: 122 MMHG | WEIGHT: 126 LBS | DIASTOLIC BLOOD PRESSURE: 78 MMHG

## 2021-08-16 DIAGNOSIS — Z85.21 HISTORY OF CANCER OF LARYNX: Primary | ICD-10-CM

## 2021-08-16 DIAGNOSIS — J30.2 SEASONAL ALLERGIC RHINITIS, UNSPECIFIED TRIGGER: ICD-10-CM

## 2021-08-16 DIAGNOSIS — Z85.118 HISTORY OF LUNG CANCER: ICD-10-CM

## 2021-08-16 PROCEDURE — 3074F SYST BP LT 130 MM HG: CPT | Mod: CPTII,S$GLB,, | Performed by: OTOLARYNGOLOGY

## 2021-08-16 PROCEDURE — 99213 PR OFFICE/OUTPT VISIT, EST, LEVL III, 20-29 MIN: ICD-10-PCS | Mod: 25,S$GLB,, | Performed by: OTOLARYNGOLOGY

## 2021-08-16 PROCEDURE — 99213 OFFICE O/P EST LOW 20 MIN: CPT | Mod: 25,S$GLB,, | Performed by: OTOLARYNGOLOGY

## 2021-08-16 PROCEDURE — 99499 UNLISTED E&M SERVICE: CPT | Mod: S$GLB,,, | Performed by: OTOLARYNGOLOGY

## 2021-08-16 PROCEDURE — 99499 RISK ADDL DX/OHS AUDIT: ICD-10-PCS | Mod: S$GLB,,, | Performed by: OTOLARYNGOLOGY

## 2021-08-16 PROCEDURE — 3008F BODY MASS INDEX DOCD: CPT | Mod: CPTII,S$GLB,, | Performed by: OTOLARYNGOLOGY

## 2021-08-16 PROCEDURE — 3288F PR FALLS RISK ASSESSMENT DOCUMENTED: ICD-10-PCS | Mod: CPTII,S$GLB,, | Performed by: OTOLARYNGOLOGY

## 2021-08-16 PROCEDURE — 3288F FALL RISK ASSESSMENT DOCD: CPT | Mod: CPTII,S$GLB,, | Performed by: OTOLARYNGOLOGY

## 2021-08-16 PROCEDURE — 3078F DIAST BP <80 MM HG: CPT | Mod: CPTII,S$GLB,, | Performed by: OTOLARYNGOLOGY

## 2021-08-16 PROCEDURE — 1159F MED LIST DOCD IN RCRD: CPT | Mod: CPTII,S$GLB,, | Performed by: OTOLARYNGOLOGY

## 2021-08-16 PROCEDURE — 31575 DIAGNOSTIC LARYNGOSCOPY: CPT | Mod: S$GLB,,, | Performed by: OTOLARYNGOLOGY

## 2021-08-16 PROCEDURE — 1101F PR PT FALLS ASSESS DOC 0-1 FALLS W/OUT INJ PAST YR: ICD-10-PCS | Mod: CPTII,S$GLB,, | Performed by: OTOLARYNGOLOGY

## 2021-08-16 PROCEDURE — 3078F PR MOST RECENT DIASTOLIC BLOOD PRESSURE < 80 MM HG: ICD-10-PCS | Mod: CPTII,S$GLB,, | Performed by: OTOLARYNGOLOGY

## 2021-08-16 PROCEDURE — 3074F PR MOST RECENT SYSTOLIC BLOOD PRESSURE < 130 MM HG: ICD-10-PCS | Mod: CPTII,S$GLB,, | Performed by: OTOLARYNGOLOGY

## 2021-08-16 PROCEDURE — 31575 PR LARYNGOSCOPY, FLEXIBLE; DIAGNOSTIC: ICD-10-PCS | Mod: S$GLB,,, | Performed by: OTOLARYNGOLOGY

## 2021-08-16 PROCEDURE — 1101F PT FALLS ASSESS-DOCD LE1/YR: CPT | Mod: CPTII,S$GLB,, | Performed by: OTOLARYNGOLOGY

## 2021-08-16 PROCEDURE — 3008F PR BODY MASS INDEX (BMI) DOCUMENTED: ICD-10-PCS | Mod: CPTII,S$GLB,, | Performed by: OTOLARYNGOLOGY

## 2021-08-16 PROCEDURE — 1159F PR MEDICATION LIST DOCUMENTED IN MEDICAL RECORD: ICD-10-PCS | Mod: CPTII,S$GLB,, | Performed by: OTOLARYNGOLOGY

## 2021-08-21 DIAGNOSIS — Z12.11 SPECIAL SCREENING FOR MALIGNANT NEOPLASM OF COLON: Primary | ICD-10-CM

## 2021-09-14 ENCOUNTER — PES CALL (OUTPATIENT)
Dept: ADMINISTRATIVE | Facility: CLINIC | Age: 71
End: 2021-09-14

## 2021-09-23 ENCOUNTER — OFFICE VISIT (OUTPATIENT)
Dept: RADIATION ONCOLOGY | Facility: CLINIC | Age: 71
End: 2021-09-23
Payer: MEDICARE

## 2021-09-23 VITALS
RESPIRATION RATE: 18 BRPM | HEART RATE: 86 BPM | DIASTOLIC BLOOD PRESSURE: 90 MMHG | HEIGHT: 67 IN | WEIGHT: 126 LBS | SYSTOLIC BLOOD PRESSURE: 148 MMHG | BODY MASS INDEX: 19.78 KG/M2 | TEMPERATURE: 98 F | OXYGEN SATURATION: 100 %

## 2021-09-23 DIAGNOSIS — Z85.21 HISTORY OF CANCER OF LARYNX: Primary | ICD-10-CM

## 2021-09-23 DIAGNOSIS — Z85.118 HISTORY OF LUNG CANCER: ICD-10-CM

## 2021-09-23 PROCEDURE — 99999 PR PBB SHADOW E&M-EST. PATIENT-LVL III: CPT | Mod: PBBFAC,,, | Performed by: RADIOLOGY

## 2021-09-23 PROCEDURE — 1126F AMNT PAIN NOTED NONE PRSNT: CPT | Mod: CPTII,S$GLB,, | Performed by: RADIOLOGY

## 2021-09-23 PROCEDURE — 1101F PT FALLS ASSESS-DOCD LE1/YR: CPT | Mod: CPTII,S$GLB,, | Performed by: RADIOLOGY

## 2021-09-23 PROCEDURE — 3008F BODY MASS INDEX DOCD: CPT | Mod: CPTII,S$GLB,, | Performed by: RADIOLOGY

## 2021-09-23 PROCEDURE — 1101F PR PT FALLS ASSESS DOC 0-1 FALLS W/OUT INJ PAST YR: ICD-10-PCS | Mod: CPTII,S$GLB,, | Performed by: RADIOLOGY

## 2021-09-23 PROCEDURE — 1159F PR MEDICATION LIST DOCUMENTED IN MEDICAL RECORD: ICD-10-PCS | Mod: CPTII,S$GLB,, | Performed by: RADIOLOGY

## 2021-09-23 PROCEDURE — 99213 PR OFFICE/OUTPT VISIT, EST, LEVL III, 20-29 MIN: ICD-10-PCS | Mod: S$GLB,,, | Performed by: RADIOLOGY

## 2021-09-23 PROCEDURE — 1126F PR PAIN SEVERITY QUANTIFIED, NO PAIN PRESENT: ICD-10-PCS | Mod: CPTII,S$GLB,, | Performed by: RADIOLOGY

## 2021-09-23 PROCEDURE — 3080F DIAST BP >= 90 MM HG: CPT | Mod: CPTII,S$GLB,, | Performed by: RADIOLOGY

## 2021-09-23 PROCEDURE — 1159F MED LIST DOCD IN RCRD: CPT | Mod: CPTII,S$GLB,, | Performed by: RADIOLOGY

## 2021-09-23 PROCEDURE — 3288F PR FALLS RISK ASSESSMENT DOCUMENTED: ICD-10-PCS | Mod: CPTII,S$GLB,, | Performed by: RADIOLOGY

## 2021-09-23 PROCEDURE — 1160F RVW MEDS BY RX/DR IN RCRD: CPT | Mod: CPTII,S$GLB,, | Performed by: RADIOLOGY

## 2021-09-23 PROCEDURE — 99213 OFFICE O/P EST LOW 20 MIN: CPT | Mod: S$GLB,,, | Performed by: RADIOLOGY

## 2021-09-23 PROCEDURE — 3080F PR MOST RECENT DIASTOLIC BLOOD PRESSURE >= 90 MM HG: ICD-10-PCS | Mod: CPTII,S$GLB,, | Performed by: RADIOLOGY

## 2021-09-23 PROCEDURE — 3008F PR BODY MASS INDEX (BMI) DOCUMENTED: ICD-10-PCS | Mod: CPTII,S$GLB,, | Performed by: RADIOLOGY

## 2021-09-23 PROCEDURE — 99999 PR PBB SHADOW E&M-EST. PATIENT-LVL III: ICD-10-PCS | Mod: PBBFAC,,, | Performed by: RADIOLOGY

## 2021-09-23 PROCEDURE — 3077F PR MOST RECENT SYSTOLIC BLOOD PRESSURE >= 140 MM HG: ICD-10-PCS | Mod: CPTII,S$GLB,, | Performed by: RADIOLOGY

## 2021-09-23 PROCEDURE — 1160F PR REVIEW ALL MEDS BY PRESCRIBER/CLIN PHARMACIST DOCUMENTED: ICD-10-PCS | Mod: CPTII,S$GLB,, | Performed by: RADIOLOGY

## 2021-09-23 PROCEDURE — 3077F SYST BP >= 140 MM HG: CPT | Mod: CPTII,S$GLB,, | Performed by: RADIOLOGY

## 2021-09-23 PROCEDURE — 3288F FALL RISK ASSESSMENT DOCD: CPT | Mod: CPTII,S$GLB,, | Performed by: RADIOLOGY

## 2021-09-27 ENCOUNTER — HOSPITAL ENCOUNTER (OUTPATIENT)
Dept: RADIOLOGY | Facility: HOSPITAL | Age: 71
Discharge: HOME | End: 2021-09-27
Attending: INTERNAL MEDICINE
Payer: MEDICARE

## 2021-09-27 ENCOUNTER — TELEPHONE (OUTPATIENT)
Dept: HEMATOLOGY/ONCOLOGY | Facility: CLINIC | Age: 71
End: 2021-09-27

## 2021-09-27 DIAGNOSIS — Z85.21 HISTORY OF CANCER OF LARYNX: ICD-10-CM

## 2021-09-27 PROCEDURE — 70491 CT SOFT TISSUE NECK W/DYE: CPT | Mod: TC

## 2021-09-27 PROCEDURE — 70491 CT SOFT TISSUE NECK WITH CONTRAST: ICD-10-PCS | Mod: 26,,, | Performed by: RADIOLOGY

## 2021-09-27 PROCEDURE — 70491 CT SOFT TISSUE NECK W/DYE: CPT | Mod: 26,,, | Performed by: RADIOLOGY

## 2021-09-27 PROCEDURE — 25500020 PHARM REV CODE 255: Performed by: INTERNAL MEDICINE

## 2021-09-27 RX ADMIN — IOHEXOL 75 ML: 350 INJECTION, SOLUTION INTRAVENOUS at 02:09

## 2021-09-28 ENCOUNTER — OFFICE VISIT (OUTPATIENT)
Dept: HEMATOLOGY/ONCOLOGY | Facility: CLINIC | Age: 71
End: 2021-09-28
Payer: MEDICARE

## 2021-09-28 VITALS
BODY MASS INDEX: 21.99 KG/M2 | HEART RATE: 90 BPM | OXYGEN SATURATION: 99 % | SYSTOLIC BLOOD PRESSURE: 115 MMHG | HEIGHT: 63 IN | WEIGHT: 124.13 LBS | DIASTOLIC BLOOD PRESSURE: 69 MMHG

## 2021-09-28 DIAGNOSIS — E03.9 HYPOTHYROIDISM, UNSPECIFIED TYPE: ICD-10-CM

## 2021-09-28 DIAGNOSIS — Z85.21 HISTORY OF CANCER OF LARYNX: Primary | ICD-10-CM

## 2021-09-28 DIAGNOSIS — Z85.118 HISTORY OF LUNG CANCER: ICD-10-CM

## 2021-09-28 PROCEDURE — 3074F PR MOST RECENT SYSTOLIC BLOOD PRESSURE < 130 MM HG: ICD-10-PCS | Mod: CPTII,S$GLB,, | Performed by: INTERNAL MEDICINE

## 2021-09-28 PROCEDURE — 3288F PR FALLS RISK ASSESSMENT DOCUMENTED: ICD-10-PCS | Mod: CPTII,S$GLB,, | Performed by: INTERNAL MEDICINE

## 2021-09-28 PROCEDURE — 3288F FALL RISK ASSESSMENT DOCD: CPT | Mod: CPTII,S$GLB,, | Performed by: INTERNAL MEDICINE

## 2021-09-28 PROCEDURE — 1101F PT FALLS ASSESS-DOCD LE1/YR: CPT | Mod: CPTII,S$GLB,, | Performed by: INTERNAL MEDICINE

## 2021-09-28 PROCEDURE — 1159F PR MEDICATION LIST DOCUMENTED IN MEDICAL RECORD: ICD-10-PCS | Mod: CPTII,S$GLB,, | Performed by: INTERNAL MEDICINE

## 2021-09-28 PROCEDURE — 3078F DIAST BP <80 MM HG: CPT | Mod: CPTII,S$GLB,, | Performed by: INTERNAL MEDICINE

## 2021-09-28 PROCEDURE — 3078F PR MOST RECENT DIASTOLIC BLOOD PRESSURE < 80 MM HG: ICD-10-PCS | Mod: CPTII,S$GLB,, | Performed by: INTERNAL MEDICINE

## 2021-09-28 PROCEDURE — 99213 PR OFFICE/OUTPT VISIT, EST, LEVL III, 20-29 MIN: ICD-10-PCS | Mod: S$GLB,,, | Performed by: INTERNAL MEDICINE

## 2021-09-28 PROCEDURE — 99999 PR PBB SHADOW E&M-EST. PATIENT-LVL III: ICD-10-PCS | Mod: PBBFAC,,, | Performed by: INTERNAL MEDICINE

## 2021-09-28 PROCEDURE — 3074F SYST BP LT 130 MM HG: CPT | Mod: CPTII,S$GLB,, | Performed by: INTERNAL MEDICINE

## 2021-09-28 PROCEDURE — 1126F AMNT PAIN NOTED NONE PRSNT: CPT | Mod: CPTII,S$GLB,, | Performed by: INTERNAL MEDICINE

## 2021-09-28 PROCEDURE — 1101F PR PT FALLS ASSESS DOC 0-1 FALLS W/OUT INJ PAST YR: ICD-10-PCS | Mod: CPTII,S$GLB,, | Performed by: INTERNAL MEDICINE

## 2021-09-28 PROCEDURE — 3008F PR BODY MASS INDEX (BMI) DOCUMENTED: ICD-10-PCS | Mod: CPTII,S$GLB,, | Performed by: INTERNAL MEDICINE

## 2021-09-28 PROCEDURE — 99213 OFFICE O/P EST LOW 20 MIN: CPT | Mod: S$GLB,,, | Performed by: INTERNAL MEDICINE

## 2021-09-28 PROCEDURE — 3008F BODY MASS INDEX DOCD: CPT | Mod: CPTII,S$GLB,, | Performed by: INTERNAL MEDICINE

## 2021-09-28 PROCEDURE — 1159F MED LIST DOCD IN RCRD: CPT | Mod: CPTII,S$GLB,, | Performed by: INTERNAL MEDICINE

## 2021-09-28 PROCEDURE — 1126F PR PAIN SEVERITY QUANTIFIED, NO PAIN PRESENT: ICD-10-PCS | Mod: CPTII,S$GLB,, | Performed by: INTERNAL MEDICINE

## 2021-09-28 PROCEDURE — 99999 PR PBB SHADOW E&M-EST. PATIENT-LVL III: CPT | Mod: PBBFAC,,, | Performed by: INTERNAL MEDICINE

## 2021-09-29 ENCOUNTER — TELEPHONE (OUTPATIENT)
Dept: HEMATOLOGY/ONCOLOGY | Facility: CLINIC | Age: 71
End: 2021-09-29

## 2021-10-04 ENCOUNTER — PATIENT MESSAGE (OUTPATIENT)
Dept: ADMINISTRATIVE | Facility: HOSPITAL | Age: 71
End: 2021-10-04

## 2021-10-25 ENCOUNTER — OFFICE VISIT (OUTPATIENT)
Dept: OTOLARYNGOLOGY | Facility: CLINIC | Age: 71
End: 2021-10-25
Payer: MEDICARE

## 2021-10-25 VITALS
WEIGHT: 124 LBS | HEIGHT: 63 IN | SYSTOLIC BLOOD PRESSURE: 118 MMHG | BODY MASS INDEX: 21.97 KG/M2 | DIASTOLIC BLOOD PRESSURE: 74 MMHG

## 2021-10-25 DIAGNOSIS — J30.2 SEASONAL ALLERGIC RHINITIS, UNSPECIFIED TRIGGER: ICD-10-CM

## 2021-10-25 DIAGNOSIS — Z85.21 HISTORY OF CANCER OF LARYNX: Primary | ICD-10-CM

## 2021-10-25 DIAGNOSIS — Z85.118 HISTORY OF LUNG CANCER: ICD-10-CM

## 2021-10-25 PROCEDURE — 1159F MED LIST DOCD IN RCRD: CPT | Mod: CPTII,S$GLB,, | Performed by: OTOLARYNGOLOGY

## 2021-10-25 PROCEDURE — 99499 RISK ADDL DX/OHS AUDIT: ICD-10-PCS | Mod: S$GLB,,, | Performed by: OTOLARYNGOLOGY

## 2021-10-25 PROCEDURE — 3074F PR MOST RECENT SYSTOLIC BLOOD PRESSURE < 130 MM HG: ICD-10-PCS | Mod: CPTII,S$GLB,, | Performed by: OTOLARYNGOLOGY

## 2021-10-25 PROCEDURE — 99214 OFFICE O/P EST MOD 30 MIN: CPT | Mod: 25,S$GLB,, | Performed by: OTOLARYNGOLOGY

## 2021-10-25 PROCEDURE — 31575 DIAGNOSTIC LARYNGOSCOPY: CPT | Mod: S$GLB,,, | Performed by: OTOLARYNGOLOGY

## 2021-10-25 PROCEDURE — 3078F DIAST BP <80 MM HG: CPT | Mod: CPTII,S$GLB,, | Performed by: OTOLARYNGOLOGY

## 2021-10-25 PROCEDURE — 1126F PR PAIN SEVERITY QUANTIFIED, NO PAIN PRESENT: ICD-10-PCS | Mod: CPTII,S$GLB,, | Performed by: OTOLARYNGOLOGY

## 2021-10-25 PROCEDURE — 31575 PR LARYNGOSCOPY, FLEXIBLE; DIAGNOSTIC: ICD-10-PCS | Mod: S$GLB,,, | Performed by: OTOLARYNGOLOGY

## 2021-10-25 PROCEDURE — 99214 PR OFFICE/OUTPT VISIT, EST, LEVL IV, 30-39 MIN: ICD-10-PCS | Mod: 25,S$GLB,, | Performed by: OTOLARYNGOLOGY

## 2021-10-25 PROCEDURE — 3288F FALL RISK ASSESSMENT DOCD: CPT | Mod: CPTII,S$GLB,, | Performed by: OTOLARYNGOLOGY

## 2021-10-25 PROCEDURE — 3078F PR MOST RECENT DIASTOLIC BLOOD PRESSURE < 80 MM HG: ICD-10-PCS | Mod: CPTII,S$GLB,, | Performed by: OTOLARYNGOLOGY

## 2021-10-25 PROCEDURE — 99499 UNLISTED E&M SERVICE: CPT | Mod: S$GLB,,, | Performed by: OTOLARYNGOLOGY

## 2021-10-25 PROCEDURE — 1126F AMNT PAIN NOTED NONE PRSNT: CPT | Mod: CPTII,S$GLB,, | Performed by: OTOLARYNGOLOGY

## 2021-10-25 PROCEDURE — 1159F PR MEDICATION LIST DOCUMENTED IN MEDICAL RECORD: ICD-10-PCS | Mod: CPTII,S$GLB,, | Performed by: OTOLARYNGOLOGY

## 2021-10-25 PROCEDURE — 1101F PR PT FALLS ASSESS DOC 0-1 FALLS W/OUT INJ PAST YR: ICD-10-PCS | Mod: CPTII,S$GLB,, | Performed by: OTOLARYNGOLOGY

## 2021-10-25 PROCEDURE — 1101F PT FALLS ASSESS-DOCD LE1/YR: CPT | Mod: CPTII,S$GLB,, | Performed by: OTOLARYNGOLOGY

## 2021-10-25 PROCEDURE — 3288F PR FALLS RISK ASSESSMENT DOCUMENTED: ICD-10-PCS | Mod: CPTII,S$GLB,, | Performed by: OTOLARYNGOLOGY

## 2021-10-25 PROCEDURE — 3074F SYST BP LT 130 MM HG: CPT | Mod: CPTII,S$GLB,, | Performed by: OTOLARYNGOLOGY

## 2021-10-25 PROCEDURE — 3008F PR BODY MASS INDEX (BMI) DOCUMENTED: ICD-10-PCS | Mod: CPTII,S$GLB,, | Performed by: OTOLARYNGOLOGY

## 2021-10-25 PROCEDURE — 3008F BODY MASS INDEX DOCD: CPT | Mod: CPTII,S$GLB,, | Performed by: OTOLARYNGOLOGY

## 2021-10-25 RX ORDER — FLUTICASONE PROPIONATE 50 MCG
1 SPRAY, SUSPENSION (ML) NASAL 2 TIMES DAILY
Qty: 18.2 ML | Refills: 3 | Status: SHIPPED | OUTPATIENT
Start: 2021-10-25 | End: 2023-02-14

## 2021-10-27 ENCOUNTER — DOCUMENTATION ONLY (OUTPATIENT)
Dept: HEMATOLOGY/ONCOLOGY | Facility: CLINIC | Age: 71
End: 2021-10-27
Payer: MEDICARE

## 2021-11-08 DIAGNOSIS — I25.10 CORONARY ARTERY DISEASE INVOLVING NATIVE CORONARY ARTERY OF NATIVE HEART WITHOUT ANGINA PECTORIS: ICD-10-CM

## 2021-11-08 DIAGNOSIS — I10 HYPERTENSION, ESSENTIAL: ICD-10-CM

## 2021-11-08 RX ORDER — CLOPIDOGREL BISULFATE 75 MG/1
75 TABLET ORAL DAILY
Qty: 90 TABLET | Refills: 3 | Status: SHIPPED | OUTPATIENT
Start: 2021-11-08 | End: 2021-12-03 | Stop reason: SDUPTHER

## 2021-11-08 RX ORDER — METOPROLOL SUCCINATE 50 MG/1
50 TABLET, EXTENDED RELEASE ORAL DAILY
Qty: 90 TABLET | Refills: 3 | Status: SHIPPED | OUTPATIENT
Start: 2021-11-08 | End: 2022-10-21 | Stop reason: SDUPTHER

## 2021-11-08 RX ORDER — ATORVASTATIN CALCIUM 80 MG/1
80 TABLET, FILM COATED ORAL DAILY
Qty: 90 TABLET | Refills: 3 | Status: SHIPPED | OUTPATIENT
Start: 2021-11-08 | End: 2022-10-01

## 2021-12-03 DIAGNOSIS — I25.10 CORONARY ARTERY DISEASE INVOLVING NATIVE CORONARY ARTERY OF NATIVE HEART WITHOUT ANGINA PECTORIS: ICD-10-CM

## 2021-12-03 RX ORDER — CLOPIDOGREL BISULFATE 75 MG/1
75 TABLET ORAL DAILY
Qty: 90 TABLET | Refills: 3 | Status: SHIPPED | OUTPATIENT
Start: 2021-12-03 | End: 2022-10-01

## 2022-01-19 ENCOUNTER — IMMUNIZATION (OUTPATIENT)
Dept: PHARMACY | Facility: CLINIC | Age: 72
End: 2022-01-19
Payer: MEDICARE

## 2022-01-19 DIAGNOSIS — Z23 NEED FOR VACCINATION: Primary | ICD-10-CM

## 2022-01-26 ENCOUNTER — DOCUMENTATION ONLY (OUTPATIENT)
Dept: HEMATOLOGY/ONCOLOGY | Facility: CLINIC | Age: 72
End: 2022-01-26
Payer: MEDICARE

## 2022-01-26 NOTE — PROGRESS NOTES
Received call from wife, Slim Gray, requesting case of Boost for the month. She also asked if she could get it for 2 months to save on driving back and forth. Told her that would be fine would give 2 cases and can place another order in April. Told her I would contact her once it has arrived.

## 2022-03-22 ENCOUNTER — OFFICE VISIT (OUTPATIENT)
Dept: FAMILY MEDICINE | Facility: CLINIC | Age: 72
End: 2022-03-22
Payer: MEDICARE

## 2022-03-22 VITALS
OXYGEN SATURATION: 98 % | WEIGHT: 126.75 LBS | DIASTOLIC BLOOD PRESSURE: 62 MMHG | TEMPERATURE: 98 F | SYSTOLIC BLOOD PRESSURE: 110 MMHG | BODY MASS INDEX: 22.46 KG/M2 | RESPIRATION RATE: 18 BRPM | HEIGHT: 63 IN | HEART RATE: 73 BPM

## 2022-03-22 DIAGNOSIS — Z12.12 SCREENING FOR COLORECTAL CANCER: ICD-10-CM

## 2022-03-22 DIAGNOSIS — I50.22 CHRONIC SYSTOLIC HEART FAILURE: ICD-10-CM

## 2022-03-22 DIAGNOSIS — Z00.00 ROUTINE CHECK-UP: Primary | ICD-10-CM

## 2022-03-22 DIAGNOSIS — I70.0 AORTIC ATHEROSCLEROSIS: ICD-10-CM

## 2022-03-22 DIAGNOSIS — I25.118 CORONARY ARTERY DISEASE WITH EXERTIONAL ANGINA: ICD-10-CM

## 2022-03-22 DIAGNOSIS — I10 HYPERTENSION, ESSENTIAL: ICD-10-CM

## 2022-03-22 DIAGNOSIS — Z85.21 HISTORY OF CANCER OF LARYNX: ICD-10-CM

## 2022-03-22 DIAGNOSIS — Z85.118 HISTORY OF LUNG CANCER: ICD-10-CM

## 2022-03-22 DIAGNOSIS — Z12.11 SCREENING FOR COLORECTAL CANCER: ICD-10-CM

## 2022-03-22 PROCEDURE — 1160F PR REVIEW ALL MEDS BY PRESCRIBER/CLIN PHARMACIST DOCUMENTED: ICD-10-PCS | Mod: CPTII,S$GLB,, | Performed by: FAMILY MEDICINE

## 2022-03-22 PROCEDURE — 99499 RISK ADDL DX/OHS AUDIT: ICD-10-PCS | Mod: S$GLB,,, | Performed by: FAMILY MEDICINE

## 2022-03-22 PROCEDURE — 3074F SYST BP LT 130 MM HG: CPT | Mod: CPTII,S$GLB,, | Performed by: FAMILY MEDICINE

## 2022-03-22 PROCEDURE — 3008F BODY MASS INDEX DOCD: CPT | Mod: CPTII,S$GLB,, | Performed by: FAMILY MEDICINE

## 2022-03-22 PROCEDURE — 1160F RVW MEDS BY RX/DR IN RCRD: CPT | Mod: CPTII,S$GLB,, | Performed by: FAMILY MEDICINE

## 2022-03-22 PROCEDURE — 99397 PR PREVENTIVE VISIT,EST,65 & OVER: ICD-10-PCS | Mod: S$GLB,,, | Performed by: FAMILY MEDICINE

## 2022-03-22 PROCEDURE — 3078F DIAST BP <80 MM HG: CPT | Mod: CPTII,S$GLB,, | Performed by: FAMILY MEDICINE

## 2022-03-22 PROCEDURE — 1101F PT FALLS ASSESS-DOCD LE1/YR: CPT | Mod: CPTII,S$GLB,, | Performed by: FAMILY MEDICINE

## 2022-03-22 PROCEDURE — 1126F AMNT PAIN NOTED NONE PRSNT: CPT | Mod: CPTII,S$GLB,, | Performed by: FAMILY MEDICINE

## 2022-03-22 PROCEDURE — 99397 PER PM REEVAL EST PAT 65+ YR: CPT | Mod: S$GLB,,, | Performed by: FAMILY MEDICINE

## 2022-03-22 PROCEDURE — 1126F PR PAIN SEVERITY QUANTIFIED, NO PAIN PRESENT: ICD-10-PCS | Mod: CPTII,S$GLB,, | Performed by: FAMILY MEDICINE

## 2022-03-22 PROCEDURE — 99999 PR PBB SHADOW E&M-EST. PATIENT-LVL IV: ICD-10-PCS | Mod: PBBFAC,,, | Performed by: FAMILY MEDICINE

## 2022-03-22 PROCEDURE — 3074F PR MOST RECENT SYSTOLIC BLOOD PRESSURE < 130 MM HG: ICD-10-PCS | Mod: CPTII,S$GLB,, | Performed by: FAMILY MEDICINE

## 2022-03-22 PROCEDURE — 3078F PR MOST RECENT DIASTOLIC BLOOD PRESSURE < 80 MM HG: ICD-10-PCS | Mod: CPTII,S$GLB,, | Performed by: FAMILY MEDICINE

## 2022-03-22 PROCEDURE — 3008F PR BODY MASS INDEX (BMI) DOCUMENTED: ICD-10-PCS | Mod: CPTII,S$GLB,, | Performed by: FAMILY MEDICINE

## 2022-03-22 PROCEDURE — 1101F PR PT FALLS ASSESS DOC 0-1 FALLS W/OUT INJ PAST YR: ICD-10-PCS | Mod: CPTII,S$GLB,, | Performed by: FAMILY MEDICINE

## 2022-03-22 PROCEDURE — 99499 UNLISTED E&M SERVICE: CPT | Mod: S$GLB,,, | Performed by: FAMILY MEDICINE

## 2022-03-22 PROCEDURE — 1159F MED LIST DOCD IN RCRD: CPT | Mod: CPTII,S$GLB,, | Performed by: FAMILY MEDICINE

## 2022-03-22 PROCEDURE — 3288F PR FALLS RISK ASSESSMENT DOCUMENTED: ICD-10-PCS | Mod: CPTII,S$GLB,, | Performed by: FAMILY MEDICINE

## 2022-03-22 PROCEDURE — 3288F FALL RISK ASSESSMENT DOCD: CPT | Mod: CPTII,S$GLB,, | Performed by: FAMILY MEDICINE

## 2022-03-22 PROCEDURE — 1159F PR MEDICATION LIST DOCUMENTED IN MEDICAL RECORD: ICD-10-PCS | Mod: CPTII,S$GLB,, | Performed by: FAMILY MEDICINE

## 2022-03-22 PROCEDURE — 99999 PR PBB SHADOW E&M-EST. PATIENT-LVL IV: CPT | Mod: PBBFAC,,, | Performed by: FAMILY MEDICINE

## 2022-03-22 NOTE — PROGRESS NOTES
03/22/22 1455   Depression Patient Health Questionnaire (PHQ-2)   Over the last two weeks how often have you been bothered by little interest or pleasure in doing things 0   Over the last two weeks how often have you been bothered by feeling down, depressed or hopeless 0   PHQ-2 Total Score 0

## 2022-03-27 NOTE — PROGRESS NOTES
Subjective:       Patient ID: Sharath Huizar is a 71 y.o. male.    Chief Complaint: Annual Exam    HPI   71-year-old male with history of laryngeal cancer, lung cancer, hypertension, coronary artery disease, and heart failure comes in for annual exam.    Review of Systems   Constitutional: Negative for unexpected weight change.   HENT: Negative for ear pain and sore throat.    Eyes: Negative for visual disturbance.   Respiratory: Negative for shortness of breath.    Cardiovascular: Negative for chest pain.   Gastrointestinal: Negative for abdominal pain and blood in stool.   Endocrine: Negative for cold intolerance and heat intolerance.   Genitourinary: Negative for dysuria and frequency.   Integumentary:  Negative for rash.   Neurological: Negative for weakness, numbness and headaches.   Hematological: Negative for adenopathy.   Psychiatric/Behavioral: Negative for suicidal ideas.         Objective:      Physical Exam  Vitals reviewed.   Constitutional:       Appearance: He is well-developed. He is not diaphoretic.   HENT:      Head: Normocephalic.      Right Ear: External ear normal.      Left Ear: External ear normal.      Nose: Nose normal.      Mouth/Throat:      Pharynx: No oropharyngeal exudate.   Neck:      Trachea: No tracheal deviation.   Cardiovascular:      Rate and Rhythm: Normal rate and regular rhythm.      Heart sounds: Normal heart sounds. No murmur heard.  Pulmonary:      Effort: Pulmonary effort is normal.      Breath sounds: Normal breath sounds. No wheezing or rales.   Abdominal:      General: Bowel sounds are normal.      Palpations: Abdomen is soft. Abdomen is not rigid. There is no mass.      Tenderness: There is no abdominal tenderness. There is no guarding or rebound.   Musculoskeletal:      Cervical back: Normal range of motion and neck supple.   Lymphadenopathy:      Cervical: No cervical adenopathy.   Neurological:      Mental Status: He is oriented to person, place, and time.       Sensory: No sensory deficit.      Motor: No atrophy.      Gait: Gait normal.      Deep Tendon Reflexes:      Reflex Scores:       Patellar reflexes are 2+ on the right side and 2+ on the left side.        Assessment:       Problem List Items Addressed This Visit        Cardiac/Vascular    Hypertension, essential    Relevant Orders    Lipid Panel    Chronic systolic heart failure    Coronary artery disease with exertional angina    Relevant Orders    Lipid Panel       Oncology    History of cancer of larynx    History of lung cancer      Other Visit Diagnoses     Routine check-up    -  Primary    Aortic atherosclerosis        Relevant Orders    Lipid Panel    Screening for colorectal cancer        Relevant Orders    Fecal Immunochemical Test (iFOBT)          Plan:       Sharath was seen today for annual exam.    Diagnoses and all orders for this visit:    Routine check-up  Age appropriate recommendations reviewed    History of cancer of larynx/History of lung cancer  Management as per oncologist    Hypertension, essential  -     Lipid Panel; Future  Continue current management    Coronary artery disease with exertional angina  -     Lipid Panel; Future  Check lipids    Chronic systolic heart failure  No acute concerns reported    Aortic atherosclerosis  -     Lipid Panel; Future  Continue statin    Screening for colorectal cancer  -     Fecal Immunochemical Test (iFOBT); Future  Screening [employer] ordered for colon cancer screening

## 2022-03-29 ENCOUNTER — HOSPITAL ENCOUNTER (OUTPATIENT)
Dept: RADIOLOGY | Facility: HOSPITAL | Age: 72
Discharge: HOME | End: 2022-03-29
Attending: INTERNAL MEDICINE
Payer: MEDICARE

## 2022-03-29 DIAGNOSIS — Z85.118 HISTORY OF LUNG CANCER: ICD-10-CM

## 2022-03-29 PROCEDURE — 71250 CT THORAX DX C-: CPT | Mod: TC

## 2022-03-29 PROCEDURE — 71250 CT THORAX DX C-: CPT | Mod: 26,,, | Performed by: RADIOLOGY

## 2022-03-29 PROCEDURE — 71250 CT CHEST WITHOUT CONTRAST: ICD-10-PCS | Mod: 26,,, | Performed by: RADIOLOGY

## 2022-04-25 ENCOUNTER — OFFICE VISIT (OUTPATIENT)
Dept: HEMATOLOGY/ONCOLOGY | Facility: CLINIC | Age: 72
End: 2022-04-25
Payer: MEDICARE

## 2022-04-25 VITALS
BODY MASS INDEX: 20.76 KG/M2 | DIASTOLIC BLOOD PRESSURE: 75 MMHG | HEART RATE: 72 BPM | HEIGHT: 66 IN | TEMPERATURE: 99 F | WEIGHT: 129.19 LBS | SYSTOLIC BLOOD PRESSURE: 135 MMHG | OXYGEN SATURATION: 97 % | RESPIRATION RATE: 18 BRPM

## 2022-04-25 DIAGNOSIS — Z85.118 HISTORY OF LUNG CANCER: ICD-10-CM

## 2022-04-25 DIAGNOSIS — Z85.21 HISTORY OF CANCER OF LARYNX: Primary | ICD-10-CM

## 2022-04-25 DIAGNOSIS — E03.9 HYPOTHYROIDISM, UNSPECIFIED TYPE: ICD-10-CM

## 2022-04-25 PROCEDURE — 3075F PR MOST RECENT SYSTOLIC BLOOD PRESS GE 130-139MM HG: ICD-10-PCS | Mod: CPTII,S$GLB,, | Performed by: INTERNAL MEDICINE

## 2022-04-25 PROCEDURE — 99499 UNLISTED E&M SERVICE: CPT | Mod: S$GLB,,, | Performed by: INTERNAL MEDICINE

## 2022-04-25 PROCEDURE — 1159F PR MEDICATION LIST DOCUMENTED IN MEDICAL RECORD: ICD-10-PCS | Mod: CPTII,S$GLB,, | Performed by: INTERNAL MEDICINE

## 2022-04-25 PROCEDURE — 1126F AMNT PAIN NOTED NONE PRSNT: CPT | Mod: CPTII,S$GLB,, | Performed by: INTERNAL MEDICINE

## 2022-04-25 PROCEDURE — 1101F PT FALLS ASSESS-DOCD LE1/YR: CPT | Mod: CPTII,S$GLB,, | Performed by: INTERNAL MEDICINE

## 2022-04-25 PROCEDURE — 3078F PR MOST RECENT DIASTOLIC BLOOD PRESSURE < 80 MM HG: ICD-10-PCS | Mod: CPTII,S$GLB,, | Performed by: INTERNAL MEDICINE

## 2022-04-25 PROCEDURE — 3078F DIAST BP <80 MM HG: CPT | Mod: CPTII,S$GLB,, | Performed by: INTERNAL MEDICINE

## 2022-04-25 PROCEDURE — 1159F MED LIST DOCD IN RCRD: CPT | Mod: CPTII,S$GLB,, | Performed by: INTERNAL MEDICINE

## 2022-04-25 PROCEDURE — 99999 PR PBB SHADOW E&M-EST. PATIENT-LVL IV: CPT | Mod: PBBFAC,,, | Performed by: INTERNAL MEDICINE

## 2022-04-25 PROCEDURE — 99499 RISK ADDL DX/OHS AUDIT: ICD-10-PCS | Mod: S$GLB,,, | Performed by: INTERNAL MEDICINE

## 2022-04-25 PROCEDURE — 3008F BODY MASS INDEX DOCD: CPT | Mod: CPTII,S$GLB,, | Performed by: INTERNAL MEDICINE

## 2022-04-25 PROCEDURE — 1126F PR PAIN SEVERITY QUANTIFIED, NO PAIN PRESENT: ICD-10-PCS | Mod: CPTII,S$GLB,, | Performed by: INTERNAL MEDICINE

## 2022-04-25 PROCEDURE — 99214 PR OFFICE/OUTPT VISIT, EST, LEVL IV, 30-39 MIN: ICD-10-PCS | Mod: S$GLB,,, | Performed by: INTERNAL MEDICINE

## 2022-04-25 PROCEDURE — 3288F PR FALLS RISK ASSESSMENT DOCUMENTED: ICD-10-PCS | Mod: CPTII,S$GLB,, | Performed by: INTERNAL MEDICINE

## 2022-04-25 PROCEDURE — 3008F PR BODY MASS INDEX (BMI) DOCUMENTED: ICD-10-PCS | Mod: CPTII,S$GLB,, | Performed by: INTERNAL MEDICINE

## 2022-04-25 PROCEDURE — 3288F FALL RISK ASSESSMENT DOCD: CPT | Mod: CPTII,S$GLB,, | Performed by: INTERNAL MEDICINE

## 2022-04-25 PROCEDURE — 3075F SYST BP GE 130 - 139MM HG: CPT | Mod: CPTII,S$GLB,, | Performed by: INTERNAL MEDICINE

## 2022-04-25 PROCEDURE — 99999 PR PBB SHADOW E&M-EST. PATIENT-LVL IV: ICD-10-PCS | Mod: PBBFAC,,, | Performed by: INTERNAL MEDICINE

## 2022-04-25 PROCEDURE — 1101F PR PT FALLS ASSESS DOC 0-1 FALLS W/OUT INJ PAST YR: ICD-10-PCS | Mod: CPTII,S$GLB,, | Performed by: INTERNAL MEDICINE

## 2022-04-25 PROCEDURE — 99214 OFFICE O/P EST MOD 30 MIN: CPT | Mod: S$GLB,,, | Performed by: INTERNAL MEDICINE

## 2022-04-25 NOTE — PROGRESS NOTES
"Subjective:       Patient ID: Sharath Huizar is a 71 y.o. male.    Chief Complaint: History of cancer of larynx  Sharath Huizar is a 71 y.o. male with history of  Larynx cancer. Patient was seen in ER 2-27-20 with hemoptysis and hoarse voice for a couple of months along with left temporal headaches.   He underwent chest xray on 2/28/2020 which revealed "Small focal opacity within the right lower lung zone with possible cavitation.  This may reflect focal infectious or inflammatory process"  He was referred to pulmonary and underwent CT chest, abdomen/pelvis on 3/18/2020 which revealed "a 2.2 cm noncalcified nodule seen at the right lung base.  May represent consolidation however malignancy can not be excluded.  PET-CT is recommended for further evaluation"  PET scan on 3/27/2020 revealed "Hypermetabolic left supraglottic soft tissue mass concerning for primary laryngeal cancer.  Two hypermetabolic bilateral cervical lymph nodes as well as a hypermetabolic right lower lobe partially cavitary pulmonary mass concerning for metastatic disease.  Recommend ENT consultation and dedicated contrast enhance neck CT"  He was referred to ENT and underwent CT neck on 4/16/2020 which revealed Stable left supraglottic laryngeal mass and bilateral cervical metastatic adenopathy.     He underwent direct laryngoscopy on 4/20/2020 and that revealed "bulky and friable supraglottic mass on left side of glottic surface of epiglottis extending over slightly on to the right. Extends down along left aryepiglottic fold and true vocal fold on left. The right vocal fold did not appear to be involved. No lesion of pyriform sinus. Palpation of vallecula and base of tongue was soft. No additional lesions were noted in the oral cavity or oropharynx"  Pathology revealed squamous cell cancer  His case was discussed in head and Neck tumor board and he underwent left lung biopsy on 4/27/2020 and pathology reveals squamous/adenosquamous cell " "cancer     He has completed RT to his lung as well as 7 weeks of chemo/RT with weekly Cisplatin, as of 8/27/2020    His restaging CT neck from 9/27/2021 which reveals "Continued decrease in size of and conspicuity of left supraglottic laryngeal mass"    HPIHe notes that he is doing well and has no new issues  CT chest reveals "Continued decrease in size of and conspicuity of left supraglottic laryngeal mass"      Review of Systems   Constitutional: Negative for appetite change, fatigue and unexpected weight change.   HENT: Negative for mouth sores.    Eyes: Negative for visual disturbance.   Respiratory: Negative for cough and shortness of breath.    Cardiovascular: Negative for chest pain.   Gastrointestinal: Negative for abdominal pain and diarrhea.   Genitourinary: Negative for frequency.   Musculoskeletal: Negative for back pain.   Integumentary:  Negative for rash.   Neurological: Negative for headaches.   Hematological: Negative for adenopathy.   Psychiatric/Behavioral: The patient is not nervous/anxious.    All other systems reviewed and are negative.        Objective:      Physical Exam  Vitals reviewed.   Constitutional:       Appearance: He is well-developed.   HENT:      Mouth/Throat:      Pharynx: No oropharyngeal exudate.   Cardiovascular:      Rate and Rhythm: Normal rate.      Heart sounds: Normal heart sounds.   Pulmonary:      Effort: Pulmonary effort is normal.      Breath sounds: Normal breath sounds. No wheezing.   Abdominal:      General: Bowel sounds are normal.      Palpations: Abdomen is soft.      Tenderness: There is no abdominal tenderness.   Musculoskeletal:         General: No tenderness.   Lymphadenopathy:      Cervical: No cervical adenopathy.   Skin:     General: Skin is warm and dry.      Findings: No rash.   Neurological:      Mental Status: He is alert and oriented to person, place, and time.      Coordination: Coordination normal.   Psychiatric:         Thought Content: Thought " content normal.         Judgment: Judgment normal.           LABS:  WBC   Date Value Ref Range Status   03/29/2022 7.38 3.90 - 12.70 K/uL Final     Hemoglobin   Date Value Ref Range Status   03/29/2022 12.4 (L) 14.0 - 18.0 g/dL Final     Hematocrit   Date Value Ref Range Status   03/29/2022 37.7 (L) 40.0 - 54.0 % Final     Platelets   Date Value Ref Range Status   03/29/2022 183 150 - 450 K/uL Final     Gran # (ANC)   Date Value Ref Range Status   03/29/2022 5.3 1.8 - 7.7 K/uL Final     Comment:     The ANC is based on a white cell differential from an   automated cell counter. It has not been microscopically   reviewed for the presence of abnormal cells. Clinical   correlation is required.         Chemistry        Component Value Date/Time     03/29/2022 0752    K 4.0 03/29/2022 0752     03/29/2022 0752    CO2 26 03/29/2022 0752    BUN 23 03/29/2022 0752    CREATININE 1.3 03/29/2022 0752     (H) 03/29/2022 0752        Component Value Date/Time    CALCIUM 9.2 03/29/2022 0752    ALKPHOS 89 03/29/2022 0752    AST 21 03/29/2022 0752    ALT 21 03/29/2022 0752    BILITOT 0.2 03/29/2022 0752    ESTGFRAFRICA >60 03/29/2022 0752    EGFRNONAA 55 (A) 03/29/2022 0752        TSH   Date Value Ref Range Status   03/29/2022 4.545 (H) 0.400 - 4.000 uIU/mL Final     Free T4   Date Value Ref Range Status   03/29/2022 0.93 0.71 - 1.51 ng/dL Final        Assessment:       Problem List Items Addressed This Visit     History of cancer of larynx - Primary    Relevant Orders    CBC Oncology    Comprehensive Metabolic Panel    CT Neck Chest With Contrast (XPD)    History of lung cancer      Other Visit Diagnoses     Hypothyroidism, unspecified type        Relevant Orders    T4    TSH          Plan:         Route Chart for Scheduling    Med Onc Chart Routing      Follow up with physician Other. Schedule with Dr. Langford in ENT next available. In 6 months schedule CBC,cMp, TSH, free T4, CT neck, chest with cpontrast and  see me   Follow up with DONNA    Labs    Imaging    Pharmacy appointment    Other referrals            He is doing well overall, scans are stable. I will see him in 6 months with CT neck and chest. He needs to see Dr. Langford in follow-up as well.    Above care plan was discussed with patient and all questions were addressed to his satisfaction

## 2022-04-28 ENCOUNTER — OFFICE VISIT (OUTPATIENT)
Dept: OTOLARYNGOLOGY | Facility: CLINIC | Age: 72
End: 2022-04-28
Payer: MEDICARE

## 2022-04-28 VITALS
BODY MASS INDEX: 20.5 KG/M2 | WEIGHT: 127.56 LBS | DIASTOLIC BLOOD PRESSURE: 72 MMHG | HEIGHT: 66 IN | SYSTOLIC BLOOD PRESSURE: 128 MMHG

## 2022-04-28 DIAGNOSIS — Z85.21 HISTORY OF CANCER OF LARYNX: Primary | ICD-10-CM

## 2022-04-28 DIAGNOSIS — J30.2 SEASONAL ALLERGIC RHINITIS, UNSPECIFIED TRIGGER: ICD-10-CM

## 2022-04-28 DIAGNOSIS — Z85.118 HISTORY OF LUNG CANCER: ICD-10-CM

## 2022-04-28 PROCEDURE — 3008F PR BODY MASS INDEX (BMI) DOCUMENTED: ICD-10-PCS | Mod: CPTII,S$GLB,, | Performed by: OTOLARYNGOLOGY

## 2022-04-28 PROCEDURE — 3074F SYST BP LT 130 MM HG: CPT | Mod: CPTII,S$GLB,, | Performed by: OTOLARYNGOLOGY

## 2022-04-28 PROCEDURE — 1159F PR MEDICATION LIST DOCUMENTED IN MEDICAL RECORD: ICD-10-PCS | Mod: CPTII,S$GLB,, | Performed by: OTOLARYNGOLOGY

## 2022-04-28 PROCEDURE — 3288F FALL RISK ASSESSMENT DOCD: CPT | Mod: CPTII,S$GLB,, | Performed by: OTOLARYNGOLOGY

## 2022-04-28 PROCEDURE — 3078F DIAST BP <80 MM HG: CPT | Mod: CPTII,S$GLB,, | Performed by: OTOLARYNGOLOGY

## 2022-04-28 PROCEDURE — 3288F PR FALLS RISK ASSESSMENT DOCUMENTED: ICD-10-PCS | Mod: CPTII,S$GLB,, | Performed by: OTOLARYNGOLOGY

## 2022-04-28 PROCEDURE — 99499 RISK ADDL DX/OHS AUDIT: ICD-10-PCS | Mod: S$GLB,,, | Performed by: OTOLARYNGOLOGY

## 2022-04-28 PROCEDURE — 31575 DIAGNOSTIC LARYNGOSCOPY: CPT | Mod: S$GLB,,, | Performed by: OTOLARYNGOLOGY

## 2022-04-28 PROCEDURE — 99213 OFFICE O/P EST LOW 20 MIN: CPT | Mod: 25,S$GLB,, | Performed by: OTOLARYNGOLOGY

## 2022-04-28 PROCEDURE — 99499 UNLISTED E&M SERVICE: CPT | Mod: S$GLB,,, | Performed by: OTOLARYNGOLOGY

## 2022-04-28 PROCEDURE — 31575 PR LARYNGOSCOPY, FLEXIBLE; DIAGNOSTIC: ICD-10-PCS | Mod: S$GLB,,, | Performed by: OTOLARYNGOLOGY

## 2022-04-28 PROCEDURE — 99213 PR OFFICE/OUTPT VISIT, EST, LEVL III, 20-29 MIN: ICD-10-PCS | Mod: 25,S$GLB,, | Performed by: OTOLARYNGOLOGY

## 2022-04-28 PROCEDURE — 3078F PR MOST RECENT DIASTOLIC BLOOD PRESSURE < 80 MM HG: ICD-10-PCS | Mod: CPTII,S$GLB,, | Performed by: OTOLARYNGOLOGY

## 2022-04-28 PROCEDURE — 1101F PR PT FALLS ASSESS DOC 0-1 FALLS W/OUT INJ PAST YR: ICD-10-PCS | Mod: CPTII,S$GLB,, | Performed by: OTOLARYNGOLOGY

## 2022-04-28 PROCEDURE — 1126F PR PAIN SEVERITY QUANTIFIED, NO PAIN PRESENT: ICD-10-PCS | Mod: CPTII,S$GLB,, | Performed by: OTOLARYNGOLOGY

## 2022-04-28 PROCEDURE — 1159F MED LIST DOCD IN RCRD: CPT | Mod: CPTII,S$GLB,, | Performed by: OTOLARYNGOLOGY

## 2022-04-28 PROCEDURE — 3074F PR MOST RECENT SYSTOLIC BLOOD PRESSURE < 130 MM HG: ICD-10-PCS | Mod: CPTII,S$GLB,, | Performed by: OTOLARYNGOLOGY

## 2022-04-28 PROCEDURE — 1126F AMNT PAIN NOTED NONE PRSNT: CPT | Mod: CPTII,S$GLB,, | Performed by: OTOLARYNGOLOGY

## 2022-04-28 PROCEDURE — 1101F PT FALLS ASSESS-DOCD LE1/YR: CPT | Mod: CPTII,S$GLB,, | Performed by: OTOLARYNGOLOGY

## 2022-04-28 PROCEDURE — 3008F BODY MASS INDEX DOCD: CPT | Mod: CPTII,S$GLB,, | Performed by: OTOLARYNGOLOGY

## 2022-04-28 NOTE — PROGRESS NOTES
OTOLARYNGOLOGY CLINIC NOTE  Date:  04/28/2022     Chief complaint:  Chief Complaint   Patient presents with    history of cancer of larynx       History of Present Illness  Sharath Huizar is a 71 y.o. male  presenting today for a followup of larynx cancer.    Had recommended 10 week follow up in October ( last seen 10-25-21), he missed his appointment in January of 2022.   Not gaining but not losing weight  Has been a bit more hoarse for a couple of days  No throat pain  No hemoptysis    Saw Dr. Aguilar 4-25-22.   Regarding his oncologic history:  He initially was referred to me for hoarseness and hemoptysis. He underwent DL with biopsy of left supraglottic mass ( glottic surface of epiglottis and left false fold with some extension to right epiglottis) on 4-20-20 that showed scca. He had 2 right sided hypermetabolic nodes ( 10-15 mm ) and 11 mm hypermetabolic node on left side of neck ( in addition to hypermetabolic supraglottic primary). He was also noted on workup of this to have a solitary lung nodule in the right lower lobe on chest ct. IR biopsy performed 4-27-20 which was positive for malignancy ( squamous cell ca vs adenosquamous). This was considered to be synchronous primary . His case was presented at the head and neck tumor board and decision was made for CRT ( pt did not want laryngectomy) for his [id number] laryngeal cancer . His case was also presented at lung tumor board and recommended for upfront SBRT for his cT1N0 lung cancer. He also had a positive AFB however not felt to be TB by pulm.   He was treated by Dr. Aguilar ( heme onc) and Dr. 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 PEG tube insertion was done 5-21-20. He also had dental clearance prior to treatment.  He completed SBRT in early July. Started on concurrent CRT ( cisplatin x6 doses)  7-6-20, completed RT 8-21-20.     He  had his G-tube removed 12-30-20. He had restaging PET-CT  mid December which did not show any obvious recurrence in the head neck area     Out of abundance of precaution, I took him back to the operating room for direct laryngoscopy and biopsy on 02/18/2021.  This was done for persistent/ slightly worsened post radiation edema and to rule out any submucosal disease.  Biopsies were negative for malignancy.      Past Medical History  Past Medical History:   Diagnosis Date    Cancer     THROAT    Coronary artery disease     History of chemotherapy     THROAT CANCER    Hypertension     Myocardial infarction     NSTEMI (non-ST elevated myocardial infarction) 3/23/2018    Nuclear sclerosis of both eyes 6/13/2019        Past Surgical History  Past Surgical History:   Procedure Laterality Date    CARDIAC CATHETERIZATION      with 4 stents    DIRECT LARYNGOSCOPY N/A 4/20/2020    Procedure: LARYNGOSCOPY, DIRECT;  Surgeon: Genoveva Vallejo MD;  Location: [facility] OR;  Service: ENT;  Laterality: N/A;    DIRECT LARYNGOSCOPY N/A 2/18/2021    Procedure: LARYNGOSCOPY, DIRECT with possible biopsy;  Surgeon: Genoveva Vallejo MD;  Location: [facility] OR;  Service: ENT;  Laterality: N/A;  PRE-OP BY RN 2---COVID NEGATIVE ON 2/17    LEFT HEART CATHETERIZATION Left 5/29/2018    Procedure: Left heart cath;  Surgeon: Viral Lombardi MD;  Location: [facility] CATH LAB;  Service: Cardiology;  Laterality: Left;  RN PREOP 5/28/18    LUNG BIOPSY N/A 4/27/2020    Procedure: BIOPSY, LUNG;  Surgeon: Jones Diagnostic Provider;  Location: [facility] OR;  Service: Radiology;  Laterality: N/A;  9am start--RN PHONE PREOP 4/24----COVID NEGATIVE--pt        Medications  Current Outpatient Medications on File Prior to Visit   Medication Sig Dispense Refill    acetaminophen (TYLENOL) 325 MG tablet Take 1 tablet (325 mg total) by mouth every 6 (six) hours as needed for Pain. 30 tablet 0    aspirin (ECOTRIN) 81 MG EC tablet Take 1 tablet (81 mg total) by mouth once daily. (Patient taking differently: Take 81 mg  by mouth once daily. )  0    atorvastatin (LIPITOR) 80 MG tablet Take 1 tablet (80 mg total) by mouth once daily. 90 tablet 3    azelastine (ASTELIN) 137 mcg (0.1 %) nasal spray 1 spray (137 mcg total) by Nasal route 2 (two) times daily. 30 mL 3    cetirizine (ZYRTEC) 10 MG tablet Take 1 tablet (10 mg total) by mouth once daily. 30 tablet 3    clopidogreL (PLAVIX) 75 mg tablet Take 1 tablet (75 mg total) by mouth once daily. 90 tablet 3    fluticasone propionate (FLONASE) 50 mcg/actuation nasal spray 1 spray (50 mcg total) by Each Nostril route 2 (two) times daily. 18.2 mL 3    fluticasone propionate (FLONASE) 50 mcg/actuation nasal spray 1 spray (50 mcg total) by Each Nostril route 2 (two) times daily. 18.2 mL 3    HYDROcodone-acetaminophen (NORCO) 5-325 mg per tablet Take 1 tablet by mouth every 6 (six) hours as needed for Pain. 10 tablet 0    metoprolol succinate (TOPROL-XL) 50 MG 24 hr tablet Take 1 tablet (50 mg total) by mouth once daily. 90 tablet 3    nitroGLYCERIN (NITROSTAT) 0.4 MG SL tablet Place 1 tablet (0.4 mg total) under the tongue every 5 (five) minutes as needed. 25 tablet 3    sodium chloride (SALINE NASAL) 0.65 % nasal spray 2 sprays by Nasal route 2 (two) times a day. Use prior to medication sprays 1 Bottle 12     No current facility-administered medications on file prior to visit.       Review of Systems  Review of Systems   HENT: Negative.    Eyes: Negative.    Respiratory: Negative.    Cardiovascular: Negative.    Gastrointestinal: Negative.    Genitourinary: Negative.    Musculoskeletal: Negative.    Skin: Negative.    Neurological: Negative.    Psychiatric/Behavioral: Negative.     Answers for HPI/ROS submitted by the patient on 4/28/2022  appetite change : Yes    Social History   reports that he quit smoking about 4 years ago. His smoking use included cigarettes. He has a 7.50 pack-year smoking history. He has never used smokeless tobacco. He reports that he does not drink  alcohol and does not use drugs.     Family History  Family History   Problem Relation Age of Onset    Blindness Paternal Aunt         Physical Exam   Vitals:    04/28/22 1343   BP: 128/72    Body mass index is 20.58 kg/m².          GENERAL: no acute distress.  HEAD: normocephalic.   EYES: lids and lashes normal.No scleral icterus  EARS: external ear without lesion, normal pinna shape and position.   NOSE: external nose without significant bony abnormality  ORAL CAVITY/OROPHARYNX: tongue midline and mobile. Symmetric palate rise. Uvula midline.   NECK: trachea midline.   LYMPH NODES:No cervical lymphadenopathy.  RESPIRATORY: no stridor, no stertor.Respirations nonlabored.  NEURO: alert, responds to questions appropriately.  Facial movement symmetric at rest   PSYCH:mood appropriate    PROCEDURE NOTE  NAME OF PROCEDURE: Flexible Laryngoscopy, diagnostic  INDICATIONS: gag reflex precludes mirror exam, surveillance exam for history of laryngeal cancer  FINDINGS: post radiation changes    Consent: After procedure was explained in detail and all questions answered, verbal consent was obtained for performing flexible laryngoscopy.  Anesthesia: topical 4% lidocaine and neosynephrine  Procedure: With patient in seated position, the scope was inserted into the bilateral nasal passageway and advanced atraumatically into the nasopharynx to examine the following structures:  Nasal cavity: Turbinates with mild-moderate hypertrophy.No purulent drainage.   Nasopharynx: no mass or lesion noted in nasopharynx.   Oropharynx: base of tongue without  mass or ulceration. Lingual tonsils normal in appearance  Hypopharynx: posterior pharyngeal wall without mass or lesion. No pooling of secretions. Pyriform sinuses visible without mass or lesion  Larynx: epiglottis normal without lesion. False vocal folds with edema and mild erythema- unchanged.  Mild interarytenoid edema, vocal folds mobile with reduced abduction which is unchanged from  prior scope exams ( videos of old exam reviewed) . Postcricoid region with mild edema no lesion   Subglottis: visualized portion of subglottis normal in appearance    After examination performed, the scope was removed atraumatically . The patient tolerated the procedure well. Photodocumentation obtained with representative images below, all images and/or videos uploaded in media section of epic.                            Imaging:  The patient does not have any new imaging of the head and neck since last visit.     Labs:  CBC  Recent Labs   Lab 06/21/21  1012 09/27/21  1320 03/29/22  0752   WBC 5.17 5.16 7.38   Hemoglobin 12.3 L 11.9 L 12.4 L   Hematocrit 35.9 L 35.1 L 37.7 L   MCV 93 94 97   Platelets 156 157 183     BMP  Recent Labs   Lab 08/03/20  0737 08/10/20  0725 08/17/20  0710 08/31/20  1149 06/21/21  1012 09/27/21  1320 03/29/22  0752   Glucose 111 H 105 106   < > 102 93 115 H   Sodium 139 140 137   < > 142 140 142   Potassium 4.1 4.7 3.8   < > 3.9 4.1 4.0   Chloride 110 108 104   < > 111 H 109 107   CO2 23 26 23   < > 23 23 26   BUN 17 17 26 H   < > 9 19 23   Creatinine 1.0 1.2 1.4   < > 1.1 1.1 1.3   Calcium 8.6 L 9.0 8.7   < > 8.8 9.9 9.2   Phosphorus 3.3 3.3 3.8  --   --   --   --    Magnesium 1.7 1.7 1.5 L  --   --   --   --     < > = values in this interval not displayed.     COAGS  Recent Labs   Lab 02/27/20  2300 04/25/20  1025   INR 1.0 1.0       Assessment  1. History of cancer of larynx    2. Seasonal allergic rhinitis, unspecified trigger    3. History of lung cancer       Plan:  Discussed plan of care with patient in detail and all questions answered. Patient reported understanding of plan of care.     F/u 4 weeks for surveillance visit and scope- gets periods of hoarseness ( not a new thing for him), not as edematous as usual ( this fluctuates on scope as well from my experience with him) has a bit more erythema from before, since has been 6 months since seen me will do close follow up scope  exam , low threshold for operative DL for evaluation given post radiation changes and loss to follow up. Emphasized importance of follow up visits.   Has f/u with dr. esteves and images planned for 6 months    Please be aware that this note has been generated with the assistance of [employer] voice-to-text.  Please excuse any spelling or grammatical errors.

## 2022-05-23 ENCOUNTER — OFFICE VISIT (OUTPATIENT)
Dept: OTOLARYNGOLOGY | Facility: CLINIC | Age: 72
End: 2022-05-23
Payer: MEDICARE

## 2022-05-23 VITALS
WEIGHT: 129.5 LBS | HEIGHT: 66 IN | DIASTOLIC BLOOD PRESSURE: 84 MMHG | BODY MASS INDEX: 20.81 KG/M2 | SYSTOLIC BLOOD PRESSURE: 132 MMHG

## 2022-05-23 DIAGNOSIS — H90.11 CONDUCTIVE HEARING LOSS OF RIGHT EAR WITH UNRESTRICTED HEARING OF LEFT EAR: ICD-10-CM

## 2022-05-23 DIAGNOSIS — J30.2 SEASONAL ALLERGIC RHINITIS, UNSPECIFIED TRIGGER: ICD-10-CM

## 2022-05-23 DIAGNOSIS — Z85.21 HISTORY OF CANCER OF LARYNX: Primary | ICD-10-CM

## 2022-05-23 DIAGNOSIS — Z85.118 HISTORY OF LUNG CANCER: ICD-10-CM

## 2022-05-23 PROCEDURE — 1126F AMNT PAIN NOTED NONE PRSNT: CPT | Mod: CPTII,S$GLB,, | Performed by: OTOLARYNGOLOGY

## 2022-05-23 PROCEDURE — 99213 OFFICE O/P EST LOW 20 MIN: CPT | Mod: 25,S$GLB,, | Performed by: OTOLARYNGOLOGY

## 2022-05-23 PROCEDURE — 1101F PT FALLS ASSESS-DOCD LE1/YR: CPT | Mod: CPTII,S$GLB,, | Performed by: OTOLARYNGOLOGY

## 2022-05-23 PROCEDURE — 3075F SYST BP GE 130 - 139MM HG: CPT | Mod: CPTII,S$GLB,, | Performed by: OTOLARYNGOLOGY

## 2022-05-23 PROCEDURE — 3079F PR MOST RECENT DIASTOLIC BLOOD PRESSURE 80-89 MM HG: ICD-10-PCS | Mod: CPTII,S$GLB,, | Performed by: OTOLARYNGOLOGY

## 2022-05-23 PROCEDURE — 1101F PR PT FALLS ASSESS DOC 0-1 FALLS W/OUT INJ PAST YR: ICD-10-PCS | Mod: CPTII,S$GLB,, | Performed by: OTOLARYNGOLOGY

## 2022-05-23 PROCEDURE — 3288F PR FALLS RISK ASSESSMENT DOCUMENTED: ICD-10-PCS | Mod: CPTII,S$GLB,, | Performed by: OTOLARYNGOLOGY

## 2022-05-23 PROCEDURE — 3008F PR BODY MASS INDEX (BMI) DOCUMENTED: ICD-10-PCS | Mod: CPTII,S$GLB,, | Performed by: OTOLARYNGOLOGY

## 2022-05-23 PROCEDURE — 1159F PR MEDICATION LIST DOCUMENTED IN MEDICAL RECORD: ICD-10-PCS | Mod: CPTII,S$GLB,, | Performed by: OTOLARYNGOLOGY

## 2022-05-23 PROCEDURE — 1126F PR PAIN SEVERITY QUANTIFIED, NO PAIN PRESENT: ICD-10-PCS | Mod: CPTII,S$GLB,, | Performed by: OTOLARYNGOLOGY

## 2022-05-23 PROCEDURE — 99499 UNLISTED E&M SERVICE: CPT | Mod: S$GLB,,, | Performed by: OTOLARYNGOLOGY

## 2022-05-23 PROCEDURE — 3079F DIAST BP 80-89 MM HG: CPT | Mod: CPTII,S$GLB,, | Performed by: OTOLARYNGOLOGY

## 2022-05-23 PROCEDURE — 99499 RISK ADDL DX/OHS AUDIT: ICD-10-PCS | Mod: S$GLB,,, | Performed by: OTOLARYNGOLOGY

## 2022-05-23 PROCEDURE — 3008F BODY MASS INDEX DOCD: CPT | Mod: CPTII,S$GLB,, | Performed by: OTOLARYNGOLOGY

## 2022-05-23 PROCEDURE — 99213 PR OFFICE/OUTPT VISIT, EST, LEVL III, 20-29 MIN: ICD-10-PCS | Mod: 25,S$GLB,, | Performed by: OTOLARYNGOLOGY

## 2022-05-23 PROCEDURE — 3075F PR MOST RECENT SYSTOLIC BLOOD PRESS GE 130-139MM HG: ICD-10-PCS | Mod: CPTII,S$GLB,, | Performed by: OTOLARYNGOLOGY

## 2022-05-23 PROCEDURE — 1159F MED LIST DOCD IN RCRD: CPT | Mod: CPTII,S$GLB,, | Performed by: OTOLARYNGOLOGY

## 2022-05-23 PROCEDURE — 3288F FALL RISK ASSESSMENT DOCD: CPT | Mod: CPTII,S$GLB,, | Performed by: OTOLARYNGOLOGY

## 2022-05-23 NOTE — PROGRESS NOTES
OTOLARYNGOLOGY CLINIC NOTE  Date:  05/23/2022     Chief complaint:  Chief Complaint   Patient presents with    Follow-up     4 week f/u history of larynx cancer       History of Present Illness  Sharath Huizar is a 71 y.o. male  presenting today for a followup of larynx cancer. He had been lost to follow up for several months until 4-28-22. Scheduled for closer follow up today to ensure exam stability.  No throat pain. No hemoptysis. No raspy voice . No LAD  No ear pain    Hearing test in 2020 showed tm perf and right CHL; left ear was normal. No otorrhea.  Worked in construction   Did not see perf on exam today but has large volume type B tymp     Regarding his oncologic history:  He initially was referred to me for hoarseness and hemoptysis. He underwent DL with biopsy of left supraglottic mass ( glottic surface of epiglottis and left false fold with some extension to right epiglottis) on 4-20-20 that showed scca. He had 2 right sided hypermetabolic nodes ( 10-15 mm ) and 11 mm hypermetabolic node on left side of neck ( in addition to hypermetabolic supraglottic primary). He was also noted on workup of this to have a solitary lung nodule in the right lower lobe on chest ct. IR biopsy performed 4-27-20 which was positive for malignancy ( squamous cell ca vs adenosquamous). This was considered to be synchronous primary . His case was presented at the head and neck tumor board and decision was made for CRT ( pt did not want laryngectomy) for his [id number] laryngeal cancer . His case was also presented at lung tumor board and recommended for upfront SBRT for his cT1N0 lung cancer. He also had a positive AFB however not felt to be TB by pulm.   He was treated by Dr. Aguilar ( heme onc) and Dr. 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 PEG tube insertion was done 5-21-20. He also had dental clearance prior to treatment.  He completed SBRT in early July. Started  on concurrent CRT ( cisplatin x6 doses)  7-6-20, completed RT 8-21-20.     He  had his G-tube removed 12-30-20. He had restaging PET-CT mid December which did not show any obvious recurrence in the head neck area     Out of abundance of precaution, I took him back to the operating room for direct laryngoscopy and biopsy on 02/18/2021.  This was done for persistent/ slightly worsened post radiation edema and to rule out any submucosal disease.  Biopsies were negative for malignancy.     Past Medical History  Past Medical History:   Diagnosis Date    Cancer     THROAT    Coronary artery disease     History of chemotherapy     THROAT CANCER    Hypertension     Myocardial infarction     NSTEMI (non-ST elevated myocardial infarction) 3/23/2018    Nuclear sclerosis of both eyes 6/13/2019        Past Surgical History  Past Surgical History:   Procedure Laterality Date    CARDIAC CATHETERIZATION      with 4 stents    DIRECT LARYNGOSCOPY N/A 4/20/2020    Procedure: LARYNGOSCOPY, DIRECT;  Surgeon: Genoveva Vallejo MD;  Location: [facility] OR;  Service: ENT;  Laterality: N/A;    DIRECT LARYNGOSCOPY N/A 2/18/2021    Procedure: LARYNGOSCOPY, DIRECT with possible biopsy;  Surgeon: Genoveva Vallejo MD;  Location: [facility] OR;  Service: ENT;  Laterality: N/A;  PRE-OP BY RN 2---COVID NEGATIVE ON 2/17    LEFT HEART CATHETERIZATION Left 5/29/2018    Procedure: Left heart cath;  Surgeon: Viral Lombardi MD;  Location: [facility] CATH LAB;  Service: Cardiology;  Laterality: Left;  RN PREOP 5/28/18    LUNG BIOPSY N/A 4/27/2020    Procedure: BIOPSY, LUNG;  Surgeon: [facility] Diagnostic Provider;  Location: [facility] OR;  Service: Radiology;  Laterality: N/A;  9am start--RN PHONE PREOP 4/24----COVID NEGATIVE--pt        Medications  Current Outpatient Medications on File Prior to Visit   Medication Sig Dispense Refill    acetaminophen (TYLENOL) 325 MG tablet Take 1 tablet (325 mg total) by mouth every 6 (six) hours as needed for Pain. 30  tablet 0    atorvastatin (LIPITOR) 80 MG tablet Take 1 tablet (80 mg total) by mouth once daily. 90 tablet 3    azelastine (ASTELIN) 137 mcg (0.1 %) nasal spray 1 spray (137 mcg total) by Nasal route 2 (two) times daily. 30 mL 3    clopidogreL (PLAVIX) 75 mg tablet Take 1 tablet (75 mg total) by mouth once daily. 90 tablet 3    fluticasone propionate (FLONASE) 50 mcg/actuation nasal spray 1 spray (50 mcg total) by Each Nostril route 2 (two) times daily. 18.2 mL 3    fluticasone propionate (FLONASE) 50 mcg/actuation nasal spray 1 spray (50 mcg total) by Each Nostril route 2 (two) times daily. 18.2 mL 3    HYDROcodone-acetaminophen (NORCO) 5-325 mg per tablet Take 1 tablet by mouth every 6 (six) hours as needed for Pain. 10 tablet 0    metoprolol succinate (TOPROL-XL) 50 MG 24 hr tablet Take 1 tablet (50 mg total) by mouth once daily. 90 tablet 3    sodium chloride (SALINE NASAL) 0.65 % nasal spray 2 sprays by Nasal route 2 (two) times a day. Use prior to medication sprays 1 Bottle 12    aspirin (ECOTRIN) 81 MG EC tablet Take 1 tablet (81 mg total) by mouth once daily. (Patient taking differently: Take 81 mg by mouth once daily. )  0    cetirizine (ZYRTEC) 10 MG tablet Take 1 tablet (10 mg total) by mouth once daily. 30 tablet 3    nitroGLYCERIN (NITROSTAT) 0.4 MG SL tablet Place 1 tablet (0.4 mg total) under the tongue every 5 (five) minutes as needed. 25 tablet 3     No current facility-administered medications on file prior to visit.       Review of Systems  Review of Systems   Constitutional: Negative for fever.   HENT: Negative for ear discharge, ear pain and sore throat.    Respiratory: Negative for hemoptysis.    Musculoskeletal: Negative for neck pain.   Endo/Heme/Allergies: Positive for environmental allergies.        Social History   reports that he quit smoking about 4 years ago. His smoking use included cigarettes. He has a 7.50 pack-year smoking history. He has never used smokeless tobacco. He  "reports that he does not drink alcohol and does not use drugs.     Family History  Family History   Problem Relation Age of Onset    Blindness Paternal Aunt         Physical Exam   There were no vitals filed for this visit. Body mass index is 20.91 kg/m².  Weight: 58.7 kg (129 lb 8.3 oz)   Height: 5' 6" (167.6 cm)     GENERAL: no acute distress.  HEAD: normocephalic.   EYES:  No scleral icterus  EARS: external ear without lesion, normal pinna shape and position.  External auditory canal with normal cerumen, tympanic membrane fully visible, no perforation , no retraction. No middle ear effusion. Ossicles intact. Did not see perforation but had some wax on drum posteriorly  NOSE: external nose without significant bony abnormality, mild turbinate hypertrophy  ORAL CAVITY/OROPHARYNX: tongue  mobile.   NECK: trachea midline.   LYMPH NODES:No cervical lymphadenopathy. Postradiation changes  RESPIRATORY: no stridor, no stertor. Voice normal today not raspy like at prior visit. Respirations nonlabored.  NEURO: alert, responds to questions appropriately.  Facial movement symmetric at rest   PSYCH:mood appropriate    PROCEDURE NOTE  NAME OF PROCEDURE: Flexible Laryngoscopy, diagnostic  INDICATIONS: gag reflex precludes mirror exam, surveillance scope  FINDINGS: postradiation changes stable, no malignancy    Consent: After procedure was explained in detail and all questions answered, verbal consent was obtained for performing flexible laryngoscopy.  Anesthesia: topical 4% lidocaine and neosynephrine  Procedure: With patient in seated position, the scope was inserted into the bilateral nasal passageway and advanced atraumatically into the nasopharynx to examine the following structures:  Nasopharynx: no mass or lesion noted in nasopharynx.   Oropharynx: base of tongue without  mass or ulceration. Lingual tonsils normal in appearance  Hypopharynx: posterior pharyngeal wall without mass or lesion. No pooling of secretions. " Pyriform sinuses visible without mass or lesion  Larynx: epiglottis normal without lesion. False vocal folds without lesion. Mild edema and erythema unchanged.True vocal folds mobile but reduced abduction which is normal for him ,no lesion.arytenoid edema stable. Mild interarytenoid edema and erythema unchanged . Postcricoid region with mild edema no lesion   Subglottis: visualized portion of subglottis normal in appearance    After examination performed, the scope was removed atraumatically . The patient tolerated the procedure well. Photodocumentation obtained with representative images below, all images and/or videos uploaded in media section of epic.    Imaging:  The patient does not have any new imaging of the head and neck since last visit.     Labs:  CBC  Recent Labs   Lab 06/21/21  1012 09/27/21  1320 03/29/22  0752   WBC 5.17 5.16 7.38   Hemoglobin 12.3 L 11.9 L 12.4 L   Hematocrit 35.9 L 35.1 L 37.7 L   MCV 93 94 97   Platelets 156 157 183     BMP  Recent Labs   Lab 08/03/20  0737 08/10/20  0725 08/17/20  0710 08/31/20  1149 06/21/21  1012 09/27/21  1320 03/29/22  0752   Glucose 111 H 105 106   < > 102 93 115 H   Sodium 139 140 137   < > 142 140 142   Potassium 4.1 4.7 3.8   < > 3.9 4.1 4.0   Chloride 110 108 104   < > 111 H 109 107   CO2 23 26 23   < > 23 23 26   BUN 17 17 26 H   < > 9 19 23   Creatinine 1.0 1.2 1.4   < > 1.1 1.1 1.3   Calcium 8.6 L 9.0 8.7   < > 8.8 9.9 9.2   Phosphorus 3.3 3.3 3.8  --   --   --   --    Magnesium 1.7 1.7 1.5 L  --   --   --   --     < > = values in this interval not displayed.     COAGS  Recent Labs   Lab 02/27/20  2300 04/25/20  1025   INR 1.0 1.0       Assessment  1. History of cancer of larynx    2. Seasonal allergic rhinitis, unspecified trigger    3. History of lung cancer    4. Conductive hearing loss of right ear with unrestricted hearing of left ear       Plan:  Discussed plan of care with patient in detail and all questions answered. Patient reported  understanding of plan of care.   Exam stable- JERAMIE  F/u 10 weeks for surveillance, reviewed si/sx to notify me before  Prior hearing test showed tm perf, no si/sx of cholesteatoma. Discussed mgmt options and pros/cons of both. Unclear but suspect was chronic with occupational history. Due for repeat surveillance hearing test. Will  Have that scheduled same day as f/u appointment.     Please be aware that this note has been generated with the assistance of MModal voice-to-text.  Please excuse any spelling or grammatical errors.

## 2022-05-30 ENCOUNTER — DOCUMENTATION ONLY (OUTPATIENT)
Dept: HEMATOLOGY/ONCOLOGY | Facility: CLINIC | Age: 72
End: 2022-05-30
Payer: MEDICARE

## 2022-05-31 ENCOUNTER — PATIENT MESSAGE (OUTPATIENT)
Dept: ADMINISTRATIVE | Facility: HOSPITAL | Age: 72
End: 2022-05-31
Payer: MEDICARE

## 2022-06-11 ENCOUNTER — HOSPITAL ENCOUNTER (EMERGENCY)
Facility: HOSPITAL | Age: 72
Discharge: HOME | End: 2022-06-11
Attending: EMERGENCY MEDICINE
Payer: MEDICARE

## 2022-06-11 VITALS
HEART RATE: 76 BPM | RESPIRATION RATE: 15 BRPM | OXYGEN SATURATION: 97 % | DIASTOLIC BLOOD PRESSURE: 72 MMHG | TEMPERATURE: 98 F | SYSTOLIC BLOOD PRESSURE: 135 MMHG

## 2022-06-11 DIAGNOSIS — B02.9 HERPES ZOSTER WITHOUT COMPLICATION: Primary | ICD-10-CM

## 2022-06-11 PROCEDURE — 99284 EMERGENCY DEPT VISIT MOD MDM: CPT

## 2022-06-11 RX ORDER — VALACYCLOVIR HYDROCHLORIDE 1 G/1
1000 TABLET, FILM COATED ORAL 3 TIMES DAILY
Qty: 21 TABLET | Refills: 0 | Status: ON HOLD | OUTPATIENT
Start: 2022-06-11 | End: 2023-01-31 | Stop reason: HOSPADM

## 2022-06-11 RX ORDER — HYDROCODONE BITARTRATE AND ACETAMINOPHEN 5; 325 MG/1; MG/1
1 TABLET ORAL EVERY 4 HOURS PRN
Qty: 18 TABLET | Refills: 0 | Status: SHIPPED | OUTPATIENT
Start: 2022-06-11 | End: 2022-06-21

## 2022-06-11 NOTE — ED TRIAGE NOTES
Pt presents to ED from home with c/o of left neck and shoulder pain x 2-3 days accompanied by a rash with pustules to the left arm which pt noticed yesterday. Pt denies itching, chest pain, sob, n/v/d, new medications, or recent travel.

## 2022-06-11 NOTE — ED PROVIDER NOTES
Encounter Date: 6/11/2022       History     Chief Complaint   Patient presents with    Rash     C/o pustules and rash that started yesterday with neck and left arm pain     HPI   This 71-year-old white male presents emergency room with one-day history of a painful rash on his left shoulder left humeral arm and left forearm.  He also has pain on the left lateral neck across the top of the high thoracic back.  The rash is very painful.  There are a little fluid-filled blisters.  Review of patient's allergies indicates:  No Known Allergies  Past Medical History:   Diagnosis Date    Cancer     THROAT    Coronary artery disease     History of chemotherapy     THROAT CANCER    Hypertension     Myocardial infarction     NSTEMI (non-ST elevated myocardial infarction) 3/23/2018    Nuclear sclerosis of both eyes 6/13/2019     Past Surgical History:   Procedure Laterality Date    CARDIAC CATHETERIZATION      with 4 stents    DIRECT LARYNGOSCOPY N/A 4/20/2020    Procedure: LARYNGOSCOPY, DIRECT;  Surgeon: Genoveva Vallejo MD;  Location: [facility] OR;  Service: ENT;  Laterality: N/A;    DIRECT LARYNGOSCOPY N/A 2/18/2021    Procedure: LARYNGOSCOPY, DIRECT with possible biopsy;  Surgeon: Genoveva Vallejo MD;  Location: [facility] OR;  Service: ENT;  Laterality: N/A;  PRE-OP BY RN 2---COVID NEGATIVE ON 2/17    LEFT HEART CATHETERIZATION Left 5/29/2018    Procedure: Left heart cath;  Surgeon: Viral Lombardi MD;  Location: [facility] CATH LAB;  Service: Cardiology;  Laterality: Left;  RN PREOP 5/28/18    LUNG BIOPSY N/A 4/27/2020    Procedure: BIOPSY, LUNG;  Surgeon: [facility] Diagnostic Provider;  Location: [facility] OR;  Service: Radiology;  Laterality: N/A;  9am start--RN PHONE PREOP 4/24----COVID NEGATIVE--pt     Family History   Problem Relation Age of Onset    Blindness Paternal Aunt      Social History     Tobacco Use    Smoking status: Former Smoker     Packs/day: 0.25     Years: 30.00     Pack years: 7.50     Types:  Cigarettes     Quit date: 3/23/2018     Years since quittin.2    Smokeless tobacco: Never Used   Substance Use Topics    Alcohol use: No    Drug use: No     Review of Systems  The patient was questioned specifically with regard to the following.  General: Fever, chills, sweats. Neuro: Headache. Eyes: eye problems. ENT: Ear pain, sore throat. Cardiovascular: Chest pain. Respiratory: Cough, shortness of breath. Gastrointestinal: Abdominal pain, vomiting, diarrhea. Genitourinary: Painful urination.  Musculoskeletal: Arm and leg problems. Skin: Rash.  The review of systems was negative except for the following:  Painful rash left shoulder left arm, pain left neck.  Physical Exam     Initial Vitals [22 1355]   BP Pulse Resp Temp SpO2   131/74 84 17 97.5 °F (36.4 °C) 98 %      MAP       --         Physical Exam  The patient was examined specifically for the following:   General:No significant distress, Good color, Warm and dry. Head and neck:Scalp atraumatic, Neck supple. Neurological:Appropriate conversation, Gross motor deficits. Eyes:Conjugate gaze, Clear corneas. ENT: No epistaxis. Cardiac: Regular rate and rhythm, Grossly normal heart tones. Pulmonary: Wheezing, Rales. Gastrointestinal: Abdominal tenderness, Abdominal distention. Musculoskeletal: Extremity deformity, Apparent pain with range of motion of the joints. Skin: Rash.   The findings on examination were normal except for the following:  The patient has of a vesicular rash along the anterior shoulder the anterior humeral arm the radial forearm.  The vesicles have a red base.  ED Course   Procedures  Labs Reviewed - No data to display       Imaging Results    None       Medical decision making:  Given the above this patient has herpes zoster.  I will treat with Valtrex and pain medicine.  I will have him follow up with primary care.       Medications - No data to display                       Clinical Impression:   Final diagnoses:  [B02.9] Herpes  zoster without complication (Primary)          ED Disposition Condition    Discharge Stable        ED Prescriptions     Medication Sig Dispense Start Date End Date Auth. Provider    valACYclovir (VALTREX) 1000 MG tablet Take 1 tablet (1,000 mg total) by mouth 3 (three) times daily. for 7 days 21 tablet 6/11/2022 6/18/2022 Matt Weeks MD    HYDROcodone-acetaminophen (NORCO) 5-325 mg per tablet Take 1 tablet by mouth every 4 (four) hours as needed for Pain. 18 tablet 6/11/2022 6/21/2022 Matt Weeks MD        Follow-up Information     Follow up With Specialties Details Why Contact Info    Wilmer Bangura Jr., MD Family Medicine In 1 week  [address] [facility] [address]  [phone]             Matt Weeks MD  06/11/22 1551       Matt Weeks MD  06/11/22 4509

## 2022-06-11 NOTE — DISCHARGE INSTRUCTIONS
Medicines as directed.  Tylenol 1000 mg by mouth every 8 hours or Tylenol with hydrocodone for pain.  The fluid in the vesicles is contagious.  You will be contagious until all the lesions on your arm are crusted over with brown scabs.  Please follow-up with primary care doctor next week.

## 2022-06-23 ENCOUNTER — TELEPHONE (OUTPATIENT)
Dept: FAMILY MEDICINE | Facility: CLINIC | Age: 72
End: 2022-06-23
Payer: MEDICARE

## 2022-06-23 NOTE — TELEPHONE ENCOUNTER
Spoke with patient over the phone and he is unable to set up for a virtual appointment but was open to going to another location. Set up an appointment for 06/27/2022. Patient stated that he is recovered, but the pain is still persisting. Patient stated that he will make it out for his appointment.

## 2022-06-23 NOTE — TELEPHONE ENCOUNTER
----- Message from Laney Chris sent at 6/23/2022  3:26 PM CDT -----  Type: Patient Call Back    Who called self    What is the request in detail: patient had the shingles, was seeing if Dr Bangura could call in antibiotics and pain med    Can the clinic reply by [facility]? no    Would the patient rather a call back or a response via [facility]? call    Best call back number:.[phone] ([phone]

## 2022-06-23 NOTE — TELEPHONE ENCOUNTER
Dr. Bangura is currently not in office  Would Patient like to schedule with another provider in his office or schedule a virtual visit or a visit with [facility]?

## 2022-06-23 NOTE — TELEPHONE ENCOUNTER
Does he still have active blisters? I recommend we try to schedule him for f/u with Jared if available    Maile Ace MD

## 2022-06-27 ENCOUNTER — OFFICE VISIT (OUTPATIENT)
Dept: FAMILY MEDICINE | Facility: CLINIC | Age: 72
End: 2022-06-27
Payer: MEDICARE

## 2022-06-27 VITALS
SYSTOLIC BLOOD PRESSURE: 120 MMHG | HEART RATE: 80 BPM | HEIGHT: 66 IN | TEMPERATURE: 98 F | WEIGHT: 125.69 LBS | OXYGEN SATURATION: 100 % | DIASTOLIC BLOOD PRESSURE: 70 MMHG | BODY MASS INDEX: 20.2 KG/M2

## 2022-06-27 DIAGNOSIS — B02.29 POSTHERPETIC NEURALGIA: Primary | ICD-10-CM

## 2022-06-27 PROCEDURE — 1159F PR MEDICATION LIST DOCUMENTED IN MEDICAL RECORD: ICD-10-PCS | Mod: CPTII,S$GLB,, | Performed by: FAMILY MEDICINE

## 2022-06-27 PROCEDURE — 1125F AMNT PAIN NOTED PAIN PRSNT: CPT | Mod: CPTII,S$GLB,, | Performed by: FAMILY MEDICINE

## 2022-06-27 PROCEDURE — 3288F FALL RISK ASSESSMENT DOCD: CPT | Mod: CPTII,S$GLB,, | Performed by: FAMILY MEDICINE

## 2022-06-27 PROCEDURE — 99214 PR OFFICE/OUTPT VISIT, EST, LEVL IV, 30-39 MIN: ICD-10-PCS | Mod: S$GLB,,, | Performed by: FAMILY MEDICINE

## 2022-06-27 PROCEDURE — 3008F PR BODY MASS INDEX (BMI) DOCUMENTED: ICD-10-PCS | Mod: CPTII,S$GLB,, | Performed by: FAMILY MEDICINE

## 2022-06-27 PROCEDURE — 1159F MED LIST DOCD IN RCRD: CPT | Mod: CPTII,S$GLB,, | Performed by: FAMILY MEDICINE

## 2022-06-27 PROCEDURE — 1101F PT FALLS ASSESS-DOCD LE1/YR: CPT | Mod: CPTII,S$GLB,, | Performed by: FAMILY MEDICINE

## 2022-06-27 PROCEDURE — 99999 PR PBB SHADOW E&M-EST. PATIENT-LVL III: CPT | Mod: PBBFAC,,, | Performed by: FAMILY MEDICINE

## 2022-06-27 PROCEDURE — 99214 OFFICE O/P EST MOD 30 MIN: CPT | Mod: S$GLB,,, | Performed by: FAMILY MEDICINE

## 2022-06-27 PROCEDURE — 1101F PR PT FALLS ASSESS DOC 0-1 FALLS W/OUT INJ PAST YR: ICD-10-PCS | Mod: CPTII,S$GLB,, | Performed by: FAMILY MEDICINE

## 2022-06-27 PROCEDURE — 1160F PR REVIEW ALL MEDS BY PRESCRIBER/CLIN PHARMACIST DOCUMENTED: ICD-10-PCS | Mod: CPTII,S$GLB,, | Performed by: FAMILY MEDICINE

## 2022-06-27 PROCEDURE — 3078F PR MOST RECENT DIASTOLIC BLOOD PRESSURE < 80 MM HG: ICD-10-PCS | Mod: CPTII,S$GLB,, | Performed by: FAMILY MEDICINE

## 2022-06-27 PROCEDURE — 3074F PR MOST RECENT SYSTOLIC BLOOD PRESSURE < 130 MM HG: ICD-10-PCS | Mod: CPTII,S$GLB,, | Performed by: FAMILY MEDICINE

## 2022-06-27 PROCEDURE — 3008F BODY MASS INDEX DOCD: CPT | Mod: CPTII,S$GLB,, | Performed by: FAMILY MEDICINE

## 2022-06-27 PROCEDURE — 99999 PR PBB SHADOW E&M-EST. PATIENT-LVL III: ICD-10-PCS | Mod: PBBFAC,,, | Performed by: FAMILY MEDICINE

## 2022-06-27 PROCEDURE — 3288F PR FALLS RISK ASSESSMENT DOCUMENTED: ICD-10-PCS | Mod: CPTII,S$GLB,, | Performed by: FAMILY MEDICINE

## 2022-06-27 PROCEDURE — 1125F PR PAIN SEVERITY QUANTIFIED, PAIN PRESENT: ICD-10-PCS | Mod: CPTII,S$GLB,, | Performed by: FAMILY MEDICINE

## 2022-06-27 PROCEDURE — 3078F DIAST BP <80 MM HG: CPT | Mod: CPTII,S$GLB,, | Performed by: FAMILY MEDICINE

## 2022-06-27 PROCEDURE — 1160F RVW MEDS BY RX/DR IN RCRD: CPT | Mod: CPTII,S$GLB,, | Performed by: FAMILY MEDICINE

## 2022-06-27 PROCEDURE — 3074F SYST BP LT 130 MM HG: CPT | Mod: CPTII,S$GLB,, | Performed by: FAMILY MEDICINE

## 2022-06-27 RX ORDER — GABAPENTIN 100 MG/1
100 CAPSULE ORAL 3 TIMES DAILY
Qty: 90 CAPSULE | Refills: 2 | Status: SHIPPED | OUTPATIENT
Start: 2022-06-27 | End: 2022-07-14

## 2022-06-27 NOTE — PROGRESS NOTES
"  Physical Exam  /70   Pulse 80   Temp 98.1 °F (36.7 °C) (Oral)   Ht 5' 6" (1.676 m)   Wt 57 kg (125 lb 10.6 oz)   SpO2 100%   BMI 20.28 kg/m²      Office Visit    Patient Name: Sharath Huizar    : 1950  MRN: [id number]      Assessment/Plan:  Sharath Huizar is a 71 y.o. male who presents today for :    Postherpetic neuralgia  -     gabapentin (NEURONTIN) 100 MG capsule; Take 1 capsule (100 mg total) by mouth 3 (three) times daily.  Dispense: 90 capsule; Refill: 2    -shingles well healed s/p course of Valtrex. Will initiate Neurontin for nerve pain. May continue with OTC PRN acetaminophen for pain. May use Aspercreme for comfort  -counseled patient that neuralgia may last anywhere from 30 days to 6 months, so patient is advised to follow up as needed.        This note was created by combination of typed  and MModal dictation.  Transcription errors may be present.  If there are any questions, please contact me.        ----------------------------------------------------------------------------------------------------------------------      HPI:  Patient Care Team:  Wilmer Bangura Jr., MD as PCP - General (Family Medicine)  Manuela Everett MA as Care Coordinator  TERRA Bellamy MD as Consulting Physician (Otolaryngology)  Pilar Aguilar MD as Consulting Physician (Hematology and Oncology)  Stanislaw Hernandez OD (Optometry)  Josh Cardenas MD as Consulting Physician (General Surgery)  Aden Lopez MD as Consulting Physician (Pulmonary Disease)  Mary Jo Puga LCSW as     Sharath is a 71 y.o. male with      Patient Active Problem List   Diagnosis    Hypertension, essential    Leukocytosis    Elevated LFTs    Chronic systolic heart failure    Coronary artery disease involving native coronary artery of native heart without angina pectoris    MCKEON (dyspnea on exertion)    Atherosclerosis of aorta    Chronic pain of both shoulders    " Decreased range of motion of shoulder    Weakness of shoulder    Refractive error    Nuclear sclerosis of both eyes    Asteroid hyalosis of right eye    Cavitary lung disease    Nasal congestion    DANYELLE (obstructive sleep apnea)    Coronary artery disease with exertional angina    Supraglottic mass    Lung mass    History of cancer of larynx    History of lung cancer    Other emphysema       Patient is new to me and has PMHx as above presents today for ER follow up of Shingles of the L arm from 6/11/22. He states the rash has resolved after course of Valtrex, however, he still has tingling nerve pain along his L forearm that occasionally radiates to his L upper arm and shoulder, which is persistent since his initial treatment for Shingles from over 2 weeks ago.     Additional ROS    No F/C/wt changes/fatigue  No dysphagia/sore throat/rhinorrhea  No CP/SOB/palpitations/swelling  No cough/wheezing/SOB  No nausea/vomiting/abd pain/no diarrhea  No MSK weakness/HA/tingling/numbness      Current Medications  Medications reviewed and updated.       Current Outpatient Medications:     acetaminophen (TYLENOL) 325 MG tablet, Take 1 tablet (325 mg total) by mouth every 6 (six) hours as needed for Pain., Disp: 30 tablet, Rfl: 0    atorvastatin (LIPITOR) 80 MG tablet, Take 1 tablet (80 mg total) by mouth once daily., Disp: 90 tablet, Rfl: 3    azelastine (ASTELIN) 137 mcg (0.1 %) nasal spray, 1 spray (137 mcg total) by Nasal route 2 (two) times daily., Disp: 30 mL, Rfl: 3    clopidogreL (PLAVIX) 75 mg tablet, Take 1 tablet (75 mg total) by mouth once daily., Disp: 90 tablet, Rfl: 3    fluticasone propionate (FLONASE) 50 mcg/actuation nasal spray, 1 spray (50 mcg total) by Each Nostril route 2 (two) times daily., Disp: 18.2 mL, Rfl: 3    fluticasone propionate (FLONASE) 50 mcg/actuation nasal spray, 1 spray (50 mcg total) by Each Nostril route 2 (two) times daily., Disp: 18.2 mL, Rfl: 3    metoprolol succinate  (TOPROL-XL) 50 MG 24 hr tablet, Take 1 tablet (50 mg total) by mouth once daily., Disp: 90 tablet, Rfl: 3    sodium chloride (SALINE NASAL) 0.65 % nasal spray, 2 sprays by Nasal route 2 (two) times a day. Use prior to medication sprays, Disp: 1 Bottle, Rfl: 12    aspirin (ECOTRIN) 81 MG EC tablet, Take 1 tablet (81 mg total) by mouth once daily. (Patient taking differently: Take 81 mg by mouth once daily. ), Disp: , Rfl: 0    cetirizine (ZYRTEC) 10 MG tablet, Take 1 tablet (10 mg total) by mouth once daily., Disp: 30 tablet, Rfl: 3    gabapentin (NEURONTIN) 100 MG capsule, Take 1 capsule (100 mg total) by mouth 3 (three) times daily., Disp: 90 capsule, Rfl: 2    nitroGLYCERIN (NITROSTAT) 0.4 MG SL tablet, Place 1 tablet (0.4 mg total) under the tongue every 5 (five) minutes as needed., Disp: 25 tablet, Rfl: 3    valACYclovir (VALTREX) 1000 MG tablet, Take 1 tablet (1,000 mg total) by mouth 3 (three) times daily. for 7 days, Disp: 21 tablet, Rfl: 0    Past Surgical History:   Procedure Laterality Date    CARDIAC CATHETERIZATION      with 4 stents    DIRECT LARYNGOSCOPY N/A 4/20/2020    Procedure: LARYNGOSCOPY, DIRECT;  Surgeon: Genoveva Vallejo MD;  Location: [facility] OR;  Service: ENT;  Laterality: N/A;    DIRECT LARYNGOSCOPY N/A 2/18/2021    Procedure: LARYNGOSCOPY, DIRECT with possible biopsy;  Surgeon: Genoveva Vallejo MD;  Location: [facility] OR;  Service: ENT;  Laterality: N/A;  PRE-OP BY RN 2---COVID NEGATIVE ON 2/17    LEFT HEART CATHETERIZATION Left 5/29/2018    Procedure: Left heart cath;  Surgeon: Viral Lombardi MD;  Location: [facility] CATH LAB;  Service: Cardiology;  Laterality: Left;  RN PREOP 5/28/18    LUNG BIOPSY N/A 4/27/2020    Procedure: BIOPSY, LUNG;  Surgeon: [facility] Diagnostic Provider;  Location: [facility] OR;  Service: Radiology;  Laterality: N/A;  9am start--RN PHONE PREOP 4/24----COVID NEGATIVE--pt       Family History   Problem Relation Age of Onset    Blindness Paternal Aunt   "      Social History     Socioeconomic History    Marital status:    Tobacco Use    Smoking status: Former Smoker     Packs/day: 0.25     Years: 30.00     Pack years: 7.50     Types: Cigarettes     Quit date: 3/23/2018     Years since quittin.2    Smokeless tobacco: Never Used   Substance and Sexual Activity    Alcohol use: No    Drug use: No    Sexual activity: Yes     Partners: Female           Allergies   Review of patient's allergies indicates:  No Known Allergies          Review of Systems  See HPI      [unfilled]  /70   Pulse 80   Temp 98.1 °F (36.7 °C) (Oral)   Ht 5' 6" (1.676 m)   Wt 57 kg (125 lb 10.6 oz)   SpO2 100%   BMI 20.28 kg/m²     GEN: NAD, pleasant  HEENT: NCAT, PERRLA, EOMI, sclera clear, anicteric  NECK: normal, supple  LUNGS: CTAB, no w/r/r, no increased work of breathing   HEART: RRR, normal S1 and S2, no m/r/g, no edema  ABD: s/nt/nd, NABS  SKIN: normal turgor, no active lesions, well healed scar along L arm. no other areas of involvement on gross exam  PSYCH: AOx3, appropriate mood and affect  MSK: warm/well perfused, normal ROM in all extremities, no c/c/e.              "

## 2022-07-11 ENCOUNTER — TELEPHONE (OUTPATIENT)
Dept: FAMILY MEDICINE | Facility: CLINIC | Age: 72
End: 2022-07-11
Payer: MEDICARE

## 2022-07-11 DIAGNOSIS — G89.29 CHRONIC PAIN OF BOTH SHOULDERS: Primary | ICD-10-CM

## 2022-07-11 DIAGNOSIS — M25.511 CHRONIC PAIN OF BOTH SHOULDERS: Primary | ICD-10-CM

## 2022-07-11 DIAGNOSIS — M25.512 CHRONIC PAIN OF BOTH SHOULDERS: Primary | ICD-10-CM

## 2022-07-11 RX ORDER — TRAMADOL HYDROCHLORIDE 50 MG/1
50 TABLET ORAL EVERY 8 HOURS PRN
Qty: 10 TABLET | Refills: 0 | Status: SHIPPED | OUTPATIENT
Start: 2022-07-11

## 2022-07-11 NOTE — TELEPHONE ENCOUNTER
----- Message from Michael Howard sent at 7/11/2022  3:28 PM CDT -----  Regarding: Call  Contact: Patient  Type: Patient Call Back    Who called: Patient     What is the request in detail: Patient is in pain and tylenol is not helping.     Can the clinic reply by [facility]? No    Would the patient rather a call back or a response via [facility]? Call    Best call back number: [phone]    Additional Information:     Thanks

## 2022-07-11 NOTE — TELEPHONE ENCOUNTER
Rt patient wife Slim flores, she clarified patient has pain from left shoulder to wrist . He's been  unable to move /lift his arm, since dx with Shingles. Went to ED three times , the provider didn't take any x-rays .  Was recommended to take Tyneol which 'isn't  helping  . Feels numbness ,tingling and like something crawling in arm . Explained that Dr. Bangura's next opening is 2 months out . Dr. Conway has already placed a orthopedic referral . Gave contact to referrals team for scheduling .She asked if Dr. Bangura could write a script for a pain rx . Informed Dr. Bangura would more than likely require a ov prior to writing any rx . Also order x-ray imaging , please advise

## 2022-07-11 NOTE — TELEPHONE ENCOUNTER
----- Message from Janice Graf sent at 7/11/2022 12:07 PM CDT -----  Type: Patient Call Back    Who called:pt's wife lorena jama [phone]    What is the request in detail:pt requesting referral for orthopedic for right shoulder. Call pt     Can the clinic reply by [facility]?    Would the patient rather a call back or a response via [facility]? call    Best call back number:[phone] (home)       Additional Information:

## 2022-07-11 NOTE — TELEPHONE ENCOUNTER
----- Message from Janice Graf sent at 7/11/2022 11:25 AM CDT -----  Type:  Needs Medical Advice/Symptom-based Call    Who Called: pt    Symptoms (please be specific): shoulder pain left. Asking for medication for pain. Call pt    How long has patient had these symptoms:  month     Would the patient rather a call back or a response via [facility]? call    Best Call Back Number: [phone] (home)   Additional Information:

## 2022-07-11 NOTE — TELEPHONE ENCOUNTER
Spoke with patient he is asking can you prescribe him pain medication for his shoulder, he states that pain is at a level 10 right now. He states that tylenol is not working.

## 2022-07-11 NOTE — TELEPHONE ENCOUNTER
Chronic pain of both shoulders  -     traMADoL (ULTRAM) 50 mg tablet; Take 1 tablet (50 mg total) by mouth every 8 (eight) hours as needed for Pain.  Dispense: 10 tablet; Refill: 0    Let him know that I sent in for him a short supply of medication for pain, but that he really needs to follow up with his PCP for long term pain medication refills as I cannot refill his pain medication long term. I've also referred him to Ortho, please have him call    (Location near [facility])  [phone]  (Location near [facility])  [phone]    To set up an appointment.      Thanks

## 2022-07-12 DIAGNOSIS — M25.512 LEFT SHOULDER PAIN, UNSPECIFIED CHRONICITY: Primary | ICD-10-CM

## 2022-07-13 ENCOUNTER — OFFICE VISIT (OUTPATIENT)
Dept: ORTHOPEDICS | Facility: CLINIC | Age: 72
End: 2022-07-13
Payer: MEDICARE

## 2022-07-13 VITALS
SYSTOLIC BLOOD PRESSURE: 132 MMHG | HEART RATE: 83 BPM | BODY MASS INDEX: 20.82 KG/M2 | DIASTOLIC BLOOD PRESSURE: 68 MMHG | WEIGHT: 129 LBS | OXYGEN SATURATION: 98 % | RESPIRATION RATE: 18 BRPM

## 2022-07-13 DIAGNOSIS — M25.512 ACUTE PAIN OF LEFT SHOULDER: ICD-10-CM

## 2022-07-13 DIAGNOSIS — R29.898 LEFT ARM WEAKNESS: Primary | ICD-10-CM

## 2022-07-13 DIAGNOSIS — G89.29 CHRONIC PAIN OF BOTH SHOULDERS: ICD-10-CM

## 2022-07-13 DIAGNOSIS — M25.512 CHRONIC PAIN OF BOTH SHOULDERS: ICD-10-CM

## 2022-07-13 DIAGNOSIS — M25.511 CHRONIC PAIN OF BOTH SHOULDERS: ICD-10-CM

## 2022-07-13 PROCEDURE — 99999 PR PBB SHADOW E&M-EST. PATIENT-LVL V: CPT | Mod: PBBFAC,,, | Performed by: PHYSICIAN ASSISTANT

## 2022-07-13 PROCEDURE — 1160F RVW MEDS BY RX/DR IN RCRD: CPT | Mod: CPTII,S$GLB,, | Performed by: PHYSICIAN ASSISTANT

## 2022-07-13 PROCEDURE — 1125F AMNT PAIN NOTED PAIN PRSNT: CPT | Mod: CPTII,S$GLB,, | Performed by: PHYSICIAN ASSISTANT

## 2022-07-13 PROCEDURE — 1159F PR MEDICATION LIST DOCUMENTED IN MEDICAL RECORD: ICD-10-PCS | Mod: CPTII,S$GLB,, | Performed by: PHYSICIAN ASSISTANT

## 2022-07-13 PROCEDURE — 1159F MED LIST DOCD IN RCRD: CPT | Mod: CPTII,S$GLB,, | Performed by: PHYSICIAN ASSISTANT

## 2022-07-13 PROCEDURE — 1125F PR PAIN SEVERITY QUANTIFIED, PAIN PRESENT: ICD-10-PCS | Mod: CPTII,S$GLB,, | Performed by: PHYSICIAN ASSISTANT

## 2022-07-13 PROCEDURE — 99203 PR OFFICE/OUTPT VISIT, NEW, LEVL III, 30-44 MIN: ICD-10-PCS | Mod: S$GLB,,, | Performed by: PHYSICIAN ASSISTANT

## 2022-07-13 PROCEDURE — 3078F DIAST BP <80 MM HG: CPT | Mod: CPTII,S$GLB,, | Performed by: PHYSICIAN ASSISTANT

## 2022-07-13 PROCEDURE — 3075F SYST BP GE 130 - 139MM HG: CPT | Mod: CPTII,S$GLB,, | Performed by: PHYSICIAN ASSISTANT

## 2022-07-13 PROCEDURE — 99203 OFFICE O/P NEW LOW 30 MIN: CPT | Mod: S$GLB,,, | Performed by: PHYSICIAN ASSISTANT

## 2022-07-13 PROCEDURE — 99999 PR PBB SHADOW E&M-EST. PATIENT-LVL V: ICD-10-PCS | Mod: PBBFAC,,, | Performed by: PHYSICIAN ASSISTANT

## 2022-07-13 PROCEDURE — 3075F PR MOST RECENT SYSTOLIC BLOOD PRESS GE 130-139MM HG: ICD-10-PCS | Mod: CPTII,S$GLB,, | Performed by: PHYSICIAN ASSISTANT

## 2022-07-13 PROCEDURE — 3008F PR BODY MASS INDEX (BMI) DOCUMENTED: ICD-10-PCS | Mod: CPTII,S$GLB,, | Performed by: PHYSICIAN ASSISTANT

## 2022-07-13 PROCEDURE — 3078F PR MOST RECENT DIASTOLIC BLOOD PRESSURE < 80 MM HG: ICD-10-PCS | Mod: CPTII,S$GLB,, | Performed by: PHYSICIAN ASSISTANT

## 2022-07-13 PROCEDURE — 1101F PR PT FALLS ASSESS DOC 0-1 FALLS W/OUT INJ PAST YR: ICD-10-PCS | Mod: CPTII,S$GLB,, | Performed by: PHYSICIAN ASSISTANT

## 2022-07-13 PROCEDURE — 1101F PT FALLS ASSESS-DOCD LE1/YR: CPT | Mod: CPTII,S$GLB,, | Performed by: PHYSICIAN ASSISTANT

## 2022-07-13 PROCEDURE — 3008F BODY MASS INDEX DOCD: CPT | Mod: CPTII,S$GLB,, | Performed by: PHYSICIAN ASSISTANT

## 2022-07-13 PROCEDURE — 3288F PR FALLS RISK ASSESSMENT DOCUMENTED: ICD-10-PCS | Mod: CPTII,S$GLB,, | Performed by: PHYSICIAN ASSISTANT

## 2022-07-13 PROCEDURE — 1160F PR REVIEW ALL MEDS BY PRESCRIBER/CLIN PHARMACIST DOCUMENTED: ICD-10-PCS | Mod: CPTII,S$GLB,, | Performed by: PHYSICIAN ASSISTANT

## 2022-07-13 PROCEDURE — 3288F FALL RISK ASSESSMENT DOCD: CPT | Mod: CPTII,S$GLB,, | Performed by: PHYSICIAN ASSISTANT

## 2022-07-13 NOTE — PROGRESS NOTES
SUBJECTIVE:     Chief Complaint & History of Present Illness:  Sharath Huizar is a 71 y.o. year old male who presents today with left arm pain, left shoulder weakness for about one month.  He was seen in the ED on 6/11 and diagnosed with Shingles rash on his left arm. There is not a history of injury.  The pain is along the entire arm, in the distribution of the rash, some radiation into the neck.  The pain is described as burning.  He states he cannot raise him arm, there is significant weakness in the left shoulder.  Rash has resolved- has seen primary care and prescribed gabapentin for  neuralgia.   There is not a history of previous injury or surgery to the shoulder.      Review of patient's allergies indicates:  No Known Allergies      Current Outpatient Medications   Medication Sig Dispense Refill    acetaminophen (TYLENOL) 325 MG tablet Take 1 tablet (325 mg total) by mouth every 6 (six) hours as needed for Pain. 30 tablet 0    atorvastatin (LIPITOR) 80 MG tablet Take 1 tablet (80 mg total) by mouth once daily. 90 tablet 3    azelastine (ASTELIN) 137 mcg (0.1 %) nasal spray 1 spray (137 mcg total) by Nasal route 2 (two) times daily. 30 mL 3    clopidogreL (PLAVIX) 75 mg tablet Take 1 tablet (75 mg total) by mouth once daily. 90 tablet 3    fluticasone propionate (FLONASE) 50 mcg/actuation nasal spray 1 spray (50 mcg total) by Each Nostril route 2 (two) times daily. 18.2 mL 3    fluticasone propionate (FLONASE) 50 mcg/actuation nasal spray 1 spray (50 mcg total) by Each Nostril route 2 (two) times daily. 18.2 mL 3    gabapentin (NEURONTIN) 100 MG capsule Take 1 capsule (100 mg total) by mouth 3 (three) times daily. 90 capsule 2    metoprolol succinate (TOPROL-XL) 50 MG 24 hr tablet Take 1 tablet (50 mg total) by mouth once daily. 90 tablet 3    sodium chloride (SALINE NASAL) 0.65 % nasal spray 2 sprays by Nasal route 2 (two) times a day. Use prior to medication sprays 1 Bottle 12    traMADoL  (ULTRAM) 50 mg tablet Take 1 tablet (50 mg total) by mouth every 8 (eight) hours as needed for Pain. 10 tablet 0    aspirin (ECOTRIN) 81 MG EC tablet Take 1 tablet (81 mg total) by mouth once daily. (Patient taking differently: Take 81 mg by mouth once daily. )  0    cetirizine (ZYRTEC) 10 MG tablet Take 1 tablet (10 mg total) by mouth once daily. 30 tablet 3    nitroGLYCERIN (NITROSTAT) 0.4 MG SL tablet Place 1 tablet (0.4 mg total) under the tongue every 5 (five) minutes as needed. 25 tablet 3    valACYclovir (VALTREX) 1000 MG tablet Take 1 tablet (1,000 mg total) by mouth 3 (three) times daily. for 7 days 21 tablet 0     No current facility-administered medications for this visit.       Past Medical History:   Diagnosis Date    Cancer     THROAT    Coronary artery disease     History of chemotherapy     THROAT CANCER    Hypertension     Myocardial infarction     NSTEMI (non-ST elevated myocardial infarction) 3/23/2018    Nuclear sclerosis of both eyes 6/13/2019       Past Surgical History:   Procedure Laterality Date    CARDIAC CATHETERIZATION      with 4 stents    DIRECT LARYNGOSCOPY N/A 4/20/2020    Procedure: LARYNGOSCOPY, DIRECT;  Surgeon: Genoveva Vallejo MD;  Location: [facility] OR;  Service: ENT;  Laterality: N/A;    DIRECT LARYNGOSCOPY N/A 2/18/2021    Procedure: LARYNGOSCOPY, DIRECT with possible biopsy;  Surgeon: Genoveva Vallejo MD;  Location: [facility] OR;  Service: ENT;  Laterality: N/A;  PRE-OP BY RN 2---COVID NEGATIVE ON 2/17    LEFT HEART CATHETERIZATION Left 5/29/2018    Procedure: Left heart cath;  Surgeon: Viral Lombardi MD;  Location: [facility] CATH LAB;  Service: Cardiology;  Laterality: Left;  RN PREOP 5/28/18    LUNG BIOPSY N/A 4/27/2020    Procedure: BIOPSY, LUNG;  Surgeon: [facility] Diagnostic Provider;  Location: [facility] OR;  Service: Radiology;  Laterality: N/A;  9am start--RN PHONE PREOP 4/24----COVID NEGATIVE--pt       Vital Signs (Most Recent)  Vitals:    07/13/22 0918   BP:  132/68   Pulse: 83   Resp: 18       Review of Systems:  ROS:  Constitutional: no fever or chills  Eyes: no visual changes  ENT: no nasal congestion or sore throat  Respiratory: no cough or shortness of breath  Cardiovascular: no chest pain or palpitations  Musculoskeletal: no arthralgias or myalgias  Neurological: no seizures or tremors  Behavioral/Psych: no auditory or visual hallucinations      OBJECTIVE:     PHYSICAL EXAM:  Vitals:    07/13/22 0918   BP: 132/68   Pulse: 83   Resp: 18           General: Weight: 58.5 kg (129 lb) Body mass index is 20.82 kg/m².  Patient is alert, awake and oriented to time, place and person. Mood and affect are appropriate.  Patient does not appear to be in any distress, denies any constitutional symptoms and appears stated age.   HEENT: Pupils are equal and round, sclera are not injected. External examination of ears and nose reveals no abnormalities. Cranial nerves II-X are grossly intact  Neck: examination demonstrates painless active range of motion. Spurling's sign is negative  Skin: no rashes, abrasions or open wounds on the affected extremity   Resp: No respiratory distress or audible wheezing   Psych:  normal mood and behavior  CV: 2+ pulses, all extremities warm and well perfused   Left Shoulder   Skin intact  No rash or swelling  No deformity  Tenderness: none  Range of motion is not painful   Passive , no active FE of shoulder  No active ER, ACTIVE IR to L1  Shoulder Strength: biceps 4/5, triceps 5/5, abduction 3/5, adduction 5/5  negative for impingement sign, sensory exam normal and motor exam normal  Special Tests:    Crossbody test: negative    Neer's negative  Hawkin's negative    Jacinto's unable to test  Drop arm positive      IMAGING:  X-rays of the left shoulder, personally reviewed by me, demonstrate mild degenerative changes.  No fracture or dislocation.    ASSESSMENT/PLAN:   71 y.o. year old male with left shoulder weakness    - Patient with significant  rotator cuff weakness without injury.  Suspect possible C5/6 neuralgia from shingles infection  - Ok to increase dose of gabapentin- 200 mg TID   - MRI left shoulder to rule out massive RTC tear  - EMG study  - Referral to neurology

## 2022-07-14 ENCOUNTER — OFFICE VISIT (OUTPATIENT)
Dept: NEUROLOGY | Facility: CLINIC | Age: 72
End: 2022-07-14
Payer: MEDICARE

## 2022-07-14 VITALS
DIASTOLIC BLOOD PRESSURE: 75 MMHG | HEART RATE: 83 BPM | SYSTOLIC BLOOD PRESSURE: 130 MMHG | BODY MASS INDEX: 20.55 KG/M2 | WEIGHT: 127.88 LBS | HEIGHT: 66 IN

## 2022-07-14 DIAGNOSIS — R29.898 LEFT ARM WEAKNESS: ICD-10-CM

## 2022-07-14 DIAGNOSIS — B02.29 POSTHERPETIC NEURALGIA: ICD-10-CM

## 2022-07-14 PROCEDURE — 3075F SYST BP GE 130 - 139MM HG: CPT | Mod: CPTII,S$GLB,,

## 2022-07-14 PROCEDURE — 3288F FALL RISK ASSESSMENT DOCD: CPT | Mod: CPTII,S$GLB,,

## 2022-07-14 PROCEDURE — 1160F RVW MEDS BY RX/DR IN RCRD: CPT | Mod: CPTII,S$GLB,,

## 2022-07-14 PROCEDURE — 1159F MED LIST DOCD IN RCRD: CPT | Mod: CPTII,S$GLB,,

## 2022-07-14 PROCEDURE — 1101F PT FALLS ASSESS-DOCD LE1/YR: CPT | Mod: CPTII,S$GLB,,

## 2022-07-14 PROCEDURE — 1125F AMNT PAIN NOTED PAIN PRSNT: CPT | Mod: CPTII,S$GLB,,

## 2022-07-14 PROCEDURE — 1160F PR REVIEW ALL MEDS BY PRESCRIBER/CLIN PHARMACIST DOCUMENTED: ICD-10-PCS | Mod: CPTII,S$GLB,,

## 2022-07-14 PROCEDURE — 99204 OFFICE O/P NEW MOD 45 MIN: CPT | Mod: S$GLB,,,

## 2022-07-14 PROCEDURE — 3078F PR MOST RECENT DIASTOLIC BLOOD PRESSURE < 80 MM HG: ICD-10-PCS | Mod: CPTII,S$GLB,,

## 2022-07-14 PROCEDURE — 3075F PR MOST RECENT SYSTOLIC BLOOD PRESS GE 130-139MM HG: ICD-10-PCS | Mod: CPTII,S$GLB,,

## 2022-07-14 PROCEDURE — 3008F PR BODY MASS INDEX (BMI) DOCUMENTED: ICD-10-PCS | Mod: CPTII,S$GLB,,

## 2022-07-14 PROCEDURE — 99999 PR PBB SHADOW E&M-EST. PATIENT-LVL IV: ICD-10-PCS | Mod: PBBFAC,,,

## 2022-07-14 PROCEDURE — 3288F PR FALLS RISK ASSESSMENT DOCUMENTED: ICD-10-PCS | Mod: CPTII,S$GLB,,

## 2022-07-14 PROCEDURE — 1101F PR PT FALLS ASSESS DOC 0-1 FALLS W/OUT INJ PAST YR: ICD-10-PCS | Mod: CPTII,S$GLB,,

## 2022-07-14 PROCEDURE — 1159F PR MEDICATION LIST DOCUMENTED IN MEDICAL RECORD: ICD-10-PCS | Mod: CPTII,S$GLB,,

## 2022-07-14 PROCEDURE — 3008F BODY MASS INDEX DOCD: CPT | Mod: CPTII,S$GLB,,

## 2022-07-14 PROCEDURE — 99204 PR OFFICE/OUTPT VISIT, NEW, LEVL IV, 45-59 MIN: ICD-10-PCS | Mod: S$GLB,,,

## 2022-07-14 PROCEDURE — 3078F DIAST BP <80 MM HG: CPT | Mod: CPTII,S$GLB,,

## 2022-07-14 PROCEDURE — 1125F PR PAIN SEVERITY QUANTIFIED, PAIN PRESENT: ICD-10-PCS | Mod: CPTII,S$GLB,,

## 2022-07-14 PROCEDURE — 99999 PR PBB SHADOW E&M-EST. PATIENT-LVL IV: CPT | Mod: PBBFAC,,,

## 2022-07-14 RX ORDER — GABAPENTIN 300 MG/1
300 CAPSULE ORAL 3 TIMES DAILY
Qty: 90 CAPSULE | Refills: 2 | Status: SHIPPED | OUTPATIENT
Start: 2022-07-14 | End: 2022-10-01

## 2022-07-14 NOTE — PROGRESS NOTES
Subjective:       Patient ID: Sharath Huizar is a 71 y.o. male.    Chief Complaint:  Extremity Weakness      History of Present Illness  71 year old male who presents today for evaluation of left arm weakness and pain after having shingles. Past medical history below. Patient states he was diagnosed with shingles a month ago that included rash on his left shoulder that extended down the left arm. He was prescribed antiviral meds which his has completed. He states that after his rash resolved hie developed weakness in his left upper arm with inability to raise the arm up. He also had very painful burning sensations along that left arm that follows in the distribution of the rash. He recently saw orthopedics who ordered a shoulder xray which showed acromioclavicular joint degenerative change but no dislocation or evidence of fracture. He denies any changes in speech or vision.     Past Medical History:   Diagnosis Date    Cancer     THROAT    Coronary artery disease     History of chemotherapy     THROAT CANCER    Hypertension     Myocardial infarction     NSTEMI (non-ST elevated myocardial infarction) 3/23/2018    Nuclear sclerosis of both eyes 6/13/2019       Past Surgical History:   Procedure Laterality Date    CARDIAC CATHETERIZATION      with 4 stents    DIRECT LARYNGOSCOPY N/A 4/20/2020    Procedure: LARYNGOSCOPY, DIRECT;  Surgeon: Genoveva Vallejo MD;  Location: [facility] OR;  Service: ENT;  Laterality: N/A;    DIRECT LARYNGOSCOPY N/A 2/18/2021    Procedure: LARYNGOSCOPY, DIRECT with possible biopsy;  Surgeon: Genoveva Vallejo MD;  Location: [facility] OR;  Service: ENT;  Laterality: N/A;  PRE-OP BY RN 2---COVID NEGATIVE ON 2/17    LEFT HEART CATHETERIZATION Left 5/29/2018    Procedure: Left heart cath;  Surgeon: Viral Lombardi MD;  Location: [facility] CATH LAB;  Service: Cardiology;  Laterality: Left;  RN PREOP 5/28/18    LUNG BIOPSY N/A 4/27/2020    Procedure: BIOPSY, LUNG;  Surgeon: [facility]  Provider;  Location: [facility] OR;  Service: Radiology;  Laterality: N/A;  9am start--RN PHONE PREOP ----COVID NEGATIVE--pt       Family History   Problem Relation Age of Onset    Blindness Paternal Aunt        Social History     Socioeconomic History    Marital status:    Tobacco Use    Smoking status: Former Smoker     Packs/day: 0.25     Years: 30.00     Pack years: 7.50     Types: Cigarettes     Quit date: 3/23/2018     Years since quittin.3    Smokeless tobacco: Never Used   Substance and Sexual Activity    Alcohol use: No    Drug use: No    Sexual activity: Yes     Partners: Female       Current Outpatient Medications   Medication Sig Dispense Refill    aspirin (ECOTRIN) 81 MG EC tablet Take 1 tablet (81 mg total) by mouth once daily. (Patient taking differently: Take 81 mg by mouth once daily.)  0    atorvastatin (LIPITOR) 80 MG tablet Take 1 tablet (80 mg total) by mouth once daily. 90 tablet 3    metoprolol succinate (TOPROL-XL) 50 MG 24 hr tablet Take 1 tablet (50 mg total) by mouth once daily. 90 tablet 3    traMADoL (ULTRAM) 50 mg tablet Take 1 tablet (50 mg total) by mouth every 8 (eight) hours as needed for Pain. 10 tablet 0    acetaminophen (TYLENOL) 325 MG tablet Take 1 tablet (325 mg total) by mouth every 6 (six) hours as needed for Pain. (Patient not taking: Reported on 2022) 30 tablet 0    azelastine (ASTELIN) 137 mcg (0.1 %) nasal spray 1 spray (137 mcg total) by Nasal route 2 (two) times daily. (Patient not taking: Reported on 2022) 30 mL 3    cetirizine (ZYRTEC) 10 MG tablet Take 1 tablet (10 mg total) by mouth once daily. 30 tablet 3    clopidogreL (PLAVIX) 75 mg tablet Take 1 tablet (75 mg total) by mouth once daily. (Patient not taking: Reported on 2022) 90 tablet 3    fluticasone propionate (FLONASE) 50 mcg/actuation nasal spray 1 spray (50 mcg total) by Each Nostril route 2 (two) times daily. (Patient not taking: Reported on 2022) 18.2 mL 3     fluticasone propionate (FLONASE) 50 mcg/actuation nasal spray 1 spray (50 mcg total) by Each Nostril route 2 (two) times daily. (Patient not taking: Reported on 7/14/2022) 18.2 mL 3    gabapentin (NEURONTIN) 300 MG capsule Take 1 capsule (300 mg total) by mouth 3 (three) times daily. 90 capsule 2    nitroGLYCERIN (NITROSTAT) 0.4 MG SL tablet Place 1 tablet (0.4 mg total) under the tongue every 5 (five) minutes as needed. 25 tablet 3    sodium chloride (SALINE NASAL) 0.65 % nasal spray 2 sprays by Nasal route 2 (two) times a day. Use prior to medication sprays (Patient not taking: Reported on 7/14/2022) 1 Bottle 12    valACYclovir (VALTREX) 1000 MG tablet Take 1 tablet (1,000 mg total) by mouth 3 (three) times daily. for 7 days 21 tablet 0     No current facility-administered medications for this visit.       Review of patient's allergies indicates:  No Known Allergies    Review of Systems  Review of Systems   Constitutional: Negative.    HENT: Negative.    Eyes: Negative.    Respiratory: Negative.    Cardiovascular: Negative.    Gastrointestinal: Negative.    Endocrine: Negative.    Genitourinary: Negative.    Musculoskeletal: Negative.    Skin: Negative.    Allergic/Immunologic: Negative.    Neurological: Positive for weakness and numbness.   Hematological: Negative.    Psychiatric/Behavioral: Negative.        Objective:      Neurologic Exam     Mental Status   Oriented to person, place, and time.   Attention: normal. Concentration: normal.   Speech: speech is normal   Level of consciousness: alert  Knowledge: good.     Cranial Nerves   Cranial nerves II through XII intact.     CN II   Visual fields full to confrontation.     CN III, IV, VI   Pupils are equal, round, and reactive to light.  Extraocular motions are normal.   CN III: no CN III palsy  CN VI: no CN VI palsy  Nystagmus: none   Diplopia: none  Ophthalmoparesis: none    CN V   Facial sensation intact.     CN VII   Facial expression full, symmetric.      CN VIII   CN VIII normal.     CN IX, X   CN IX normal.   CN X normal.     CN XI   CN XI normal.     CN XII   CN XII normal.     Motor Exam   Muscle bulk: normal  Overall muscle tone: normal  Right arm pronator drift: absent  Left arm pronator drift: absent    Strength   Right deltoid: 5/5  Left deltoid: 3/5  Right biceps: 5/5  Left biceps: 4/5  Right triceps: 5/5  Left triceps: 4/5  Right wrist flexion: 5/5  Left wrist flexion: 4/5  Right wrist extension: 5/5  Left wrist extension: 4/5  Right quadriceps: 5/5  Left quadriceps: 5/5  Right anterior tibial: 5/5  Left anterior tibial: 5/5  Right posterior tibial: 5/5  Left posterior tibial: 5/5    Sensory Exam   Light touch normal.     Gait, Coordination, and Reflexes     Gait  Gait: normal    Coordination   Romberg: negative    Tremor   Resting tremor: absent  Intention tremor: absent  Action tremor: absent    Reflexes   Right brachioradialis: 2+  Left brachioradialis: 2+  Right biceps: 2+  Left biceps: 2+  Right triceps: 2+  Left triceps: 2+  Right patellar: 2+  Left patellar: 2+  Right achilles: 2+  Left achilles: 2+  Right : 2+  Left : 2+      Physical Exam  HENT:      Head: Normocephalic and atraumatic.   Eyes:      Extraocular Movements: EOM normal.      Pupils: Pupils are equal, round, and reactive to light.   Cardiovascular:      Rate and Rhythm: Normal rate.   Pulmonary:      Effort: Pulmonary effort is normal.   Skin:     General: Skin is warm and dry.   Neurological:      Mental Status: He is alert and oriented to person, place, and time.      Cranial Nerves: Cranial nerves 2-12 are intact.      Sensory: Sensation is intact.      Motor: Weakness present.      Coordination: Romberg Test normal.      Gait: Gait is intact.      Deep Tendon Reflexes:      Reflex Scores:       Tricep reflexes are 2+ on the right side and 2+ on the left side.       Bicep reflexes are 2+ on the right side and 2+ on the left side.       Brachioradialis reflexes are 2+ on  the right side and 2+ on the left side.       Patellar reflexes are 2+ on the right side and 2+ on the left side.       Achilles reflexes are 2+ on the right side and 2+ on the left side.  Psychiatric:         Speech: Speech normal.           Assessment and Plan:     1. Left arm weakness  - Ambulatory referral/consult to Physical/Occupational Therapy; Future    2. Postherpetic neuralgia  - gabapentin (NEURONTIN) 300 MG capsule; Take 1 capsule (300 mg total) by mouth 3 (three) times daily.  Dispense: 90 capsule; Refill: 2

## 2022-07-18 DIAGNOSIS — G89.29 CHRONIC PAIN OF BOTH SHOULDERS: ICD-10-CM

## 2022-07-18 DIAGNOSIS — M25.511 CHRONIC PAIN OF BOTH SHOULDERS: ICD-10-CM

## 2022-07-18 DIAGNOSIS — M25.512 CHRONIC PAIN OF BOTH SHOULDERS: ICD-10-CM

## 2022-07-18 PROBLEM — R29.898 LEFT ARM WEAKNESS: Status: ACTIVE | Noted: 2022-07-18

## 2022-07-18 RX ORDER — TRAMADOL HYDROCHLORIDE 50 MG/1
50 TABLET ORAL EVERY 8 HOURS PRN
Qty: 10 TABLET | Refills: 0 | OUTPATIENT
Start: 2022-07-18

## 2022-07-18 NOTE — TELEPHONE ENCOUNTER
No new care gaps identified.  [facility] Embedded Care Gaps. Reference number: [id number]. 7/18/2022   12:05:49 PM CDT

## 2022-07-18 NOTE — TELEPHONE ENCOUNTER
----- Message from Alena Sher sent at 7/18/2022 11:57 AM CDT -----  Type: RX Refill Request    Who Called: wife Slim     Have you contacted your pharmacy: no     Refill or New Rx: refill     RX Name and Strength: traMADoL (ULTRAM) 50 mg tablet    Preferred Pharmacy with phone number:   [facility] #[id number] [address] AT [address] [address]  Phone: [phone] Fax: [phone]    Local or Mail Order: local     Would the patient rather a call back or a response via [facility]? Call back     Best Call Back Number:  [phone] requesting a call once this is submitted. Thank you.

## 2022-07-19 ENCOUNTER — DOCUMENTATION ONLY (OUTPATIENT)
Dept: REHABILITATION | Facility: HOSPITAL | Age: 72
End: 2022-07-19
Payer: MEDICARE

## 2022-07-19 NOTE — PROGRESS NOTES
Occupational Therapy No Show Documentation     Name: Sharath Huizar   MRN: [id number]  Date: 07/19/2022  Medical Diagnosis: R29.898 (ICD-10-CM) - Left arm weakness  Referring Physician: Bhumika Ramos NP    Patient (Pt) did not attend previously scheduled OT evaluation today, with no prior notification. No charges have been posted today.     1 No show   0 Same day cancellations     SIMA Healy MOT  [facility]   Phone: [phone]

## 2022-07-19 NOTE — TELEPHONE ENCOUNTER
----- Message from Bentley Nicholson sent at 7/19/2022  4:42 PM CDT -----  Type: RX Refill Request    Who Called: wifeJustin Smalls    Have you contacted your pharmacy:yes    Refill or New Rx:refill    RX Name and Strength:traMADoL (ULTRAM) 50 mg tablet    Preferred Pharmacy with phone number:[facility] #[id number] - [place], [place] - [address] AT [facility]   Phone:  [phone]  Fax:  [phone]    Local or Mail Order:local    Ordering Provider:Dr. Conway    Would the patient rather a call back or a [facility]? call    Best Call Back Number:[phone] (Z)

## 2022-07-20 RX ORDER — TRAMADOL HYDROCHLORIDE 50 MG/1
50 TABLET ORAL EVERY 8 HOURS PRN
Qty: 10 TABLET | Refills: 0 | OUTPATIENT
Start: 2022-07-20

## 2022-08-01 ENCOUNTER — OFFICE VISIT (OUTPATIENT)
Dept: OTOLARYNGOLOGY | Facility: CLINIC | Age: 72
End: 2022-08-01
Payer: MEDICARE

## 2022-08-01 VITALS — WEIGHT: 128.5 LBS | HEIGHT: 66 IN | BODY MASS INDEX: 20.65 KG/M2

## 2022-08-01 DIAGNOSIS — Z85.118 HISTORY OF LUNG CANCER: ICD-10-CM

## 2022-08-01 DIAGNOSIS — H90.71 MIXED CONDUCTIVE AND SENSORINEURAL HEARING LOSS OF RIGHT EAR WITH UNRESTRICTED HEARING OF LEFT EAR: ICD-10-CM

## 2022-08-01 DIAGNOSIS — J30.2 SEASONAL ALLERGIC RHINITIS, UNSPECIFIED TRIGGER: ICD-10-CM

## 2022-08-01 DIAGNOSIS — Z85.21 HISTORY OF CANCER OF LARYNX: Primary | ICD-10-CM

## 2022-08-01 PROCEDURE — 1159F PR MEDICATION LIST DOCUMENTED IN MEDICAL RECORD: ICD-10-PCS | Mod: CPTII,S$GLB,, | Performed by: OTOLARYNGOLOGY

## 2022-08-01 PROCEDURE — 99213 PR OFFICE/OUTPT VISIT, EST, LEVL III, 20-29 MIN: ICD-10-PCS | Mod: 25,S$GLB,, | Performed by: OTOLARYNGOLOGY

## 2022-08-01 PROCEDURE — 3008F PR BODY MASS INDEX (BMI) DOCUMENTED: ICD-10-PCS | Mod: CPTII,S$GLB,, | Performed by: OTOLARYNGOLOGY

## 2022-08-01 PROCEDURE — 1125F AMNT PAIN NOTED PAIN PRSNT: CPT | Mod: CPTII,S$GLB,, | Performed by: OTOLARYNGOLOGY

## 2022-08-01 PROCEDURE — 31575 PR LARYNGOSCOPY, FLEXIBLE; DIAGNOSTIC: ICD-10-PCS | Mod: S$GLB,,, | Performed by: OTOLARYNGOLOGY

## 2022-08-01 PROCEDURE — 1125F PR PAIN SEVERITY QUANTIFIED, PAIN PRESENT: ICD-10-PCS | Mod: CPTII,S$GLB,, | Performed by: OTOLARYNGOLOGY

## 2022-08-01 PROCEDURE — 1159F MED LIST DOCD IN RCRD: CPT | Mod: CPTII,S$GLB,, | Performed by: OTOLARYNGOLOGY

## 2022-08-01 PROCEDURE — 99213 OFFICE O/P EST LOW 20 MIN: CPT | Mod: 25,S$GLB,, | Performed by: OTOLARYNGOLOGY

## 2022-08-01 PROCEDURE — 31575 DIAGNOSTIC LARYNGOSCOPY: CPT | Mod: S$GLB,,, | Performed by: OTOLARYNGOLOGY

## 2022-08-01 PROCEDURE — 3008F BODY MASS INDEX DOCD: CPT | Mod: CPTII,S$GLB,, | Performed by: OTOLARYNGOLOGY

## 2022-08-01 NOTE — PROGRESS NOTES
OTOLARYNGOLOGY CLINIC NOTE  Date:  08/01/2022     Chief complaint:  Chief Complaint   Patient presents with    Follow-up     10 week follow up for hx of larynx cancer.  Didn't not come for audio appt 7/27       History of Present Illness  Sharath Huizar is a 71 y.o. male  presenting today for a followup.  Currently with shingles , having trouble picking up his arm and he got an xray which he reports was normal    Weight stable  Gets hoarse on occasion - comes and goes does not persists  No issues with eating  No ear pain   No hemoptysis    Regarding his oncologic history:  He initially was referred to me for hoarseness and hemoptysis. He underwent DL with biopsy of left supraglottic mass ( glottic surface of epiglottis and left false fold with some extension to right epiglottis) on 4-20-20 that showed scca. He had 2 right sided hypermetabolic nodes ( 10-15 mm ) and 11 mm hypermetabolic node on left side of neck ( in addition to hypermetabolic supraglottic primary). He was also noted on workup of this to have a solitary lung nodule in the right lower lobe on chest ct. IR biopsy performed 4-27-20 which was positive for malignancy ( squamous cell ca vs adenosquamous). This was considered to be synchronous primary . His case was presented at the head and neck tumor board and decision was made for CRT ( pt did not want laryngectomy) for his [id number] laryngeal cancer . His case was also presented at lung tumor board and recommended for upfront SBRT for his cT1N0 lung cancer. He also had a positive AFB however not felt to be TB by pulm.   He was treated by Dr. Aguilar ( heme onc) and Dr. 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 PEG tube insertion was done 5-21-20. He also had dental clearance prior to treatment.  He completed SBRT in early July. Started on concurrent CRT ( cisplatin x6 doses)  7-6-20, completed RT 8-21-20.     He  had his G-tube removed  12-30-20. He had restaging PET-CT mid December which did not show any obvious recurrence in the head neck area     Out of abundance of precaution, I took him back to the operating room for direct laryngoscopy and biopsy on 02/18/2021.  This was done for persistent/ slightly worsened post radiation edema and to rule out any submucosal disease.  Biopsies were negative for malignancy.    Regarding hearing history:  Hearing test in 2020 showed tm perf and right CHL; left ear was normal. No otorrhea.  Worked in construction   Did not see perf on exam previously but has large volume type B tymp     Past Medical History  Past Medical History:   Diagnosis Date    Cancer     THROAT    Coronary artery disease     History of chemotherapy     THROAT CANCER    Hypertension     Myocardial infarction     NSTEMI (non-ST elevated myocardial infarction) 3/23/2018    Nuclear sclerosis of both eyes 6/13/2019        Past Surgical History  Past Surgical History:   Procedure Laterality Date    CARDIAC CATHETERIZATION      with 4 stents    DIRECT LARYNGOSCOPY N/A 4/20/2020    Procedure: LARYNGOSCOPY, DIRECT;  Surgeon: Genoveva Vallejo MD;  Location: [facility] OR;  Service: ENT;  Laterality: N/A;    DIRECT LARYNGOSCOPY N/A 2/18/2021    Procedure: LARYNGOSCOPY, DIRECT with possible biopsy;  Surgeon: Genoveva Vallejo MD;  Location: [facility] OR;  Service: ENT;  Laterality: N/A;  PRE-OP BY RN 2---COVID NEGATIVE ON 2/17    LEFT HEART CATHETERIZATION Left 5/29/2018    Procedure: Left heart cath;  Surgeon: Viral Lombardi MD;  Location: [facility] CATH LAB;  Service: Cardiology;  Laterality: Left;  RN PREOP 5/28/18    LUNG BIOPSY N/A 4/27/2020    Procedure: BIOPSY, LUNG;  Surgeon: Jones Diagnostic Provider;  Location: [facility] OR;  Service: Radiology;  Laterality: N/A;  9am start--RN PHONE PREOP 4/24----COVID NEGATIVE--pt        Medications  Current Outpatient Medications on File Prior to Visit   Medication Sig Dispense Refill     acetaminophen (TYLENOL) 325 MG tablet Take 1 tablet (325 mg total) by mouth every 6 (six) hours as needed for Pain. (Patient not taking: Reported on 7/14/2022) 30 tablet 0    aspirin (ECOTRIN) 81 MG EC tablet Take 1 tablet (81 mg total) by mouth once daily. (Patient taking differently: Take 81 mg by mouth once daily.)  0    atorvastatin (LIPITOR) 80 MG tablet Take 1 tablet (80 mg total) by mouth once daily. 90 tablet 3    azelastine (ASTELIN) 137 mcg (0.1 %) nasal spray 1 spray (137 mcg total) by Nasal route 2 (two) times daily. (Patient not taking: Reported on 7/14/2022) 30 mL 3    cetirizine (ZYRTEC) 10 MG tablet Take 1 tablet (10 mg total) by mouth once daily. 30 tablet 3    clopidogreL (PLAVIX) 75 mg tablet Take 1 tablet (75 mg total) by mouth once daily. (Patient not taking: Reported on 7/14/2022) 90 tablet 3    fluticasone propionate (FLONASE) 50 mcg/actuation nasal spray 1 spray (50 mcg total) by Each Nostril route 2 (two) times daily. (Patient not taking: Reported on 7/14/2022) 18.2 mL 3    fluticasone propionate (FLONASE) 50 mcg/actuation nasal spray 1 spray (50 mcg total) by Each Nostril route 2 (two) times daily. (Patient not taking: Reported on 7/14/2022) 18.2 mL 3    gabapentin (NEURONTIN) 300 MG capsule Take 1 capsule (300 mg total) by mouth 3 (three) times daily. 90 capsule 2    metoprolol succinate (TOPROL-XL) 50 MG 24 hr tablet Take 1 tablet (50 mg total) by mouth once daily. 90 tablet 3    nitroGLYCERIN (NITROSTAT) 0.4 MG SL tablet Place 1 tablet (0.4 mg total) under the tongue every 5 (five) minutes as needed. 25 tablet 3    sodium chloride (SALINE NASAL) 0.65 % nasal spray 2 sprays by Nasal route 2 (two) times a day. Use prior to medication sprays (Patient not taking: Reported on 7/14/2022) 1 Bottle 12    traMADoL (ULTRAM) 50 mg tablet Take 1 tablet (50 mg total) by mouth every 8 (eight) hours as needed for Pain. 10 tablet 0    valACYclovir (VALTREX) 1000 MG tablet Take 1 tablet  (1,000 mg total) by mouth 3 (three) times daily. for 7 days 21 tablet 0     No current facility-administered medications on file prior to visit.       Review of Systems  Review of Systems   Eyes: Negative.    Cardiovascular: Negative.    Gastrointestinal: Negative.    Musculoskeletal: Negative.    Skin: Negative.    Neurological: Negative.    Psychiatric/Behavioral: Negative.     Answers for HPI/ROS submitted by the patient on 8/1/2022  appetite change : Yes  sinus pressure : Yes  Snoring?: Yes        Social History   reports that he quit smoking about 4 years ago. His smoking use included cigarettes. He has a 7.50 pack-year smoking history. He has never used smokeless tobacco. He reports that he does not drink alcohol and does not use drugs.     Family History  Family History   Problem Relation Age of Onset    Blindness Paternal Aunt         Physical Exam   There were no vitals filed for this visit. Body mass index is 20.74 kg/m².            GENERAL: no acute distress.  HEAD: normocephalic.   EYES: No scleral icterus  EARS: external ear without lesion, normal pinna shape and position.    NOSE: external nose without significant bony abnormality  ORAL CAVITY/OROPHARYNX: tongue mobile.   NECK: trachea midline.   LYMPH NODES:No cervical lymphadenopathy.postradiation changes  RESPIRATORY: no stridor, no stertor.  Respirations nonlabored.  NEURO: alert, responds to questions appropriately.    PSYCH:mood appropriate    PROCEDURE NOTE  NAME OF PROCEDURE: Flexible Laryngoscopy, diagnostic  INDICATIONS: gag reflex precludes mirror exam, history of larynx cancer, surveillance scope as per nccn guidelines  FINDINGS: no evidence of recurrence, postradiation changes    Consent: After procedure was explained in detail and all questions answered, verbal consent was obtained for performing flexible laryngoscopy.  Anesthesia: topical 4% lidocaine and neosynephrine  Procedure: With patient in seated position, the scope was inserted  into the bilateral nasal passageway and advanced atraumatically into the nasopharynx to examine the following structures:  Nasal cavity: Turbinates with mild hypertrophy.  No purulent drainage.   Nasopharynx: no mass or lesion noted in nasopharynx.   Oropharynx: base of tongue without  mass or ulceration. Lingual tonsils normal in appearance  Hypopharynx: posterior pharyngeal wall without mass or lesion. No pooling of secretions. Pyriform sinuses visible without mass or lesion  Larynx: epiglottis normal without lesion. False vocal folds without edema/erythema/lesion. True vocal folds mobile and without lesion. Slight scar band along anterior commisure unchanged in appearance. No evidence of recurrence Mild interarytenoid edema no erythema . Postcricoid region without edema no lesion  Subglottis: visualized portion of subglottis normal in appearance    After examination performed, the scope was removed atraumatically . The patient tolerated the procedure well. Photodocumentation obtained with representative images below, all images and/or videos uploaded in media section of epic.                      Imaging:  The patient does not have any new imaging of the head and neck since last visit.     Labs:  CBC  Recent Labs   Lab 06/21/21  1012 09/27/21  1320 03/29/22  0752   WBC 5.17 5.16 7.38   Hemoglobin 12.3 L 11.9 L 12.4 L   Hematocrit 35.9 L 35.1 L 37.7 L   MCV 93 94 97   Platelets 156 157 183     BMP  Recent Labs   Lab 08/03/20  0737 08/10/20  0725 08/17/20  0710 08/31/20  1149 06/21/21  1012 09/27/21  1320 03/29/22  0752   Glucose 111 H 105 106   < > 102 93 115 H   Sodium 139 140 137   < > 142 140 142   Potassium 4.1 4.7 3.8   < > 3.9 4.1 4.0   Chloride 110 108 104   < > 111 H 109 107   CO2 23 26 23   < > 23 23 26   BUN 17 17 26 H   < > 9 19 23   Creatinine 1.0 1.2 1.4   < > 1.1 1.1 1.3   Calcium 8.6 L 9.0 8.7   < > 8.8 9.9 9.2   Phosphorus 3.3 3.3 3.8  --   --   --   --    Magnesium 1.7 1.7 1.5 L  --   --   --   --      < > = values in this interval not displayed.     COAGS  Recent Labs   Lab 02/27/20  2300 04/25/20  1025   INR 1.0 1.0       Assessment  1. History of cancer of larynx    2. History of lung cancer    3. Seasonal allergic rhinitis, unspecified trigger    4. Mixed conductive and sensorineural hearing loss of right ear with unrestricted hearing of left ear       Plan:  Discussed plan of care with patient in detail and all questions answered. Patient reported understanding of plan of care.   No evidence of recurrent disease    Reschedule audio appointment     surveillance visit in 10 weeks with flex     Notified patient about need to schedule appointment with heme/onc. Recall letter should be going out soon from what I can tell in epic, also needs to schedule imaging prior to that visit and he was reminded of this as well.     Reach out to pcp if arm not  improving     I spent a total of 25 minutes on the day of the visit.  This includes face to face time and non-face to face time preparing to see the patient (eg, review of tests), obtaining and/or reviewing separately obtained history, documenting clinical information in the electronic or other health record, independently interpreting results and communicating results to the patient/family/caregiver, or care coordinator.   Please be aware that this note has been generated with the assistance of MModal voice-to-text.  Please excuse any spelling or grammatical errors.

## 2022-08-23 NOTE — PROGRESS NOTES
Mr. Sharath Huizar was seen in the clinic today for an audiological evaluation.  Mr. Huizar has a history of a mixed hearing loss of the right ear.    Audiological testing revealed a moderate rising to mild sloping to severe mixed hearing loss for the right ear and normal hearing sensitivity with a moderate hearing loss noted at 8000 Hz for the left ear.  A speech reception threshold was obtained at 35 dBHL for the right ear and at 10 dBHL for the left ear.  Speech discrimination was 100% for the right ear and 100% for the left ear.      Tympanometry testing revealed a Type B (large ear canal volume) tympanogram for the right ear and a Type C tympanogram for the left ear.  Ipsilateral acoustic reflexes were present at 500 Hz and 1000 Hz for the right ear and were present from 500-4000 Hz for the left ear.    Recommendations:  1. Otologic evaluation  2. Annual audiological evaluation  3. Hearing protection when in noise     Please click on link to view Audiogram:  Document on 8/24/2022  3:52 PM by LILLIAM MaldonadoD: Audiogram

## 2022-08-24 ENCOUNTER — CLINICAL SUPPORT (OUTPATIENT)
Dept: AUDIOLOGY | Facility: CLINIC | Age: 72
End: 2022-08-24
Payer: MEDICARE

## 2022-08-24 DIAGNOSIS — H69.93 DYSFUNCTION OF BOTH EUSTACHIAN TUBES: Primary | ICD-10-CM

## 2022-08-24 PROCEDURE — 92557 COMPREHENSIVE HEARING TEST: CPT | Mod: S$GLB,,, | Performed by: AUDIOLOGIST

## 2022-08-24 PROCEDURE — 92550 PR TYMPANOMETRY AND REFLEX THRESHOLD MEASUREMENTS: ICD-10-PCS | Mod: S$GLB,,, | Performed by: AUDIOLOGIST

## 2022-08-24 PROCEDURE — 92557 PR COMPREHENSIVE HEARING TEST: ICD-10-PCS | Mod: S$GLB,,, | Performed by: AUDIOLOGIST

## 2022-08-24 PROCEDURE — 92550 TYMPANOMETRY & REFLEX THRESH: CPT | Mod: S$GLB,,, | Performed by: AUDIOLOGIST

## 2022-10-01 DIAGNOSIS — I25.118 CORONARY ARTERY DISEASE WITH EXERTIONAL ANGINA: ICD-10-CM

## 2022-10-01 DIAGNOSIS — I10 HYPERTENSION, ESSENTIAL: Primary | ICD-10-CM

## 2022-10-01 DIAGNOSIS — I50.22 CHRONIC SYSTOLIC HEART FAILURE: ICD-10-CM

## 2022-10-21 DIAGNOSIS — I10 HYPERTENSION, ESSENTIAL: ICD-10-CM

## 2022-10-21 RX ORDER — METOPROLOL SUCCINATE 50 MG/1
50 TABLET, EXTENDED RELEASE ORAL DAILY
Qty: 90 TABLET | Refills: 3 | Status: SHIPPED | OUTPATIENT
Start: 2022-10-21 | End: 2023-11-28 | Stop reason: SDUPTHER

## 2022-11-25 ENCOUNTER — PES CALL (OUTPATIENT)
Dept: ADMINISTRATIVE | Facility: CLINIC | Age: 72
End: 2022-11-25
Payer: MEDICARE

## 2022-11-28 ENCOUNTER — PES CALL (OUTPATIENT)
Dept: ADMINISTRATIVE | Facility: CLINIC | Age: 72
End: 2022-11-28
Payer: MEDICARE

## 2023-01-19 DIAGNOSIS — I25.10 CORONARY ARTERY DISEASE INVOLVING NATIVE CORONARY ARTERY OF NATIVE HEART WITHOUT ANGINA PECTORIS: ICD-10-CM

## 2023-01-19 RX ORDER — CLOPIDOGREL BISULFATE 75 MG/1
75 TABLET ORAL DAILY
Qty: 90 TABLET | Refills: 3 | Status: SHIPPED | OUTPATIENT
Start: 2023-01-19 | End: 2023-11-28 | Stop reason: SDUPTHER

## 2023-01-19 RX ORDER — ATORVASTATIN CALCIUM 80 MG/1
80 TABLET, FILM COATED ORAL DAILY
Qty: 90 TABLET | Refills: 3 | Status: SHIPPED | OUTPATIENT
Start: 2023-01-19 | End: 2023-03-01

## 2023-01-27 NOTE — INTERVAL H&P NOTE
The patient has been examined and the H&P has been reviewed:    I concur with the findings and no changes have occurred since H&P was written.    Anesthesia/Surgery risks, benefits and alternative options discussed and understood by patient/family.          Active Hospital Problems    Diagnosis  POA    Supraglottic mass [J38.7]  Yes      Resolved Hospital Problems   No resolved problems to display.      no distress

## 2023-01-28 ENCOUNTER — HOSPITAL ENCOUNTER (INPATIENT)
Facility: HOSPITAL | Age: 73
LOS: 2 days | Discharge: HOME | DRG: 871 | End: 2023-01-31
Attending: STUDENT IN AN ORGANIZED HEALTH CARE EDUCATION/TRAINING PROGRAM | Admitting: STUDENT IN AN ORGANIZED HEALTH CARE EDUCATION/TRAINING PROGRAM
Payer: MEDICARE

## 2023-01-28 DIAGNOSIS — R09.02 HYPOXIA: ICD-10-CM

## 2023-01-28 DIAGNOSIS — A41.9 SEPSIS, DUE TO UNSPECIFIED ORGANISM, UNSPECIFIED WHETHER ACUTE ORGAN DYSFUNCTION PRESENT: Primary | ICD-10-CM

## 2023-01-28 DIAGNOSIS — J96.01 ACUTE HYPOXEMIC RESPIRATORY FAILURE: ICD-10-CM

## 2023-01-28 DIAGNOSIS — R07.9 CHEST PAIN: ICD-10-CM

## 2023-01-28 DIAGNOSIS — R00.0 TACHYCARDIA: ICD-10-CM

## 2023-01-28 DIAGNOSIS — J18.9 PNEUMONIA DUE TO INFECTIOUS ORGANISM, UNSPECIFIED LATERALITY, UNSPECIFIED PART OF LUNG: ICD-10-CM

## 2023-01-28 LAB
ALBUMIN SERPL BCP-MCNC: 3.2 G/DL (ref 3.5–5.2)
ALLENS TEST: NORMAL
ALP SERPL-CCNC: 64 U/L (ref 55–135)
ALT SERPL W/O P-5'-P-CCNC: 17 U/L (ref 10–44)
ANION GAP SERPL CALC-SCNC: 11 MMOL/L (ref 8–16)
ANISOCYTOSIS BLD QL SMEAR: SLIGHT
AST SERPL-CCNC: 31 U/L (ref 10–40)
BASOPHILS NFR BLD: 0 % (ref 0–1.9)
BILIRUB SERPL-MCNC: 0.7 MG/DL (ref 0.1–1)
BNP SERPL-MCNC: 65 PG/ML (ref 0–99)
BUN SERPL-MCNC: 18 MG/DL (ref 8–23)
CALCIUM SERPL-MCNC: 9.7 MG/DL (ref 8.7–10.5)
CHLORIDE SERPL-SCNC: 103 MMOL/L (ref 95–110)
CO2 SERPL-SCNC: 23 MMOL/L (ref 23–29)
CREAT SERPL-MCNC: 1.3 MG/DL (ref 0.5–1.4)
DIFFERENTIAL METHOD: ABNORMAL
EOSINOPHIL NFR BLD: 0 % (ref 0–8)
ERYTHROCYTE [DISTWIDTH] IN BLOOD BY AUTOMATED COUNT: 14.3 % (ref 11.5–14.5)
EST. GFR  (NO RACE VARIABLE): 58 ML/MIN/1.73 M^2
GLUCOSE SERPL-MCNC: 135 MG/DL (ref 70–110)
HCT VFR BLD AUTO: 35.9 % (ref 40–54)
HGB BLD-MCNC: 12.3 G/DL (ref 14–18)
IMM GRANULOCYTES # BLD AUTO: ABNORMAL K/UL (ref 0–0.04)
IMM GRANULOCYTES NFR BLD AUTO: ABNORMAL % (ref 0–0.5)
LACTATE SERPL-SCNC: 2.4 MMOL/L (ref 0.5–2.2)
LDH SERPL L TO P-CCNC: 1.7 MMOL/L (ref 0.5–2.2)
LYMPHOCYTES NFR BLD: 8 % (ref 18–48)
MAGNESIUM SERPL-MCNC: 1.8 MG/DL (ref 1.6–2.6)
MCH RBC QN AUTO: 33.4 PG (ref 27–31)
MCHC RBC AUTO-ENTMCNC: 34.3 G/DL (ref 32–36)
MCV RBC AUTO: 98 FL (ref 82–98)
METAMYELOCYTES NFR BLD MANUAL: 2 %
MONOCYTES NFR BLD: 0 % (ref 4–15)
MYELOCYTES NFR BLD MANUAL: 4 %
NEUTROPHILS NFR BLD: 60 % (ref 38–73)
NEUTS BAND NFR BLD MANUAL: 26 %
NRBC BLD-RTO: 0 /100 WBC
PLATELET # BLD AUTO: 141 K/UL (ref 150–450)
PMV BLD AUTO: 9.8 FL (ref 9.2–12.9)
POTASSIUM SERPL-SCNC: 4.2 MMOL/L (ref 3.5–5.1)
PROT SERPL-MCNC: 7.3 G/DL (ref 6–8.4)
RBC # BLD AUTO: 3.68 M/UL (ref 4.6–6.2)
SAMPLE: NORMAL
SITE: NORMAL
SODIUM SERPL-SCNC: 137 MMOL/L (ref 136–145)
TROPONIN I SERPL DL<=0.01 NG/ML-MCNC: 0.03 NG/ML (ref 0–0.03)
WBC # BLD AUTO: 5.49 K/UL (ref 3.9–12.7)

## 2023-01-28 PROCEDURE — 85007 BL SMEAR W/DIFF WBC COUNT: CPT | Performed by: EMERGENCY MEDICINE

## 2023-01-28 PROCEDURE — 83605 ASSAY OF LACTIC ACID: CPT

## 2023-01-28 PROCEDURE — 83880 ASSAY OF NATRIURETIC PEPTIDE: CPT | Performed by: EMERGENCY MEDICINE

## 2023-01-28 PROCEDURE — 87502 INFLUENZA DNA AMP PROBE: CPT | Mod: 91

## 2023-01-28 PROCEDURE — 96375 TX/PRO/DX INJ NEW DRUG ADDON: CPT

## 2023-01-28 PROCEDURE — 83605 ASSAY OF LACTIC ACID: CPT | Performed by: EMERGENCY MEDICINE

## 2023-01-28 PROCEDURE — 99900035 HC TECH TIME PER 15 MIN (STAT)

## 2023-01-28 PROCEDURE — 85027 COMPLETE CBC AUTOMATED: CPT | Performed by: EMERGENCY MEDICINE

## 2023-01-28 PROCEDURE — 87040 BLOOD CULTURE FOR BACTERIA: CPT | Performed by: EMERGENCY MEDICINE

## 2023-01-28 PROCEDURE — 84484 ASSAY OF TROPONIN QUANT: CPT | Performed by: EMERGENCY MEDICINE

## 2023-01-28 PROCEDURE — 93010 ELECTROCARDIOGRAM REPORT: CPT | Mod: ,,, | Performed by: INTERNAL MEDICINE

## 2023-01-28 PROCEDURE — 93010 EKG 12-LEAD: ICD-10-PCS | Mod: ,,, | Performed by: INTERNAL MEDICINE

## 2023-01-28 PROCEDURE — 25000003 PHARM REV CODE 250: Performed by: EMERGENCY MEDICINE

## 2023-01-28 PROCEDURE — 96365 THER/PROPH/DIAG IV INF INIT: CPT

## 2023-01-28 PROCEDURE — 83735 ASSAY OF MAGNESIUM: CPT | Performed by: EMERGENCY MEDICINE

## 2023-01-28 PROCEDURE — 96366 THER/PROPH/DIAG IV INF ADDON: CPT

## 2023-01-28 PROCEDURE — 96367 TX/PROPH/DG ADDL SEQ IV INF: CPT

## 2023-01-28 PROCEDURE — 93005 ELECTROCARDIOGRAM TRACING: CPT

## 2023-01-28 PROCEDURE — 80053 COMPREHEN METABOLIC PANEL: CPT | Performed by: EMERGENCY MEDICINE

## 2023-01-28 PROCEDURE — 84145 PROCALCITONIN (PCT): CPT | Performed by: EMERGENCY MEDICINE

## 2023-01-28 PROCEDURE — 99285 EMERGENCY DEPT VISIT HI MDM: CPT | Mod: 25

## 2023-01-28 PROCEDURE — 87635 SARS-COV-2 COVID-19 AMP PRB: CPT | Performed by: EMERGENCY MEDICINE

## 2023-01-28 RX ORDER — KETOROLAC TROMETHAMINE 30 MG/ML
15 INJECTION, SOLUTION INTRAMUSCULAR; INTRAVENOUS
Status: COMPLETED | OUTPATIENT
Start: 2023-01-29 | End: 2023-01-28

## 2023-01-28 RX ORDER — ACETAMINOPHEN 500 MG
1000 TABLET ORAL
Status: COMPLETED | OUTPATIENT
Start: 2023-01-28 | End: 2023-01-28

## 2023-01-28 RX ADMIN — ACETAMINOPHEN 1000 MG: 500 TABLET ORAL at 10:01

## 2023-01-28 RX ADMIN — KETOROLAC TROMETHAMINE 15 MG: 30 INJECTION, SOLUTION INTRAMUSCULAR; INTRAVENOUS at 11:01

## 2023-01-28 NOTE — Clinical Note
Diagnosis: Tachycardia [043374]   Admitting Provider:: ELSY BURLESON III [[id number]]   Future Attending Provider: ELSY BURLESON III [[id number]]   Reason for IP Medical Treatment  (Clinical interventions that can only be accomplished in the IP setting? ) :: Pneumonia   Estimated Length of Stay:: 2 midnights   I certify that Inpatient services for greater than or equal to 2 midnights are medically necessary:: Yes   Plans for Post-Acute care--if anticipated (pick the single best option):: A. No post acute care anticipated at this time

## 2023-01-29 PROBLEM — J96.01 ACUTE HYPOXEMIC RESPIRATORY FAILURE: Status: ACTIVE | Noted: 2023-01-29

## 2023-01-29 PROBLEM — J18.9 PNEUMONIA: Status: ACTIVE | Noted: 2023-01-29

## 2023-01-29 PROBLEM — A41.9 SEPSIS: Status: ACTIVE | Noted: 2023-01-29

## 2023-01-29 LAB
BACTERIA #/AREA URNS HPF: NORMAL /HPF
BILIRUB UR QL STRIP: NEGATIVE
CLARITY UR: CLEAR
COLOR UR: YELLOW
CTP QC/QA: YES
CTP QC/QA: YES
GLUCOSE UR QL STRIP: NEGATIVE
HGB UR QL STRIP: NEGATIVE
HYALINE CASTS #/AREA URNS LPF: 0 /LPF
KETONES UR QL STRIP: NEGATIVE
LACTATE SERPL-SCNC: 0.9 MMOL/L (ref 0.5–2.2)
LEUKOCYTE ESTERASE UR QL STRIP: NEGATIVE
MICROSCOPIC COMMENT: NORMAL
NITRITE UR QL STRIP: NEGATIVE
PH UR STRIP: 6 [PH] (ref 5–8)
POC MOLECULAR INFLUENZA A AGN: NEGATIVE
POC MOLECULAR INFLUENZA B AGN: NEGATIVE
PROCALCITONIN SERPL IA-MCNC: 0.24 NG/ML
PROT UR QL STRIP: ABNORMAL
RBC #/AREA URNS HPF: 3 /HPF (ref 0–4)
SARS-COV-2 RDRP RESP QL NAA+PROBE: NEGATIVE
SP GR UR STRIP: >1.03 (ref 1–1.03)
URN SPEC COLLECT METH UR: ABNORMAL
UROBILINOGEN UR STRIP-ACNC: ABNORMAL EU/DL
WBC #/AREA URNS HPF: 0 /HPF (ref 0–5)

## 2023-01-29 PROCEDURE — 25000003 PHARM REV CODE 250: Performed by: STUDENT IN AN ORGANIZED HEALTH CARE EDUCATION/TRAINING PROGRAM

## 2023-01-29 PROCEDURE — 87449 NOS EACH ORGANISM AG IA: CPT | Performed by: STUDENT IN AN ORGANIZED HEALTH CARE EDUCATION/TRAINING PROGRAM

## 2023-01-29 PROCEDURE — 27000221 HC OXYGEN, UP TO 24 HOURS

## 2023-01-29 PROCEDURE — 96367 TX/PROPH/DG ADDL SEQ IV INF: CPT

## 2023-01-29 PROCEDURE — 96366 THER/PROPH/DIAG IV INF ADDON: CPT

## 2023-01-29 PROCEDURE — 63600175 PHARM REV CODE 636 W HCPCS: Performed by: STUDENT IN AN ORGANIZED HEALTH CARE EDUCATION/TRAINING PROGRAM

## 2023-01-29 PROCEDURE — 94640 AIRWAY INHALATION TREATMENT: CPT

## 2023-01-29 PROCEDURE — 25000242 PHARM REV CODE 250 ALT 637 W/ HCPCS: Performed by: STUDENT IN AN ORGANIZED HEALTH CARE EDUCATION/TRAINING PROGRAM

## 2023-01-29 PROCEDURE — 25500020 PHARM REV CODE 255: Performed by: STUDENT IN AN ORGANIZED HEALTH CARE EDUCATION/TRAINING PROGRAM

## 2023-01-29 PROCEDURE — 63600175 PHARM REV CODE 636 W HCPCS: Mod: TB,JG | Performed by: STUDENT IN AN ORGANIZED HEALTH CARE EDUCATION/TRAINING PROGRAM

## 2023-01-29 PROCEDURE — 83605 ASSAY OF LACTIC ACID: CPT | Performed by: EMERGENCY MEDICINE

## 2023-01-29 PROCEDURE — 87205 SMEAR GRAM STAIN: CPT | Performed by: STUDENT IN AN ORGANIZED HEALTH CARE EDUCATION/TRAINING PROGRAM

## 2023-01-29 PROCEDURE — 96365 THER/PROPH/DIAG IV INF INIT: CPT

## 2023-01-29 PROCEDURE — 87070 CULTURE OTHR SPECIMN AEROBIC: CPT | Performed by: STUDENT IN AN ORGANIZED HEALTH CARE EDUCATION/TRAINING PROGRAM

## 2023-01-29 PROCEDURE — 11000001 HC ACUTE MED/SURG PRIVATE ROOM

## 2023-01-29 PROCEDURE — 94761 N-INVAS EAR/PLS OXIMETRY MLT: CPT

## 2023-01-29 PROCEDURE — 81000 URINALYSIS NONAUTO W/SCOPE: CPT | Performed by: EMERGENCY MEDICINE

## 2023-01-29 RX ORDER — ACETAMINOPHEN 325 MG/1
650 TABLET ORAL EVERY 4 HOURS PRN
Status: DISCONTINUED | OUTPATIENT
Start: 2023-01-29 | End: 2023-01-31 | Stop reason: HOSPADM

## 2023-01-29 RX ORDER — IPRATROPIUM BROMIDE AND ALBUTEROL SULFATE 2.5; .5 MG/3ML; MG/3ML
3 SOLUTION RESPIRATORY (INHALATION)
Status: DISCONTINUED | OUTPATIENT
Start: 2023-01-29 | End: 2023-01-31 | Stop reason: HOSPADM

## 2023-01-29 RX ORDER — IBUPROFEN 200 MG
16 TABLET ORAL
Status: DISCONTINUED | OUTPATIENT
Start: 2023-01-29 | End: 2023-01-31 | Stop reason: HOSPADM

## 2023-01-29 RX ORDER — METOPROLOL SUCCINATE 50 MG/1
50 TABLET, EXTENDED RELEASE ORAL DAILY
Status: DISCONTINUED | OUTPATIENT
Start: 2023-01-29 | End: 2023-01-31 | Stop reason: HOSPADM

## 2023-01-29 RX ORDER — BISACODYL 10 MG
10 SUPPOSITORY, RECTAL RECTAL DAILY PRN
Status: DISCONTINUED | OUTPATIENT
Start: 2023-01-29 | End: 2023-01-31 | Stop reason: HOSPADM

## 2023-01-29 RX ORDER — SODIUM,POTASSIUM PHOSPHATES 280-250MG
2 POWDER IN PACKET (EA) ORAL
Status: DISCONTINUED | OUTPATIENT
Start: 2023-01-29 | End: 2023-01-31 | Stop reason: HOSPADM

## 2023-01-29 RX ORDER — POLYETHYLENE GLYCOL 3350 17 G/17G
17 POWDER, FOR SOLUTION ORAL DAILY
Status: DISCONTINUED | OUTPATIENT
Start: 2023-01-29 | End: 2023-01-31 | Stop reason: HOSPADM

## 2023-01-29 RX ORDER — HEPARIN SODIUM 5000 [USP'U]/ML
5000 INJECTION, SOLUTION INTRAVENOUS; SUBCUTANEOUS EVERY 8 HOURS
Status: DISCONTINUED | OUTPATIENT
Start: 2023-01-29 | End: 2023-01-31 | Stop reason: HOSPADM

## 2023-01-29 RX ORDER — TALC
6 POWDER (GRAM) TOPICAL NIGHTLY PRN
Status: DISCONTINUED | OUTPATIENT
Start: 2023-01-29 | End: 2023-01-31 | Stop reason: HOSPADM

## 2023-01-29 RX ORDER — LANOLIN ALCOHOL/MO/W.PET/CERES
800 CREAM (GRAM) TOPICAL
Status: DISCONTINUED | OUTPATIENT
Start: 2023-01-29 | End: 2023-01-31 | Stop reason: HOSPADM

## 2023-01-29 RX ORDER — PROCHLORPERAZINE EDISYLATE 5 MG/ML
5 INJECTION INTRAMUSCULAR; INTRAVENOUS EVERY 6 HOURS PRN
Status: DISCONTINUED | OUTPATIENT
Start: 2023-01-29 | End: 2023-01-31 | Stop reason: HOSPADM

## 2023-01-29 RX ORDER — SIMETHICONE 80 MG
1 TABLET,CHEWABLE ORAL 4 TIMES DAILY PRN
Status: DISCONTINUED | OUTPATIENT
Start: 2023-01-29 | End: 2023-01-31 | Stop reason: HOSPADM

## 2023-01-29 RX ORDER — ATORVASTATIN CALCIUM 40 MG/1
80 TABLET, FILM COATED ORAL DAILY
Status: DISCONTINUED | OUTPATIENT
Start: 2023-01-29 | End: 2023-01-31 | Stop reason: HOSPADM

## 2023-01-29 RX ORDER — HYDROCODONE BITARTRATE AND ACETAMINOPHEN 5; 325 MG/1; MG/1
1 TABLET ORAL EVERY 6 HOURS PRN
Status: DISCONTINUED | OUTPATIENT
Start: 2023-01-29 | End: 2023-01-31 | Stop reason: HOSPADM

## 2023-01-29 RX ORDER — SODIUM CHLORIDE 0.9 % (FLUSH) 0.9 %
10 SYRINGE (ML) INJECTION EVERY 12 HOURS PRN
Status: DISCONTINUED | OUTPATIENT
Start: 2023-01-29 | End: 2023-01-31 | Stop reason: HOSPADM

## 2023-01-29 RX ORDER — ONDANSETRON 2 MG/ML
4 INJECTION INTRAMUSCULAR; INTRAVENOUS EVERY 8 HOURS PRN
Status: DISCONTINUED | OUTPATIENT
Start: 2023-01-29 | End: 2023-01-31 | Stop reason: HOSPADM

## 2023-01-29 RX ORDER — ACETAMINOPHEN 325 MG/1
650 TABLET ORAL EVERY 8 HOURS PRN
Status: DISCONTINUED | OUTPATIENT
Start: 2023-01-29 | End: 2023-01-31 | Stop reason: HOSPADM

## 2023-01-29 RX ORDER — MAG HYDROX/ALUMINUM HYD/SIMETH 200-200-20
30 SUSPENSION, ORAL (FINAL DOSE FORM) ORAL 4 TIMES DAILY PRN
Status: DISCONTINUED | OUTPATIENT
Start: 2023-01-29 | End: 2023-01-31 | Stop reason: HOSPADM

## 2023-01-29 RX ORDER — GLUCAGON 1 MG
1 KIT INJECTION
Status: DISCONTINUED | OUTPATIENT
Start: 2023-01-29 | End: 2023-01-31 | Stop reason: HOSPADM

## 2023-01-29 RX ORDER — ASPIRIN 81 MG/1
81 TABLET ORAL DAILY
Status: DISCONTINUED | OUTPATIENT
Start: 2023-01-29 | End: 2023-01-31 | Stop reason: HOSPADM

## 2023-01-29 RX ORDER — CLOPIDOGREL BISULFATE 75 MG/1
75 TABLET ORAL DAILY
Status: DISCONTINUED | OUTPATIENT
Start: 2023-01-29 | End: 2023-01-31 | Stop reason: HOSPADM

## 2023-01-29 RX ORDER — IPRATROPIUM BROMIDE AND ALBUTEROL SULFATE 2.5; .5 MG/3ML; MG/3ML
3 SOLUTION RESPIRATORY (INHALATION) EVERY 4 HOURS PRN
Status: DISCONTINUED | OUTPATIENT
Start: 2023-01-29 | End: 2023-01-29

## 2023-01-29 RX ORDER — NALOXONE HCL 0.4 MG/ML
0.02 VIAL (ML) INJECTION
Status: DISCONTINUED | OUTPATIENT
Start: 2023-01-29 | End: 2023-01-31 | Stop reason: HOSPADM

## 2023-01-29 RX ORDER — IBUPROFEN 200 MG
24 TABLET ORAL
Status: DISCONTINUED | OUTPATIENT
Start: 2023-01-29 | End: 2023-01-31 | Stop reason: HOSPADM

## 2023-01-29 RX ORDER — CEFTRIAXONE 2 G/50ML
2 INJECTION, SOLUTION INTRAVENOUS
Status: DISCONTINUED | OUTPATIENT
Start: 2023-01-30 | End: 2023-01-31 | Stop reason: HOSPADM

## 2023-01-29 RX ADMIN — CEFTRIAXONE 2 G: 2 INJECTION, SOLUTION INTRAVENOUS at 11:01

## 2023-01-29 RX ADMIN — PIPERACILLIN AND TAZOBACTAM 4.5 G: 4; .5 INJECTION, POWDER, LYOPHILIZED, FOR SOLUTION INTRAVENOUS; PARENTERAL at 12:01

## 2023-01-29 RX ADMIN — HEPARIN SODIUM 5000 UNITS: 5000 INJECTION INTRAVENOUS; SUBCUTANEOUS at 01:01

## 2023-01-29 RX ADMIN — POLYETHYLENE GLYCOL 3350 17 G: 17 POWDER, FOR SOLUTION ORAL at 08:01

## 2023-01-29 RX ADMIN — ASPIRIN 81 MG: 81 TABLET, COATED ORAL at 08:01

## 2023-01-29 RX ADMIN — IPRATROPIUM BROMIDE AND ALBUTEROL SULFATE 3 ML: .5; 3 SOLUTION RESPIRATORY (INHALATION) at 07:01

## 2023-01-29 RX ADMIN — CLOPIDOGREL BISULFATE 75 MG: 75 TABLET ORAL at 08:01

## 2023-01-29 RX ADMIN — AZITHROMYCIN MONOHYDRATE 500 MG: 500 INJECTION, POWDER, LYOPHILIZED, FOR SOLUTION INTRAVENOUS at 06:01

## 2023-01-29 RX ADMIN — IPRATROPIUM BROMIDE AND ALBUTEROL SULFATE 3 ML: .5; 3 SOLUTION RESPIRATORY (INHALATION) at 01:01

## 2023-01-29 RX ADMIN — METOPROLOL SUCCINATE 50 MG: 50 TABLET, EXTENDED RELEASE ORAL at 08:01

## 2023-01-29 RX ADMIN — HEPARIN SODIUM 5000 UNITS: 5000 INJECTION INTRAVENOUS; SUBCUTANEOUS at 06:01

## 2023-01-29 RX ADMIN — ATORVASTATIN CALCIUM 80 MG: 40 TABLET, FILM COATED ORAL at 08:01

## 2023-01-29 RX ADMIN — ACETAMINOPHEN 650 MG: 325 TABLET ORAL at 08:01

## 2023-01-29 RX ADMIN — VANCOMYCIN HYDROCHLORIDE 1250 MG: 1.25 INJECTION, POWDER, LYOPHILIZED, FOR SOLUTION INTRAVENOUS at 12:01

## 2023-01-29 RX ADMIN — IOHEXOL 85 ML: 350 INJECTION, SOLUTION INTRAVENOUS at 01:01

## 2023-01-29 RX ADMIN — HEPARIN SODIUM 5000 UNITS: 5000 INJECTION INTRAVENOUS; SUBCUTANEOUS at 10:01

## 2023-01-29 NOTE — SUBJECTIVE & OBJECTIVE
Past Medical History:   Diagnosis Date    Cancer     THROAT    Coronary artery disease     History of chemotherapy     THROAT CANCER    Hypertension     Myocardial infarction     NSTEMI (non-ST elevated myocardial infarction) 3/23/2018    Nuclear sclerosis of both eyes 6/13/2019       Past Surgical History:   Procedure Laterality Date    CARDIAC CATHETERIZATION      with 4 stents    DIRECT LARYNGOSCOPY N/A 4/20/2020    Procedure: LARYNGOSCOPY, DIRECT;  Surgeon: Genoveva Vallejo MD;  Location: [facility] OR;  Service: ENT;  Laterality: N/A;    DIRECT LARYNGOSCOPY N/A 2/18/2021    Procedure: LARYNGOSCOPY, DIRECT with possible biopsy;  Surgeon: Genoveva Vallejo MD;  Location: [facility] OR;  Service: ENT;  Laterality: N/A;  PRE-OP BY RN 2---COVID NEGATIVE ON 2/17    LEFT HEART CATHETERIZATION Left 5/29/2018    Procedure: Left heart cath;  Surgeon: Viral Lombardi MD;  Location: [facility] CATH LAB;  Service: Cardiology;  Laterality: Left;  RN PREOP 5/28/18    LUNG BIOPSY N/A 4/27/2020    Procedure: BIOPSY, LUNG;  Surgeon: Jones Diagnostic Provider;  Location: [facility] OR;  Service: Radiology;  Laterality: N/A;  9am start--RN PHONE PREOP 4/24----COVID NEGATIVE--pt       Review of patient's allergies indicates:  No Known Allergies    No current facility-administered medications on file prior to encounter.     Current Outpatient Medications on File Prior to Encounter   Medication Sig    acetaminophen (TYLENOL) 325 MG tablet Take 1 tablet (325 mg total) by mouth every 6 (six) hours as needed for Pain.    aspirin (ECOTRIN) 81 MG EC tablet Take 1 tablet (81 mg total) by mouth once daily. (Patient taking differently: Take 81 mg by mouth once daily.)    atorvastatin (LIPITOR) 80 MG tablet Take 1 tablet (80 mg total) by mouth once daily.    azelastine (ASTELIN) 137 mcg (0.1 %) nasal spray 1 spray (137 mcg total) by Nasal route 2 (two) times daily.    cetirizine (ZYRTEC) 10 MG tablet Take 1 tablet (10 mg total) by mouth once daily.     clopidogreL (PLAVIX) 75 mg tablet Take 1 tablet (75 mg total) by mouth once daily.    fluticasone propionate (FLONASE) 50 mcg/actuation nasal spray 1 spray (50 mcg total) by Each Nostril route 2 (two) times daily.    fluticasone propionate (FLONASE) 50 mcg/actuation nasal spray 1 spray (50 mcg total) by Each Nostril route 2 (two) times daily.    gabapentin (NEURONTIN) 300 MG capsule Take 1 capsule (300 mg total) by mouth 3 (three) times daily.    metoprolol succinate (TOPROL-XL) 50 MG 24 hr tablet Take 1 tablet (50 mg total) by mouth once daily.    nitroGLYCERIN (NITROSTAT) 0.4 MG SL tablet Place 1 tablet (0.4 mg total) under the tongue every 5 (five) minutes as needed.    sodium chloride (SALINE NASAL) 0.65 % nasal spray 2 sprays by Nasal route 2 (two) times a day. Use prior to medication sprays    traMADoL (ULTRAM) 50 mg tablet Take 1 tablet (50 mg total) by mouth every 8 (eight) hours as needed for Pain.    valACYclovir (VALTREX) 1000 MG tablet Take 1 tablet (1,000 mg total) by mouth 3 (three) times daily. for 7 days     Family History       Problem Relation (Age of Onset)    Blindness Paternal Aunt          Tobacco Use    Smoking status: Former     Packs/day: 0.25     Years: 30.00     Pack years: 7.50     Types: Cigarettes     Quit date: 3/23/2018     Years since quittin.8    Smokeless tobacco: Never   Substance and Sexual Activity    Alcohol use: No    Drug use: No    Sexual activity: Yes     Partners: Female     Review of Systems   Constitutional:  Positive for chills, fatigue and fever.   HENT: Negative.     Eyes: Negative.    Respiratory:  Positive for cough and shortness of breath.    Cardiovascular: Negative.    Gastrointestinal: Negative.    Endocrine: Negative.    Genitourinary: Negative.    Musculoskeletal: Negative.    Skin: Negative.    Allergic/Immunologic: Negative.    Neurological: Negative.    Psychiatric/Behavioral: Negative.     Objective:     Vital Signs (Most Recent):  Temp: 99.7 °F (37.6  °C) (01/29/23 0045)  Pulse: 72 (01/29/23 0303)  Resp: 20 (01/29/23 0303)  BP: 111/60 (01/29/23 0303)  SpO2: 97 % (01/29/23 0303) Vital Signs (24h Range):  Temp:  [99.7 °F (37.6 °C)-102.1 °F (38.9 °C)] 99.7 °F (37.6 °C)  Pulse:  [] 72  Resp:  [19-24] 20  SpO2:  [90 %-97 %] 97 %  BP: (110-161)/(56-91) 111/60     Weight: 54.4 kg (120 lb)  Body mass index is 19.37 kg/m².    Physical Exam  Vitals and nursing note reviewed.   Constitutional:       General: He is not in acute distress.     Appearance: Normal appearance. He is not ill-appearing.   HENT:      Head: Normocephalic and atraumatic.      Nose: Nose normal.      Mouth/Throat:      Mouth: Mucous membranes are moist.   Eyes:      Extraocular Movements: Extraocular movements intact.   Cardiovascular:      Rate and Rhythm: Normal rate.      Pulses: Normal pulses.      Heart sounds: No murmur heard.  Pulmonary:      Effort: Pulmonary effort is normal. No respiratory distress.      Breath sounds: Rhonchi present.      Comments: Diminished breath sounds  Abdominal:      General: Abdomen is flat.      Palpations: Abdomen is soft.      Tenderness: There is no abdominal tenderness.   Musculoskeletal:      Right lower leg: No edema.      Left lower leg: No edema.   Skin:     General: Skin is warm.      Capillary Refill: Capillary refill takes less than 2 seconds.   Neurological:      General: No focal deficit present.      Mental Status: He is alert.   Psychiatric:         Mood and Affect: Mood normal.           Significant Labs: All pertinent labs within the past 24 hours have been reviewed.    Significant Imaging: I have reviewed all pertinent imaging results/findings within the past 24 hours.

## 2023-01-29 NOTE — PLAN OF CARE
[facility] - Med Surg  Initial Discharge Assessment       Primary Care Provider: Wilmer Bangura Jr, MD    Admission Diagnosis: Tachycardia [R00.0]  Chest pain [R07.9]    Admission Date: 1/28/2023  Expected Discharge Date:     Discharge Barriers Identified: None    Payor: HU[employer] MANAGED MEDICARE / Plan: HUMANA TOTAL CARE ADVANTAGE / Product Type: Medicare Advantage /     Extended Emergency Contact Information  Primary Emergency Contact: Slim Huizar  Address: [address] [place], [place] [address] [facility]  Home Phone: [phone]  Mobile Phone: [phone]  Relation: Spouse    Discharge Plan A: Home with family  Discharge Plan B:  (tbd)      [id number] - [place], [place] - [address] [place] [address] EXP AT [address] [address]  Phone: [phone] Fax: [phone]    [facility] [address] [place] [address] [place] - [address] [facility]  [address] [place] [address]  Phone: [phone] Fax: [phone]    [facility] - [facility] [address] [facility]  [address] [facility] [address]  Phone: [phone] Fax: [phone]      Initial Assessment (most recent)       Adult Discharge Assessment - 01/29/23 1519          Discharge Assessment    Assessment Type Discharge Planning Assessment     Confirmed/corrected address, phone number and insurance Yes     Confirmed Demographics Correct on Facesheet     Source of Information patient     When was your last doctors appointment? 06/15/22     Communicated CHANCE with patient/caregiver Yes     Reason For Admission Tahycardia     People in Home spouse     Do you expect to return to your current living situation? Yes     Do you have help at home or someone to help you manage your care at home? Yes     Prior to hospitilization cognitive status: Alert/Oriented     Current cognitive status: Alert/Oriented     Equipment Currently Used at Home none     Readmission within 30  days? No     Patient currently being followed by outpatient case management? No     Do you currently have service(s) that help you manage your care at home? No     Do you take prescription medications? Yes     Do you have prescription coverage? Yes     Coverage [employer]     Do you have any problems affording any of your prescribed medications? No     Is the patient taking medications as prescribed? yes     Who is going to help you get home at discharge? Slim Gray     How do you get to doctors appointments? car, drives self     Are you on dialysis? No     Do you take coumadin? No     Discharge Plan A Home with family     Discharge Plan B --   tbd    DME Needed Upon Discharge  none     Discharge Plan discussed with: Spouse/sig other     Name(s) and Number(s) Slim Gray [phone]     Discharge Barriers Identified None        OTHER    Name(s) of People in Home Slim Gray, spouse

## 2023-01-29 NOTE — ASSESSMENT & PLAN NOTE
Results for orders placed during the hospital encounter of 02/01/19    Transthoracic echo (TTE) complete    Interpretation Summary  · Moderately decreased left ventricular systolic function. The estimated ejection fraction is 30%  · Eccentric left ventricular hypertrophy.  · Mild left ventricular enlargement.  · Grade I (mild) left ventricular diastolic dysfunction consistent with impaired relaxation.  · Normal right ventricular systolic function.  · Moderate left atrial enlargement.    BNP  Recent Labs   Lab 01/28/23  2315   BNP 65       Compensated  Optimize volume status  Does not appear volume overloaded at this time, stable

## 2023-01-29 NOTE — ASSESSMENT & PLAN NOTE
Patient with Hypoxic Respiratory failure which is Acute.  he is not on home oxygen. Supplemental oxygen was provided and noted-  .   Signs/symptoms of respiratory failure include- increased work of breathing and respiratory distress. Contributing diagnoses includes - Pneumonia Labs and images were reviewed. Patient Has not had a recent ABG. Will treat underlying causes and adjust management of respiratory failure as follows- See PNA plan

## 2023-01-29 NOTE — HPI
This is a 72 year old male with a PMHx of SCC of the supraglottis, CAD, HTN, HLD, HFrEF (EF: 30%) who presented with cough.    Patient reports developing a productive cough with green sputum the day of presentation along with fevers, chills, and shortness of breath. Associated symptoms include runny nose and sinus congestion. No sick contacts. In the ED, he was febrile (38.8), tachycardic, tachypnic, hypoxic requiring 2L of supplemental O2. Labs showed no leukocytosis (5.4), down trending lactic acid (2.4 > 0.9), negative pro-calcitonin (0.24). CT chest showed no PE, bilateral pattern of pulmonary infiltrates, including patchy and nodular infiltrates as well as confluent infiltrates/airspace disease most notably at the lung bases, left lung base to a greater extent than the right. He was given vancomycin, zosyn and was admitted for further management.

## 2023-01-29 NOTE — ASSESSMENT & PLAN NOTE
This patient does have evidence of infective focus  My overall impression is sepsis.  Source: Respiratory  Antibiotics given-   Antibiotics (72h ago, onward)    Start     Stop Route Frequency Ordered    01/30/23 0000  cefTRIAXone 2 gram/50 mL IVPB 2 g         -- IV Every 24 hours (non-standard times) 01/29/23 0333    01/29/23 0700  azithromycin (ZITHROMAX) 500 mg in dextrose 5 % (D5W) 250 mL IVPB (Vial-Mate)         -- IV Every 24 hours (non-standard times) 01/29/23 0333        Latest lactate reviewed-  Recent Labs   Lab 01/28/23  2315 01/29/23  0227   LACTATE 2.4* 0.9     Organ dysfunction indicated by N/A    Fluid challenge Not needed - patient is not hypotensive      Post- resuscitation assessment Yes Perfusion exam was performed within 6 hours of septic shock presentation after bolus shows Adequate tissue perfusion assessed by non-invasive monitoring       Will Not start Pressors- Levophed for MAP of 65  Source control achieved by: Abx

## 2023-01-29 NOTE — NURSING
[facility] [place]  Nurses Note -- 4 Eyes      1/29/2023       Skin assessed on: Admit      [x] No Pressure Injuries Present    []Prevention Measures Documented    [] Yes LDA  for Pressure Injury Previously documented     [] Yes New Pressure Injury Discovered   [] LDA for New Pressure Injury Added      Attending RN:  Leticia Whitfield RN     Second RN:  Earl Wilkinson RN

## 2023-01-29 NOTE — NURSING
Patient arrived to floor. AAOx3. Oriented patient to room and call light. Tele monitor 6523 applied. Rhythm visible and audible on monitor. Informed patient of needing sputum culture and urine sample. Patient verbalized understanding.

## 2023-01-29 NOTE — HOSPITAL COURSE
72 year old male with a PMHx of SCC of the supraglottis, CAD, HTN, HLD, HFrEF (EF: 30%) admitted on 01/28 for acute respiratory failure with hypoxia and community-acquired pneumonia.  Patient presented febrile, tachycardic, tachypneic on admit.  O2 sats down to 89-90% on room air in the ED. CTA chest with no PE, but there was bilateral pattern of pulmonary infiltrates. CXR with Mild perihilar infiltrates bilaterally.  Patient without any leukocytosis.  Was started on antibiotics for pneumonia coverage.  Blood culture with no growth to date.  Respiratory culture with many Gram-positive cocci. Received Rocephin and azithromycin.  Able to wean off supplemental oxygen and to room air.  Completed 6 minute walk test without any hypoxia at rest with ambulation.    Patient admits feeling better overall.  No longer having any shortness of breath.  Cough still present. Pt denies any fever, headaches, vision changes, chest pain, shortness of breath, palpitations, abdominal pain, nausea, vomiting, or any new weaknesses. Feels ready to go home. Patient's exam on discharge was as follow: Patient is alert and oriented, appears in no acute distress, heart with regular rate and rhythm, lungs clear to asculation with non-labored breathing, abdomen soft, and no new weaknesses or focal deficits seen. Bilateral lower extremities without any edema or calf tenderness.     Patient was counseled regarding any abnormal labs, differential diagnosis, treatment options, risk-benefit, lifestyle changes, prognosis, current condition, and medications. Patient was interactive and attentive.  Patient's questions were answered in a respectful and timely manner. Patient was instructed to follow-up with PCP within 1 week and to continue taking medications as prescribed.  Also, extensively discussed the risks, benefits, and side effects of patient's medications. Discussed with patient about any medication changes. Patient verbalized understanding and  agrees to treatment plan.  Patient is stable for discharge.  Patient has no other questions or concerns at this time.  ED precautions discussed with the patient.    Vital signs are stable. Ambulating without any difficulty. Tolerating p.o. intake without any nausea or vomiting. Afebrile for over 24 hours. Patient is in stable condition and has no questions or concerns. Patient will be discharge to home. Prescriptions sent to pharmacy.  CM/SW to assist with discharge planning.     Vitals:    01/31/23 0231 01/31/23 0410 01/31/23 0726 01/31/23 0800   BP:  129/69 139/75    BP Location:  Right arm Right arm    Patient Position:  Lying Lying    Pulse:  84 80 84   Resp:  18 18 15   Temp:  98.3 °F (36.8 °C) 98.5 °F (36.9 °C)    TempSrc:  Oral Oral    SpO2: 98% 95% 96% 96%   Weight:       Height:

## 2023-01-29 NOTE — H&P
"[facility]  [facility] Medicine  History & Physical    Patient Name: Sharath Huizar  MRN: [id number]  Patient Class: IP- Inpatient  Admission Date: 1/28/2023  Attending Physician: Stanislaw Darnell III, MD   Primary Care Provider: Wilmer Bangura Jr, MD         Patient information was obtained from patient and ER records.     Subjective:     Principal Problem:Pneumonia    Chief Complaint:   Chief Complaint   Patient presents with    Cough     Pt c/o "head cold" since Tuesday. Pt reports productive cough, SOB with lying down and exertion, chills and nasal congestion. Pt denies CP at this time, reports tightness with coughing.     Shortness of Breath    Nasal Congestion        HPI: This is a 72 year old male with a PMHx of SCC of the supraglottis, CAD, HTN, HLD, HFrEF (EF: 30%) who presented with cough.    Patient reports developing a productive cough with green sputum the day of presentation along with fevers, chills, and shortness of breath. Associated symptoms include runny nose and sinus congestion. No sick contacts. In the ED, he was febrile (38.8), tachycardic, tachypnic, hypoxic requiring 2L of supplemental O2. Labs showed no leukocytosis (5.4), down trending lactic acid (2.4 > 0.9), negative pro-calcitonin (0.24). CT chest showed no PE, bilateral pattern of pulmonary infiltrates, including patchy and nodular infiltrates as well as confluent infiltrates/airspace disease most notably at the lung bases, left lung base to a greater extent than the right. He was given vancomycin, zosyn and was admitted for further management.       No new subjective & objective note has been filed under this hospital service since the last note was generated.    Assessment/Plan:     * Pneumonia  Presented with SOB, cough   Labs showed no leukocytosis (5.4), down trending lactic acid (2.4 > 0.9), negative pro-calcitonin (0.24).   CT chest showed no PE, bilateral pattern of pulmonary infiltrates, including patchy and nodular " infiltrates as well as confluent infiltrates/airspace disease most notably at the lung bases, left lung base to a greater extent than the right.  Ceftriaxone/azithromycin   Wean O2 as tolerated     Acute hypoxemic respiratory failure  Patient with Hypoxic Respiratory failure which is Acute.  he is not on home oxygen. Supplemental oxygen was provided and noted-  .   Signs/symptoms of respiratory failure include- increased work of breathing and respiratory distress. Contributing diagnoses includes - Pneumonia Labs and images were reviewed. Patient Has not had a recent ABG. Will treat underlying causes and adjust management of respiratory failure as follows- See PNA plan    Sepsis  This patient does have evidence of infective focus  My overall impression is sepsis.  Source: Respiratory  Antibiotics given-   Antibiotics (72h ago, onward)      Start     Stop Route Frequency Ordered    01/30/23 0000  cefTRIAXone 2 gram/50 mL IVPB 2 g         -- IV Every 24 hours (non-standard times) 01/29/23 0333    01/29/23 0700  azithromycin (ZITHROMAX) 500 mg in dextrose 5 % (D5W) 250 mL IVPB (Vial-Mate)         -- IV Every 24 hours (non-standard times) 01/29/23 0333          Latest lactate reviewed-  Recent Labs   Lab 01/28/23  2315 01/29/23  0227   LACTATE 2.4* 0.9     Organ dysfunction indicated by N/A    Fluid challenge Not needed - patient is not hypotensive      Post- resuscitation assessment Yes Perfusion exam was performed within 6 hours of septic shock presentation after bolus shows Adequate tissue perfusion assessed by non-invasive monitoring       Will Not start Pressors- Levophed for MAP of 65  Source control achieved by: Abx      History of lung cancer  Outpatient follow up         History of cancer of larynx  Outpatient follow up       Coronary artery disease involving native coronary artery of native heart without angina pectoris  Resume home medications       Chronic systolic heart failure  Results for orders placed  during the hospital encounter of 02/01/19    Transthoracic echo (TTE) complete    Interpretation Summary  · Moderately decreased left ventricular systolic function. The estimated ejection fraction is 30%  · Eccentric left ventricular hypertrophy.  · Mild left ventricular enlargement.  · Grade I (mild) left ventricular diastolic dysfunction consistent with impaired relaxation.  · Normal right ventricular systolic function.  · Moderate left atrial enlargement.    BNP  Recent Labs   Lab 01/28/23  2315   BNP 65       Compensated  Optimize volume status    Hypertension, essential  Resume home medications         VTE Risk Mitigation (From admission, onward)           Ordered     heparin (porcine) injection 5,000 Units  Every 8 hours         01/29/23 0332     IP VTE HIGH RISK PATIENT  Once         01/29/23 0332     Place sequential compression device  Until discontinued         01/29/23 0332                       Emmanuel Dudley MD  Department of Hospital Medicine   [facility] [place] - Emergency Dept

## 2023-01-29 NOTE — ASSESSMENT & PLAN NOTE
Presented with SOB, cough   Labs showed no leukocytosis (5.4), down trending lactic acid (2.4 > 0.9), negative pro-calcitonin (0.24).   CT chest showed no PE, bilateral pattern of pulmonary infiltrates, including patchy and nodular infiltrates as well as confluent infiltrates/airspace disease most notably at the lung bases, left lung base to a greater extent than the right.  Ceftriaxone/azithromycin   Wean O2 as tolerated

## 2023-01-29 NOTE — ED PROVIDER NOTES
"Encounter Date: 1/28/2023    SCRIBE #1 NOTE: I, Mann Parks, am scribing for, and in the presence of,  Berna Abdalla MD. I have scribed the following portions of the note - Other sections scribed: HPI, ROS.     History     Chief Complaint   Patient presents with    Cough     Pt c/o "head cold" since Tuesday. Pt reports productive cough, SOB with lying down and exertion, chills and nasal congestion. Pt denies CP at this time, reports tightness with coughing.     Shortness of Breath    Nasal Congestion     Sharath Huizar is a 72 y.o. male, with a PMHx of CAD, HTN, MI, Throat Cancer, Nuclear sclerosis of both eyes who presents to the ED with productive cough and shortness of breath for the past 4 days. Patient reports the wet cough producing green phlegm. Patient further notes chest pain that is diffuse in nature and rated 4/10 and worsened with cough., states having subjective fever, and SOB on exertion. Patient endorses compliance with HTN and blood thinner medications. Patient further endorses smoking (half a pack daily). Patient denies EtOH and other drug usage.  He states having 2 pillow orthopnea for years.  No lower extremity swelling.  No other exacerbating or alleviating factors. Denies dysuria, headache, weakness, neck pain, leg swelling, nausea, vomiting, congestion, rhinorrhea, or other associated symptoms.      The history is provided by the patient. No  was used.   Review of patient's allergies indicates:  No Known Allergies  Past Medical History:   Diagnosis Date    Cancer     THROAT    Coronary artery disease     History of chemotherapy     THROAT CANCER    Hypertension     Myocardial infarction     NSTEMI (non-ST elevated myocardial infarction) 3/23/2018    Nuclear sclerosis of both eyes 6/13/2019     Past Surgical History:   Procedure Laterality Date    CARDIAC CATHETERIZATION      with 4 stents    DIRECT LARYNGOSCOPY N/A 4/20/2020    Procedure: LARYNGOSCOPY, DIRECT;  " Surgeon: Genoveva Vallejo MD;  Location: [facility] OR;  Service: ENT;  Laterality: N/A;    DIRECT LARYNGOSCOPY N/A 2021    Procedure: LARYNGOSCOPY, DIRECT with possible biopsy;  Surgeon: Genoveva Vallejo MD;  Location: [facility] OR;  Service: ENT;  Laterality: N/A;  PRE-OP BY RN 2021--COVID NEGATIVE ON     LEFT HEART CATHETERIZATION Left 2018    Procedure: Left heart cath;  Surgeon: Viral Lombardi MD;  Location: [facility] CATH LAB;  Service: Cardiology;  Laterality: Left;  RN PREOP 18    LUNG BIOPSY N/A 2020    Procedure: BIOPSY, LUNG;  Surgeon: Dosc Diagnostic Provider;  Location: [facility] OR;  Service: Radiology;  Laterality: N/A;  9am start--RN PHONE PREOP ----COVID NEGATIVE--pt     Family History   Problem Relation Age of Onset    Blindness Paternal Aunt      Social History     Tobacco Use    Smoking status: Former     Packs/day: 0.25     Years: 30.00     Pack years: 7.50     Types: Cigarettes     Quit date: 3/23/2018     Years since quittin.8    Smokeless tobacco: Never   Substance Use Topics    Alcohol use: No    Drug use: No     Review of Systems   Constitutional:  Positive for fever. Negative for chills.   HENT:  Negative for congestion and rhinorrhea.    Eyes:  Negative for pain.   Respiratory:  Positive for cough and shortness of breath.    Cardiovascular:  Negative for chest pain and leg swelling.   Gastrointestinal:  Negative for abdominal pain, nausea and vomiting.   Endocrine: Negative for polyuria.   Genitourinary:  Negative for dysuria and hematuria.   Musculoskeletal:  Negative for gait problem and neck pain.   Skin:  Negative for rash.   Allergic/Immunologic: Negative for immunocompromised state.   Neurological:  Positive for dizziness. Negative for weakness and headaches.     Physical Exam     Initial Vitals [23 2211]   BP Pulse Resp Temp SpO2   136/73 (!) 129 (!) 24 (S) (!) 101.9 °F (38.8 °C) (!) 93 %      MAP       --         Physical Exam    Constitutional: He  appears well-developed and well-nourished. He is not diaphoretic. No distress.   HENT:   Head: Normocephalic and atraumatic.   Eyes: Conjunctivae and EOM are normal. Pupils are equal, round, and reactive to light.   Neck: No JVD present.   Normal range of motion.  Cardiovascular:  Regular rhythm.   Tachycardia present.         Pulses:       Radial pulses are 2+ on the right side and 2+ on the left side.        Posterior tibial pulses are 2+ on the right side and 2+ on the left side.   Pulmonary/Chest: No respiratory distress. He has rhonchi.   Abdominal: Abdomen is soft. Bowel sounds are normal. He exhibits no distension. There is no abdominal tenderness. There is no rebound and no guarding.   Musculoskeletal:         General: No tenderness or edema. Normal range of motion.      Cervical back: Normal range of motion.     Lymphadenopathy:     He has no cervical adenopathy.   Neurological: He is alert and oriented to person, place, and time.   Skin: Skin is warm. Capillary refill takes less than 2 seconds.   Psychiatric: He has a normal mood and affect.       ED Course   Procedures  Labs Reviewed   CBC W/ AUTO DIFFERENTIAL - Abnormal; Notable for the following components:       Result Value    RBC 3.68 (*)     Hemoglobin 12.3 (*)     Hematocrit 35.9 (*)     MCH 33.4 (*)     Platelets 141 (*)     Lymph % 8.0 (*)     Mono % 0.0 (*)     All other components within normal limits   COMPREHENSIVE METABOLIC PANEL - Abnormal; Notable for the following components:    Glucose 135 (*)     Albumin 3.2 (*)     eGFR 58 (*)     All other components within normal limits   LACTIC ACID, PLASMA - Abnormal; Notable for the following components:    Lactate (Lactic Acid) 2.4 (*)     All other components within normal limits   TROPONIN I - Abnormal; Notable for the following components:    Troponin I 0.030 (*)     All other components within normal limits   CULTURE, RESPIRATORY   MAGNESIUM   B-TYPE NATRIURETIC PEPTIDE   PROCALCITONIN    LACTIC ACID, PLASMA   POCT INFLUENZA A/B MOLECULAR   SARS-COV-2 RDRP GENE   ISTAT LACTATE     EKG Readings: (Independently Interpreted)   EKG obtained at 11:11 p.m. sinus tachycardia with a ventricular rate of 109, normal intervals, nonspecific T-wave abnormalities including T-wave inversions in inferior leads.  No STEMI     Imaging Results              CTA Chest Non-Coronary (PE Studies) (Final result)  Result time 01/29/23 02:30:23      Final result by Dimitri Leggett MD (01/29/23 02:30:23)                   Impression:      There is no large central saddle type pulmonary embolus, there is no additional evidence for pulmonary embolus on this exam.    Bilateral pattern of pulmonary infiltrates as above, including patchy and nodular infiltrates as well as confluent infiltrates/airspace disease most notably at the lung bases, left lung base to a greater extent than the right.    Peribronchial thickening is noted most notably at the lung bases, there is also mild bronchial opacity that may relate to secretions, mucous plugging and or aspiration.    Additional findings as above.      Electronically signed by: Dimitri Leggett  Date:    01/29/2023  Time:    02:30               Narrative:    EXAMINATION:  CTA CHEST NON CORONARY (PE STUDIES)    CLINICAL HISTORY:  Pulmonary embolism (PE) suspected, unknown D-dimer;    TECHNIQUE:  Low dose axial images, sagittal and coronal reformations were obtained from the thoracic inlet to the lung bases following the IV administration of 85 mL of Omnipaque 350.  Contrast timing was optimized to evaluate the pulmonary arteries.  MIP images were performed.    COMPARISON:  CT examination of the chest without contrast March 29, 2022    FINDINGS:  There is no large central saddle type pulmonary embolus.  The pulmonary arterial vascular demonstrate opacification, abnormal pattern of pulmonary arterial filling defects specific for pulmonary emboli are not seen.    The thoracic aorta  demonstrates appropriate opacification.  Aortic atherosclerotic change and coronary arterial atherosclerotic change noted.  There is no enlarged mediastinal or hilar adenopathy.  Small lymph nodes are noted.  There is no pericardial effusion.    There is peribronchial thickening noted bilaterally most notably at the lung bases in the lower lobes to the greatest degree, there is some degree of bronchial opacity that may relate to mucous plugging and or secretions or aspiration.  There are patchy pulmonary nodular infiltrate seen involving the right upper lobe, most notably posteriorly.  There is mild confluent ground-glass infiltrate of the right middle lobe.  There are patchy pulmonary infiltrates with mild nodularity of the left upper lobe lingular segment in particular.  There are patchy nodular infiltrates of the superior segment of the right lower lobe, and there is more prominent patchy and confluent infiltrates/airspace disease of the posterior and inferior right lower lobe.  More prominently there is pulmonary infiltrate/airspace disease with confluence, interstitial and alveolar infiltrate involving the left lower lobe.  Chronic lung changes are also noted.    There is no evidence for pleural effusion.  There is no evidence for pneumothorax.    Limited imaging of the upper abdomen demonstrates an exophytic lesion of the posterior aspect of the left kidney, not well evaluated on this examination however measures approximately 16 Hounsfield units, likely a cyst.  Evaluation of the upper abdomen is otherwise limited.  The visualized osseous structures appear intact.                                        X-Ray Chest AP Portable (Final result)  Result time 01/29/23 00:01:43      Final result by Dimitri Leggett MD (01/29/23 00:01:43)                   Impression:      Mild perihilar infiltrates bilaterally, additional pulmonary opacity at the right lung base consistent with pulmonary infiltrate however the  possibility of a cavitary lesion is to be considered.  If clinically warranted further evaluation with CT may be helpful.    This report was flagged in Epic as abnormal.      Electronically signed by: Dimitri Leggett  Date:    01/29/2023  Time:    00:01               Narrative:    EXAMINATION:  XR CHEST AP PORTABLE    CLINICAL HISTORY:  Sepsis;    TECHNIQUE:  Single frontal view of the chest was performed.    COMPARISON:  Chest radiograph February 11, 2021    FINDINGS:  Single portable chest view is submitted.  When accounting for difference in position technique and depth of inspiration, the appearance of the cardiomediastinal silhouette is appropriate.    There is mild perihilar infiltrate bilaterally.  There is somewhat more prominent pulmonary opacity at the right lung base that may relate to an area of pulmonary infiltrate/airspace disease.  There is subtle suggestion rounded lucency this may relate to a cavitary lesion.  If clinically warranted further evaluation with CT examination may be helpful.    There is no evidence for pleural effusion and no evidence for pneumothorax.  The visualized osseous structures appear intact.                                       Medications   aspirin EC tablet 81 mg (81 mg Oral Given 1/29/23 0800)   atorvastatin tablet 80 mg (80 mg Oral Given 1/29/23 0800)   clopidogreL tablet 75 mg (75 mg Oral Given 1/29/23 0800)   metoprolol succinate (TOPROL-XL) 24 hr tablet 50 mg (50 mg Oral Given 1/29/23 0800)   sodium chloride 0.9% flush 10 mL (has no administration in time range)   heparin (porcine) injection 5,000 Units (5,000 Units Subcutaneous Given 1/29/23 1352)   melatonin tablet 6 mg (has no administration in time range)   ondansetron injection 4 mg (has no administration in time range)   prochlorperazine injection Soln 5 mg (has no administration in time range)   polyethylene glycol packet 17 g (17 g Oral Given 1/29/23 0801)   bisacodyL suppository 10 mg (has no administration in  time range)   acetaminophen tablet 650 mg (650 mg Oral Given 1/29/23 2025)   simethicone chewable tablet 80 mg (has no administration in time range)   aluminum-magnesium hydroxide-simethicone 200-200-20 mg/5 mL suspension 30 mL (has no administration in time range)   acetaminophen tablet 650 mg (has no administration in time range)   HYDROcodone-acetaminophen 5-325 mg per tablet 1 tablet (has no administration in time range)   naloxone 0.4 mg/mL injection 0.02 mg (has no administration in time range)   potassium bicarbonate disintegrating tablet 50 mEq (has no administration in time range)   potassium bicarbonate disintegrating tablet 35 mEq (has no administration in time range)   potassium bicarbonate disintegrating tablet 60 mEq (has no administration in time range)   magnesium oxide tablet 800 mg (has no administration in time range)   magnesium oxide tablet 800 mg (has no administration in time range)   potassium, sodium phosphates 280-160-250 mg packet 2 packet (has no administration in time range)   potassium, sodium phosphates 280-160-250 mg packet 2 packet (has no administration in time range)   potassium, sodium phosphates 280-160-250 mg packet 2 packet (has no administration in time range)   glucose chewable tablet 16 g (has no administration in time range)   glucose chewable tablet 24 g (has no administration in time range)   glucagon (human recombinant) injection 1 mg (has no administration in time range)   cefTRIAXone 2 gram/50 mL IVPB 2 g (has no administration in time range)   azithromycin (ZITHROMAX) 500 mg in dextrose 5 % (D5W) 250 mL IVPB (Vial-Mate) (0 mg Intravenous Stopped 1/29/23 0724)   dextrose 10% bolus 125 mL 125 mL (has no administration in time range)   dextrose 10% bolus 250 mL 250 mL (has no administration in time range)   influenza 65up-adj (QUADRIVALENT ADJUVANTED PF) vaccine 0.5 mL (has no administration in time range)   albuterol-ipratropium 2.5 mg-0.5 mg/3 mL nebulizer solution 3  "mL (3 mLs Nebulization Given 1/29/23 1954)   acetaminophen tablet 1,000 mg (1,000 mg Oral Given 1/28/23 2217)   ketorolac injection 15 mg (15 mg Intravenous Given 1/28/23 2356)   vancomycin 1,250 mg in dextrose 5 % (D5W) 250 mL IVPB (Vial-Mate) (0 mg Intravenous Stopped 1/29/23 0229)   iohexoL (OMNIPAQUE 350) injection 85 mL (85 mLs Intravenous Given 1/29/23 0146)     Medical Decision Making:   History:   Old Medical Records: I decided to obtain old medical records.  Initial Assessment:   72 y.o. male, with a PMHx of CAD, HTN, MI, Throat Cancer, Nuclear sclerosis of both eyes who presents to the ED with productive cough and shortness of breath for the past 4 days.  Patient tachycardic, febrile and hypoxic.  Presentation concerning for sepsis.  Sepsis protocol activated labs obtained.  Inpatient given broad-spectrum antibiotics.  Given patient's known heart failure with EF of 30 will hold off giving fluids unless it is lactate is significantly elevated.  Will obtain labs including blood cultures.  Will get a chest x-ray for assessment of possible pneumonia.  Anticipate patient will be admitted for further management.      Clinical Tests:   Lab Tests: Ordered and Reviewed  Radiological Study: Ordered and Reviewed  Medical Tests: Ordered and Reviewed  Sepsis Perfusion Assessment: "I attest a sepsis perfusion exam was performed within 6 hours of sepsis, severe sepsis, or septic shock presentation, following fluid resuscitation."        Scribe Attestation:   Scribe #1: I performed the above scribed service and the documentation accurately describes the services I performed. I attest to the accuracy of the note.      ED Course as of 01/29/23 2152   Sat Jan 28, 2023   2337 Holding off giving patient any fluids given his nonischemic cardiomyopathy with the EF of 30. [AS]   Sun Jan 29, 2023   0036 X-Ray Chest AP Portable(!)  Profile infiltrates and possible right lower lobe abscess.  Will obtain CT of the chest to assess for " further pathology. [AS]   0230 CT with findings suggestive possible aspiration pneumonia.  Patient currently on broad-spectrum antibiotics. CBC without significant leukocytosis, anemia, or platelet abnormalities.  Chem 14 negative for hypo-or hyper natremia, kalemia, chloridemia, or other electrolyte abnormalities; BUN and creatinine were within normal limits indicating normal kidney function, ALT and AST were within normal limits indicating normal liver function.  After review of the patient's physical exam, ED testing, and history/symptoms, the patient requires additional care in the hospital overnight. Discussed patients case with hospitals who will accept the patient and any pending labs/imaging/interventions. The diagnosis, treatment and plan were discussed with the patient. All questions or concerns have been addressed.   [AS]      ED Course User Index  [AS] Berna Abdalla MD          This dictation has been generated using M-Modal Fluency Direct dictation; some phonetic errors may occur.        Please put in 60 minutes of critical care due to patient having a high risk of multiorgan failure secondary to sepsis   Separate from teaching and exclusive of procedure and ekg time  Includes:  Time at bedside  Time reviewing test results  Time discussing case with staff  Time documenting the medical record  Time spent with family members  Time spent with consults  Management        Clinical Impression:   Final diagnoses:  [R00.0] Tachycardia  [A41.9] Sepsis, due to unspecified organism, unspecified whether acute organ dysfunction present (Primary)  [J18.9] Pneumonia due to infectious organism, unspecified laterality, unspecified part of lung  [R09.02] Hypoxia     I, Berna Abdalla MD, personally performed the services described in this documentation. All medical record entries made by the scribe were at my direction and in my presence. I have reviewed the chart and agree that the record reflects my personal  performance and is accurate and complete.    ED Disposition Condition    Admit Stable                Yanetew MD Lianne  01/29/23 3713

## 2023-01-30 LAB
ANISOCYTOSIS BLD QL SMEAR: SLIGHT
BASOPHILS NFR BLD: 0 % (ref 0–1.9)
DIFFERENTIAL METHOD: ABNORMAL
EOSINOPHIL NFR BLD: 1 % (ref 0–8)
ERYTHROCYTE [DISTWIDTH] IN BLOOD BY AUTOMATED COUNT: 14.3 % (ref 11.5–14.5)
GIANT PLATELETS BLD QL SMEAR: PRESENT
HCT VFR BLD AUTO: 31.7 % (ref 40–54)
HGB BLD-MCNC: 11 G/DL (ref 14–18)
IMM GRANULOCYTES # BLD AUTO: ABNORMAL K/UL (ref 0–0.04)
IMM GRANULOCYTES NFR BLD AUTO: ABNORMAL % (ref 0–0.5)
LYMPHOCYTES NFR BLD: 11 % (ref 18–48)
MCH RBC QN AUTO: 33.3 PG (ref 27–31)
MCHC RBC AUTO-ENTMCNC: 34.7 G/DL (ref 32–36)
MCV RBC AUTO: 96 FL (ref 82–98)
METAMYELOCYTES NFR BLD MANUAL: 2 %
MONOCYTES NFR BLD: 1 % (ref 4–15)
NEUTROPHILS NFR BLD: 64 % (ref 38–73)
NEUTS BAND NFR BLD MANUAL: 21 %
NRBC BLD-RTO: 0 /100 WBC
PLATELET # BLD AUTO: 136 K/UL (ref 150–450)
PMV BLD AUTO: 10.6 FL (ref 9.2–12.9)
POLYCHROMASIA BLD QL SMEAR: ABNORMAL
RBC # BLD AUTO: 3.3 M/UL (ref 4.6–6.2)
WBC # BLD AUTO: 5.77 K/UL (ref 3.9–12.7)

## 2023-01-30 PROCEDURE — 85027 COMPLETE CBC AUTOMATED: CPT | Performed by: STUDENT IN AN ORGANIZED HEALTH CARE EDUCATION/TRAINING PROGRAM

## 2023-01-30 PROCEDURE — 36415 COLL VENOUS BLD VENIPUNCTURE: CPT | Performed by: STUDENT IN AN ORGANIZED HEALTH CARE EDUCATION/TRAINING PROGRAM

## 2023-01-30 PROCEDURE — 85007 BL SMEAR W/DIFF WBC COUNT: CPT | Performed by: STUDENT IN AN ORGANIZED HEALTH CARE EDUCATION/TRAINING PROGRAM

## 2023-01-30 PROCEDURE — 25000003 PHARM REV CODE 250: Performed by: NURSE PRACTITIONER

## 2023-01-30 PROCEDURE — 94640 AIRWAY INHALATION TREATMENT: CPT

## 2023-01-30 PROCEDURE — 25000003 PHARM REV CODE 250: Performed by: STUDENT IN AN ORGANIZED HEALTH CARE EDUCATION/TRAINING PROGRAM

## 2023-01-30 PROCEDURE — 63600175 PHARM REV CODE 636 W HCPCS: Performed by: STUDENT IN AN ORGANIZED HEALTH CARE EDUCATION/TRAINING PROGRAM

## 2023-01-30 PROCEDURE — 11000001 HC ACUTE MED/SURG PRIVATE ROOM

## 2023-01-30 PROCEDURE — 25000242 PHARM REV CODE 250 ALT 637 W/ HCPCS: Performed by: STUDENT IN AN ORGANIZED HEALTH CARE EDUCATION/TRAINING PROGRAM

## 2023-01-30 RX ORDER — GUAIFENESIN 100 MG/5ML
200 SOLUTION ORAL EVERY 4 HOURS PRN
Status: DISCONTINUED | OUTPATIENT
Start: 2023-01-30 | End: 2023-01-30

## 2023-01-30 RX ORDER — GUAIFENESIN/DEXTROMETHORPHAN 100-10MG/5
10 SYRUP ORAL EVERY 4 HOURS PRN
Status: DISCONTINUED | OUTPATIENT
Start: 2023-01-30 | End: 2023-01-31 | Stop reason: HOSPADM

## 2023-01-30 RX ADMIN — GUAIFENESIN 200 MG: 100 SOLUTION ORAL at 02:01

## 2023-01-30 RX ADMIN — METOPROLOL SUCCINATE 50 MG: 50 TABLET, EXTENDED RELEASE ORAL at 08:01

## 2023-01-30 RX ADMIN — IPRATROPIUM BROMIDE AND ALBUTEROL SULFATE 3 ML: .5; 3 SOLUTION RESPIRATORY (INHALATION) at 07:01

## 2023-01-30 RX ADMIN — ASPIRIN 81 MG: 81 TABLET, COATED ORAL at 08:01

## 2023-01-30 RX ADMIN — HEPARIN SODIUM 5000 UNITS: 5000 INJECTION INTRAVENOUS; SUBCUTANEOUS at 01:01

## 2023-01-30 RX ADMIN — HEPARIN SODIUM 5000 UNITS: 5000 INJECTION INTRAVENOUS; SUBCUTANEOUS at 09:01

## 2023-01-30 RX ADMIN — GUAIFENESIN AND DEXTROMETHORPHAN 10 ML: 100; 10 SYRUP ORAL at 10:01

## 2023-01-30 RX ADMIN — HEPARIN SODIUM 5000 UNITS: 5000 INJECTION INTRAVENOUS; SUBCUTANEOUS at 06:01

## 2023-01-30 RX ADMIN — CLOPIDOGREL BISULFATE 75 MG: 75 TABLET ORAL at 08:01

## 2023-01-30 RX ADMIN — GUAIFENESIN AND DEXTROMETHORPHAN 10 ML: 100; 10 SYRUP ORAL at 03:01

## 2023-01-30 RX ADMIN — ATORVASTATIN CALCIUM 80 MG: 40 TABLET, FILM COATED ORAL at 08:01

## 2023-01-30 RX ADMIN — AZITHROMYCIN MONOHYDRATE 500 MG: 500 INJECTION, POWDER, LYOPHILIZED, FOR SOLUTION INTRAVENOUS at 06:01

## 2023-01-30 RX ADMIN — IPRATROPIUM BROMIDE AND ALBUTEROL SULFATE 3 ML: .5; 3 SOLUTION RESPIRATORY (INHALATION) at 02:01

## 2023-01-30 RX ADMIN — SIMETHICONE 80 MG: 80 TABLET, CHEWABLE ORAL at 07:01

## 2023-01-30 RX ADMIN — GUAIFENESIN AND DEXTROMETHORPHAN 10 ML: 100; 10 SYRUP ORAL at 09:01

## 2023-01-30 RX ADMIN — POLYETHYLENE GLYCOL 3350 17 G: 17 POWDER, FOR SOLUTION ORAL at 08:01

## 2023-01-30 RX ADMIN — CEFTRIAXONE 2 G: 2 INJECTION, SOLUTION INTRAVENOUS at 11:01

## 2023-01-30 RX ADMIN — GUAIFENESIN 200 MG: 100 SOLUTION ORAL at 06:01

## 2023-01-30 NOTE — ASSESSMENT & PLAN NOTE
-Presented with SOB, cough, and fever  -On admit, Labs showed no leukocytosis (5.4), down trending lactic acid (2.4 > 0.9), negative pro-calcitonin (0.24).   -CT chest showed no PE, bilateral pattern of pulmonary infiltrates, including patchy and nodular infiltrates as well as confluent infiltrates/airspace disease most notably at the lung bases, left lung base to a greater extent than the right.  -continue Ceftriaxone/azithromycin   -Wean O2 as tolerated, on 1L NC  -Will need walk test prior to discharge

## 2023-01-30 NOTE — ASSESSMENT & PLAN NOTE
This patient does have evidence of infective focus  My overall impression is sepsis.  Source: Respiratory  Antibiotics given-   Antibiotics (72h ago, onward)    Start     Stop Route Frequency Ordered    01/30/23 0000  cefTRIAXone 2 gram/50 mL IVPB 2 g         -- IV Every 24 hours (non-standard times) 01/29/23 0333    01/29/23 0700  azithromycin (ZITHROMAX) 500 mg in dextrose 5 % (D5W) 250 mL IVPB (Vial-Mate)         -- IV Every 24 hours (non-standard times) 01/29/23 0333        Latest lactate reviewed-  Recent Labs   Lab 01/28/23  2315 01/29/23  0227   LACTATE 2.4* 0.9     Organ dysfunction indicated by N/A    Fluid challenge Not needed - patient is not hypotensive      Post- resuscitation assessment Yes Perfusion exam was performed within 6 hours of septic shock presentation after bolus shows Adequate tissue perfusion assessed by non-invasive monitoring       Will Not start Pressors- Levophed for MAP of 65  Source control achieved by: Abx      -presented febrile, tachycardic, and tachypneic with source of infection as pneumonia   -continue IV antibiotics as above   -blood culture with no growth to date

## 2023-01-30 NOTE — PLAN OF CARE
Problem: Adult Inpatient Plan of Care  Goal: Plan of Care Review  Outcome: Ongoing, Progressing  Flowsheets (Taken 1/30/2023 0321)  Plan of Care Reviewed With: patient    Patient remains free from injury and falls this shift. Febrile this shift 100.9. PRN tylenol administered with relief. Patient weaned to 1L NC. IV antibiotics administered as ordered. Patient with frequent cough. PRN cough syrup ordered and administered. Patient resting comfortably. Plan of care continued.

## 2023-01-30 NOTE — PROGRESS NOTES
[facility]  Progress Note    Patient Name: Sharath Huizar  MRN: [id number]  Patient Class: IP- Inpatient   Admission Date: 1/28/2023  Length of Stay: 1 days  Attending Physician: Fernando Padilla DO  Primary Care Provider: Wilmer Bangura Jr, MD        Subjective:     Principal Problem:Pneumonia        HPI:  This is a 72 year old male with a PMHx of SCC of the supraglottis, CAD, HTN, HLD, HFrEF (EF: 30%) who presented with cough.    Patient reports developing a productive cough with green sputum the day of presentation along with fevers, chills, and shortness of breath. Associated symptoms include runny nose and sinus congestion. No sick contacts. In the ED, he was febrile (38.8), tachycardic, tachypnic, hypoxic requiring 2L of supplemental O2. Labs showed no leukocytosis (5.4), down trending lactic acid (2.4 > 0.9), negative pro-calcitonin (0.24). CT chest showed no PE, bilateral pattern of pulmonary infiltrates, including patchy and nodular infiltrates as well as confluent infiltrates/airspace disease most notably at the lung bases, left lung base to a greater extent than the right. He was given vancomycin, zosyn and was admitted for further management.       Overview/Hospital Course:  72 year old male with a PMHx of SCC of the supraglottis, CAD, HTN, HLD, HFrEF (EF: 30%) admitted on 01/28 for acute respiratory failure with hypoxia and community-acquired pneumonia.  Patient presented febrile, tachycardic, tachypneic on admit.  O2 sats down to 89-90% on room air in the ED. CTA chest with no PE, but there was bilateral pattern of pulmonary infiltrates. CXR with Mild perihilar infiltrates bilaterally.  Patient without any leukocytosis.  Was started on antibiotics for pneumonia coverage.  Blood culture with no growth to date.  Respiratory culture with many Gram-positive cocci.  Continue on Rocephin and azithromycin.  Wean O2 as tolerated.      Interval History:  No acute overnight events.   Patient however was febrile overnight.  States shortness of breath is improving, still has cough.  Denies any chest pain or palpitations.  Feeling better overall.    Review of Systems   Constitutional:  Positive for fever. Negative for chills and fatigue.   HENT:  Positive for congestion.    Eyes:  Negative for visual disturbance.   Respiratory:  Positive for cough and shortness of breath (improving). Negative for chest tightness and wheezing.    Cardiovascular:  Negative for chest pain, palpitations and leg swelling.   Gastrointestinal:  Negative for abdominal pain, nausea and vomiting.   Musculoskeletal:  Negative for joint swelling.   Neurological:  Negative for dizziness, weakness and headaches.   Psychiatric/Behavioral:  Negative for confusion and hallucinations.      Objective:     Vital Signs (Most Recent):  Temp: 98 °F (36.7 °C) (01/30/23 1140)  Pulse: 80 (01/30/23 1424)  Resp: 20 (01/30/23 1424)  BP: (!) 149/86 (01/30/23 1140)  SpO2: (!) 94 % (01/30/23 1424)   Vital Signs (24h Range):  Temp:  [98 °F (36.7 °C)-100.9 °F (38.3 °C)] 98 °F (36.7 °C)  Pulse:  [80-94] 80  Resp:  [16-22] 20  SpO2:  [92 %-99 %] 94 %  BP: (112-149)/(58-86) 149/86     Weight: 57.4 kg (126 lb 8.7 oz)  Body mass index is 20.42 kg/m².    Intake/Output Summary (Last 24 hours) at 1/30/2023 1621  Last data filed at 1/30/2023 0827  Gross per 24 hour   Intake 790 ml   Output 500 ml   Net 290 ml      Physical Exam  Vitals and nursing note reviewed.   Constitutional:       General: He is not in acute distress.     Appearance: He is not ill-appearing.   Eyes:      Extraocular Movements: Extraocular movements intact.      Pupils: Pupils are equal, round, and reactive to light.   Cardiovascular:      Rate and Rhythm: Normal rate and regular rhythm.      Heart sounds: No murmur heard.    No gallop.   Pulmonary:      Effort: Pulmonary effort is normal. No respiratory distress.      Breath sounds: No wheezing or rales.      Comments: Diminished  breath sounds in lower lung fields. On 1L NC. No acute distress. +cough present  Abdominal:      General: There is no distension.      Palpations: Abdomen is soft.      Tenderness: There is no abdominal tenderness. There is no guarding.   Musculoskeletal:         General: No swelling or tenderness.      Right lower leg: No edema.      Left lower leg: No edema.   Skin:     General: Skin is warm and dry.   Neurological:      General: No focal deficit present.      Mental Status: He is alert and oriented to person, place, and time.   Psychiatric:         Mood and Affect: Mood normal.         Behavior: Behavior normal.         Thought Content: Thought content normal.       Significant Labs: All pertinent labs within the past 24 hours have been reviewed.    Significant Imaging: I have reviewed all pertinent imaging results/findings within the past 24 hours.      Assessment/Plan:      * Pneumonia  -Presented with SOB, cough, and fever  -On admit, Labs showed no leukocytosis (5.4), down trending lactic acid (2.4 > 0.9), negative pro-calcitonin (0.24).   -CT chest showed no PE, bilateral pattern of pulmonary infiltrates, including patchy and nodular infiltrates as well as confluent infiltrates/airspace disease most notably at the lung bases, left lung base to a greater extent than the right.  -continue Ceftriaxone/azithromycin   -Wean O2 as tolerated, on 1L NC  -Will need walk test prior to discharge     Acute hypoxemic respiratory failure  Patient with Hypoxic Respiratory failure which is Acute.  he is not on home oxygen. Supplemental oxygen was provided and noted-  .   Signs/symptoms of respiratory failure include- increased work of breathing and respiratory distress. Contributing diagnoses includes - Pneumonia Labs and images were reviewed. Patient Has not had a recent ABG. Will treat underlying causes and adjust management of respiratory failure as follows- See PNA plan    -O2 sats of 89-90% on RA in the ER  -likely  secondary to pneumonia.    -treatment plan as above for pneumonia   -wean O2 as tolerated    Sepsis  This patient does have evidence of infective focus  My overall impression is sepsis.  Source: Respiratory  Antibiotics given-   Antibiotics (72h ago, onward)    Start     Stop Route Frequency Ordered    01/30/23 0000  cefTRIAXone 2 gram/50 mL IVPB 2 g         -- IV Every 24 hours (non-standard times) 01/29/23 0333    01/29/23 0700  azithromycin (ZITHROMAX) 500 mg in dextrose 5 % (D5W) 250 mL IVPB (Vial-Mate)         -- IV Every 24 hours (non-standard times) 01/29/23 0333        Latest lactate reviewed-  Recent Labs   Lab 01/28/23  2315 01/29/23  0227   LACTATE 2.4* 0.9     Organ dysfunction indicated by N/A    Fluid challenge Not needed - patient is not hypotensive      Post- resuscitation assessment Yes Perfusion exam was performed within 6 hours of septic shock presentation after bolus shows Adequate tissue perfusion assessed by non-invasive monitoring       Will Not start Pressors- Levophed for MAP of 65  Source control achieved by: Abx      -presented febrile, tachycardic, and tachypneic with source of infection as pneumonia   -continue IV antibiotics as above   -blood culture with no growth to date    Chronic systolic heart failure  Results for orders placed during the hospital encounter of 02/01/19    Transthoracic echo (TTE) complete    Interpretation Summary  · Moderately decreased left ventricular systolic function. The estimated ejection fraction is 30%  · Eccentric left ventricular hypertrophy.  · Mild left ventricular enlargement.  · Grade I (mild) left ventricular diastolic dysfunction consistent with impaired relaxation.  · Normal right ventricular systolic function.  · Moderate left atrial enlargement.    BNP  Recent Labs   Lab 01/28/23 2315   BNP 65       Compensated  Optimize volume status  Does not appear volume overloaded at this time, stable    Coronary artery disease involving native coronary  artery of native heart without angina pectoris  Continue home medications:  Aspirin, atorvastatin, Plavix      Hypertension, essential  Continue home medications:  Metoprolol succinate 50 mg daily    History of cancer of larynx  Outpatient follow up       History of lung cancer  Outpatient follow up           VTE Risk Mitigation (From admission, onward)         Ordered     heparin (porcine) injection 5,000 Units  Every 8 hours         01/29/23 0332     IP VTE HIGH RISK PATIENT  Once         01/29/23 0332     Place sequential compression device  Until discontinued         01/29/23 0332                Discharge Planning   CHANCE:      Code Status: Full Code   Is the patient medically ready for discharge?:     Reason for patient still in hospital (select all that apply): Patient trending condition, Laboratory test and Treatment  Discharge Plan A: Home with family                  Fernando Padilla DO  Department of Hospital Medicine   [facility] [place] [facility]

## 2023-01-30 NOTE — ASSESSMENT & PLAN NOTE
Patient with Hypoxic Respiratory failure which is Acute.  he is not on home oxygen. Supplemental oxygen was provided and noted-  .   Signs/symptoms of respiratory failure include- increased work of breathing and respiratory distress. Contributing diagnoses includes - Pneumonia Labs and images were reviewed. Patient Has not had a recent ABG. Will treat underlying causes and adjust management of respiratory failure as follows- See PNA plan    -O2 sats of 89-90% on RA in the ER  -likely secondary to pneumonia.    -treatment plan as above for pneumonia   -wean O2 as tolerated

## 2023-01-30 NOTE — PLAN OF CARE
Patient 92-94% on RA, prn cough syrup given, afebrile this shift    Problem: Adult Inpatient Plan of Care  Goal: Plan of Care Review  Outcome: Ongoing, Progressing  Goal: Patient-Specific Goal (Individualized)  Outcome: Ongoing, Progressing  Goal: Absence of Hospital-Acquired Illness or Injury  Outcome: Ongoing, Progressing  Goal: Optimal Comfort and Wellbeing  Outcome: Ongoing, Progressing  Goal: Readiness for Transition of Care  Outcome: Ongoing, Progressing     Problem: Adjustment to Illness (Sepsis/Septic Shock)  Goal: Optimal Coping  Outcome: Ongoing, Progressing     Problem: Infection Progression (Sepsis/Septic Shock)  Goal: Absence of Infection Signs and Symptoms  Outcome: Ongoing, Progressing     Problem: Infection (Pneumonia)  Goal: Resolution of Infection Signs and Symptoms  Outcome: Ongoing, Progressing     Problem: Respiratory Compromise (Pneumonia)  Goal: Effective Oxygenation and Ventilation  Outcome: Ongoing, Progressing     Problem: Fall Injury Risk  Goal: Absence of Fall and Fall-Related Injury  Outcome: Ongoing, Progressing

## 2023-01-30 NOTE — SUBJECTIVE & OBJECTIVE
Interval History:  No acute overnight events.  Patient however was febrile overnight.  States shortness of breath is improving, still has cough.  Denies any chest pain or palpitations.  Feeling better overall.    Review of Systems   Constitutional:  Positive for fever. Negative for chills and fatigue.   HENT:  Positive for congestion.    Eyes:  Negative for visual disturbance.   Respiratory:  Positive for cough and shortness of breath (improving). Negative for chest tightness and wheezing.    Cardiovascular:  Negative for chest pain, palpitations and leg swelling.   Gastrointestinal:  Negative for abdominal pain, nausea and vomiting.   Musculoskeletal:  Negative for joint swelling.   Neurological:  Negative for dizziness, weakness and headaches.   Psychiatric/Behavioral:  Negative for confusion and hallucinations.      Objective:     Vital Signs (Most Recent):  Temp: 98 °F (36.7 °C) (01/30/23 1140)  Pulse: 80 (01/30/23 1424)  Resp: 20 (01/30/23 1424)  BP: (!) 149/86 (01/30/23 1140)  SpO2: (!) 94 % (01/30/23 1424)   Vital Signs (24h Range):  Temp:  [98 °F (36.7 °C)-100.9 °F (38.3 °C)] 98 °F (36.7 °C)  Pulse:  [80-94] 80  Resp:  [16-22] 20  SpO2:  [92 %-99 %] 94 %  BP: (112-149)/(58-86) 149/86     Weight: 57.4 kg (126 lb 8.7 oz)  Body mass index is 20.42 kg/m².    Intake/Output Summary (Last 24 hours) at 1/30/2023 1621  Last data filed at 1/30/2023 0827  Gross per 24 hour   Intake 790 ml   Output 500 ml   Net 290 ml      Physical Exam  Vitals and nursing note reviewed.   Constitutional:       General: He is not in acute distress.     Appearance: He is not ill-appearing.   Eyes:      Extraocular Movements: Extraocular movements intact.      Pupils: Pupils are equal, round, and reactive to light.   Cardiovascular:      Rate and Rhythm: Normal rate and regular rhythm.      Heart sounds: No murmur heard.    No gallop.   Pulmonary:      Effort: Pulmonary effort is normal. No respiratory distress.      Breath sounds: No  wheezing or rales.      Comments: Diminished breath sounds in lower lung fields. On 1L NC. No acute distress. +cough present  Abdominal:      General: There is no distension.      Palpations: Abdomen is soft.      Tenderness: There is no abdominal tenderness. There is no guarding.   Musculoskeletal:         General: No swelling or tenderness.      Right lower leg: No edema.      Left lower leg: No edema.   Skin:     General: Skin is warm and dry.   Neurological:      General: No focal deficit present.      Mental Status: He is alert and oriented to person, place, and time.   Psychiatric:         Mood and Affect: Mood normal.         Behavior: Behavior normal.         Thought Content: Thought content normal.       Significant Labs: All pertinent labs within the past 24 hours have been reviewed.    Significant Imaging: I have reviewed all pertinent imaging results/findings within the past 24 hours.

## 2023-01-31 VITALS
HEART RATE: 84 BPM | DIASTOLIC BLOOD PRESSURE: 75 MMHG | SYSTOLIC BLOOD PRESSURE: 139 MMHG | BODY MASS INDEX: 20.34 KG/M2 | TEMPERATURE: 99 F | HEIGHT: 66 IN | OXYGEN SATURATION: 96 % | RESPIRATION RATE: 15 BRPM | WEIGHT: 126.56 LBS

## 2023-01-31 LAB
BACTERIA SPEC AEROBE CULT: NORMAL
BASOPHILS # BLD AUTO: 0.01 K/UL (ref 0–0.2)
BASOPHILS NFR BLD: 0.2 % (ref 0–1.9)
DIFFERENTIAL METHOD: ABNORMAL
EOSINOPHIL # BLD AUTO: 0 K/UL (ref 0–0.5)
EOSINOPHIL NFR BLD: 0.7 % (ref 0–8)
ERYTHROCYTE [DISTWIDTH] IN BLOOD BY AUTOMATED COUNT: 14.3 % (ref 11.5–14.5)
GRAM STN SPEC: NORMAL
HCT VFR BLD AUTO: 32.8 % (ref 40–54)
HGB BLD-MCNC: 11.1 G/DL (ref 14–18)
IMM GRANULOCYTES # BLD AUTO: 0.02 K/UL (ref 0–0.04)
IMM GRANULOCYTES NFR BLD AUTO: 0.3 % (ref 0–0.5)
LYMPHOCYTES # BLD AUTO: 1 K/UL (ref 1–4.8)
LYMPHOCYTES NFR BLD: 15.8 % (ref 18–48)
MCH RBC QN AUTO: 33.1 PG (ref 27–31)
MCHC RBC AUTO-ENTMCNC: 33.8 G/DL (ref 32–36)
MCV RBC AUTO: 98 FL (ref 82–98)
MONOCYTES # BLD AUTO: 0.2 K/UL (ref 0.3–1)
MONOCYTES NFR BLD: 2.8 % (ref 4–15)
NEUTROPHILS # BLD AUTO: 4.9 K/UL (ref 1.8–7.7)
NEUTROPHILS NFR BLD: 80.2 % (ref 38–73)
NRBC BLD-RTO: 0 /100 WBC
PLATELET # BLD AUTO: 143 K/UL (ref 150–450)
PMV BLD AUTO: 11.4 FL (ref 9.2–12.9)
RBC # BLD AUTO: 3.35 M/UL (ref 4.6–6.2)
WBC # BLD AUTO: 6.08 K/UL (ref 3.9–12.7)

## 2023-01-31 PROCEDURE — G0378 HOSPITAL OBSERVATION PER HR: HCPCS

## 2023-01-31 PROCEDURE — 36415 COLL VENOUS BLD VENIPUNCTURE: CPT | Performed by: STUDENT IN AN ORGANIZED HEALTH CARE EDUCATION/TRAINING PROGRAM

## 2023-01-31 PROCEDURE — 94640 AIRWAY INHALATION TREATMENT: CPT

## 2023-01-31 PROCEDURE — 25000242 PHARM REV CODE 250 ALT 637 W/ HCPCS: Performed by: STUDENT IN AN ORGANIZED HEALTH CARE EDUCATION/TRAINING PROGRAM

## 2023-01-31 PROCEDURE — 25000003 PHARM REV CODE 250: Performed by: STUDENT IN AN ORGANIZED HEALTH CARE EDUCATION/TRAINING PROGRAM

## 2023-01-31 PROCEDURE — 63600175 PHARM REV CODE 636 W HCPCS: Performed by: STUDENT IN AN ORGANIZED HEALTH CARE EDUCATION/TRAINING PROGRAM

## 2023-01-31 PROCEDURE — 85025 COMPLETE CBC W/AUTO DIFF WBC: CPT | Performed by: STUDENT IN AN ORGANIZED HEALTH CARE EDUCATION/TRAINING PROGRAM

## 2023-01-31 RX ORDER — AMOXICILLIN AND CLAVULANATE POTASSIUM 875; 125 MG/1; MG/1
1 TABLET, FILM COATED ORAL 2 TIMES DAILY
Qty: 10 TABLET | Refills: 0 | Status: SHIPPED | OUTPATIENT
Start: 2023-01-31 | End: 2023-02-05

## 2023-01-31 RX ADMIN — HEPARIN SODIUM 5000 UNITS: 5000 INJECTION INTRAVENOUS; SUBCUTANEOUS at 06:01

## 2023-01-31 RX ADMIN — IPRATROPIUM BROMIDE AND ALBUTEROL SULFATE 3 ML: .5; 3 SOLUTION RESPIRATORY (INHALATION) at 08:01

## 2023-01-31 RX ADMIN — METOPROLOL SUCCINATE 50 MG: 50 TABLET, EXTENDED RELEASE ORAL at 09:01

## 2023-01-31 RX ADMIN — ASPIRIN 81 MG: 81 TABLET, COATED ORAL at 09:01

## 2023-01-31 RX ADMIN — AZITHROMYCIN MONOHYDRATE 500 MG: 500 INJECTION, POWDER, LYOPHILIZED, FOR SOLUTION INTRAVENOUS at 06:01

## 2023-01-31 RX ADMIN — ATORVASTATIN CALCIUM 80 MG: 40 TABLET, FILM COATED ORAL at 09:01

## 2023-01-31 RX ADMIN — CLOPIDOGREL BISULFATE 75 MG: 75 TABLET ORAL at 09:01

## 2023-01-31 NOTE — NURSING
Oxygen Evaluation     Date Performed: 2023     1)         Patient's Home O2 Sat on room air, while at rest: 95%                               If O2 sats on room air at rest are 88% or below, patient qualifies. No additional testing needed. Document N/A in steps 2 and 3. If 89% or above, complete steps 2.        2)         Patient's O2 Sat on room air while exercisin%                               If O2 sats on room air while exercising remain 89% or above patient does not qualify, no further testing needed Document N/A in step 3. If O2 sats on room air while exercising are 88% or below, continue to step 3.        3)         N/A                                           (Must show improvement from #2 for patients to qualify)     If O2 sats improve on oxygen, patient qualifies for portable oxygen. If not, the patient does not qualify.

## 2023-01-31 NOTE — PLAN OF CARE
Problem: Adult Inpatient Plan of Care  Goal: Plan of Care Review  Outcome: Met  Goal: Patient-Specific Goal (Individualized)  Outcome: Met  Goal: Absence of Hospital-Acquired Illness or Injury  Outcome: Met  Goal: Optimal Comfort and Wellbeing  Outcome: Met  Goal: Readiness for Transition of Care  Outcome: Met     Problem: Adjustment to Illness (Sepsis/Septic Shock)  Goal: Optimal Coping  Outcome: Met     Problem: Bleeding (Sepsis/Septic Shock)  Goal: Absence of Bleeding  Outcome: Met     Problem: Glycemic Control Impaired (Sepsis/Septic Shock)  Goal: Blood Glucose Level Within Desired Range  Outcome: Met     Problem: Infection Progression (Sepsis/Septic Shock)  Goal: Absence of Infection Signs and Symptoms  Outcome: Met     Problem: Nutrition Impaired (Sepsis/Septic Shock)  Goal: Optimal Nutrition Intake  Outcome: Met     Problem: Fluid Imbalance (Pneumonia)  Goal: Fluid Balance  Outcome: Met     Problem: Infection (Pneumonia)  Goal: Resolution of Infection Signs and Symptoms  Outcome: Met     Problem: Respiratory Compromise (Pneumonia)  Goal: Effective Oxygenation and Ventilation  Outcome: Met     Problem: Fall Injury Risk  Goal: Absence of Fall and Fall-Related Injury  Outcome: Met

## 2023-01-31 NOTE — DISCHARGE SUMMARY
[facility]  Discharge Summary      Patient Name: Sharath Huizar  MRN: [id number]  [facility]: [id number]  Patient Class: OP- Observation  Admission Date: 1/28/2023  Hospital Length of Stay: 2 days  Discharge Date and Time: 1/31/2023 11:08 AM  Attending Physician: No att. providers found   Discharging Provider: Fernando Padilla DO  Primary Care Provider: Wilmer Bangura Jr, MD    Primary Care Team: Networked reference to record PCT     HPI:   This is a 72 year old male with a PMHx of SCC of the supraglottis, CAD, HTN, HLD, HFrEF (EF: 30%) who presented with cough.    Patient reports developing a productive cough with green sputum the day of presentation along with fevers, chills, and shortness of breath. Associated symptoms include runny nose and sinus congestion. No sick contacts. In the ED, he was febrile (38.8), tachycardic, tachypnic, hypoxic requiring 2L of supplemental O2. Labs showed no leukocytosis (5.4), down trending lactic acid (2.4 > 0.9), negative pro-calcitonin (0.24). CT chest showed no PE, bilateral pattern of pulmonary infiltrates, including patchy and nodular infiltrates as well as confluent infiltrates/airspace disease most notably at the lung bases, left lung base to a greater extent than the right. He was given vancomycin, zosyn and was admitted for further management.       * No surgery found *      Hospital Course:   72 year old male with a PMHx of SCC of the supraglottis, CAD, HTN, HLD, HFrEF (EF: 30%) admitted on 01/28 for acute respiratory failure with hypoxia and community-acquired pneumonia.  Patient presented febrile, tachycardic, tachypneic on admit.  O2 sats down to 89-90% on room air in the ED. CTA chest with no PE, but there was bilateral pattern of pulmonary infiltrates. CXR with Mild perihilar infiltrates bilaterally.  Patient without any leukocytosis.  Was started on antibiotics for pneumonia coverage.  Blood culture with no growth to date.  Respiratory culture  with many Gram-positive cocci. Received Rocephin and azithromycin.  Able to wean off supplemental oxygen and to room air.  Completed 6 minute walk test without any hypoxia at rest with ambulation.    Patient admits feeling better overall.  No longer having any shortness of breath.  Cough still present. Pt denies any fever, headaches, vision changes, chest pain, shortness of breath, palpitations, abdominal pain, nausea, vomiting, or any new weaknesses. Feels ready to go home. Patient's exam on discharge was as follow: Patient is alert and oriented, appears in no acute distress, heart with regular rate and rhythm, lungs clear to asculation with non-labored breathing, abdomen soft, and no new weaknesses or focal deficits seen. Bilateral lower extremities without any edema or calf tenderness.     Patient was counseled regarding any abnormal labs, differential diagnosis, treatment options, risk-benefit, lifestyle changes, prognosis, current condition, and medications. Patient was interactive and attentive.  Patient's questions were answered in a respectful and timely manner. Patient was instructed to follow-up with PCP within 1 week and to continue taking medications as prescribed.  Also, extensively discussed the risks, benefits, and side effects of patient's medications. Discussed with patient about any medication changes. Patient verbalized understanding and agrees to treatment plan.  Patient is stable for discharge.  Patient has no other questions or concerns at this time.  ED precautions discussed with the patient.    Vital signs are stable. Ambulating without any difficulty. Tolerating p.o. intake without any nausea or vomiting. Afebrile for over 24 hours. Patient is in stable condition and has no questions or concerns. Patient will be discharge to home. Prescriptions sent to pharmacy.  CM/SW to assist with discharge planning.     Vitals:    01/31/23 0231 01/31/23 0410 01/31/23 0726 01/31/23 0800   BP:  129/69  139/75    BP Location:  Right arm Right arm    Patient Position:  Lying Lying    Pulse:  84 80 84   Resp:  18 18 15   Temp:  98.3 °F (36.8 °C) 98.5 °F (36.9 °C)    TempSrc:  Oral Oral    SpO2: 98% 95% 96% 96%   Weight:       Height:                Goals of Care Treatment Preferences:  Code Status: Full Code      Consults:     No new Assessment & Plan notes have been filed under this hospital service since the last note was generated.  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] 05/05/2020 Not Applicable    History of lung cancer [Z85.118] 05/08/2020 Not Applicable      Problems Resolved During this Admission:       Discharged Condition: stable    Disposition: Home or Self Care    Follow Up:    Patient Instructions:      Diet Adult Regular     Notify your health care provider if you experience any of the following:  temperature >100.4     Notify your health care provider if you experience any of the following:  difficulty breathing or increased cough     Notify your health care provider if you experience any of the following:  increased confusion or weakness     Notify your health care provider if you experience any of the following:  persistent dizziness, light-headedness, or visual disturbances     Activity as tolerated       Significant Diagnostic Studies: Labs: All labs within the past 24 hours have been reviewed       Recent Results (from the past 100 hour(s))   Blood culture x two cultures. Draw prior to antibiotics.    Collection Time: 01/28/23 11:13  PM    Specimen: Peripheral, Antecubital, Right; Blood   Result Value Ref Range    Blood Culture, Routine No Growth to date     Blood Culture, Routine No Growth to date     Blood Culture, Routine No Growth to date    CBC auto differential    Collection Time: 01/28/23 11:15 PM   Result Value Ref Range    WBC 5.49 3.90 - 12.70 K/uL    RBC 3.68 (L) 4.60 - 6.20 M/uL    Hemoglobin 12.3 (L) 14.0 - 18.0 g/dL    Hematocrit 35.9 (L) 40.0 - 54.0 %    MCV 98 82 - 98 fL    MCH 33.4 (H) 27.0 - 31.0 pg    MCHC 34.3 32.0 - 36.0 g/dL    RDW 14.3 11.5 - 14.5 %    Platelets 141 (L) 150 - 450 K/uL    MPV 9.8 9.2 - 12.9 fL    Immature Granulocytes CANCELED 0.0 - 0.5 %    Immature Grans (Abs) CANCELED 0.00 - 0.04 K/uL    nRBC 0 0 /100 WBC    Gran % 60.0 38.0 - 73.0 %    Lymph % 8.0 (L) 18.0 - 48.0 %    Mono % 0.0 (L) 4.0 - 15.0 %    Eosinophil % 0.0 0.0 - 8.0 %    Basophil % 0.0 0.0 - 1.9 %    Bands 26.0 %    Metamyelocytes 2.0 %    Myelocytes 4.0 %    Aniso Slight     Differential Method Manual    Comprehensive metabolic panel    Collection Time: 01/28/23 11:15 PM   Result Value Ref Range    Sodium 137 136 - 145 mmol/L    Potassium 4.2 3.5 - 5.1 mmol/L    Chloride 103 95 - 110 mmol/L    CO2 23 23 - 29 mmol/L    Glucose 135 (H) 70 - 110 mg/dL    BUN 18 8 - 23 mg/dL    Creatinine 1.3 0.5 - 1.4 mg/dL    Calcium 9.7 8.7 - 10.5 mg/dL    Total Protein 7.3 6.0 - 8.4 g/dL    Albumin 3.2 (L) 3.5 - 5.2 g/dL    Total Bilirubin 0.7 0.1 - 1.0 mg/dL    Alkaline Phosphatase 64 55 - 135 U/L    AST 31 10 - 40 U/L    ALT 17 10 - 44 U/L    Anion Gap 11 8 - 16 mmol/L    eGFR 58 (A) >60 mL/min/1.73 m^2   Lactic acid, plasma #1    Collection Time: 01/28/23 11:15 PM   Result Value Ref Range    Lactate (Lactic Acid) 2.4 (H) 0.5 - 2.2 mmol/L   Magnesium    Collection Time: 01/28/23 11:15 PM   Result Value Ref Range    Magnesium 1.8 1.6 - 2.6 mg/dL   Brain natriuretic peptide    Collection Time: 01/28/23 11:15 PM   Result Value Ref Range    BNP 65 0 - 99 pg/mL    Troponin I    Collection Time: 01/28/23 11:15 PM   Result Value Ref Range    Troponin I 0.030 (H) 0.000 - 0.026 ng/mL   Procalcitonin    Collection Time: 01/28/23 11:15 PM   Result Value Ref Range    Procalcitonin 0.24 <0.25 ng/mL   Blood culture x two cultures. Draw prior to antibiotics.    Collection Time: 01/28/23 11:25 PM    Specimen: Peripheral, Forearm, Left; Blood   Result Value Ref Range    Blood Culture, Routine No Growth to date     Blood Culture, Routine No Growth to date     Blood Culture, Routine No Growth to date    ISTAT Lactate    Collection Time: 01/28/23 11:27 PM   Result Value Ref Range    POC Lactate 1.70 0.5 - 2.2 mmol/L    Sample VENOUS     Site Other     Allens Test N/A    POCT COVID-19 Rapid Screening    Collection Time: 01/29/23 12:13 AM   Result Value Ref Range    POC Rapid COVID Negative Negative     Acceptable Yes    POCT Influenza A/B Molecular    Collection Time: 01/29/23 12:14 AM   Result Value Ref Range    POC Molecular Influenza A Ag Negative Negative, Not Reported    POC Molecular Influenza B Ag Negative Negative, Not Reported     Acceptable Yes    Lactic acid, plasma #2    Collection Time: 01/29/23  2:27 AM   Result Value Ref Range    Lactate (Lactic Acid) 0.9 0.5 - 2.2 mmol/L   Urinalysis, Reflex to Urine Culture Urine, Clean Catch    Collection Time: 01/29/23  6:32 AM    Specimen: Urine   Result Value Ref Range    Specimen UA Urine, Clean Catch     Color, UA Yellow Yellow, Straw, Ina    Appearance, UA Clear Clear    pH, UA 6.0 5.0 - 8.0    Specific Gravity, UA >1.030 (A) 1.005 - 1.030    Protein, UA 1+ (A) Negative    Glucose, UA Negative Negative    Ketones, UA Negative Negative    Bilirubin (UA) Negative Negative    Occult Blood UA Negative Negative    Nitrite, UA Negative Negative    Urobilinogen, UA 4.0-6.0 (A) <2.0 EU/dL    Leukocytes, UA Negative Negative   Urinalysis Microscopic    Collection Time: 01/29/23  6:32 AM   Result Value Ref  Range    RBC, UA 3 0 - 4 /hpf    WBC, UA 0 0 - 5 /hpf    Bacteria None None-Occ /hpf    Hyaline Casts, UA 0 0-1/lpf /lpf    Microscopic Comment SEE COMMENT    Culture, Respiratory with Gram Stain    Collection Time: 01/29/23 11:46 PM    Specimen: Sputum; Respiratory   Result Value Ref Range    Respiratory Culture Normal respiratory isaiah     Gram Stain (Respiratory) <10 epithelial cells per low power field.     Gram Stain (Respiratory) Few WBC's     Gram Stain (Respiratory) Many Gram positive cocci in pairs and chains    CBC auto differential    Collection Time: 01/30/23  4:09 AM   Result Value Ref Range    WBC 5.77 3.90 - 12.70 K/uL    RBC 3.30 (L) 4.60 - 6.20 M/uL    Hemoglobin 11.0 (L) 14.0 - 18.0 g/dL    Hematocrit 31.7 (L) 40.0 - 54.0 %    MCV 96 82 - 98 fL    MCH 33.3 (H) 27.0 - 31.0 pg    MCHC 34.7 32.0 - 36.0 g/dL    RDW 14.3 11.5 - 14.5 %    Platelets 136 (L) 150 - 450 K/uL    MPV 10.6 9.2 - 12.9 fL    Immature Granulocytes CANCELED 0.0 - 0.5 %    Immature Grans (Abs) CANCELED 0.00 - 0.04 K/uL    nRBC 0 0 /100 WBC    Gran % 64.0 38.0 - 73.0 %    Lymph % 11.0 (L) 18.0 - 48.0 %    Mono % 1.0 (L) 4.0 - 15.0 %    Eosinophil % 1.0 0.0 - 8.0 %    Basophil % 0.0 0.0 - 1.9 %    Bands 21.0 %    Metamyelocytes 2.0 %    Aniso Slight     Poly Occasional     Large/Giant Platelets Present     Differential Method Manual    CBC auto differential    Collection Time: 01/31/23  3:43 AM   Result Value Ref Range    WBC 6.08 3.90 - 12.70 K/uL    RBC 3.35 (L) 4.60 - 6.20 M/uL    Hemoglobin 11.1 (L) 14.0 - 18.0 g/dL    Hematocrit 32.8 (L) 40.0 - 54.0 %    MCV 98 82 - 98 fL    MCH 33.1 (H) 27.0 - 31.0 pg    MCHC 33.8 32.0 - 36.0 g/dL    RDW 14.3 11.5 - 14.5 %    Platelets 143 (L) 150 - 450 K/uL    MPV 11.4 9.2 - 12.9 fL    Immature Granulocytes 0.3 0.0 - 0.5 %    Gran # (ANC) 4.9 1.8 - 7.7 K/uL    Immature Grans (Abs) 0.02 0.00 - 0.04 K/uL    Lymph # 1.0 1.0 - 4.8 K/uL    Mono # 0.2 (L) 0.3 - 1.0 K/uL    Eos # 0.0 0.0 - 0.5 K/uL     Baso # 0.01 0.00 - 0.20 K/uL    nRBC 0 0 /100 WBC    Gran % 80.2 (H) 38.0 - 73.0 %    Lymph % 15.8 (L) 18.0 - 48.0 %    Mono % 2.8 (L) 4.0 - 15.0 %    Eosinophil % 0.7 0.0 - 8.0 %    Basophil % 0.2 0.0 - 1.9 %    Differential Method Automated        Microbiology Results (last 7 days)     Procedure Component Value Units Date/Time    Culture, Respiratory with Gram Stain [[id number]] Collected: 01/29/23 2346    Order Status: Completed Specimen: Respiratory from Sputum Updated: 01/31/23 0808     Respiratory Culture Normal respiratory isaiah     Gram Stain (Respiratory) <10 epithelial cells per low power field.     Gram Stain (Respiratory) Few WBC's     Gram Stain (Respiratory) Many Gram positive cocci in pairs and chains    Blood culture x two cultures. Draw prior to antibiotics. [[id number]] Collected: 01/28/23 2313    Order Status: Completed Specimen: Blood from Peripheral, Antecubital, Right Updated: 01/31/23 0303     Blood Culture, Routine No Growth to date      No Growth to date      No Growth to date    Narrative:      Aerobic and anaerobic    Blood culture x two cultures. Draw prior to antibiotics. [[id number]] Collected: 01/28/23 2325    Order Status: Completed Specimen: Blood from Peripheral, Forearm, Left Updated: 01/31/23 0303     Blood Culture, Routine No Growth to date      No Growth to date      No Growth to date    Narrative:      Aerobic and anaerobic          Imaging Results          CTA Chest Non-Coronary (PE Studies) (Final result)  Result time 01/29/23 02:30:23    Final result by Dimitri Leggett MD (01/29/23 02:30:23)                 Impression:      There is no large central saddle type pulmonary embolus, there is no additional evidence for pulmonary embolus on this exam.    Bilateral pattern of pulmonary infiltrates as above, including patchy and nodular infiltrates as well as confluent infiltrates/airspace disease most notably at the lung bases, left lung base to a greater extent than the  right.    Peribronchial thickening is noted most notably at the lung bases, there is also mild bronchial opacity that may relate to secretions, mucous plugging and or aspiration.    Additional findings as above.      Electronically signed by: Dimitri Leggett  Date:    01/29/2023  Time:    02:30             Narrative:    EXAMINATION:  CTA CHEST NON CORONARY (PE STUDIES)    CLINICAL HISTORY:  Pulmonary embolism (PE) suspected, unknown D-dimer;    TECHNIQUE:  Low dose axial images, sagittal and coronal reformations were obtained from the thoracic inlet to the lung bases following the IV administration of 85 mL of Omnipaque 350.  Contrast timing was optimized to evaluate the pulmonary arteries.  MIP images were performed.    COMPARISON:  CT examination of the chest without contrast March 29, 2022    FINDINGS:  There is no large central saddle type pulmonary embolus.  The pulmonary arterial vascular demonstrate opacification, abnormal pattern of pulmonary arterial filling defects specific for pulmonary emboli are not seen.    The thoracic aorta demonstrates appropriate opacification.  Aortic atherosclerotic change and coronary arterial atherosclerotic change noted.  There is no enlarged mediastinal or hilar adenopathy.  Small lymph nodes are noted.  There is no pericardial effusion.    There is peribronchial thickening noted bilaterally most notably at the lung bases in the lower lobes to the greatest degree, there is some degree of bronchial opacity that may relate to mucous plugging and or secretions or aspiration.  There are patchy pulmonary nodular infiltrate seen involving the right upper lobe, most notably posteriorly.  There is mild confluent ground-glass infiltrate of the right middle lobe.  There are patchy pulmonary infiltrates with mild nodularity of the left upper lobe lingular segment in particular.  There are patchy nodular infiltrates of the superior segment of the right lower lobe, and there is more  prominent patchy and confluent infiltrates/airspace disease of the posterior and inferior right lower lobe.  More prominently there is pulmonary infiltrate/airspace disease with confluence, interstitial and alveolar infiltrate involving the left lower lobe.  Chronic lung changes are also noted.    There is no evidence for pleural effusion.  There is no evidence for pneumothorax.    Limited imaging of the upper abdomen demonstrates an exophytic lesion of the posterior aspect of the left kidney, not well evaluated on this examination however measures approximately 16 Hounsfield units, likely a cyst.  Evaluation of the upper abdomen is otherwise limited.  The visualized osseous structures appear intact.                                X-Ray Chest AP Portable (Final result)  Result time 01/29/23 00:01:43    Final result by Dmiitri Leggett MD (01/29/23 00:01:43)                 Impression:      Mild perihilar infiltrates bilaterally, additional pulmonary opacity at the right lung base consistent with pulmonary infiltrate however the possibility of a cavitary lesion is to be considered.  If clinically warranted further evaluation with CT may be helpful.    This report was flagged in Epic as abnormal.      Electronically signed by: Dimitri Leggett  Date:    01/29/2023  Time:    00:01             Narrative:    EXAMINATION:  XR CHEST AP PORTABLE    CLINICAL HISTORY:  Sepsis;    TECHNIQUE:  Single frontal view of the chest was performed.    COMPARISON:  Chest radiograph February 11, 2021    FINDINGS:  Single portable chest view is submitted.  When accounting for difference in position technique and depth of inspiration, the appearance of the cardiomediastinal silhouette is appropriate.    There is mild perihilar infiltrate bilaterally.  There is somewhat more prominent pulmonary opacity at the right lung base that may relate to an area of pulmonary infiltrate/airspace disease.  There is subtle suggestion rounded lucency this  may relate to a cavitary lesion.  If clinically warranted further evaluation with CT examination may be helpful.    There is no evidence for pleural effusion and no evidence for pneumothorax.  The visualized osseous structures appear intact.                                  Pending Diagnostic Studies:     Procedure Component Value Units Date/Time    Legionella antigen, urine [[id number]] Collected: 01/29/23 0634    Order Status: Sent Lab Status: In process Updated: 01/29/23 0645    Specimen: Urine, Clean Catch          Medications:  Reconciled Home Medications:      Medication List      START taking these medications    amoxicillin-clavulanate 875-125mg 875-125 mg per tablet  Commonly known as: AUGMENTIN  Take 1 tablet by mouth 2 (two) times daily. for 5 days        CONTINUE taking these medications    acetaminophen 325 MG tablet  Commonly known as: TYLENOL  Take 1 tablet (325 mg total) by mouth every 6 (six) hours as needed for Pain.     aspirin 81 MG EC tablet  Commonly known as: ECOTRIN  Take 1 tablet (81 mg total) by mouth once daily.     atorvastatin 80 MG tablet  Commonly known as: LIPITOR  Take 1 tablet (80 mg total) by mouth once daily.     azelastine 137 mcg (0.1 %) nasal spray  Commonly known as: ASTELIN  1 spray (137 mcg total) by Nasal route 2 (two) times daily.     cetirizine 10 MG tablet  Commonly known as: ZYRTEC  Take 1 tablet (10 mg total) by mouth once daily.     clopidogreL 75 mg tablet  Commonly known as: PLAVIX  Take 1 tablet (75 mg total) by mouth once daily.     * fluticasone propionate 50 mcg/actuation nasal spray  Commonly known as: FLONASE  1 spray (50 mcg total) by Each Nostril route 2 (two) times daily.     * fluticasone propionate 50 mcg/actuation nasal spray  Commonly known as: FLONASE  1 spray (50 mcg total) by Each Nostril route 2 (two) times daily.     gabapentin 300 MG capsule  Commonly known as: NEURONTIN  Take 1 capsule (300 mg total) by mouth 3 (three) times daily.     metoprolol  succinate 50 MG 24 hr tablet  Commonly known as: TOPROL-XL  Take 1 tablet (50 mg total) by mouth once daily.     nitroGLYCERIN 0.4 MG SL tablet  Commonly known as: NITROSTAT  Place 1 tablet (0.4 mg total) under the tongue every 5 (five) minutes as needed.     sodium chloride 0.65 % nasal spray  Commonly known as: Saline NasaL  2 sprays by Nasal route 2 (two) times a day. Use prior to medication sprays     traMADoL 50 mg tablet  Commonly known as: ULTRAM  Take 1 tablet (50 mg total) by mouth every 8 (eight) hours as needed for Pain.         * This list has 2 medication(s) that are the same as other medications prescribed for you. Read the directions carefully, and ask your doctor or other care provider to review them with you.            STOP taking these medications    valACYclovir 1000 MG tablet  Commonly known as: VALTREX            Indwelling Lines/Drains at time of discharge:   Lines/Drains/Airways     None                 Time spent on the discharge of patient: Greater than 35 minutes         Fernando Padilla DO  Department of Hospital Medicine  [facility] [place] [facility]

## 2023-01-31 NOTE — CODE 44
"MEDICARE CONDITION 44    (Reference: Transmittal 200 of the Medicare Manual)    A Medicare "Inpatient Admission" may be changed to an "Outpatient" (includes  Outpatient Observation) status, if the following conditions exist:  The change in patient status from inpatient to outpatient is made prior to        discharge or release, while the patient is still a patient in the hospital.   The hospital has not submitted a claim for the inpatient admission.  A physician concurs with the Utilization Committee decision.   Physician concurrence with the Utilization Committee's decision is documented         in the patient's records.         IMPLEMENTATION OF CONDITION CODE 44    Inpatient status has been reviewed prior to discharge. Change to Outpatient Observation status, in accordance with Condition Code 44.     By entering this in the medical record, I verify that I have reviewed and concur with the findings of the Utilization Review Committee and the Utilization Review Physician, and that the identified Patient does not meet medical necessity for Inpatient status. This patient is appropriate for the downgrade in status because per Payor Guidelines and Policy they have determined Observation to be the correct level of care. Outpatient Observation is the identified level of care appropriate for the Patient, and all of the above conditions for this status change have been met.         Wendy-Christie Padilla, DO    "

## 2023-01-31 NOTE — PLAN OF CARE
[facility] - Med Surg  Discharge Final Note    Primary Care Provider: Wilmer Bangura Jr, MD    Expected Discharge Date: 1/31/2023    All needs met. Appointment scheduled. SW notified nurse Fany that patient is ready for discharge from case management standpoint.     Final Discharge Note (most recent)       Final Note - 01/31/23 0945          Final Note    Assessment Type Final Discharge Note     Anticipated Discharge Disposition Home or Self Care     What phone number can be called within the next 1-3 days to see how you are doing after discharge? [phone]     Hospital Resources/Appts/Education Provided Appointments scheduled and added to AVS;Appointments scheduled by Navigator/Coordinator        Post-Acute Status    Post-Acute Authorization Other     Coverage [employer]     Other Status No Post-Acute Service Needs     Discharge Delays None known at this time                     Important Message from Medicare

## 2023-01-31 NOTE — PLAN OF CARE
Problem: Adult Inpatient Plan of Care  Goal: Plan of Care Review  Outcome: Ongoing, Progressing  Flowsheets (Taken 1/31/2023 0244)  Plan of Care Reviewed With:   patient   spouse    Patient remains free from injury and falls this shift. Oxygen sats remain >92% on room air. Denies SOB. Cough improving. IV antibiotics administered as ordered. Remains afebrile. Current plan of reviewed with patient and continued.

## 2023-02-01 LAB — L PNEUMO AG UR QL IA: NEGATIVE

## 2023-02-02 LAB
BACTERIA BLD CULT: NORMAL
BACTERIA BLD CULT: NORMAL

## 2023-02-07 DIAGNOSIS — Z00.00 ENCOUNTER FOR MEDICARE ANNUAL WELLNESS EXAM: ICD-10-CM

## 2023-02-09 DIAGNOSIS — Z00.00 ENCOUNTER FOR MEDICARE ANNUAL WELLNESS EXAM: ICD-10-CM

## 2023-02-14 ENCOUNTER — LAB VISIT (OUTPATIENT)
Dept: LAB | Facility: HOSPITAL | Age: 73
End: 2023-02-14
Attending: FAMILY MEDICINE
Payer: MEDICARE

## 2023-02-14 ENCOUNTER — OFFICE VISIT (OUTPATIENT)
Dept: FAMILY MEDICINE | Facility: CLINIC | Age: 73
End: 2023-02-14
Payer: MEDICARE

## 2023-02-14 VITALS
BODY MASS INDEX: 21.51 KG/M2 | HEART RATE: 88 BPM | DIASTOLIC BLOOD PRESSURE: 66 MMHG | TEMPERATURE: 98 F | SYSTOLIC BLOOD PRESSURE: 132 MMHG | OXYGEN SATURATION: 97 % | HEIGHT: 66 IN | WEIGHT: 133.81 LBS

## 2023-02-14 DIAGNOSIS — Z85.118 HISTORY OF LUNG CANCER: ICD-10-CM

## 2023-02-14 DIAGNOSIS — I10 HYPERTENSION, ESSENTIAL: Primary | Chronic | ICD-10-CM

## 2023-02-14 DIAGNOSIS — J43.8 OTHER EMPHYSEMA: Chronic | ICD-10-CM

## 2023-02-14 DIAGNOSIS — Z12.11 SCREENING FOR COLORECTAL CANCER: ICD-10-CM

## 2023-02-14 DIAGNOSIS — I50.22 CHRONIC SYSTOLIC HEART FAILURE: Chronic | ICD-10-CM

## 2023-02-14 DIAGNOSIS — I70.0 ATHEROSCLEROSIS OF AORTA: Chronic | ICD-10-CM

## 2023-02-14 DIAGNOSIS — I25.10 CORONARY ARTERY DISEASE INVOLVING NATIVE CORONARY ARTERY OF NATIVE HEART WITHOUT ANGINA PECTORIS: Chronic | ICD-10-CM

## 2023-02-14 DIAGNOSIS — I70.0 ATHEROSCLEROSIS OF AORTA: ICD-10-CM

## 2023-02-14 DIAGNOSIS — J18.9 COMMUNITY ACQUIRED PNEUMONIA, BILATERAL: ICD-10-CM

## 2023-02-14 DIAGNOSIS — I10 HYPERTENSION, ESSENTIAL: ICD-10-CM

## 2023-02-14 DIAGNOSIS — I25.10 CORONARY ARTERY DISEASE INVOLVING NATIVE CORONARY ARTERY OF NATIVE HEART WITHOUT ANGINA PECTORIS: ICD-10-CM

## 2023-02-14 DIAGNOSIS — R73.9 HYPERGLYCEMIA: ICD-10-CM

## 2023-02-14 DIAGNOSIS — Z85.21 HISTORY OF CANCER OF LARYNX: ICD-10-CM

## 2023-02-14 DIAGNOSIS — Z12.12 SCREENING FOR COLORECTAL CANCER: ICD-10-CM

## 2023-02-14 PROBLEM — J98.4 CAVITARY LUNG DISEASE: Status: RESOLVED | Noted: 2020-03-06 | Resolved: 2023-02-14

## 2023-02-14 PROBLEM — R91.8 LUNG MASS: Status: RESOLVED | Noted: 2020-04-27 | Resolved: 2023-02-14

## 2023-02-14 PROBLEM — R09.81 NASAL CONGESTION: Status: RESOLVED | Noted: 2020-03-06 | Resolved: 2023-02-14

## 2023-02-14 PROBLEM — J38.7 SUPRAGLOTTIC MASS: Status: RESOLVED | Noted: 2020-04-20 | Resolved: 2023-02-14

## 2023-02-14 PROBLEM — H52.7 REFRACTIVE ERROR: Status: RESOLVED | Noted: 2019-06-13 | Resolved: 2023-02-14

## 2023-02-14 PROBLEM — R79.89 ELEVATED LFTS: Status: RESOLVED | Noted: 2018-03-24 | Resolved: 2023-02-14

## 2023-02-14 PROBLEM — R06.09 DOE (DYSPNEA ON EXERTION): Status: RESOLVED | Noted: 2018-05-15 | Resolved: 2023-02-14

## 2023-02-14 PROBLEM — D72.829 LEUKOCYTOSIS: Status: RESOLVED | Noted: 2018-03-24 | Resolved: 2023-02-14

## 2023-02-14 PROBLEM — G47.33 OSA (OBSTRUCTIVE SLEEP APNEA): Status: RESOLVED | Noted: 2020-03-06 | Resolved: 2023-02-14

## 2023-02-14 PROBLEM — A41.9 SEPSIS: Status: RESOLVED | Noted: 2023-01-29 | Resolved: 2023-02-14

## 2023-02-14 PROBLEM — I25.118 CORONARY ARTERY DISEASE WITH EXERTIONAL ANGINA: Status: RESOLVED | Noted: 2020-03-17 | Resolved: 2023-02-14

## 2023-02-14 PROBLEM — J96.01 ACUTE HYPOXEMIC RESPIRATORY FAILURE: Status: RESOLVED | Noted: 2023-01-29 | Resolved: 2023-02-14

## 2023-02-14 LAB
ALBUMIN SERPL BCP-MCNC: 3.4 G/DL (ref 3.5–5.2)
ALP SERPL-CCNC: 80 U/L (ref 55–135)
ALT SERPL W/O P-5'-P-CCNC: 27 U/L (ref 10–44)
ANION GAP SERPL CALC-SCNC: 6 MMOL/L (ref 8–16)
AST SERPL-CCNC: 27 U/L (ref 10–40)
BASOPHILS # BLD AUTO: 0.03 K/UL (ref 0–0.2)
BASOPHILS NFR BLD: 0.6 % (ref 0–1.9)
BILIRUB SERPL-MCNC: 0.4 MG/DL (ref 0.1–1)
BUN SERPL-MCNC: 18 MG/DL (ref 8–23)
CALCIUM SERPL-MCNC: 9.5 MG/DL (ref 8.7–10.5)
CHLORIDE SERPL-SCNC: 105 MMOL/L (ref 95–110)
CHOLEST SERPL-MCNC: 92 MG/DL (ref 120–199)
CHOLEST/HDLC SERPL: 2.2 {RATIO} (ref 2–5)
CO2 SERPL-SCNC: 27 MMOL/L (ref 23–29)
CREAT SERPL-MCNC: 1.1 MG/DL (ref 0.5–1.4)
DIFFERENTIAL METHOD: ABNORMAL
EOSINOPHIL # BLD AUTO: 0 K/UL (ref 0–0.5)
EOSINOPHIL NFR BLD: 0.6 % (ref 0–8)
ERYTHROCYTE [DISTWIDTH] IN BLOOD BY AUTOMATED COUNT: 14.7 % (ref 11.5–14.5)
EST. GFR  (NO RACE VARIABLE): >60 ML/MIN/1.73 M^2
GLUCOSE SERPL-MCNC: 104 MG/DL (ref 70–110)
HCT VFR BLD AUTO: 39 % (ref 40–54)
HDLC SERPL-MCNC: 41 MG/DL (ref 40–75)
HDLC SERPL: 44.6 % (ref 20–50)
HGB BLD-MCNC: 12.6 G/DL (ref 14–18)
IMM GRANULOCYTES # BLD AUTO: 0.01 K/UL (ref 0–0.04)
IMM GRANULOCYTES NFR BLD AUTO: 0.2 % (ref 0–0.5)
LDLC SERPL CALC-MCNC: 37.6 MG/DL (ref 63–159)
LYMPHOCYTES # BLD AUTO: 1.6 K/UL (ref 1–4.8)
LYMPHOCYTES NFR BLD: 31.7 % (ref 18–48)
MCH RBC QN AUTO: 32.2 PG (ref 27–31)
MCHC RBC AUTO-ENTMCNC: 32.3 G/DL (ref 32–36)
MCV RBC AUTO: 100 FL (ref 82–98)
MONOCYTES # BLD AUTO: 0.2 K/UL (ref 0.3–1)
MONOCYTES NFR BLD: 4.8 % (ref 4–15)
NEUTROPHILS # BLD AUTO: 3.1 K/UL (ref 1.8–7.7)
NEUTROPHILS NFR BLD: 62.1 % (ref 38–73)
NONHDLC SERPL-MCNC: 51 MG/DL
NRBC BLD-RTO: 0 /100 WBC
PLATELET # BLD AUTO: 264 K/UL (ref 150–450)
PMV BLD AUTO: 10.6 FL (ref 9.2–12.9)
POTASSIUM SERPL-SCNC: 4.7 MMOL/L (ref 3.5–5.1)
PROT SERPL-MCNC: 7.1 G/DL (ref 6–8.4)
RBC # BLD AUTO: 3.91 M/UL (ref 4.6–6.2)
SODIUM SERPL-SCNC: 138 MMOL/L (ref 136–145)
TRIGL SERPL-MCNC: 67 MG/DL (ref 30–150)
WBC # BLD AUTO: 5.02 K/UL (ref 3.9–12.7)

## 2023-02-14 PROCEDURE — 1101F PR PT FALLS ASSESS DOC 0-1 FALLS W/OUT INJ PAST YR: ICD-10-PCS | Mod: CPTII,S$GLB,, | Performed by: FAMILY MEDICINE

## 2023-02-14 PROCEDURE — 1101F PT FALLS ASSESS-DOCD LE1/YR: CPT | Mod: CPTII,S$GLB,, | Performed by: FAMILY MEDICINE

## 2023-02-14 PROCEDURE — 1111F PR DISCHARGE MEDS RECONCILED W/ CURRENT OUTPATIENT MED LIST: ICD-10-PCS | Mod: CPTII,S$GLB,, | Performed by: FAMILY MEDICINE

## 2023-02-14 PROCEDURE — 80053 COMPREHEN METABOLIC PANEL: CPT | Performed by: FAMILY MEDICINE

## 2023-02-14 PROCEDURE — 1111F DSCHRG MED/CURRENT MED MERGE: CPT | Mod: CPTII,S$GLB,, | Performed by: FAMILY MEDICINE

## 2023-02-14 PROCEDURE — 80061 LIPID PANEL: CPT | Performed by: FAMILY MEDICINE

## 2023-02-14 PROCEDURE — 85025 COMPLETE CBC W/AUTO DIFF WBC: CPT | Performed by: FAMILY MEDICINE

## 2023-02-14 PROCEDURE — 3008F PR BODY MASS INDEX (BMI) DOCUMENTED: ICD-10-PCS | Mod: CPTII,S$GLB,, | Performed by: FAMILY MEDICINE

## 2023-02-14 PROCEDURE — 3288F PR FALLS RISK ASSESSMENT DOCUMENTED: ICD-10-PCS | Mod: CPTII,S$GLB,, | Performed by: FAMILY MEDICINE

## 2023-02-14 PROCEDURE — 3078F PR MOST RECENT DIASTOLIC BLOOD PRESSURE < 80 MM HG: ICD-10-PCS | Mod: CPTII,S$GLB,, | Performed by: FAMILY MEDICINE

## 2023-02-14 PROCEDURE — 3044F PR MOST RECENT HEMOGLOBIN A1C LEVEL <7.0%: ICD-10-PCS | Mod: CPTII,S$GLB,, | Performed by: FAMILY MEDICINE

## 2023-02-14 PROCEDURE — 3075F SYST BP GE 130 - 139MM HG: CPT | Mod: CPTII,S$GLB,, | Performed by: FAMILY MEDICINE

## 2023-02-14 PROCEDURE — 3044F HG A1C LEVEL LT 7.0%: CPT | Mod: CPTII,S$GLB,, | Performed by: FAMILY MEDICINE

## 2023-02-14 PROCEDURE — 1126F PR PAIN SEVERITY QUANTIFIED, NO PAIN PRESENT: ICD-10-PCS | Mod: CPTII,S$GLB,, | Performed by: FAMILY MEDICINE

## 2023-02-14 PROCEDURE — 99214 PR OFFICE/OUTPT VISIT, EST, LEVL IV, 30-39 MIN: ICD-10-PCS | Mod: S$GLB,,, | Performed by: FAMILY MEDICINE

## 2023-02-14 PROCEDURE — 1126F AMNT PAIN NOTED NONE PRSNT: CPT | Mod: CPTII,S$GLB,, | Performed by: FAMILY MEDICINE

## 2023-02-14 PROCEDURE — 99214 OFFICE O/P EST MOD 30 MIN: CPT | Mod: S$GLB,,, | Performed by: FAMILY MEDICINE

## 2023-02-14 PROCEDURE — 3288F FALL RISK ASSESSMENT DOCD: CPT | Mod: CPTII,S$GLB,, | Performed by: FAMILY MEDICINE

## 2023-02-14 PROCEDURE — 99999 PR PBB SHADOW E&M-EST. PATIENT-LVL IV: ICD-10-PCS | Mod: PBBFAC,,, | Performed by: FAMILY MEDICINE

## 2023-02-14 PROCEDURE — 83036 HEMOGLOBIN GLYCOSYLATED A1C: CPT | Performed by: FAMILY MEDICINE

## 2023-02-14 PROCEDURE — 3075F PR MOST RECENT SYSTOLIC BLOOD PRESS GE 130-139MM HG: ICD-10-PCS | Mod: CPTII,S$GLB,, | Performed by: FAMILY MEDICINE

## 2023-02-14 PROCEDURE — 3078F DIAST BP <80 MM HG: CPT | Mod: CPTII,S$GLB,, | Performed by: FAMILY MEDICINE

## 2023-02-14 PROCEDURE — 36415 COLL VENOUS BLD VENIPUNCTURE: CPT | Mod: PN | Performed by: FAMILY MEDICINE

## 2023-02-14 PROCEDURE — 99999 PR PBB SHADOW E&M-EST. PATIENT-LVL IV: CPT | Mod: PBBFAC,,, | Performed by: FAMILY MEDICINE

## 2023-02-14 PROCEDURE — 3008F BODY MASS INDEX DOCD: CPT | Mod: CPTII,S$GLB,, | Performed by: FAMILY MEDICINE

## 2023-02-15 LAB
ESTIMATED AVG GLUCOSE: 108 MG/DL (ref 68–131)
HBA1C MFR BLD: 5.4 % (ref 4–5.6)

## 2023-02-20 ENCOUNTER — HOSPITAL ENCOUNTER (OUTPATIENT)
Dept: RADIOLOGY | Facility: HOSPITAL | Age: 73
Discharge: HOME | End: 2023-02-20
Attending: FAMILY MEDICINE
Payer: MEDICARE

## 2023-02-20 DIAGNOSIS — Z85.118 HISTORY OF LUNG CANCER: ICD-10-CM

## 2023-02-20 DIAGNOSIS — Z85.21 HISTORY OF CANCER OF LARYNX: ICD-10-CM

## 2023-02-20 PROCEDURE — 70491 CT NECK CHEST WITH CONTRAST (XPD): ICD-10-PCS | Mod: 26,,, | Performed by: RADIOLOGY

## 2023-02-20 PROCEDURE — 25500020 PHARM REV CODE 255: Performed by: FAMILY MEDICINE

## 2023-02-20 PROCEDURE — 71260 CT THORAX DX C+: CPT | Mod: TC

## 2023-02-20 PROCEDURE — 71260 CT NECK CHEST WITH CONTRAST (XPD): ICD-10-PCS | Mod: 26,,, | Performed by: RADIOLOGY

## 2023-02-20 PROCEDURE — 70491 CT SOFT TISSUE NECK W/DYE: CPT | Mod: 26,,, | Performed by: RADIOLOGY

## 2023-02-20 PROCEDURE — 71260 CT THORAX DX C+: CPT | Mod: 26,,, | Performed by: RADIOLOGY

## 2023-02-20 RX ADMIN — IOHEXOL 75 ML: 350 INJECTION, SOLUTION INTRAVENOUS at 11:02

## 2023-02-24 ENCOUNTER — PATIENT OUTREACH (OUTPATIENT)
Dept: ADMINISTRATIVE | Facility: HOSPITAL | Age: 73
End: 2023-02-24
Payer: MEDICARE

## 2023-02-24 NOTE — PROGRESS NOTES
Attempted to contact pt in regards to overdue HM unavailable left voicemail. Immunization's updated.  
6

## 2023-03-01 ENCOUNTER — OFFICE VISIT (OUTPATIENT)
Dept: FAMILY MEDICINE | Facility: CLINIC | Age: 73
End: 2023-03-01
Payer: MEDICARE

## 2023-03-01 VITALS
WEIGHT: 129.19 LBS | DIASTOLIC BLOOD PRESSURE: 66 MMHG | HEART RATE: 75 BPM | BODY MASS INDEX: 20.76 KG/M2 | TEMPERATURE: 98 F | OXYGEN SATURATION: 97 % | HEIGHT: 66 IN | SYSTOLIC BLOOD PRESSURE: 122 MMHG

## 2023-03-01 DIAGNOSIS — I70.0 ATHEROSCLEROSIS OF AORTA: Chronic | ICD-10-CM

## 2023-03-01 DIAGNOSIS — M62.512 ATROPHY OF MUSCLE OF LEFT SHOULDER: Primary | ICD-10-CM

## 2023-03-01 DIAGNOSIS — Z85.21 HISTORY OF CANCER OF LARYNX: ICD-10-CM

## 2023-03-01 DIAGNOSIS — I50.22 CHRONIC SYSTOLIC HEART FAILURE: Chronic | ICD-10-CM

## 2023-03-01 DIAGNOSIS — I10 HYPERTENSION, ESSENTIAL: Chronic | ICD-10-CM

## 2023-03-01 DIAGNOSIS — Z85.118 HISTORY OF LUNG CANCER: ICD-10-CM

## 2023-03-01 DIAGNOSIS — R29.898 LEFT ARM WEAKNESS: ICD-10-CM

## 2023-03-01 DIAGNOSIS — C76.0 MALIGNANT NEOPLASM OF HEAD, FACE AND NECK: ICD-10-CM

## 2023-03-01 DIAGNOSIS — I25.10 CORONARY ARTERY DISEASE INVOLVING NATIVE CORONARY ARTERY OF NATIVE HEART WITHOUT ANGINA PECTORIS: Chronic | ICD-10-CM

## 2023-03-01 PROCEDURE — 1159F PR MEDICATION LIST DOCUMENTED IN MEDICAL RECORD: ICD-10-PCS | Mod: CPTII,S$GLB,, | Performed by: FAMILY MEDICINE

## 2023-03-01 PROCEDURE — 3008F PR BODY MASS INDEX (BMI) DOCUMENTED: ICD-10-PCS | Mod: CPTII,S$GLB,, | Performed by: FAMILY MEDICINE

## 2023-03-01 PROCEDURE — 99999 PR PBB SHADOW E&M-EST. PATIENT-LVL IV: ICD-10-PCS | Mod: PBBFAC,,, | Performed by: FAMILY MEDICINE

## 2023-03-01 PROCEDURE — 1101F PR PT FALLS ASSESS DOC 0-1 FALLS W/OUT INJ PAST YR: ICD-10-PCS | Mod: CPTII,S$GLB,, | Performed by: FAMILY MEDICINE

## 2023-03-01 PROCEDURE — 1101F PT FALLS ASSESS-DOCD LE1/YR: CPT | Mod: CPTII,S$GLB,, | Performed by: FAMILY MEDICINE

## 2023-03-01 PROCEDURE — 1111F PR DISCHARGE MEDS RECONCILED W/ CURRENT OUTPATIENT MED LIST: ICD-10-PCS | Mod: CPTII,S$GLB,, | Performed by: FAMILY MEDICINE

## 2023-03-01 PROCEDURE — 3044F PR MOST RECENT HEMOGLOBIN A1C LEVEL <7.0%: ICD-10-PCS | Mod: CPTII,S$GLB,, | Performed by: FAMILY MEDICINE

## 2023-03-01 PROCEDURE — 99999 PR PBB SHADOW E&M-EST. PATIENT-LVL IV: CPT | Mod: PBBFAC,,, | Performed by: FAMILY MEDICINE

## 2023-03-01 PROCEDURE — 1159F MED LIST DOCD IN RCRD: CPT | Mod: CPTII,S$GLB,, | Performed by: FAMILY MEDICINE

## 2023-03-01 PROCEDURE — 3074F PR MOST RECENT SYSTOLIC BLOOD PRESSURE < 130 MM HG: ICD-10-PCS | Mod: CPTII,S$GLB,, | Performed by: FAMILY MEDICINE

## 2023-03-01 PROCEDURE — 1160F RVW MEDS BY RX/DR IN RCRD: CPT | Mod: CPTII,S$GLB,, | Performed by: FAMILY MEDICINE

## 2023-03-01 PROCEDURE — 1126F PR PAIN SEVERITY QUANTIFIED, NO PAIN PRESENT: ICD-10-PCS | Mod: CPTII,S$GLB,, | Performed by: FAMILY MEDICINE

## 2023-03-01 PROCEDURE — 3078F DIAST BP <80 MM HG: CPT | Mod: CPTII,S$GLB,, | Performed by: FAMILY MEDICINE

## 2023-03-01 PROCEDURE — 3288F FALL RISK ASSESSMENT DOCD: CPT | Mod: CPTII,S$GLB,, | Performed by: FAMILY MEDICINE

## 2023-03-01 PROCEDURE — 3074F SYST BP LT 130 MM HG: CPT | Mod: CPTII,S$GLB,, | Performed by: FAMILY MEDICINE

## 2023-03-01 PROCEDURE — 1160F PR REVIEW ALL MEDS BY PRESCRIBER/CLIN PHARMACIST DOCUMENTED: ICD-10-PCS | Mod: CPTII,S$GLB,, | Performed by: FAMILY MEDICINE

## 2023-03-01 PROCEDURE — 3044F HG A1C LEVEL LT 7.0%: CPT | Mod: CPTII,S$GLB,, | Performed by: FAMILY MEDICINE

## 2023-03-01 PROCEDURE — 3288F PR FALLS RISK ASSESSMENT DOCUMENTED: ICD-10-PCS | Mod: CPTII,S$GLB,, | Performed by: FAMILY MEDICINE

## 2023-03-01 PROCEDURE — 1111F DSCHRG MED/CURRENT MED MERGE: CPT | Mod: CPTII,S$GLB,, | Performed by: FAMILY MEDICINE

## 2023-03-01 PROCEDURE — 3008F BODY MASS INDEX DOCD: CPT | Mod: CPTII,S$GLB,, | Performed by: FAMILY MEDICINE

## 2023-03-01 PROCEDURE — 99214 OFFICE O/P EST MOD 30 MIN: CPT | Mod: S$GLB,,, | Performed by: FAMILY MEDICINE

## 2023-03-01 PROCEDURE — 3078F PR MOST RECENT DIASTOLIC BLOOD PRESSURE < 80 MM HG: ICD-10-PCS | Mod: CPTII,S$GLB,, | Performed by: FAMILY MEDICINE

## 2023-03-01 PROCEDURE — 99214 PR OFFICE/OUTPT VISIT, EST, LEVL IV, 30-39 MIN: ICD-10-PCS | Mod: S$GLB,,, | Performed by: FAMILY MEDICINE

## 2023-03-01 PROCEDURE — 1126F AMNT PAIN NOTED NONE PRSNT: CPT | Mod: CPTII,S$GLB,, | Performed by: FAMILY MEDICINE

## 2023-03-01 RX ORDER — ATORVASTATIN CALCIUM 80 MG/1
80 TABLET, FILM COATED ORAL EVERY OTHER DAY
Qty: 90 TABLET | Refills: 3
Start: 2023-03-01 | End: 2023-11-28 | Stop reason: SDUPTHER

## 2023-03-01 NOTE — PROGRESS NOTES
Subjective:       Patient ID: Sharath Huizar is a 72 y.o. male.    Chief Complaint: Results    HPI  Review of Systems      Objective:      Physical Exam      The ASCVD Risk score (Wilber FABIAN, et al., 2019) failed to calculate for the following reasons:    The patient has a prior MI or stroke diagnosis    Assessment:       Problem List Items Addressed This Visit    None      Plan:       ***

## 2023-03-01 NOTE — Clinical Note
"Hi Dr. Vallejo, and Dr. Aguilar I recently saw Mr. Huizar, with history of a cancer of the larynx and cancer of the lung.  He had missed his follow-ups with both of you last year.  I went ahead and ordered repeat imaging, CT Neck and Chest, as he had not done the previously ordered one.  It showed "Stable nonspecific soft tissue thickening of the supraglottic larynx and area epiglottic folds which may reflect treated disease. Emphysema and patchy bibasilar lung opacities and scarring nonspecific and possibly postinflammatory in nature." I told him I would message both of you to see when ya'll would like to see him again.   Thanks -Wilmer "

## 2023-03-05 PROBLEM — I70.0 ATHEROSCLEROSIS OF AORTA: Chronic | Status: ACTIVE | Noted: 2018-07-03

## 2023-03-05 PROBLEM — I50.22 CHRONIC SYSTOLIC HEART FAILURE: Chronic | Status: ACTIVE | Noted: 2018-03-25

## 2023-03-05 PROBLEM — C76.0 MALIGNANT NEOPLASM OF HEAD, FACE AND NECK: Status: ACTIVE | Noted: 2023-03-05

## 2023-03-05 PROBLEM — I25.10 CORONARY ARTERY DISEASE INVOLVING NATIVE CORONARY ARTERY OF NATIVE HEART WITHOUT ANGINA PECTORIS: Chronic | Status: ACTIVE | Noted: 2018-03-26

## 2023-03-05 PROBLEM — I10 HYPERTENSION, ESSENTIAL: Chronic | Status: ACTIVE | Noted: 2018-03-23

## 2023-03-05 PROBLEM — M62.512 ATROPHY OF MUSCLE OF LEFT SHOULDER: Status: ACTIVE | Noted: 2023-03-05

## 2023-03-05 PROBLEM — J43.8 OTHER EMPHYSEMA: Chronic | Status: ACTIVE | Noted: 2021-06-22

## 2023-03-05 NOTE — ASSESSMENT & PLAN NOTE
Patient reports no acute symptoms.    Strongly recommended to make follow-up appointment with Pulmonary as he is not gone in some time.

## 2023-03-05 NOTE — ASSESSMENT & PLAN NOTE
Patient followed up with ENT in sometime time and is past due.  Will get CT chest with contrast strongly recommended he make an appointment with ENT.

## 2023-03-05 NOTE — PROGRESS NOTES
"  Patient Name: Sharath Huizar    : 1950  MRN: [id number]      Subjective:     Patient ID: Sharath is a 72 y.o. male    Chief Complaint:  Hospital Follow Up    72-year-old male with hypertension, systolic heart failure, coronary artery disease, and emphysema comes in for hospital follow-up .  He was admitted for sepsis secondary to pneumonia a few weeks ago.  He has completed antibiotic therapy, and reports he is doing much better.  He still feels bit fatigued, but states he has no chest pain, and no significant shortness of breath.  He reports no fevers.  He reports no chills.  Reports that he is compliant with his routine medications.     Review of Systems   Constitutional:  Positive for fatigue. Negative for chills, diaphoresis and unexpected weight change.   HENT:  Negative for ear pain and sore throat.    Eyes:  Negative for visual disturbance.   Respiratory:  Negative for shortness of breath.    Cardiovascular:  Negative for chest pain.   Gastrointestinal:  Negative for abdominal pain and blood in stool.   Genitourinary:  Negative for dysuria and frequency.   Integumentary:  Negative for rash.   Neurological:  Negative for weakness, numbness and headaches.   Hematological:  Negative for adenopathy.   Psychiatric/Behavioral:  Negative for suicidal ideas.       Objective:   /66 (BP Location: Left arm, Patient Position: Sitting, BP Method: Medium (Manual))   Pulse 88   Temp 97.9 °F (36.6 °C) (Oral)   Ht 5' 6" (1.676 m)   Wt 60.7 kg (133 lb 13.1 oz)   SpO2 97%   BMI 21.60 kg/m²     Physical Exam  Vitals reviewed.   Constitutional:       Appearance: He is well-developed. He is not diaphoretic.   HENT:      Head: Normocephalic.      Right Ear: External ear normal.      Left Ear: External ear normal.      Nose: Nose normal.      Mouth/Throat:      Pharynx: No oropharyngeal exudate.   Eyes:      Extraocular Movements: Extraocular movements intact.      Conjunctiva/sclera: Conjunctivae normal.      " Pupils: Pupils are equal, round, and reactive to light.   Neck:      Trachea: No tracheal deviation.   Cardiovascular:      Rate and Rhythm: Normal rate and regular rhythm.      Heart sounds: Normal heart sounds. No murmur heard.  Pulmonary:      Effort: Pulmonary effort is normal.      Breath sounds: Normal breath sounds. No wheezing or rales.   Abdominal:      General: Bowel sounds are normal.      Palpations: Abdomen is soft. Abdomen is not rigid. There is no mass.      Tenderness: There is no abdominal tenderness. There is no guarding or rebound.   Musculoskeletal:      Cervical back: Normal range of motion and neck supple.   Lymphadenopathy:      Cervical: No cervical adenopathy.   Neurological:      Mental Status: He is oriented to person, place, and time.      Sensory: No sensory deficit.      Motor: No atrophy.      Gait: Gait normal.      Deep Tendon Reflexes:      Reflex Scores:       Patellar reflexes are 2+ on the right side and 2+ on the left side.     Assessment        ICD-10-CM ICD-9-CM   1. Hypertension, essential  I10 401.9   2. Chronic systolic heart failure  I50.22 428.22   3. Atherosclerosis of aorta  I70.0 440.0   4. Coronary artery disease involving native coronary artery of native heart without angina pectoris  I25.10 414.01   5. Other emphysema  J43.8 492.8   6. Community acquired pneumonia, bilateral  J18.9 486   7. Hyperglycemia  R73.9 790.29   8. History of lung cancer  Z85.118 V10.11   9. History of cancer of larynx  Z85.21 V10.21   10. Screening for colorectal cancer  Z12.11 V76.51    Z12.12 V76.41         Plan:     1. Hypertension, essential  Assessment & Plan:  Current therapy effective, will continue.    Orders:  -     Comprehensive Metabolic Panel; Future; Expected date: 02/14/2023  -     CBC Auto Differential; Future; Expected date: 02/14/2023  -     Lipid Panel; Future; Expected date: 02/14/2023    2. Chronic systolic heart failure  Assessment & Plan:  No acute concerns reported.     Continue medical management.      3. Atherosclerosis of aorta  Overview:  Ultrasound abdominal aorta 4/2018    Assessment & Plan:  Patient on statin.  Continue    Orders:  -     Lipid Panel; Future; Expected date: 02/14/2023    4. Coronary artery disease involving native coronary artery of native heart without angina pectoris  Assessment & Plan:  Continue medical management.    Orders:  -     Lipid Panel; Future; Expected date: 02/14/2023    5. Other emphysema  Assessment & Plan:  Patient reports no acute symptoms.    Strongly recommended to make follow-up appointment with Pulmonary as he is not gone in some time.      6. Community acquired pneumonia, bilateral  Assessment & Plan:  Patient completed treatment.  He reports breathing is back to baseline.      7. Hyperglycemia  Comments:  Check A1c with next labs.  Orders:  -     Hemoglobin A1C; Future; Expected date: 02/14/2023    8. History of lung cancer  Assessment & Plan:  Patient followed up with oncology in sometime time and is past due.  Will get CT chest with contrast strongly recommended he make an appointment with oncologist.    Orders:  -     CT Neck Chest With Contrast (XPD); Future; Expected date: 02/14/2023    9. 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    Assessment & Plan:  Patient followed up with ENT in sometime time and is past due.  Will get CT chest with contrast strongly recommended he make an appointment with ENT.    Orders:  -     CT Neck Chest With Contrast (XPD); Future; Expected date: 02/14/2023    10. Screening for colorectal cancer  Comments:  Strongly recommended colon cancer screening.    Declined colonoscopy.  However he states that he would do fecal testing.  Orders:  -     Fecal Immunochemical Test (iFOBT); Future; Expected date: 02/14/2023           -Wilmer Bangura Jr., MD, AAHIVS      This office note has been dictated.  This dictation has been generated using [employer] Fluency Direct  dictation; some phonetic errors may occur.         There are no Patient Instructions on file for this visit.      Future Appointments   Date Time Provider Department Center   4/17/2023 10:40 AM Mary Conway MD [facility]

## 2023-03-05 NOTE — ASSESSMENT & PLAN NOTE
Patient followed up with oncology in sometime time and is past due.  Will get CT chest with contrast strongly recommended he make an appointment with oncologist.

## 2023-03-06 NOTE — PROGRESS NOTES
Patient Name: Sharath Huizar    : 1950  MRN: [id number]      Subjective:     Patient ID: Sharath is a 72 y.o. male    Chief Complaint:  Results    72-year-old male with history of a laryngeal cancer and lung cancer as well as hypertension, coronary artery disease, and heart failure comes in to review CT scan for follow-up on his cancers.  He has not made appointment with his ENT or oncologist yet.    He last saw ENT in 2022 and missed his October appointment.  He last saw oncology  and was was follow-up with the CT scan, in six months.  Unfortunately, patient had done neither provider follow-up.  CT scan was done last week however.    Overall, patient states that he is feeling okay.  His only concern is that he has been having worsening muscular atrophy of the left shoulder after an episode of shingles.  He also reports weakness.  He was evaluated by Neurology in a 2022, and was advised to follow up with physical therapy.  Unfortunately, patient did not do this either.  He reports that for the last several months, his muscle mass over left shoulder has been worsening.  He states that he has extreme difficulty raising his left arm a without assistance from his right arm.  He also reports tingling pain down the left arm.  Patient did not follow-up with neurologist either with worsening symptoms.       Review of Systems   Constitutional:  Positive for fatigue. Negative for chills, diaphoresis and unexpected weight change.   HENT:  Negative for ear pain and sore throat.    Eyes:  Negative for visual disturbance.   Respiratory:  Negative for shortness of breath.    Cardiovascular:  Negative for chest pain.   Gastrointestinal:  Negative for abdominal pain and blood in stool.   Genitourinary:  Negative for dysuria and frequency.   Integumentary:  Negative for rash.   Neurological:  Positive for weakness and numbness (left arm). Negative for headaches. Tremors: left arm.  Hematological:   "Negative for adenopathy.   Psychiatric/Behavioral:  Negative for suicidal ideas.       Objective:   /66 (BP Location: Left arm, Patient Position: Sitting, BP Method: Medium (Manual))   Pulse 75   Temp 97.9 °F (36.6 °C) (Oral)   Ht 5' 6" (1.676 m)   Wt 58.6 kg (129 lb 3 oz)   SpO2 97%   BMI 20.85 kg/m²     Physical Exam  Vitals reviewed.   Constitutional:       Appearance: He is well-developed. He is not diaphoretic.   HENT:      Head: Normocephalic.      Right Ear: External ear normal.      Left Ear: External ear normal.      Nose: Nose normal.      Mouth/Throat:      Pharynx: No oropharyngeal exudate.   Eyes:      Extraocular Movements: Extraocular movements intact.      Conjunctiva/sclera: Conjunctivae normal.      Pupils: Pupils are equal, round, and reactive to light.   Neck:      Trachea: No tracheal deviation.   Cardiovascular:      Rate and Rhythm: Normal rate and regular rhythm.      Heart sounds: Normal heart sounds. No murmur heard.  Pulmonary:      Effort: Pulmonary effort is normal.      Breath sounds: Normal breath sounds. No wheezing or rales.   Abdominal:      General: Bowel sounds are normal.      Palpations: Abdomen is soft. Abdomen is not rigid. There is no mass.      Tenderness: There is no abdominal tenderness. There is no guarding or rebound.   Musculoskeletal:      Cervical back: Normal range of motion and neck supple.   Lymphadenopathy:      Cervical: No cervical adenopathy.   Neurological:      Mental Status: He is oriented to person, place, and time.      Sensory: No sensory deficit.      Motor: Weakness (left arm at shoulder) and atrophy (visible atrophy of left shoulder muscles) present.      Gait: Gait normal.      Deep Tendon Reflexes:      Reflex Scores:       Patellar reflexes are 2+ on the right side and 2+ on the left side.     Narrative & Impression  EXAMINATION:  CT NECK CHEST WITH CONTRAST (XPD)     CLINICAL HISTORY:  History of Larynx cancer; History of Lung cancer; " Personal history of malignant neoplasm of larynx     TECHNIQUE:  Low dose axial images, coronal and sagittal reformations were obtained from the skull base to the lung bases following the intravenous administration of 75 mL of Omnipaque 350.     COMPARISON:  CTA of the chest 01/29/2023 and CT of the soft tissues of the neck 09/27/2021     FINDINGS:  Orbits, paranasal sinuses, and skull base: Normal.     Nasopharynx: Normal.     Suprahyoid neck: Normal oropharynx and oral cavity.     Infrahyoid neck: There is redemonstration of diffuse soft tissue thickening of the laryngeal and supra laryngeal regions including both aryepiglottic folds.  Medial ization of the right vocal cord is noted. Findings are similar to prior.     Thyroid: Normal.     Thoracic inlet: Normal lung apices and brachial plexus.     Lymph nodes Normal. No lymphadenopathy.     Vascular structures: Prominent atherosclerosis and borderline cardiomegaly.     Lungs: Mild emphysema.  Patchy areas of architectural distortion and scarring in the right lung base posteriorly and to a lesser extent medial left lung base.  No suspicious pulmonary nodules or masses.  No pleural effusions.     Mediastinum: Normal     Other findings:     Impression:     Stable nonspecific soft tissue thickening of the supraglottic larynx and area epiglottic folds which may reflect treated disease.     Emphysema and patchy bibasilar lung opacities and scarring nonspecific and possibly postinflammatory in nature        Electronically signed by: Stanislaw Basurto Jr  Date:                                            02/20/2023  Time:                                           13:04    Assessment        ICD-10-CM ICD-9-CM   1. Atrophy of muscle of left shoulder  M62.512 728.2   2. Left arm weakness  R29.898 729.89   3. Hypertension, essential  I10 401.9   4. History of cancer of larynx  Z85.21 V10.21   5. History of lung cancer  Z85.118 V10.11   6. Coronary artery disease involving  native coronary artery of native heart without angina pectoris  I25.10 414.01   7. Chronic systolic heart failure  I50.22 428.22   8. Atherosclerosis of aorta  I70.0 440.0         Plan:     1. Atrophy of muscle of left shoulder/ 2. Left arm weakness  -     Ambulatory referral/consult to Neurology; Future; Expected date: 03/08/2023  Order placed to patient be re-evaluated with Neurology as he does have visible significant decrease in muscle tone and strength in the left upper arm.    3. Hypertension, essential  Current therapy effective, will continue    4. 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  Will message his ENT, to see one she would like to see him.    5. History of lung cancer  Will message oncology.    6. Coronary artery disease involving native coronary artery of native heart without angina pectoris/ 7. Chronic systolic heart failure  -     atorvastatin (LIPITOR) 80 MG tablet; Take 1 tablet (80 mg total) by mouth every other day.  Dispense: 90 tablet; Refill: 3  Continue medical management    8. Atherosclerosis of aorta  Overview:  Ultrasound abdominal aorta 4/2018    Orders:  -     atorvastatin (LIPITOR) 80 MG tablet; Take 1 tablet (80 mg total) by mouth every other day.  Dispense: 90 tablet; Refill: 3  -     Comprehensive Metabolic Panel; Future; Expected date: 07/01/2023  -     Lipid Panel; Future; Expected date: 07/01/2023     Continue atorvastatin.          -Wilmer Bangura Jr., MD, AAHIVS      This office note has been dictated.  This dictation has been generated using [employer] Flu[employer] Direct dictation; some phonetic errors may occur.         There are no Patient Instructions on file for this visit.      Future Appointments   Date Time Provider Department Center   4/17/2023 10:40 AM Mary Conway MD [facility]

## 2023-04-02 ENCOUNTER — PATIENT MESSAGE (OUTPATIENT)
Dept: ADMINISTRATIVE | Facility: HOSPITAL | Age: 73
End: 2023-04-02
Payer: MEDICARE

## 2023-04-12 ENCOUNTER — PATIENT MESSAGE (OUTPATIENT)
Dept: ADMINISTRATIVE | Facility: HOSPITAL | Age: 73
End: 2023-04-12
Payer: MEDICARE

## 2023-04-13 ENCOUNTER — PES CALL (OUTPATIENT)
Dept: ADMINISTRATIVE | Facility: CLINIC | Age: 73
End: 2023-04-13
Payer: MEDICARE

## 2023-04-17 ENCOUNTER — OFFICE VISIT (OUTPATIENT)
Dept: NEUROLOGY | Facility: CLINIC | Age: 73
End: 2023-04-17
Payer: MEDICARE

## 2023-04-17 VITALS
SYSTOLIC BLOOD PRESSURE: 127 MMHG | DIASTOLIC BLOOD PRESSURE: 58 MMHG | BODY MASS INDEX: 20.87 KG/M2 | WEIGHT: 129.88 LBS | HEIGHT: 66 IN

## 2023-04-17 DIAGNOSIS — G54.0 BRACHIAL PLEXUS DISORDERS: ICD-10-CM

## 2023-04-17 DIAGNOSIS — R29.898 LEFT ARM WEAKNESS: Primary | ICD-10-CM

## 2023-04-17 DIAGNOSIS — M62.512 ATROPHY OF MUSCLE OF LEFT SHOULDER: ICD-10-CM

## 2023-04-17 PROCEDURE — 3008F BODY MASS INDEX DOCD: CPT | Mod: CPTII,S$GLB,, | Performed by: STUDENT IN AN ORGANIZED HEALTH CARE EDUCATION/TRAINING PROGRAM

## 2023-04-17 PROCEDURE — 3074F SYST BP LT 130 MM HG: CPT | Mod: CPTII,S$GLB,, | Performed by: STUDENT IN AN ORGANIZED HEALTH CARE EDUCATION/TRAINING PROGRAM

## 2023-04-17 PROCEDURE — 1159F PR MEDICATION LIST DOCUMENTED IN MEDICAL RECORD: ICD-10-PCS | Mod: CPTII,S$GLB,, | Performed by: STUDENT IN AN ORGANIZED HEALTH CARE EDUCATION/TRAINING PROGRAM

## 2023-04-17 PROCEDURE — 3288F PR FALLS RISK ASSESSMENT DOCUMENTED: ICD-10-PCS | Mod: CPTII,S$GLB,, | Performed by: STUDENT IN AN ORGANIZED HEALTH CARE EDUCATION/TRAINING PROGRAM

## 2023-04-17 PROCEDURE — 3008F PR BODY MASS INDEX (BMI) DOCUMENTED: ICD-10-PCS | Mod: CPTII,S$GLB,, | Performed by: STUDENT IN AN ORGANIZED HEALTH CARE EDUCATION/TRAINING PROGRAM

## 2023-04-17 PROCEDURE — 1126F PR PAIN SEVERITY QUANTIFIED, NO PAIN PRESENT: ICD-10-PCS | Mod: CPTII,S$GLB,, | Performed by: STUDENT IN AN ORGANIZED HEALTH CARE EDUCATION/TRAINING PROGRAM

## 2023-04-17 PROCEDURE — 1101F PR PT FALLS ASSESS DOC 0-1 FALLS W/OUT INJ PAST YR: ICD-10-PCS | Mod: CPTII,S$GLB,, | Performed by: STUDENT IN AN ORGANIZED HEALTH CARE EDUCATION/TRAINING PROGRAM

## 2023-04-17 PROCEDURE — 1159F MED LIST DOCD IN RCRD: CPT | Mod: CPTII,S$GLB,, | Performed by: STUDENT IN AN ORGANIZED HEALTH CARE EDUCATION/TRAINING PROGRAM

## 2023-04-17 PROCEDURE — 99215 PR OFFICE/OUTPT VISIT, EST, LEVL V, 40-54 MIN: ICD-10-PCS | Mod: S$GLB,,, | Performed by: STUDENT IN AN ORGANIZED HEALTH CARE EDUCATION/TRAINING PROGRAM

## 2023-04-17 PROCEDURE — 3078F PR MOST RECENT DIASTOLIC BLOOD PRESSURE < 80 MM HG: ICD-10-PCS | Mod: CPTII,S$GLB,, | Performed by: STUDENT IN AN ORGANIZED HEALTH CARE EDUCATION/TRAINING PROGRAM

## 2023-04-17 PROCEDURE — 1126F AMNT PAIN NOTED NONE PRSNT: CPT | Mod: CPTII,S$GLB,, | Performed by: STUDENT IN AN ORGANIZED HEALTH CARE EDUCATION/TRAINING PROGRAM

## 2023-04-17 PROCEDURE — 3044F HG A1C LEVEL LT 7.0%: CPT | Mod: CPTII,S$GLB,, | Performed by: STUDENT IN AN ORGANIZED HEALTH CARE EDUCATION/TRAINING PROGRAM

## 2023-04-17 PROCEDURE — 99215 OFFICE O/P EST HI 40 MIN: CPT | Mod: S$GLB,,, | Performed by: STUDENT IN AN ORGANIZED HEALTH CARE EDUCATION/TRAINING PROGRAM

## 2023-04-17 PROCEDURE — 1101F PT FALLS ASSESS-DOCD LE1/YR: CPT | Mod: CPTII,S$GLB,, | Performed by: STUDENT IN AN ORGANIZED HEALTH CARE EDUCATION/TRAINING PROGRAM

## 2023-04-17 PROCEDURE — 3044F PR MOST RECENT HEMOGLOBIN A1C LEVEL <7.0%: ICD-10-PCS | Mod: CPTII,S$GLB,, | Performed by: STUDENT IN AN ORGANIZED HEALTH CARE EDUCATION/TRAINING PROGRAM

## 2023-04-17 PROCEDURE — 3288F FALL RISK ASSESSMENT DOCD: CPT | Mod: CPTII,S$GLB,, | Performed by: STUDENT IN AN ORGANIZED HEALTH CARE EDUCATION/TRAINING PROGRAM

## 2023-04-17 PROCEDURE — 99999 PR PBB SHADOW E&M-EST. PATIENT-LVL IV: ICD-10-PCS | Mod: PBBFAC,,, | Performed by: STUDENT IN AN ORGANIZED HEALTH CARE EDUCATION/TRAINING PROGRAM

## 2023-04-17 PROCEDURE — 3074F PR MOST RECENT SYSTOLIC BLOOD PRESSURE < 130 MM HG: ICD-10-PCS | Mod: CPTII,S$GLB,, | Performed by: STUDENT IN AN ORGANIZED HEALTH CARE EDUCATION/TRAINING PROGRAM

## 2023-04-17 PROCEDURE — 3078F DIAST BP <80 MM HG: CPT | Mod: CPTII,S$GLB,, | Performed by: STUDENT IN AN ORGANIZED HEALTH CARE EDUCATION/TRAINING PROGRAM

## 2023-04-17 PROCEDURE — 99999 PR PBB SHADOW E&M-EST. PATIENT-LVL IV: CPT | Mod: PBBFAC,,, | Performed by: STUDENT IN AN ORGANIZED HEALTH CARE EDUCATION/TRAINING PROGRAM

## 2023-04-17 NOTE — PROGRESS NOTES
Chief Complaint and Duration     Chief Complaint   Patient presents with    Atrophy of muscle of left shoulder/Left arm weakness   Since June 2022    History of Present Illness     Sharath Huizar is a 72 y.o. male with a history of multiple medical diagnoses as listed below referred back over for L arm weakness and pain after having shingles in June 2022. Stated on prior evaluation by neurology provider, he had rash on his L shoulder that extended down to his L arm, was prescribed antivirals.    Continues to have weakness in deltoids. Still has pain in should region consistent of numbness and tingling. Denies significant neck pain.     Review of patient's allergies indicates:  No Known Allergies  Current Outpatient Medications   Medication Sig Dispense Refill    acetaminophen (TYLENOL) 325 MG tablet Take 1 tablet (325 mg total) by mouth every 6 (six) hours as needed for Pain. 30 tablet 0    aspirin (ECOTRIN) 81 MG EC tablet Take 1 tablet (81 mg total) by mouth once daily. (Patient taking differently: Take 81 mg by mouth once daily.)  0    atorvastatin (LIPITOR) 80 MG tablet Take 1 tablet (80 mg total) by mouth every other day. 90 tablet 3    azelastine (ASTELIN) 137 mcg (0.1 %) nasal spray 1 spray (137 mcg total) by Nasal route 2 (two) times daily. 30 mL 3    cetirizine (ZYRTEC) 10 MG tablet Take 1 tablet (10 mg total) by mouth once daily. 30 tablet 3    clopidogreL (PLAVIX) 75 mg tablet Take 1 tablet (75 mg total) by mouth once daily. 90 tablet 3    gabapentin (NEURONTIN) 300 MG capsule Take 1 capsule (300 mg total) by mouth 3 (three) times daily. 270 capsule 1    metoprolol succinate (TOPROL-XL) 50 MG 24 hr tablet Take 1 tablet (50 mg total) by mouth once daily. 90 tablet 3    nitroGLYCERIN (NITROSTAT) 0.4 MG SL tablet Place 1 tablet (0.4 mg total) under the tongue every 5 (five) minutes as needed. 25 tablet 3    sodium chloride (SALINE NASAL) 0.65 % nasal spray 2 sprays by Nasal route 2 (two) times a day. Use  prior to medication sprays 1 Bottle 12    traMADoL (ULTRAM) 50 mg tablet Take 1 tablet (50 mg total) by mouth every 8 (eight) hours as needed for Pain. 10 tablet 0     No current facility-administered medications for this visit.       Medical History     Past Medical History:   Diagnosis Date    Cancer     THROAT    Coronary artery disease     History of chemotherapy     THROAT CANCER    Hypertension     Myocardial infarction     NSTEMI (non-ST elevated myocardial infarction) 3/23/2018    Nuclear sclerosis of both eyes 2019     Past Surgical History:   Procedure Laterality Date    CARDIAC CATHETERIZATION      with 4 stents    DIRECT LARYNGOSCOPY N/A 2020    Procedure: LARYNGOSCOPY, DIRECT;  Surgeon: Genoveva Vallejo MD;  Location: [facility] OR;  Service: ENT;  Laterality: N/A;    DIRECT LARYNGOSCOPY N/A 2021    Procedure: LARYNGOSCOPY, DIRECT with possible biopsy;  Surgeon: Genoveva Vallejo MD;  Location: [facility] OR;  Service: ENT;  Laterality: N/A;  PRE-OP BY RN 2021--COVID NEGATIVE ON     LEFT HEART CATHETERIZATION Left 2018    Procedure: Left heart cath;  Surgeon: Viral Lombardi MD;  Location: [facility] CATH LAB;  Service: Cardiology;  Laterality: Left;  RN PREOP 18    LUNG BIOPSY N/A 2020    Procedure: BIOPSY, LUNG;  Surgeon: [facility] Diagnostic Provider;  Location: [facility] OR;  Service: Radiology;  Laterality: N/A;  9am start--RN PHONE PREOP ----COVID NEGATIVE--pt     Family History   Problem Relation Age of Onset    Blindness Paternal Aunt      Social History     Socioeconomic History    Marital status:    Tobacco Use    Smoking status: Former     Packs/day: 0.25     Years: 30.00     Pack years: 7.50     Types: Cigarettes     Quit date: 3/23/2018     Years since quittin.0    Smokeless tobacco: Never   Substance and Sexual Activity    Alcohol use: No    Drug use: No    Sexual activity: Yes     Partners: Female       Exam     Vitals:    23 1042   BP: (!) 127/58       Physical Exam:  General: he is not in acute distress. he is not ill-appearing.   HENT: Normocephalic and atraumatic. Moist mucous membranes.  Eyes: Conjunctivae normal.   Pulmonary: Pulmonary effort is normal.   Abdominal: Abdomen is soft and flat.   Skin: Skin is warm and dry. No rashes.   Psychiatric: Mood normal.        Neurologic Exam   Mental status: oriented to person, place, and time  Attention: Normal. Concentration: normal.  Speech: speech is normal.  Cranial Nerves: PERRL, EOMI intact, V1-V3 Facial sensation intact. Symmetric facies. Hearing grossly intact. Palate and uvula midline, symmetric. No tongue deviation. Trapezius strength intact.     Motor exam:   Decreased bulk and tone of L deltoid, has scapular winging on the L.    Reflexes: 2+ in bilateral upper extremities: biceps and brachiaradialis    Sensory exam: light touch intact    Gait exam: normal  Coordination: normal      Labs and Imaging     Imaging: reviewed  Xray shoulder reviewed which showed AC joint degenerative changes    Assessment and Plan     Problem List Items Addressed This Visit          Orthopedic    Left arm weakness - Primary    Relevant Orders    MRI Brachial Plexus W WO Contrast    EMG W/ ULTRASOUND AND NERVE CONDUCTION TEST 2 Extremities    Atrophy of muscle of left shoulder    Relevant Orders    MRI Brachial Plexus W WO Contrast    EMG W/ ULTRASOUND AND NERVE CONDUCTION TEST 2 Extremities     Other Visit Diagnoses       Brachial plexus disorders        Relevant Orders    EMG W/ ULTRASOUND AND NERVE CONDUCTION TEST 2 Extremities          This is a 72 year old male w hx of shingles in his L arm s/p antivirals. Has weakness in L deltoid and infraspinatus (w scapular winging on L). Continued neruopathic pain. Need to get brachial plexus imaging on L. Also NCS/EMG to evaluate for active or chronic denervation. After that, if deficits are permanent, to resume physical therapy and gabapentin if needed for neuropathic pain  medication.      Follow-up: after MRI and on NCS/EMG    Time spent on this encounter: 40 minutes. This includes face to face time and non-face to face time preparing to see the patient (eg, review of tests), obtaining and/or reviewing separately obtained history, documenting clinical information in the electronic or other health record, independently interpreting results and communicating results to the patient/family/caregiver, or care coordinator.

## 2023-04-20 ENCOUNTER — PATIENT MESSAGE (OUTPATIENT)
Dept: RADIOLOGY | Facility: HOSPITAL | Age: 73
End: 2023-04-20
Payer: MEDICARE

## 2023-04-21 ENCOUNTER — TELEPHONE (OUTPATIENT)
Dept: RADIOLOGY | Facility: HOSPITAL | Age: 73
End: 2023-04-21
Payer: MEDICARE

## 2023-05-05 ENCOUNTER — HOSPITAL ENCOUNTER (OUTPATIENT)
Dept: RADIOLOGY | Facility: HOSPITAL | Age: 73
Discharge: HOME | End: 2023-05-05
Attending: STUDENT IN AN ORGANIZED HEALTH CARE EDUCATION/TRAINING PROGRAM
Payer: MEDICARE

## 2023-05-05 DIAGNOSIS — M62.512 ATROPHY OF MUSCLE OF LEFT SHOULDER: ICD-10-CM

## 2023-05-05 DIAGNOSIS — R29.898 LEFT ARM WEAKNESS: ICD-10-CM

## 2023-05-05 PROCEDURE — 73220 MRI UPPR EXTREMITY W/O&W/DYE: CPT | Mod: 26,LT,, | Performed by: RADIOLOGY

## 2023-05-05 PROCEDURE — 73220 MRI BRACHIAL PLEXUS W WO CONTRAST: ICD-10-PCS | Mod: 26,LT,, | Performed by: RADIOLOGY

## 2023-05-05 PROCEDURE — 73220 MRI UPPR EXTREMITY W/O&W/DYE: CPT | Mod: TC

## 2023-05-05 PROCEDURE — A9585 GADOBUTROL INJECTION: HCPCS | Performed by: STUDENT IN AN ORGANIZED HEALTH CARE EDUCATION/TRAINING PROGRAM

## 2023-05-05 PROCEDURE — 25500020 PHARM REV CODE 255: Performed by: STUDENT IN AN ORGANIZED HEALTH CARE EDUCATION/TRAINING PROGRAM

## 2023-05-05 RX ORDER — GADOBUTROL 604.72 MG/ML
7 INJECTION INTRAVENOUS
Status: COMPLETED | OUTPATIENT
Start: 2023-05-05 | End: 2023-05-05

## 2023-05-05 RX ADMIN — GADOBUTROL 7 ML: 604.72 INJECTION INTRAVENOUS at 08:05

## 2023-05-08 ENCOUNTER — PATIENT MESSAGE (OUTPATIENT)
Dept: NEUROLOGY | Facility: CLINIC | Age: 73
End: 2023-05-08
Payer: MEDICARE

## 2023-05-22 ENCOUNTER — PES CALL (OUTPATIENT)
Dept: ADMINISTRATIVE | Facility: CLINIC | Age: 73
End: 2023-05-22
Payer: MEDICARE

## 2023-05-22 PROBLEM — J18.9 COMMUNITY ACQUIRED PNEUMONIA, BILATERAL: Status: RESOLVED | Noted: 2023-01-29 | Resolved: 2023-05-22

## 2023-05-30 ENCOUNTER — PROCEDURE VISIT (OUTPATIENT)
Dept: NEUROLOGY | Facility: CLINIC | Age: 73
End: 2023-05-30
Payer: MEDICARE

## 2023-05-30 DIAGNOSIS — G54.0 BRACHIAL PLEXUS DISORDERS: ICD-10-CM

## 2023-05-30 DIAGNOSIS — M62.512 ATROPHY OF MUSCLE OF LEFT SHOULDER: ICD-10-CM

## 2023-05-30 DIAGNOSIS — G54.5 PARSONAGE-TURNER SYNDROME: Primary | ICD-10-CM

## 2023-05-30 DIAGNOSIS — R29.898 LEFT ARM WEAKNESS: ICD-10-CM

## 2023-05-30 PROCEDURE — 95886 PR EMG COMPLETE, W/ NERVE CONDUCTION STUDIES, 5+ MUSCLES: ICD-10-PCS | Mod: S$GLB,,, | Performed by: STUDENT IN AN ORGANIZED HEALTH CARE EDUCATION/TRAINING PROGRAM

## 2023-05-30 PROCEDURE — 95911 PR NERVE CONDUCTION STUDY; 9-10 STUDIES: ICD-10-PCS | Mod: S$GLB,,, | Performed by: STUDENT IN AN ORGANIZED HEALTH CARE EDUCATION/TRAINING PROGRAM

## 2023-05-30 PROCEDURE — 99214 OFFICE O/P EST MOD 30 MIN: CPT | Mod: S$GLB,,, | Performed by: STUDENT IN AN ORGANIZED HEALTH CARE EDUCATION/TRAINING PROGRAM

## 2023-05-30 PROCEDURE — 99214 PR OFFICE/OUTPT VISIT, EST, LEVL IV, 30-39 MIN: ICD-10-PCS | Mod: S$GLB,,, | Performed by: STUDENT IN AN ORGANIZED HEALTH CARE EDUCATION/TRAINING PROGRAM

## 2023-05-30 PROCEDURE — 95886 MUSC TEST DONE W/N TEST COMP: CPT | Mod: S$GLB,,, | Performed by: STUDENT IN AN ORGANIZED HEALTH CARE EDUCATION/TRAINING PROGRAM

## 2023-05-30 PROCEDURE — 95911 NRV CNDJ TEST 9-10 STUDIES: CPT | Mod: S$GLB,,, | Performed by: STUDENT IN AN ORGANIZED HEALTH CARE EDUCATION/TRAINING PROGRAM

## 2023-05-30 NOTE — PROCEDURES
Procedures      Chief Complaint and Duration     No chief complaint on file.  Since June 2022    History of Present Illness     Sharath Huizar is a 72 y.o. male with a history of multiple medical diagnoses as listed below referred back over for L arm weakness and pain after having shingles in June 2022. Stated on prior evaluation by neurology provider, he had rash on his L shoulder that extended down to his L arm, was prescribed antivirals.    Continues to have weakness in deltoids. Still has pain in should region consistent of numbness and tingling. Denies significant neck pain.     Interim period:  5/30/23 Patient continues to have weakness in L deltoid. Denies significant neck pain. No new weakness. No changes to bowel or bladder.     Review of patient's allergies indicates:  No Known Allergies  Current Outpatient Medications   Medication Sig Dispense Refill    acetaminophen (TYLENOL) 325 MG tablet Take 1 tablet (325 mg total) by mouth every 6 (six) hours as needed for Pain. 30 tablet 0    aspirin (ECOTRIN) 81 MG EC tablet Take 1 tablet (81 mg total) by mouth once daily. (Patient taking differently: Take 81 mg by mouth once daily.)  0    atorvastatin (LIPITOR) 80 MG tablet Take 1 tablet (80 mg total) by mouth every other day. 90 tablet 3    azelastine (ASTELIN) 137 mcg (0.1 %) nasal spray 1 spray (137 mcg total) by Nasal route 2 (two) times daily. 30 mL 3    cetirizine (ZYRTEC) 10 MG tablet Take 1 tablet (10 mg total) by mouth once daily. 30 tablet 3    clopidogreL (PLAVIX) 75 mg tablet Take 1 tablet (75 mg total) by mouth once daily. 90 tablet 3    gabapentin (NEURONTIN) 300 MG capsule Take 1 capsule (300 mg total) by mouth 3 (three) times daily. 270 capsule 1    metoprolol succinate (TOPROL-XL) 50 MG 24 hr tablet Take 1 tablet (50 mg total) by mouth once daily. 90 tablet 3    nitroGLYCERIN (NITROSTAT) 0.4 MG SL tablet Place 1 tablet (0.4 mg total) under the tongue every 5 (five) minutes as needed. 25 tablet 3     sodium chloride (SALINE NASAL) 0.65 % nasal spray 2 sprays by Nasal route 2 (two) times a day. Use prior to medication sprays 1 Bottle 12    traMADoL (ULTRAM) 50 mg tablet Take 1 tablet (50 mg total) by mouth every 8 (eight) hours as needed for Pain. 10 tablet 0     No current facility-administered medications for this visit.       Medical History     Past Medical History:   Diagnosis Date    Cancer     THROAT    Coronary artery disease     History of chemotherapy     THROAT CANCER    Hypertension     Myocardial infarction     NSTEMI (non-ST elevated myocardial infarction) 3/23/2018    Nuclear sclerosis of both eyes 2019     Past Surgical History:   Procedure Laterality Date    CARDIAC CATHETERIZATION      with 4 stents    DIRECT LARYNGOSCOPY N/A 2020    Procedure: LARYNGOSCOPY, DIRECT;  Surgeon: Genoveva Vallejo MD;  Location: [facility] OR;  Service: ENT;  Laterality: N/A;    DIRECT LARYNGOSCOPY N/A 2021    Procedure: LARYNGOSCOPY, DIRECT with possible biopsy;  Surgeon: Genoveva Vallejo MD;  Location: [facility] OR;  Service: ENT;  Laterality: N/A;  PRE-OP BY RN 2021--COVID NEGATIVE ON     LEFT HEART CATHETERIZATION Left 2018    Procedure: Left heart cath;  Surgeon: Viral Lombardi MD;  Location: [facility] CATH LAB;  Service: Cardiology;  Laterality: Left;  RN PREOP 18    LUNG BIOPSY N/A 2020    Procedure: BIOPSY, LUNG;  Surgeon: Jones Diagnostic Provider;  Location: [facility] OR;  Service: Radiology;  Laterality: N/A;  9am start--RN PHONE PREOP ----COVID NEGATIVE--pt     Family History   Problem Relation Age of Onset    Blindness Paternal Aunt      Social History     Socioeconomic History    Marital status:    Tobacco Use    Smoking status: Former     Packs/day: 0.25     Years: 30.00     Pack years: 7.50     Types: Cigarettes     Quit date: 3/23/2018     Years since quittin.1    Smokeless tobacco: Never   Substance and Sexual Activity    Alcohol use: No    Drug use: No     Sexual activity: Yes     Partners: Female       Exam     There were no vitals filed for this visit.     Physical Exam:  General: he is not in acute distress. he is not ill-appearing.   HENT: Normocephalic and atraumatic. Moist mucous membranes.  Eyes: Conjunctivae normal.   Pulmonary: Pulmonary effort is normal.   Abdominal: Abdomen is soft and flat.   Skin: Skin is warm and dry. No rashes.   Psychiatric: Mood normal.        Neurologic Exam   Mental status: oriented to person, place, and time  Attention: Normal. Concentration: normal.  Speech: speech is normal.  Cranial Nerves: PERRL, EOMI intact, V1-V3 Facial sensation intact. Symmetric facies. Hearing grossly intact. Palate and uvula midline, symmetric. No tongue deviation. Trapezius strength intact.     Motor exam:   Decreased bulk and tone of L deltoid, has scapular winging on the L.    Reflexes: 2+ in bilateral upper extremities: biceps and brachiaradialis    Sensory exam: light touch intact    Gait exam: normal  Coordination: normal      Labs and Imaging     Imaging: reviewed  Xray shoulder reviewed which showed AC joint degenerative changes    MRI brachial plexus 5/5/23 - volume loss of L supraspinatus and infraspinatus, multilevel cervical spondylosis    Assessment and Plan     Problem List Items Addressed This Visit          Neuro    Parsonage-Natarajan syndrome - Primary    Brachial plexus disorders       Orthopedic    Left arm weakness    Atrophy of muscle of left shoulder     This is a 72 year old male w hx of shingles in his L arm s/p antivirals. Has weakness in L deltoid and infraspinatus (w scapular winging on L). Continued neruopathic pain. MRI brachial plexus w degeneration of L supraspinatus, chronic denervation changes.  EMG today showed chronic denervation of C5.   Patient has done PT, states neuropathic pain is manageable.  Likely parsonage natarajan syndrome after shingles affecting C5 with weakness in L deltoid.  Manage conservatively.  Also shows  he has a bilateral sensory ulnar neuropathy - no significant compression site noted on today's study.    Follow-up: PRN

## 2023-06-01 ENCOUNTER — PATIENT MESSAGE (OUTPATIENT)
Dept: ADMINISTRATIVE | Facility: HOSPITAL | Age: 73
End: 2023-06-01
Payer: MEDICARE

## 2023-06-06 ENCOUNTER — TELEPHONE (OUTPATIENT)
Dept: ADMINISTRATIVE | Facility: CLINIC | Age: 73
End: 2023-06-06
Payer: MEDICARE

## 2023-06-06 NOTE — TELEPHONE ENCOUNTER
Called pt, no answer; left message informing pt I was calling to remind pt of his in office EAWV on 6/8/23 and to return my call if he had any questions; left my name and number

## 2023-06-08 ENCOUNTER — OFFICE VISIT (OUTPATIENT)
Dept: FAMILY MEDICINE | Facility: CLINIC | Age: 73
End: 2023-06-08
Payer: MEDICARE

## 2023-06-08 VITALS
RESPIRATION RATE: 16 BRPM | OXYGEN SATURATION: 98 % | BODY MASS INDEX: 21.01 KG/M2 | WEIGHT: 130.75 LBS | TEMPERATURE: 99 F | HEART RATE: 78 BPM | HEIGHT: 66 IN | SYSTOLIC BLOOD PRESSURE: 132 MMHG | DIASTOLIC BLOOD PRESSURE: 58 MMHG

## 2023-06-08 DIAGNOSIS — Z00.00 ENCOUNTER FOR MEDICARE ANNUAL WELLNESS EXAM: Primary | ICD-10-CM

## 2023-06-08 DIAGNOSIS — I50.22 CHRONIC SYSTOLIC HEART FAILURE: Chronic | ICD-10-CM

## 2023-06-08 DIAGNOSIS — Z00.00 ENCOUNTER FOR PREVENTIVE HEALTH EXAMINATION: ICD-10-CM

## 2023-06-08 DIAGNOSIS — Z85.21 HISTORY OF CANCER OF LARYNX: ICD-10-CM

## 2023-06-08 DIAGNOSIS — I70.0 ATHEROSCLEROSIS OF AORTA: Chronic | ICD-10-CM

## 2023-06-08 DIAGNOSIS — Z12.11 COLON CANCER SCREENING: ICD-10-CM

## 2023-06-08 DIAGNOSIS — J43.8 OTHER EMPHYSEMA: Chronic | ICD-10-CM

## 2023-06-08 PROCEDURE — 3075F PR MOST RECENT SYSTOLIC BLOOD PRESS GE 130-139MM HG: ICD-10-PCS | Mod: CPTII,S$GLB,, | Performed by: NURSE PRACTITIONER

## 2023-06-08 PROCEDURE — G0439 PPPS, SUBSEQ VISIT: HCPCS | Mod: S$GLB,,, | Performed by: NURSE PRACTITIONER

## 2023-06-08 PROCEDURE — 3288F FALL RISK ASSESSMENT DOCD: CPT | Mod: CPTII,S$GLB,, | Performed by: NURSE PRACTITIONER

## 2023-06-08 PROCEDURE — 99999 PR PBB SHADOW E&M-EST. PATIENT-LVL V: ICD-10-PCS | Mod: PBBFAC,,, | Performed by: NURSE PRACTITIONER

## 2023-06-08 PROCEDURE — 1101F PT FALLS ASSESS-DOCD LE1/YR: CPT | Mod: CPTII,S$GLB,, | Performed by: NURSE PRACTITIONER

## 2023-06-08 PROCEDURE — 3078F PR MOST RECENT DIASTOLIC BLOOD PRESSURE < 80 MM HG: ICD-10-PCS | Mod: CPTII,S$GLB,, | Performed by: NURSE PRACTITIONER

## 2023-06-08 PROCEDURE — 1126F AMNT PAIN NOTED NONE PRSNT: CPT | Mod: CPTII,S$GLB,, | Performed by: NURSE PRACTITIONER

## 2023-06-08 PROCEDURE — 3008F PR BODY MASS INDEX (BMI) DOCUMENTED: ICD-10-PCS | Mod: CPTII,S$GLB,, | Performed by: NURSE PRACTITIONER

## 2023-06-08 PROCEDURE — 99499 UNLISTED E&M SERVICE: CPT | Mod: S$GLB,,, | Performed by: NURSE PRACTITIONER

## 2023-06-08 PROCEDURE — 1170F PR FUNCTIONAL STATUS ASSESSED: ICD-10-PCS | Mod: CPTII,S$GLB,, | Performed by: NURSE PRACTITIONER

## 2023-06-08 PROCEDURE — 3078F DIAST BP <80 MM HG: CPT | Mod: CPTII,S$GLB,, | Performed by: NURSE PRACTITIONER

## 2023-06-08 PROCEDURE — 1160F PR REVIEW ALL MEDS BY PRESCRIBER/CLIN PHARMACIST DOCUMENTED: ICD-10-PCS | Mod: CPTII,S$GLB,, | Performed by: NURSE PRACTITIONER

## 2023-06-08 PROCEDURE — G0439 PR MEDICARE ANNUAL WELLNESS SUBSEQUENT VISIT: ICD-10-PCS | Mod: S$GLB,,, | Performed by: NURSE PRACTITIONER

## 2023-06-08 PROCEDURE — 99999 PR PBB SHADOW E&M-EST. PATIENT-LVL V: CPT | Mod: PBBFAC,,, | Performed by: NURSE PRACTITIONER

## 2023-06-08 PROCEDURE — 1101F PR PT FALLS ASSESS DOC 0-1 FALLS W/OUT INJ PAST YR: ICD-10-PCS | Mod: CPTII,S$GLB,, | Performed by: NURSE PRACTITIONER

## 2023-06-08 PROCEDURE — 1126F PR PAIN SEVERITY QUANTIFIED, NO PAIN PRESENT: ICD-10-PCS | Mod: CPTII,S$GLB,, | Performed by: NURSE PRACTITIONER

## 2023-06-08 PROCEDURE — 1159F MED LIST DOCD IN RCRD: CPT | Mod: CPTII,S$GLB,, | Performed by: NURSE PRACTITIONER

## 2023-06-08 PROCEDURE — 3075F SYST BP GE 130 - 139MM HG: CPT | Mod: CPTII,S$GLB,, | Performed by: NURSE PRACTITIONER

## 2023-06-08 PROCEDURE — 1170F FXNL STATUS ASSESSED: CPT | Mod: CPTII,S$GLB,, | Performed by: NURSE PRACTITIONER

## 2023-06-08 PROCEDURE — 3008F BODY MASS INDEX DOCD: CPT | Mod: CPTII,S$GLB,, | Performed by: NURSE PRACTITIONER

## 2023-06-08 PROCEDURE — 1160F RVW MEDS BY RX/DR IN RCRD: CPT | Mod: CPTII,S$GLB,, | Performed by: NURSE PRACTITIONER

## 2023-06-08 PROCEDURE — 3044F HG A1C LEVEL LT 7.0%: CPT | Mod: CPTII,S$GLB,, | Performed by: NURSE PRACTITIONER

## 2023-06-08 PROCEDURE — 3044F PR MOST RECENT HEMOGLOBIN A1C LEVEL <7.0%: ICD-10-PCS | Mod: CPTII,S$GLB,, | Performed by: NURSE PRACTITIONER

## 2023-06-08 PROCEDURE — 1159F PR MEDICATION LIST DOCUMENTED IN MEDICAL RECORD: ICD-10-PCS | Mod: CPTII,S$GLB,, | Performed by: NURSE PRACTITIONER

## 2023-06-08 PROCEDURE — 3288F PR FALLS RISK ASSESSMENT DOCUMENTED: ICD-10-PCS | Mod: CPTII,S$GLB,, | Performed by: NURSE PRACTITIONER

## 2023-06-08 NOTE — PROGRESS NOTES
"  Sharath Huizar presented for a  Medicare AWV and comprehensive Health Risk Assessment today. The following components were reviewed and updated:    Medical history  Family History  Social history  Allergies and Current Medications  Health Risk Assessment  Health Maintenance  Care Team         ** See Completed Assessments for Annual Wellness Visit within the encounter summary.**         The following assessments were completed:  Living Situation  CAGE  Depression Screening  Timed Get Up and Go  Whisper Test  Cognitive Function Screening  Nutrition Screening  ADL Screening  PAQ Screening        Vitals:    06/08/23 1401   BP: (!) 132/58   BP Location: Right arm   Patient Position: Sitting   BP Method: Large (Manual)   Pulse: 78   Resp: 16   Temp: 98.6 °F (37 °C)   TempSrc: Oral   SpO2: 98%   Weight: 59.3 kg (130 lb 11.7 oz)   Height: 5' 6" (1.676 m)     Body mass index is 21.1 kg/m².  Physical Exam  Vitals reviewed.   Constitutional:       General: He is not in acute distress.     Appearance: Normal appearance. He is not ill-appearing, toxic-appearing or diaphoretic.   HENT:      Head: Normocephalic and atraumatic.   Cardiovascular:      Rate and Rhythm: Normal rate and regular rhythm.      Pulses: Normal pulses.      Heart sounds: Normal heart sounds.   Pulmonary:      Effort: Pulmonary effort is normal.      Breath sounds: Normal breath sounds.   Skin:     General: Skin is warm and dry.   Neurological:      General: No focal deficit present.      Mental Status: He is alert and oriented to person, place, and time.   Psychiatric:         Mood and Affect: Mood normal.         Behavior: Behavior normal.               Diagnoses and health risks identified today and associated recommendations/orders:    1. Encounter for Medicare annual wellness exam  The patient was seen today for an annual Medicare wellness exam.  Health maintenance and screening topics were discussed.  Proper diet and exercise recommendations were " reviewed.  - Ambulatory Referral/Consult to Enhanced Annual Wellness Visit (eAWV)    2. Encounter for preventive health examination  As above.    3. Chronic systolic heart failure  No acute concerns.    4. Atherosclerosis of aorta  No acute concerns.    5. Other emphysema  No acute concerns.    6. Colon cancer screening  Patient has fit kit at home he states he will turn this in.    7. History of cancer of larynx  No acute concerns.    Review current opioid prescriptions: NA  Screen for potential Substance Use Disorders: NA      Provided Sharath with a 5-10 year written screening schedule and personal prevention plan. Recommendations were developed using the USPSTF age appropriate recommendations. Education, counseling, and referrals were provided as needed. After Visit Summary printed and given to patient which includes a list of additional screenings\tests needed.    Follow up in about 1 year (around 6/8/2024) for AWV.    Lee Vogel, MULUGETA  I offered to discuss advanced care planning, including how to pick a person who would make decisions for you if you were unable to make them for yourself, called a health care power of , and what kind of decisions you might make such as use of life sustaining treatments such as ventilators and tube feeding when faced with a life limiting illness recorded on a living will that they will need to know. (How you want to be cared for as you near the end of your natural life)     X Patient is interested in learning more about how to make advanced directives.  I provided them paperwork and offered to discuss this with them.

## 2023-06-08 NOTE — PATIENT INSTRUCTIONS
Counseling and Referral of Other Preventative  (Italic type indicates deductible and co-insurance are waived)    Patient Name: Sharath Huizar  Today's Date: 6/8/2023    Health Maintenance       Date Due Completion Date    Shingles Vaccine (1 of 2) Never done ---    Colorectal Cancer Screening 11/01/2020 11/1/2019    COVID-19 Vaccine (3 - Moderna risk series) 02/16/2022 1/19/2022    Influenza Vaccine (Season Ended) 09/01/2023 10/24/2019    High Dose Statin 03/01/2024 3/1/2023    Lipid Panel 02/14/2028 2/14/2023    TETANUS VACCINE 07/03/2028 7/3/2018        No orders of the defined types were placed in this encounter.      The following information is provided to all patients.  This information is to help you find resources for any of the problems found today that may be affecting your health:                Living healthy guide: www.[facility].[place].[facility]      Understanding Diabetes: www.diabetes.org      Eating healthy: www.cdc.gov/healthyweight      [facility] home safety checklist: www.cdc.gov/steadi/patient.html      Agency on Aging: www.goea.[place].[facility]      Alcoholics anonymous (AA): www.aa.org      Physical Activity: www.lenny.nih.gov/sh1ltpv      Tobacco use: www.quitwithusla.org

## 2023-11-02 ENCOUNTER — PATIENT OUTREACH (OUTPATIENT)
Dept: ADMINISTRATIVE | Facility: HOSPITAL | Age: 73
End: 2023-11-02
Payer: MEDICARE

## 2023-11-28 ENCOUNTER — TELEPHONE (OUTPATIENT)
Dept: CARDIOLOGY | Facility: CLINIC | Age: 73
End: 2023-11-28
Payer: MEDICARE

## 2023-11-28 DIAGNOSIS — I70.0 ATHEROSCLEROSIS OF AORTA: Chronic | ICD-10-CM

## 2023-11-28 DIAGNOSIS — I10 HYPERTENSION, ESSENTIAL: ICD-10-CM

## 2023-11-28 DIAGNOSIS — I25.10 CORONARY ARTERY DISEASE INVOLVING NATIVE CORONARY ARTERY OF NATIVE HEART WITHOUT ANGINA PECTORIS: Chronic | ICD-10-CM

## 2023-11-28 RX ORDER — NITROGLYCERIN 0.4 MG/1
0.4 TABLET SUBLINGUAL EVERY 5 MIN PRN
Qty: 25 TABLET | Refills: 3 | Status: SHIPPED | OUTPATIENT
Start: 2023-11-28 | End: 2024-11-27

## 2023-11-28 RX ORDER — CLOPIDOGREL BISULFATE 75 MG/1
75 TABLET ORAL DAILY
Qty: 90 TABLET | Refills: 3 | Status: SHIPPED | OUTPATIENT
Start: 2023-11-28 | End: 2024-11-27

## 2023-11-28 RX ORDER — ATORVASTATIN CALCIUM 80 MG/1
80 TABLET, FILM COATED ORAL EVERY OTHER DAY
Qty: 90 TABLET | Refills: 3 | Status: SHIPPED | OUTPATIENT
Start: 2023-11-28

## 2023-11-28 RX ORDER — METOPROLOL SUCCINATE 50 MG/1
50 TABLET, EXTENDED RELEASE ORAL DAILY
Qty: 90 TABLET | Refills: 3 | Status: SHIPPED | OUTPATIENT
Start: 2023-11-28 | End: 2024-11-27

## 2023-11-28 NOTE — TELEPHONE ENCOUNTER
Patient requesting refill of medication. Last seen 202. Attempted to contact patient to schedule follow up

## 2024-02-05 ENCOUNTER — TELEPHONE (OUTPATIENT)
Dept: FAMILY MEDICINE | Facility: CLINIC | Age: 74
End: 2024-02-05
Payer: MEDICARE

## 2024-02-05 NOTE — TELEPHONE ENCOUNTER
Rt pt wife Isaac flores , says that he has ear pain . Pt talks loud , they've tried cleaning at home . Offered first opening with Dr. Frias tomorrow at 9:40 am and both accept .

## 2024-02-05 NOTE — TELEPHONE ENCOUNTER
----- Message from Yamilex Davison sent at 2/5/2024  8:15 AM CST -----  Regarding: Referral  .Type:  Patient Requesting Referral    Who Called:noraSlim Gray     Referral to What Specialty:Audiology     Reason for Referral:Can't hear out of his right ear     Does the patient want the referral with a specific physician?:No     [facility]     Would the patient rather a call back or a response via [facility]? Call     Best Call Back Number:.[phone]       Additional Information:

## 2024-02-06 ENCOUNTER — LAB VISIT (OUTPATIENT)
Dept: LAB | Facility: HOSPITAL | Age: 74
End: 2024-02-06
Attending: FAMILY MEDICINE
Payer: MEDICARE

## 2024-02-06 ENCOUNTER — OFFICE VISIT (OUTPATIENT)
Dept: FAMILY MEDICINE | Facility: CLINIC | Age: 74
End: 2024-02-06
Payer: MEDICARE

## 2024-02-06 VITALS
HEIGHT: 66 IN | OXYGEN SATURATION: 98 % | BODY MASS INDEX: 20.16 KG/M2 | WEIGHT: 125.44 LBS | SYSTOLIC BLOOD PRESSURE: 130 MMHG | HEART RATE: 73 BPM | TEMPERATURE: 98 F | DIASTOLIC BLOOD PRESSURE: 68 MMHG

## 2024-02-06 DIAGNOSIS — Z85.21 HISTORY OF CANCER OF LARYNX: ICD-10-CM

## 2024-02-06 DIAGNOSIS — R35.0 URINARY FREQUENCY: ICD-10-CM

## 2024-02-06 DIAGNOSIS — H90.5 SENSORINEURAL HEARING LOSS (SNHL) OF RIGHT EAR, UNSPECIFIED HEARING STATUS ON CONTRALATERAL SIDE: ICD-10-CM

## 2024-02-06 DIAGNOSIS — I70.0 ATHEROSCLEROSIS OF AORTA: Chronic | ICD-10-CM

## 2024-02-06 DIAGNOSIS — I25.10 CORONARY ARTERY DISEASE INVOLVING NATIVE CORONARY ARTERY OF NATIVE HEART WITHOUT ANGINA PECTORIS: Primary | ICD-10-CM

## 2024-02-06 LAB
ALBUMIN SERPL BCP-MCNC: 3.4 G/DL (ref 3.5–5.2)
ALP SERPL-CCNC: 94 U/L (ref 55–135)
ALT SERPL W/O P-5'-P-CCNC: 13 U/L (ref 10–44)
ANION GAP SERPL CALC-SCNC: 9 MMOL/L (ref 8–16)
AST SERPL-CCNC: 22 U/L (ref 10–40)
BILIRUB SERPL-MCNC: 0.3 MG/DL (ref 0.1–1)
BILIRUB UR QL STRIP: NEGATIVE
BUN SERPL-MCNC: 15 MG/DL (ref 8–23)
CALCIUM SERPL-MCNC: 9.2 MG/DL (ref 8.7–10.5)
CHLORIDE SERPL-SCNC: 110 MMOL/L (ref 95–110)
CHOLEST SERPL-MCNC: 110 MG/DL (ref 120–199)
CHOLEST/HDLC SERPL: 2.1 {RATIO} (ref 2–5)
CLARITY UR: CLEAR
CO2 SERPL-SCNC: 22 MMOL/L (ref 23–29)
COLOR UR: YELLOW
CREAT SERPL-MCNC: 1.3 MG/DL (ref 0.5–1.4)
EST. GFR  (NO RACE VARIABLE): 58 ML/MIN/1.73 M^2
GLUCOSE SERPL-MCNC: 107 MG/DL (ref 70–110)
GLUCOSE UR QL STRIP: NEGATIVE
HDLC SERPL-MCNC: 53 MG/DL (ref 40–75)
HDLC SERPL: 48.2 % (ref 20–50)
HGB UR QL STRIP: NEGATIVE
KETONES UR QL STRIP: NEGATIVE
LDLC SERPL CALC-MCNC: 47.4 MG/DL (ref 63–159)
LEUKOCYTE ESTERASE UR QL STRIP: NEGATIVE
NITRITE UR QL STRIP: NEGATIVE
NONHDLC SERPL-MCNC: 57 MG/DL
PH UR STRIP: 6 [PH] (ref 5–8)
POTASSIUM SERPL-SCNC: 4.4 MMOL/L (ref 3.5–5.1)
PROT SERPL-MCNC: 7 G/DL (ref 6–8.4)
PROT UR QL STRIP: NEGATIVE
SODIUM SERPL-SCNC: 141 MMOL/L (ref 136–145)
SP GR UR STRIP: 1.01 (ref 1–1.03)
TRIGL SERPL-MCNC: 48 MG/DL (ref 30–150)
URN SPEC COLLECT METH UR: NORMAL
UROBILINOGEN UR STRIP-ACNC: NEGATIVE EU/DL

## 2024-02-06 PROCEDURE — 3288F FALL RISK ASSESSMENT DOCD: CPT | Mod: CPTII,S$GLB,, | Performed by: INTERNAL MEDICINE

## 2024-02-06 PROCEDURE — 1160F RVW MEDS BY RX/DR IN RCRD: CPT | Mod: CPTII,S$GLB,, | Performed by: INTERNAL MEDICINE

## 2024-02-06 PROCEDURE — 1126F AMNT PAIN NOTED NONE PRSNT: CPT | Mod: CPTII,S$GLB,, | Performed by: INTERNAL MEDICINE

## 2024-02-06 PROCEDURE — 3075F SYST BP GE 130 - 139MM HG: CPT | Mod: CPTII,S$GLB,, | Performed by: INTERNAL MEDICINE

## 2024-02-06 PROCEDURE — 80053 COMPREHEN METABOLIC PANEL: CPT | Performed by: FAMILY MEDICINE

## 2024-02-06 PROCEDURE — 36415 COLL VENOUS BLD VENIPUNCTURE: CPT | Mod: PN | Performed by: FAMILY MEDICINE

## 2024-02-06 PROCEDURE — 1159F MED LIST DOCD IN RCRD: CPT | Mod: CPTII,S$GLB,, | Performed by: INTERNAL MEDICINE

## 2024-02-06 PROCEDURE — 81003 URINALYSIS AUTO W/O SCOPE: CPT | Performed by: INTERNAL MEDICINE

## 2024-02-06 PROCEDURE — 80061 LIPID PANEL: CPT | Performed by: FAMILY MEDICINE

## 2024-02-06 PROCEDURE — 99999 PR PBB SHADOW E&M-EST. PATIENT-LVL V: CPT | Mod: PBBFAC,,, | Performed by: INTERNAL MEDICINE

## 2024-02-06 PROCEDURE — 3008F BODY MASS INDEX DOCD: CPT | Mod: CPTII,S$GLB,, | Performed by: INTERNAL MEDICINE

## 2024-02-06 PROCEDURE — 99214 OFFICE O/P EST MOD 30 MIN: CPT | Mod: S$GLB,,, | Performed by: INTERNAL MEDICINE

## 2024-02-06 PROCEDURE — 3078F DIAST BP <80 MM HG: CPT | Mod: CPTII,S$GLB,, | Performed by: INTERNAL MEDICINE

## 2024-02-06 PROCEDURE — 1101F PT FALLS ASSESS-DOCD LE1/YR: CPT | Mod: CPTII,S$GLB,, | Performed by: INTERNAL MEDICINE

## 2024-02-06 NOTE — PROGRESS NOTES
Subjective:       Patient ID: Sharath Huizar is a 73 y.o. male.    Chief Complaint: Male  Problem (x2 months denies pain) and Cerumen Impaction (Rt ear x1-2 months denies pain, loss of hearing)    Right ear hearing loss    HPI: 72 y/o history of larygneal cancer treated with XRT presents alone for urgent care visit. Reports one month of decreased hearing on right side. Knows this because when lies on left side can not hear wife speak. No vertigo. He did note two days ago clear drainage from right ear he does use qtips inside ears never used hearing aids no ear drops. No pain to ear no vertigo no loose stool no orthostatic symptoms he has not had ENT follow pu for his laryngeal cancer scine Aug 2022. Denies vocal changes no dysphagia/odonyphagia    He also feels over the last month that he has to urinate every hour no change with lying down no hematuria no dysuria no LE swelling       Review of Systems   Constitutional:  Negative for activity change, fever and unexpected weight change.   HENT:  Positive for hearing loss. Negative for congestion, rhinorrhea, sore throat and trouble swallowing.    Eyes:  Negative for photophobia and redness.   Respiratory:  Negative for cough, chest tightness, shortness of breath and wheezing.    Cardiovascular:  Negative for chest pain, palpitations and leg swelling.   Gastrointestinal:  Negative for abdominal pain, blood in stool, constipation, diarrhea, nausea and vomiting.   Endocrine: Negative for cold intolerance, heat intolerance and polyuria.   Genitourinary:  Positive for frequency. Negative for decreased urine volume, difficulty urinating, dysuria, flank pain, hematuria, testicular pain and urgency.   Musculoskeletal:  Negative for arthralgias and back pain.   Skin:  Negative for rash.   Neurological:  Negative for dizziness, syncope, weakness and headaches.   Psychiatric/Behavioral:  Negative for dysphoric mood, sleep disturbance and suicidal ideas.        Objective:  "    Vitals:    02/06/24 0935   BP: 130/68   BP Location: Right arm   Patient Position: Sitting   BP Method: Medium (Manual)   Pulse: 73   Temp: 98.4 °F (36.9 °C)   TempSrc: Oral   SpO2: 98%   Weight: 56.9 kg (125 lb 7.1 oz)   Height: 5' 6" (1.676 m)          Physical Exam  Constitutional:       Appearance: He is well-developed.   HENT:      Head: Normocephalic and atraumatic.      Right Ear: Tympanic membrane normal.      Left Ear: Tympanic membrane normal.      Ears:      Comments: Scarring of inferior TM on right w/o effusion no cerumen  Eyes:      General: No scleral icterus.     Conjunctiva/sclera: Conjunctivae normal.   Cardiovascular:      Rate and Rhythm: Normal rate and regular rhythm.      Heart sounds: No murmur heard.     No friction rub. No gallop.   Pulmonary:      Effort: Pulmonary effort is normal.      Breath sounds: Normal breath sounds. No wheezing or rales.   Abdominal:      General: There is no distension.      Palpations: Abdomen is soft.      Tenderness: There is no abdominal tenderness. There is no right CVA tenderness, left CVA tenderness, guarding or rebound.   Musculoskeletal:         General: No tenderness. Normal range of motion.      Cervical back: Normal range of motion.   Lymphadenopathy:      Cervical: No cervical adenopathy.   Skin:     General: Skin is warm and dry.   Neurological:      Mental Status: He is alert and oriented to person, place, and time.      Cranial Nerves: No cranial nerve deficit.   Psychiatric:         Mood and Affect: Mood normal.         Behavior: Behavior normal.         Thought Content: Thought content normal.         Assessment and Plan   1. Coronary artery disease involving native coronary artery of native heart without angina pectoris  On beta blocker statyin and asa repeat fasting labs today    2. Urinary frequency  Check urien for infectious etiology  - Urinalysis  - Urine culture; Future    3. History of cancer of larynx  Overdue for ENT exam referral " placed  - Ambulatory referral/consult to ENT; Future    4. Sensorineural hearing loss (SNHL) of right ear, unspecified hearing status on contralateral side  Auidometric testing   - Ambulatory referral/consult to ENT; Future  - Ambulatory referral/consult to Audiology; Future

## 2024-02-08 ENCOUNTER — CLINICAL SUPPORT (OUTPATIENT)
Dept: AUDIOLOGY | Facility: CLINIC | Age: 74
End: 2024-02-08
Payer: MEDICARE

## 2024-02-08 DIAGNOSIS — H90.A31 MIXED CONDUCTIVE AND SENSORINEURAL HEARING LOSS OF RIGHT EAR WITH RESTRICTED HEARING OF LEFT EAR: Primary | ICD-10-CM

## 2024-02-08 DIAGNOSIS — H90.A22 SENSORINEURAL HEARING LOSS (SNHL) OF LEFT EAR WITH RESTRICTED HEARING OF RIGHT EAR: ICD-10-CM

## 2024-02-08 DIAGNOSIS — H90.5 SENSORINEURAL HEARING LOSS (SNHL) OF RIGHT EAR, UNSPECIFIED HEARING STATUS ON CONTRALATERAL SIDE: ICD-10-CM

## 2024-02-08 PROCEDURE — 92557 COMPREHENSIVE HEARING TEST: CPT | Mod: S$GLB,,,

## 2024-02-08 PROCEDURE — 92550 TYMPANOMETRY & REFLEX THRESH: CPT | Mod: S$GLB,,,

## 2024-02-08 NOTE — PROGRESS NOTES
Please click on link to view Audiogram:  Document on 2/8/2024 1:29 PM by Becky Walters AU.D: Audiogram    Mr. Sharath Huizar, a 73 y.o. male, was seen in the clinic today for an audiological evaluation. Previous audiogram from 8/24/22 indicated a moderate rising to mild sloping to severe mixed hearing loss (MHL) for the right ear and a mild to moderate high frequency sensorineural hearing loss (SNHL) for the left ear.     Otoscopy clear bilaterally. Tympanometry testing revealed a Type B tympanogram with large ear canal volume for the right ear and a Type C tympanogram for the left ear. Ipsilateral acoustic reflexes were absent at all tested frequencies for the right ear and were present at all tested frequencies for the left ear.    Audiological testing revealed a moderately-severe rising to mild sloping to severe MHL for the right ear and a mild to moderate high frequency SNHL for the left ear. A speech reception threshold was obtained at 35 dBHL for the right ear and at 15 dBHL for the left ear. Speech discrimination was 80% for the right ear and 96% for the left ear.      Today's results were discussed with the patient. Patient expressed understanding of hearing test results and recommendations. Scheduled hearing aid consult upon patient request for 2/19/24 at 2:00 pm.    Recommendations:  1. Otologic evaluation  2. Annual audiological evaluation, sooner if medically necessary or if hearing changes  3. Hearing protection when in noise   4. Hearing aid consultation

## 2024-02-19 ENCOUNTER — CLINICAL SUPPORT (OUTPATIENT)
Dept: AUDIOLOGY | Facility: CLINIC | Age: 74
End: 2024-02-19
Payer: MEDICARE

## 2024-02-19 DIAGNOSIS — H90.A31 MIXED CONDUCTIVE AND SENSORINEURAL HEARING LOSS OF RIGHT EAR WITH RESTRICTED HEARING OF LEFT EAR: Primary | ICD-10-CM

## 2024-02-19 NOTE — PROGRESS NOTES
"HEARING AID CONSULTATION (HAC)    Mr. Huizar was seen today for a hearing aid consultation. His main complaint was difficulty understanding speech.     Demoed binaural Widex MRR4D 440 hearing aids and completed Feedback test. Audiogram, hearing aid options, and levels of technology were discussed with the patient. Counseled patient on pricing, warranties, and 30 day trial period. Answered patient's questions about insurance coverage and discussed follow-up care after hearing aid fitting.     Recommended binaural Widex MRR4D 330 or 220 hearing aids ("Advanced" or "Performance" technology level) coupled to 2M receivers, small power dome (right), and medium open dome (left). Patient reported that he wants monaural amplification for his right ear only and also wants to confirm hearing aid benefit with his insurance before purchasing. Provided my contact information to call and schedule follow-up HAC when he is ready to purchase.    Recommendations:  Binaural amplification  Annual audiological evaluation or sooner if hearing changes  3.   Hearing protection when in noise    "

## 2024-03-20 ENCOUNTER — TELEPHONE (OUTPATIENT)
Dept: HEMATOLOGY/ONCOLOGY | Facility: CLINIC | Age: 74
End: 2024-03-20
Payer: MEDICARE

## 2024-03-20 NOTE — TELEPHONE ENCOUNTER
"----- Message from Meagan Rodriguez sent at 3/20/2024  4:47 PM CDT -----  Regarding: Scheduling Request        Patient Status:   Active      Scheduling Appt:   Labs, CT scan w/ contrast (Neck, Chest), & Ep Appt      Time/Date Preference:   First availability      Relationship to Patient?:    Slim Gray (Pt's Wife)      Contact Preference?:    [phone]       Treating Provider:   Dr. Aguilar      Do you feel you need to be seen today?  No      Additional Notes:   Pt's Wife would like to schedule her 's follow up appts.       "Thank you for all that you do for our patients"    "

## 2024-03-21 NOTE — TELEPHONE ENCOUNTER
He needs CBC, CMP, TSH, free T4 and see me but more importantly he needs to see Dr. Vallejo in ENT

## 2024-04-25 ENCOUNTER — PATIENT OUTREACH (OUTPATIENT)
Dept: ADMINISTRATIVE | Facility: HOSPITAL | Age: 74
End: 2024-04-25
Payer: MEDICARE

## 2024-04-25 NOTE — PROGRESS NOTES
Reached out to pt in regards to overdue colon cancer screening unavailable left voicemail. Gap report updated.Immunization's updated/triggered.

## 2024-05-10 ENCOUNTER — PATIENT OUTREACH (OUTPATIENT)
Dept: ADMINISTRATIVE | Facility: HOSPITAL | Age: 74
End: 2024-05-10
Payer: MEDICARE

## 2024-05-17 ENCOUNTER — OFFICE VISIT (OUTPATIENT)
Dept: OTOLARYNGOLOGY | Facility: CLINIC | Age: 74
End: 2024-05-17
Payer: MEDICARE

## 2024-05-17 VITALS
DIASTOLIC BLOOD PRESSURE: 72 MMHG | SYSTOLIC BLOOD PRESSURE: 126 MMHG | BODY MASS INDEX: 20.89 KG/M2 | WEIGHT: 130 LBS | HEIGHT: 66 IN

## 2024-05-17 DIAGNOSIS — R49.0 DYSPHONIA: Primary | ICD-10-CM

## 2024-05-17 DIAGNOSIS — Z85.21 HISTORY OF CANCER OF LARYNX: ICD-10-CM

## 2024-05-17 DIAGNOSIS — H90.5 SENSORINEURAL HEARING LOSS (SNHL) OF RIGHT EAR, UNSPECIFIED HEARING STATUS ON CONTRALATERAL SIDE: ICD-10-CM

## 2024-05-17 DIAGNOSIS — H90.A31 MIXED CONDUCTIVE AND SENSORINEURAL HEARING LOSS OF RIGHT EAR WITH RESTRICTED HEARING OF LEFT EAR: ICD-10-CM

## 2024-05-17 PROCEDURE — 3078F DIAST BP <80 MM HG: CPT | Mod: CPTII,S$GLB,, | Performed by: OTOLARYNGOLOGY

## 2024-05-17 PROCEDURE — 3074F SYST BP LT 130 MM HG: CPT | Mod: CPTII,S$GLB,, | Performed by: OTOLARYNGOLOGY

## 2024-05-17 PROCEDURE — 99214 OFFICE O/P EST MOD 30 MIN: CPT | Mod: 25,S$GLB,, | Performed by: OTOLARYNGOLOGY

## 2024-05-17 PROCEDURE — 1126F AMNT PAIN NOTED NONE PRSNT: CPT | Mod: CPTII,S$GLB,, | Performed by: OTOLARYNGOLOGY

## 2024-05-17 PROCEDURE — 31575 DIAGNOSTIC LARYNGOSCOPY: CPT | Mod: S$GLB,,, | Performed by: OTOLARYNGOLOGY

## 2024-05-17 PROCEDURE — 1159F MED LIST DOCD IN RCRD: CPT | Mod: CPTII,S$GLB,, | Performed by: OTOLARYNGOLOGY

## 2024-05-17 PROCEDURE — 3008F BODY MASS INDEX DOCD: CPT | Mod: CPTII,S$GLB,, | Performed by: OTOLARYNGOLOGY

## 2024-05-17 PROCEDURE — 1101F PT FALLS ASSESS-DOCD LE1/YR: CPT | Mod: CPTII,S$GLB,, | Performed by: OTOLARYNGOLOGY

## 2024-05-17 PROCEDURE — 3288F FALL RISK ASSESSMENT DOCD: CPT | Mod: CPTII,S$GLB,, | Performed by: OTOLARYNGOLOGY

## 2024-05-17 NOTE — PROGRESS NOTES
OTOLARYNGOLOGY CLINIC NOTE  Date:  05/17/2024     Chief complaint:  Chief Complaint   Patient presents with    Hearing Loss    Follow-up     Hx larynx cancer hasn't been seen since 08/01/22 for cancer surveillance        History of Present Illness  Sharath Huizar is a 73 y.o. male  presenting today for a followup.  Had hearing test at [facility] and here     No throat pain   Hoarse every now and then - unchanged   No hemoptysis  No ear pain on right just can't hear on that side   Smokes about half pack per day     Regarding his oncologic history:  He initially was referred to me for hoarseness and hemoptysis. He underwent DL with biopsy of left supraglottic mass ( glottic surface of epiglottis and left false fold with some extension to right epiglottis) on 4-20-20 that showed scca. He had 2 right sided hypermetabolic nodes ( 10-15 mm ) and 11 mm hypermetabolic node on left side of neck ( in addition to hypermetabolic supraglottic primary). He was also noted on workup of this to have a solitary lung nodule in the right lower lobe on chest ct. IR biopsy performed 4-27-20 which was positive for malignancy ( squamous cell ca vs adenosquamous). This was considered to be synchronous primary . His case was presented at the head and neck tumor board and decision was made for CRT ( pt did not want laryngectomy) for his [id number] laryngeal cancer . His case was also presented at lung tumor board and recommended for upfront SBRT for his cT1N0 lung cancer. He also had a positive AFB however not felt to be TB by pulm.   He was treated by Dr. Aguilar ( heme onc) and Dr. 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 PEG tube insertion was done 5-21-20. He also had dental clearance prior to treatment.  He completed SBRT in early July. Started on concurrent CRT ( cisplatin x6 doses)  7-6-20, completed RT 8-21-20.     He  had his G-tube removed 12-30-20. He had restaging PET-CT  mid December which did not show any obvious recurrence in the head neck area     Out of abundance of precaution, I took him back to the operating room for direct laryngoscopy and biopsy on 02/18/2021.  This was done for persistent/ slightly worsened post radiation edema and to rule out any submucosal disease.  Biopsies were negative for malignancy.     Regarding hearing history:  Hearing test in 2020 showed tm perf and right CHL; left ear was normal. No otorrhea.  Worked in construction   Did not see perf on exam previously but has large volume type B tymp        Past Medical History  Past Medical History:   Diagnosis Date    Cancer     THROAT    Coronary artery disease     Emphysema of lung     History of chemotherapy     THROAT CANCER    Hypertension     Myocardial infarction     NSTEMI (non-ST elevated myocardial infarction) 03/23/2018    Nuclear sclerosis of both eyes 06/13/2019        Past Surgical History  Past Surgical History:   Procedure Laterality Date    CARDIAC CATHETERIZATION      with 4 stents    DIRECT LARYNGOSCOPY N/A 4/20/2020    Procedure: LARYNGOSCOPY, DIRECT;  Surgeon: Genoveva Vallejo MD;  Location: [facility] OR;  Service: ENT;  Laterality: N/A;    DIRECT LARYNGOSCOPY N/A 2/18/2021    Procedure: LARYNGOSCOPY, DIRECT with possible biopsy;  Surgeon: Genoveva Vallejo MD;  Location: [facility] OR;  Service: ENT;  Laterality: N/A;  PRE-OP BY RN 2---COVID NEGATIVE ON 2/17    LEFT HEART CATHETERIZATION Left 5/29/2018    Procedure: Left heart cath;  Surgeon: Viral Lombardi MD;  Location: [facility] CATH LAB;  Service: Cardiology;  Laterality: Left;  RN PREOP 5/28/18    LUNG BIOPSY N/A 4/27/2020    Procedure: BIOPSY, LUNG;  Surgeon: [facility] Diagnostic Provider;  Location: [facility] OR;  Service: Radiology;  Laterality: N/A;  9am start--RN PHONE PREOP 4/24----COVID NEGATIVE--pt        Medications  Current Outpatient Medications on File Prior to Visit   Medication Sig Dispense Refill    acetaminophen (TYLENOL) 325 MG  tablet Take 1 tablet (325 mg total) by mouth every 6 (six) hours as needed for Pain. 30 tablet 0    atorvastatin (LIPITOR) 80 MG tablet Take 1 tablet (80 mg total) by mouth every other day. 90 tablet 3    azelastine (ASTELIN) 137 mcg (0.1 %) nasal spray 1 spray (137 mcg total) by Nasal route 2 (two) times daily. 30 mL 3    clopidogreL (PLAVIX) 75 mg tablet Take 1 tablet (75 mg total) by mouth once daily. 90 tablet 3    gabapentin (NEURONTIN) 300 MG capsule Take 1 capsule (300 mg total) by mouth 3 (three) times daily. 270 capsule 1    metoprolol succinate (TOPROL-XL) 50 MG 24 hr tablet Take 1 tablet (50 mg total) by mouth once daily. 90 tablet 3    nitroGLYCERIN (NITROSTAT) 0.4 MG SL tablet Place 1 tablet (0.4 mg total) under the tongue every 5 (five) minutes as needed for Chest pain. 25 tablet 3    sodium chloride (SALINE NASAL) 0.65 % nasal spray 2 sprays by Nasal route 2 (two) times a day. Use prior to medication sprays 1 Bottle 12    traMADoL (ULTRAM) 50 mg tablet Take 1 tablet (50 mg total) by mouth every 8 (eight) hours as needed for Pain. 10 tablet 0    aspirin (ECOTRIN) 81 MG EC tablet Take 1 tablet (81 mg total) by mouth once daily. (Patient taking differently: Take 81 mg by mouth once daily.)  0    cetirizine (ZYRTEC) 10 MG tablet Take 1 tablet (10 mg total) by mouth once daily. 30 tablet 3     No current facility-administered medications on file prior to visit.       Review of Systems  Review of Systems   Constitutional:  Negative for fever and weight loss.   HENT:  Negative for sore throat.    Respiratory:  Negative for hemoptysis and stridor.    Musculoskeletal:  Negative for neck pain.        Social History   reports that he quit smoking about 6 years ago. His smoking use included cigarettes. He started smoking about 36 years ago. He has a 7.5 pack-year smoking history. He has never used smokeless tobacco. He reports that he does not drink alcohol and does not use drugs.     Family History  Family  "History   Problem Relation Name Age of Onset    Blindness Paternal Aunt          Physical Exam   Vitals:    05/17/24 1041   BP: 126/72    Body mass index is 20.98 kg/m².  Weight: 59 kg (130 lb)   Height: 5' 6" (167.6 cm)     GENERAL: no acute distress.  HEAD: normocephalic.   EYES: No scleral icterus  EARS: external ear without lesion, normal pinna shape and position.    NOSE: external nose without significant bony abnormality  ORAL CAVITY/OROPHARYNX: tongue mobile. Dentures removed for exam- no lesions visualized . No palpable mass.   NECK: trachea midline.   LYMPH NODES:No cervical lymphadenopathy.  RESPIRATORY: no stridor, no stertor. Respirations nonlabored.  NEURO: alert, responds to questions appropriately.    PSYCH:mood appropriate    PROCEDURE NOTE  NAME OF PROCEDURE: Flexible Laryngoscopy, diagnostic  INDICATIONS: gag reflex precludes mirror exam, surveillance history of larynx cancer  FINDINGS: wacthed video sluggish cord but that way in 2022 as well post radiation changes     Consent: After procedure was explained in detail and all questions answered, verbal consent was obtained for performing flexible laryngoscopy.  Anesthesia: topical 4% lidocaine and neosynephrine  Procedure: With patient in seated position, the scope was inserted into the bilateral nasal passageway and advanced atraumatically into the nasopharynx to examine the following structures:  Nasal cavity: Turbinates with mild hypertrophy. no middle meatal edema. No purulent drainage.   Nasopharynx: no ulceration noted in nasopharynx.   Oropharynx: base of tongue without  mass or ulceration. Lingual tonsils normal in appearance  Hypopharynx: posterior pharyngeal wall without mass or lesion. No pooling of secretions. Pyriform sinuses visible without mass or lesion  Larynx: epiglottis normal without lesion. False vocal folds without edema/erythema/lesion. True vocal folds mobile - slight sluggish appearance of left vocal fold ; radiation changes " to mucosa. No malignancy Mild interarytenoid edema no erythema . Postcricoid region with mild edema no lesion   Subglottis: visualized portion of subglottis normal in appearance    After examination performed, the scope was removed atraumatically . The patient tolerated the procedure well. Photodocumentation obtained with representative images below, all images and/or videos uploaded in media section of epic.                            AUDIOLOGY RESULTS  Audiometric evaluation including audiogram, tympanometry, acoustic reflexes, and speech discrimination which was performed 2-8-24 was personally reviewed and interpreted.  Notable findings on the audiogram were left ear with normal hearing sloping to mild sensorineural hearing loss (SNHL) at 4khz and then to moderate high frequnecy snhl. Right ear with severe mixed hearing loss.   Tympanometry revealed Type C tympanogram on the left and Type B tympanogram with large canal volume ( better however compared to exam in 2022)on the right . Speech discrimination was 96%  on the left, and 80% on the right.     Report of the audiologist performing this audiometric testing was also reviewed   2022 audiogram- copied below       Audiogram from 2020    Imaging:  The patient does have any new imaging of the head and neck since last visit. Ct neck/chest from 6-2021 images and report personally reviewed    No overt cholesteatoma or middle ear pathology on right side        Labs:  CBC  Recent Labs   Lab 01/30/23  0409 01/31/23  0343 02/14/23  1217   WBC 5.77 6.08 5.02   Hemoglobin 11.0 L 11.1 L 12.6 L   Hematocrit 31.7 L 32.8 L 39.0 L   MCV 96 98 100 H   Platelets 136 L 143 L 264     BMP  Recent Labs   Lab 01/28/23  2315 02/14/23  1217 02/06/24  1002   Glucose 135 H 104 107   Sodium 137 138 141   Potassium 4.2 4.7 4.4   Chloride 103 105 110   CO2 23 27 22 L   BUN 18 18 15   Creatinine 1.3 1.1 1.3   Calcium 9.7 9.5 9.2   Magnesium 1.8  --   --      COAGS        Assessment  1.  History of cancer of larynx  - Ambulatory referral/consult to ENT    2. Sensorineural hearing loss (SNHL) of right ear, unspecified hearing status on contralateral side  - Ambulatory referral/consult to ENT    3. Dysphonia [R49.0]    4. Mixed conductive and sensorineural hearing loss of right ear with restricted hearing of left ear       Plan:  Discussed plan of care with patient in detail and all questions answered. Patient reported understanding of plan of care. I gave the patient the opportunity to ask questions and patient confirmed all questions answered to satisfaction.     Counseled on over 50% risk of recurrent or second malignancy with continued tobacco use    Discussed importance of surveillance schedule and needs to see me as recommended and if needs to change appointment or forgets a date that is ok and needs to make sure that he is seeing me at least 2-3 times per year    Right hearing has worsened over the past 4 years. No middle ear effusion nor overt evidence of cholesteatoma. Will recheck at follow up as well - can get dedicated ct temporal bone +/- refer to dr woo if interested - may be candidate for [place] hearing aids    F/u October with flex, sooner if issue      Please be aware that this note has been generated with the assistance of MModal voice-to-text.  Please excuse any spelling or grammatical errors.

## 2024-07-31 ENCOUNTER — TELEPHONE (OUTPATIENT)
Dept: HEMATOLOGY/ONCOLOGY | Facility: CLINIC | Age: 74
End: 2024-07-31
Payer: MEDICARE

## 2024-07-31 DIAGNOSIS — R53.1 WEAKNESS: ICD-10-CM

## 2024-07-31 DIAGNOSIS — Z85.21 HISTORY OF CANCER OF LARYNX: Primary | ICD-10-CM

## 2024-07-31 NOTE — TELEPHONE ENCOUNTER
----- Message from Phoebe Polanco RN sent at 7/31/2024  9:59 AM CDT -----  Regarding: RE: Scheduling Request  Contact: wife @ [phone]  Perfect  ----- Message -----  From: Marge Kern  Sent: 7/31/2024   9:40 AM CDT  To: Phoebe Polanco RN  Subject: RE: Scheduling Request                           He saw the ENT in May and has a follow up with ENT in October.    I'll reach out to patient to schedule labs and office visit with Lauren.    Thanks.  ----- Message -----  From: Phoebe Polanco RN  Sent: 7/31/2024   9:16 AM CDT  To: Marge Erlin  Subject: RE: Scheduling Request                           Yes, he needs a visit with his ent and cbc/cmp/tsh/free t4 with dr esteves    No scans for dr esteves  ----- Message -----  From: Pilar Esteves MD  Sent: 7/30/2024   5:36 PM CDT  To: Phoebe Polanco RN; Marge Erlin  Subject: RE: Scheduling Request                           See michelle  ----- Message -----  From: Marge Kern  Sent: 7/30/2024   4:10 PM CDT  To: Pilar Esteves MD  Subject: FW: Scheduling Request                           I see patient was last seen on 04/25/2022 and to return for a 6 month follow up but never scheduled then I see patient reached out in March and to be scheduled but was not. He did see an ENT provider in May. Will patient need scans with labs prior to being scheduled with you again? Please advise    -Marge  ----- Message -----  From: Kady Walls  Sent: 7/30/2024   2:08 PM CDT  To: [facility]  Pool  Subject: Scheduling Request                               Scheduling Request     Appt Type:  EP     Date/Time Preference: first available     Treating Provider:Dr Esteves     Caller Name:Slim Huizar     Contact Preference:[phone]     Comments/notes:calling to get appt

## 2024-08-12 ENCOUNTER — LAB VISIT (OUTPATIENT)
Dept: LAB | Facility: HOSPITAL | Age: 74
End: 2024-08-12
Payer: MEDICARE

## 2024-08-12 ENCOUNTER — OFFICE VISIT (OUTPATIENT)
Dept: HEMATOLOGY/ONCOLOGY | Facility: CLINIC | Age: 74
End: 2024-08-12
Payer: MEDICARE

## 2024-08-12 VITALS
TEMPERATURE: 98 F | DIASTOLIC BLOOD PRESSURE: 87 MMHG | RESPIRATION RATE: 16 BRPM | HEART RATE: 73 BPM | SYSTOLIC BLOOD PRESSURE: 131 MMHG | WEIGHT: 120.56 LBS | HEIGHT: 66 IN | BODY MASS INDEX: 19.38 KG/M2 | OXYGEN SATURATION: 100 %

## 2024-08-12 DIAGNOSIS — E03.9 HYPOTHYROIDISM, UNSPECIFIED TYPE: ICD-10-CM

## 2024-08-12 DIAGNOSIS — Z85.118 HISTORY OF LUNG CANCER: ICD-10-CM

## 2024-08-12 DIAGNOSIS — Z85.21 HISTORY OF CANCER OF LARYNX: Primary | ICD-10-CM

## 2024-08-12 DIAGNOSIS — Z85.21 HISTORY OF CANCER OF LARYNX: ICD-10-CM

## 2024-08-12 DIAGNOSIS — R53.1 WEAKNESS: ICD-10-CM

## 2024-08-12 PROBLEM — C76.0 MALIGNANT NEOPLASM OF HEAD, FACE AND NECK: Status: RESOLVED | Noted: 2023-03-05 | Resolved: 2024-08-12

## 2024-08-12 LAB
ALBUMIN SERPL BCP-MCNC: 3.2 G/DL (ref 3.5–5.2)
ALP SERPL-CCNC: 85 U/L (ref 55–135)
ALT SERPL W/O P-5'-P-CCNC: 9 U/L (ref 10–44)
ANION GAP SERPL CALC-SCNC: 8 MMOL/L (ref 8–16)
AST SERPL-CCNC: 16 U/L (ref 10–40)
BILIRUB SERPL-MCNC: 0.2 MG/DL (ref 0.1–1)
BUN SERPL-MCNC: 16 MG/DL (ref 8–23)
CALCIUM SERPL-MCNC: 8.8 MG/DL (ref 8.7–10.5)
CHLORIDE SERPL-SCNC: 113 MMOL/L (ref 95–110)
CO2 SERPL-SCNC: 21 MMOL/L (ref 23–29)
CREAT SERPL-MCNC: 1.2 MG/DL (ref 0.5–1.4)
ERYTHROCYTE [DISTWIDTH] IN BLOOD BY AUTOMATED COUNT: 13.5 % (ref 11.5–14.5)
EST. GFR  (NO RACE VARIABLE): >60 ML/MIN/1.73 M^2
GLUCOSE SERPL-MCNC: 97 MG/DL (ref 70–110)
HCT VFR BLD AUTO: 33.3 % (ref 40–54)
HGB BLD-MCNC: 11.3 G/DL (ref 14–18)
IMM GRANULOCYTES # BLD AUTO: 0.01 K/UL (ref 0–0.04)
MCH RBC QN AUTO: 34.5 PG (ref 27–31)
MCHC RBC AUTO-ENTMCNC: 33.9 G/DL (ref 32–36)
MCV RBC AUTO: 102 FL (ref 82–98)
NEUTROPHILS # BLD AUTO: 2.2 K/UL (ref 1.8–7.7)
PLATELET # BLD AUTO: 192 K/UL (ref 150–450)
PMV BLD AUTO: 10.3 FL (ref 9.2–12.9)
POTASSIUM SERPL-SCNC: 3.9 MMOL/L (ref 3.5–5.1)
PROT SERPL-MCNC: 6.4 G/DL (ref 6–8.4)
RBC # BLD AUTO: 3.28 M/UL (ref 4.6–6.2)
SODIUM SERPL-SCNC: 142 MMOL/L (ref 136–145)
T4 FREE SERPL-MCNC: 0.74 NG/DL (ref 0.71–1.51)
TSH SERPL DL<=0.005 MIU/L-ACNC: 5.46 UIU/ML (ref 0.4–4)
WBC # BLD AUTO: 3.78 K/UL (ref 3.9–12.7)

## 2024-08-12 PROCEDURE — 99999 PR PBB SHADOW E&M-EST. PATIENT-LVL IV: CPT | Mod: PBBFAC,HCNC,, | Performed by: INTERNAL MEDICINE

## 2024-08-12 PROCEDURE — 80053 COMPREHEN METABOLIC PANEL: CPT | Mod: HCNC | Performed by: INTERNAL MEDICINE

## 2024-08-12 PROCEDURE — 36415 COLL VENOUS BLD VENIPUNCTURE: CPT | Mod: HCNC | Performed by: INTERNAL MEDICINE

## 2024-08-12 PROCEDURE — 1159F MED LIST DOCD IN RCRD: CPT | Mod: CPTII,S$GLB,, | Performed by: INTERNAL MEDICINE

## 2024-08-12 PROCEDURE — 1126F AMNT PAIN NOTED NONE PRSNT: CPT | Mod: CPTII,S$GLB,, | Performed by: INTERNAL MEDICINE

## 2024-08-12 PROCEDURE — 84443 ASSAY THYROID STIM HORMONE: CPT | Mod: HCNC | Performed by: INTERNAL MEDICINE

## 2024-08-12 PROCEDURE — 1101F PT FALLS ASSESS-DOCD LE1/YR: CPT | Mod: CPTII,S$GLB,, | Performed by: INTERNAL MEDICINE

## 2024-08-12 PROCEDURE — 3075F SYST BP GE 130 - 139MM HG: CPT | Mod: CPTII,S$GLB,, | Performed by: INTERNAL MEDICINE

## 2024-08-12 PROCEDURE — 85027 COMPLETE CBC AUTOMATED: CPT | Mod: HCNC | Performed by: INTERNAL MEDICINE

## 2024-08-12 PROCEDURE — 3288F FALL RISK ASSESSMENT DOCD: CPT | Mod: CPTII,S$GLB,, | Performed by: INTERNAL MEDICINE

## 2024-08-12 PROCEDURE — 99214 OFFICE O/P EST MOD 30 MIN: CPT | Mod: S$GLB,,, | Performed by: INTERNAL MEDICINE

## 2024-08-12 PROCEDURE — G2211 COMPLEX E/M VISIT ADD ON: HCPCS | Mod: S$GLB,,, | Performed by: INTERNAL MEDICINE

## 2024-08-12 PROCEDURE — 84439 ASSAY OF FREE THYROXINE: CPT | Mod: HCNC | Performed by: INTERNAL MEDICINE

## 2024-08-12 PROCEDURE — 3079F DIAST BP 80-89 MM HG: CPT | Mod: CPTII,S$GLB,, | Performed by: INTERNAL MEDICINE

## 2024-08-12 PROCEDURE — 3008F BODY MASS INDEX DOCD: CPT | Mod: CPTII,S$GLB,, | Performed by: INTERNAL MEDICINE

## 2024-08-12 RX ORDER — LEVOTHYROXINE SODIUM 25 UG/1
25 TABLET ORAL
Qty: 30 TABLET | Refills: 11 | Status: SHIPPED | OUTPATIENT
Start: 2024-08-12 | End: 2025-08-12

## 2024-08-12 NOTE — PROGRESS NOTES
"Subjective     Patient ID: Sharath Huizar is a 73 y.o. male.    Chief Complaint: History of cancer of larynx  Sharath Huizar is a 71 y.o. male with history of  Larynx cancer. Patient was seen in ER 2-27-20 with hemoptysis and hoarse voice for a couple of months along with left temporal headaches.   He underwent chest xray on 2/28/2020 which revealed "Small focal opacity within the right lower lung zone with possible cavitation.  This may reflect focal infectious or inflammatory process"  He was referred to pulmonary and underwent CT chest, abdomen/pelvis on 3/18/2020 which revealed "a 2.2 cm noncalcified nodule seen at the right lung base.  May represent consolidation however malignancy can not be excluded.  PET-CT is recommended for further evaluation"  PET scan on 3/27/2020 revealed "Hypermetabolic left supraglottic soft tissue mass concerning for primary laryngeal cancer.  Two hypermetabolic bilateral cervical lymph nodes as well as a hypermetabolic right lower lobe partially cavitary pulmonary mass concerning for metastatic disease.  Recommend ENT consultation and dedicated contrast enhance neck CT"  He was referred to ENT and underwent CT neck on 4/16/2020 which revealed Stable left supraglottic laryngeal mass and bilateral cervical metastatic adenopathy.     He underwent direct laryngoscopy on 4/20/2020 and that revealed "bulky and friable supraglottic mass on left side of glottic surface of epiglottis extending over slightly on to the right. Extends down along left aryepiglottic fold and true vocal fold on left. The right vocal fold did not appear to be involved. No lesion of pyriform sinus. Palpation of vallecula and base of tongue was soft. No additional lesions were noted in the oral cavity or oropharynx"  Pathology revealed squamous cell cancer  His case was discussed in head and Neck tumor board and he underwent left lung biopsy on 4/27/2020 and pathology reveals squamous/adenosquamous cell cancer     He " "has completed RT to his lung as well as 7 weeks of chemo/RT with weekly Cisplatin, as of 8/27/2020     His restaging CT neck from 9/27/2021 revealed "Continued decrease in size of and conspicuity of left supraglottic laryngeal mass"     CT chest in April 2022 revealed "Continued decrease in size of and conspicuity of left supraglottic laryngeal mass"  LAst seen in April 2022 in Oncology     HPIHe underwent CT neck,. Chest in 2/2023: Stable nonspecific soft tissue thickening of the supraglottic larynx and area epiglottic folds which may reflect treated disease. Emphysema and patchy bibasilar lung opacities and scarring nonspecific and possibly postinflammatory in nature"       He saw Dr. Vallejo in May 2024 and has no evidence of recurrence    He feels well but does note worsening hearing in its right ear    Review of Systems   Constitutional:  Negative for appetite change, fatigue and unexpected weight change.   HENT:  Negative for mouth sores.    Eyes:  Negative for visual disturbance.   Respiratory:  Negative for cough and shortness of breath.    Cardiovascular:  Negative for chest pain.   Gastrointestinal:  Negative for abdominal pain and diarrhea.   Genitourinary:  Negative for frequency.   Musculoskeletal:  Negative for back pain.   Integumentary:  Negative for rash.   Neurological:  Negative for headaches.   Hematological:  Negative for adenopathy.   Psychiatric/Behavioral:  The patient is not nervous/anxious.    All other systems reviewed and are negative.         Objective     Physical Exam      LABS:  WBC   Date Value Ref Range Status   08/12/2024 3.78 (L) 3.90 - 12.70 K/uL Final     Hemoglobin   Date Value Ref Range Status   08/12/2024 11.3 (L) 14.0 - 18.0 g/dL Final     Hematocrit   Date Value Ref Range Status   08/12/2024 33.3 (L) 40.0 - 54.0 % Final     Platelets   Date Value Ref Range Status   08/12/2024 192 150 - 450 K/uL Final     Gran # (ANC)   Date Value Ref Range Status   08/12/2024 2.2 1.8 - " 7.7 K/uL Final     Comment:     The ANC is based on a white cell differential from an   automated cell counter. It has not been microscopically   reviewed for the presence of abnormal cells. Clinical   correlation is required.         Chemistry        Component Value Date/Time     08/12/2024 0855    K 3.9 08/12/2024 0855     (H) 08/12/2024 0855    CO2 21 (L) 08/12/2024 0855    BUN 16 08/12/2024 0855    CREATININE 1.2 08/12/2024 0855    GLU 97 08/12/2024 0855        Component Value Date/Time    CALCIUM 8.8 08/12/2024 0855    ALKPHOS 85 08/12/2024 0855    AST 16 08/12/2024 0855    ALT 9 (L) 08/12/2024 0855    BILITOT 0.2 08/12/2024 0855    ESTGFRAFRICA >60 03/29/2022 0752    EGFRNONAA 55 (A) 03/29/2022 0752             Assessment and Plan     1. 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    Orders:  -     CBC w/ DIFF; Future; Expected date: 08/12/2024  -     CMP; Future; Expected date: 08/12/2024    2. History of lung cancer  -     CT CHEST WITHOUT CONTRAST; Future; Expected date: 08/12/2024  -     CT CHEST WITHOUT CONTRAST; Future; Expected date: 08/12/2024    3. Hypothyroidism, unspecified type  -     TSH; Future; Expected date: 08/12/2024  -     FREE T4; Future; Expected date: 08/12/2024  -     levothyroxine (SYNTHROID) 25 MCG tablet; Take 1 tablet (25 mcg total) by mouth before breakfast.  Dispense: 30 tablet; Refill: 11          Route Chart for Scheduling    Med Onc Chart Routing      Follow up with physician . Schedule CT chest without contrast on [place] next available. Then in 1 year schedule CBC, CMP, TSH and free T4, CT chest and see me   Follow up with DONNA    Infusion scheduling note    Injection scheduling note    Labs    Imaging    Pharmacy appointment    Other referrals                    Mr. Huizar is doing well clinically with no evidence of recurrence of his larynx cancer.  He did have a early stage lung cancer in his due for a CT chest without  contrast since the last 1 was February 2023.  We will go ahead and obtain CT chest and call him with results he then return in 1 year with repeat labs and CT chest.    Hypothyroidism related to his treatment with radiation:  Started Synthroid today we will continue to monitor    Visit today included increased complexity associated with the care of the episodic problem recurrence addressed and managing the longitudinal care of the patient due to the serious and/or complex managed problem(s) history of lung cancer and history of larynx cancer      Above plan reviewed with the patient and all of his questions were answered to his satisfaction

## 2024-08-13 ENCOUNTER — TELEPHONE (OUTPATIENT)
Dept: HEMATOLOGY/ONCOLOGY | Facility: CLINIC | Age: 74
End: 2024-08-13
Payer: MEDICARE

## 2024-08-14 ENCOUNTER — HOSPITAL ENCOUNTER (OUTPATIENT)
Dept: RADIOLOGY | Facility: HOSPITAL | Age: 74
Discharge: HOME | End: 2024-08-14
Attending: INTERNAL MEDICINE
Payer: MEDICARE

## 2024-08-14 DIAGNOSIS — Z85.118 HISTORY OF LUNG CANCER: ICD-10-CM

## 2024-08-14 PROCEDURE — 71250 CT THORAX DX C-: CPT | Mod: 26,,, | Performed by: RADIOLOGY

## 2024-08-14 PROCEDURE — 71250 CT THORAX DX C-: CPT | Mod: TC

## 2024-10-02 ENCOUNTER — OFFICE VISIT (OUTPATIENT)
Dept: OTOLARYNGOLOGY | Facility: CLINIC | Age: 74
End: 2024-10-02
Payer: MEDICARE

## 2024-10-02 ENCOUNTER — LAB VISIT (OUTPATIENT)
Dept: LAB | Facility: HOSPITAL | Age: 74
End: 2024-10-02
Attending: OTOLARYNGOLOGY
Payer: MEDICARE

## 2024-10-02 VITALS
BODY MASS INDEX: 18.23 KG/M2 | WEIGHT: 113.44 LBS | SYSTOLIC BLOOD PRESSURE: 128 MMHG | DIASTOLIC BLOOD PRESSURE: 76 MMHG | HEIGHT: 66 IN

## 2024-10-02 DIAGNOSIS — Z85.21 HISTORY OF CANCER OF LARYNX: ICD-10-CM

## 2024-10-02 DIAGNOSIS — E03.9 HYPOTHYROIDISM, UNSPECIFIED TYPE: Primary | ICD-10-CM

## 2024-10-02 DIAGNOSIS — J38.4 LARYNGEAL EDEMA: ICD-10-CM

## 2024-10-02 DIAGNOSIS — H90.A31 MIXED CONDUCTIVE AND SENSORINEURAL HEARING LOSS OF RIGHT EAR WITH RESTRICTED HEARING OF LEFT EAR: ICD-10-CM

## 2024-10-02 DIAGNOSIS — E03.9 HYPOTHYROIDISM, UNSPECIFIED TYPE: ICD-10-CM

## 2024-10-02 DIAGNOSIS — T66.XXXS ADVERSE EFFECT OF RADIATION, SEQUELA: ICD-10-CM

## 2024-10-02 LAB
ALBUMIN SERPL BCP-MCNC: 3.3 G/DL (ref 3.5–5.2)
ALP SERPL-CCNC: 83 U/L (ref 55–135)
ALT SERPL W/O P-5'-P-CCNC: 10 U/L (ref 10–44)
ANION GAP SERPL CALC-SCNC: 9 MMOL/L (ref 8–16)
AST SERPL-CCNC: 16 U/L (ref 10–40)
BASOPHILS # BLD AUTO: 0.03 K/UL (ref 0–0.2)
BASOPHILS NFR BLD: 0.6 % (ref 0–1.9)
BILIRUB SERPL-MCNC: 0.3 MG/DL (ref 0.1–1)
BUN SERPL-MCNC: 16 MG/DL (ref 8–23)
CALCIUM SERPL-MCNC: 9.7 MG/DL (ref 8.7–10.5)
CHLORIDE SERPL-SCNC: 109 MMOL/L (ref 95–110)
CO2 SERPL-SCNC: 24 MMOL/L (ref 23–29)
CREAT SERPL-MCNC: 1 MG/DL (ref 0.5–1.4)
DIFFERENTIAL METHOD BLD: ABNORMAL
EOSINOPHIL # BLD AUTO: 0 K/UL (ref 0–0.5)
EOSINOPHIL NFR BLD: 0.4 % (ref 0–8)
ERYTHROCYTE [DISTWIDTH] IN BLOOD BY AUTOMATED COUNT: 14.4 % (ref 11.5–14.5)
EST. GFR  (NO RACE VARIABLE): >60 ML/MIN/1.73 M^2
GLUCOSE SERPL-MCNC: 105 MG/DL (ref 70–110)
HCT VFR BLD AUTO: 36.6 % (ref 40–54)
HGB BLD-MCNC: 12.1 G/DL (ref 14–18)
IMM GRANULOCYTES # BLD AUTO: 0.02 K/UL (ref 0–0.04)
IMM GRANULOCYTES NFR BLD AUTO: 0.4 % (ref 0–0.5)
LYMPHOCYTES # BLD AUTO: 1.5 K/UL (ref 1–4.8)
LYMPHOCYTES NFR BLD: 32.8 % (ref 18–48)
MCH RBC QN AUTO: 34.5 PG (ref 27–31)
MCHC RBC AUTO-ENTMCNC: 33.1 G/DL (ref 32–36)
MCV RBC AUTO: 104 FL (ref 82–98)
MONOCYTES # BLD AUTO: 0.1 K/UL (ref 0.3–1)
MONOCYTES NFR BLD: 2.2 % (ref 4–15)
NEUTROPHILS # BLD AUTO: 3 K/UL (ref 1.8–7.7)
NEUTROPHILS NFR BLD: 63.6 % (ref 38–73)
NRBC BLD-RTO: 0 /100 WBC
PLATELET # BLD AUTO: 337 K/UL (ref 150–450)
PMV BLD AUTO: 9.4 FL (ref 9.2–12.9)
POTASSIUM SERPL-SCNC: 4.7 MMOL/L (ref 3.5–5.1)
PROT SERPL-MCNC: 7.4 G/DL (ref 6–8.4)
RBC # BLD AUTO: 3.51 M/UL (ref 4.6–6.2)
SODIUM SERPL-SCNC: 142 MMOL/L (ref 136–145)
T4 FREE SERPL-MCNC: 0.92 NG/DL (ref 0.71–1.51)
TSH SERPL DL<=0.005 MIU/L-ACNC: 2.56 UIU/ML (ref 0.4–4)
TSH SERPL DL<=0.005 MIU/L-ACNC: 2.56 UIU/ML (ref 0.4–4)
WBC # BLD AUTO: 4.64 K/UL (ref 3.9–12.7)

## 2024-10-02 PROCEDURE — 3074F SYST BP LT 130 MM HG: CPT | Mod: CPTII,S$GLB,, | Performed by: OTOLARYNGOLOGY

## 2024-10-02 PROCEDURE — 3008F BODY MASS INDEX DOCD: CPT | Mod: CPTII,S$GLB,, | Performed by: OTOLARYNGOLOGY

## 2024-10-02 PROCEDURE — 80053 COMPREHEN METABOLIC PANEL: CPT | Mod: HCNC | Performed by: INTERNAL MEDICINE

## 2024-10-02 PROCEDURE — 3288F FALL RISK ASSESSMENT DOCD: CPT | Mod: CPTII,S$GLB,, | Performed by: OTOLARYNGOLOGY

## 2024-10-02 PROCEDURE — 1126F AMNT PAIN NOTED NONE PRSNT: CPT | Mod: CPTII,S$GLB,, | Performed by: OTOLARYNGOLOGY

## 2024-10-02 PROCEDURE — 84443 ASSAY THYROID STIM HORMONE: CPT | Mod: HCNC | Performed by: INTERNAL MEDICINE

## 2024-10-02 PROCEDURE — 3078F DIAST BP <80 MM HG: CPT | Mod: CPTII,S$GLB,, | Performed by: OTOLARYNGOLOGY

## 2024-10-02 PROCEDURE — 1101F PT FALLS ASSESS-DOCD LE1/YR: CPT | Mod: CPTII,S$GLB,, | Performed by: OTOLARYNGOLOGY

## 2024-10-02 PROCEDURE — 84439 ASSAY OF FREE THYROXINE: CPT | Mod: HCNC | Performed by: INTERNAL MEDICINE

## 2024-10-02 PROCEDURE — 99213 OFFICE O/P EST LOW 20 MIN: CPT | Mod: 25,S$GLB,, | Performed by: OTOLARYNGOLOGY

## 2024-10-02 PROCEDURE — 36415 COLL VENOUS BLD VENIPUNCTURE: CPT | Mod: HCNC | Performed by: INTERNAL MEDICINE

## 2024-10-02 PROCEDURE — 31575 DIAGNOSTIC LARYNGOSCOPY: CPT | Mod: S$GLB,,, | Performed by: OTOLARYNGOLOGY

## 2024-10-02 PROCEDURE — 85025 COMPLETE CBC W/AUTO DIFF WBC: CPT | Mod: HCNC | Performed by: INTERNAL MEDICINE

## 2024-10-02 NOTE — PROGRESS NOTES
OTOLARYNGOLOGY CLINIC NOTE  Date:  10/02/2024     Chief complaint:  Chief Complaint   Patient presents with    History of cancer of larynx       History of Present Illness  Sharath Huizar is a 73 y.o. male  presenting today for a followup.    No throat pain no hemoptysis no swallowing issues  No voice changes    Had some drainage from ear but not liquid or pus, sounds like wax from his description   Had a cold /uri last week     Smoking 2-3 cigs per day     Started on 25 mcg synthroid by dr esteves for tsh elevation recently      2-2023 imaging no fluid in mastoid or middle ear cavity; has had type b tymp with large ecv since 2022; noraml ecv on 4-2024 at [facility]    I last saw the patient on 5-17 - 24. Below text is copied from  note on that date describing history of present illness at that time :  Had hearing test at [facility] and here      No throat pain   Hoarse every now and then - unchanged   No hemoptysis  No ear pain on right just can't hear on that side   Smokes about half pack per day      Regarding his oncologic history:  He initially was referred to me for hoarseness and hemoptysis. He underwent DL with biopsy of left supraglottic mass ( glottic surface of epiglottis and left false fold with some extension to right epiglottis) on 4-20-20 that showed scca. He had 2 right sided hypermetabolic nodes ( 10-15 mm ) and 11 mm hypermetabolic node on left side of neck ( in addition to hypermetabolic supraglottic primary). He was also noted on workup of this to have a solitary lung nodule in the right lower lobe on chest ct. IR biopsy performed 4-27-20 which was positive for malignancy ( squamous cell ca vs adenosquamous). This was considered to be synchronous primary . His case was presented at the head and neck tumor board and decision was made for CRT ( pt did not want laryngectomy) for his [id number] laryngeal cancer . His case was also presented at lung tumor board and recommended for upfront SBRT for his  cT1N0 lung cancer. He also had a positive AFB however not felt to be TB by pulm.   He was treated by Dr. Aguilar ( heme onc) and Dr. 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 PEG tube insertion was done 5-21-20. He also had dental clearance prior to treatment.  He completed SBRT in early July. Started on concurrent CRT ( cisplatin x6 doses)  7-6-20, completed RT 8-21-20.     He  had his G-tube removed 12-30-20. He had restaging PET-CT mid December which did not show any obvious recurrence in the head neck area     Out of abundance of precaution, I took him back to the operating room for direct laryngoscopy and biopsy on 02/18/2021.  This was done for persistent/ slightly worsened post radiation edema and to rule out any submucosal disease.  Biopsies were negative for malignancy.     Regarding hearing history:  Hearing test in 2020 showed tm perf and right CHL; left ear was normal. No otorrhea.  Worked in construction   Did not see perf on exam previously but has large volume type B tymp     Past Medical History  Past Medical History:   Diagnosis Date    Cancer     THROAT    Coronary artery disease     Emphysema of lung     History of chemotherapy     THROAT CANCER    Hypertension     Myocardial infarction     NSTEMI (non-ST elevated myocardial infarction) 03/23/2018    Nuclear sclerosis of both eyes 06/13/2019        Past Surgical History  Past Surgical History:   Procedure Laterality Date    CARDIAC CATHETERIZATION      with 4 stents    DIRECT LARYNGOSCOPY N/A 4/20/2020    Procedure: LARYNGOSCOPY, DIRECT;  Surgeon: Genoveva Vallejo MD;  Location: [facility] OR;  Service: ENT;  Laterality: N/A;    DIRECT LARYNGOSCOPY N/A 2/18/2021    Procedure: LARYNGOSCOPY, DIRECT with possible biopsy;  Surgeon: Genoveva Vallejo MD;  Location: [facility] OR;  Service: ENT;  Laterality: N/A;  PRE-OP BY RN 2---COVID NEGATIVE ON 2/17    LEFT HEART CATHETERIZATION Left  5/29/2018    Procedure: Left heart cath;  Surgeon: Viral Lombardi MD;  Location: [facility] CATH LAB;  Service: Cardiology;  Laterality: Left;  RN PREOP 5/28/18    LUNG BIOPSY N/A 4/27/2020    Procedure: BIOPSY, LUNG;  Surgeon: Jones Diagnostic Provider;  Location: [facility] OR;  Service: Radiology;  Laterality: N/A;  9am start--RN PHONE PREOP 4/24----COVID NEGATIVE--pt        Medications  Current Outpatient Medications on File Prior to Visit   Medication Sig Dispense Refill    acetaminophen (TYLENOL) 325 MG tablet Take 1 tablet (325 mg total) by mouth every 6 (six) hours as needed for Pain. 30 tablet 0    aspirin (ECOTRIN) 81 MG EC tablet Take 1 tablet (81 mg total) by mouth once daily. (Patient taking differently: Take 81 mg by mouth once daily.)  0    atorvastatin (LIPITOR) 80 MG tablet Take 1 tablet (80 mg total) by mouth every other day. 90 tablet 3    azelastine (ASTELIN) 137 mcg (0.1 %) nasal spray 1 spray (137 mcg total) by Nasal route 2 (two) times daily. (Patient not taking: Reported on 8/12/2024) 30 mL 3    cetirizine (ZYRTEC) 10 MG tablet Take 1 tablet (10 mg total) by mouth once daily. 30 tablet 3    clopidogreL (PLAVIX) 75 mg tablet Take 1 tablet (75 mg total) by mouth once daily. 90 tablet 3    gabapentin (NEURONTIN) 300 MG capsule Take 1 capsule (300 mg total) by mouth 3 (three) times daily. (Patient not taking: Reported on 8/12/2024) 270 capsule 1    levothyroxine (SYNTHROID) 25 MCG tablet Take 1 tablet (25 mcg total) by mouth before breakfast. 30 tablet 11    metoprolol succinate (TOPROL-XL) 50 MG 24 hr tablet Take 1 tablet (50 mg total) by mouth once daily. 90 tablet 3    nitroGLYCERIN (NITROSTAT) 0.4 MG SL tablet Place 1 tablet (0.4 mg total) under the tongue every 5 (five) minutes as needed for Chest pain. 25 tablet 3    sodium chloride (SALINE NASAL) 0.65 % nasal spray 2 sprays by Nasal route 2 (two) times a day. Use prior to medication sprays (Patient not taking: Reported on 8/12/2024) 1 Bottle 12     traMADoL (ULTRAM) 50 mg tablet Take 1 tablet (50 mg total) by mouth every 8 (eight) hours as needed for Pain. (Patient not taking: Reported on 8/12/2024) 10 tablet 0     No current facility-administered medications on file prior to visit.       Review of Systems  Review of Systems   Constitutional:  Negative for fever.   HENT:  Negative for sore throat.    Respiratory:  Negative for hemoptysis.    Cardiovascular:  Negative for chest pain.   Musculoskeletal:  Negative for neck pain.   Neurological:  Negative for headaches.   Endo/Heme/Allergies:  Positive for environmental allergies.        Social History   reports that he quit smoking about 6 years ago. His smoking use included cigarettes. He started smoking about 36 years ago. He has a 7.5 pack-year smoking history. He has never used smokeless tobacco. He reports that he does not drink alcohol and does not use drugs.     Family History  Family History   Problem Relation Name Age of Onset    Blindness Paternal Aunt          Physical Exam   Vitals:    10/02/24 1112   BP: 128/76    Body mass index is 18.31 kg/m².            GENERAL: no acute distress.  HEAD: normocephalic.   EYES: No scleral icterus  EARS: external ear without lesion, normal pinna shape and position.  External auditory canal with normal cerumen, tympanic membrane fully visible, no perforation , no retraction. No middle ear effusion. Ossicles intact.   NOSE: external nose without significant bony abnormality  ORAL CAVITY/OROPHARYNX: tongue mobile.   NECK: trachea midline.   LYMPH NODES:No cervical lymphadenopathy.  RESPIRATORY: no stridor, no stertor. Voice normal. Respirations nonlabored.  NEURO: alert, responds to questions appropriately.  AC over BC ; lateralizes to right  Micro monomeric can't autoinsuffalte - does not know how  don't think full perforation    PSYCH:mood appropriate    PROCEDURE NOTE  NAME OF PROCEDURE: Flexible Laryngoscopy, diagnostic  INDICATIONS: gag reflex precludes mirror  exam, surveillance for history of head and neck cancer   FINDINGS: slightly more edema than previous - possibly related to recent URI     Consent: After procedure was explained in detail and all questions answered, verbal consent was obtained for performing flexible laryngoscopy.  Anesthesia: topical 4% lidocaine and neosynephrine  Procedure: With patient in seated position, the scope was inserted into the bilateral nasal passageway and advanced atraumatically into the nasopharynx to examine the following structures:  Nasal cavity: Turbinates with  hypertrophy but good response to decongestant spray. no middle meatal edema. No purulent drainage.   Nasopharynx: no mass or lesion noted in nasopharynx.   Oropharynx: base of tongue without  mass or ulceration.   Hypopharynx: posterior pharyngeal wall without mass or lesion. mild pooling of secretions. Pyriform sinuses visible without mass or lesion  Larynx: epiglottis normal without lesion. False vocal folds without edema/erythema/lesion. True vocal folds mobile and without lesion. Mild interarytenoid edema no erythema . Postcricoid region with moderate edema no lesion   Subglottis: visualized portion of subglottis normal in appearance    After examination performed, the scope was removed atraumatically . The patient tolerated the procedure well. Photodocumentation obtained with representative images below, all images and/or videos uploaded in media section of epic.                              Imaging:  The patient does not have any new imaging of the head and neck since last visit.     Labs:  CBC  Recent Labs   Lab 01/31/23  0343 02/14/23  1217 08/12/24  0855   WBC 6.08 5.02 3.78 L   Hemoglobin 11.1 L 12.6 L 11.3 L   Hematocrit 32.8 L 39.0 L 33.3 L   MCV 98 100 H 102 H   Platelets 143 L 264 192     BMP  Recent Labs   Lab 01/28/23  2315 02/14/23  1217 02/06/24  1002 08/12/24  0855   Glucose 135 H 104 107 97   Sodium 137 138 141 142   Potassium 4.2 4.7 4.4 3.9    Chloride 103 105 110 113 H   CO2 23 27 22 L 21 L   BUN 18 18 15 16   Creatinine 1.3 1.1 1.3 1.2   Calcium 9.7 9.5 9.2 8.8   Magnesium 1.8  --   --   --      COAGS          Assessment  1. Hypothyroidism, unspecified type  - TSH; Future    2. History of cancer of larynx    3. Mixed conductive and sensorineural hearing loss of right ear with restricted hearing of left ear    4. Laryngeal edema    5. Adverse effect of radiation, sequela       Plan:  Discussed plan of care with patient in detail and all questions answered. Patient reported understanding of plan of care. I gave the patient the opportunity to ask questions and patient confirmed all questions answered to satisfaction.     I think from recent uri that  is the reason for slightly more swollen appearance compared to before but more consistent with what postradiation tissue exacerbated by allergy flare up or URI recently will do close followup to recheck  out of abundance of caution   Repeat tsh now that has been on synthroid for several weeks  Counseled on tobacco cessation and risk of cancer recurrence with continued tobacco use    Please be aware that this note has been generated with the assistance of [employer] voice-to-text.  Please excuse any spelling or grammatical errors.

## 2024-10-10 ENCOUNTER — OFFICE VISIT (OUTPATIENT)
Dept: OTOLARYNGOLOGY | Facility: CLINIC | Age: 74
End: 2024-10-10
Payer: MEDICARE

## 2024-10-10 VITALS
WEIGHT: 113.56 LBS | SYSTOLIC BLOOD PRESSURE: 124 MMHG | BODY MASS INDEX: 18.33 KG/M2 | DIASTOLIC BLOOD PRESSURE: 82 MMHG

## 2024-10-10 DIAGNOSIS — Z85.21 HISTORY OF CANCER OF LARYNX: Primary | ICD-10-CM

## 2024-10-10 DIAGNOSIS — H90.A31 MIXED CONDUCTIVE AND SENSORINEURAL HEARING LOSS OF RIGHT EAR WITH RESTRICTED HEARING OF LEFT EAR: ICD-10-CM

## 2024-10-10 DIAGNOSIS — T66.XXXS ADVERSE EFFECT OF RADIATION, SEQUELA: ICD-10-CM

## 2024-10-10 DIAGNOSIS — J38.4 LARYNGEAL EDEMA: ICD-10-CM

## 2024-12-05 ENCOUNTER — LAB VISIT (OUTPATIENT)
Dept: LAB | Facility: HOSPITAL | Age: 74
End: 2024-12-05
Attending: FAMILY MEDICINE
Payer: MEDICARE

## 2024-12-05 ENCOUNTER — OFFICE VISIT (OUTPATIENT)
Dept: FAMILY MEDICINE | Facility: CLINIC | Age: 74
End: 2024-12-05
Payer: MEDICARE

## 2024-12-05 VITALS
HEIGHT: 66 IN | SYSTOLIC BLOOD PRESSURE: 124 MMHG | RESPIRATION RATE: 18 BRPM | HEART RATE: 78 BPM | OXYGEN SATURATION: 99 % | BODY MASS INDEX: 19.67 KG/M2 | WEIGHT: 122.38 LBS | DIASTOLIC BLOOD PRESSURE: 82 MMHG

## 2024-12-05 DIAGNOSIS — I10 HYPERTENSION, ESSENTIAL: ICD-10-CM

## 2024-12-05 DIAGNOSIS — J43.8 OTHER EMPHYSEMA: Chronic | ICD-10-CM

## 2024-12-05 DIAGNOSIS — I50.22 CHRONIC SYSTOLIC HEART FAILURE: Chronic | ICD-10-CM

## 2024-12-05 DIAGNOSIS — I10 HYPERTENSION, ESSENTIAL: Primary | Chronic | ICD-10-CM

## 2024-12-05 DIAGNOSIS — E03.9 HYPOTHYROIDISM, UNSPECIFIED TYPE: ICD-10-CM

## 2024-12-05 DIAGNOSIS — I25.10 CORONARY ARTERY DISEASE INVOLVING NATIVE CORONARY ARTERY OF NATIVE HEART WITHOUT ANGINA PECTORIS: Chronic | ICD-10-CM

## 2024-12-05 DIAGNOSIS — I70.0 ATHEROSCLEROSIS OF AORTA: Chronic | ICD-10-CM

## 2024-12-05 DIAGNOSIS — B02.29 POSTHERPETIC NEURALGIA: ICD-10-CM

## 2024-12-05 DIAGNOSIS — R35.1 NOCTURIA: ICD-10-CM

## 2024-12-05 LAB
ALBUMIN SERPL BCP-MCNC: 3.6 G/DL (ref 3.5–5.2)
ALP SERPL-CCNC: 78 U/L (ref 40–150)
ALT SERPL W/O P-5'-P-CCNC: 13 U/L (ref 10–44)
ANION GAP SERPL CALC-SCNC: 9 MMOL/L (ref 8–16)
AST SERPL-CCNC: 21 U/L (ref 10–40)
BASOPHILS # BLD AUTO: 0.01 K/UL (ref 0–0.2)
BASOPHILS NFR BLD: 0.3 % (ref 0–1.9)
BILIRUB SERPL-MCNC: 0.4 MG/DL (ref 0.1–1)
BUN SERPL-MCNC: 18 MG/DL (ref 8–23)
CALCIUM SERPL-MCNC: 9.2 MG/DL (ref 8.7–10.5)
CHLORIDE SERPL-SCNC: 110 MMOL/L (ref 95–110)
CHOLEST SERPL-MCNC: 152 MG/DL (ref 120–199)
CHOLEST/HDLC SERPL: 2.5 {RATIO} (ref 2–5)
CO2 SERPL-SCNC: 21 MMOL/L (ref 23–29)
CREAT SERPL-MCNC: 1.1 MG/DL (ref 0.5–1.4)
DIFFERENTIAL METHOD BLD: ABNORMAL
EOSINOPHIL # BLD AUTO: 0 K/UL (ref 0–0.5)
EOSINOPHIL NFR BLD: 0.8 % (ref 0–8)
ERYTHROCYTE [DISTWIDTH] IN BLOOD BY AUTOMATED COUNT: 15.2 % (ref 11.5–14.5)
EST. GFR  (NO RACE VARIABLE): >60 ML/MIN/1.73 M^2
GLUCOSE SERPL-MCNC: 92 MG/DL (ref 70–110)
HCT VFR BLD AUTO: 39.1 % (ref 40–54)
HDLC SERPL-MCNC: 60 MG/DL (ref 40–75)
HDLC SERPL: 39.5 % (ref 20–50)
HGB BLD-MCNC: 12.3 G/DL (ref 14–18)
IMM GRANULOCYTES # BLD AUTO: 0.01 K/UL (ref 0–0.04)
IMM GRANULOCYTES NFR BLD AUTO: 0.3 % (ref 0–0.5)
LDLC SERPL CALC-MCNC: 82.6 MG/DL (ref 63–159)
LYMPHOCYTES # BLD AUTO: 1.7 K/UL (ref 1–4.8)
LYMPHOCYTES NFR BLD: 45.1 % (ref 18–48)
MCH RBC QN AUTO: 33.8 PG (ref 27–31)
MCHC RBC AUTO-ENTMCNC: 31.5 G/DL (ref 32–36)
MCV RBC AUTO: 107 FL (ref 82–98)
MONOCYTES # BLD AUTO: 0.1 K/UL (ref 0.3–1)
MONOCYTES NFR BLD: 2.2 % (ref 4–15)
NEUTROPHILS # BLD AUTO: 1.9 K/UL (ref 1.8–7.7)
NEUTROPHILS NFR BLD: 51.3 % (ref 38–73)
NONHDLC SERPL-MCNC: 92 MG/DL
NRBC BLD-RTO: 0 /100 WBC
PLATELET # BLD AUTO: 139 K/UL (ref 150–450)
PMV BLD AUTO: 11.4 FL (ref 9.2–12.9)
POTASSIUM SERPL-SCNC: 4.7 MMOL/L (ref 3.5–5.1)
PROT SERPL-MCNC: 7 G/DL (ref 6–8.4)
RBC # BLD AUTO: 3.64 M/UL (ref 4.6–6.2)
SODIUM SERPL-SCNC: 140 MMOL/L (ref 136–145)
TRIGL SERPL-MCNC: 47 MG/DL (ref 30–150)
TSH SERPL DL<=0.005 MIU/L-ACNC: 0.79 UIU/ML (ref 0.4–4)
WBC # BLD AUTO: 3.66 K/UL (ref 3.9–12.7)

## 2024-12-05 PROCEDURE — 3074F SYST BP LT 130 MM HG: CPT | Mod: HCNC,CPTII,S$GLB, | Performed by: FAMILY MEDICINE

## 2024-12-05 PROCEDURE — 85025 COMPLETE CBC W/AUTO DIFF WBC: CPT | Mod: HCNC | Performed by: FAMILY MEDICINE

## 2024-12-05 PROCEDURE — 36415 COLL VENOUS BLD VENIPUNCTURE: CPT | Mod: HCNC,PN | Performed by: FAMILY MEDICINE

## 2024-12-05 PROCEDURE — 3079F DIAST BP 80-89 MM HG: CPT | Mod: HCNC,CPTII,S$GLB, | Performed by: FAMILY MEDICINE

## 2024-12-05 PROCEDURE — 80053 COMPREHEN METABOLIC PANEL: CPT | Mod: HCNC | Performed by: FAMILY MEDICINE

## 2024-12-05 PROCEDURE — G2211 COMPLEX E/M VISIT ADD ON: HCPCS | Mod: HCNC,S$GLB,, | Performed by: FAMILY MEDICINE

## 2024-12-05 PROCEDURE — 84443 ASSAY THYROID STIM HORMONE: CPT | Mod: HCNC | Performed by: FAMILY MEDICINE

## 2024-12-05 PROCEDURE — 99999 PR PBB SHADOW E&M-EST. PATIENT-LVL III: CPT | Mod: PBBFAC,HCNC,, | Performed by: FAMILY MEDICINE

## 2024-12-05 PROCEDURE — 1101F PT FALLS ASSESS-DOCD LE1/YR: CPT | Mod: HCNC,CPTII,S$GLB, | Performed by: FAMILY MEDICINE

## 2024-12-05 PROCEDURE — 1160F RVW MEDS BY RX/DR IN RCRD: CPT | Mod: HCNC,CPTII,S$GLB, | Performed by: FAMILY MEDICINE

## 2024-12-05 PROCEDURE — 3008F BODY MASS INDEX DOCD: CPT | Mod: HCNC,CPTII,S$GLB, | Performed by: FAMILY MEDICINE

## 2024-12-05 PROCEDURE — 99214 OFFICE O/P EST MOD 30 MIN: CPT | Mod: HCNC,S$GLB,, | Performed by: FAMILY MEDICINE

## 2024-12-05 PROCEDURE — 1126F AMNT PAIN NOTED NONE PRSNT: CPT | Mod: HCNC,CPTII,S$GLB, | Performed by: FAMILY MEDICINE

## 2024-12-05 PROCEDURE — 1159F MED LIST DOCD IN RCRD: CPT | Mod: HCNC,CPTII,S$GLB, | Performed by: FAMILY MEDICINE

## 2024-12-05 PROCEDURE — 3288F FALL RISK ASSESSMENT DOCD: CPT | Mod: HCNC,CPTII,S$GLB, | Performed by: FAMILY MEDICINE

## 2024-12-05 PROCEDURE — 80061 LIPID PANEL: CPT | Mod: HCNC | Performed by: FAMILY MEDICINE

## 2024-12-05 RX ORDER — GABAPENTIN 300 MG/1
300 CAPSULE ORAL NIGHTLY
Qty: 90 CAPSULE | Refills: 3 | Status: SHIPPED | OUTPATIENT
Start: 2024-12-05

## 2024-12-05 RX ORDER — METOPROLOL SUCCINATE 50 MG/1
50 TABLET, EXTENDED RELEASE ORAL DAILY
Qty: 90 TABLET | Refills: 3 | Status: SHIPPED | OUTPATIENT
Start: 2024-12-05 | End: 2025-12-05

## 2024-12-05 RX ORDER — ATORVASTATIN CALCIUM 80 MG/1
80 TABLET, FILM COATED ORAL EVERY OTHER DAY
Qty: 45 TABLET | Refills: 3 | Status: SHIPPED | OUTPATIENT
Start: 2024-12-05

## 2024-12-07 NOTE — PROGRESS NOTES
"  Patient Name: Sharath Huizar    : 1950  MRN: [id number]      Subjective:     Patient ID: Sharath is a 73 y.o. male    Chief Complaint:  Follow-up    History of Present Illness    Sharath presents today for follow-up on multiple issues.    He reports ongoing shingles-related nerve pain persisting for over a year. The pain has improved but is still present, described as an intermittent sensation of "something crawled in you." Gabapentin was previously prescribed for nerve pain management, but he has not been taking it consistently.    He reports frequent urination, particularly at night, waking up every two hours to urinate. He denies difficulty pushing or dribbling when urinating.    He reports snoring, which his wife has observed over their 50 years of marriage. He has not undergone a previous sleep study.    He reports running out of thyroid medication. He is currently taking blood pressure medication, cholesterol medication, and Plavix. His pharmacy is still [facility] on [address] in [place].      ROS:  General: -fever, -chills, -fatigue, -weight gain, -weight loss  Eyes: -vision changes, -redness, -discharge  ENT: -ear pain, -nasal congestion, -sore throat, +snoring  Cardiovascular: -chest pain, -palpitations, -lower extremity edema  Respiratory: -cough, -shortness of breath  Gastrointestinal: -abdominal pain, -nausea, -vomiting, -diarrhea, -constipation, -blood in stool  Genitourinary: -dysuria, -hematuria, +frequency  Musculoskeletal: -joint pain, -muscle pain  Skin: -rash, -lesion  Neurological: -headache, -dizziness, -numbness, -tingling  Psychiatric: -anxiety, -depression, -sleep difficulty           Objective:   /82 (BP Location: Right arm, Patient Position: Sitting)   Pulse 78   Resp 18   Ht 5' 6" (1.676 m)   Wt 55.5 kg (122 lb 5.7 oz)   SpO2 99%   BMI 19.75 kg/m²     Physical Exam  Constitutional:       General: He is not in acute distress.     Appearance: He is well-developed. He is not " ill-appearing.   HENT:      Head: Normocephalic and atraumatic.      Right Ear: Tympanic membrane normal.      Left Ear: Tympanic membrane normal.   Eyes:      General: No scleral icterus.     Conjunctiva/sclera: Conjunctivae normal.   Cardiovascular:      Rate and Rhythm: Normal rate and regular rhythm.   Pulmonary:      Effort: Pulmonary effort is normal.      Breath sounds: Normal breath sounds. No wheezing or rales.   Abdominal:      General: There is no distension.      Palpations: Abdomen is soft.      Tenderness: There is no abdominal tenderness. There is no right CVA tenderness, left CVA tenderness, guarding or rebound.   Musculoskeletal:         General: No tenderness. Normal range of motion.      Cervical back: Normal range of motion.   Lymphadenopathy:      Cervical: No cervical adenopathy.   Skin:     General: Skin is warm and dry.   Neurological:      Mental Status: He is alert and oriented to person, place, and time.      Cranial Nerves: No cranial nerve deficit.   Psychiatric:         Mood and Affect: Mood normal.         Behavior: Behavior normal.         Thought Content: Thought content normal.            Assessment        ICD-10-CM ICD-9-CM   1. Hypertension, essential  I10 401.9   2. Chronic systolic heart failure  I50.22 428.22   3. Coronary artery disease involving native coronary artery of native heart without angina pectoris  I25.10 414.01   4. Atherosclerosis of aorta  I70.0 440.0   5. Nocturia  R35.1 788.43   6. Postherpetic neuralgia  B02.29 053.19   7. Other emphysema  J43.8 492.8   8. Hypothyroidism, unspecified type  E03.9 244.9         Plan:   Assessment & Plan    IMPRESSION:  Evaluated patient's ongoing shingles-related nerve pain, considering resumption of gabapentin for symptom management  Assessed complaint of frequent urination, especially at night  Considered potential causes for nocturia, including urinary tract infection, sleep apnea, and prostate issues  Will order urine  studies to rule out infection before considering urology referral  Reviewed current medications, noting need for thyroid medication refill    POSTHERPETIC NEURALGIA:  Explained that gabapentin is a nerve medicine that may help with lingering shingles pain but could cause drowsiness.  Started gabapentin: Take once nightly at bedtime for nerve pain.    NOCTURIA AND SLEEP APNEA:  Discussed potential link between frequent nighttime urination and sleep apnea.    HYPERTENSION AND HYPERLIPIDEMIA:  Continued blood pressure medication, cholesterol medication, and Plavix.    HYPOTHYROIDISM:  Refilled thyroid medication.    GENERAL HEALTH MONITORING:  Ordered urine studies to rule out urinary tract infection, TSH test, CBC, and CMP.    FOLLOW-UP:  Follow up when nurse calls with test results to schedule next appointment.  Contact the office for work excuse note for 2-3 days.           1. Hypertension, essential  -     Comprehensive Metabolic Panel; Future; Expected date: 12/05/2024  -     Lipid Panel; Future; Expected date: 12/05/2024  -     metoprolol succinate (TOPROL-XL) 50 MG 24 hr tablet; Take 1 tablet (50 mg total) by mouth once daily.  Dispense: 90 tablet; Refill: 3  -     CBC Auto Differential; Future; Expected date: 12/05/2024  Continue current blood pressure regimen.      2. Chronic systolic heart failure  Overview:  02-  Echocardiogram  Moderately decreased left ventricular systolic function. The estimated ejection fraction is 30%    No acute signs of decompensation.    Patient has not seen cardiology since 2021.    Strongly encouraged patient to make appointment with Cardiology.    3. Coronary artery disease involving native coronary artery of native heart without angina pectoris  -     Comprehensive Metabolic Panel; Future; Expected date: 12/05/2024  -     Lipid Panel; Future; Expected date: 12/05/2024  -     atorvastatin (LIPITOR) 80 MG tablet; Take 1 tablet (80 mg total) by mouth every other day.   Dispense: 45 tablet; Refill: 3  -     CBC Auto Differential; Future; Expected date: 12/05/2024  Continue medical management.    4. Atherosclerosis of aorta  Overview:  Ultrasound abdominal aorta 4/2018    Orders:  -     Comprehensive Metabolic Panel; Future; Expected date: 12/05/2024  -     Lipid Panel; Future; Expected date: 12/05/2024  -     atorvastatin (LIPITOR) 80 MG tablet; Take 1 tablet (80 mg total) by mouth every other day.  Dispense: 45 tablet; Refill: 3  -     CBC Auto Differential; Future; Expected date: 12/05/2024  Continue atorvastatin.    5. Nocturia  -     Urine culture; Future; Expected date: 12/05/2024  -     Urinalysis; Future; Expected date: 12/05/2024  Check urine studies.    Discussed with patient that his nocturia maybe a symptom of sleep apnea as he has other symptoms of sleep apnea.    Encouraged patient to make appointment with Sleep Medicine however he declined.    6. Postherpetic neuralgia  -     gabapentin (NEURONTIN) 300 MG capsule; Take 1 capsule (300 mg total) by mouth every evening.  Dispense: 90 capsule; Refill: 3  Recommended patient to restart gabapentin.    7. Other emphysema  Overview:  03-  CT Chest  Apical pleural scarring and tiny emphysema blebs, central lobular and paraseptal emphysema   Currently asymptomatic.    Continue monitoring.    Strongly advised against smoking.    8. Hypothyroidism, unspecified type  -     TSH; Future; Expected date: 12/05/2024             -Wilmer Bangura Jr., MD, AAHIVS      This note was generated with the assistance of ambient listening technology. Verbal consent was obtained by the patient and accompanying visitor(s) for the recording of patient appointment to facilitate this note. I attest to having reviewed and edited the generated note for accuracy, though some syntax or spelling errors may persist. Please contact the author of this note for any clarification.      There are no Patient Instructions on file for this visit.      No  follow-ups on file.   Future Appointments   Date Time Provider Department Center   12/11/2024 10:15 AM Genoveva Vallejo MD [facility]

## 2024-12-11 ENCOUNTER — OFFICE VISIT (OUTPATIENT)
Dept: OTOLARYNGOLOGY | Facility: CLINIC | Age: 74
End: 2024-12-11
Payer: MEDICARE

## 2024-12-11 VITALS
SYSTOLIC BLOOD PRESSURE: 130 MMHG | DIASTOLIC BLOOD PRESSURE: 68 MMHG | BODY MASS INDEX: 19.54 KG/M2 | WEIGHT: 121.56 LBS | HEIGHT: 66 IN

## 2024-12-11 DIAGNOSIS — H90.A31 MIXED CONDUCTIVE AND SENSORINEURAL HEARING LOSS OF RIGHT EAR WITH RESTRICTED HEARING OF LEFT EAR: ICD-10-CM

## 2024-12-11 DIAGNOSIS — R49.0 DYSPHONIA: ICD-10-CM

## 2024-12-11 DIAGNOSIS — Z85.21 HISTORY OF CANCER OF LARYNX: Primary | ICD-10-CM

## 2024-12-11 DIAGNOSIS — H72.91 PERFORATION OF RIGHT TYMPANIC MEMBRANE: ICD-10-CM

## 2024-12-11 DIAGNOSIS — E03.9 HYPOTHYROIDISM, UNSPECIFIED TYPE: ICD-10-CM

## 2024-12-11 DIAGNOSIS — T66.XXXS ADVERSE EFFECT OF RADIATION, SEQUELA: ICD-10-CM

## 2024-12-11 PROCEDURE — 99214 OFFICE O/P EST MOD 30 MIN: CPT | Mod: 25,S$GLB,, | Performed by: OTOLARYNGOLOGY

## 2024-12-11 PROCEDURE — 1159F MED LIST DOCD IN RCRD: CPT | Mod: CPTII,S$GLB,, | Performed by: OTOLARYNGOLOGY

## 2024-12-11 PROCEDURE — 1126F AMNT PAIN NOTED NONE PRSNT: CPT | Mod: CPTII,S$GLB,, | Performed by: OTOLARYNGOLOGY

## 2024-12-11 PROCEDURE — 3075F SYST BP GE 130 - 139MM HG: CPT | Mod: CPTII,S$GLB,, | Performed by: OTOLARYNGOLOGY

## 2024-12-11 PROCEDURE — 3078F DIAST BP <80 MM HG: CPT | Mod: CPTII,S$GLB,, | Performed by: OTOLARYNGOLOGY

## 2024-12-11 PROCEDURE — 1101F PT FALLS ASSESS-DOCD LE1/YR: CPT | Mod: CPTII,S$GLB,, | Performed by: OTOLARYNGOLOGY

## 2024-12-11 PROCEDURE — 3008F BODY MASS INDEX DOCD: CPT | Mod: CPTII,S$GLB,, | Performed by: OTOLARYNGOLOGY

## 2024-12-11 PROCEDURE — 3288F FALL RISK ASSESSMENT DOCD: CPT | Mod: CPTII,S$GLB,, | Performed by: OTOLARYNGOLOGY

## 2024-12-11 PROCEDURE — 31575 DIAGNOSTIC LARYNGOSCOPY: CPT | Mod: S$GLB,,, | Performed by: OTOLARYNGOLOGY

## 2024-12-11 NOTE — PROGRESS NOTES
OTOLARYNGOLOGY CLINIC NOTE  Date:  12/11/2024     Chief complaint:  Chief Complaint   Patient presents with    Follow-up     8-9 week f./u- CA surv. Hx of larynx CA       History of Present Illness  Sharath Huizar is a 73 y.o. male  presenting today for a followup.    I last saw the patient on 10-10 - 24. Below text is copied from  note on that date describing history of present illness at that time :  No longer with uri symptoms     I last saw the patient on 10-2 - 24. Below text is copied from  note on that date describing history of present illness at that time :  No throat pain no hemoptysis no swallowing issues  No voice changes     Had some drainage from ear but not liquid or pus, sounds like wax from his description   Had a cold /uri last week      Smoking 2-3 cigs per day      Started on 25 mcg synthroid by dr esteves for tsh elevation recently        2-2023 imaging no fluid in mastoid or middle ear cavity; has had type b tymp with large ecv since 2022; noraml ecv on 4-2024 at [facility]     I last saw the patient on 5-17 - 24. Below text is copied from  note on that date describing history of present illness at that time :  Had hearing test at [facility] and here      No throat pain   Hoarse every now and then - unchanged   No hemoptysis  No ear pain on right just can't hear on that side   Smokes about half pack per day      Regarding his oncologic history:  He initially was referred to me for hoarseness and hemoptysis. He underwent DL with biopsy of left supraglottic mass ( glottic surface of epiglottis and left false fold with some extension to right epiglottis) on 4-20-20 that showed scca. He had 2 right sided hypermetabolic nodes ( 10-15 mm ) and 11 mm hypermetabolic node on left side of neck ( in addition to hypermetabolic supraglottic primary). He was also noted on workup of this to have a solitary lung nodule in the right lower lobe on chest ct. IR biopsy performed 4-27-20 which was positive for  malignancy ( squamous cell ca vs adenosquamous). This was considered to be synchronous primary . His case was presented at the head and neck tumor board and decision was made for CRT ( pt did not want laryngectomy) for his [id number] laryngeal cancer . His case was also presented at lung tumor board and recommended for upfront SBRT for his cT1N0 lung cancer. He also had a positive AFB however not felt to be TB by pulm.   He was treated by Dr. Aguilar ( heme onc) and Dr. 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 PEG tube insertion was done 5-21-20. He also had dental clearance prior to treatment.  He completed SBRT in early July. Started on concurrent CRT ( cisplatin x6 doses)  7-6-20, completed RT 8-21-20.     He  had his G-tube removed 12-30-20. He had restaging PET-CT mid December which did not show any obvious recurrence in the head neck area     Out of abundance of precaution, I took him back to the operating room for direct laryngoscopy and biopsy on 02/18/2021.  This was done for persistent/ slightly worsened post radiation edema and to rule out any submucosal disease.  Biopsies were negative for malignancy.     Regarding hearing history:  Hearing test in 2020 showed tm perf and right CHL; left ear was normal. No otorrhea.  Worked in construction   Did not see perf on exam previously but has large volume type B tymp     Past Medical History  Past Medical History:   Diagnosis Date    Cancer     THROAT    Coronary artery disease     Emphysema of lung     History of chemotherapy     THROAT CANCER    Hypertension     Myocardial infarction     NSTEMI (non-ST elevated myocardial infarction) 03/23/2018    Nuclear sclerosis of both eyes 06/13/2019        Past Surgical History  Past Surgical History:   Procedure Laterality Date    CARDIAC CATHETERIZATION      with 4 stents    DIRECT LARYNGOSCOPY N/A 4/20/2020    Procedure: LARYNGOSCOPY, DIRECT;  Surgeon: Genoveva  MD Yoni;  Location: [facility] OR;  Service: ENT;  Laterality: N/A;    DIRECT LARYNGOSCOPY N/A 2/18/2021    Procedure: LARYNGOSCOPY, DIRECT with possible biopsy;  Surgeon: Genoveva Vallejo MD;  Location: [facility] OR;  Service: ENT;  Laterality: N/A;  PRE-OP BY RN 2---COVID NEGATIVE ON 2/17    LEFT HEART CATHETERIZATION Left 5/29/2018    Procedure: Left heart cath;  Surgeon: Viral Lombardi MD;  Location: [facility] CATH LAB;  Service: Cardiology;  Laterality: Left;  RN PREOP 5/28/18    LUNG BIOPSY N/A 4/27/2020    Procedure: BIOPSY, LUNG;  Surgeon: Dosc Diagnostic Provider;  Location: [facility] OR;  Service: Radiology;  Laterality: N/A;  9am start--RN PHONE PREOP 4/24----COVID NEGATIVE--pt        Medications  Current Outpatient Medications on File Prior to Visit   Medication Sig Dispense Refill    acetaminophen (TYLENOL) 325 MG tablet Take 1 tablet (325 mg total) by mouth every 6 (six) hours as needed for Pain. 30 tablet 0    aspirin (ECOTRIN) 81 MG EC tablet Take 1 tablet (81 mg total) by mouth once daily. (Patient taking differently: Take 81 mg by mouth once daily.)  0    atorvastatin (LIPITOR) 80 MG tablet Take 1 tablet (80 mg total) by mouth every other day. 45 tablet 3    azelastine (ASTELIN) 137 mcg (0.1 %) nasal spray 1 spray (137 mcg total) by Nasal route 2 (two) times daily. 30 mL 3    cetirizine (ZYRTEC) 10 MG tablet Take 1 tablet (10 mg total) by mouth once daily. 30 tablet 3    clopidogreL (PLAVIX) 75 mg tablet Take 1 tablet (75 mg total) by mouth once daily. 90 tablet 3    gabapentin (NEURONTIN) 300 MG capsule Take 1 capsule (300 mg total) by mouth every evening. 90 capsule 3    levothyroxine (SYNTHROID) 25 MCG tablet Take 1 tablet (25 mcg total) by mouth before breakfast. 30 tablet 11    metoprolol succinate (TOPROL-XL) 50 MG 24 hr tablet Take 1 tablet (50 mg total) by mouth once daily. 90 tablet 3    nitroGLYCERIN (NITROSTAT) 0.4 MG SL tablet Place 1 tablet (0.4 mg total) under the tongue every 5 (five)  minutes as needed for Chest pain. 25 tablet 3    sodium chloride (SALINE NASAL) 0.65 % nasal spray 2 sprays by Nasal route 2 (two) times a day. Use prior to medication sprays 1 Bottle 12    traMADoL (ULTRAM) 50 mg tablet Take 1 tablet (50 mg total) by mouth every 8 (eight) hours as needed for Pain. 10 tablet 0     No current facility-administered medications on file prior to visit.       Review of Systems  Review of Systems   Constitutional: Negative.    HENT: Negative.     Eyes: Negative.    Respiratory: Negative.     Cardiovascular: Negative.    Gastrointestinal: Negative.    Genitourinary: Negative.    Musculoskeletal: Negative.    Skin: Negative.    Neurological: Negative.    Psychiatric/Behavioral: Negative.          Social History   reports that he quit smoking about 6 years ago. His smoking use included cigarettes. He started smoking about 36 years ago. He has a 7.5 pack-year smoking history. He has never used smokeless tobacco. He reports that he does not drink alcohol and does not use drugs.     Family History  Family History   Problem Relation Name Age of Onset    Blindness Paternal Aunt          Physical Exam   Vitals:    12/11/24 1027   BP: 130/68    Body mass index is 19.62 kg/m².            GENERAL: no acute distress.  HEAD: normocephalic.   EYES: No scleral icterus  EARS: external ear without lesion, normal pinna shape and position.  External auditory canal with normal cerumen, tympanic membrane fully visible, no perforation , no retraction. No middle ear effusion. Ossicles intact. TM perf on right- micro   NOSE: external nose without significant bony abnormality  ORAL CAVITY/OROPHARYNX: tongue mobile. Dentures removed, no mass or lesion seen nor palpated  NECK: trachea midline.   LYMPH NODES:No cervical lymphadenopathy.  RESPIRATORY: no stridor, no stertor.  Slight rasp of voice - stable Respirations nonlabored.  NEURO: alert, responds to questions appropriately.    PSYCH:mood  appropriate    PROCEDURE NOTE  NAME OF PROCEDURE: Flexible Laryngoscopy, diagnostic  INDICATIONS: gag reflex precludes mirror exam, surveillance history of larynx cancer   FINDINGS: stable post radiation changes, no mass or lesion nor asymmetry of tissues    Consent: After procedure was explained in detail and all questions answered, verbal consent was obtained for performing flexible laryngoscopy.  Anesthesia: topical 4% lidocaine and neosynephrine  Procedure: With patient in seated position, the scope was inserted into the bilateral nasal passageway and advanced atraumatically into the nasopharynx to examine the following structures:  Nasal cavity: Turbinates with mild hypertrophy. no middle meatal edema. No purulent drainage.   Nasopharynx: no mass or lesion noted in nasopharynx.   Oropharynx: base of tongue without  mass or ulceration. Lingual tonsils normal in appearance  Hypopharynx: posterior pharyngeal wall without mass or lesion. No pooling of secretions. Pyriform sinuses visible without mass or lesion  Larynx: epiglottis normal without lesion. False vocal folds without edema/erythema/lesion. True vocal folds mobile and without lesion. Mild interarytenoid edema no erythema . Postcricoid region with mild edema no lesion   Subglottis: visualized portion of subglottis normal in appearance    After examination performed, the scope was removed atraumatically . The patient tolerated the procedure well. Photodocumentation obtained , all images and/or videos uploaded in media section of epic.      Imaging:  The patient does not have any new imaging of the head and neck since last visit.     Labs:  CBC  Recent Labs   Lab 08/12/24  0855 10/02/24  1225 12/05/24  1447   WBC 3.78 L 4.64 3.66 L   Hemoglobin 11.3 L 12.1 L 12.3 L   Hematocrit 33.3 L 36.6 L 39.1 L    H 104 H 107 H   Platelets 192 337 139 L     BMP  Recent Labs   Lab 01/28/23  2315 02/14/23  1217 08/12/24  0855 10/02/24  1225 12/05/24  1447   Glucose  135 H   < > 97 105 92   Sodium 137   < > 142 142 140   Potassium 4.2   < > 3.9 4.7 4.7   Chloride 103   < > 113 H 109 110   CO2 23   < > 21 L 24 21 L   BUN 18   < > 16 16 18   Creatinine 1.3   < > 1.2 1.0 1.1   Calcium 9.7   < > 8.8 9.7 9.2   Magnesium 1.8  --   --   --   --     < > = values in this interval not displayed.     COAGS        Assessment  1. History of cancer of larynx    2. Adverse effect of radiation, sequela    3. Mixed conductive and sensorineural hearing loss of right ear with restricted hearing of left ear    4. Hypothyroidism, unspecified type    5. Perforation of right tympanic membrane    6. Dysphonia       Plan:  Discussed plan of care with patient in detail and all questions answered. Patient reported understanding of plan of care. I gave the patient the opportunity to ask questions and patient confirmed all questions answered to satisfaction.     Wants to get hearing aid with arron, had called [facility] and was [facility] to [facility] - unclear if his plan has hearing aid benefit but he would rather just go ahead and purchase here; discussed option of seeing dr woo as well and about baha vs regular hearing aid as well as indications for perforation repair etc will get hearing test at follow up as well ; not having otorrhea and no cholesteatoma, he prefers monitoring and standard HA- interested in seeing arron again , will get set up    JERAMIE from head and neck cancer     F/u 11-12 weeks for surveillence exam     I spent a total of 30 minutes on the day of the visit ( this is in addition to the time spent for flexible scope procedure which was additional time on top of the 30 minutes) .  This includes face to face time and non-face to face time preparing to see the patient (eg, review of tests), obtaining and/or reviewing separately obtained history, documenting clinical information in the electronic or other health record, independently interpreting results and communicating results to the  patient/family/caregiver, or care coordinator.   Please be aware that this note has been generated with the assistance of MModal voice-to-text.  Please excuse any spelling or grammatical errors.

## 2024-12-16 ENCOUNTER — CLINICAL SUPPORT (OUTPATIENT)
Dept: AUDIOLOGY | Facility: CLINIC | Age: 74
End: 2024-12-16
Payer: MEDICARE

## 2024-12-16 DIAGNOSIS — H90.A31 MIXED CONDUCTIVE AND SENSORINEURAL HEARING LOSS OF RIGHT EAR WITH RESTRICTED HEARING OF LEFT EAR: Primary | ICD-10-CM

## 2024-12-18 NOTE — PROGRESS NOTES
HEARING AID CONSULTATION (HAC)    Mr. Huizar was seen today for a follow-up hearing aid consultation.     Patient elected to order a monaural (right ear) Phonak Audeo L30-R hearing aid in color P3 coupled to a 2M . *ITEMIZED PACKAGE. Will call patient to schedule fitting when hearing aid is delivered.

## 2024-12-30 ENCOUNTER — TELEPHONE (OUTPATIENT)
Dept: FAMILY MEDICINE | Facility: CLINIC | Age: 74
End: 2024-12-30
Payer: MEDICARE

## 2025-01-13 ENCOUNTER — CLINICAL SUPPORT (OUTPATIENT)
Dept: AUDIOLOGY | Facility: CLINIC | Age: 75
End: 2025-01-13
Payer: MEDICARE

## 2025-01-13 ENCOUNTER — LAB VISIT (OUTPATIENT)
Dept: LAB | Facility: HOSPITAL | Age: 75
End: 2025-01-13
Attending: FAMILY MEDICINE
Payer: MEDICARE

## 2025-01-13 ENCOUNTER — OFFICE VISIT (OUTPATIENT)
Dept: FAMILY MEDICINE | Facility: CLINIC | Age: 75
End: 2025-01-13
Payer: MEDICARE

## 2025-01-13 VITALS
OXYGEN SATURATION: 95 % | HEART RATE: 79 BPM | DIASTOLIC BLOOD PRESSURE: 64 MMHG | WEIGHT: 118.19 LBS | SYSTOLIC BLOOD PRESSURE: 128 MMHG | TEMPERATURE: 99 F | RESPIRATION RATE: 19 BRPM | HEIGHT: 66 IN | BODY MASS INDEX: 18.99 KG/M2

## 2025-01-13 DIAGNOSIS — D69.6 THROMBOCYTOPENIA: Chronic | ICD-10-CM

## 2025-01-13 DIAGNOSIS — D53.9 MACROCYTIC ANEMIA: Primary | ICD-10-CM

## 2025-01-13 DIAGNOSIS — Z12.11 SCREENING FOR COLORECTAL CANCER: ICD-10-CM

## 2025-01-13 DIAGNOSIS — H90.A31 MIXED CONDUCTIVE AND SENSORINEURAL HEARING LOSS OF RIGHT EAR WITH RESTRICTED HEARING OF LEFT EAR: Primary | ICD-10-CM

## 2025-01-13 DIAGNOSIS — Z12.12 SCREENING FOR COLORECTAL CANCER: ICD-10-CM

## 2025-01-13 DIAGNOSIS — I70.0 ATHEROSCLEROSIS OF AORTA: Chronic | ICD-10-CM

## 2025-01-13 DIAGNOSIS — I10 HYPERTENSION, ESSENTIAL: Chronic | ICD-10-CM

## 2025-01-13 DIAGNOSIS — R63.4 UNINTENDED WEIGHT LOSS: ICD-10-CM

## 2025-01-13 DIAGNOSIS — D69.6 THROMBOCYTOPENIA: ICD-10-CM

## 2025-01-13 DIAGNOSIS — I50.22 CHRONIC SYSTOLIC HEART FAILURE: Chronic | ICD-10-CM

## 2025-01-13 DIAGNOSIS — D53.9 MACROCYTIC ANEMIA: ICD-10-CM

## 2025-01-13 DIAGNOSIS — J43.8 OTHER EMPHYSEMA: Chronic | ICD-10-CM

## 2025-01-13 DIAGNOSIS — Z00.00 ENCOUNTER FOR MEDICARE ANNUAL WELLNESS EXAM: ICD-10-CM

## 2025-01-13 LAB
IRON SERPL-MCNC: 111 UG/DL (ref 45–160)
LIPASE SERPL-CCNC: 20 U/L (ref 4–60)
SATURATED IRON: 43 % (ref 20–50)
TOTAL IRON BINDING CAPACITY: 256 UG/DL (ref 250–450)
TRANSFERRIN SERPL-MCNC: 173 MG/DL (ref 200–375)

## 2025-01-13 PROCEDURE — 82746 ASSAY OF FOLIC ACID SERUM: CPT | Mod: HCNC | Performed by: FAMILY MEDICINE

## 2025-01-13 PROCEDURE — 1126F AMNT PAIN NOTED NONE PRSNT: CPT | Mod: HCNC,CPTII,S$GLB, | Performed by: FAMILY MEDICINE

## 2025-01-13 PROCEDURE — 82607 VITAMIN B-12: CPT | Mod: HCNC | Performed by: FAMILY MEDICINE

## 2025-01-13 PROCEDURE — 1160F RVW MEDS BY RX/DR IN RCRD: CPT | Mod: HCNC,CPTII,S$GLB, | Performed by: FAMILY MEDICINE

## 2025-01-13 PROCEDURE — 84630 ASSAY OF ZINC: CPT | Mod: HCNC | Performed by: FAMILY MEDICINE

## 2025-01-13 PROCEDURE — 3078F DIAST BP <80 MM HG: CPT | Mod: HCNC,CPTII,S$GLB, | Performed by: FAMILY MEDICINE

## 2025-01-13 PROCEDURE — G2211 COMPLEX E/M VISIT ADD ON: HCPCS | Mod: HCNC,S$GLB,, | Performed by: FAMILY MEDICINE

## 2025-01-13 PROCEDURE — 83690 ASSAY OF LIPASE: CPT | Mod: HCNC | Performed by: FAMILY MEDICINE

## 2025-01-13 PROCEDURE — 36415 COLL VENOUS BLD VENIPUNCTURE: CPT | Mod: HCNC,PN | Performed by: FAMILY MEDICINE

## 2025-01-13 PROCEDURE — 3288F FALL RISK ASSESSMENT DOCD: CPT | Mod: HCNC,CPTII,S$GLB, | Performed by: FAMILY MEDICINE

## 2025-01-13 PROCEDURE — 84165 PROTEIN E-PHORESIS SERUM: CPT | Mod: HCNC | Performed by: FAMILY MEDICINE

## 2025-01-13 PROCEDURE — 99999 PR PBB SHADOW E&M-EST. PATIENT-LVL V: CPT | Mod: PBBFAC,HCNC,, | Performed by: FAMILY MEDICINE

## 2025-01-13 PROCEDURE — 86334 IMMUNOFIX E-PHORESIS SERUM: CPT | Mod: HCNC | Performed by: FAMILY MEDICINE

## 2025-01-13 PROCEDURE — 83540 ASSAY OF IRON: CPT | Mod: HCNC | Performed by: FAMILY MEDICINE

## 2025-01-13 PROCEDURE — 84165 PROTEIN E-PHORESIS SERUM: CPT | Mod: 26,HCNC,, | Performed by: PATHOLOGY

## 2025-01-13 PROCEDURE — 1101F PT FALLS ASSESS-DOCD LE1/YR: CPT | Mod: HCNC,CPTII,S$GLB, | Performed by: FAMILY MEDICINE

## 2025-01-13 PROCEDURE — 3008F BODY MASS INDEX DOCD: CPT | Mod: HCNC,CPTII,S$GLB, | Performed by: FAMILY MEDICINE

## 2025-01-13 PROCEDURE — 99214 OFFICE O/P EST MOD 30 MIN: CPT | Mod: HCNC,S$GLB,, | Performed by: FAMILY MEDICINE

## 2025-01-13 PROCEDURE — 86038 ANTINUCLEAR ANTIBODIES: CPT | Mod: HCNC | Performed by: FAMILY MEDICINE

## 2025-01-13 PROCEDURE — 1159F MED LIST DOCD IN RCRD: CPT | Mod: HCNC,CPTII,S$GLB, | Performed by: FAMILY MEDICINE

## 2025-01-13 PROCEDURE — 86334 IMMUNOFIX E-PHORESIS SERUM: CPT | Mod: 26,HCNC,, | Performed by: PATHOLOGY

## 2025-01-13 PROCEDURE — 82525 ASSAY OF COPPER: CPT | Mod: HCNC | Performed by: FAMILY MEDICINE

## 2025-01-13 PROCEDURE — 3074F SYST BP LT 130 MM HG: CPT | Mod: HCNC,CPTII,S$GLB, | Performed by: FAMILY MEDICINE

## 2025-01-14 LAB
FOLATE SERPL-MCNC: 12.3 NG/ML (ref 4–24)
VIT B12 SERPL-MCNC: 347 PG/ML (ref 210–950)

## 2025-01-14 NOTE — PROGRESS NOTES
HEARING AID FITTING    Sharath Huizar was seen today for a hearing aid fitting.  All parts of the hearing aid, including battery, domes, wax filters, and microphones, were discussed with the patient.  Battery charging was also reviewed and practiced with the patient.  Feedback measures were taken and hearing aid was fit to patient's satisfaction.  Counseled patient on daily wear and maintenance of the hearing aid.  Mr. Huizar acknowledged that he understood.  The hearing aids were not connected to the patient's phone.  The purchase agreement, 30-day trial period, and warranties were also reviewed with patient.  Patient will follow up in 2 weeks.    ** ITEMIZED PACKAGE: LAST DAY OF CARE 4/14/2025    Hearing Aid Details   & Model: Phonak Audeo L30-R  Color: Sandalwood   Rt SN: [id number]  Lt SN: N/A  Battery: Rechargeable  Rt  and Dome: 2P , small vented dome  Repair Warranty Exp: 1/18/2028  L&D Warranty Exp: 1/18/2028   SN: [id number]

## 2025-01-15 ENCOUNTER — TELEPHONE (OUTPATIENT)
Dept: FAMILY MEDICINE | Facility: CLINIC | Age: 75
End: 2025-01-15
Payer: MEDICARE

## 2025-01-15 LAB
ALBUMIN SERPL ELPH-MCNC: 3.89 G/DL (ref 3.35–5.55)
ALPHA1 GLOB SERPL ELPH-MCNC: 0.4 G/DL (ref 0.17–0.41)
ALPHA2 GLOB SERPL ELPH-MCNC: 0.91 G/DL (ref 0.43–0.99)
ANA SER QL IF: NORMAL
B-GLOBULIN SERPL ELPH-MCNC: 0.73 G/DL (ref 0.5–1.1)
GAMMA GLOB SERPL ELPH-MCNC: 1.47 G/DL (ref 0.67–1.58)
PROT SERPL-MCNC: 7.4 G/DL (ref 6–8.4)

## 2025-01-15 NOTE — TELEPHONE ENCOUNTER
Unable to reach patient with results due to patient not answering and also could not leave voice message due to the patient voice message not being set up.----- Message from Wilmer Bangura MD sent at 1/15/2025 10:59 AM CST -----  Chest xray appears unchanged compared to previous

## 2025-01-16 LAB
PATHOLOGIST INTERPRETATION SPE: NORMAL
ZINC SERPL-MCNC: 81 UG/DL (ref 60–130)

## 2025-01-17 LAB
COPPER SERPL-MCNC: 1285 UG/L (ref 665–1480)
INTERPRETATION SERPL IFE-IMP: NORMAL
PATHOLOGIST INTERPRETATION IFE: NORMAL

## 2025-01-23 DIAGNOSIS — D53.9 MACROCYTIC ANEMIA: Primary | ICD-10-CM

## 2025-01-24 ENCOUNTER — TELEPHONE (OUTPATIENT)
Dept: FAMILY MEDICINE | Facility: CLINIC | Age: 75
End: 2025-01-24
Payer: MEDICARE

## 2025-01-24 NOTE — TELEPHONE ENCOUNTER
----- Message from Wilmer Bangura MD sent at 1/23/2025  2:36 AM CST -----  Please let patient know that it is important he keep the appointment for the ultrasound as lab test was not normal.

## 2025-01-27 ENCOUNTER — CLINICAL SUPPORT (OUTPATIENT)
Dept: AUDIOLOGY | Facility: CLINIC | Age: 75
End: 2025-01-27
Payer: MEDICARE

## 2025-01-27 DIAGNOSIS — H90.A31 MIXED CONDUCTIVE AND SENSORINEURAL HEARING LOSS OF RIGHT EAR WITH RESTRICTED HEARING OF LEFT EAR: Primary | ICD-10-CM

## 2025-01-28 NOTE — PROGRESS NOTES
HEARING AID FOLLOW-UP    Mr. Sharath Huizar, a 74 y.o. male, was seen in the clinic today for his 2 week hearing aid follow-up.     ** ITEMIZED PACKAGE: LAST DAY OF CARE 4/14/2025     Hearing Aid Details   & Model: Phonak Audeo L30-R  Color: Sandalwood       Rt SN: [id number]  Lt SN: N/A  Battery: Rechargeable  Rt  and Dome: 2P , small power dome  Repair Warranty Exp: 1/18/2028  L&D Warranty Exp: 1/18/2028   SN: [id number]    Counseled patient on cleaning hearing aids and replacing wax filters. Increased global gain to 92% and increased mid frequencies 1 step. Changed small vented dome to small power dome to remove feedback. Patient will follow up in 1 month or sooner if needed.    Recommendations:  1.) Continue daily use of binaural amplification.  2.) Hearing aid follow-ups as needed  3.) Annual audiological evaluation or sooner if hearing changes

## 2025-01-30 ENCOUNTER — HOSPITAL ENCOUNTER (OUTPATIENT)
Dept: RADIOLOGY | Facility: HOSPITAL | Age: 75
Discharge: HOME | End: 2025-01-30
Attending: FAMILY MEDICINE
Payer: MEDICARE

## 2025-01-30 DIAGNOSIS — R63.4 UNINTENDED WEIGHT LOSS: ICD-10-CM

## 2025-01-30 PROCEDURE — 76700 US EXAM ABDOM COMPLETE: CPT | Mod: TC,HCNC

## 2025-01-30 PROCEDURE — 76700 US EXAM ABDOM COMPLETE: CPT | Mod: 26,HCNC,, | Performed by: RADIOLOGY

## 2025-02-04 ENCOUNTER — OFFICE VISIT (OUTPATIENT)
Dept: FAMILY MEDICINE | Facility: CLINIC | Age: 75
End: 2025-02-04
Payer: MEDICARE

## 2025-02-04 DIAGNOSIS — D47.2 MONOCLONAL GAMMOPATHY: Primary | ICD-10-CM

## 2025-02-17 ENCOUNTER — CLINICAL SUPPORT (OUTPATIENT)
Dept: AUDIOLOGY | Facility: CLINIC | Age: 75
End: 2025-02-17
Payer: MEDICARE

## 2025-02-17 DIAGNOSIS — H90.A31 MIXED CONDUCTIVE AND SENSORINEURAL HEARING LOSS OF RIGHT EAR WITH RESTRICTED HEARING OF LEFT EAR: Primary | ICD-10-CM

## 2025-02-17 NOTE — PROGRESS NOTES
HEARING AID FOLLOW-UP     Mr. Sharath Huizar, a 74 y.o. male, was seen in the clinic today for his 1 month hearing aid follow-up.      ** ITEMIZED PACKAGE: LAST DAY OF CARE 4/14/2025     Hearing Aid Details   & Model: Phonak Audeo L30-R  Color: Sandalwood       Rt SN: [id number]  Lt SN: N/A  Battery: Rechargeable  Rt  and Dome: 2P , small power dome  Repair Warranty Exp: 1/18/2028  L&D Warranty Exp: 1/18/2028   SN: [id number]        Increased global gain to 95%. Patient will follow up in a month.

## 2025-02-20 ENCOUNTER — OFFICE VISIT (OUTPATIENT)
Dept: OTOLARYNGOLOGY | Facility: CLINIC | Age: 75
End: 2025-02-20
Payer: MEDICARE

## 2025-02-20 ENCOUNTER — CLINICAL SUPPORT (OUTPATIENT)
Dept: AUDIOLOGY | Facility: CLINIC | Age: 75
End: 2025-02-20
Payer: MEDICARE

## 2025-02-20 VITALS
WEIGHT: 123 LBS | HEIGHT: 66 IN | BODY MASS INDEX: 19.77 KG/M2 | SYSTOLIC BLOOD PRESSURE: 143 MMHG | DIASTOLIC BLOOD PRESSURE: 80 MMHG

## 2025-02-20 DIAGNOSIS — H90.A31 MIXED CONDUCTIVE AND SENSORINEURAL HEARING LOSS OF RIGHT EAR WITH RESTRICTED HEARING OF LEFT EAR: ICD-10-CM

## 2025-02-20 DIAGNOSIS — J38.4 LARYNGEAL EDEMA: ICD-10-CM

## 2025-02-20 DIAGNOSIS — T66.XXXS ADVERSE EFFECT OF RADIATION, SEQUELA: ICD-10-CM

## 2025-02-20 DIAGNOSIS — Z85.21 HISTORY OF CANCER OF LARYNX: Primary | ICD-10-CM

## 2025-02-20 DIAGNOSIS — H90.A22 SENSORINEURAL HEARING LOSS (SNHL) OF LEFT EAR WITH RESTRICTED HEARING OF RIGHT EAR: ICD-10-CM

## 2025-02-20 DIAGNOSIS — H90.A31 MIXED CONDUCTIVE AND SENSORINEURAL HEARING LOSS OF RIGHT EAR WITH RESTRICTED HEARING OF LEFT EAR: Primary | ICD-10-CM

## 2025-02-20 DIAGNOSIS — R49.0 DYSPHONIA: ICD-10-CM

## 2025-02-20 PROCEDURE — 3288F FALL RISK ASSESSMENT DOCD: CPT | Mod: CPTII,S$GLB,, | Performed by: OTOLARYNGOLOGY

## 2025-02-20 PROCEDURE — 3008F BODY MASS INDEX DOCD: CPT | Mod: CPTII,S$GLB,, | Performed by: OTOLARYNGOLOGY

## 2025-02-20 PROCEDURE — 1101F PT FALLS ASSESS-DOCD LE1/YR: CPT | Mod: CPTII,S$GLB,, | Performed by: OTOLARYNGOLOGY

## 2025-02-20 PROCEDURE — 31575 DIAGNOSTIC LARYNGOSCOPY: CPT | Mod: S$GLB,,, | Performed by: OTOLARYNGOLOGY

## 2025-02-20 PROCEDURE — 3077F SYST BP >= 140 MM HG: CPT | Mod: CPTII,S$GLB,, | Performed by: OTOLARYNGOLOGY

## 2025-02-20 PROCEDURE — 1126F AMNT PAIN NOTED NONE PRSNT: CPT | Mod: CPTII,S$GLB,, | Performed by: OTOLARYNGOLOGY

## 2025-02-20 PROCEDURE — 3079F DIAST BP 80-89 MM HG: CPT | Mod: CPTII,S$GLB,, | Performed by: OTOLARYNGOLOGY

## 2025-02-20 PROCEDURE — 99213 OFFICE O/P EST LOW 20 MIN: CPT | Mod: 25,S$GLB,, | Performed by: OTOLARYNGOLOGY

## 2025-02-20 RX ORDER — LACTOSE-REDUCED FOOD 0.06G-1/ML
237 LIQUID (ML) ORAL DAILY
Qty: 7110 ML | Refills: 11 | Status: SHIPPED | OUTPATIENT
Start: 2025-02-20

## 2025-02-20 NOTE — PROGRESS NOTES
Please click on link to view Audiogram:  Document on 2/20/2025 1:25 PM by Becky Walters AU.D: Audiogram    Mr. Sharath Huizar, a 74 y.o. male, was seen in the clinic today for an annual audiological evaluation. Patient currently utilizes monaural amplification purchased at [facility].    Otoscopy clear bilaterally. Tympanometry testing revealed a Type B tympanogram with large ear canal volume for the right ear. Tympanometry testing was attempted for the left ear; hermetic seal could not be maintained to obtain results.     Audiological testing revealed a mild to severe mixed hearing loss (MHL) for the right ear and a mild to moderate high frequency sensorineural hearing loss (SNHL) for the left ear. A speech reception threshold was obtained at 35 dBHL for the right ear and at 20 dBHL for the left ear. Speech discrimination was 88% for the right ear and 92% for the left ear.      Recommendations:  1. Otologic evaluation  2. Annual audiological evaluation, sooner if medically necessary or if hearing changes  3. Hearing protection when in noise   4. Continue daily use of amplification

## 2025-02-20 NOTE — PROGRESS NOTES
OTOLARYNGOLOGY CLINIC NOTE  Date:  02/20/2025     Chief complaint:  Chief Complaint   Patient presents with    History of cancer of larynx     10 week follow up ca surv with audio       History of Present Illness  Sharath Huizar is a 74 y.o. male  presenting today for a followup.    No hemoptysis  No throat pain   Voice stable, swallowing stable  No ear pain   Had been getting boost shakes and stopped getting that had been 130 and had been about 119 during tx but now not getting shakes and just can't gain it back since not having the shakes    Has seen pcp and noted to have macrocytic anemia and is getting workup for this- chart reviewed    Has had hearing aid visit since last apptmt with me  He had some worsening edema on flex scope exams in October so had him follow a bit more frequently to ensure no pathology developed and has been stable/improving on scopes since that time (scope done 10-2, 10-10 and 12-11 24     Per chart review- saw dr esteves 8-2024 , wanted repeat ct chest in 1 year and f/u exam at that time   ----------------------------------------------------------  I last saw the patient on 10-10 - 24. Below text is copied from  note on that date describing history of present illness at that time :  No longer with uri symptoms     I last saw the patient on 10-2 - 24. Below text is copied from  note on that date describing history of present illness at that time :  No throat pain no hemoptysis no swallowing issues  No voice changes     Had some drainage from ear but not liquid or pus, sounds like wax from his description   Had a cold /uri last week      Smoking 2-3 cigs per day      Started on 25 mcg synthroid by dr esteves for tsh elevation recently        2-2023 imaging no fluid in mastoid or middle ear cavity; has had type b tymp with large ecv since 2022; noraml ecv on 4-2024 at [facility]     I last saw the patient on 5-17 - 24. Below text is copied from  note on that date describing history of present  illness at that time :  Had hearing test at [facility] and here      No throat pain   Hoarse every now and then - unchanged   No hemoptysis  No ear pain on right just can't hear on that side   Smokes about half pack per day      Regarding his oncologic history:  He initially was referred to me for hoarseness and hemoptysis. He underwent DL with biopsy of left supraglottic mass ( glottic surface of epiglottis and left false fold with some extension to right epiglottis) on 4-20-20 that showed scca. He had 2 right sided hypermetabolic nodes ( 10-15 mm ) and 11 mm hypermetabolic node on left side of neck ( in addition to hypermetabolic supraglottic primary). He was also noted on workup of this to have a solitary lung nodule in the right lower lobe on chest ct. IR biopsy performed 4-27-20 which was positive for malignancy ( squamous cell ca vs adenosquamous). This was considered to be synchronous primary . His case was presented at the head and neck tumor board and decision was made for CRT ( pt did not want laryngectomy) for his [id number] laryngeal cancer . His case was also presented at lung tumor board and recommended for upfront SBRT for his cT1N0 lung cancer. He also had a positive AFB however not felt to be TB by pulm.   He was treated by Dr. Aguilar ( heme onc) and Dr. 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 PEG tube insertion was done 5-21-20. He also had dental clearance prior to treatment.  He completed SBRT in early July. Started on concurrent CRT ( cisplatin x6 doses)  7-6-20, completed RT 8-21-20.     He  had his G-tube removed 12-30-20. He had restaging PET-CT mid December which did not show any obvious recurrence in the head neck area     Out of abundance of precaution, I took him back to the operating room for direct laryngoscopy and biopsy on 02/18/2021.  This was done for persistent/ slightly worsened post radiation edema and to rule out any  submucosal disease.  Biopsies were negative for malignancy.     Regarding hearing history:  Hearing test in 2020 showed tm perf and right CHL; left ear was normal. No otorrhea.  Worked in construction   Did not see perf on exam previously but has large volume type B tymp          Past Medical History  Past Medical History:   Diagnosis Date    Cancer     THROAT    Coronary artery disease     Emphysema of lung     History of chemotherapy     THROAT CANCER    Hypertension     Myocardial infarction     NSTEMI (non-ST elevated myocardial infarction) 03/23/2018    Nuclear sclerosis of both eyes 06/13/2019        Past Surgical History  Past Surgical History:   Procedure Laterality Date    CARDIAC CATHETERIZATION      with 4 stents    DIRECT LARYNGOSCOPY N/A 4/20/2020    Procedure: LARYNGOSCOPY, DIRECT;  Surgeon: Genoveva Vallejo MD;  Location: [facility] OR;  Service: ENT;  Laterality: N/A;    DIRECT LARYNGOSCOPY N/A 2/18/2021    Procedure: LARYNGOSCOPY, DIRECT with possible biopsy;  Surgeon: Genoveva Vallejo MD;  Location: [facility] OR;  Service: ENT;  Laterality: N/A;  PRE-OP BY RN 2---COVID NEGATIVE ON 2/17    LEFT HEART CATHETERIZATION Left 5/29/2018    Procedure: Left heart cath;  Surgeon: Viral Lombardi MD;  Location: [facility] CATH LAB;  Service: Cardiology;  Laterality: Left;  RN PREOP 5/28/18    LUNG BIOPSY N/A 4/27/2020    Procedure: BIOPSY, LUNG;  Surgeon: Jones Diagnostic Provider;  Location: [facility] OR;  Service: Radiology;  Laterality: N/A;  9am start--RN PHONE PREOP 4/24----COVID NEGATIVE--pt        Medications  Medications Ordered Prior to Encounter[1]    Review of Systems  Review of Systems   Constitutional:  Negative for fever.   Respiratory:  Negative for hemoptysis.    Musculoskeletal:  Negative for neck pain.        Social History   reports that he quit smoking about 6 years ago. His smoking use included cigarettes. He started smoking about 36 years ago. He has a 7.5 pack-year smoking history. He has never  used smokeless tobacco. He reports that he does not drink alcohol and does not use drugs.     Family History  Family History   Problem Relation Name Age of Onset    Blindness Paternal Aunt          Physical Exam   Vitals:    02/20/25 1330   BP: (!) 143/80    Body mass index is 19.86 kg/m².            GENERAL: no acute distress.  HEAD: normocephalic.   EYES: No scleral icterus  EARS: external ear without lesion, normal pinna shape and position.    NOSE: external nose without significant bony abnormality  ORAL CAVITY/OROPHARYNX: tongue mobile. Mucoccele right upper lip gingiva has dentures- removed for exam - no mass or lesion   NECK: trachea midline.   LYMPH NODES:No cervical lymphadenopathy.  RESPIRATORY: no stridor, no stertor. Voice with slight rasp unchanged Respirations nonlabored.  NEURO: alert, responds to questions appropriately.    PSYCH:mood appropriate    PROCEDURE NOTE  NAME OF PROCEDURE: Flexible Laryngoscopy, diagnostic  INDICATIONS: gag reflex precludes mirror exam,surveillance history of larynx cancer   FINDINGS:  post treatment/radiation changes/edema stable (Has had consistently slightly sluggish intermittently abductive capability of vocal folds and varying edema that has been stable over serial scope exams)    Consent: After procedure was explained in detail and all questions answered, verbal consent was obtained for performing flexible laryngoscopy.  Anesthesia: topical 4% lidocaine and neosynephrine  Procedure: With patient in seated position, the scope was inserted into the bilateral nasal passageway and advanced atraumatically into the nasopharynx to examine the following structures:  Nasal cavity: Turbinates without significant hypertrophy. no middle meatal edema. No purulent drainage.   Nasopharynx: no mass or lesion noted in nasopharynx.   Oropharynx: base of tongue without  mass or ulceration. Lingual tonsils normal in appearance  Hypopharynx: posterior pharyngeal wall without mass or  lesion. No pooling of secretions. Pyriform sinuses visible without mass or lesion  Larynx: epiglottis normal without lesion. False vocal folds without edema/erythema/lesion. True vocal folds -slighlty sluggish abduction of right vocal fold unchanged, slightly medialzied position but also stable. Left vocal fold realtively mobile -at times very subtle intermittent limited abduction - both unchanged on scope exqams- reviewed all videos and without lesion. Mild interarytenoid edema no erythema . Postcricoid region with mild edema no lesion   Subglottis: visualized portion of subglottis normal in appearance    After examination performed, the scope was removed atraumatically . The patient tolerated the procedure well. Photodocumentation obtained with representative images below, all images and/or videos uploaded in media section of epic.                            Scope 12-11-24        Scope 10-10-24    10-2-24    Imaging:  The patient does not have any new imaging of the head and neck since last visit.     Labs:  CBC  Recent Labs   Lab 08/12/24  0855 10/02/24  1225 12/05/24  1447   WBC 3.78 L 4.64 3.66 L   Hemoglobin 11.3 L 12.1 L 12.3 L   Hematocrit 33.3 L 36.6 L 39.1 L    H 104 H 107 H   Platelets 192 337 139 L     BMP  Recent Labs   Lab 01/28/23  2315 02/14/23  1217 08/12/24  0855 10/02/24  1225 12/05/24  1447   Glucose 135 H   < > 97 105 92   Sodium 137   < > 142 142 140   Potassium 4.2   < > 3.9 4.7 4.7   Chloride 103   < > 113 H 109 110   CO2 23   < > 21 L 24 21 L   BUN 18   < > 16 16 18   Creatinine 1.3   < > 1.2 1.0 1.1   Calcium 9.7   < > 8.8 9.7 9.2   Magnesium 1.8  --   --   --   --     < > = values in this interval not displayed.     COAGS        Assessment  1. History of cancer of larynx    2. Adverse effect of radiation, sequela    3. Mixed conductive and sensorineural hearing loss of right ear with restricted hearing of left ear    4. Laryngeal edema    5. Dysphonia       Plan:  Discussed plan of  care with patient in detail and all questions answered. Patient reported understanding of plan of care. I gave the patient the opportunity to ask questions and patient confirmed all questions answered to satisfaction.     He had some worsening edema on flex scope exams in October so had him follow a bit more frequently to ensure no pathology developed and has been stable/improving on scopes since that time (scope done 10-2, 10-10 and 12-11 24     Per chart review- saw dr esteves 8-2024 , wanted repeat ct chest in 1 year and f/u exam at that time   No recurrent disease nor new disease on exam today   Has lost a few pounds, has not been able to get boost and can't afford. Will see if can send as an rx, mnonitor weight closely  Reminded patient to schedule his visit with dr esteves      F/u 4 months with flex, he understands to notify me if continues to lose weight ( it seems that it has stabilized since initially lost of a few pounds) but if worsens/persists would want to get  pet/ct . Counseled him on this and he states understanding    I spent a total of 20 minutes on the day of the visit ( this is in addition to scope exam which was additional time not included in 20 minutes) .  This includes face to face time and non-face to face time preparing to see the patient (eg, review of tests), obtaining and/or reviewing separately obtained history, documenting clinical information in the electronic or other health record, independently interpreting results and communicating results to the patient/family/caregiver, or care coordinator.   Please be aware that this note has been generated with the assistance of MModal voice-to-text.  Please excuse any spelling or grammatical errors.             [1]   Current Outpatient Medications on File Prior to Visit   Medication Sig Dispense Refill    acetaminophen (TYLENOL) 325 MG tablet Take 1 tablet (325 mg total) by mouth every 6 (six) hours as needed for Pain. 30 tablet 0    aspirin  (ECOTRIN) 81 MG EC tablet Take 1 tablet (81 mg total) by mouth once daily. (Patient taking differently: Take 81 mg by mouth once daily.)  0    atorvastatin (LIPITOR) 80 MG tablet Take 1 tablet (80 mg total) by mouth every other day. 45 tablet 3    azelastine (ASTELIN) 137 mcg (0.1 %) nasal spray 1 spray (137 mcg total) by Nasal route 2 (two) times daily. 30 mL 3    cetirizine (ZYRTEC) 10 MG tablet Take 1 tablet (10 mg total) by mouth once daily. 30 tablet 3    clopidogreL (PLAVIX) 75 mg tablet Take 1 tablet (75 mg total) by mouth once daily. 90 tablet 3    gabapentin (NEURONTIN) 300 MG capsule Take 1 capsule (300 mg total) by mouth every evening. 90 capsule 3    levothyroxine (SYNTHROID) 25 MCG tablet Take 1 tablet (25 mcg total) by mouth before breakfast. 30 tablet 11    metoprolol succinate (TOPROL-XL) 50 MG 24 hr tablet Take 1 tablet (50 mg total) by mouth once daily. 90 tablet 3    nitroGLYCERIN (NITROSTAT) 0.4 MG SL tablet Place 1 tablet (0.4 mg total) under the tongue every 5 (five) minutes as needed for Chest pain. 25 tablet 3    sodium chloride (SALINE NASAL) 0.65 % nasal spray 2 sprays by Nasal route 2 (two) times a day. Use prior to medication sprays 1 Bottle 12    traMADoL (ULTRAM) 50 mg tablet Take 1 tablet (50 mg total) by mouth every 8 (eight) hours as needed for Pain. 10 tablet 0     No current facility-administered medications on file prior to visit.

## 2025-02-24 ENCOUNTER — TELEPHONE (OUTPATIENT)
Dept: HEMATOLOGY/ONCOLOGY | Facility: CLINIC | Age: 75
End: 2025-02-24
Payer: MEDICARE

## 2025-02-24 NOTE — PROGRESS NOTES
Audio Only Telehealth Visit     The patient location is: [facility]  The chief complaint leading to consultation is: Abnormal labs  Visit type: Virtual visit with audio only (telephone)  Total time spent in medical discussion with patient: 12 minutes  Total time spent on date of the encounter:12 minutes       The reason for the audio only service rather than synchronous audio and video virtual visit was related to technical difficulties or patient preference/necessity.       Each patient to whom I provide medical services by telemedicine is:  (1) informed of the relationship between the physician and patient and the respective role of any other health care provider with respect to management of the patient; and (2) notified that they may decline to receive medical services by telemedicine and may withdraw from such care at any time. Patient verbally consented to receive this service via voice-only telephone call.      Patient Name: Sharath Huizar    : 1950  MRN: [id number]        Subjective:     Patient ID: Sharath is a 74 y.o. male    Chief Complaint:  Results    74-year-old male makes an audio only visit to discuss abnormal lab results.  His lab results recently showed a monoclonal gammopathy, specifically IgA in the kappa band.  He denies any fevers, chills, shortness of breath, or swollen lymph nodes.  He denies any hip or back pain.         Review of Systems   Constitutional:  Negative for unexpected weight change.   HENT:  Negative for ear pain and sore throat.    Respiratory:  Negative for shortness of breath.    Cardiovascular:  Negative for chest pain.   Gastrointestinal:  Negative for abdominal pain and blood in stool.   Genitourinary:  Negative for frequency.   Integumentary:  Negative for rash.   Hematological:  Negative for adenopathy.   Psychiatric/Behavioral:  Negative for sleep disturbance and suicidal ideas.         Objective:   There were no vitals taken for this visit.    Physical  Exam  Constitutional:       Comments: Speaking in full sentences.          Lab Visit on 01/13/2025   Component Date Value Ref Range Status    Vitamin B-12 01/13/2025 347  210 - 950 pg/mL Final    Folate 01/13/2025 12.3  4.0 - 24.0 ng/mL Final    Iron 01/13/2025 111  45 - 160 ug/dL Final    Transferrin 01/13/2025 173 (L)  200 - 375 mg/dL Final    TIBC 01/13/2025 256  250 - 450 ug/dL Final    Saturated Iron 01/13/2025 43  20 - 50 % Final    Protein, Serum 01/13/2025 7.4  6.0 - 8.4 g/dL Final    Comment: Serum protein electrophoresis and immunofixation results should be   interpreted in clinical context in that some therapeutic agents can   result   in false positive results (example, daratumumab). Correlation with   the   patient s therapeutic regimen is required.      Albumin 01/13/2025 3.89  3.35 - 5.55 g/dL Final    Alpha-1 01/13/2025 0.40  0.17 - 0.41 g/dL Final    Alpha-2 01/13/2025 0.91  0.43 - 0.99 g/dL Final    Beta 01/13/2025 0.73  0.50 - 1.10 g/dL Final    Gamma 01/13/2025 1.47  0.67 - 1.58 g/dL Final    Lipase 01/13/2025 20  4 - 60 U/L Final    Zinc 01/13/2025 81  60 - 130 ug/dL Final    Comment: Elevated results may be due to sample collected in a   non-certified trace element-free tube.     This test was developed and the performance   characteristics determined by [facility].   It has not been cleared or approved by the FDA.   The laboratory is regulated under CLIA as qualified to   perform high-complexity testing. This test is used for   patient testing purposes. It should not be regarded   as investigational or for research.    Test performed at [facility],  [address], [place]  [address]     [phone]  Leisa Vuong MD, PhD - Medical Director      Copper 01/13/2025 1285  665 - 1480 ug/L Final    Comment: Elevated results may be due to sample collected in a   non-certified trace element-free tube.     This test was developed and the performance    characteristics determined by [facility].   It has not been cleared or approved by the FDA.   The laboratory is regulated under CLIA as qualified to   perform high-complexity testing. This test is used for   patient testing purposes. It should not be regarded   as investigational or for research.    Test performed at [facility],  [address] , [place]  [address]     [phone]  Leisa Vuong MD, PhD - Medical Director      KATRIN Screen 01/13/2025 Negative <1:80  Negative <1:80 Final    KATRIN test was performed by Immunofluorescence on HEP2 cells.       Immunofix Interp. 01/13/2025 SEE COMMENT   Final    Comment: Serum protein electrophoresis and immunofixation results should be   interpreted in clinical context in that some therapeutic agents can   result   in false positive results (example, daratumumab). Correlation with   the   patient s therapeutic regimen is required.  See pathologist's interpretation.      Pathologist Interpretation SPE 01/13/2025 REVIEWED   Final    Comment:   Electronically reviewed and signed by:  Meche Hutton MD  Signed on 01/16/25 at 09:46  Normal total protein.  Paraprotein band in beta -gamma junction = 0.7 g/dL,  correlate with   DUNCAN result.      Pathologist Interpretation DUNCAN 01/13/2025 REVIEWED   Final    Comment:   Electronically reviewed and signed by:  Vale Mcgee M.D.  Signed on 01/17/25 at 16:55  An IgA kappa specific monoclonal protein is present.         Assessment        ICD-10-CM ICD-9-CM   1. Monoclonal gammopathy  D47.2 273.1         Plan:     1. Monoclonal gammopathy  -     Ambulatory referral/consult to Hematology / Oncology; Future; Expected date: 03/02/2025    Reviewed with patient importance of following up with Hematology to review the monoclonal gammopathy.    Discussed association with potential cancers.  He agrees to follow up with Hematology.      -Wilmer Bangura Jr., MD, AAHIVS      This office note has been  dictated.  This dictation has been generated using M-Modal Fluency Direct dictation; some phonetic errors may occur.         There are no Patient Instructions on file for this visit.      No follow-ups on file.   Future Appointments   Date Time Provider Department Center   3/24/2025  2:00 PM Becky Walters AU.D [facility] AUDIO [facility] [place]   8/22/2025  2:15 PM Genoveva Vallejo MD [facility] ENT [facility]             This service was not originating from a related E/M service provided within the previous 7 days nor will  to an E/M service or procedure within the next 24 hours or my soonest available appointment.  Prevailing standard of care was able to be met in this audio-only visit.

## 2025-02-24 NOTE — TELEPHONE ENCOUNTER
This patient has a new referral in for monoclonal gammopathy  He is currently a  patient of Dr Aguilar  please assist with scheduling with her Thank you [place]

## 2025-03-12 ENCOUNTER — OFFICE VISIT (OUTPATIENT)
Dept: HEMATOLOGY/ONCOLOGY | Facility: CLINIC | Age: 75
End: 2025-03-12
Payer: MEDICARE

## 2025-03-12 ENCOUNTER — LAB VISIT (OUTPATIENT)
Dept: LAB | Facility: HOSPITAL | Age: 75
End: 2025-03-12
Attending: STUDENT IN AN ORGANIZED HEALTH CARE EDUCATION/TRAINING PROGRAM
Payer: MEDICARE

## 2025-03-12 VITALS
HEART RATE: 93 BPM | HEIGHT: 66 IN | SYSTOLIC BLOOD PRESSURE: 132 MMHG | DIASTOLIC BLOOD PRESSURE: 77 MMHG | WEIGHT: 123.25 LBS | BODY MASS INDEX: 19.81 KG/M2

## 2025-03-12 DIAGNOSIS — Z85.21 HISTORY OF CANCER OF LARYNX: ICD-10-CM

## 2025-03-12 DIAGNOSIS — D47.2 MONOCLONAL GAMMOPATHY: Primary | ICD-10-CM

## 2025-03-12 DIAGNOSIS — E03.9 HYPOTHYROIDISM, UNSPECIFIED TYPE: ICD-10-CM

## 2025-03-12 DIAGNOSIS — Z85.118 HISTORY OF LUNG CANCER: ICD-10-CM

## 2025-03-12 DIAGNOSIS — D47.2 MONOCLONAL GAMMOPATHY: ICD-10-CM

## 2025-03-12 DIAGNOSIS — Z76.89 ENCOUNTER TO ESTABLISH CARE: ICD-10-CM

## 2025-03-12 LAB
ALBUMIN SERPL BCP-MCNC: 3.7 G/DL (ref 3.5–5.2)
ALP SERPL-CCNC: 77 U/L (ref 40–150)
ALT SERPL W/O P-5'-P-CCNC: 10 U/L (ref 10–44)
ANION GAP SERPL CALC-SCNC: 11 MMOL/L (ref 8–16)
AST SERPL-CCNC: 21 U/L (ref 10–40)
BASOPHILS # BLD AUTO: 0.02 K/UL (ref 0–0.2)
BASOPHILS NFR BLD: 0.7 % (ref 0–1.9)
BILIRUB SERPL-MCNC: 0.5 MG/DL (ref 0.1–1)
BUN SERPL-MCNC: 16 MG/DL (ref 8–23)
CALCIUM SERPL-MCNC: 8.8 MG/DL (ref 8.7–10.5)
CHLORIDE SERPL-SCNC: 106 MMOL/L (ref 95–110)
CO2 SERPL-SCNC: 25 MMOL/L (ref 23–29)
CREAT SERPL-MCNC: 1.2 MG/DL (ref 0.5–1.4)
DIFFERENTIAL METHOD BLD: ABNORMAL
EOSINOPHIL # BLD AUTO: 0 K/UL (ref 0–0.5)
EOSINOPHIL NFR BLD: 0.7 % (ref 0–8)
ERYTHROCYTE [DISTWIDTH] IN BLOOD BY AUTOMATED COUNT: 16.3 % (ref 11.5–14.5)
EST. GFR  (NO RACE VARIABLE): >60 ML/MIN/1.73 M^2
GLUCOSE SERPL-MCNC: 96 MG/DL (ref 70–110)
HCT VFR BLD AUTO: 34.6 % (ref 40–54)
HGB BLD-MCNC: 11.6 G/DL (ref 14–18)
IMM GRANULOCYTES # BLD AUTO: 0 K/UL (ref 0–0.04)
IMM GRANULOCYTES NFR BLD AUTO: 0 % (ref 0–0.5)
LYMPHOCYTES # BLD AUTO: 1.2 K/UL (ref 1–4.8)
LYMPHOCYTES NFR BLD: 38.2 % (ref 18–48)
MCH RBC QN AUTO: 34.6 PG (ref 27–31)
MCHC RBC AUTO-ENTMCNC: 33.5 G/DL (ref 32–36)
MCV RBC AUTO: 103 FL (ref 82–98)
MONOCYTES # BLD AUTO: 0.1 K/UL (ref 0.3–1)
MONOCYTES NFR BLD: 2 % (ref 4–15)
NEUTROPHILS # BLD AUTO: 1.8 K/UL (ref 1.8–7.7)
NEUTROPHILS NFR BLD: 58.4 % (ref 38–73)
NRBC BLD-RTO: 0 /100 WBC
PLATELET # BLD AUTO: 173 K/UL (ref 150–450)
PMV BLD AUTO: 10.2 FL (ref 9.2–12.9)
POTASSIUM SERPL-SCNC: 4.1 MMOL/L (ref 3.5–5.1)
PROT SERPL-MCNC: 7.4 G/DL (ref 6–8.4)
PROT UR-MCNC: 8 MG/DL
RBC # BLD AUTO: 3.35 M/UL (ref 4.6–6.2)
SODIUM SERPL-SCNC: 142 MMOL/L (ref 136–145)
WBC # BLD AUTO: 3.06 K/UL (ref 3.9–12.7)

## 2025-03-12 PROCEDURE — 3008F BODY MASS INDEX DOCD: CPT | Mod: HCNC,CPTII,S$GLB, | Performed by: STUDENT IN AN ORGANIZED HEALTH CARE EDUCATION/TRAINING PROGRAM

## 2025-03-12 PROCEDURE — 3288F FALL RISK ASSESSMENT DOCD: CPT | Mod: HCNC,CPTII,S$GLB, | Performed by: STUDENT IN AN ORGANIZED HEALTH CARE EDUCATION/TRAINING PROGRAM

## 2025-03-12 PROCEDURE — 1101F PT FALLS ASSESS-DOCD LE1/YR: CPT | Mod: HCNC,CPTII,S$GLB, | Performed by: STUDENT IN AN ORGANIZED HEALTH CARE EDUCATION/TRAINING PROGRAM

## 2025-03-12 PROCEDURE — 1159F MED LIST DOCD IN RCRD: CPT | Mod: HCNC,CPTII,S$GLB, | Performed by: STUDENT IN AN ORGANIZED HEALTH CARE EDUCATION/TRAINING PROGRAM

## 2025-03-12 PROCEDURE — 3075F SYST BP GE 130 - 139MM HG: CPT | Mod: HCNC,CPTII,S$GLB, | Performed by: STUDENT IN AN ORGANIZED HEALTH CARE EDUCATION/TRAINING PROGRAM

## 2025-03-12 PROCEDURE — 84165 PROTEIN E-PHORESIS SERUM: CPT | Mod: 26,HCNC,, | Performed by: PATHOLOGY

## 2025-03-12 PROCEDURE — 36415 COLL VENOUS BLD VENIPUNCTURE: CPT | Mod: HCNC | Performed by: STUDENT IN AN ORGANIZED HEALTH CARE EDUCATION/TRAINING PROGRAM

## 2025-03-12 PROCEDURE — 82784 ASSAY IGA/IGD/IGG/IGM EACH: CPT | Mod: 59,HCNC | Performed by: STUDENT IN AN ORGANIZED HEALTH CARE EDUCATION/TRAINING PROGRAM

## 2025-03-12 PROCEDURE — 86334 IMMUNOFIX E-PHORESIS SERUM: CPT | Mod: HCNC | Performed by: STUDENT IN AN ORGANIZED HEALTH CARE EDUCATION/TRAINING PROGRAM

## 2025-03-12 PROCEDURE — 84165 PROTEIN E-PHORESIS SERUM: CPT | Mod: HCNC | Performed by: STUDENT IN AN ORGANIZED HEALTH CARE EDUCATION/TRAINING PROGRAM

## 2025-03-12 PROCEDURE — 85025 COMPLETE CBC W/AUTO DIFF WBC: CPT | Mod: HCNC | Performed by: STUDENT IN AN ORGANIZED HEALTH CARE EDUCATION/TRAINING PROGRAM

## 2025-03-12 PROCEDURE — 83521 IG LIGHT CHAINS FREE EACH: CPT | Mod: HCNC | Performed by: STUDENT IN AN ORGANIZED HEALTH CARE EDUCATION/TRAINING PROGRAM

## 2025-03-12 PROCEDURE — 3078F DIAST BP <80 MM HG: CPT | Mod: HCNC,CPTII,S$GLB, | Performed by: STUDENT IN AN ORGANIZED HEALTH CARE EDUCATION/TRAINING PROGRAM

## 2025-03-12 PROCEDURE — 1126F AMNT PAIN NOTED NONE PRSNT: CPT | Mod: HCNC,CPTII,S$GLB, | Performed by: STUDENT IN AN ORGANIZED HEALTH CARE EDUCATION/TRAINING PROGRAM

## 2025-03-12 PROCEDURE — 99215 OFFICE O/P EST HI 40 MIN: CPT | Mod: HCNC,S$GLB,, | Performed by: STUDENT IN AN ORGANIZED HEALTH CARE EDUCATION/TRAINING PROGRAM

## 2025-03-12 PROCEDURE — 84156 ASSAY OF PROTEIN URINE: CPT | Mod: HCNC | Performed by: STUDENT IN AN ORGANIZED HEALTH CARE EDUCATION/TRAINING PROGRAM

## 2025-03-12 PROCEDURE — 82784 ASSAY IGA/IGD/IGG/IGM EACH: CPT | Mod: HCNC | Performed by: STUDENT IN AN ORGANIZED HEALTH CARE EDUCATION/TRAINING PROGRAM

## 2025-03-12 PROCEDURE — 86334 IMMUNOFIX E-PHORESIS SERUM: CPT | Mod: 26,HCNC,, | Performed by: PATHOLOGY

## 2025-03-12 PROCEDURE — 82232 ASSAY OF BETA-2 PROTEIN: CPT | Mod: HCNC | Performed by: STUDENT IN AN ORGANIZED HEALTH CARE EDUCATION/TRAINING PROGRAM

## 2025-03-12 PROCEDURE — 99999 PR PBB SHADOW E&M-EST. PATIENT-LVL IV: CPT | Mod: PBBFAC,HCNC,, | Performed by: STUDENT IN AN ORGANIZED HEALTH CARE EDUCATION/TRAINING PROGRAM

## 2025-03-12 PROCEDURE — 84166 PROTEIN E-PHORESIS/URINE/CSF: CPT | Mod: HCNC | Performed by: STUDENT IN AN ORGANIZED HEALTH CARE EDUCATION/TRAINING PROGRAM

## 2025-03-12 PROCEDURE — G2211 COMPLEX E/M VISIT ADD ON: HCPCS | Mod: HCNC,S$GLB,, | Performed by: STUDENT IN AN ORGANIZED HEALTH CARE EDUCATION/TRAINING PROGRAM

## 2025-03-12 PROCEDURE — 80053 COMPREHEN METABOLIC PANEL: CPT | Mod: HCNC | Performed by: STUDENT IN AN ORGANIZED HEALTH CARE EDUCATION/TRAINING PROGRAM

## 2025-03-12 NOTE — PROGRESS NOTES
Hematology- Oncology Clinic Note :     3/12/2025    Chief Complaint   Patient presents with    Establish Care    abnormal spep         HPI  Pt is a 74 y.o. male who  has a past medical history of Cancer, Coronary artery disease, Emphysema of lung, History of chemotherapy, Hypertension, Myocardial infarction, NSTEMI (non-ST elevated myocardial infarction) (03/23/2018), and Nuclear sclerosis of both eyes (06/13/2019). Who presents for paraprotein workup after an IgA kappa specific monoclonal protein seen on SPEP/DUNCAN.  Pt agreeable to further workup and denied any issues at time of exam.        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    Chronic pain of both shoulders 07/24/2018    Decreased range of motion of shoulder 07/24/2018    Weakness of shoulder 07/24/2018    Atherosclerosis of aorta 07/03/2018    Coronary artery disease involving native coronary artery of native heart without angina pectoris 03/26/2018    Chronic systolic heart failure 03/25/2018    Hypertension, essential 03/23/2018     Past Medical History:   Diagnosis Date    Cancer     THROAT    Coronary artery disease     Emphysema of lung     History of chemotherapy     THROAT CANCER    Hypertension     Myocardial infarction     NSTEMI (non-ST elevated myocardial infarction) 03/23/2018    Nuclear sclerosis of both eyes 06/13/2019      Past Surgical History:   Procedure Laterality Date    CARDIAC CATHETERIZATION      with 4 stents    DIRECT LARYNGOSCOPY N/A 4/20/2020    Procedure: LARYNGOSCOPY, DIRECT;  Surgeon: Genoveva Vallejo MD;  Location: [facility] OR;  Service: ENT;  Laterality: N/A;    DIRECT LARYNGOSCOPY N/A 2/18/2021    Procedure: LARYNGOSCOPY, DIRECT with possible biopsy;  Surgeon: Genoveva Vallejo MD;  Location: [facility] OR;  Service: ENT;  Laterality: N/A;  PRE-OP BY RN 2---COVID NEGATIVE ON 2/17    LEFT HEART CATHETERIZATION Left 5/29/2018    Procedure: Left heart cath;  Surgeon: Viral Lombardi MD;  Location: [facility] CATH LAB;  Service: Cardiology;  Laterality: Left;  RN PREOP 5/28/18    LUNG BIOPSY N/A 4/27/2020    Procedure: BIOPSY, LUNG;  Surgeon: Jones Diagnostic Provider;  Location: [facility] OR;  Service: Radiology;  Laterality: N/A;  9am start--RN PHONE PREOP 4/24----COVID NEGATIVE--pt      Prescriptions Prior to Admission[1]  Review of patient's allergies indicates:  No Known Allergies   Social History     Tobacco Use    Smoking status: Former     Current packs/day: 0.00      Average packs/day: 0.3 packs/day for 30.0 years (7.5 ttl pk-yrs)     Types: Cigarettes     Start date: 3/23/1988     Quit date: 3/23/2018     Years since quittin.9    Smokeless tobacco: Never   Substance Use Topics    Alcohol use: No      Family History   Problem Relation Name Age of Onset    Blindness Paternal Aunt          Review of Systems :  Review of Systems   Constitutional:  Negative for fever, malaise/fatigue and weight loss.   HENT:  Negative for congestion, hearing loss and nosebleeds.    Eyes:  Negative for blurred vision.   Respiratory:  Negative for cough, sputum production and shortness of breath.    Cardiovascular:  Negative for chest pain, claudication and leg swelling.   Gastrointestinal:  Negative for abdominal pain, blood in stool, constipation, diarrhea, heartburn, melena, nausea and vomiting.   Genitourinary:  Negative for dysuria and hematuria.   Musculoskeletal:  Negative for back pain, joint pain and myalgias.   Skin:  Negative for itching and rash.   Neurological:  Negative for dizziness.   Endo/Heme/Allergies:  Does not bruise/bleed easily.   Psychiatric/Behavioral:  Negative for depression. The patient is not nervous/anxious.        Physical Exam :  Wt Readings from Last 3 Encounters:   25 55.9 kg (123 lb 3.8 oz)   25 55.8 kg (123 lb 0.3 oz)   25 53.6 kg (118 lb 2.7 oz)     Temp Readings from Last 3 Encounters:   25 98.5 °F (36.9 °C) (Oral)   24 98.2 °F (36.8 °C) (Oral)   24 98.4 °F (36.9 °C) (Oral)     BP Readings from Last 3 Encounters:   25 132/77   25 (!) 143/80   25 128/64     Pulse Readings from Last 3 Encounters:   25 93   25 79   24 78     Body mass index is 19.89 kg/m².    Physical Exam  Vitals reviewed.   Constitutional:       Appearance: Normal appearance.   HENT:      Head: Normocephalic and atraumatic.      Nose: Nose normal.      Mouth/Throat:      Mouth: Mucous membranes are moist.   Eyes:       "Pupils: Pupils are equal, round, and reactive to light.   Cardiovascular:      Rate and Rhythm: Normal rate and regular rhythm.   Pulmonary:      Effort: Pulmonary effort is normal.   Abdominal:      General: Abdomen is flat.   Musculoskeletal:         General: Normal range of motion.      Cervical back: Normal range of motion and neck supple.   Skin:     General: Skin is warm and dry.   Neurological:      Mental Status: He is alert. Mental status is at baseline.   Psychiatric:         Mood and Affect: Mood normal.         Behavior: Behavior normal.       Onc hx:    He underwent direct laryngoscopy on 4/20/2020 and that revealed "bulky and friable supraglottic mass on left side of glottic surface of epiglottis extending over slightly on to the right. Extends down along left aryepiglottic fold and true vocal fold on left. The right vocal fold did not appear to be involved. No lesion of pyriform sinus. Palpation of vallecula and base of tongue was soft. No additional lesions were noted in the oral cavity or oropharynx"  Pathology revealed squamous cell cancer  His case was discussed in head and Neck tumor board and he underwent left lung biopsy on 4/27/2020 and pathology reveals squamous/adenosquamous cell cancer     He has completed RT to his lung as well as 7 weeks of chemo/RT with weekly Cisplatin, as of 8/27/2020     His restaging CT neck from 9/27/2021 revealed "Continued decrease in size of and conspicuity of left supraglottic laryngeal mass"     CT chest in April 2022 revealed "Continued decrease in size of and conspicuity of left supraglottic laryngeal mass"  LAst seen in April 2022 in Oncology      HPIHe underwent CT neck,. Chest in 2/2023: Stable nonspecific soft tissue thickening of the supraglottic larynx and area epiglottic folds which may reflect treated disease. Emphysema and patchy bibasilar lung opacities and scarring nonspecific and possibly postinflammatory in nature"        He saw Dr. Vallejo in " May 2024 and has no evidence of recurrence      Pertinent Diagnostic studies:    No results found for this or any previous visit (from the past 24 hours).    Assessment/Plan :     Abnormal SPEP, concern for paraproteinemia   -seen on workup by PCP  -Paraprotein band in beta -gamma junction = 0.7 g/dL   -Pt denies fmhx of myeloma or symptoms at time of exam  -Will complete paraprotein workup today  -RTC in 5 weeks for MD visit and results to determine further workup if needed      Hx of lung and larynx cancer  -previously tx by Dr. Aguilar  doing well clinically with no evidence of recurrence of his larynx cancer  -He did have a early stage lung cancer in his due for a CT chest without contrast since the last 2024 in August of 2025  -Continue ENT follow up       Hypothyroidism   -secondary to his treatment with radiation  -stable, continue synthroid       Time spent on case: 40 minutes      Summary of orders placed this encounter:  Orders Placed This Encounter   Procedures    IgA    IgG    IgM    Immunofixation Electrophoresis    Immunoglobulin Free LT Chains Blood    Beta 2 Microglobulin, Serum    Immunofixation, 24 hour Urine    Protein electrophoresis, timed urine    Protein Electrophoresis, Serum    Protein Electrophoresis, Random Urine    Comprehensive Metabolic Panel    CBC Auto Differential       Future Appointments   Date Time Provider Department Center   3/24/2025  2:00 PM Becky Walters AU.D [facility] AUDIO [facility]   8/22/2025  2:15 PM Genoveva Vallejo MD [facility] ENT [facility]           Liliane Allen MD   Hematology/oncology, [facility] [place]           [1] (Not in a hospital admission)

## 2025-03-13 LAB
B2 MICROGLOB SERPL-MCNC: 2.4 UG/ML (ref 0–2.5)
IGA SERPL-MCNC: 749 MG/DL (ref 40–350)
IGG SERPL-MCNC: 1144 MG/DL (ref 650–1600)
IGM SERPL-MCNC: 19 MG/DL (ref 50–300)

## 2025-03-14 ENCOUNTER — LAB VISIT (OUTPATIENT)
Dept: LAB | Facility: HOSPITAL | Age: 75
End: 2025-03-14
Attending: STUDENT IN AN ORGANIZED HEALTH CARE EDUCATION/TRAINING PROGRAM
Payer: MEDICARE

## 2025-03-14 DIAGNOSIS — D47.2 MONOCLONAL PARAPROTEINEMIA: ICD-10-CM

## 2025-03-14 DIAGNOSIS — D47.2 MONOCLONAL PARAPROTEINEMIA: Primary | ICD-10-CM

## 2025-03-14 DIAGNOSIS — D47.2 MONOCLONAL GAMMOPATHY: ICD-10-CM

## 2025-03-14 LAB
ALBUMIN SERPL ELPH-MCNC: 4.02 G/DL (ref 3.35–5.55)
ALPHA1 GLOB SERPL ELPH-MCNC: 0.23 G/DL (ref 0.17–0.41)
ALPHA2 GLOB SERPL ELPH-MCNC: 0.55 G/DL (ref 0.43–0.99)
B-GLOBULIN SERPL ELPH-MCNC: 1.41 G/DL (ref 0.5–1.1)
GAMMA GLOB SERPL ELPH-MCNC: 0.69 G/DL (ref 0.67–1.58)
INTERPRETATION SERPL IFE-IMP: NORMAL
KAPPA LC SER QL IA: 5.26 MG/DL (ref 0.33–1.94)
KAPPA LC/LAMBDA SER IA: 3.68 (ref 0.26–1.65)
LAMBDA LC SER QL IA: 1.43 MG/DL (ref 0.57–2.63)
PROT 24H UR-MRATE: NORMAL MG/SPEC (ref 0–100)
PROT SERPL-MCNC: 6.9 G/DL (ref 6–8.4)
PROT UR-MCNC: <7 MG/DL (ref 0–15)
URINE COLLECTION DURATION: 24 HR
URINE VOLUME: 1300 ML

## 2025-03-14 PROCEDURE — 84166 PROTEIN E-PHORESIS/URINE/CSF: CPT | Mod: HCNC | Performed by: STUDENT IN AN ORGANIZED HEALTH CARE EDUCATION/TRAINING PROGRAM

## 2025-03-14 PROCEDURE — 84156 ASSAY OF PROTEIN URINE: CPT | Mod: HCNC | Performed by: STUDENT IN AN ORGANIZED HEALTH CARE EDUCATION/TRAINING PROGRAM

## 2025-03-14 PROCEDURE — 86335 IMMUNFIX E-PHORSIS/URINE/CSF: CPT | Mod: 26,HCNC,, | Performed by: PATHOLOGY

## 2025-03-14 PROCEDURE — 84166 PROTEIN E-PHORESIS/URINE/CSF: CPT | Mod: 26,HCNC,, | Performed by: PATHOLOGY

## 2025-03-14 PROCEDURE — 86335 IMMUNFIX E-PHORSIS/URINE/CSF: CPT | Mod: HCNC | Performed by: STUDENT IN AN ORGANIZED HEALTH CARE EDUCATION/TRAINING PROGRAM

## 2025-03-17 LAB
INTERPRETATION UR IFE-IMP: NORMAL
PROT PATTERN UR ELPH-IMP: NORMAL

## 2025-03-18 LAB
PATHOLOGIST INTERPRETATION IFE: NORMAL
PATHOLOGIST INTERPRETATION SPE: NORMAL

## 2025-03-19 LAB
PATHOLOGIST INTERPRETATION UIFE: NORMAL
PATHOLOGIST INTERPRETATION UPE: NORMAL

## 2025-03-24 ENCOUNTER — CLINICAL SUPPORT (OUTPATIENT)
Dept: AUDIOLOGY | Facility: CLINIC | Age: 75
End: 2025-03-24
Payer: MEDICARE

## 2025-03-24 DIAGNOSIS — H90.3 ASYMMETRICAL SENSORINEURAL HEARING LOSS: Primary | ICD-10-CM

## 2025-03-24 NOTE — PROGRESS NOTES
HEARING AID FOLLOW-UP    Mr. Sharath Huizar, a 74 y.o. male, was seen in the clinic today for a hearing aid follow-up.      ** ITEMIZED PACKAGE: LAST DAY OF CARE 4/14/2025     Hearing Aid Details   & Model: Phonak Audeo L30-R  Color: [place]alwood       Rt SN: [id number]  Lt SN: N/A  Battery: Rechargeable  Rt  and Dome: 2P , small power dome  Repair Warranty Exp: 1/18/2028  L&D Warranty Exp: 1/18/2028   SN: [id number]          Hearing aids were cleaned and wax filters and domes were replaced. Listening check indicated good sound quality. Global gain set at 100% and completed real ear feedback test. Patient will follow up as needed.    Recommendations:  1.) Continue daily use of binaural amplification.  2.) Hearing aid follow-ups as needed  3.) Annual audiological evaluation or sooner if hearing changes  
Yes

## 2025-06-25 ENCOUNTER — TELEPHONE (OUTPATIENT)
Dept: ENDOSCOPY | Facility: HOSPITAL | Age: 75
End: 2025-06-25
Payer: MEDICARE

## 2025-06-25 VITALS — BODY MASS INDEX: 22.6 KG/M2 | WEIGHT: 140 LBS

## 2025-06-25 DIAGNOSIS — Z12.11 SPECIAL SCREENING FOR MALIGNANT NEOPLASMS, COLON: Primary | ICD-10-CM

## 2025-06-25 NOTE — PATIENT INSTRUCTIONS
Colonoscopy Procedure Prep Instructions    Date of procedure: 08/11/25 Arrive at: 06:30 AM    Location of Department:   [facility] [address] [place] [address]  Take the [facility] Elevators to 4th Floor Endoscopy Lab    As soon as possible:   your prep from pharmacy and over the counter DULCOLAX LAXATIVE TABLETS            On the day before your procedure   What You CAN do:   You may have clear liquids ONLY-see below for list.     Liquids That Are OK to Drink:   Water  Sports drinks (Gatorade, Power-Aid)  Coffee or tea (no cream or nondairy creamer)  Clear juices without pulp (apple, white grape)  Gelatin desserts (no fruit or toppings)  Clear soda (sprite, coke, ginger ale)  Chicken broth (until 12 midnight the night before procedure)    What You CANNOT do:   Do not EAT solid food, drink milk or anything   colored red.  Do not drink alcohol.  Do not take oral medications within 1 hour of starting   each dose of prep.  No gum chewing or candy morning of procedure                       Note:   (Please disregard the insert instructions from pharmacy).  PEG Bowel Prep is indicated for cleansing of the colon as a preparation for colonoscopy in adults.   Be sure to tell your doctor about all the medicines you take, including prescription and non-prescription medicines, vitamins, and herbal supplements. PEG Bowel Prep may affect how other medicines work.  Medication taken by mouth may not be absorbed properly when taken within 1 hour before the start of each dose of PEG Bowel Prep.    It is not uncommon to experience some abdominal cramping, nausea and/or vomiting when taking the prep. If you have nausea and/or vomiting while taking the prep, stop drinking for 20 to 30 minutes then continue.      How to take prep:    PEG Bowel Prep is a (2-day) prep.    One (1) bottle of prep are required for a complete preparation for colonoscopy. Dilute the solution concentrate as directed prior to  use. You must drink water with each dose of prep, and additional water after each dose.    DOSE 1--Day Before Colonoscopy 08/10/25     Drink at least 6 to 8 glasses of clear liquids from time you wake up until you begin your prep and then continue until bedtime to avoid dehydration.     12:00 pm (NOON) Mix your entire container of prep with lukewarm water and refrigerate. Take four (4) Dulcolax (Bisacodyl) tablets with at least 8 ounces or more of clear liquids.       6:00 pm:    You must complete Steps 1 and 2 below before going to bed:    Step 1-Drink half the liquid in the container within one (1) hour.   Step 2-Refrigerate the remaining half of the liquid for dose 2. See below when to begin this step.                       IMPORTANT: If you experience preparation-related symptoms (for example, nausea, bloating, or cramping), stop, or slow the rate of drinking the additional water until your symptoms decrease.    DOSE 2--Day of the Colonoscopy 08/11/25 at 2-3 AM.    For this dose, repeat Step 1 shown above using the remaining half of the liquid prep.   You may continue drinking water/clear liquids until   4 hours before your colonoscopy or as directed by the scheduling nurse  03:30 AM.      For information about your procedure, two (2) things to view prior to colonoscopy:  Please watch this informational video. It is important to watch this animated consent video prior to your arrival. If you haven't watched the video prior to arriving, you are required to watch it during admission which can causes delays.    Options for viewing:   Using a keyboard:  press and hold the control tab (Ctrl) and left mouse click to follow links.           Colonoscopy Instructional Video                                                                                   OR    Type link address into your web browser's address bar:  [email or web address]      Educational Booklet with pictures:      Colonoscopy Prep  - Liquid      Comments:          IMPORTANT INFORMATION TO KNOW BEFORE YOUR PROCEDURE    [facility] 4th Floor         If your procedure requires the administration of anesthesia, it is necessary for a responsible adult to drive you home. (Medical Transportation, Uber, Lyft, Taxi, etc. may ONLY be used if a responsible adult is present to accompany you home.  The responsible adult CAN'T be the  of the service).      person must be available to return to pick you up within 15 minutes of being notified of discharge.       Please bring a picture ID, insurance card, & copayment      Take Medications as directed below:      1) Stop taking Plavix (clopidogrel) 5 days (prior to the procedure) on:  08/06/25       If you begin taking any blood thinning medications, injectable weight loss/diabetes medications (other than insulin) , Adipex (Phentermine) , please contact the endoscopy scheduling department listed below as soon as possible.    If you are diabetic see the attached instruction sheet regarding your medication.     If you take HEART, BLOOD PRESSURE, SEIZURE, PAIN, LUNG (including inhalers/nebulizers), ANTI-REJECTION (transplant patients), or PSYCHIATRIC medications, please take at your regular times with a sip of water or as directed by the scheduling nurse.     Important contact information:    Endoscopy [phone] Hours of operation Monday-Friday 8:00-4:30pm.    Questions about insurance or financial obligations call [phone] or [phone].    If you have questions regarding the prep or need to reschedule, please call [phone]. After hours questions requiring immediate assistance, contact [facility] nurse line at [phone] or [phone].   NOTE:     On occasion, unforeseen circumstances may cause a delay in your procedure start time. We respect your time and appreciate your patience during these circumstances.      Comments:             Are you ready for your Colonoscopy?  __ If you take blood thinners,weight loss or diabetic injectable medications, have you stopped taking them according to your doctor's instructions before your procedures?  __ Have you stopped eating solid foods and followed a clear liquid diet a full day before your procedure or followed the diet   guidelines indicated in your instructions? REMINDER: NO BROTH AFTER MIDNIGHT THE DAY BEFORE PROCEDURE.   __ Have you completed all your prep solution? PLEASE DO NOT FOLLOW the insert/or box instructions from pharmacy)  __ Have you taken your blood pressure, heart, seizure, or other essential medications the morning of your procedure?  __ Have you planned for a ride to and from procedure? (Medical Transportation, Uber, Lyft, Taxi, etc. may ONLY be used if a responsible adult is present to accompany you home). The responsible adult CANNOT be the  of the service.  person must be available to return and pick you up within 15 minutes of being notified of discharge.               Questions or Concerns? Please call us! [phone] (M-F) [phone] (Nights and weekends)     Listed below are some helpful tips:     Please bring protective cases for eyewear and hearing aids-wear comfortable clothing/shoes.  Follow prep instructions closely so you don't have to do it twice.  Bowel prep is prescription used to clean out the colon before a colonoscopy. The prep increases movement of your colon by causing you to have diarrhea (loose stools). Cleaning out stool from the colon helps your doctor to see in your colon clearly during this procedure.   It is important to stay hydrated before, during and after bowel prep to prevent loss of fluid (dehydration). You can have water and your choice of clear liquids. Reminder: these liquids you will drink in addition to the bowel prep.      Preparing the mixture:     First, mix the prep with water.  Make the taste better by adding a sugar free  drink mix (Crystal Light) can improve the taste of your prep.   Use a large bore (opening) straw. Place towards the back of mouth (throat) as tolerated.  Prepare a prep mixture that is lightly chilled, but not ice-cold. Drinking a large amount of ice-cold liquid can make you feel very ill.  Avoid drinking any RED beverages or eating popsicles with this color for the 24 hours leading up to your procedure. This color can look like blood in the colon.     Consuming the prep:     Take your time. If you feel ill, take a 15-minute break from drinking the prep mixture  Combat hunger and dehydration with clear liquids. Options like JELL-O, or popsicles will help.  Settle in with good reading material. The goal is to clean out 6 feet of colon, so you can plan on spending a good deal of time in the bathroom. Have some good reading material on-hand or an iPad ready to keep yourself entertained!  Keep yourself comfortable. We recommend applying personal hygiene wipes, Tucks pads and a soothing ointment, like A&D ointment, Desitin, or Vaseline to your bottom before starting and as needed to protect your skin.

## 2025-06-25 NOTE — PLAN OF CARE
Patient is scheduled for a Colonoscopy on 08/11/25 with KARELY Doe  Referral for procedure from  [facility] referral (see Appts tab)      The patient is currently under an internal cardiologist, sarah Veronica. Is patient okay to hold blood thinner Plavix (clopidogrel) for their upcoming scheduled Colonoscopy on 08/11/25.

## 2025-07-02 ENCOUNTER — TELEPHONE (OUTPATIENT)
Dept: ENDOSCOPY | Facility: HOSPITAL | Age: 75
End: 2025-07-02
Payer: MEDICARE

## 2025-07-02 NOTE — TELEPHONE ENCOUNTER
----- Message from MARY Shafer sent at 2025  2:16 PM CDT -----  Regardin/11 BT/CL  The patient is currently under an internal cardiologist, sarah Veronica. Is patient okay to hold blood thinner Plavix (clopidogrel) for their upcoming scheduled Colonoscopy on 25.

## 2025-07-02 NOTE — TELEPHONE ENCOUNTER
Dear Dr Lombardi,    Patient has a scheduled procedure Colonoscopy on 8/11/25 and is currently taking a blood thinner. In order to ensure patient safety, we would like to confirm that the patient can place their blood thinner medication on hold for the procedure. Can he/she discontinue Plavix (clopidogrel) for a minimum of 5 days prior to the procedure?     Thank you for your prompt reply.    [facility] Endoscopy Scheduling

## 2025-07-31 ENCOUNTER — TELEPHONE (OUTPATIENT)
Dept: HEMATOLOGY/ONCOLOGY | Facility: CLINIC | Age: 75
End: 2025-07-31
Payer: MEDICARE

## 2025-07-31 DIAGNOSIS — Z85.118 HISTORY OF LUNG CANCER: Primary | ICD-10-CM

## 2025-07-31 DIAGNOSIS — E03.9 HYPOTHYROIDISM, UNSPECIFIED TYPE: ICD-10-CM

## 2025-07-31 NOTE — TELEPHONE ENCOUNTER
Attempted to contact patient to schedule 1 year f/u with labs and CT scan prior. No answer. Left voicemail and included call back number.

## 2025-08-05 ENCOUNTER — TELEPHONE (OUTPATIENT)
Dept: HEMATOLOGY/ONCOLOGY | Facility: CLINIC | Age: 75
End: 2025-08-05
Payer: MEDICARE

## 2025-08-11 ENCOUNTER — TELEPHONE (OUTPATIENT)
Dept: ENDOSCOPY | Facility: HOSPITAL | Age: 75
End: 2025-08-11
Payer: MEDICARE

## 2025-08-12 ENCOUNTER — TELEPHONE (OUTPATIENT)
Dept: HEMATOLOGY/ONCOLOGY | Facility: CLINIC | Age: 75
End: 2025-08-12
Payer: MEDICARE

## 2025-08-12 DIAGNOSIS — Z85.118 HISTORY OF LUNG CANCER: Primary | ICD-10-CM

## 2025-08-18 ENCOUNTER — OFFICE VISIT (OUTPATIENT)
Dept: FAMILY MEDICINE | Facility: CLINIC | Age: 75
End: 2025-08-18
Payer: MEDICARE

## 2025-08-18 ENCOUNTER — APPOINTMENT (OUTPATIENT)
Dept: RADIOLOGY | Facility: HOSPITAL | Age: 75
End: 2025-08-18
Attending: FAMILY MEDICINE
Payer: MEDICARE

## 2025-08-18 VITALS
DIASTOLIC BLOOD PRESSURE: 60 MMHG | TEMPERATURE: 98 F | RESPIRATION RATE: 18 BRPM | HEIGHT: 66 IN | HEART RATE: 113 BPM | OXYGEN SATURATION: 99 % | SYSTOLIC BLOOD PRESSURE: 100 MMHG | BODY MASS INDEX: 18.88 KG/M2 | WEIGHT: 117.5 LBS

## 2025-08-18 DIAGNOSIS — R06.02 SHORTNESS OF BREATH: ICD-10-CM

## 2025-08-18 DIAGNOSIS — R06.2 WHEEZING: ICD-10-CM

## 2025-08-18 DIAGNOSIS — J98.8 RESPIRATORY INFECTION: ICD-10-CM

## 2025-08-18 DIAGNOSIS — J98.8 RESPIRATORY INFECTION: Primary | ICD-10-CM

## 2025-08-18 PROCEDURE — 3074F SYST BP LT 130 MM HG: CPT | Mod: CPTII,HCNC,S$GLB, | Performed by: FAMILY MEDICINE

## 2025-08-18 PROCEDURE — 71046 X-RAY EXAM CHEST 2 VIEWS: CPT | Mod: 26,HCNC,, | Performed by: RADIOLOGY

## 2025-08-18 PROCEDURE — 99999 PR PBB SHADOW E&M-EST. PATIENT-LVL V: CPT | Mod: PBBFAC,HCNC,, | Performed by: FAMILY MEDICINE

## 2025-08-18 PROCEDURE — 99213 OFFICE O/P EST LOW 20 MIN: CPT | Mod: HCNC,S$GLB,, | Performed by: FAMILY MEDICINE

## 2025-08-18 PROCEDURE — 3288F FALL RISK ASSESSMENT DOCD: CPT | Mod: CPTII,HCNC,S$GLB, | Performed by: FAMILY MEDICINE

## 2025-08-18 PROCEDURE — 1159F MED LIST DOCD IN RCRD: CPT | Mod: CPTII,HCNC,S$GLB, | Performed by: FAMILY MEDICINE

## 2025-08-18 PROCEDURE — 1101F PT FALLS ASSESS-DOCD LE1/YR: CPT | Mod: CPTII,HCNC,S$GLB, | Performed by: FAMILY MEDICINE

## 2025-08-18 PROCEDURE — 1160F RVW MEDS BY RX/DR IN RCRD: CPT | Mod: CPTII,HCNC,S$GLB, | Performed by: FAMILY MEDICINE

## 2025-08-18 PROCEDURE — 3078F DIAST BP <80 MM HG: CPT | Mod: CPTII,HCNC,S$GLB, | Performed by: FAMILY MEDICINE

## 2025-08-18 PROCEDURE — 3008F BODY MASS INDEX DOCD: CPT | Mod: CPTII,HCNC,S$GLB, | Performed by: FAMILY MEDICINE

## 2025-08-18 PROCEDURE — 1126F AMNT PAIN NOTED NONE PRSNT: CPT | Mod: CPTII,HCNC,S$GLB, | Performed by: FAMILY MEDICINE

## 2025-08-18 PROCEDURE — 87637 SARSCOV2&INF A&B&RSV AMP PRB: CPT | Mod: HCNC | Performed by: FAMILY MEDICINE

## 2025-08-18 PROCEDURE — 71046 X-RAY EXAM CHEST 2 VIEWS: CPT | Mod: TC,HCNC,PN

## 2025-08-18 RX ORDER — PROMETHAZINE HYDROCHLORIDE AND DEXTROMETHORPHAN HYDROBROMIDE 6.25; 15 MG/5ML; MG/5ML
5 SYRUP ORAL
Qty: 240 ML | Refills: 0 | Status: ON HOLD | OUTPATIENT
Start: 2025-08-18

## 2025-08-18 RX ORDER — ACETAMINOPHEN AND CODEINE PHOSPHATE 300; 30 MG/1; MG/1
1 TABLET ORAL EVERY 4 HOURS PRN
Qty: 30 TABLET | Refills: 0 | Status: SHIPPED | OUTPATIENT
Start: 2025-08-18 | End: 2025-08-25

## 2025-08-18 RX ORDER — ALBUTEROL SULFATE 90 UG/1
2 INHALANT RESPIRATORY (INHALATION) EVERY 4 HOURS PRN
Qty: 18 G | Refills: 0 | Status: ON HOLD | OUTPATIENT
Start: 2025-08-18 | End: 2025-09-17

## 2025-08-19 ENCOUNTER — TELEPHONE (OUTPATIENT)
Dept: FAMILY MEDICINE | Facility: CLINIC | Age: 75
End: 2025-08-19
Payer: MEDICARE

## 2025-08-19 LAB
FLUAV AG UPPER RESP QL IA.RAPID: NEGATIVE
FLUBV AG UPPER RESP QL IA.RAPID: NEGATIVE
RSV A 5' UTR RNA NPH QL NAA+PROBE: NEGATIVE
SARS-COV-2 RNA RESP QL NAA+PROBE: NEGATIVE

## 2025-08-20 ENCOUNTER — TELEPHONE (OUTPATIENT)
Dept: FAMILY MEDICINE | Facility: CLINIC | Age: 75
End: 2025-08-20
Payer: MEDICARE

## 2025-08-21 ENCOUNTER — TELEPHONE (OUTPATIENT)
Dept: ENDOSCOPY | Facility: HOSPITAL | Age: 75
End: 2025-08-21
Payer: MEDICARE

## 2025-08-21 DIAGNOSIS — Z12.11 SPECIAL SCREENING FOR MALIGNANT NEOPLASMS, COLON: Primary | ICD-10-CM

## 2025-08-21 RX ORDER — SODIUM, POTASSIUM,MAG SULFATES 17.5-3.13G
1 SOLUTION, RECONSTITUTED, ORAL ORAL DAILY
Qty: 1 KIT | Refills: 0 | Status: SHIPPED | OUTPATIENT
Start: 2025-10-08 | End: 2025-10-10

## 2025-08-22 ENCOUNTER — HOSPITAL ENCOUNTER (OUTPATIENT)
Dept: RADIOLOGY | Facility: HOSPITAL | Age: 75
Discharge: HOME | End: 2025-08-22
Attending: INTERNAL MEDICINE
Payer: MEDICARE

## 2025-08-22 ENCOUNTER — OFFICE VISIT (OUTPATIENT)
Dept: OTOLARYNGOLOGY | Facility: CLINIC | Age: 75
End: 2025-08-22
Payer: MEDICARE

## 2025-08-22 VITALS
TEMPERATURE: 99 F | SYSTOLIC BLOOD PRESSURE: 128 MMHG | HEIGHT: 66 IN | WEIGHT: 115.88 LBS | DIASTOLIC BLOOD PRESSURE: 78 MMHG | BODY MASS INDEX: 18.62 KG/M2

## 2025-08-22 DIAGNOSIS — J34.3 HYPERTROPHY OF INFERIOR NASAL TURBINATE: ICD-10-CM

## 2025-08-22 DIAGNOSIS — J01.00 ACUTE NON-RECURRENT MAXILLARY SINUSITIS: ICD-10-CM

## 2025-08-22 DIAGNOSIS — R07.0 THROAT PAIN IN ADULT: ICD-10-CM

## 2025-08-22 DIAGNOSIS — J38.4 LARYNGEAL EDEMA: ICD-10-CM

## 2025-08-22 DIAGNOSIS — R09.82 POSTNASAL DRIP: ICD-10-CM

## 2025-08-22 DIAGNOSIS — T66.XXXS ADVERSE EFFECT OF RADIATION, SEQUELA: ICD-10-CM

## 2025-08-22 DIAGNOSIS — Z85.118 HISTORY OF LUNG CANCER: ICD-10-CM

## 2025-08-22 DIAGNOSIS — Z85.21 HISTORY OF CANCER OF LARYNX: Primary | ICD-10-CM

## 2025-08-22 PROCEDURE — 3074F SYST BP LT 130 MM HG: CPT | Mod: CPTII,S$GLB,, | Performed by: OTOLARYNGOLOGY

## 2025-08-22 PROCEDURE — 3078F DIAST BP <80 MM HG: CPT | Mod: CPTII,S$GLB,, | Performed by: OTOLARYNGOLOGY

## 2025-08-22 PROCEDURE — 71250 CT THORAX DX C-: CPT | Mod: TC,HCNC

## 2025-08-22 PROCEDURE — 71250 CT THORAX DX C-: CPT | Mod: 26,HCNC,, | Performed by: RADIOLOGY

## 2025-08-22 PROCEDURE — 99214 OFFICE O/P EST MOD 30 MIN: CPT | Mod: 25,S$GLB,, | Performed by: OTOLARYNGOLOGY

## 2025-08-22 PROCEDURE — 3008F BODY MASS INDEX DOCD: CPT | Mod: CPTII,S$GLB,, | Performed by: OTOLARYNGOLOGY

## 2025-08-22 PROCEDURE — 31575 DIAGNOSTIC LARYNGOSCOPY: CPT | Mod: S$GLB,,, | Performed by: OTOLARYNGOLOGY

## 2025-08-22 RX ORDER — AZELASTINE 1 MG/ML
1 SPRAY, METERED NASAL 2 TIMES DAILY
Qty: 30 ML | Refills: 3 | Status: ON HOLD | OUTPATIENT
Start: 2025-08-22

## 2025-08-22 RX ORDER — FLUTICASONE PROPIONATE 50 MCG
2 SPRAY, SUSPENSION (ML) NASAL 2 TIMES DAILY
Qty: 18.2 ML | Refills: 3 | Status: ON HOLD | OUTPATIENT
Start: 2025-08-22

## 2025-08-22 RX ORDER — AZITHROMYCIN 250 MG/1
TABLET, FILM COATED ORAL
Qty: 6 TABLET | Refills: 0 | Status: ON HOLD | OUTPATIENT
Start: 2025-08-22 | End: 2025-08-27

## 2025-08-27 PROBLEM — I25.2 HISTORY OF NON-ST ELEVATION MYOCARDIAL INFARCTION (NSTEMI): Status: ACTIVE | Noted: 2018-03-23

## 2025-08-27 PROBLEM — I50.23 ACUTE ON CHRONIC SYSTOLIC HEART FAILURE: Status: ACTIVE | Noted: 2018-03-25

## 2025-08-27 PROBLEM — Z85.21 HISTORY OF CANCER OF LARYNX: Chronic | Status: ACTIVE | Noted: 2020-05-05

## 2025-08-27 PROBLEM — N17.9 ACUTE RENAL FAILURE: Status: ACTIVE | Noted: 2025-08-27

## 2025-08-27 PROBLEM — I25.2 HISTORY OF NON-ST ELEVATION MYOCARDIAL INFARCTION (NSTEMI): Chronic | Status: ACTIVE | Noted: 2018-03-23

## 2025-08-29 PROBLEM — R33.9 BLADDER RETENTION: Status: ACTIVE | Noted: 2025-08-29

## 2025-08-29 PROBLEM — R50.9 FEVER: Status: ACTIVE | Noted: 2025-08-29

## 2025-08-29 PROBLEM — Z71.89 ACP (ADVANCE CARE PLANNING): Status: ACTIVE | Noted: 2025-08-29

## 2025-08-29 PROBLEM — N39.0 UTI (URINARY TRACT INFECTION): Status: ACTIVE | Noted: 2025-08-29

## 2025-08-29 PROBLEM — E79.0 ELEVATED URIC ACID IN BLOOD: Status: ACTIVE | Noted: 2025-08-29

## 2025-09-01 PROBLEM — R53.1 GENERALIZED WEAKNESS: Status: ACTIVE | Noted: 2025-09-01

## 2025-09-02 ENCOUNTER — DOCUMENTATION ONLY (OUTPATIENT)
Facility: CLINIC | Age: 75
End: 2025-09-02
Payer: MEDICARE

## 2025-09-02 DIAGNOSIS — R06.02 SOB (SHORTNESS OF BREATH): Primary | ICD-10-CM

## 2025-09-02 PROBLEM — D64.9 NORMOCYTIC ANEMIA: Status: ACTIVE | Noted: 2025-09-02

## 2025-09-02 PROBLEM — I25.2 HISTORY OF NON-ST ELEVATION MYOCARDIAL INFARCTION (NSTEMI): Chronic | Status: RESOLVED | Noted: 2018-03-23 | Resolved: 2025-09-02

## 2025-09-02 PROBLEM — D69.6 THROMBOCYTOPENIA: Status: ACTIVE | Noted: 2025-09-02

## 2025-09-03 ENCOUNTER — TELEPHONE (OUTPATIENT)
Dept: NEPHROLOGY | Facility: CLINIC | Age: 75
End: 2025-09-03
Payer: MEDICARE

## (undated) DEVICE — SHEET DRAPE FAN-FOLDED 3/4

## (undated) DEVICE — SPONGE GAUZE 16PLY 4X4

## (undated) DEVICE — ELECTRODE REM PLYHSV RETURN 9

## (undated) DEVICE — SPONGE PATTY SURGICAL .5X3IN

## (undated) DEVICE — SEE MEDLINE ITEM 152622

## (undated) DEVICE — SEE MEDLINE ITEM 152487

## (undated) DEVICE — GAUZE SPONGE 4X4 12PLY

## (undated) DEVICE — CONTAINER SPECIMEN STRL 4OZ

## (undated) DEVICE — KIT PEG PULL STANDARD 20F

## (undated) DEVICE — LUBRICANT SURGILUBE 2 OZ

## (undated) DEVICE — SEE MEDLINE ITEM 107746

## (undated) DEVICE — SEE L#95700

## (undated) DEVICE — CANISTER SUCTION 2 LTR

## (undated) DEVICE — SEE MEDLINE ITEM 146313

## (undated) DEVICE — GLOVE SURGICAL LATEX SZ 6.5

## (undated) DEVICE — KIT ANTIFOG

## (undated) DEVICE — SUPPORT ULNA NERVE PROTECTOR

## (undated) DEVICE — SEE MEDLINE ITEM 157117

## (undated) DEVICE — SEE MEDLINE ITEM 154981

## (undated) DEVICE — SEE L#120831

## (undated) DEVICE — DRESSING TELFA N ADH 3X8